# Patient Record
Sex: FEMALE | Race: WHITE | HISPANIC OR LATINO | Employment: OTHER | URBAN - METROPOLITAN AREA
[De-identification: names, ages, dates, MRNs, and addresses within clinical notes are randomized per-mention and may not be internally consistent; named-entity substitution may affect disease eponyms.]

---

## 2017-02-01 ENCOUNTER — TELEPHONE (OUTPATIENT)
Dept: UROLOGY | Facility: CLINIC | Age: 72
End: 2017-02-01

## 2017-02-01 ENCOUNTER — OFFICE VISIT (OUTPATIENT)
Dept: UROLOGY | Facility: CLINIC | Age: 72
End: 2017-02-01
Payer: MEDICARE

## 2017-02-01 VITALS
DIASTOLIC BLOOD PRESSURE: 71 MMHG | SYSTOLIC BLOOD PRESSURE: 119 MMHG | BODY MASS INDEX: 26.63 KG/M2 | HEIGHT: 65 IN | HEART RATE: 73 BPM | WEIGHT: 159.81 LBS

## 2017-02-01 DIAGNOSIS — R31.29 HEMATURIA, MICROSCOPIC: Primary | ICD-10-CM

## 2017-02-01 DIAGNOSIS — N95.2 VAGINAL ATROPHY: Primary | ICD-10-CM

## 2017-02-01 DIAGNOSIS — R39.89 URETHRAL PAIN: ICD-10-CM

## 2017-02-01 DIAGNOSIS — R93.89 ABNORMAL CT SCAN: ICD-10-CM

## 2017-02-01 DIAGNOSIS — N13.30 HYDRONEPHROSIS, RIGHT: ICD-10-CM

## 2017-02-01 PROCEDURE — 99999 PR PBB SHADOW E&M-EST. PATIENT-LVL III: CPT | Mod: PBBFAC,,, | Performed by: UROLOGY

## 2017-02-01 PROCEDURE — 99215 OFFICE O/P EST HI 40 MIN: CPT | Mod: 25,S$GLB,, | Performed by: UROLOGY

## 2017-02-01 PROCEDURE — 87077 CULTURE AEROBIC IDENTIFY: CPT

## 2017-02-01 PROCEDURE — 3078F DIAST BP <80 MM HG: CPT | Mod: S$GLB,,, | Performed by: UROLOGY

## 2017-02-01 PROCEDURE — 81002 URINALYSIS NONAUTO W/O SCOPE: CPT | Mod: S$GLB,,, | Performed by: UROLOGY

## 2017-02-01 PROCEDURE — 1157F ADVNC CARE PLAN IN RCRD: CPT | Mod: S$GLB,,, | Performed by: UROLOGY

## 2017-02-01 PROCEDURE — 3074F SYST BP LT 130 MM HG: CPT | Mod: S$GLB,,, | Performed by: UROLOGY

## 2017-02-01 PROCEDURE — 87186 SC STD MICRODIL/AGAR DIL: CPT

## 2017-02-01 PROCEDURE — 1160F RVW MEDS BY RX/DR IN RCRD: CPT | Mod: S$GLB,,, | Performed by: UROLOGY

## 2017-02-01 PROCEDURE — 1159F MED LIST DOCD IN RCRD: CPT | Mod: S$GLB,,, | Performed by: UROLOGY

## 2017-02-01 PROCEDURE — 1126F AMNT PAIN NOTED NONE PRSNT: CPT | Mod: S$GLB,,, | Performed by: UROLOGY

## 2017-02-01 PROCEDURE — 87086 URINE CULTURE/COLONY COUNT: CPT

## 2017-02-01 PROCEDURE — 87088 URINE BACTERIA CULTURE: CPT

## 2017-02-01 NOTE — MR AVS SNAPSHOT
[facility] 4th Floor  [address]  [place] [address]  Phone: [phone]                  Aminah Alicea-Dermody   2017 9:40 AM   Office Visit    Description:  Female : 1945   Provider:  Mary Murray MD   Department:  [facility] - Urology 4th Floor           Reason for Visit     Dysuria           Diagnoses this Visit        Comments    Vaginal atrophy    -  Primary     Urethral pain         Hydronephrosis, right                To Do List           Goals (5 Years of Data)     None       These Medications        Disp Refills Start End    conjugated estrogens (PREMARIN) vaginal cream 30 g 3 2017 3/3/2017    Place 0.5 g vaginally 3 (three) times a week. - Vaginal    Pharmacy: [facility] #[id number] - [place], [place] - [address]  #: [phone]         [facility]     [facility] Care [facility] -  Assistance  Registered nurses in the [facility] provide clinical advisement, health education, appointment booking, and other advisory services.  Call for this free service at [phone].             Medications           Message regarding Medications     Verify the changes and/or additions to your medication regime listed below are the same as discussed with your clinician today.  If any of these changes or additions are incorrect, please notify your healthcare provider.        START taking these NEW medications        Refills    conjugated estrogens (PREMARIN) vaginal cream 3    Sig: Place 0.5 g vaginally 3 (three) times a week.    Class: Normal    Route: Vaginal           Verify that the below list of medications is an accurate representation of the medications you are currently taking.  If none reported, the list may be blank. If incorrect, please contact your healthcare provider. Carry this list with you in case of emergency.           Current Medications     alcohol swabs (ALCOHOL PADS) PadM Apply 1 each topically as  "needed.    aspirin (ECOTRIN) 81 MG EC tablet Take 81 mg by mouth once daily.    biotin 1 mg tablet Take 1,000 mcg by mouth 3 (three) times daily.    blood sugar diagnostic (ACCU-CHEK SMARTVIEW TEST STRIP) Strp To use as directed with AccuChek Shara meter and monitor blood sugar daily    blood-glucose meter Misc Accu Check Sahra Smartview meter    carvedilol (COREG) 12.5 MG tablet Take 1 tablet (12.5 mg total) by mouth 2 (two) times daily.    clotrimazole-betamethasone 1-0.05% (LOTRISONE) cream Apply topically 2 (two) times daily.    cyclobenzaprine (FLEXERIL) 10 MG tablet Take 1 tablet (10 mg total) by mouth 3 (three) times daily as needed for Muscle spasms.    econazole nitrate 1 % cream AAA bid after cool blow dry when flared    gabapentin (NEURONTIN) 300 MG capsule Take 1 capsule (300 mg total) by mouth every evening.    lancets (ACCU-CHEK MULTICLIX LANCET) Misc To use as directed with Accu Chek Sahra FastClix lancing device    levothyroxine (SYNTHROID) 112 MCG tablet Take 1 tablet (112 mcg total) by mouth before breakfast.    lisinopril (PRINIVIL,ZESTRIL) 40 MG tablet Take 1 tablet (40 mg total) by mouth once daily.    metformin (GLUCOPHAGE) 1000 MG tablet Take 1 tablet (1,000 mg total) by mouth 2 (two) times daily with meals.    pravastatin (PRAVACHOL) 80 MG tablet Take 1 tablet (80 mg total) by mouth once daily.    conjugated estrogens (PREMARIN) vaginal cream Place 0.5 g vaginally 3 (three) times a week.    fluocinonide (LIDEX) 0.05 % external solution Apply topically 2 (two) times daily.           Clinical Reference Information           Vital Signs - Last Recorded  Most recent update: 2/1/2017  9:44 AM by Anitha Quiroz LPN    BP Pulse Ht Wt BMI    119/71 73 5' 5" (1.651 m) 72.5 kg (159 lb 13.3 oz) 26.6 kg/m2      Blood Pressure          Most Recent Value    BP  119/71      Allergies as of 2/1/2017     Bufferin [Aspirin, Buffered]    Ibuprofen      Immunizations Administered on Date of Encounter - 2/1/2017     " None      [facility] Sign-Up     Activating your [facility] account is as easy as 1-2-3!     1) Visit [email or web address], select Sign Up Now, enter this activation code and your date of birth, then select Next.  [email or web address]  Expires: 3/18/2017 10:20 AM      2) Create a username and password to use when you visit [facility] in the future and select a security question in case you lose your password and select Next.    3) Enter your e-mail address and click Sign Up!    Additional Information  If you have questions, please e-mail [email or web address] or call [phone] to talk to our [facility] staff. Remember, [facility] is NOT to be used for urgent needs. For medical emergencies, dial 911.

## 2017-02-01 NOTE — PROGRESS NOTES
CHIEF COMPLAINT:    Mrs. Norton is a 71 y.o. female presenting for a consultation at the request of Dr. Dereje Thomas. Patient presents with dysuria.    PRESENTING ILLNESS:    Aminah Norton is a 71 y.o. female who states that she has a history of a burning sensation, deep within the pelvis.  Started in October.  She also notes that the urinary flow is not very good.  She has been checked for a UTI but was found to have no growth.  Review of her chart reveals that she saw Dr. Hannah last fall for microscopic hematuria and a CT urogram was obtained which showed she was supposed to have cystoscopy, RGPG, possible ureteroscopy, possible ureteral biopsy with fulguration and a stent, however, this was not done.  The patient denies any gross hematuria.  With regards to risk factors, she is a non smoker, she denies any chemotherapy or radiation therapy.      REVIEW OF SYSTEMS:    Review of Systems   Constitutional: Negative.    HENT: Negative.    Eyes:        Dry eyes   Respiratory: Negative.    Cardiovascular: Negative.    Gastrointestinal: Negative for diarrhea.   Genitourinary: Positive for dysuria.   Musculoskeletal: Positive for back pain.   Skin: Negative.    Neurological: Negative.    Endo/Heme/Allergies: Negative.    Psychiatric/Behavioral: Negative.      PATIENT HISTORY:    Past Medical History   Diagnosis Date    Arthritis     Cataract      left eye    Choroidal rupture of left eye     Diabetes mellitus type II     GERD (gastroesophageal reflux disease)     Hyperlipidemia     Hypertension     Hypothyroidism     Macular scar      right eye    Retinal degeneration      chorioretinal OD    Thyroid disease     Vitamin B 12 deficiency        Past Surgical History   Procedure Laterality Date    Total abdominal hysterectomy       DUB    Cholecystectomy      Bladder suspension      Cataract extraction       right eye        Family History   Problem Relation Age of Onset     Cancer Mother      uterine    COPD Father     Cancer Sister      liver    Hypertension Daughter     Thyroid disease Daughter     Diabetes Son     Hypertension Sister     Hypertension Sister     Cancer Daughter      uterine    Thyroid disease Daughter     Hypertension Daughter        Social History     Social History    Marital status:      Social History Main Topics    Smoking status: Never Smoker    Smokeless tobacco: Never Used    Alcohol use No    Drug use: No    Sexual activity: Yes     Partners: Male     Birth control/ protection: Post-menopausal      Comment:         Social History Narrative     with 7 kids       Allergies:  Bufferin [aspirin, buffered] and Ibuprofen    Medications:  Outpatient Encounter Prescriptions as of 2/1/2017   Medication Sig Dispense Refill    alcohol swabs (ALCOHOL PADS) PadM Apply 1 each topically as needed. 100 each 3    aspirin (ECOTRIN) 81 MG EC tablet Take 81 mg by mouth once daily.      biotin 1 mg tablet Take 1,000 mcg by mouth 3 (three) times daily.      blood sugar diagnostic (ACCU-CHEK SMARTVIEW TEST STRIP) Strp To use as directed with AccuChek Sahra meter and monitor blood sugar daily 100 strip 3    blood-glucose meter Misc Accu Check Sahra Smartview meter 1 each 0    carvedilol (COREG) 12.5 MG tablet Take 1 tablet (12.5 mg total) by mouth 2 (two) times daily. 180 tablet 3    clotrimazole-betamethasone 1-0.05% (LOTRISONE) cream Apply topically 2 (two) times daily. 45 g 1    cyclobenzaprine (FLEXERIL) 10 MG tablet Take 1 tablet (10 mg total) by mouth 3 (three) times daily as needed for Muscle spasms. 30 tablet 1    econazole nitrate 1 % cream AAA bid after cool blow dry when flared 85 g 2    gabapentin (NEURONTIN) 300 MG capsule Take 1 capsule (300 mg total) by mouth every evening. 90 capsule 3    lancets (ACCU-CHEK MULTICLIX LANCET) Misc To use as directed with Accu Chek Sahra FastClix lancing device 100 each 2    levothyroxine  (SYNTHROID) 112 MCG tablet Take 1 tablet (112 mcg total) by mouth before breakfast. 90 tablet 3    lisinopril (PRINIVIL,ZESTRIL) 40 MG tablet Take 1 tablet (40 mg total) by mouth once daily. 90 tablet 3    metformin (GLUCOPHAGE) 1000 MG tablet Take 1 tablet (1,000 mg total) by mouth 2 (two) times daily with meals. 180 tablet 3    pravastatin (PRAVACHOL) 80 MG tablet Take 1 tablet (80 mg total) by mouth once daily. 90 tablet 3    conjugated estrogens (PREMARIN) vaginal cream Place 0.5 g vaginally 3 (three) times a week. 30 g 3    fluocinonide (LIDEX) 0.05 % external solution Apply topically 2 (two) times daily. 60 mL 1     No facility-administered encounter medications on file as of 2/1/2017.          PHYSICAL EXAMINATION:    The patient generally appears in good health, is appropriately interactive, and is in no apparent distress.    Skin: No lesions.    Mental: Cooperative with normal affect.    Neuro: Grossly intact.    HEENT: Normal. No evidence of lymphadenopathy.    Chest: Clear to ascultation bilaterally, normal inspiratory effort.    Heart: Regular rhythm.  No murmurs    Abdomen: BS active. Soft, non-tender. No masses or organomegaly. Bladder is not palpable. No evidence of flank discomfort. No evidence of inguinal hernia.    Extremities: No clubbing, cyanosis, or edema    Normal external female genitalia  Grade II urogenital atrophy  Urethral meatus is normal  Urethra and bladder are nontender to bimanual exam  Well supported anteriorly and posteriorly   Uterus and cervix are normal  No adnexal masses    LABS:    Reviewed the CT scan 10/11/2016 with the patient (images and report)  Explained the abnormality and the rationale for performing cysto, B RGPG,, ureteroscopy, possible ureteral biopsy and fulguration, ureteral stent.      IMPRESSION:    Encounter Diagnoses   Name Primary?    Vaginal atrophy Yes    Urethral pain     Hydronephrosis, right        PLAN:    1.  Catheterized specimen sent for  culture  2.  Consent signed for cystoscopy, bilateral retrograde pyelogram, possible ureteroscopy, ureteral biopsy and fulguration and stent  3.  Premarin cream.  MOA was discussed    Copy to:  Dr. Thomas

## 2017-02-01 NOTE — LETTER
February 1, 2017      Dereje Thomas MD  [address]  [facility] [address]           [facility] - Urology 4th Floor  [address]  [facility] [address]  Phone: [phone]          Patient: Aminah Norton   MR Number: [id number]   YOB: 1945   Date of Visit: 2/1/2017       Dear Dr. Dereje Thomas:    Thank you for referring Aminah Norton to me for evaluation. Attached you will find relevant portions of my assessment and plan of care.    If you have questions, please do not hesitate to call me. I look forward to following Aminah Norton along with you.    Sincerely,    Mary Murray MD    Enclosure  CC:  No Recipients    If you would like to receive this communication electronically, please contact [email or web address] or [phone] to request more information on [employer] access.    For providers and/or their staff who would like to refer a patient to [facility], please contact us through our one-stop-shop provider referral line, [facility], at [phone].    If you feel you have received this communication in error or would no longer like to receive these types of communications, please e-mail [email or web address]

## 2017-02-03 LAB — BACTERIA UR CULT: NORMAL

## 2017-02-06 ENCOUNTER — TELEPHONE (OUTPATIENT)
Dept: UROLOGY | Facility: CLINIC | Age: 72
End: 2017-02-06

## 2017-02-06 DIAGNOSIS — N30.90 BLADDER INFECTION: Primary | ICD-10-CM

## 2017-02-06 RX ORDER — SULFAMETHOXAZOLE AND TRIMETHOPRIM 800; 160 MG/1; MG/1
1 TABLET ORAL 2 TIMES DAILY
Qty: 14 TABLET | Refills: 0 | Status: SHIPPED | OUTPATIENT
Start: 2017-02-06 | End: 2017-02-13

## 2017-02-06 NOTE — TELEPHONE ENCOUNTER
Left message on the patient's number [phone] that a prescription for Bactrim DS was sent to her [facility] pharmacy.

## 2017-02-24 ENCOUNTER — ANESTHESIA EVENT (OUTPATIENT)
Dept: SURGERY | Facility: HOSPITAL | Age: 72
End: 2017-02-24
Payer: MEDICARE

## 2017-02-24 NOTE — ANESTHESIA PREPROCEDURE EVALUATION
Pre Admission Screening  Hayley Sampson RN      []Hide copied text  Anesthesia Assessment: Preoperative EQUATION     Planned Procedure: Procedure(s) (LRB):  CYSTOSCOPY WITH RETROGRADE PYELOGRAM (Bilateral)  PLACEMENT-STENT (Right)  URETEROSCOPY (Right)  BIOPSY-URETER (Right)  Requested Anesthesia Type:General  Surgeon: Mary Murray MD  Service: Urology  Known or anticipated Date of Surgery:3/7/2017     Surgeon notes: reviewed     Electronic QUestionnaire Assessment completed via nurse interview with patient.         Aminah Norton [[id number]] - 71 y.o. Female         Providers Outside of [facility]       No data to display        Surgical Risk Level       Surgical Risk Level:   2              caRDScore (Clinical Anesthesia Rapid Decision Score)         Low  Total Score: 14       14 Sum of Clinical Scores        caRDScores (Grouped)       caRDScore - Ane:   1                       caRDScore - CVD:   3                       caRDScore - Pul:   0                       caRDScore - Met:   5                       caRDScore - Phy:   5              caRDScore Items             Pre-admit from 3/7/2017 in [facility]      Anesthesia        Has GERD, hiatal hernia, or chronic heartburn/dyspepsia requiring Rx some or all times   Yes      CVD        Activity similar to best ability for maximal activity or exercise   METS 4      Diagnosed with high blood pressure   Yes      Typical BP runs <150/90   Yes      PVD in abdomen or legs   Yes      Pulmonary        Metabolic        Type 2 Diabetes   Yes      Is on oral Rx and/or non-insulin injectable for diabetes   Yes      Thyroid disease (Specify)   Yes [hypothyroidism]      Problem well controlled by med or other Rx   Yes      Has hyperlipoproteinemia   Yes      Physiologic        Obesity Status   Not Obese (BMI <30)      Having abnormal bleeding (intestinal, urinary, vaginal, coughing up blood, etc.)   Yes [microscopic hematuria]      Aspirin  (taken because I have stents)   Yes        Flags       Red Flag Score:   1                       Yellow Flag Score:   3              Red Flags             Pre-admit from 3/7/2017 in [facility]      Obesity Status   Not Obese (BMI <30)      Aspirin (taken because I have stents)   Yes        Yellow Flags             Pre-admit from 3/7/2017 in [facility]      Obesity Status   Not Obese (BMI <30)      Having abnormal bleeding (intestinal, urinary, vaginal, coughing up blood, etc.)   Yes [microscopic hematuria]      Aspirin   Yes      Has pain   Yes        PONV Risk Score (assumes periop narcotic use = +1, Max=4)       PONV Risk Score:   3              PONV Risk Factors  Total Score: 2       1 Female      1 Non-Smoker at present        Sleep Apnea  Total Score: 0         MATTHEW STOP-Bang Risk Factors (Max=8)  Total Score: 2       1 Takes medication for high blood pressure      1 Age >50        MATTHEW Risk Level - 1 (Low), 2 (Moderate), 3 (High)       MATTHEW Risk Level:   1              RCRI (Revised Cardiac Risk Indices of ACC/AHA guidelines, Max=6)  Total Score: 0         CAD Risk Factors  Total Score: 5       1 Female. Age >55      1 Diagnosed with high blood pressure      1 Has/has had non-cardiac arterial blockages or aneurysm (PVD) now or in the past      1 Has Diabetes      1 Has hyperlipoproteinemia        CHADS Score if applicable (history of atrial fib/flutter, Max=6)  Total Score: 2       1 Diagnosed with high blood pressure      1 Has Diabetes        Maximal Exercise Capacity             Pre-admit from 3/7/2017 in [facility]      Maximal Exercise Capacity   METS 4        Summary of Dependence  Total Score: 1       1 Is totally independent of others for activities of daily living        Phone Fraility Score (Max = 17)  Total Score: 1       1 Uses 5 or more meds on reg basis        Pain Factors             [facility]      Has pain   Yes      Location and description of pain   abdominal pain      Typical Pain Scores   0 to 4        Risk Triggers (Evidence-Based Risk Triggers)         Pulmonary Risk Triggers  Total Score: 1       1 Age > or = 60        Renal Risk Triggers  Total Score: 3       1 Diagnosed with high blood pressure      1 PVD in abdomen or legs      1 Type 2 Diabetes        Delirium Risk Triggers  Total Score: 0         Urologic Risk Triggers  Total Score: 0         Logistics         Pre-op Clinic Logistics  Total Score: 5       1 Has had anesthesia, either as adult or as a child      3 Aspirin (taken because I have stents)      1 Takes medication for high blood pressure        DOSC Logistics  Total Score: 1       1 Aspirin (taken because I have stents)        Discharge Logistics  Total Score: 0         Discharge Planning             Pre-admit from 3/7/2017 in [facility]      Discharge Planning        Will assist patient 24/7, if needed   daughter      Who will transport you to therapy, if need   daughter        Anes <For office use only>       Total Score: 12          Surgical Risk Level Assessment                       Triage considerations:      The patient has no apparent active cardiac condition (No unstable coronary Syndrome such as severe unstable angina or recent [<1 month] myocardial infarction, decompensated CHF, severe valvular disease or significant arrhythmia)     Previous anesthesia records:GETA, MAC, No problems and Not available     Last PCP note: within 3 months , within [facility]  12/28/16 Dr Thomas  Subspecialty notes: Cardiology: General, Endocrinology     Other important co-morbidities: HTN/HLP, Hypothyroidism, DM2, GERD, mild anemia      Tests already available:  Available tests, 3-6 months ago . 11/2016 TSH, A1c, CBC, CMP. 2014 EKG      Instructions given. (See in Nurse's note)     Optimization:  Anesthesia Preop Clinic Assessment Indicated-not required for  this surgery      Plan:   Testing: BMP  Patient has previously scheduled Medical Appointment:none     Navigation: Tests Scheduled. Am of surgery  Straight Line to surgery.       Electronically signed by Hayley Sampson RN at 2/24/2017 11:23 AM        Pre-admit on 3/7/2017              Detailed Report                                                                                                                        02/24/2017  Aminah Norton is a 71 y.o., female.    [facility] Anesthesia Evaluation    I have reviewed the Patient Summary Reports.    I have reviewed the Nursing Notes.   I have reviewed the Medications.     Review of Systems  Anesthesia Hx:  No problems with previous Anesthesia    Social:  Non-Smoker, No Alcohol Use    Hematology/Oncology:  Hematology Normal   Oncology Normal     EENT/Dental:EENT/Dental Normal   Cardiovascular:   Exercise tolerance: good Hypertension NYHA Classification I    Pulmonary:  Pulmonary Normal    Renal/:  Renal/ Normal     Hepatic/GI:   GERD, well controlled    Musculoskeletal:  Musculoskeletal Normal    Neurological:  Neurology Normal    Endocrine:   Diabetes, poorly controlled, type 2 Hypothyroidism    Dermatological:  Skin Normal    Psych:  Psychiatric Normal           Physical Exam  General:  Well nourished, Obesity    Airway/Jaw/Neck:  Airway Findings: Mouth Opening: Normal Tongue: Normal  General Airway Assessment: Adult  Mallampati: II  Improves to II with phonation.  TM Distance: Normal, at least 6 cm        Eyes/Ears/Nose:  EYES/EARS/NOSE FINDINGS: Normal   Dental:  DENTAL FINDINGS: Normal   Chest/Lungs:  Chest/Lungs Findings: Clear to auscultation, Normal Respiratory Rate     Heart/Vascular:  Heart Findings: Rate: Normal  Rhythm: Regular Rhythm  Sounds: Normal     Abdomen:  Abdomen Findings: Normal    Musculoskeletal:  Musculoskeletal Findings: Normal   Skin:  Skin Findings: Normal    Mental Status:  Mental Status Findings:  Cooperative, Alert and  Oriented         Anesthesia Plan  Type of Anesthesia, risks & benefits discussed:  Anesthesia Type:  general  Patient's Preference:   Intra-op Monitoring Plan: standard ASA monitors  Intra-op Monitoring Plan Comments:   Post Op Pain Control Plan:   Post Op Pain Control Plan Comments:   Induction:   IV  Beta Blocker:  Patient is on a Beta-Blocker and has received one dose within the past 24 hours (No further documentation required).       Informed Consent: Patient understands risks and agrees with Anesthesia plan.  Questions answered. Anesthesia consent signed with patient.  ASA Score: 3     Day of Surgery Review of History & Physical:    H&P update referred to the surgeon.     Anesthesia Plan Notes: Patient with elevated BP this am. No signs of end organ dysfunction. Appears very anxious. Did not take her medications this am or yesterday. Will giver her home beta blocker and watch closely.        Ready For Surgery From Anesthesia Perspective.      Lab Results   Component Value Date    HGBA1C 7.6 (H) 11/29/2016

## 2017-02-24 NOTE — PRE ADMISSION SCREENING
Anesthesia Assessment: Preoperative EQUATION    Planned Procedure: Procedure(s) (LRB):  CYSTOSCOPY WITH RETROGRADE PYELOGRAM (Bilateral)  PLACEMENT-STENT (Right)  URETEROSCOPY (Right)  BIOPSY-URETER (Right)  Requested Anesthesia Type:General  Surgeon: Mary Murray MD  Service: Urology  Known or anticipated Date of Surgery:3/7/2017    Surgeon notes: reviewed    Electronic QUestionnaire Assessment completed via nurse interview with patient.        NixonEfrainAminah johnson [[id number]] - 71 y.o. Female        Providers Outside of [facility]      No data to display       Surgical Risk Level      Surgical Risk Level:  2           caRDScore (Clinical Anesthesia Rapid Decision Score)        Low  Total Score: 14      14 Sum of Clinical Scores       caRDScores (Grouped)      caRDScore - Ane:  1                caRDScore - CVD:  3                caRDScore - Pul:  0                caRDScore - Met:  5                caRDScore - Phy:  5           caRDScore Items           Pre-admit from 3/7/2017 in [facility]     Anesthesia      Has GERD, hiatal hernia, or chronic heartburn/dyspepsia requiring Rx some or all times  Yes     CVD      Activity similar to best ability for maximal activity or exercise  METS 4     Diagnosed with high blood pressure  Yes     Typical BP runs <150/90  Yes     PVD in abdomen or legs  Yes     Pulmonary      Metabolic      Type 2 Diabetes  Yes     Is on oral Rx and/or non-insulin injectable for diabetes  Yes     Thyroid disease (Specify)  Yes [hypothyroidism]     Problem well controlled by med or other Rx  Yes     Has hyperlipoproteinemia  Yes     Physiologic      Obesity Status  Not Obese (BMI <30)     Having abnormal bleeding (intestinal, urinary, vaginal, coughing up blood, etc.)  Yes [microscopic hematuria]     Aspirin (taken because I have stents)  Yes       Flags      Red Flag Score:  1                Yellow Flag Score:  3           Red Flags           Pre-admit from 3/7/2017  in [facility]     Obesity Status  Not Obese (BMI <30)     Aspirin (taken because I have stents)  Yes       Yellow Flags           Pre-admit from 3/7/2017 in [facility]     Obesity Status  Not Obese (BMI <30)     Having abnormal bleeding (intestinal, urinary, vaginal, coughing up blood, etc.)  Yes [microscopic hematuria]     Aspirin  Yes     Has pain  Yes       PONV Risk Score (assumes periop narcotic use = +1, Max=4)      PONV Risk Score:  3           PONV Risk Factors  Total Score: 2      1 Female     1 Non-Smoker at present       Sleep Apnea  Total Score: 0        MATTHEW STOP-Bang Risk Factors (Max=8)  Total Score: 2      1 Takes medication for high blood pressure     1 Age >50       MATTHEW Risk Level - 1 (Low), 2 (Moderate), 3 (High)      MATTHEW Risk Level:  1           RCRI (Revised Cardiac Risk Indices of ACC/AHA guidelines, Max=6)  Total Score: 0        CAD Risk Factors  Total Score: 5      1 Female. Age >55     1 Diagnosed with high blood pressure     1 Has/has had non-cardiac arterial blockages or aneurysm (PVD) now or in the past     1 Has Diabetes     1 Has hyperlipoproteinemia       CHADS Score if applicable (history of atrial fib/flutter, Max=6)  Total Score: 2      1 Diagnosed with high blood pressure     1 Has Diabetes       Maximal Exercise Capacity           Pre-admit from 3/7/2017 in [facility]     Maximal Exercise Capacity  METS 4       Summary of Dependence  Total Score: 1      1 Is totally independent of others for activities of daily living       Phone Fraility Score (Max = 17)  Total Score: 1      1 Uses 5 or more meds on reg basis       Pain Factors           Pre-admit from 3/7/2017 in [facility]     Has pain  Yes     Location and description of pain  abdominal pain     Typical Pain Scores  0 to 4       Risk Triggers (Evidence-Based Risk Triggers)        Pulmonary Risk Triggers  Total Score: 1      1 Age > or = 60        Renal Risk Triggers  Total Score: 3      1 Diagnosed with high blood pressure     1 PVD in abdomen or legs     1 Type 2 Diabetes       Delirium Risk Triggers  Total Score: 0        Urologic Risk Triggers  Total Score: 0        Logistics        Pre-op Clinic Logistics  Total Score: 5      1 Has had anesthesia, either as adult or as a child     3 Aspirin (taken because I have stents)     1 Takes medication for high blood pressure       DOSC Logistics  Total Score: 1      1 Aspirin (taken because I have stents)       Discharge Logistics  Total Score: 0        Discharge Planning           Pre-admit from 3/7/2017 in [facility]     Discharge Planning      Will assist patient 24/7, if needed  daughter     Who will transport you to therapy, if need  daughter       Anes <For office use only>      Total Score: 12        Surgical Risk Level Assessment                 Triage considerations:     The patient has no apparent active cardiac condition (No unstable coronary Syndrome such as severe unstable angina or recent [<1 month] myocardial infarction, decompensated CHF, severe valvular   disease or significant arrhythmia)    Previous anesthesia records:GETA, MAC, No problems and Not available    Last PCP note: within 3 months , within [facility]   Subspecialty notes: Cardiology: General, Endocrinology    Other important co-morbidities: HTN/HLP, Hypothyroidism, DM2, GERD     Tests already available:  Available tests,  3-6 months ago . 11/2016 TSH, A1c, CBC, CMP. 2014 EKG            Instructions given. (See in Nurse's note)    Optimization:  Anesthesia Preop Clinic Assessment  Indicated-not required for this surgery      Plan:    Testing:  BMP      Patient  has previously scheduled Medical Appointment:none    Navigation: Tests Scheduled. Am of surgery                       Straight Line to surgery.

## 2017-03-06 ENCOUNTER — TELEPHONE (OUTPATIENT)
Dept: UROLOGY | Facility: CLINIC | Age: 72
End: 2017-03-06

## 2017-03-06 NOTE — TELEPHONE ENCOUNTER
Called pt to confirm arrival time of 5:30am for procedure. Gave pt NPO instructions and gave pt opportunity to ask questions. Pt verbalized understanding.

## 2017-03-07 ENCOUNTER — ANESTHESIA (OUTPATIENT)
Dept: SURGERY | Facility: HOSPITAL | Age: 72
End: 2017-03-07
Payer: MEDICARE

## 2017-03-07 ENCOUNTER — HOSPITAL ENCOUNTER (OUTPATIENT)
Facility: HOSPITAL | Age: 72
Discharge: HOME | End: 2017-03-07
Attending: UROLOGY | Admitting: UROLOGY
Payer: MEDICARE

## 2017-03-07 DIAGNOSIS — N13.5 URETERAL OBSTRUCTION: ICD-10-CM

## 2017-03-07 DIAGNOSIS — R31.9 HEMATURIA: ICD-10-CM

## 2017-03-07 LAB
POCT GLUCOSE: 181 MG/DL (ref 70–110)
POCT GLUCOSE: 182 MG/DL (ref 70–110)

## 2017-03-07 PROCEDURE — D9220A PRA ANESTHESIA: Mod: CRNA,,, | Performed by: NURSE ANESTHETIST, CERTIFIED REGISTERED

## 2017-03-07 PROCEDURE — 71000015 HC POSTOP RECOV 1ST HR: Performed by: UROLOGY

## 2017-03-07 PROCEDURE — 25000003 PHARM REV CODE 250: Performed by: ANESTHESIOLOGY

## 2017-03-07 PROCEDURE — 88305 TISSUE EXAM BY PATHOLOGIST: CPT | Mod: 26,,, | Performed by: PATHOLOGY

## 2017-03-07 PROCEDURE — 52354 CYSTOURETERO W/BIOPSY: CPT | Mod: RT,,, | Performed by: UROLOGY

## 2017-03-07 PROCEDURE — 88305 TISSUE EXAM BY PATHOLOGIST: CPT | Mod: 59 | Performed by: PATHOLOGY

## 2017-03-07 PROCEDURE — 52332 CYSTOSCOPY AND TREATMENT: CPT | Mod: 51,RT,, | Performed by: UROLOGY

## 2017-03-07 PROCEDURE — 36000707: Performed by: UROLOGY

## 2017-03-07 PROCEDURE — 36000706: Performed by: UROLOGY

## 2017-03-07 PROCEDURE — C1758 CATHETER, URETERAL: HCPCS | Performed by: UROLOGY

## 2017-03-07 PROCEDURE — C2617 STENT, NON-COR, TEM W/O DEL: HCPCS | Performed by: UROLOGY

## 2017-03-07 PROCEDURE — 71000039 HC RECOVERY, EACH ADD'L HOUR: Performed by: UROLOGY

## 2017-03-07 PROCEDURE — 25000003 PHARM REV CODE 250: Performed by: NURSE ANESTHETIST, CERTIFIED REGISTERED

## 2017-03-07 PROCEDURE — D9220A PRA ANESTHESIA: Mod: ANES,,, | Performed by: ANESTHESIOLOGY

## 2017-03-07 PROCEDURE — 25000003 PHARM REV CODE 250: Performed by: UROLOGY

## 2017-03-07 PROCEDURE — 71000033 HC RECOVERY, INTIAL HOUR: Performed by: UROLOGY

## 2017-03-07 PROCEDURE — 76000 FLUOROSCOPY <1 HR PHYS/QHP: CPT | Mod: 26,59,, | Performed by: UROLOGY

## 2017-03-07 PROCEDURE — 63600175 PHARM REV CODE 636 W HCPCS: Performed by: NURSE ANESTHETIST, CERTIFIED REGISTERED

## 2017-03-07 PROCEDURE — 74420 UROGRAPHY RTRGR +-KUB: CPT | Mod: 26,,, | Performed by: UROLOGY

## 2017-03-07 PROCEDURE — 52204 CYSTOSCOPY W/BIOPSY(S): CPT | Mod: 59,,, | Performed by: UROLOGY

## 2017-03-07 PROCEDURE — 37000009 HC ANESTHESIA EA ADD 15 MINS: Performed by: UROLOGY

## 2017-03-07 PROCEDURE — 27201423 OPTIME MED/SURG SUP & DEVICES STERILE SUPPLY: Performed by: UROLOGY

## 2017-03-07 PROCEDURE — 37000008 HC ANESTHESIA 1ST 15 MINUTES: Performed by: UROLOGY

## 2017-03-07 PROCEDURE — 25500020 PHARM REV CODE 255: Performed by: UROLOGY

## 2017-03-07 PROCEDURE — C1769 GUIDE WIRE: HCPCS | Performed by: UROLOGY

## 2017-03-07 DEVICE — STENT URETERAL UNIV 6FR 28CM: Type: IMPLANTABLE DEVICE | Site: URETER | Status: FUNCTIONAL

## 2017-03-07 RX ORDER — PHENYLEPHRINE HYDROCHLORIDE 10 MG/ML
INJECTION INTRAVENOUS
Status: DISCONTINUED | OUTPATIENT
Start: 2017-03-07 | End: 2017-03-07

## 2017-03-07 RX ORDER — CARVEDILOL 12.5 MG/1
12.5 TABLET ORAL ONCE
Status: COMPLETED | OUTPATIENT
Start: 2017-03-07 | End: 2017-03-07

## 2017-03-07 RX ORDER — SUCCINYLCHOLINE CHLORIDE 20 MG/ML
INJECTION INTRAMUSCULAR; INTRAVENOUS
Status: DISCONTINUED | OUTPATIENT
Start: 2017-03-07 | End: 2017-03-07

## 2017-03-07 RX ORDER — LIDOCAINE HYDROCHLORIDE 10 MG/ML
1 INJECTION INFILTRATION; PERINEURAL ONCE
Status: COMPLETED | OUTPATIENT
Start: 2017-03-07 | End: 2017-03-07

## 2017-03-07 RX ORDER — TAMSULOSIN HYDROCHLORIDE 0.4 MG/1
0.4 CAPSULE ORAL DAILY
Qty: 30 CAPSULE | Refills: 0 | Status: SHIPPED | OUTPATIENT
Start: 2017-03-07 | End: 2017-05-01 | Stop reason: SDUPTHER

## 2017-03-07 RX ORDER — POLYETHYLENE GLYCOL 3350 17 G/17G
17 POWDER, FOR SOLUTION ORAL DAILY
Qty: 30 PACKET | Refills: 0 | Status: ON HOLD | OUTPATIENT
Start: 2017-03-07 | End: 2021-05-19 | Stop reason: HOSPADM

## 2017-03-07 RX ORDER — ROCURONIUM BROMIDE 10 MG/ML
INJECTION, SOLUTION INTRAVENOUS
Status: DISCONTINUED | OUTPATIENT
Start: 2017-03-07 | End: 2017-03-07

## 2017-03-07 RX ORDER — SODIUM CHLORIDE 9 MG/ML
INJECTION, SOLUTION INTRAVENOUS CONTINUOUS
Status: DISCONTINUED | OUTPATIENT
Start: 2017-03-07 | End: 2017-03-07 | Stop reason: HOSPADM

## 2017-03-07 RX ORDER — OXYCODONE AND ACETAMINOPHEN 5; 325 MG/1; MG/1
1 TABLET ORAL EVERY 4 HOURS PRN
Qty: 21 TABLET | Refills: 0 | Status: SHIPPED | OUTPATIENT
Start: 2017-03-07 | End: 2017-09-27

## 2017-03-07 RX ORDER — PROPOFOL 10 MG/ML
VIAL (ML) INTRAVENOUS
Status: DISCONTINUED | OUTPATIENT
Start: 2017-03-07 | End: 2017-03-07

## 2017-03-07 RX ORDER — HYDROCODONE BITARTRATE AND ACETAMINOPHEN 5; 325 MG/1; MG/1
1 TABLET ORAL EVERY 4 HOURS PRN
Status: DISCONTINUED | OUTPATIENT
Start: 2017-03-07 | End: 2017-03-07 | Stop reason: HOSPADM

## 2017-03-07 RX ORDER — LIDOCAINE HCL/PF 100 MG/5ML
SYRINGE (ML) INTRAVENOUS
Status: DISCONTINUED | OUTPATIENT
Start: 2017-03-07 | End: 2017-03-07

## 2017-03-07 RX ORDER — ONDANSETRON 8 MG/1
8 TABLET, ORALLY DISINTEGRATING ORAL EVERY 8 HOURS PRN
Status: DISCONTINUED | OUTPATIENT
Start: 2017-03-07 | End: 2017-03-07 | Stop reason: HOSPADM

## 2017-03-07 RX ORDER — LIDOCAINE HYDROCHLORIDE 10 MG/ML
INJECTION, SOLUTION EPIDURAL; INFILTRATION; INTRACAUDAL; PERINEURAL
Status: DISCONTINUED
Start: 2017-03-07 | End: 2017-03-07 | Stop reason: HOSPADM

## 2017-03-07 RX ORDER — NEOSTIGMINE METHYLSULFATE 0.5 MG/ML
INJECTION, SOLUTION INTRAVENOUS
Status: DISCONTINUED | OUTPATIENT
Start: 2017-03-07 | End: 2017-03-07

## 2017-03-07 RX ORDER — ONDANSETRON 2 MG/ML
INJECTION INTRAMUSCULAR; INTRAVENOUS
Status: DISCONTINUED | OUTPATIENT
Start: 2017-03-07 | End: 2017-03-07

## 2017-03-07 RX ORDER — LIDOCAINE HYDROCHLORIDE 20 MG/ML
JELLY TOPICAL
Status: DISCONTINUED | OUTPATIENT
Start: 2017-03-07 | End: 2017-03-07 | Stop reason: HOSPADM

## 2017-03-07 RX ORDER — FENTANYL CITRATE 50 UG/ML
INJECTION, SOLUTION INTRAMUSCULAR; INTRAVENOUS
Status: DISCONTINUED | OUTPATIENT
Start: 2017-03-07 | End: 2017-03-07

## 2017-03-07 RX ORDER — GLYCOPYRROLATE 0.2 MG/ML
INJECTION INTRAMUSCULAR; INTRAVENOUS
Status: DISCONTINUED | OUTPATIENT
Start: 2017-03-07 | End: 2017-03-07

## 2017-03-07 RX ADMIN — ROCURONIUM BROMIDE 10 MG: 10 INJECTION, SOLUTION INTRAVENOUS at 07:03

## 2017-03-07 RX ADMIN — GLYCOPYRROLATE 0.4 MG: 0.2 INJECTION INTRAMUSCULAR; INTRAVENOUS at 08:03

## 2017-03-07 RX ADMIN — EPHEDRINE SULFATE 5 MG: 50 INJECTION, SOLUTION INTRAMUSCULAR; INTRAVENOUS; SUBCUTANEOUS at 08:03

## 2017-03-07 RX ADMIN — LIDOCAINE HYDROCHLORIDE 1 ML: 10 INJECTION, SOLUTION EPIDURAL; INFILTRATION; INTRACAUDAL; PERINEURAL at 06:03

## 2017-03-07 RX ADMIN — LIDOCAINE HYDROCHLORIDE 40 MG: 20 INJECTION, SOLUTION INTRAVENOUS at 08:03

## 2017-03-07 RX ADMIN — LIDOCAINE HYDROCHLORIDE 80 MG: 20 INJECTION, SOLUTION INTRAVENOUS at 07:03

## 2017-03-07 RX ADMIN — DEXTROSE 2 G: 50 INJECTION, SOLUTION INTRAVENOUS at 07:03

## 2017-03-07 RX ADMIN — LIDOCAINE HYDROCHLORIDE 20 MG: 20 INJECTION, SOLUTION INTRAVENOUS at 07:03

## 2017-03-07 RX ADMIN — PROPOFOL 200 MG: 10 INJECTION, EMULSION INTRAVENOUS at 07:03

## 2017-03-07 RX ADMIN — CARVEDILOL 12.5 MG: 12.5 TABLET, FILM COATED ORAL at 06:03

## 2017-03-07 RX ADMIN — SODIUM CHLORIDE: 0.9 INJECTION, SOLUTION INTRAVENOUS at 06:03

## 2017-03-07 RX ADMIN — FENTANYL CITRATE 100 MCG: 50 INJECTION, SOLUTION INTRAMUSCULAR; INTRAVENOUS at 07:03

## 2017-03-07 RX ADMIN — CARBOXYMETHYLCELLULOSE SODIUM 2 DROP: 2.5 SOLUTION/ DROPS OPHTHALMIC at 07:03

## 2017-03-07 RX ADMIN — NEOSTIGMINE METHYLSULFATE 3 MG: 0.5 INJECTION INTRAVENOUS at 08:03

## 2017-03-07 RX ADMIN — ROCURONIUM BROMIDE 5 MG: 10 INJECTION, SOLUTION INTRAVENOUS at 07:03

## 2017-03-07 RX ADMIN — EPHEDRINE SULFATE 5 MG: 50 INJECTION, SOLUTION INTRAMUSCULAR; INTRAVENOUS; SUBCUTANEOUS at 07:03

## 2017-03-07 RX ADMIN — PHENYLEPHRINE HYDROCHLORIDE 100 MCG: 10 INJECTION INTRAVENOUS at 07:03

## 2017-03-07 RX ADMIN — ONDANSETRON 4 MG: 2 INJECTION INTRAMUSCULAR; INTRAVENOUS at 07:03

## 2017-03-07 RX ADMIN — SUCCINYLCHOLINE CHLORIDE 140 MG: 20 INJECTION, SOLUTION INTRAMUSCULAR; INTRAVENOUS at 07:03

## 2017-03-07 NOTE — H&P
CHIEF COMPLAINT:     Mrs. Norton is a 71 y.o. female presenting for cystoscopy, bilateral retrograde pyelograms, possible ureteroscopy, possible ureteral biopsy and stent placement for a distal ureteral lesion seen on CT Scan.       PRESENTING ILLNESS:     Aminah Norton is a 71 y.o. female who states that she has a history of a burning sensation, deep within the pelvis. Started in October. She also notes that the urinary flow is not very good. She has been checked for a UTI but was found to have no growth. Review of her chart reveals that she saw Dr. Hannah last fall for microscopic hematuria and a CT urogram was obtained which showed she was supposed to have cystoscopy, RGPG, possible ureteroscopy, possible ureteral biopsy with fulguration and a stent, however, this was not done. The patient denies any gross hematuria. With regards to risk factors, she is a non smoker, she denies any chemotherapy or radiation therapy.      REVIEW OF SYSTEMS:     Review of Systems   Constitutional: Negative.   HENT: Negative.   Eyes:   Dry eyes   Respiratory: Negative.   Cardiovascular: Negative.   Gastrointestinal: Negative for diarrhea.   Genitourinary: Positive for dysuria.   Musculoskeletal: Positive for back pain.   Skin: Negative.   Neurological: Negative.   Endo/Heme/Allergies: Negative.   Psychiatric/Behavioral: Negative.      PATIENT HISTORY:           Past Medical History   Diagnosis Date    Arthritis      Cataract         left eye    Choroidal rupture of left eye      Diabetes mellitus type II      GERD (gastroesophageal reflux disease)      Hyperlipidemia      Hypertension      Hypothyroidism      Macular scar         right eye    Retinal degeneration         chorioretinal OD    Thyroid disease      Vitamin B 12 deficiency                  Past Surgical History   Procedure Laterality Date    Total abdominal hysterectomy           DUB    Cholecystectomy        Bladder suspension         Cataract extraction           right eye                 Family History   Problem Relation Age of Onset    Cancer Mother         uterine    COPD Father      Cancer Sister         liver    Hypertension Daughter      Thyroid disease Daughter      Diabetes Son      Hypertension Sister      Hypertension Sister      Cancer Daughter         uterine    Thyroid disease Daughter      Hypertension Daughter           Social History           Social History    Marital status:              Social History Main Topics    Smoking status: Never Smoker    Smokeless tobacco: Never Used    Alcohol use No    Drug use: No    Sexual activity: Yes       Partners: Male       Birth control/ protection: Post-menopausal         Comment:               Social History Narrative      with 7 kids         Allergies:  Bufferin [aspirin, buffered] and Ibuprofen     Medications:   Encounter Medications    Outpatient Encounter Prescriptions as of 2/1/2017   Medication Sig Dispense Refill    alcohol swabs (ALCOHOL PADS) PadM Apply 1 each topically as needed. 100 each 3    aspirin (ECOTRIN) 81 MG EC tablet Take 81 mg by mouth once daily.        biotin 1 mg tablet Take 1,000 mcg by mouth 3 (three) times daily.        blood sugar diagnostic (ACCU-CHEK SMARTVIEW TEST STRIP) Strp To use as directed with AccuChek Sahra meter and monitor blood sugar daily 100 strip 3    blood-glucose meter Misc Accu Check Sahra Smartview meter 1 each 0    carvedilol (COREG) 12.5 MG tablet Take 1 tablet (12.5 mg total) by mouth 2 (two) times daily. 180 tablet 3    clotrimazole-betamethasone 1-0.05% (LOTRISONE) cream Apply topically 2 (two) times daily. 45 g 1    cyclobenzaprine (FLEXERIL) 10 MG tablet Take 1 tablet (10 mg total) by mouth 3 (three) times daily as needed for Muscle spasms. 30 tablet 1    econazole nitrate 1 % cream AAA bid after cool blow dry when flared 85 g 2    gabapentin (NEURONTIN) 300 MG capsule Take 1 capsule  (300 mg total) by mouth every evening. 90 capsule 3    lancets (ACCU-CHEK MULTICLIX LANCET) Misc To use as directed with Accu Chek Sahra FastClix lancing device 100 each 2    levothyroxine (SYNTHROID) 112 MCG tablet Take 1 tablet (112 mcg total) by mouth before breakfast. 90 tablet 3    lisinopril (PRINIVIL,ZESTRIL) 40 MG tablet Take 1 tablet (40 mg total) by mouth once daily. 90 tablet 3    metformin (GLUCOPHAGE) 1000 MG tablet Take 1 tablet (1,000 mg total) by mouth 2 (two) times daily with meals. 180 tablet 3    pravastatin (PRAVACHOL) 80 MG tablet Take 1 tablet (80 mg total) by mouth once daily. 90 tablet 3    conjugated estrogens (PREMARIN) vaginal cream Place 0.5 g vaginally 3 (three) times a week. 30 g 3    fluocinonide (LIDEX) 0.05 % external solution Apply topically 2 (two) times daily. 60 mL 1      No facility-administered encounter medications on file as of 2/1/2017.                PHYSICAL EXAMINATION:     The patient generally appears in good health, is appropriately interactive, and is in no apparent distress.     Skin: No lesions.     Mental: Cooperative with normal affect.     Neuro: Grossly intact.     HEENT: Normal. No evidence of lymphadenopathy.     Chest: Clear to ascultation bilaterally, normal inspiratory effort.     Heart: Regular rhythm. No murmurs     Abdomen: BS active. Soft, non-tender. No masses or organomegaly. Bladder is not palpable. No evidence of flank discomfort. No evidence of inguinal hernia.     Extremities: No clubbing, cyanosis, or edema     Normal external female genitalia  Grade II urogenital atrophy  Urethral meatus is normal  Urethra and bladder are nontender to bimanual exam  Well supported anteriorly and posteriorly   Uterus and cervix are normal  No adnexal masses     LABS:     Reviewed the CT scan 10/11/2016 with the patient (images and report) Explained the abnormality and the rationale for performing cysto, B RGPG,, ureteroscopy, possible ureteral biopsy and  fulguration, ureteral stent.  There is right sided hydroureteronephrosis leading down to a distal ureteral mass.       IMPRESSION:          Encounter Diagnoses   Name Primary?    Vaginal atrophy Yes    Urethral pain      Hydronephrosis, right           PLAN:     1. To OR today  2. All questions answered  3. Consent obtained    UA negative for infection    Kait Bartlett MD  Urology, PGY-2  Pager# [phone]    Patient seen in holding.  No changes in clinical condition.  Proceed with planned procedure.

## 2017-03-07 NOTE — IP AVS SNAPSHOT
[facility]  [address]  [facility] [address]  Phone: [phone]           Patient Discharge Instructions     Our goal is to set you up for success. This packet includes information on your condition, medications, and your home care. It will help you to care for yourself so you don't get sicker and need to go back to the hospital.     Please ask your nurse if you have any questions.        There are many details to remember when preparing to leave the hospital. Here is what you will need to do:    1. Take your medicine. If you are prescribed medications, review your Medication List in the following pages. You may have new medications to  at the pharmacy and others that you'll need to stop taking. Review the instructions for how and when to take your medications. Talk with your doctor or nurses if you are unsure of what to do.     2. Go to your follow-up appointments. Specific follow-up information is listed in the following pages. Your may be contacted by a transition nurse or clinical provider about future appointments. Be sure we have all of the phone numbers to reach you, if needed. Please contact your provider's office if you are unable to make an appointment.     3. Watch for warning signs. Your doctor or nurse will give you detailed warning signs to watch for and when to call for assistance. These instructions may also include educational information about your condition. If you experience any of warning signs to your health, call your doctor.               [facility]  Unless otherwise directed by your provider, please contact [facility], our nurse care line that is available for 24/7 assistance.     [phone] (toll-free)    Registered nurses in the [facility] provide clinical advisement, health education, appointment booking, and other advisory services.                    ** Verify the list of medication(s) below is accurate and up  to date. Carry this with you in case of emergency. If your medications have changed, please notify your healthcare provider.             Medication List      START taking these medications        Additional Info                      oxycodone-acetaminophen 5-325 mg per tablet   Commonly known as:  PERCOCET   Quantity:  21 tablet   Refills:  0   Dose:  1 tablet    Instructions:  Take 1 tablet by mouth every 4 (four) hours as needed for Pain.     Begin Date    AM    Noon    PM    Bedtime       polyethylene glycol 17 gram Pwpk   Commonly known as:  GLYCOLAX   Quantity:  30 packet   Refills:  0   Dose:  17 g    Instructions:  Take 17 g by mouth once daily.     Begin Date    AM    Noon    PM    Bedtime       tamsulosin 0.4 mg Cp24   Commonly known as:  FLOMAX   Quantity:  30 capsule   Refills:  0   Dose:  0.4 mg    Instructions:  Take 1 capsule (0.4 mg total) by mouth once daily.     Begin Date    AM    Noon    PM    Bedtime         CONTINUE taking these medications        Additional Info                      alcohol swabs Padm   Commonly known as:  ALCOHOL PADS   Quantity:  100 each   Refills:  3   Dose:  1 each    Instructions:  Apply 1 each topically as needed.     Begin Date    AM    Noon    PM    Bedtime       aspirin 81 MG EC tablet   Commonly known as:  ECOTRIN   Refills:  0   Dose:  81 mg    Instructions:  Take 81 mg by mouth once daily.     Begin Date    AM    Noon    PM    Bedtime       biotin 1 mg tablet   Refills:  0   Dose:  1000 mcg    Instructions:  Take 1,000 mcg by mouth 3 (three) times daily.     Begin Date    AM    Noon    PM    Bedtime       blood sugar diagnostic Strp   Commonly known as:  ACCU-CHEK SMARTVIEW TEST STRIP   Quantity:  100 strip   Refills:  3    Instructions:  To use as directed with AccuChek Sahra meter and monitor blood sugar daily     Begin Date    AM    Noon    PM    Bedtime       blood-glucose meter Misc   Quantity:  1 each   Refills:  0    Instructions:  Accu Check Sahra Smartview  meter     Begin Date    AM    Noon    PM    Bedtime       carvedilol 12.5 MG tablet   Commonly known as:  COREG   Quantity:  180 tablet   Refills:  3   Dose:  12.5 mg    Last time this was given:  12.5 mg on 3/7/2017  6:40 AM   Instructions:  Take 1 tablet (12.5 mg total) by mouth 2 (two) times daily.     Begin Date    AM    Noon    PM    Bedtime       clotrimazole-betamethasone 1-0.05% cream   Commonly known as:  LOTRISONE   Quantity:  45 g   Refills:  1    Instructions:  Apply topically 2 (two) times daily.     Begin Date    AM    Noon    PM    Bedtime       conjugated estrogens vaginal cream   Commonly known as:  PREMARIN   Quantity:  30 g   Refills:  3   Dose:  0.5 g    Instructions:  Place 0.5 g vaginally 3 (three) times a week.     Begin Date    AM    Noon    PM    Bedtime       cyclobenzaprine 10 MG tablet   Commonly known as:  FLEXERIL   Quantity:  30 tablet   Refills:  1   Dose:  10 mg    Instructions:  Take 1 tablet (10 mg total) by mouth 3 (three) times daily as needed for Muscle spasms.     Begin Date    AM    Noon    PM    Bedtime       econazole nitrate 1 % cream   Quantity:  85 g   Refills:  2    Instructions:  AAA bid after cool blow dry when flared     Begin Date    AM    Noon    PM    Bedtime       fluocinonide 0.05 % external solution   Commonly known as:  LIDEX   Quantity:  60 mL   Refills:  1    Instructions:  Apply topically 2 (two) times daily.     Begin Date    AM    Noon    PM    Bedtime       gabapentin 300 MG capsule   Commonly known as:  NEURONTIN   Quantity:  90 capsule   Refills:  3   Dose:  300 mg    Instructions:  Take 1 capsule (300 mg total) by mouth every evening.     Begin Date    AM    Noon    PM    Bedtime       lancets Misc   Commonly known as:  ACCU-CHEK MULTICLIX LANCET   Quantity:  100 each   Refills:  2    Instructions:  To use as directed with Accu Chek Sahra FastClix lancing device     Begin Date    AM    Noon    PM    Bedtime       levothyroxine 112 MCG tablet   Commonly  known as:  SYNTHROID   Quantity:  90 tablet   Refills:  3   Dose:  112 mcg    Instructions:  Take 1 tablet (112 mcg total) by mouth before breakfast.     Begin Date    AM    Noon    PM    Bedtime       lisinopril 40 MG tablet   Commonly known as:  PRINIVIL,ZESTRIL   Quantity:  90 tablet   Refills:  3   Dose:  40 mg    Instructions:  Take 1 tablet (40 mg total) by mouth once daily.     Begin Date    AM    Noon    PM    Bedtime       metformin 1000 MG tablet   Commonly known as:  GLUCOPHAGE   Quantity:  180 tablet   Refills:  3   Dose:  1000 mg    Instructions:  Take 1 tablet (1,000 mg total) by mouth 2 (two) times daily with meals.     Begin Date    AM    Noon    PM    Bedtime       pravastatin 80 MG tablet   Commonly known as:  PRAVACHOL   Quantity:  90 tablet   Refills:  3   Dose:  80 mg    Instructions:  Take 1 tablet (80 mg total) by mouth once daily.     Begin Date    AM    Noon    PM    Bedtime            Where to Get Your Medications      You can get these medications from any pharmacy     Bring a paper prescription for each of these medications     oxycodone-acetaminophen 5-325 mg per tablet    polyethylene glycol 17 gram Pwpk    tamsulosin 0.4 mg Cp24                  Please bring to all follow up appointments:    1. A copy of your discharge instructions.  2. All medicines you are currently taking in their original bottles.  3. Identification and insurance card.    Please arrive 15 minutes ahead of scheduled appointment time.    Please call 24 hours in advance if you must reschedule your appointment and/or time.        Your Scheduled Appointments     Mar 20, 2017  3:00 PM CDT   Post OP with MD Juan Ivey - Urology 4th Floor ([address] )    [address]  [facility] [id number] [phone]              Follow-up Information     Follow up with Mary Murray MD In 2 weeks.    Specialty:  Urology    Why:  pathology results    Contact information:    [address]  [place] [address]  [phone]          Discharge Instructions     Future Orders    Activity as tolerated     Call MD for:  persistent nausea and vomiting     Call MD for:  severe uncontrolled pain     Call MD for:  temperature >100.4     Diet general     Questions:    Total calories:      Fat restriction, if any:      Protein restriction, if any:      Na restriction, if any:      Fluid restriction:      Additional restrictions:      No dressing needed         Discharge Instructions         La cistoscopia  La cistoscopia es un procedimiento que permite al médico observar directamente la uretra y la vejiga. Puede usarse para:  · Ayudar a diagnosticar un problema de la uretra, la vejiga o los riñones.  · Garrick kell muestra (biopsia) de tejido de la vejiga o de la uretra.  · Tratar ciertos problemas (tigre la extracción de cálculos renales).  · Colocar un stent (endoprótesis) para evitar kell obstrucción.  · Garrick radiografías especiales de los riñones.  Según los resultados, es posible que cantrell médico le recomiende otras pruebas o tratamientos.  ¿Qué es un cistoscopio?  Un cistoscopio es un instrumento semejante a un telescopio que contiene lentes y fibra óptica (filamentos de kadeem para la transmisión anthony). El cistoscopio puede ser recto y rígido o tiago flexible para que pueda doblarse en las curvas de la uretra. El médico puede mirar directamente dentro del cistoscopio o proyectar la imagen en un monitor.  Preparativos  Para prepararse, deje de garrick medicamentos según le indiquen. Pregunte si debe evitar comer o beber después de la medianoche anterior al procedimiento. Siga todas las demás instrucciones que le dé el médico.  Antes del examen, informe a cantrell médico si usted:  · Está tomando medicamentos, tigre aspirina o anticoagulantes  · Tiene alergia a algún medicamento  · Está embarazada   El procedimiento  La cistoscopia se lleva a cabo en el consultorio del médico o en el hospital. Además del médico, a veces  también está presente kell enfermera. El procedimiento dura solo unos minutos; un poco más si es necesario hacer kell biopsia, radiografías o tratamiento. Janes el procedimiento:  · Usted está en posición horizontal sobre kell marielos, boca arriba, con las rodillas dobladas, las piernas separadas y cubierto con kell sábana.  · Le lavarán la uretra y la tanika alrededor de esta. Es posible que le apliquen gelatina anestésica para insensibilizar la uretra. Normalmente no se necesita ningún otro tipo de medicamentos contra el dolor. En algunos casos, es posible que le ofrezcan un sedante suave para ayudarle a relajarse. Si fuera necesario ampliar el procedimiento, tigre por ejemplo hacer también kell biopsia o extraer un cálculo renal, es posible que se necesite anestesia general.  · Se inserta el cistoscopio y se introduce un líquido estéril en la vejiga para dilatarla. Es posible que sienta presión a causa de basilio líquido.  · Cuando se termina el procedimiento, se retira el cistoscopio.  Después de la operación  Si ha recibido un sedante, anestesia general o anestesia naik, pida a alguien que le lleve a casa. Kell vez que esté en casa:  · Alicia abundante líquido.  · Es posible que sienta ardor en la uretra al orinar, o que tenga un poco de roscoe en la orina. Martell es normal.  · Urban vez le receten medicamentos para aliviar las molestias o prevenir infecciones. [place] los medicamentos según le indiquen.  · Llame a cantrell médico si tiene sangrado abundante o coágulos, ardor que persiste janes más de un día, fiebre superior a 101.4 °F (38 °C) o dificultad para orinar.  Date Last Reviewed: 9/8/2014  © 0149-5348 [employer]. [address] [place] [address], [place] [address]. Todos los derechos reservados. Esta información no pretende sustituir la atención médica profesional. Sólo cantrell médico puede diagnosticar y tratar un problema de shanon.        Stents ureterales  Un stent ureteral es un tubo plástico blando con agujeros. Se  inserta temporalmente dentro de un uréter para ayudar a que la orina circule para llegar a la vejiga. Un extremo se ubica en el riñón y el otro entra en la vejiga. En cada extremo, hay un espiral que sostiene al stent (o endoprótesis) en cantrell lugar. El stent no se ve desde el exterior del cuerpo y no debería interferir con cantrell rutina normal. La colocación del stent estará a cargo de un urólogo (médico experto en las vías urinarias) u otro especialista. Bettie procedimiento se lleva a cabo en un hospital o centro quúrgico. ¿Cuándo se usa un stent ureteral?  ¿Cuándo se coloca un stent ureteral?  Se puede usar:  · Para derivar un bloqueo en un riñón o uréter.  · Janes la extracción de cálculos renales.  · Para permitir que el uréter sane después de karine operación.    Antes del procedimiento  Cantrell médico le dará instrucciones sobre cómo prepararse para el procedimiento. Podrían hacerle radiografías u otras pruebas con imágenes de los riñones y los uréteres antes de colocarle el stent.  Janes el procedimiento  · Le administrarán medicamentos para impedir que sienta dolor y ayudarle a relajarse o a dormir janes el procedimiento. Karine vez que surtan efecto los medicamentos, se inicia el procedimiento.  · El médico insertará un cistoscopio (instrumento con stacy) a través de la uretra hasta ingresar en la vejiga. Así podrá eusebio la abertura hacia el uréter.  · Se guía un alambre grayson cuidadosamente por el cistoscopio, subiendo por el uréter, hasta entrar en el riñón. El stent se introduce enhebrado en el alambre.  · Se utiliza un fluoroscopio (máquina especial de josh X) para ayudar a ubicar el stent. Cuando el stent llega a cantrell lugar, se quitan el alambre y el cistoscopio.  Mientras tenga un stent  · Es normal sentir algo de molestia. Ciertos movimientos pueden desencadenar dolor o sensación de necesidad de orinar. También es posible que sienta un dolor leve o presión antes o janes la micción. Estos síntomas desaparecerán  pocos días después de que le quiten el stent.  · Es posible que le receten medicamentos para controlar el dolor o los espasmos de la vejiga o para prevenir infecciones. Tómelos según las indicaciones.  · Alicia mucho líquido para ayudar a limpiar sandip vías urinarias.  · Cantrell orina puede estar levemente yanira o mena a causa del sangrado por la irritación chen que provoca el stent. [place] puede presentarse de manera intermitente mientras usted tenga el stent.  · [place] sucede con cualquier dispositivo sintético que se coloca en el cuerpo, existe un riesgo de infección. Se deberá extraer el stent si se presenta esta situación.   ¿Por cuánto tiempo necesitará el stent?  Generalmente se jeovany después de tratar el bloqueo del uréter o kell vez que heather el uréter. [place]paugh puede demorar entre kell y dos semanas o más. Si el stent se necesita por más tiempo, es posible que haya que reemplazarlo cada varios meses.  Llame a cantrell médico  Llame a cantrell médico de inmediato si:  · Cantrell orina tiene coágulos de roscoe o ve kell cantidad jorge alberto de orina teñida de roscoe.   · Tiene síntomas parecidos a los que tuvo antes de que le colocaran el stent.   · Tiene escapes de orina constantes.  · Tiene fiebre de más de 100.4°F (38°C), escalofríos, náuseas o vómitos.  · Cantrell dolor no se calma con los medicamentos.  · El extremo del stent sale por la uretra.   Date Last Reviewed: 11/26/2014  © 0549-4125 [employer]. [address] [place] [address], [place] [address]. Todos los derechos reservados. Esta información no pretende sustituir la atención médica profesional. Sólo cantrell médico puede diagnosticar y tratar un problema de shanon.        Cystoscopy    Cystoscopy is a procedure that lets your doctor look directly inside your urethra and bladder. It can be used to:  · Help diagnose a problem with your urethra, bladder, or kidneys.  · Take a sample (biopsy) of bladder or urethral tissue.  · Treat certain problems (such as removing kidney stones).  · Place a stent  to bypass an obstruction.  · Take special X-rays of the kidneys.  Based on the findings, your doctor may recommend other tests or treatments.  What is a cystoscope?  A cystoscope is a telescope-like instrument that contains lenses and fiberoptics (small glass wires that make bright light). The cystoscope may be straight and rigid, or flexible to bend around curves in the urethra. The doctor may look directly into the cystoscope, or project the image onto a monitor.  Getting ready  · Ask your doctor if you should stop taking any medications prior to the procedure.  · Ask whether you should avoid eating or drinking anything after midnight before the procedure.  · Follow any other instructions your doctor gives you.  Tell your doctor before the exam if you:  · Take any medications, such as aspirin or blood thinners  · Have allergies to any medications  · Are pregnant   The procedure  Cystoscopy is done in the doctors office or hospital. The doctor and a nurse are present during the procedure. It takes only a few minutes, longer if a biopsy, X-ray, or treatment needs to be done.  During the procedure:  · You lie on an exam table on your back, knees bent and legs apart. You are covered with a drape.  · Your urethra and the area around it are washed. Anesthetic jelly may be applied to numb the urethra. Other pain medication is usually not needed. In some cases, you may be offered a mild sedative to help you relax. If a more extensive procedure is to be done, such as a biopsy or kidney stone removal, general anesthesia may be needed.  · The cystoscope is inserted. A sterile fluid is put into the bladder to expand it. You may feel pressure from this fluid.  · When the procedure is done, the cystoscope is removed.  After the procedure  If you had a sedative, general anesthesia, or spinal anesthesia, you must have someone drive you home. Once youre home:  · Drink plenty of fluids.  · You may have burning or light bleeding  when you urinate--this is normal.  · Medications may be prescribed to ease any discomfort or prevent infection. Take these as directed.  · Call your doctor if you have heavy bleeding or blood clots, burning that lasts more than a day, a fever over 100°F  (38° C), or trouble urinating.  Date Last Reviewed: 9/8/2014  © 3209-8408 [employer]. [address] [place] [address], [place] [address]. All rights reserved. This information is not intended as a substitute for professional medical care. Always follow your healthcare professional's instructions.      Ureteral Stents  A ureteral stent is a soft plastic tube with holes in it. Its temporarily inserted into a ureter to help drain urine into the bladder. One end goes in the kidney. The other end goes in the bladder. A coil on each end holds the stent in place. The stent cant be seen from outside the body. It shouldnt interfere with your normal routine. Your stent will be put in by a urologist (doctor trained in treating the urinary tract) or another specialist. The procedure is done in a hospital or surgery center. Youll likely go home the same day.  When Is a Ureteral Stent Used?  A ureteral stent may be used:  · To bypass a blockage in a kidney or ureter.  · During kidney stone removal.  · To let a ureter heal after surgery.    Before the Procedure  Your doctor will give you instructions to prepare for the procedure. X-rays or other imaging tests of your kidneys and ureters may be done beforehand.  During the Procedure  · You receive medication to prevent pain and help you relax or sleep during the procedure. Once this takes effect, the procedure starts.  · The doctor inserts a cystoscope (lighted instrument) through the urethra and into the bladder. This shows the opening to the ureter.  · A thin wire is carefully threaded through the cystoscope, up the ureter, and into the kidney. The stent is inserted over the wire.  · A fluoroscope (special X-ray  machine) is used to help position the stent. When the stent is in place, the wire and cystoscope are removed.  While You Have a Stent  · Some discomfort is normal. Certain movements may trigger pain or a feeling that you need to urinate. You may also feel mild soreness or pressure before or during urination. These symptoms will go away a few days after the stent is removed.  · Medication to control pain or bladder spasms or to prevent infection may be prescribed. Take this as directed.  · Drink plenty of fluids to help flush out your urinary tract.  · Your urine may be slightly pink or red. This is due to bleeding caused by minor irritation from the stent. This may happen on and off while you have the stent.  · As with any synthetic device placed in the body, there is a risk of infection. The stent may have to be removed if this happens.   How Long Will You Need a Stent?  The stent is often taken out after the blockage in the ureter is treated or the ureter has healed. This may take 1 week to 2 weeks, or longer. If a stent is needed for a long time, it may need to be changed every few months.  Call Your Doctor  Contact your doctor right away if:  · Your urine contains blood clots or you see a large amount of blood-tinged urine.   · You have symptoms similar to those you had before the stent was placed.   · You constantly leak urine.  · You have a fever over 100.4°F (38°C), chills, nausea, or vomiting.  · Your pain is not relieved with medication.  · The end of the stent comes out of the urethra.   Date Last Reviewed: 11/26/2014  © 4023-2382 [employer]. [address] [place] [address], [place] [address]. All rights reserved. This information is not intended as a substitute for professional medical care. Always follow your healthcare professional's instructions.              Primary Diagnosis     Your primary diagnosis was:  Blood In Urine      Admission Information     Date & Time Provider Department CSN     "3/7/2017  5:49 AM Mary Murray MD [facility] [id number]      Care Providers     Provider Role Specialty Primary office phone    Mary Murray MD Attending Provider Urology [phone]    Mary Murray MD Surgeon  Urology [phone]      Your Vitals Were     BP Pulse Temp Resp Height Weight    131/53 73 97.7 °F (36.5 °C) (Temporal) 14 5' 5" (1.651 m) 74.4 kg (164 lb)    SpO2 BMI             99% 27.29 kg/m2         Recent Lab Values        11/11/2013 5/16/2014 9/16/2014 1/16/2015 6/15/2015 5/9/2016 11/21/2016 11/29/2016      8:30 AM  2:08 PM  8:30 AM  8:15 AM  9:50 AM  8:40 AM  8:42 AM 10:23 AM    A1C 7.7 (H) 7.8 (H) 7.8 (H) 7.9 (H) 8.0 (H) 8.1 (H) 7.8 (H) 7.6 (H)    Comment for A1C at  8:42 AM on 11/21/2016:  According to ADA guidelines, hemoglobin A1C <7.0% represents  optimal control in non-pregnant diabetic patients.  Different  metrics may apply to specific populations.   Standards of Medical Care in Diabetes - 2016.  For the purpose of screening for the presence of diabetes:  <5.7%     Consistent with the absence of diabetes  5.7-6.4%  Consistent with increasing risk for diabetes   (prediabetes)  >or=6.5%  Consistent with diabetes  Currently no consensus exists for use of hemoglobin A1C  for diagnosis of diabetes for children.      Comment for A1C at 10:23 AM on 11/29/2016:  According to ADA guidelines, hemoglobin A1C <7.0% represents  optimal control in non-pregnant diabetic patients.  Different  metrics may apply to specific populations.   Standards of Medical Care in Diabetes - 2016.  For the purpose of screening for the presence of diabetes:  <5.7%     Consistent with the absence of diabetes  5.7-6.4%  Consistent with increasing risk for diabetes   (prediabetes)  >or=6.5%  Consistent with diabetes  Currently no consensus exists for use of hemoglobin A1C  for diagnosis of diabetes for children.        Pending Labs     Order Current Status    Specimen to Pathology - Surgery " Collected (03/07/17 0813)      Allergies as of 3/7/2017        Reactions    Bufferin [Aspirin, Buffered] Itching    Ibuprofen Itching    Itching of eyes, head      Advance Directives     An advance directive is a document which, in the event you are no longer able to make decisions for yourself, tells your healthcare team what kind of treatment you do or do not want to receive, or who you would like to make those decisions for you.  If you do not currently have an advance directive, [facility] encourages you to create one.  For more information call:  [phone]), [phone] ([phone]),  or log on to [email or web address].        Language Assistance Services     ATTENTION: Language assistance services are available, free of charge. Please call [phone].      ATENCIÓN: Si habla español, tiene a cantrell disposición servicios gratuitos de asistencia lingüística. Llame al [phone].     CHÚ Ý: N?u b?n nói Ti?ng Vi?t, có các d?ch v? h? tr? ngôn ng? mi?n phí dành cho b?n. G?i s? [phone].        Pneumonmia Discharge Instructions                Diabetes Discharge Instructions                                   [facility] Sign-Up     Activating your [facility] account is as easy as 1-2-3!     1) Visit [email or web address], select Sign Up Now, enter this activation code and your date of birth, then select Next.  [email or web address]  Expires: 3/18/2017 10:20 AM      2) Create a username and password to use when you visit [facility] in the future and select a security question in case you lose your password and select Next.    3) Enter your e-mail address and click Sign Up!    Additional Information  If you have questions, please e-mail [email or web address] or call [phone] to talk to our [facility] staff. Remember, [facility] is NOT to be used for urgent needs. For medical emergencies, dial 911.          [facility] complies with applicable Federal civil rights laws and does not  discriminate on the basis of race, color, national origin, age, disability, or sex.

## 2017-03-07 NOTE — ANESTHESIA RELEASE NOTE
"Anesthesia Release from PACU Note    Patient: Aminah Blockda-Dermelizabeth    Procedure(s) Performed: Procedure(s) (LRB):  CYSTOSCOPY WITH RETROGRADE PYELOGRAM (Bilateral)  PLACEMENT-STENT (Right)  URETEROSCOPY (Right)  BIOPSY-URETER (Right)  BIOPSY-BLADDER (N/A)  FULGURATION-BLADDER (N/A)    Anesthesia type: general    Post pain: Adequate analgesia    Post assessment: no apparent anesthetic complications    Last Vitals:   Visit Vitals    /68    Pulse 73    Temp 36.5 °C (97.7 °F) (Temporal)    Resp 16    Ht 5' 5" (1.651 m)    Wt 74.4 kg (164 lb)    SpO2 95%    Breastfeeding No    BMI 27.29 kg/m2       Post vital signs: stable    Level of consciousness: awake, alert  and oriented    Nausea/Vomiting: no nausea/no vomiting    Complications: none    Airway Patency: patent    Respiratory: unassisted, spontaneous ventilation    Cardiovascular: stable and blood pressure at baseline    Hydration: euvolemic  "

## 2017-03-07 NOTE — PROGRESS NOTES
Discharge instructions given by SUKI Gorman, Turkmen speaking nurse. Family and patient verbalized understanding.

## 2017-03-07 NOTE — PROGRESS NOTES
Patient awake and alert, family at bedside. Patient due to void, tolerating po liquids. No complaints of pain or nausea. VSS. Son waiting at beside to speak with Dr Murray.

## 2017-03-07 NOTE — TRANSFER OF CARE
"Anesthesia Transfer of Care Note    Patient: Aminah Alicea-Shelly    Procedure(s) Performed: Procedure(s) (LRB):  CYSTOSCOPY WITH RETROGRADE PYELOGRAM (Bilateral)  PLACEMENT-STENT (Right)  URETEROSCOPY (Right)  BIOPSY-URETER (Right)  BIOPSY-BLADDER (N/A)  FULGURATION-BLADDER (N/A)    Patient location: PACU    Anesthesia Type: general    Transport from OR: Transported from OR on 6-10 L/min O2 by face mask with adequate spontaneous ventilation    Post pain: adequate analgesia    Post assessment: no apparent anesthetic complications    Post vital signs: stable    Level of consciousness: sedated    Nausea/Vomiting: no nausea/vomiting    Complications: none          Last vitals:   Visit Vitals    /62    Pulse 77    Temp 36.5 °C (97.7 °F) (Temporal)    Resp 20    Ht 5' 5" (1.651 m)    Wt 74.4 kg (164 lb)    SpO2 97%    Breastfeeding No    BMI 27.29 kg/m2     "

## 2017-03-07 NOTE — ANESTHESIA POSTPROCEDURE EVALUATION
"Anesthesia Post Evaluation    Patient: Aminah Blockda-Dermelizabeth    Procedure(s) Performed: Procedure(s) (LRB):  CYSTOSCOPY WITH RETROGRADE PYELOGRAM (Bilateral)  PLACEMENT-STENT (Right)  URETEROSCOPY (Right)  BIOPSY-URETER (Right)  BIOPSY-BLADDER (N/A)  FULGURATION-BLADDER (N/A)    Final Anesthesia Type: general  Patient location during evaluation: PACU  Patient participation: Yes- Able to Participate  Level of consciousness: awake and alert and awake  Post-procedure vital signs: reviewed and stable  Pain management: adequate  Airway patency: patent  PONV status at discharge: No PONV  Anesthetic complications: no      Cardiovascular status: blood pressure returned to baseline  Respiratory status: unassisted and spontaneous ventilation  Hydration status: euvolemic  Follow-up not needed.        Visit Vitals    /68    Pulse 73    Temp 36.5 °C (97.7 °F) (Temporal)    Resp 16    Ht 5' 5" (1.651 m)    Wt 74.4 kg (164 lb)    SpO2 95%    Breastfeeding No    BMI 27.29 kg/m2       Pain/Lorenza Score: Pain Assessment Performed: Yes (3/7/2017  8:55 AM)  Presence of Pain: denies (3/7/2017  8:55 AM)  Lorenza Score: 10 (3/7/2017  8:55 AM)      "

## 2017-03-07 NOTE — OP NOTE
[facility]  Operative Note    Date: 03/07/2017    Pre-Op Diagnosis:   1. Right hydroureteralnephrosis  2. Right ureteral filling defect  3. Pelvic pain    Post-Op Diagnosis: same    Procedure(s) Performed:   1.  Right ureteroscopy  2.  Cystoscopy  3.  Right JJ ureteral stent placement  4.  Bilateral retrograde pyelogram  5.  Right ureteral biopsy  6.  Bladder biopsy and fulguration  7.  Fluoro < 1 h    Specimen(s):   1. Right distal ureteral biopsy  2. Bladder biopsy x 2    Staff Surgeon: Mary Murray MD    Assistant Surgeon: Kait Bartlett MD; River Yusuf MD    Anesthesia: General endotracheal anesthesia    Indications: Aminah Norton is a 71 y.o. female with a hx of hematuria and a abnormality seen in her right ureter on CT Urogram.     Findings:   1. No urethral abnormalities  2. Bladder highly vascular with scattered areas of suspicious erythema  3. No filling defects or hydronephrosis on left retrograde pyelogram  4. Filling defect noted in distal right ureter with hydronephrosis proximal  5. Area of tumor vs scar tissue seen in distal right ureter on ureteroscopy    1 basket was used throughout the case.      Estimated Blood Loss: min    Drains: 6 Fr x 28 cm JJ ureteral stent without strings    Procedure in detail:  After informed consent was obtained, the patient was brought the the cystoscopy suite and placed in the supine position.  SCDs were applied and working.  Anesthesia was administered.  The patient was then placed in the dorsal lithotomy position and prepped and draped in the usual sterile fashion.      A rigid cystoscope in a 22 Fr sheath was introduced into the patient's urethra.  This passed easily.  The entire urethra was visualized which showed no strictures or masses.  Formal cystoscopy was performed with findings above. The ureteral orifices were visualized in the normal anatomic position bilaterally and clear efflux was visualized.      A 5 fr open  ended catheter was used to perform left retrograde pyelogram. No filling defects or hydronephrosis noted. We then moved to the right, there was a filling defect in the distal ureter as well as hydroureter proximally. We were unable to push contrast past the obstruction however we were able to advance the 5 Fr open ended catheter past this area and into the renal pelvis. This was then filled with contrast. No other filling defects were noted.     A Motion wire was passed up the right ureteral orifice and up into the kidney.  This passed easily and placement was confirmed using fluoro.  The cystoscope was removed keeping the guidewire in place.    An 8 Fr rigid ureteroscope was passed into the patient's bladder alongside the wire under direct vision.  It was then passed through the right ureteral orifice alongside the wire, a glide wire was needed to advance into the ureteral orifice.  There was an abnormal area noted in the distal ureter. It appeared to be possible scar tissue vs possible tumor. The area was biopsied multiple times with cup biopsy forceps. We were then able to advance past the area with the ureteroscope. The more proximal ureter appeared normal. We then removed the ureteroscope keeping the motion wire in place.      A cystoscope was reinserted and the bladder over the motion wire. A 5 Fr open ended catheter was used to measure the distance from the renal pelvis to the ureteral orifice.    A 6 Fr x 28 cm JJ ureteral stent without strings was passed over the wire and up into the renal pelvis using fluoro.  When the coil appeared to be in good position in the kidney, the wire was removed under continuous fluoro.  Good coils were seen in the kidney and the bladder using fluoro as well as direct vision.    We then used narrow band imaging to locate abnormal areas in the bladder mucosa. 2 biopsies were taken using cup forceps. These areas were fulgurated using the Bugbee.       The patient tolerated the  procedure well and was transferred to the recovery room in stable condition.      Disposition:  The patient will follow up with Dr. Murray in 2 weeks for pathology results.  She was given prescriptions for percocet, miralax, flomax.     Kait Bartlett MD    I was present for the entire case and agree with the above note.

## 2017-03-07 NOTE — DISCHARGE SUMMARY
[facility]  Discharge Note  Short Stay    Admit Date: 3/7/2017    Discharge Date and Time: 3/7/2017    Attending Physician: Mary Murray MD     Discharge Provider: Kait Bartlett    Diagnoses:  Active Hospital Problems    Diagnosis  POA    *Hematuria [R31.9]  Yes   Right ureteral lesion with right hydronephrosis   Resolved Hospital Problems    Diagnosis Date Resolved POA   No resolved problems to display.       Discharged Condition: good    Hospital Course: Patient was admitted for right ureteroscopy and tolerated the procedure well with no complications.    Final Diagnoses: Same as principal problem.    Disposition: Home or Self Care    Follow up/Patient Instructions:    Medications:  Reconciled Home Medications:   Current Discharge Medication List      START taking these medications    Details   oxycodone-acetaminophen (PERCOCET) 5-325 mg per tablet Take 1 tablet by mouth every 4 (four) hours as needed for Pain.  Qty: 21 tablet, Refills: 0      polyethylene glycol (GLYCOLAX) 17 gram PwPk Take 17 g by mouth once daily.  Qty: 30 packet, Refills: 0      tamsulosin (FLOMAX) 0.4 mg Cp24 Take 1 capsule (0.4 mg total) by mouth once daily.  Qty: 30 capsule, Refills: 0         CONTINUE these medications which have NOT CHANGED    Details   aspirin (ECOTRIN) 81 MG EC tablet Take 81 mg by mouth once daily.      carvedilol (COREG) 12.5 MG tablet Take 1 tablet (12.5 mg total) by mouth 2 (two) times daily.  Qty: 180 tablet, Refills: 3    Associated Diagnoses: Palpitations; HTN (hypertension), benign      levothyroxine (SYNTHROID) 112 MCG tablet Take 1 tablet (112 mcg total) by mouth before breakfast.  Qty: 90 tablet, Refills: 3    Associated Diagnoses: Hypothyroidism due to acquired atrophy of thyroid      lisinopril (PRINIVIL,ZESTRIL) 40 MG tablet Take 1 tablet (40 mg total) by mouth once daily.  Qty: 90 tablet, Refills: 3      metformin (GLUCOPHAGE) 1000 MG tablet Take 1 tablet (1,000 mg total) by mouth 2  (two) times daily with meals.  Qty: 180 tablet, Refills: 3    Associated Diagnoses: Type 2 diabetes mellitus without complication      pravastatin (PRAVACHOL) 80 MG tablet Take 1 tablet (80 mg total) by mouth once daily.  Qty: 90 tablet, Refills: 3    Associated Diagnoses: Hyperlipidemia      alcohol swabs (ALCOHOL PADS) PadM Apply 1 each topically as needed.  Qty: 100 each, Refills: 3      biotin 1 mg tablet Take 1,000 mcg by mouth 3 (three) times daily.      blood sugar diagnostic (ACCU-CHEK SMARTVIEW TEST STRIP) Strp To use as directed with AccuChek Sahra meter and monitor blood sugar daily  Qty: 100 strip, Refills: 3    Associated Diagnoses: Diabetes type 2, controlled      blood-glucose meter Misc Accu Check Sahra Smartview meter  Qty: 1 each, Refills: 0    Associated Diagnoses: Diabetes type 2, controlled      clotrimazole-betamethasone 1-0.05% (LOTRISONE) cream Apply topically 2 (two) times daily.  Qty: 45 g, Refills: 1    Associated Diagnoses: Tinea corporis      conjugated estrogens (PREMARIN) vaginal cream Place 0.5 g vaginally 3 (three) times a week.  Qty: 30 g, Refills: 3    Associated Diagnoses: Vaginal atrophy; Urethral pain      cyclobenzaprine (FLEXERIL) 10 MG tablet Take 1 tablet (10 mg total) by mouth 3 (three) times daily as needed for Muscle spasms.  Qty: 30 tablet, Refills: 1    Associated Diagnoses: Back pain      econazole nitrate 1 % cream AAA bid after cool blow dry when flared  Qty: 85 g, Refills: 2    Associated Diagnoses: Intertrigo      fluocinonide (LIDEX) 0.05 % external solution Apply topically 2 (two) times daily.  Qty: 60 mL, Refills: 1    Associated Diagnoses: Dermatitis      gabapentin (NEURONTIN) 300 MG capsule Take 1 capsule (300 mg total) by mouth every evening.  Qty: 90 capsule, Refills: 3    Associated Diagnoses: Diabetic polyneuropathy associated with type 2 diabetes mellitus      lancets (ACCU-CHEK MULTICLIX LANCET) Misc To use as directed with Accu Chek Sahra FastClix lancing  device  Qty: 100 each, Refills: 2    Associated Diagnoses: Type II or unspecified type diabetes mellitus with neurological manifestations, uncontrolled             Discharge Procedure Orders  Diet general     Activity as tolerated     Call MD for:  temperature >100.4     Call MD for:  persistent nausea and vomiting     Call MD for:  severe uncontrolled pain     No dressing needed       Follow-up Information     Follow up with Mary Murray MD In 2 weeks.    Specialty:  Urology    Why:  pathology results    Contact information:    [address] [place] [address] [id number] [phone]              Kait Bartlett MD  Urology, PGY-2  Pager# [phone]      As above.

## 2017-03-07 NOTE — DISCHARGE INSTRUCTIONS
La cistoscopia  La cistoscopia es un procedimiento que permite al médico observar directamente la uretra y la vejiga. Puede usarse para:  · Ayudar a diagnosticar un problema de la uretra, la vejiga o los riñones.  · Garrick kell muestra (biopsia) de tejido de la vejiga o de la uretra.  · Tratar ciertos problemas (tigre la extracción de cálculos renales).  · Colocar un stent (endoprótesis) para evitar kell obstrucción.  · Garrick radiografías especiales de los riñones.  Según los resultados, es posible que cantrell médico le recomiende otras pruebas o tratamientos.  ¿Qué es un cistoscopio?  Un cistoscopio es un instrumento semejante a un telescopio que contiene lentes y fibra óptica (filamentos de kadeem para la transmisión anthony). El cistoscopio puede ser recto y rígido o tiago flexible para que pueda doblarse en las curvas de la uretra. El médico puede mirar directamente dentro del cistoscopio o proyectar la imagen en un monitor.  Preparativos  Para prepararse, deje de garrick medicamentos según le indiquen. Pregunte si debe evitar comer o beber después de la medianoche anterior al procedimiento. Siga todas las demás instrucciones que le dé el médico.  Antes del examen, informe a cantrell médico si usted:  · Está tomando medicamentos, tigre aspirina o anticoagulantes  · Tiene alergia a algún medicamento  · Está embarazada   El procedimiento  La cistoscopia se lleva a cabo en el consultorio del médico o en el hospital. Además del médico, a veces también está presente kell enfermera. El procedimiento dura solo unos minutos; un poco más si es necesario hacer kell biopsia, radiografías o tratamiento. Tc el procedimiento:  · Usted está en posición horizontal sobre kell marielos, boca arriba, con las rodillas dobladas, las piernas separadas y cubierto con kell sábana.  · Le lavarán la uretra y la tanika alrededor de esta. Es posible que le apliquen gelatina anestésica para insensibilizar la uretra. Normalmente no se necesita ningún otro  tipo de medicamentos contra el dolor. En algunos casos, es posible que le ofrezcan un sedante suave para ayudarle a relajarse. Si fuera necesario ampliar el procedimiento, tigre por ejemplo hacer también kell biopsia o extraer un cálculo renal, es posible que se necesite anestesia general.  · Se inserta el cistoscopio y se introduce un líquido estéril en la vejiga para dilatarla. Es posible que sienta presión a causa de basilio líquido.  · Cuando se termina el procedimiento, se retira el cistoscopio.  Después de la operación  Si ha recibido un sedante, anestesia general o anestesia naik, pida a alguien que le lleve a casa. Kell vez que esté en casa:  · Alicia abundante líquido.  · Es posible que sienta ardor en la uretra al orinar, o que tenga un poco de roscoe en la orina. Peter es normal.  · Urban vez le receten medicamentos para aliviar las molestias o prevenir infecciones. [place] los medicamentos según le indiquen.  · Llame a cantrell médico si tiene sangrado abundante o coágulos, ardor que persiste janes más de un día, fiebre superior a 101.4 °F (38 °C) o dificultad para orinar.  Date Last Reviewed: 9/8/2014  © 0624-9645 [employer]. [address] [place] [id number]. Todos los derechos reservados. Esta información no pretende sustituir la atención médica profesional. Sólo cantrell médico puede diagnosticar y tratar un problema de shanon.        Stents ureterales  Un stent ureteral es un tubo plástico blando con agujeros. Se inserta temporalmente dentro de un uréter para ayudar a que la orina circule para llegar a la vejiga. Un extremo se ubica en el riñón y el otro entra en la vejiga. En cada extremo, hay un espiral que sostiene al stent (o endoprótesis) en cantrell lugar. El stent no se ve desde el exterior del cuerpo y no debería interferir con cantrell rutina normal. La colocación del stent estará a cargo de un urólogo (médico experto en las vías urinarias) u otro especialista. Basilio procedimiento se lleva a cabo en un  hospital o [facility]. ¿Cuándo se usa un stent ureteral?  ¿Cuándo se coloca un stent ureteral?  Se puede usar:  · Para derivar un bloqueo en un riñón o uréter.  · Janes la extracción de cálculos renales.  · Para permitir que el uréter sane después de kell operación.    Antes del procedimiento  Cantrell médico le dará instrucciones sobre cómo prepararse para el procedimiento. Podrían hacerle radiografías u otras pruebas con imágenes de los riñones y los uréteres antes de colocarle el stent.  Janes el procedimiento  · Le administrarán medicamentos para impedir que sienta dolor y ayudarle a relajarse o a dormir janes el procedimiento. Kell vez que surtan efecto los medicamentos, se inicia el procedimiento.  · El médico insertará un cistoscopio (instrumento con stacy) a través de la uretra hasta ingresar en la vejiga. Así podrá eusebio la abertura hacia el uréter.  · Se guía un alambre grayson cuidadosamente por el cistoscopio, subiendo por el uréter, hasta entrar en el riñón. El stent se introduce enhebrado en el alambre.  · Se utiliza un fluoroscopio (máquina especial de josh X) para ayudar a ubicar el stent. Cuando el stent llega a cantrell lugar, se quitan el alambre y el cistoscopio.  Mientras tenga un stent  · Es normal sentir algo de molestia. Ciertos movimientos pueden desencadenar dolor o sensación de necesidad de orinar. También es posible que sienta un dolor leve o presión antes o janes la micción. Estos síntomas desaparecerán pocos días después de que le quiten el stent.  · Es posible que le receten medicamentos para controlar el dolor o los espasmos de la vejiga o para prevenir infecciones. Tómelos según las indicaciones.  · Alicia mucho líquido para ayudar a limpiar sandip vías urinarias.  · Cantrell orina puede estar levemente yanira o mena a causa del sangrado por la irritación chen que provoca el stent. [place] puede presentarse de manera intermitente mientras usted tenga el stent.  · [place] sucede con cualquier dispositivo  sintético que se coloca en el cuerpo, existe un riesgo de infección. Se deberá extraer el stent si se presenta esta situación.   ¿Por cuánto tiempo necesitará el stent?  Generalmente se jeovany después de tratar el bloqueo del uréter o kell vez que heather el uréter. [place] puede demorar entre kell y dos semanas o más. Si el stent se necesita por más tiempo, es posible que haya que reemplazarlo cada varios meses.  Llame a cantrell médico  Llame a cantrell médico de inmediato si:  · Cantrell orina tiene coágulos de roscoe o ve kell cantidad jorge alberto de orina teñida de roscoe.   · Tiene síntomas parecidos a los que tuvo antes de que le colocaran el stent.   · Tiene escapes de orina constantes.  · Tiene fiebre de más de 100.4°F (38°C), escalofríos, náuseas o vómitos.  · Cantrell dolor no se calma con los medicamentos.  · El extremo del stent sale por la uretra.   Date Last Reviewed: 11/26/2014  © 7213-2503 [employer]. [address] [place] [address]. Todos los derechos reservados. Esta información no pretende sustituir la atención médica profesional. Sólo cantrell médico puede diagnosticar y tratar un problema de shanon.        Cystoscopy    Cystoscopy is a procedure that lets your doctor look directly inside your urethra and bladder. It can be used to:  · Help diagnose a problem with your urethra, bladder, or kidneys.  · Take a sample (biopsy) of bladder or urethral tissue.  · Treat certain problems (such as removing kidney stones).  · Place a stent to bypass an obstruction.  · Take special X-rays of the kidneys.  Based on the findings, your doctor may recommend other tests or treatments.  What is a cystoscope?  A cystoscope is a telescope-like instrument that contains lenses and fiberoptics (small glass wires that make bright light). The cystoscope may be straight and rigid, or flexible to bend around curves in the urethra. The doctor may look directly into the cystoscope, or project the image onto a monitor.  Getting ready  · Ask  your doctor if you should stop taking any medications prior to the procedure.  · Ask whether you should avoid eating or drinking anything after midnight before the procedure.  · Follow any other instructions your doctor gives you.  Tell your doctor before the exam if you:  · Take any medications, such as aspirin or blood thinners  · Have allergies to any medications  · Are pregnant   The procedure  Cystoscopy is done in the doctors office or hospital. The doctor and a nurse are present during the procedure. It takes only a few minutes, longer if a biopsy, X-ray, or treatment needs to be done.  During the procedure:  · You lie on an exam table on your back, knees bent and legs apart. You are covered with a drape.  · Your urethra and the area around it are washed. Anesthetic jelly may be applied to numb the urethra. Other pain medication is usually not needed. In some cases, you may be offered a mild sedative to help you relax. If a more extensive procedure is to be done, such as a biopsy or kidney stone removal, general anesthesia may be needed.  · The cystoscope is inserted. A sterile fluid is put into the bladder to expand it. You may feel pressure from this fluid.  · When the procedure is done, the cystoscope is removed.  After the procedure  If you had a sedative, general anesthesia, or spinal anesthesia, you must have someone drive you home. Once youre home:  · Drink plenty of fluids.  · You may have burning or light bleeding when you urinate--this is normal.  · Medications may be prescribed to ease any discomfort or prevent infection. Take these as directed.  · Call your doctor if you have heavy bleeding or blood clots, burning that lasts more than a day, a fever over 100°F  (38° C), or trouble urinating.  Date Last Reviewed: 9/8/2014  © 4496-3478 [employer]. [address] [place] [address], [place] [address]. All rights reserved. This information is not intended as a substitute for professional medical  care. Always follow your healthcare professional's instructions.      Ureteral Stents  A ureteral stent is a soft plastic tube with holes in it. Its temporarily inserted into a ureter to help drain urine into the bladder. One end goes in the kidney. The other end goes in the bladder. A coil on each end holds the stent in place. The stent cant be seen from outside the body. It shouldnt interfere with your normal routine. Your stent will be put in by a urologist (doctor trained in treating the urinary tract) or another specialist. The procedure is done in a hospital or surgery center. Youll likely go home the same day.  When Is a Ureteral Stent Used?  A ureteral stent may be used:  · To bypass a blockage in a kidney or ureter.  · During kidney stone removal.  · To let a ureter heal after surgery.    Before the Procedure  Your doctor will give you instructions to prepare for the procedure. X-rays or other imaging tests of your kidneys and ureters may be done beforehand.  During the Procedure  · You receive medication to prevent pain and help you relax or sleep during the procedure. Once this takes effect, the procedure starts.  · The doctor inserts a cystoscope (lighted instrument) through the urethra and into the bladder. This shows the opening to the ureter.  · A thin wire is carefully threaded through the cystoscope, up the ureter, and into the kidney. The stent is inserted over the wire.  · A fluoroscope (special X-ray machine) is used to help position the stent. When the stent is in place, the wire and cystoscope are removed.  While You Have a Stent  · Some discomfort is normal. Certain movements may trigger pain or a feeling that you need to urinate. You may also feel mild soreness or pressure before or during urination. These symptoms will go away a few days after the stent is removed.  · Medication to control pain or bladder spasms or to prevent infection may be prescribed. Take this as directed.  · Drink  plenty of fluids to help flush out your urinary tract.  · Your urine may be slightly pink or red. This is due to bleeding caused by minor irritation from the stent. This may happen on and off while you have the stent.  · As with any synthetic device placed in the body, there is a risk of infection. The stent may have to be removed if this happens.   How Long Will You Need a Stent?  The stent is often taken out after the blockage in the ureter is treated or the ureter has healed. This may take 1 week to 2 weeks, or longer. If a stent is needed for a long time, it may need to be changed every few months.  Call Your Doctor  Contact your doctor right away if:  · Your urine contains blood clots or you see a large amount of blood-tinged urine.   · You have symptoms similar to those you had before the stent was placed.   · You constantly leak urine.  · You have a fever over 100.4°F (38°C), chills, nausea, or vomiting.  · Your pain is not relieved with medication.  · The end of the stent comes out of the urethra.   Date Last Reviewed: 11/26/2014  © 4449-7270 [employer]. [address] [place] [address], [place] [address]. All rights reserved. This information is not intended as a substitute for professional medical care. Always follow your healthcare professional's instructions.

## 2017-03-08 VITALS
HEART RATE: 71 BPM | HEIGHT: 65 IN | WEIGHT: 164 LBS | DIASTOLIC BLOOD PRESSURE: 68 MMHG | OXYGEN SATURATION: 98 % | BODY MASS INDEX: 27.32 KG/M2 | TEMPERATURE: 98 F | SYSTOLIC BLOOD PRESSURE: 135 MMHG | RESPIRATION RATE: 16 BRPM

## 2017-03-20 ENCOUNTER — OFFICE VISIT (OUTPATIENT)
Dept: UROLOGY | Facility: CLINIC | Age: 72
End: 2017-03-20
Payer: MEDICARE

## 2017-03-20 VITALS
BODY MASS INDEX: 27.18 KG/M2 | WEIGHT: 163.13 LBS | HEIGHT: 65 IN | SYSTOLIC BLOOD PRESSURE: 137 MMHG | DIASTOLIC BLOOD PRESSURE: 58 MMHG | HEART RATE: 93 BPM

## 2017-03-20 DIAGNOSIS — N13.30 HYDRONEPHROSIS, RIGHT: Primary | ICD-10-CM

## 2017-03-20 PROCEDURE — 99024 POSTOP FOLLOW-UP VISIT: CPT | Mod: S$GLB,,, | Performed by: UROLOGY

## 2017-03-20 PROCEDURE — 99999 PR PBB SHADOW E&M-EST. PATIENT-LVL IV: CPT | Mod: PBBFAC,,, | Performed by: UROLOGY

## 2017-03-20 RX ORDER — CIPROFLOXACIN 250 MG/1
500 TABLET, FILM COATED ORAL ONCE
Status: CANCELLED | OUTPATIENT
Start: 2017-03-20 | End: 2017-03-20

## 2017-03-20 RX ORDER — LIDOCAINE HYDROCHLORIDE 20 MG/ML
JELLY TOPICAL ONCE
Status: CANCELLED | OUTPATIENT
Start: 2017-03-20 | End: 2017-03-20

## 2017-03-20 NOTE — PROGRESS NOTES
CHIEF COMPLAINT:    Mrs. Norton is a 71 y.o. female presenting for a follow up after right ureteral biopsy and stent placement.    PRESENTING ILLNESS:    Aminah Norton is a 71 y.o. female who returns for follow up.  She is seen with the .  She states she had no problems after the biopsy.  The pathology was negative for neoplasm (see below)      Allergies:  Bufferin [aspirin, buffered] and Ibuprofen    Medications:  Outpatient Encounter Prescriptions as of 3/20/2017   Medication Sig Dispense Refill    alcohol swabs (ALCOHOL PADS) PadM Apply 1 each topically as needed. 100 each 3    aspirin (ECOTRIN) 81 MG EC tablet Take 81 mg by mouth once daily.      biotin 1 mg tablet Take 1,000 mcg by mouth 3 (three) times daily.      blood sugar diagnostic (ACCU-CHEK SMARTVIEW TEST STRIP) Strp To use as directed with AccuChek Sahra meter and monitor blood sugar daily 100 strip 3    blood-glucose meter Misc Accu Check Sarha Smartview meter 1 each 0    carvedilol (COREG) 12.5 MG tablet Take 1 tablet (12.5 mg total) by mouth 2 (two) times daily. 180 tablet 3    clotrimazole-betamethasone 1-0.05% (LOTRISONE) cream Apply topically 2 (two) times daily. 45 g 1    econazole nitrate 1 % cream AAA bid after cool blow dry when flared 85 g 2    gabapentin (NEURONTIN) 300 MG capsule Take 1 capsule (300 mg total) by mouth every evening. 90 capsule 3    lancets (ACCU-CHEK MULTICLIX LANCET) Misc To use as directed with Accu Chek Sahra FastClix lancing device 100 each 2    levothyroxine (SYNTHROID) 112 MCG tablet Take 1 tablet (112 mcg total) by mouth before breakfast. 90 tablet 3    lisinopril (PRINIVIL,ZESTRIL) 40 MG tablet Take 1 tablet (40 mg total) by mouth once daily. 90 tablet 3    metformin (GLUCOPHAGE) 1000 MG tablet Take 1 tablet (1,000 mg total) by mouth 2 (two) times daily with meals. 180 tablet 3    oxycodone-acetaminophen (PERCOCET) 5-325 mg per tablet Take 1 tablet by mouth  every 4 (four) hours as needed for Pain. 21 tablet 0    polyethylene glycol (GLYCOLAX) 17 gram PwPk Take 17 g by mouth once daily. 30 packet 0    pravastatin (PRAVACHOL) 80 MG tablet Take 1 tablet (80 mg total) by mouth once daily. 90 tablet 3    tamsulosin (FLOMAX) 0.4 mg Cp24 Take 1 capsule (0.4 mg total) by mouth once daily. 30 capsule 0    conjugated estrogens (PREMARIN) vaginal cream Place 0.5 g vaginally 3 (three) times a week. 30 g 3    cyclobenzaprine (FLEXERIL) 10 MG tablet Take 1 tablet (10 mg total) by mouth 3 (three) times daily as needed for Muscle spasms. 30 tablet 1    fluocinonide (LIDEX) 0.05 % external solution Apply topically 2 (two) times daily. 60 mL 1     No facility-administered encounter medications on file as of 3/20/2017.          PHYSICAL EXAMINATION:    The patient generally appears in good health, is appropriately interactive, and is in no apparent distress.    Skin: No lesions.    Mental: Cooperative with normal affect.    Neuro: Grossly intact.    HEENT: Normal. No evidence of lymphadenopathy.    Chest:   normal inspiratory effort    Extremities: No clubbing, cyanosis, or edema      LABS:    FINAL PATHOLOGIC DIAGNOSIS  1. Ureter, right distal, biopsy:  - Urothelium with acute and chronic inflammation.  - Negative for malignancy.  2. Bladder, biopsy:  - Urothelium with chronic inflammation.  - Negative for malignancy.    IMPRESSION:    Encounter Diagnoses   Name Primary?    Hydronephrosis, right Yes       PLAN:    1.  Reassured the patient about the path  2.  Scheduled for cystoscopy, stent removal  3.  Plan to repeat a renal ultrasound in 1 month, if continues to have hydronephrosis, will check for obstruction.  She may need a ureteral reimplantation if obstructed.

## 2017-03-20 NOTE — PATIENT INSTRUCTIONS

## 2017-03-20 NOTE — MR AVS SNAPSHOT
[facility] - Urology 4th Floor  [address]  [place] [address]  Phone: [phone]                  Aminah Alicea-Shelly   3/20/2017 3:00 PM   Office Visit    Description:  Female : 1945   Provider:  Mary Murray MD   Department:  [facility] - Urology 4th Floor           Reason for Visit     Post-op Evaluation           Diagnoses this Visit        Comments    Hydronephrosis, right    -  Primary            To Do List           Goals (5 Years of Data)     None      [facility]     [facility] Care [facility] -  Assistance  Registered nurses in the [facility] Center provide clinical advisement, health education, appointment booking, and other advisory services.  Call for this free service at [phone].             Medications           Message regarding Medications     Verify the changes and/or additions to your medication regime listed below are the same as discussed with your clinician today.  If any of these changes or additions are incorrect, please notify your healthcare provider.             Verify that the below list of medications is an accurate representation of the medications you are currently taking.  If none reported, the list may be blank. If incorrect, please contact your healthcare provider. Carry this list with you in case of emergency.           Current Medications     alcohol swabs (ALCOHOL PADS) PadM Apply 1 each topically as needed.    aspirin (ECOTRIN) 81 MG EC tablet Take 81 mg by mouth once daily.    biotin 1 mg tablet Take 1,000 mcg by mouth 3 (three) times daily.    blood sugar diagnostic (ACCU-CHEK SMARTVIEW TEST STRIP) Strp To use as directed with AccuChek Sahra meter and monitor blood sugar daily    blood-glucose meter Misc Accu Check Sahra Smartview meter    carvedilol (COREG) 12.5 MG tablet Take 1 tablet (12.5 mg total) by mouth 2 (two) times daily.    clotrimazole-betamethasone 1-0.05% (LOTRISONE) cream Apply topically 2 (two)  "times daily.    econazole nitrate 1 % cream AAA bid after cool blow dry when flared    gabapentin (NEURONTIN) 300 MG capsule Take 1 capsule (300 mg total) by mouth every evening.    lancets (ACCU-CHEK MULTICLIX LANCET) Misc To use as directed with Accu Chek Sahra FastClix lancing device    levothyroxine (SYNTHROID) 112 MCG tablet Take 1 tablet (112 mcg total) by mouth before breakfast.    lisinopril (PRINIVIL,ZESTRIL) 40 MG tablet Take 1 tablet (40 mg total) by mouth once daily.    metformin (GLUCOPHAGE) 1000 MG tablet Take 1 tablet (1,000 mg total) by mouth 2 (two) times daily with meals.    oxycodone-acetaminophen (PERCOCET) 5-325 mg per tablet Take 1 tablet by mouth every 4 (four) hours as needed for Pain.    polyethylene glycol (GLYCOLAX) 17 gram PwPk Take 17 g by mouth once daily.    pravastatin (PRAVACHOL) 80 MG tablet Take 1 tablet (80 mg total) by mouth once daily.    tamsulosin (FLOMAX) 0.4 mg Cp24 Take 1 capsule (0.4 mg total) by mouth once daily.    conjugated estrogens (PREMARIN) vaginal cream Place 0.5 g vaginally 3 (three) times a week.    cyclobenzaprine (FLEXERIL) 10 MG tablet Take 1 tablet (10 mg total) by mouth 3 (three) times daily as needed for Muscle spasms.    fluocinonide (LIDEX) 0.05 % external solution Apply topically 2 (two) times daily.           Clinical Reference Information           Your Vitals Were     BP Pulse Height Weight BMI    137/58 93 5' 5" (1.651 m) 74 kg (163 lb 2.3 oz) 27.15 kg/m2      Blood Pressure          Most Recent Value    BP  (!)  137/58      Allergies as of 3/20/2017     Bufferin [Aspirin, Buffered]    Ibuprofen      Immunizations Administered on Date of Encounter - 3/20/2017     None      Orders Placed During Today's Visit     Future Labs/Procedures Expected by Expires    US Kidney Only  4/24/2017 3/20/2018    Cystoscopy with Stent Removal  As directed 5/20/2017      [facility] Sign-Up     Activating your [facility] account is as easy as 1-2-3!     1) Visit " [email or web address], select Sign Up Now, enter this activation code and your date of birth, then select Next.  [email or web address]  Expires: 5/4/2017  3:34 PM      2) Create a username and password to use when you visit [facility] in the future and select a security question in case you lose your password and select Next.    3) Enter your e-mail address and click Sign Up!    Additional Information  If you have questions, please e-mail [email or web address] or call [phone] to talk to our [employer] staff. Remember, [facility] is NOT to be used for urgent needs. For medical emergencies, dial 911.         Instructions        Cystoscopy    Cystoscopy is a procedure that lets your doctor look directly inside your urethra and bladder. It can be used to:  · Help diagnose a problem with your urethra, bladder, or kidneys.  · Take a sample (biopsy) of bladder or urethral tissue.  · Treat certain problems (such as removing kidney stones).  · Place a stent to bypass an obstruction.  · Take special X-rays of the kidneys.  Based on the findings, your doctor may recommend other tests or treatments.  What is a cystoscope?  A cystoscope is a telescope-like instrument that contains lenses and fiberoptics (small glass wires that make bright light). The cystoscope may be straight and rigid, or flexible to bend around curves in the urethra. The doctor may look directly into the cystoscope, or project the image onto a monitor.  Getting ready  · Ask your doctor if you should stop taking any medications prior to the procedure.  · Ask whether you should avoid eating or drinking anything after midnight before the procedure.  · Follow any other instructions your doctor gives you.  Tell your doctor before the exam if you:  · Take any medications, such as aspirin or blood thinners  · Have allergies to any medications  · Are pregnant   The procedure  Cystoscopy is done in the doctors office or hospital. The doctor and a nurse are present during the  procedure. It takes only a few minutes, longer if a biopsy, X-ray, or treatment needs to be done.  During the procedure:  · You lie on an exam table on your back, knees bent and legs apart. You are covered with a drape.  · Your urethra and the area around it are washed. Anesthetic jelly may be applied to numb the urethra. Other pain medication is usually not needed. In some cases, you may be offered a mild sedative to help you relax. If a more extensive procedure is to be done, such as a biopsy or kidney stone removal, general anesthesia may be needed.  · The cystoscope is inserted. A sterile fluid is put into the bladder to expand it. You may feel pressure from this fluid.  · When the procedure is done, the cystoscope is removed.  After the procedure  If you had a sedative, general anesthesia, or spinal anesthesia, you must have someone drive you home. Once youre home:  · Drink plenty of fluids.  · You may have burning or light bleeding when you urinate--this is normal.  · Medications may be prescribed to ease any discomfort or prevent infection. Take these as directed.  · Call your doctor if you have heavy bleeding or blood clots, burning that lasts more than a day, a fever over 100°F  (38° C), or trouble urinating.  Date Last Reviewed: 9/8/2014  © 9449-6383 [employer]. [address] [place] [address]. All rights reserved. This information is not intended as a substitute for professional medical care. Always follow your healthcare professional's instructions.             Language Assistance Services     ATTENTION: Language assistance services are available, free of charge. Please call [phone].      ATENCIÓN: Si habla español, tiene a cantrell disposición servicios gratuitos de asistencia lingüística. Llame al [phone].     ANASTASIA Ý: N?u b?n nói Ti?ng Vi?t, có các d?ch v? h? tr? ngôn ng? mi?n phí dành cho b?n. G?i s? [phone].         [facility] 4th [facility] complies with  applicable Federal civil rights laws and does not discriminate on the basis of race, color, national origin, age, disability, or sex.

## 2017-03-31 ENCOUNTER — HOSPITAL ENCOUNTER (OUTPATIENT)
Dept: UROLOGY | Facility: HOSPITAL | Age: 72
Discharge: HOME | End: 2017-03-31
Attending: UROLOGY
Payer: MEDICARE

## 2017-03-31 VITALS
BODY MASS INDEX: 28.88 KG/M2 | DIASTOLIC BLOOD PRESSURE: 76 MMHG | SYSTOLIC BLOOD PRESSURE: 166 MMHG | RESPIRATION RATE: 16 BRPM | HEIGHT: 63 IN | HEART RATE: 69 BPM | WEIGHT: 163 LBS | TEMPERATURE: 98 F

## 2017-03-31 DIAGNOSIS — N13.30 HYDRONEPHROSIS, RIGHT: ICD-10-CM

## 2017-03-31 PROCEDURE — 52310 CYSTOSCOPY AND TREATMENT: CPT | Mod: ,,, | Performed by: UROLOGY

## 2017-03-31 PROCEDURE — 52310 CYSTOSCOPY AND TREATMENT: CPT

## 2017-03-31 PROCEDURE — 52000 CYSTOURETHROSCOPY: CPT

## 2017-03-31 RX ORDER — CIPROFLOXACIN 500 MG/1
500 TABLET ORAL
Status: COMPLETED | OUTPATIENT
Start: 2017-03-31 | End: 2017-03-31

## 2017-03-31 RX ORDER — LIDOCAINE HYDROCHLORIDE 20 MG/ML
JELLY TOPICAL ONCE
Status: COMPLETED | OUTPATIENT
Start: 2017-03-31 | End: 2017-03-31

## 2017-03-31 RX ADMIN — CIPROFLOXACIN 500 MG: 500 TABLET ORAL at 08:03

## 2017-03-31 RX ADMIN — LIDOCAINE HYDROCHLORIDE: 20 JELLY TOPICAL at 07:03

## 2017-03-31 NOTE — PATIENT INSTRUCTIONS

## 2017-03-31 NOTE — H&P (VIEW-ONLY)
CHIEF COMPLAINT:    Mrs. Norton is a 71 y.o. female presenting for a follow up after right ureteral biopsy and stent placement.    PRESENTING ILLNESS:    Aminah Norton is a 71 y.o. female who returns for follow up.  She is seen with the .  She states she had no problems after the biopsy.  The pathology was negative for neoplasm (see below)      Allergies:  Bufferin [aspirin, buffered] and Ibuprofen    Medications:  Outpatient Encounter Prescriptions as of 3/20/2017   Medication Sig Dispense Refill    alcohol swabs (ALCOHOL PADS) PadM Apply 1 each topically as needed. 100 each 3    aspirin (ECOTRIN) 81 MG EC tablet Take 81 mg by mouth once daily.      biotin 1 mg tablet Take 1,000 mcg by mouth 3 (three) times daily.      blood sugar diagnostic (ACCU-CHEK SMARTVIEW TEST STRIP) Strp To use as directed with AccuChek Sahra meter and monitor blood sugar daily 100 strip 3    blood-glucose meter Misc Accu Check Sahra Smartview meter 1 each 0    carvedilol (COREG) 12.5 MG tablet Take 1 tablet (12.5 mg total) by mouth 2 (two) times daily. 180 tablet 3    clotrimazole-betamethasone 1-0.05% (LOTRISONE) cream Apply topically 2 (two) times daily. 45 g 1    econazole nitrate 1 % cream AAA bid after cool blow dry when flared 85 g 2    gabapentin (NEURONTIN) 300 MG capsule Take 1 capsule (300 mg total) by mouth every evening. 90 capsule 3    lancets (ACCU-CHEK MULTICLIX LANCET) Misc To use as directed with Accu Chek Sahra FastClix lancing device 100 each 2    levothyroxine (SYNTHROID) 112 MCG tablet Take 1 tablet (112 mcg total) by mouth before breakfast. 90 tablet 3    lisinopril (PRINIVIL,ZESTRIL) 40 MG tablet Take 1 tablet (40 mg total) by mouth once daily. 90 tablet 3    metformin (GLUCOPHAGE) 1000 MG tablet Take 1 tablet (1,000 mg total) by mouth 2 (two) times daily with meals. 180 tablet 3    oxycodone-acetaminophen (PERCOCET) 5-325 mg per tablet Take 1 tablet by mouth  every 4 (four) hours as needed for Pain. 21 tablet 0    polyethylene glycol (GLYCOLAX) 17 gram PwPk Take 17 g by mouth once daily. 30 packet 0    pravastatin (PRAVACHOL) 80 MG tablet Take 1 tablet (80 mg total) by mouth once daily. 90 tablet 3    tamsulosin (FLOMAX) 0.4 mg Cp24 Take 1 capsule (0.4 mg total) by mouth once daily. 30 capsule 0    conjugated estrogens (PREMARIN) vaginal cream Place 0.5 g vaginally 3 (three) times a week. 30 g 3    cyclobenzaprine (FLEXERIL) 10 MG tablet Take 1 tablet (10 mg total) by mouth 3 (three) times daily as needed for Muscle spasms. 30 tablet 1    fluocinonide (LIDEX) 0.05 % external solution Apply topically 2 (two) times daily. 60 mL 1     No facility-administered encounter medications on file as of 3/20/2017.          PHYSICAL EXAMINATION:    The patient generally appears in good health, is appropriately interactive, and is in no apparent distress.    Skin: No lesions.    Mental: Cooperative with normal affect.    Neuro: Grossly intact.    HEENT: Normal. No evidence of lymphadenopathy.    Chest:   normal inspiratory effort    Extremities: No clubbing, cyanosis, or edema      LABS:    FINAL PATHOLOGIC DIAGNOSIS  1. Ureter, right distal, biopsy:  - Urothelium with acute and chronic inflammation.  - Negative for malignancy.  2. Bladder, biopsy:  - Urothelium with chronic inflammation.  - Negative for malignancy.    IMPRESSION:    Encounter Diagnoses   Name Primary?    Hydronephrosis, right Yes       PLAN:    1.  Reassured the patient about the path  2.  Scheduled for cystoscopy, stent removal  3.  Plan to repeat a renal ultrasound in 1 month, if continues to have hydronephrosis, will check for obstruction.  She may need a ureteral reimplantation if obstructed.

## 2017-03-31 NOTE — IP AVS SNAPSHOT
[facility]  [address] [address]  Phone: [phone]  Fax: [phone]                  Aminah Norton   3/31/2017  9:00 AM   Cysto and Stent Removal    Description:  Female : 1945   Provider:  IBETH HUIZAR   Department:  [facility]           Visit Information     Date & Time Provider Department    3/31/2017  9:00 AM IBETH HUIZAR [facility]      Recent Lab Values        2014 12/15/2015 2016 2017                 12:29 PM 11:27 AM 11:41 AM 11:07 AM        Urine Culture - No significant growth No growth ESCHERICHIA COLI  10,000 - 49,999 cfu/ml          Color Yellow - - -        Specific Gravity 1.010 - - -        pH 6.0 - - -        Nitrite Negative - - -        Ketones Negative - - -        Urobilinogen Negative - - -                 Procedures Performed This Visit     Procedure Authorizing Provider    Cystoscopy with Stent Removal Mary Murray MD      Reason for Visit     Other           Diagnoses this Visit        Comments    Hydronephrosis, right                To Do List           Your Scheduled Appointments     Mar 31, 2017  9:00 AM CDT   Cysto and Stent Removal with IBETH HUIZAR   [facility] ([facility])    [address] [address] [phone]            2017  8:45 AM CDT   [facility] with [place] [address] ALL   [facility] ([facility] )    [address] [address] [phone]              Follow-Up and Disposition     Return in about 1 month (around 2017).      Goals (5 Years of Data)     None           Medications                ** Verify the list of medication(s) below is accurate and up to date. Carry this with you in case of emergency. If your medications have changed, please notify your healthcare provider.             Medication List       TAKE these medications        Additional Info                      alcohol swabs Padm   Commonly known as:  ALCOHOL PADS   Quantity:  100 each   Refills:  3   Dose:  1 each    Instructions:  Apply 1 each topically as needed.     Begin Date    AM    Noon    PM    Bedtime       aspirin 81 MG EC tablet   Commonly known as:  ECOTRIN   Refills:  0   Dose:  81 mg    Instructions:  Take 81 mg by mouth once daily.     Begin Date    AM    Noon    PM    Bedtime       biotin 1 mg tablet   Refills:  0   Dose:  1000 mcg    Instructions:  Take 1,000 mcg by mouth 3 (three) times daily.     Begin Date    AM    Noon    PM    Bedtime       blood sugar diagnostic Strp   Commonly known as:  ACCU-CHEK SMARTVIEW TEST STRIP   Quantity:  100 strip   Refills:  3    Instructions:  To use as directed with AccuChek Sahra meter and monitor blood sugar daily     Begin Date    AM    Noon    PM    Bedtime       blood-glucose meter Misc   Quantity:  1 each   Refills:  0    Instructions:  Accu Check Sahra Smartview meter     Begin Date    AM    Noon    PM    Bedtime       carvedilol 12.5 MG tablet   Commonly known as:  COREG   Quantity:  180 tablet   Refills:  3   Dose:  12.5 mg    Instructions:  Take 1 tablet (12.5 mg total) by mouth 2 (two) times daily.     Begin Date    AM    Noon    PM    Bedtime       clotrimazole-betamethasone 1-0.05% cream   Commonly known as:  LOTRISONE   Quantity:  45 g   Refills:  1    Instructions:  Apply topically 2 (two) times daily.     Begin Date    AM    Noon    PM    Bedtime       conjugated estrogens vaginal cream   Commonly known as:  PREMARIN   Quantity:  30 g   Refills:  3   Dose:  0.5 g    Instructions:  Place 0.5 g vaginally 3 (three) times a week.     Begin Date    AM    Noon    PM    Bedtime       cyclobenzaprine 10 MG tablet   Commonly known as:  FLEXERIL   Quantity:  30 tablet   Refills:  1   Dose:  10 mg    Instructions:  Take 1 tablet (10 mg total) by mouth 3 (three) times daily as needed for Muscle  spasms.     Begin Date    AM    Noon    PM    Bedtime       econazole nitrate 1 % cream   Quantity:  85 g   Refills:  2    Instructions:  AAA bid after cool blow dry when flared     Begin Date    AM    Noon    PM    Bedtime       fluocinonide 0.05 % external solution   Commonly known as:  LIDEX   Quantity:  60 mL   Refills:  1    Instructions:  Apply topically 2 (two) times daily.     Begin Date    AM    Noon    PM    Bedtime       gabapentin 300 MG capsule   Commonly known as:  NEURONTIN   Quantity:  90 capsule   Refills:  3   Dose:  300 mg    Instructions:  Take 1 capsule (300 mg total) by mouth every evening.     Begin Date    AM    Noon    PM    Bedtime       lancets Misc   Commonly known as:  ACCU-CHEK MULTICLIX LANCET   Quantity:  100 each   Refills:  2    Instructions:  To use as directed with Accu Chek Sahra FastClix lancing device     Begin Date    AM    Noon    PM    Bedtime       levothyroxine 112 MCG tablet   Commonly known as:  SYNTHROID   Quantity:  90 tablet   Refills:  3   Dose:  112 mcg    Instructions:  Take 1 tablet (112 mcg total) by mouth before breakfast.     Begin Date    AM    Noon    PM    Bedtime       lisinopril 40 MG tablet   Commonly known as:  PRINIVIL,ZESTRIL   Quantity:  90 tablet   Refills:  3   Dose:  40 mg    Instructions:  Take 1 tablet (40 mg total) by mouth once daily.     Begin Date    AM    Noon    PM    Bedtime       metformin 1000 MG tablet   Commonly known as:  GLUCOPHAGE   Quantity:  180 tablet   Refills:  3   Dose:  1000 mg    Instructions:  Take 1 tablet (1,000 mg total) by mouth 2 (two) times daily with meals.     Begin Date    AM    Noon    PM    Bedtime       oxycodone-acetaminophen 5-325 mg per tablet   Commonly known as:  PERCOCET   Quantity:  21 tablet   Refills:  0   Dose:  1 tablet    Instructions:  Take 1 tablet by mouth every 4 (four) hours as needed for Pain.     Begin Date    AM    Noon    PM    Bedtime       polyethylene glycol 17 gram Pwpk   Commonly known  "as:  GLYCOLAX   Quantity:  30 packet   Refills:  0   Dose:  17 g    Instructions:  Take 17 g by mouth once daily.     Begin Date    AM    Noon    PM    Bedtime       pravastatin 80 MG tablet   Commonly known as:  PRAVACHOL   Quantity:  90 tablet   Refills:  3   Dose:  80 mg    Instructions:  Take 1 tablet (80 mg total) by mouth once daily.     Begin Date    AM    Noon    PM    Bedtime       tamsulosin 0.4 mg Cp24   Commonly known as:  FLOMAX   Quantity:  30 capsule   Refills:  0   Dose:  0.4 mg    Instructions:  Take 1 capsule (0.4 mg total) by mouth once daily.     Begin Date    AM    Noon    PM    Bedtime               Your Vitals Were     BP Pulse Temp Resp Height Weight    166/76 69 97.7 °F (36.5 °C) 16 5' 3" (1.6 m) 73.9 kg (163 lb)    BMI                28.87 kg/m2          Allergies as of 3/31/2017     Bufferin [Aspirin, Buffered]    Ibuprofen      Immunizations Administered on Date of Encounter - 3/31/2017     None      Orders Placed During Today's Visit      Normal Orders This Visit    Cystoscopy with Stent Removal       Administrations This Visit     lidocaine HCl 2% urojet     Admin Date Action Dose Route Administered By             03/31/2017 Given   Mucous Membrane Alexa Jacobs, RN                      Instructions    What to Expect After a Cystoscopy with Stent Removal  For the next 24-48 hours, you may feel a mild burning when you urinate. This burning is normal and expected. Drink plenty of water to dilute the urine to help relieve the burning sensation. You may also see a small amount of blood in your urine after the procedure.    Unless you are already taking antibiotics, you may be given an antibiotic after the test to prevent infection.    Signs and Symptoms to Report  Call the [facility] at [phone] if you develop any of the following:  · Fever of 101 degrees or higher  · Chills or persistent bleeding  · Inability to urinate    After hours or on weekends, you may reach a " urology resident on call at this number: [phone].       Advance Directives     An advance directive is a document which, in the event you are no longer able to make decisions for yourself, tells your healthcare team what kind of treatment you do or do not want to receive, or who you would like to make those decisions for you.  If you do not currently have an advance directive, [facility] encourages you to create one.  For more information call:  [phone]), [phone] ([phone]),  or log on to [email or web address].        [facility] On Call     [facility] Nurse Care Line - 24/7 Assistance  Unless otherwise directed by your provider, please contact [facility], our nurse care line that is available for 24/7 assistance.     Registered nurses in the [facility] Call Center provide: appointment scheduling, clinical advisement, health education, and other advisory services.  Call: [phone] (toll free)          [facility] Sign-Up     Activating your [facility] account is as easy as 1-2-3!     1) Visit [email or web address], select Sign Up Now, enter this activation code and your date of birth, then select Next.  [email or web address]  Expires: 5/4/2017  3:34 PM      2) Create a username and password to use when you visit [facility] in the future and select a security question in case you lose your password and select Next.    3) Enter your e-mail address and click Sign Up!    Additional Information  If you have questions, please e-mail [email or web address] or call [phone] to talk to our [facility] staff. Remember, [facility] is NOT to be used for urgent needs. For medical emergencies, dial 911.         Language Assistance Services     ATTENTION: Language assistance services are available, free of charge. Please call [phone].      ATENCIÓN: Si habla español, tiene a cantrell disposición servicios gratuitos de asistencia lingüística. Llame al [phone].     CHÚ Ý: N?u b?n nói Ti?ng Vi?t,  có các d?ch v? h? tr? ngôn ng? mi?n phí dành cho b?n. G?i s? [phone].         [facility] complies with applicable Federal civil rights laws and does not discriminate on the basis of race, color, national origin, age, disability, or sex.

## 2017-04-27 ENCOUNTER — OFFICE VISIT (OUTPATIENT)
Dept: CARDIOLOGY | Facility: CLINIC | Age: 72
End: 2017-04-27
Payer: MEDICARE

## 2017-04-27 VITALS
DIASTOLIC BLOOD PRESSURE: 63 MMHG | HEIGHT: 65 IN | HEART RATE: 78 BPM | SYSTOLIC BLOOD PRESSURE: 129 MMHG | BODY MASS INDEX: 27.25 KG/M2 | WEIGHT: 163.56 LBS

## 2017-04-27 DIAGNOSIS — E78.2 ELEVATED CHOLESTEROL WITH HIGH TRIGLYCERIDES: ICD-10-CM

## 2017-04-27 DIAGNOSIS — E11.40 TYPE 2 DIABETES MELLITUS WITH DIABETIC NEUROPATHY, WITHOUT LONG-TERM CURRENT USE OF INSULIN: ICD-10-CM

## 2017-04-27 DIAGNOSIS — R00.2 PALPITATIONS: ICD-10-CM

## 2017-04-27 DIAGNOSIS — I10 HTN (HYPERTENSION), BENIGN: Primary | ICD-10-CM

## 2017-04-27 DIAGNOSIS — M79.605 BILATERAL LEG PAIN: ICD-10-CM

## 2017-04-27 DIAGNOSIS — M79.604 BILATERAL LEG PAIN: ICD-10-CM

## 2017-04-27 PROCEDURE — 99999 PR PBB SHADOW E&M-EST. PATIENT-LVL III: CPT | Mod: PBBFAC,,, | Performed by: NURSE PRACTITIONER

## 2017-04-27 PROCEDURE — 1157F ADVNC CARE PLAN IN RCRD: CPT | Mod: S$GLB,,, | Performed by: NURSE PRACTITIONER

## 2017-04-27 PROCEDURE — 1159F MED LIST DOCD IN RCRD: CPT | Mod: S$GLB,,, | Performed by: NURSE PRACTITIONER

## 2017-04-27 PROCEDURE — 1126F AMNT PAIN NOTED NONE PRSNT: CPT | Mod: S$GLB,,, | Performed by: NURSE PRACTITIONER

## 2017-04-27 PROCEDURE — 3074F SYST BP LT 130 MM HG: CPT | Mod: S$GLB,,, | Performed by: NURSE PRACTITIONER

## 2017-04-27 PROCEDURE — 99214 OFFICE O/P EST MOD 30 MIN: CPT | Mod: S$GLB,,, | Performed by: NURSE PRACTITIONER

## 2017-04-27 PROCEDURE — 1160F RVW MEDS BY RX/DR IN RCRD: CPT | Mod: S$GLB,,, | Performed by: NURSE PRACTITIONER

## 2017-04-27 PROCEDURE — 3045F PR MOST RECENT HEMOGLOBIN A1C LEVEL 7.0-9.0%: CPT | Mod: S$GLB,,, | Performed by: NURSE PRACTITIONER

## 2017-04-27 PROCEDURE — 99499 UNLISTED E&M SERVICE: CPT | Mod: S$GLB,,, | Performed by: NURSE PRACTITIONER

## 2017-04-27 PROCEDURE — 4010F ACE/ARB THERAPY RXD/TAKEN: CPT | Mod: S$GLB,,, | Performed by: NURSE PRACTITIONER

## 2017-04-27 PROCEDURE — 3078F DIAST BP <80 MM HG: CPT | Mod: S$GLB,,, | Performed by: NURSE PRACTITIONER

## 2017-04-27 RX ORDER — ATORVASTATIN CALCIUM 40 MG/1
40 TABLET, FILM COATED ORAL DAILY
Qty: 90 TABLET | Refills: 3 | Status: SHIPPED | OUTPATIENT
Start: 2017-04-27 | End: 2018-07-25 | Stop reason: SINTOL

## 2017-04-27 RX ORDER — LISINOPRIL 40 MG/1
40 TABLET ORAL DAILY
Qty: 90 TABLET | Refills: 3 | Status: SHIPPED | OUTPATIENT
Start: 2017-04-27 | End: 2018-05-30 | Stop reason: SDUPTHER

## 2017-04-27 RX ORDER — CARVEDILOL 12.5 MG/1
12.5 TABLET ORAL 2 TIMES DAILY
Qty: 180 TABLET | Refills: 3 | OUTPATIENT
Start: 2017-04-27 | End: 2018-02-27 | Stop reason: SDUPTHER

## 2017-04-27 NOTE — PROGRESS NOTES
Ms. Norton was last seen in [facility] 2/5/2016 by Dr. Novak.      Subjective:   Patient ID:  Aminah Norton is a 71 y.o. female who presents for follow-up of Hypertension (Annual exam) and Leg Problem (cramping and both legs)  .   HPI:    Ms. Newton is a 72yo female with a PMHx of HTN, HLD, and DMII here for follow-up of HTN. Today she has complaints of pain in her legs. She says that this is associated with arthritis and has been present for about 2 years. Her pain runs up and down the front and back of both legs and knees, is not associated with activity, and is more prevalent at night. She last had a lower extremity ultrasound 3/1/2012 that was negative for DVT. She reports that her palpitations are controlled with her carvedilol 12.5mg BID. She had an event monitor (8/21/2014) that showed NSR. She is also taking ASA 81mg, lisinopril 40mg, and pravastatin 80mg. Her LDL is 145.2 and she reports that she is compliant with her medications daily. She takes her BP at home and reports that it is normally in the 130s but can spike to the 160s if she eats a high salt meal. She says that she has some dizziness when changing positions. Ms. Newton denies chest pain with exertion or at rest, SOB, DAY, syncope, leg edema, claudication, PND, or orthopnea. She walks daily for 15-20 minutes daily    Blood pressure:    BP Readings from Last 5 Encounters:   04/27/17 129/63   03/31/17 (!) 166/76   03/20/17 (!) 137/58   03/07/17 135/68   02/01/17 119/71     Recent Cardiac Tests:    Exercise stress echo (8/12/2013):    1 - Normal left ventricular function (EF 65%).     2 - Normal diastolic function.     3 - Normal right ventricular function .   No evidence of stress induced myocardial ischemia.     Current Outpatient Prescriptions   Medication Sig    alcohol swabs (ALCOHOL PADS) PadM Apply 1 each topically as needed.    aspirin (ECOTRIN) 81 MG EC tablet Take 81 mg by mouth once  daily.    biotin 1 mg tablet Take 1,000 mcg by mouth 3 (three) times daily.    blood sugar diagnostic (ACCU-CHEK SMARTVIEW TEST STRIP) Strp To use as directed with AccuChek Sahra meter and monitor blood sugar daily    blood-glucose meter Misc Accu Check Sahra Smartview meter    carvedilol (COREG) 12.5 MG tablet Take 1 tablet (12.5 mg total) by mouth 2 (two) times daily.    clotrimazole-betamethasone 1-0.05% (LOTRISONE) cream Apply topically 2 (two) times daily.    cyclobenzaprine (FLEXERIL) 10 MG tablet Take 1 tablet (10 mg total) by mouth 3 (three) times daily as needed for Muscle spasms.    econazole nitrate 1 % cream AAA bid after cool blow dry when flared    gabapentin (NEURONTIN) 300 MG capsule Take 1 capsule (300 mg total) by mouth every evening.    lancets (ACCU-CHEK MULTICLIX LANCET) Misc To use as directed with Accu Chek Sahra FastClix lancing device    levothyroxine (SYNTHROID) 112 MCG tablet Take 1 tablet (112 mcg total) by mouth before breakfast.    lisinopril (PRINIVIL,ZESTRIL) 40 MG tablet Take 1 tablet (40 mg total) by mouth once daily.    metformin (GLUCOPHAGE) 1000 MG tablet Take 1 tablet (1,000 mg total) by mouth 2 (two) times daily with meals.    oxycodone-acetaminophen (PERCOCET) 5-325 mg per tablet Take 1 tablet by mouth every 4 (four) hours as needed for Pain.    polyethylene glycol (GLYCOLAX) 17 gram PwPk Take 17 g by mouth once daily.    tamsulosin (FLOMAX) 0.4 mg Cp24 Take 1 capsule (0.4 mg total) by mouth once daily.    atorvastatin (LIPITOR) 40 MG tablet Take 1 tablet (40 mg total) by mouth once daily.    conjugated estrogens (PREMARIN) vaginal cream Place 0.5 g vaginally 3 (three) times a week.    fluocinonide (LIDEX) 0.05 % external solution Apply topically 2 (two) times daily.     No current facility-administered medications for this visit.        Review of Systems   Constitution: Negative for weakness and malaise/fatigue.   HENT: Positive for headaches (sometimes).  "Negative for sore throat.    Eyes: Negative for blurred vision.   Cardiovascular: Negative for chest pain, claudication, dyspnea on exertion, irregular heartbeat, leg swelling, orthopnea, palpitations, paroxysmal nocturnal dyspnea and syncope.   Respiratory: Negative for shortness of breath.    Hematologic/Lymphatic: Negative for adenopathy and bleeding problem.   Skin: Negative for rash.   Musculoskeletal: Positive for arthritis, joint pain (bilateral knees, left hip) and neck pain. Negative for back pain, muscle weakness and myalgias.   Gastrointestinal: Negative for abdominal pain, constipation, diarrhea, dysphagia and nausea.   Genitourinary: Negative for dysuria and hematuria.   Neurological: Negative for loss of balance, numbness, paresthesias and seizures.   Psychiatric/Behavioral: Negative for altered mental status.   Allergic/Immunologic: Negative for persistent infections.     Objective:     Right Arm BP - Sittin/63 (17 1327)  Left Arm BP - Sittin/63 (17 1327)    /63 (BP Location: Left arm, Patient Position: Sitting, BP Method: Automatic)  Pulse 78  Ht 5' 5" (1.651 m)  Wt 74.2 kg (163 lb 9.3 oz)  BMI 27.22 kg/m2    Physical Exam   Constitutional: She is oriented to person, place, and time. She appears well-developed and well-nourished.   HENT:   Head: Normocephalic.   Nose: Nose normal.   Eyes: Pupils are equal, round, and reactive to light.   Neck: No JVD present. Carotid bruit is not present.   Cardiovascular: Normal rate, regular rhythm, S1 normal, S2 normal and intact distal pulses.   No extrasystoles are present. PMI is not displaced.  Exam reveals no gallop.    No murmur heard.  Pulses:       Carotid pulses are 2+ on the right side, and 2+ on the left side.       Radial pulses are 2+ on the right side, and 2+ on the left side.        Dorsalis pedis pulses are 2+ on the right side, and 2+ on the left side.   Pulmonary/Chest: Breath sounds normal. No respiratory " distress.   Abdominal: Soft. Bowel sounds are normal. She exhibits no distension.   Musculoskeletal: Normal range of motion. She exhibits no edema.   Neurological: She is alert and oriented to person, place, and time. She is not disoriented.   Skin: Skin is warm and dry. No rash noted.   Psychiatric: She has a normal mood and affect. Her speech is normal and behavior is normal.   Nursing note and vitals reviewed.    Lab Results   Component Value Date     11/21/2016    K 4.4 11/21/2016     11/21/2016    CO2 25 11/21/2016    BUN 13 11/21/2016    CREATININE 0.8 11/21/2016     (H) 11/21/2016    HGBA1C 7.6 (H) 11/29/2016    MG 2.1 09/07/2012    AST 16 11/21/2016    ALT 19 11/21/2016    ALBUMIN 3.8 11/21/2016    PROT 7.2 11/21/2016    BILITOT 0.5 11/21/2016    WBC 3.69 (L) 11/21/2016    HGB 11.1 (L) 11/21/2016    HCT 34.2 (L) 11/21/2016    MCV 78 (L) 11/21/2016     11/21/2016    TSH 1.395 11/29/2016    CHOL 229 (H) 11/21/2016    HDL 51 11/21/2016    LDLCALC 145.2 11/21/2016    TRIG 164 (H) 11/21/2016            Test(s) Reviewed  I have reviewed the following in detail:  [] Stress test   [] Angiography   [] Echocardiogram   [x] Labs   [] Other:       Assessment:         1. HTN (hypertension), benign. BP controlled on current medications. Discussed with patient the importance of eating a low salt diet to prevent blood pressure elevation. Also discussed need to increase exercise to 30 min daily.      2. Elevated cholesterol with high triglycerides .2 on 11/21/2016. Patient says she is compliant with her statin. Will change to atorvastatin 40mg.     3. Type 2 diabetes mellitus with diabetic neuropathy, without long-term current use of insulin. DM not well controlled. HA1C 7.6. Takes gabapentin 300mg daily. Discussed consulting her PCP regarding neuropathy.     4. Bilateral leg pain. Whole leg. Not associated with activity and predominantly at night. Unlikely PAD. Likely related to arthritis  and/or neuropathy.      Plan:     Aminah was seen today for hypertension and leg problem.    Diagnoses and all orders for this visit:    HTN (hypertension), benign  -     Basic metabolic panel; Future; Expected date: 4/27/18  -     carvedilol (COREG) 12.5 MG tablet; Take 1 tablet (12.5 mg total) by mouth 2 (two) times daily.  -     lisinopril (PRINIVIL,ZESTRIL) 40 MG tablet; Take 1 tablet (40 mg total) by mouth once daily.    Elevated cholesterol with high triglycerides  -     atorvastatin (LIPITOR) 40 MG tablet; Take 1 tablet (40 mg total) by mouth once daily.  -     Lipid panel; Future; Expected date: 4/27/18    Type 2 diabetes mellitus with diabetic neuropathy, without long-term current use of insulin    Bilateral leg pain    Palpitations  -     carvedilol (COREG) 12.5 MG tablet; Take 1 tablet (12.5 mg total) by mouth 2 (two) times daily.      Patient has been encouraged to exercise a minimum of 30 minutes daily, 3-5 times per week.   Patient advised to consume a low salt, heart healthy diet. Information provided.    Return in about 1 year (around 4/27/2018).

## 2017-04-27 NOTE — LETTER
April 27, 2017      Dereje Thomas MD  [address]  [facility] [address]           [facility]  [address]  [facility] [address]  Phone: [phone]          Patient: Aminah Norton   MR Number: [id number]   YOB: 1945   Date of Visit: 4/27/2017       Dear Dr. Dereje Thomas:    Thank you for referring Aminah Norton to me for evaluation. Attached you will find relevant portions of my assessment and plan of care.    If you have questions, please do not hesitate to call me. I look forward to following Aminah Norton along with you.    Sincerely,    Shira Sung, AUDELIA    Enclosure  CC:  No Recipients    If you would like to receive this communication electronically, please contact [email or web address] or [phone] to request more information on [employer]. Link access.    For providers and/or their staff who would like to refer a patient to [facility], please contact us through our one-stop-shop provider referral line, [facility], at [phone].    If you feel you have received this communication in error or would no longer like to receive these types of communications, please e-mail [email or web address]

## 2017-04-27 NOTE — MR AVS SNAPSHOT
[facility]  [address]  [place] [address]  Phone: [phone]                  Aminah Alicea-Dermody   2017 1:20 PM   Office Visit    Description:  Female : 1945   Provider:  Shira Sung NP   Department:  [facility]           Reason for Visit     Hypertension     Leg Problem           Diagnoses this Visit        Comments    HTN (hypertension), benign    -  Primary     Elevated cholesterol with high triglycerides         Type 2 diabetes mellitus with diabetic neuropathy, without long-term current use of insulin         Bilateral leg pain                To Do List           Future Appointments        Provider Department Dept Phone    2017  8:45 AM [facility] 11 ALL [facility] [phone]    2017 1:00 PM Mary Murray MD [facility] - Urology 4th Floor [phone]      Goals (5 Years of Data)     None      Follow-Up and Disposition     Return in about 1 year (around 2018).       These Medications        Disp Refills Start End    atorvastatin (LIPITOR) 40 MG tablet 90 tablet 3 2017    Take 1 tablet (40 mg total) by mouth once daily. - Oral    Pharmacy: [facility] - [address] Ph #: [phone]         [facility]     [facility] Nurse Care Line -  Assistance  Unless otherwise directed by your provider, please contact [facility], our nurse care line that is available for  assistance.     Registered nurses in the [facility] provide: appointment scheduling, clinical advisement, health education, and other advisory services.  Call: [phone] (toll free)               Medications           Message regarding Medications     Verify the changes and/or additions to your medication regime listed below are the same as discussed with your clinician today.  If any of these changes or additions are incorrect, please notify  your healthcare provider.        START taking these NEW medications        Refills    atorvastatin (LIPITOR) 40 MG tablet 3    Sig: Take 1 tablet (40 mg total) by mouth once daily.    Class: Normal    Route: Oral      STOP taking these medications     pravastatin (PRAVACHOL) 80 MG tablet Take 1 tablet (80 mg total) by mouth once daily.           Verify that the below list of medications is an accurate representation of the medications you are currently taking.  If none reported, the list may be blank. If incorrect, please contact your healthcare provider. Carry this list with you in case of emergency.           Current Medications     alcohol swabs (ALCOHOL PADS) PadM Apply 1 each topically as needed.    aspirin (ECOTRIN) 81 MG EC tablet Take 81 mg by mouth once daily.    biotin 1 mg tablet Take 1,000 mcg by mouth 3 (three) times daily.    blood sugar diagnostic (ACCU-CHEK SMARTVIEW TEST STRIP) Strp To use as directed with AccuChek Sahra meter and monitor blood sugar daily    blood-glucose meter Misc Accu Check Sahra Smartview meter    carvedilol (COREG) 12.5 MG tablet Take 1 tablet (12.5 mg total) by mouth 2 (two) times daily.    clotrimazole-betamethasone 1-0.05% (LOTRISONE) cream Apply topically 2 (two) times daily.    cyclobenzaprine (FLEXERIL) 10 MG tablet Take 1 tablet (10 mg total) by mouth 3 (three) times daily as needed for Muscle spasms.    econazole nitrate 1 % cream AAA bid after cool blow dry when flared    gabapentin (NEURONTIN) 300 MG capsule Take 1 capsule (300 mg total) by mouth every evening.    lancets (ACCU-CHEK MULTICLIX LANCET) Misc To use as directed with Accu Chek Sahra FastClix lancing device    levothyroxine (SYNTHROID) 112 MCG tablet Take 1 tablet (112 mcg total) by mouth before breakfast.    lisinopril (PRINIVIL,ZESTRIL) 40 MG tablet Take 1 tablet (40 mg total) by mouth once daily.    metformin (GLUCOPHAGE) 1000 MG tablet Take 1 tablet (1,000 mg total) by mouth 2 (two) times daily with  "meals.    oxycodone-acetaminophen (PERCOCET) 5-325 mg per tablet Take 1 tablet by mouth every 4 (four) hours as needed for Pain.    polyethylene glycol (GLYCOLAX) 17 gram PwPk Take 17 g by mouth once daily.    tamsulosin (FLOMAX) 0.4 mg Cp24 Take 1 capsule (0.4 mg total) by mouth once daily.    atorvastatin (LIPITOR) 40 MG tablet Take 1 tablet (40 mg total) by mouth once daily.    conjugated estrogens (PREMARIN) vaginal cream Place 0.5 g vaginally 3 (three) times a week.    fluocinonide (LIDEX) 0.05 % external solution Apply topically 2 (two) times daily.           Clinical Reference Information           Your Vitals Were     BP Pulse Height Weight BMI    129/63 (BP Location: Left arm, Patient Position: Sitting, BP Method: Automatic) 78 5' 5" (1.651 m) 74.2 kg (163 lb 9.3 oz) 27.22 kg/m2      Blood Pressure          Most Recent Value    Right Arm BP - Sitting  131/63    Left Arm BP - Sitting  129/63    BP  129/63      Allergies as of 4/27/2017     Bufferin [Aspirin, Buffered]    Ibuprofen      Immunizations Administered on Date of Encounter - 4/27/2017     None      Orders Placed During Today's Visit     Future Labs/Procedures Expected by Expires    Basic metabolic panel  4/27/2018 (Approximate) 4/27/2018    Lipid panel  4/27/2018 (Approximate) 4/27/2019      [facility] Sign-Up     Activating your [facility] account is as easy as 1-2-3!     1) Visit [email or web address], select Sign Up Now, enter this activation code and your date of birth, then select Next.  [email or web address]  Expires: 5/4/2017  3:34 PM      2) Create a username and password to use when you visit [facility] in the future and select a security question in case you lose your password and select Next.    3) Enter your e-mail address and click Sign Up!    Additional Information  If you have questions, please e-mail [email or web address] or call [phone] to talk to our [facility] staff. Remember, [facility] is NOT to be used for urgent needs. For medical " emergencies, dial 911.         Instructions    Stop taking pravastatin 80mg; start taking atorvastatin 40mg.  Patient has been encouraged to exercise a minimum of 30 minutes daily, 3-5 times per week.   Patient advised to consume a low salt, heart healthy diet. DASH diet recommended and information provided.  Return in one year after blood tests.    Walking for Fitness  Fitness walking has something for everyone, even people who are already fit. Walking is one of the safest ways to condition your body aerobically. It can boost energy, help you lose weight, and reduce stress.    Physical benefits  · Walking strengthens your heart and lungs, and tones your muscles.  · When walking, your feet land with less impact than in other sports. This reduces chances of muscle, bone, and joint injury.  · Regular walking improves your cholesterol levels and lowers your risk of heart disease. And it helps you control your blood sugar if you have diabetes.  · Walking is a weight-bearing activity, which helps maintain bone density. This can help prevent osteoporosis.  Personal rewards  · Taking walks can help you relax and manage stress. And fitness walking may make you feel better about yourself.  · Walking can help you sleep better at night and make you less likely to be depressed.  · Regular walking may help maintain your memory as you get older.  · Walking is a great way to spend extra time with friends and family members. Be sure to invite your dog along!  Q&A about fitness walking  Q: Will walking keep me fit?  A: Yes. Regular walking at the right pace gives you all the benefits of other aerobic activities, such as jogging and swimming.  Q: Will walking help me lose weight and keep it off?  A: Yes. Per mile, walking can burn as many calories as jogging. Your health care provider can help work walking into your weight-loss plan.  Q: Is walking safe for my health?  A: Yes. Walking is safe if you have high blood pressure,  diabetes, heart disease, or other conditions. Talk to your health care provider before you start.  Date Last Reviewed: 5/9/2015 © 2000-2016 [employer]. [address] [place] [address], [place] [address]. All rights reserved. This information is not intended as a substitute for professional medical care. Always follow your healthcare professional's instructions.        Low-Salt Diet  This diet removes foods that are high in salt. It also limits the amount of salt you use when cooking. It is most often used for people with high blood pressure, edema (fluid retention), and kidney, liver, or heart disease.  Table salt contains the mineral sodium. Your body needs sodium to work normally. But too much sodium can make your health problems worse. Your healthcare provider is recommending a low-salt (also called low-sodium) diet for you. Your total daily allowance of salt is 1,500 to 2,300 milligrams (mg). It is less than 1 teaspoon of table salt. This means you can have only about 500 to 700 mg of sodium at each meal. People with certain health problems should limit salt intake to the lower end of the recommended range.    When you cook, dont add much salt. If you can cook without using salt, even better. Dont add salt to your food at the table.  When shopping, read food labels. Salt is often called sodium on the label. Choose foods that are salt-free, low salt, or very low salt. Note that foods with reduced salt may not lower your salt intake enough.    Beans, potatoes, and pasta  Ok: Dry beans, split peas, lentils, potatoes, rice, macaroni, pasta, spaghetti without added salt  Avoid: Potato chips, tortilla chips, and similar products  Breads and cereals  Ok: Low-sodium breads, rolls, cereals, and cakes; low-salt crackers, matzo crackers  Avoid: Salted crackers, pretzels, popcorn, Indian toast, pancakes, muffins  Dairy  Ok: Milk, chocolate milk, hot chocolate mix, low-salt cheeses, and yogurt  Avoid: Processed  cheese and cheese spreads; Roquefort, Camembert, and cottage cheese; buttermilk, instant breakfast drink  Desserts  Ok: Ice cream, frozen yogurt, juice bars, gelatin, cookies and pies, sugar, honey, jelly, hard candy  Avoid: Most pies, cakes and cookies prepared or processed with salt; instant pudding  Drinks  Ok: Tea, coffee, fizzy (carbonated) drinks, juices  Avoid: Flavored coffees, electrolyte replacement drinks, sports drinks  Meats  Ok: All fresh meat, fish, poultry, low-salt tuna, eggs, egg substitute  Avoid: Smoked, pickled, brine-cured, or salted meats and fish. This includes kirby, chipped beef, corned beef, hot dogs, deli meats, ham, kosher meats, salt pork, sausage, canned tuna, salted codfish, smoked salmon, herring, sardines, or anchovies.  Seasonings and spices  Ok: Most seasonings are okay. Good substitutes for salt include: fresh herb blends, hot sauce, lemon, garlic, manzano, vinegar, dry mustard, parsley, cilantro, horseradish, tomato paste, regular margarine, mayonnaise, unsalted butter, cream cheese, vegetable oil, cream, low-salt salad dressing and gravy.  Avoid: Regular ketchup, relishes, pickles, soy sauce, teriyaki sauce, Worcestershire sauce, BBQ sauce, tartar sauce, meat tenderizer, chili sauce, regular gravy, regular salad dressing, salted butter  Soups  Ok: Low-salt soups and broths made with allowed foods  Avoid: Bouillon cubes, soups with smoked or salted meats, regular soup and broth  Vegetables  Ok: Most vegetables are okay; also low-salt tomato and vegetable juices  Avoid: Sauerkraut and other brine-soaked vegetables; pickles and other pickled vegetables; tomato juice, olives  Date Last Reviewed: 8/1/2016  © 9477-2898 [employer]. [address] [place] [address], [place] [address]. All rights reserved. This information is not intended as a substitute for professional medical care. Always follow your healthcare professional's instructions.             Language Assistance Services      ATTENTION: Language assistance services are available, free of charge. Please call [phone].      ATENCIÓN: Si habla español, tiene a cantrell disposición servicios gratuitos de asistencia lingüística. Llame al [phone].     CHÚ Ý: N?u b?n nói Ti?ng Vi?t, có các d?ch v? h? tr? ngôn ng? mi?n phí dành cho b?n. G?i s? [phone].         [facility] complies with applicable Federal civil rights laws and does not discriminate on the basis of race, color, national origin, age, disability, or sex.

## 2017-04-27 NOTE — PATIENT INSTRUCTIONS
Stop taking pravastatin 80mg; start taking atorvastatin 40mg.  Patient has been encouraged to exercise a minimum of 30 minutes daily, 3-5 times per week.   Patient advised to consume a low salt, heart healthy diet. DASH diet recommended and information provided.  Return in one year after blood tests.    Walking for Fitness  Fitness walking has something for everyone, even people who are already fit. Walking is one of the safest ways to condition your body aerobically. It can boost energy, help you lose weight, and reduce stress.    Physical benefits  · Walking strengthens your heart and lungs, and tones your muscles.  · When walking, your feet land with less impact than in other sports. This reduces chances of muscle, bone, and joint injury.  · Regular walking improves your cholesterol levels and lowers your risk of heart disease. And it helps you control your blood sugar if you have diabetes.  · Walking is a weight-bearing activity, which helps maintain bone density. This can help prevent osteoporosis.  Personal rewards  · Taking walks can help you relax and manage stress. And fitness walking may make you feel better about yourself.  · Walking can help you sleep better at night and make you less likely to be depressed.  · Regular walking may help maintain your memory as you get older.  · Walking is a great way to spend extra time with friends and family members. Be sure to invite your dog along!  Q&A about fitness walking  Q: Will walking keep me fit?  A: Yes. Regular walking at the right pace gives you all the benefits of other aerobic activities, such as jogging and swimming.  Q: Will walking help me lose weight and keep it off?  A: Yes. Per mile, walking can burn as many calories as jogging. Your health care provider can help work walking into your weight-loss plan.  Q: Is walking safe for my health?  A: Yes. Walking is safe if you have high blood pressure, diabetes, heart disease, or other conditions. Talk to  your health care provider before you start.  Date Last Reviewed: 5/9/2015  © 0618-2915 [employer]. [address] [place] [address], [place] [address]. All rights reserved. This information is not intended as a substitute for professional medical care. Always follow your healthcare professional's instructions.        Low-Salt Diet  This diet removes foods that are high in salt. It also limits the amount of salt you use when cooking. It is most often used for people with high blood pressure, edema (fluid retention), and kidney, liver, or heart disease.  Table salt contains the mineral sodium. Your body needs sodium to work normally. But too much sodium can make your health problems worse. Your healthcare provider is recommending a low-salt (also called low-sodium) diet for you. Your total daily allowance of salt is 1,500 to 2,300 milligrams (mg). It is less than 1 teaspoon of table salt. This means you can have only about 500 to 700 mg of sodium at each meal. People with certain health problems should limit salt intake to the lower end of the recommended range.    When you cook, dont add much salt. If you can cook without using salt, even better. Dont add salt to your food at the table.  When shopping, read food labels. Salt is often called sodium on the label. Choose foods that are salt-free, low salt, or very low salt. Note that foods with reduced salt may not lower your salt intake enough.    Beans, potatoes, and pasta  Ok: Dry beans, split peas, lentils, potatoes, rice, macaroni, pasta, spaghetti without added salt  Avoid: Potato chips, tortilla chips, and similar products  Breads and cereals  Ok: Low-sodium breads, rolls, cereals, and cakes; low-salt crackers, matzo crackers  Avoid: Salted crackers, pretzels, popcorn, Malay toast, pancakes, muffins  Dairy  Ok: Milk, chocolate milk, hot chocolate mix, low-salt cheeses, and yogurt  Avoid: Processed cheese and cheese spreads; Roquefort, Camembert, and  cottage cheese; buttermilk, instant breakfast drink  Desserts  Ok: Ice cream, frozen yogurt, juice bars, gelatin, cookies and pies, sugar, honey, jelly, hard candy  Avoid: Most pies, cakes and cookies prepared or processed with salt; instant pudding  Drinks  Ok: Tea, coffee, fizzy (carbonated) drinks, juices  Avoid: Flavored coffees, electrolyte replacement drinks, sports drinks  Meats  Ok: All fresh meat, fish, poultry, low-salt tuna, eggs, egg substitute  Avoid: Smoked, pickled, brine-cured, or salted meats and fish. This includes kirby, chipped beef, corned beef, hot dogs, deli meats, ham, kosher meats, salt pork, sausage, canned tuna, salted codfish, smoked salmon, herring, sardines, or anchovies.  Seasonings and spices  Ok: Most seasonings are okay. Good substitutes for salt include: fresh herb blends, hot sauce, lemon, garlic, manzano, vinegar, dry mustard, parsley, cilantro, horseradish, tomato paste, regular margarine, mayonnaise, unsalted butter, cream cheese, vegetable oil, cream, low-salt salad dressing and gravy.  Avoid: Regular ketchup, relishes, pickles, soy sauce, teriyaki sauce, Worcestershire sauce, BBQ sauce, tartar sauce, meat tenderizer, chili sauce, regular gravy, regular salad dressing, salted butter  Soups  Ok: Low-salt soups and broths made with allowed foods  Avoid: Bouillon cubes, soups with smoked or salted meats, regular soup and broth  Vegetables  Ok: Most vegetables are okay; also low-salt tomato and vegetable juices  Avoid: Sauerkraut and other brine-soaked vegetables; pickles and other pickled vegetables; tomato juice, olives  Date Last Reviewed: 8/1/2016  © 6803-9610 [employer]. [address] [place] [address]. All rights reserved. This information is not intended as a substitute for professional medical care. Always follow your healthcare professional's instructions.

## 2017-04-28 ENCOUNTER — HOSPITAL ENCOUNTER (OUTPATIENT)
Dept: RADIOLOGY | Facility: HOSPITAL | Age: 72
Discharge: HOME | End: 2017-04-28
Attending: UROLOGY
Payer: MEDICARE

## 2017-04-28 DIAGNOSIS — N13.30 HYDRONEPHROSIS, RIGHT: ICD-10-CM

## 2017-04-28 PROCEDURE — 76770 US EXAM ABDO BACK WALL COMP: CPT | Mod: TC

## 2017-04-28 PROCEDURE — 76770 US EXAM ABDO BACK WALL COMP: CPT | Mod: 26,,, | Performed by: RADIOLOGY

## 2017-05-01 ENCOUNTER — OFFICE VISIT (OUTPATIENT)
Dept: UROLOGY | Facility: CLINIC | Age: 72
End: 2017-05-01
Payer: MEDICARE

## 2017-05-01 VITALS
BODY MASS INDEX: 27.18 KG/M2 | WEIGHT: 163.13 LBS | HEART RATE: 78 BPM | HEIGHT: 65 IN | DIASTOLIC BLOOD PRESSURE: 63 MMHG | SYSTOLIC BLOOD PRESSURE: 128 MMHG

## 2017-05-01 DIAGNOSIS — N13.30 HYDRONEPHROSIS, RIGHT: Primary | ICD-10-CM

## 2017-05-01 DIAGNOSIS — R39.11 URINARY HESITANCY: ICD-10-CM

## 2017-05-01 PROCEDURE — 99999 PR PBB SHADOW E&M-EST. PATIENT-LVL IV: CPT | Mod: PBBFAC,,, | Performed by: UROLOGY

## 2017-05-01 PROCEDURE — 1160F RVW MEDS BY RX/DR IN RCRD: CPT | Mod: S$GLB,,, | Performed by: UROLOGY

## 2017-05-01 PROCEDURE — 99213 OFFICE O/P EST LOW 20 MIN: CPT | Mod: S$GLB,,, | Performed by: UROLOGY

## 2017-05-01 PROCEDURE — 1126F AMNT PAIN NOTED NONE PRSNT: CPT | Mod: S$GLB,,, | Performed by: UROLOGY

## 2017-05-01 PROCEDURE — 3074F SYST BP LT 130 MM HG: CPT | Mod: S$GLB,,, | Performed by: UROLOGY

## 2017-05-01 PROCEDURE — 1159F MED LIST DOCD IN RCRD: CPT | Mod: S$GLB,,, | Performed by: UROLOGY

## 2017-05-01 PROCEDURE — 1157F ADVNC CARE PLAN IN RCRD: CPT | Mod: S$GLB,,, | Performed by: UROLOGY

## 2017-05-01 PROCEDURE — 3078F DIAST BP <80 MM HG: CPT | Mod: S$GLB,,, | Performed by: UROLOGY

## 2017-05-01 RX ORDER — TAMSULOSIN HYDROCHLORIDE 0.4 MG/1
0.4 CAPSULE ORAL DAILY
Qty: 90 CAPSULE | Refills: 3 | Status: SHIPPED | OUTPATIENT
Start: 2017-05-01 | End: 2018-05-30 | Stop reason: SDUPTHER

## 2017-05-01 RX ORDER — TAMSULOSIN HYDROCHLORIDE 0.4 MG/1
0.4 CAPSULE ORAL DAILY
Qty: 90 CAPSULE | Refills: 3 | Status: SHIPPED | OUTPATIENT
Start: 2017-05-01 | End: 2017-05-01 | Stop reason: SDUPTHER

## 2017-05-01 NOTE — PROGRESS NOTES
CHIEF COMPLAINT:    Mrs. Norton is a 72 y.o. female presenting for a follow up on ureteral scarring, status post cystoscopy, stent placement, is status post removal.    PRESENTING ILLNESS:    Aminah Norton is a 72 y.o. female who returns for follow up.  She states that she urinates more easily with the flomax.  She would like to stay on the medication and wants it to be sent to [facility].  She feels well, no right flank tenderness.  She had her renal ultrasound today which is 1 month from the time of stent removal.  There is hydronephrosis but it is to a lesser degree than it was when she had the biopsy and stent placement.      REVIEW OF SYSTEMS:    Review of Systems   Constitutional: Negative.    HENT: Negative.    Eyes: Negative.    Respiratory: Negative.    Cardiovascular: Negative.    Gastrointestinal: Negative.    Genitourinary: Negative for flank pain.   Musculoskeletal: Negative.    Skin: Negative.    Neurological: Negative.    Endo/Heme/Allergies: Negative.    Psychiatric/Behavioral: Negative.          PATIENT HISTORY:    Past Medical History:   Diagnosis Date    Arthritis     Cataract     left eye    Choroidal rupture of left eye     Diabetes mellitus type II     GERD (gastroesophageal reflux disease)     Hyperlipidemia     Hypertension     Hypothyroidism     Macular scar     right eye    Retinal degeneration     chorioretinal OD    Thyroid disease     Vitamin B 12 deficiency        Past Surgical History:   Procedure Laterality Date    BLADDER SUSPENSION      CATARACT EXTRACTION      right eye     CHOLECYSTECTOMY      TOTAL ABDOMINAL HYSTERECTOMY      DUB       Family History   Problem Relation Age of Onset    Cancer Mother      uterine    COPD Father     Cancer Sister      liver    Hypertension Daughter     Thyroid disease Daughter     Diabetes Son     Hypertension Sister     Hypertension Sister     Cancer Daughter      uterine     Thyroid disease Daughter     Hypertension Daughter      Social History    Marital status:      Social History Main Topics    Smoking status: Never Smoker    Smokeless tobacco: Never Used    Alcohol use No    Drug use: No    Sexual activity: Yes     Partners: Male     Birth control/ protection: Post-menopausal      Comment:       Social History Narrative     with 7 kids       Allergies:  Bufferin [aspirin, buffered] and Ibuprofen    Medications:  Outpatient Encounter Prescriptions as of 5/1/2017   Medication Sig Dispense Refill    aspirin (ECOTRIN) 81 MG EC tablet Take 81 mg by mouth once daily.      atorvastatin (LIPITOR) 40 MG tablet Take 1 tablet (40 mg total) by mouth once daily. 90 tablet 3    biotin 1 mg tablet Take 1,000 mcg by mouth 3 (three) times daily.      carvedilol (COREG) 12.5 MG tablet Take 1 tablet (12.5 mg total) by mouth 2 (two) times daily. 180 tablet 3    cyclobenzaprine (FLEXERIL) 10 MG tablet Take 1 tablet (10 mg total) by mouth 3 (three) times daily as needed for Muscle spasms. 30 tablet 1    gabapentin (NEURONTIN) 300 MG capsule Take 1 capsule (300 mg total) by mouth every evening. 90 capsule 3    levothyroxine (SYNTHROID) 112 MCG tablet Take 1 tablet (112 mcg total) by mouth before breakfast. 90 tablet 3    lisinopril (PRINIVIL,ZESTRIL) 40 MG tablet Take 1 tablet (40 mg total) by mouth once daily. 90 tablet 3    metformin (GLUCOPHAGE) 1000 MG tablet Take 1 tablet (1,000 mg total) by mouth 2 (two) times daily with meals. 180 tablet 3    tamsulosin (FLOMAX) 0.4 mg Cp24 Take 1 capsule (0.4 mg total) by mouth once daily. 90 capsule 3    [DISCONTINUED] tamsulosin (FLOMAX) 0.4 mg Cp24 Take 1 capsule (0.4 mg total) by mouth once daily. 30 capsule 0    alcohol swabs (ALCOHOL PADS) PadM Apply 1 each topically as needed. 100 each 3    blood sugar diagnostic (ACCU-CHEK SMARTVIEW TEST STRIP) Strp To use as directed with AccuChek Sahra meter and monitor blood sugar  daily 100 strip 3    blood-glucose meter Misc Accu Check Sahra Smartview meter 1 each 0    clotrimazole-betamethasone 1-0.05% (LOTRISONE) cream Apply topically 2 (two) times daily. 45 g 1    conjugated estrogens (PREMARIN) vaginal cream Place 0.5 g vaginally 3 (three) times a week. 30 g 3    econazole nitrate 1 % cream AAA bid after cool blow dry when flared 85 g 2    fluocinonide (LIDEX) 0.05 % external solution Apply topically 2 (two) times daily. 60 mL 1    lancets (ACCU-CHEK MULTICLIX LANCET) Misc To use as directed with Accu Chek Sahra FastClix lancing device 100 each 2    oxycodone-acetaminophen (PERCOCET) 5-325 mg per tablet Take 1 tablet by mouth every 4 (four) hours as needed for Pain. 21 tablet 0    polyethylene glycol (GLYCOLAX) 17 gram PwPk Take 17 g by mouth once daily. 30 packet 0     No facility-administered encounter medications on file as of 5/1/2017.          PHYSICAL EXAMINATION:    The patient generally appears in good health, is appropriately interactive, and is in no apparent distress.    Skin: No lesions.    Mental: Cooperative with normal affect.    Neuro: Grossly intact.    HEENT: Normal. No evidence of lymphadenopathy.    Chest: Clear to ascultation bilaterally, normal inspiratory effort.    Extremities: No clubbing, cyanosis, or edema      LABS:    Renal ultrasound report and images reviewed    IMPRESSION:    Encounter Diagnoses   Name Primary?    Urinary hesitancy Yes       PLAN:    1. Return in 3 months with pre clinic renal ultrasound  2.  flomax refilled as requested.

## 2017-05-01 NOTE — MR AVS SNAPSHOT
[facility] - Urology 4th Floor  [address]  [place] [address]  Phone: [phone]                  Aminah DeShelly   2017 1:00 PM   Office Visit    Description:  Female : 1945   Provider:  Mary Murray MD   Department:  [facility] - Urology 4th Floor           Reason for Visit     Follow-up           Diagnoses this Visit        Comments    Hydronephrosis, right    -  Primary     Urinary hesitancy                To Do List           Goals (5 Years of Data)     None       These Medications        Disp Refills Start End    tamsulosin (FLOMAX) 0.4 mg Cp24 90 capsule 3 2017     Take 1 capsule (0.4 mg total) by mouth once daily. - Oral    Pharmacy: [facility] - [place] [id number] [facility] Ph #: [phone]         [facility]     [facility] Nurse Care Line -  Assistance  Unless otherwise directed by your provider, please contact [facility], our nurse care line that is available for  assistance.     Registered nurses in the [facility] provide: appointment scheduling, clinical advisement, health education, and other advisory services.  Call: [phone] (toll free)               Medications           Message regarding Medications     Verify the changes and/or additions to your medication regime listed below are the same as discussed with your clinician today.  If any of these changes or additions are incorrect, please notify your healthcare provider.             Verify that the below list of medications is an accurate representation of the medications you are currently taking.  If none reported, the list may be blank. If incorrect, please contact your healthcare provider. Carry this list with you in case of emergency.           Current Medications     aspirin (ECOTRIN) 81 MG EC tablet Take 81 mg by mouth once daily.    atorvastatin (LIPITOR) 40 MG tablet Take 1 tablet (40 mg total) by mouth once  "daily.    biotin 1 mg tablet Take 1,000 mcg by mouth 3 (three) times daily.    carvedilol (COREG) 12.5 MG tablet Take 1 tablet (12.5 mg total) by mouth 2 (two) times daily.    cyclobenzaprine (FLEXERIL) 10 MG tablet Take 1 tablet (10 mg total) by mouth 3 (three) times daily as needed for Muscle spasms.    gabapentin (NEURONTIN) 300 MG capsule Take 1 capsule (300 mg total) by mouth every evening.    levothyroxine (SYNTHROID) 112 MCG tablet Take 1 tablet (112 mcg total) by mouth before breakfast.    lisinopril (PRINIVIL,ZESTRIL) 40 MG tablet Take 1 tablet (40 mg total) by mouth once daily.    metformin (GLUCOPHAGE) 1000 MG tablet Take 1 tablet (1,000 mg total) by mouth 2 (two) times daily with meals.    tamsulosin (FLOMAX) 0.4 mg Cp24 Take 1 capsule (0.4 mg total) by mouth once daily.    alcohol swabs (ALCOHOL PADS) PadM Apply 1 each topically as needed.    blood sugar diagnostic (ACCU-CHEK SMARTVIEW TEST STRIP) Strp To use as directed with AccuChek Sahra meter and monitor blood sugar daily    blood-glucose meter Misc Accu Check Sahra Smartview meter    clotrimazole-betamethasone 1-0.05% (LOTRISONE) cream Apply topically 2 (two) times daily.    conjugated estrogens (PREMARIN) vaginal cream Place 0.5 g vaginally 3 (three) times a week.    econazole nitrate 1 % cream AAA bid after cool blow dry when flared    fluocinonide (LIDEX) 0.05 % external solution Apply topically 2 (two) times daily.    lancets (ACCU-CHEK MULTICLIX LANCET) Misc To use as directed with Accu Chek Sahra FastClix lancing device    oxycodone-acetaminophen (PERCOCET) 5-325 mg per tablet Take 1 tablet by mouth every 4 (four) hours as needed for Pain.    polyethylene glycol (GLYCOLAX) 17 gram PwPk Take 17 g by mouth once daily.           Clinical Reference Information           Your Vitals Were     BP Pulse Height Weight BMI    128/63 78 5' 5" (1.651 m) 74 kg (163 lb 2.3 oz) 27.15 kg/m2      Blood Pressure          Most Recent Value    BP  128/63    "   Allergies as of 5/1/2017     Bufferin [Aspirin, Buffered]    Ibuprofen      Immunizations Administered on Date of Encounter - 5/1/2017     None      Orders Placed During Today's Visit     Future Labs/Procedures Expected by Expires    US Kidney Only  8/1/2017 5/1/2018      Language Assistance Services     ATTENTION: Language assistance services are available, free of charge. Please call [phone].      ATENCIÓN: Si habla español, tiene a cantrell disposición servicios gratuitos de asistencia lingüística. Llame al [phone].     CHÚ Ý: N?u b?n nói Ti?ng Vi?t, có các d?ch v? h? tr? ngôn ng? mi?n phí dành cho b?n. G?i s? [phone].         [facility] complies with applicable Federal civil rights laws and does not discriminate on the basis of race, color, national origin, age, disability, or sex.

## 2017-05-16 ENCOUNTER — OFFICE VISIT (OUTPATIENT)
Dept: INTERNAL MEDICINE | Facility: CLINIC | Age: 72
End: 2017-05-16
Payer: MEDICARE

## 2017-05-16 ENCOUNTER — HOSPITAL ENCOUNTER (OUTPATIENT)
Dept: RADIOLOGY | Facility: HOSPITAL | Age: 72
Discharge: HOME | End: 2017-05-16
Attending: INTERNAL MEDICINE
Payer: MEDICARE

## 2017-05-16 VITALS — DIASTOLIC BLOOD PRESSURE: 86 MMHG | SYSTOLIC BLOOD PRESSURE: 126 MMHG | HEART RATE: 70 BPM

## 2017-05-16 DIAGNOSIS — M54.16 LEFT LUMBAR RADICULITIS: ICD-10-CM

## 2017-05-16 DIAGNOSIS — Z71.89 ENCOUNTER FOR MEDICATION REVIEW AND COUNSELING: ICD-10-CM

## 2017-05-16 DIAGNOSIS — M54.16 LEFT LUMBAR RADICULITIS: Primary | ICD-10-CM

## 2017-05-16 PROCEDURE — 72100 X-RAY EXAM L-S SPINE 2/3 VWS: CPT | Mod: TC

## 2017-05-16 PROCEDURE — 72100 X-RAY EXAM L-S SPINE 2/3 VWS: CPT | Mod: 26,,, | Performed by: RADIOLOGY

## 2017-05-16 PROCEDURE — 99213 OFFICE O/P EST LOW 20 MIN: CPT | Mod: S$GLB,,, | Performed by: INTERNAL MEDICINE

## 2017-05-16 PROCEDURE — 99999 PR PBB SHADOW E&M-EST. PATIENT-LVL III: CPT | Mod: PBBFAC,,, | Performed by: INTERNAL MEDICINE

## 2017-05-16 PROCEDURE — 3074F SYST BP LT 130 MM HG: CPT | Mod: S$GLB,,, | Performed by: INTERNAL MEDICINE

## 2017-05-16 PROCEDURE — 1159F MED LIST DOCD IN RCRD: CPT | Mod: S$GLB,,, | Performed by: INTERNAL MEDICINE

## 2017-05-16 PROCEDURE — 1157F ADVNC CARE PLAN IN RCRD: CPT | Mod: S$GLB,,, | Performed by: INTERNAL MEDICINE

## 2017-05-16 PROCEDURE — 3079F DIAST BP 80-89 MM HG: CPT | Mod: S$GLB,,, | Performed by: INTERNAL MEDICINE

## 2017-05-16 PROCEDURE — 1160F RVW MEDS BY RX/DR IN RCRD: CPT | Mod: S$GLB,,, | Performed by: INTERNAL MEDICINE

## 2017-05-16 NOTE — MR AVS SNAPSHOT
[facility]  [address]  [facility] [address]  Phone: [phone]  Fax: [phone]                  Aminah Alicea-Dermody   2017 11:30 AM   Office Visit    Description:  Female : 1945   Provider:  Dereje Thomas MD   Department:  [facility]           Reason for Visit     Follow-up           Diagnoses this Visit        Comments    Left lumbar radiculitis    -  Primary            To Do List           Future Appointments        Provider Department Dept Phone    2017 12:30 PM [facility] [id number] [address] LIMIT [facility] [phone]    2017 9:30 AM [facility] US 11 ALL [facility] [phone]    2017 10:40 AM Mary Murray MD [facility]rology 4th Floor [phone]      Goals (5 Years of Data)     None      [facility]     [facility] Nurse Care Line -  Assistance  Unless otherwise directed by your provider, please contact [facility], our nurse care line that is available for  assistance.     Registered nurses in the [facility] provide: appointment scheduling, clinical advisement, health education, and other advisory services.  Call: [phone] (toll free)               Medications           Message regarding Medications     Verify the changes and/or additions to your medication regime listed below are the same as discussed with your clinician today.  If any of these changes or additions are incorrect, please notify your healthcare provider.             Verify that the below list of medications is an accurate representation of the medications you are currently taking.  If none reported, the list may be blank. If incorrect, please contact your healthcare provider. Carry this list with you in case of emergency.           Current Medications     alcohol swabs (ALCOHOL PADS) PadM Apply 1 each topically as needed.    aspirin (ECOTRIN) 81 MG EC tablet Take 81 mg by  mouth once daily.    atorvastatin (LIPITOR) 40 MG tablet Take 1 tablet (40 mg total) by mouth once daily.    biotin 1 mg tablet Take 1,000 mcg by mouth 3 (three) times daily.    blood sugar diagnostic (ACCU-CHEK SMARTVIEW TEST STRIP) Strp To use as directed with AccuChek Sahra meter and monitor blood sugar daily    blood-glucose meter Misc Accu Check Sahra Smartview meter    carvedilol (COREG) 12.5 MG tablet Take 1 tablet (12.5 mg total) by mouth 2 (two) times daily.    cyclobenzaprine (FLEXERIL) 10 MG tablet Take 1 tablet (10 mg total) by mouth 3 (three) times daily as needed for Muscle spasms.    econazole nitrate 1 % cream AAA bid after cool blow dry when flared    gabapentin (NEURONTIN) 300 MG capsule Take 1 capsule (300 mg total) by mouth every evening.    lancets (ACCU-CHEK MULTICLIX LANCET) Misc To use as directed with Accu Chek Sahra FastClix lancing device    levothyroxine (SYNTHROID) 112 MCG tablet Take 1 tablet (112 mcg total) by mouth before breakfast.    lisinopril (PRINIVIL,ZESTRIL) 40 MG tablet Take 1 tablet (40 mg total) by mouth once daily.    metformin (GLUCOPHAGE) 1000 MG tablet Take 1 tablet (1,000 mg total) by mouth 2 (two) times daily with meals.    clotrimazole-betamethasone 1-0.05% (LOTRISONE) cream Apply topically 2 (two) times daily.    conjugated estrogens (PREMARIN) vaginal cream Place 0.5 g vaginally 3 (three) times a week.    fluocinonide (LIDEX) 0.05 % external solution Apply topically 2 (two) times daily.    oxycodone-acetaminophen (PERCOCET) 5-325 mg per tablet Take 1 tablet by mouth every 4 (four) hours as needed for Pain.    polyethylene glycol (GLYCOLAX) 17 gram PwPk Take 17 g by mouth once daily.    tamsulosin (FLOMAX) 0.4 mg Cp24 Take 1 capsule (0.4 mg total) by mouth once daily.           Clinical Reference Information           Allergies as of 5/16/2017     Bufferin [Aspirin, Buffered]    Ibuprofen      Immunizations Administered on Date of Encounter - 5/16/2017     None       Orders Placed During Today's Visit     Future Labs/Procedures Expected by Expires    X-Ray Lumbar Spine AP And Lateral  5/16/2017 5/16/2018      Language Assistance Services     ATTENTION: Language assistance services are available, free of charge. Please call [phone].      ATENCIÓN: Si habdelia elliott, tiene a cantrell disposición servicios gratuitos de asistencia lingüística. Llame al [phone].     CHÚ Ý: N?u b?n nói Ti?ng Vi?t, có các d?ch v? h? tr? ngôn ng? mi?n phí dành cho b?n. G?i s? [phone].         [facility] complies with applicable Federal civil rights laws and does not discriminate on the basis of race, color, national origin, age, disability, or sex.

## 2017-05-16 NOTE — PROGRESS NOTES
"Subjective:       Patient ID: Aminah Norton is a 72 y.o. female.    Chief Complaint: Follow-up    HPIMiss Aminah here today for follow up. She is s/p cysto with biopsies taken R ureter and bladder 2o LUTS and evidence of ureteral dilatation and R hydronephrosis. She tolerated procedure well and has noted improvement in her urinary sx's. She is on Flomax since the procedure which helps. Copies of the pathology report were provided that showed no evidence of malignancy, but rather acute and chronic inflammation. She reports recurrent pain in her lower back with L leg radiation at times. She underwent an MRI in 2015 for similar complaints showing degenerative changes with "severe" neural foraminal stenosis at L5-S1 segment on the L>R.. I will obtain L spine xr and proceed from there. She might need repeat MRI for comparison.  Review of Systems   All other systems reviewed and are negative.      Objective:      Physical Exam   Constitutional: She is oriented to person, place, and time. She appears well-developed and well-nourished. No distress.   Cardiovascular: Normal rate, regular rhythm, normal heart sounds and intact distal pulses.    Pulmonary/Chest: Effort normal and breath sounds normal. No respiratory distress. She has no wheezes. She exhibits no tenderness.   Musculoskeletal: Normal range of motion. She exhibits no edema or tenderness.   Neurological: She is alert and oriented to person, place, and time. No cranial nerve deficit.   Skin: Skin is warm and dry. She is not diaphoretic.   Psychiatric: She has a normal mood and affect. Her behavior is normal.   Nursing note and vitals reviewed.      Assessment:       1. Left lumbar radiculitis    2. Encounter for medication review and counseling     3.    S/P cystoscopy with evidence of ureteral/bladder inflammation.   4.     Improved LUTS.   Plan:    1. Obtain L spine XR.         2. Continue with current medications.         3. RTC 3 months.  "

## 2017-08-18 ENCOUNTER — OFFICE VISIT (OUTPATIENT)
Dept: ORTHOPEDICS | Facility: CLINIC | Age: 72
End: 2017-08-18
Payer: MEDICARE

## 2017-08-18 ENCOUNTER — HOSPITAL ENCOUNTER (OUTPATIENT)
Dept: RADIOLOGY | Facility: HOSPITAL | Age: 72
Discharge: HOME | End: 2017-08-18
Attending: PHYSICIAN ASSISTANT
Payer: MEDICARE

## 2017-08-18 VITALS — HEIGHT: 65 IN | BODY MASS INDEX: 27.24 KG/M2 | WEIGHT: 163.5 LBS

## 2017-08-18 DIAGNOSIS — M25.511 BILATERAL SHOULDER PAIN, UNSPECIFIED CHRONICITY: ICD-10-CM

## 2017-08-18 DIAGNOSIS — R20.2 NUMBNESS AND TINGLING OF RIGHT UPPER EXTREMITY: ICD-10-CM

## 2017-08-18 DIAGNOSIS — M25.512 BILATERAL SHOULDER PAIN, UNSPECIFIED CHRONICITY: ICD-10-CM

## 2017-08-18 DIAGNOSIS — M25.511 BILATERAL SHOULDER PAIN, UNSPECIFIED CHRONICITY: Primary | ICD-10-CM

## 2017-08-18 DIAGNOSIS — R20.2 NUMBNESS AND TINGLING IN LEFT UPPER EXTREMITY: ICD-10-CM

## 2017-08-18 DIAGNOSIS — M25.512 BILATERAL SHOULDER PAIN, UNSPECIFIED CHRONICITY: Primary | ICD-10-CM

## 2017-08-18 DIAGNOSIS — R20.0 NUMBNESS AND TINGLING OF RIGHT UPPER EXTREMITY: ICD-10-CM

## 2017-08-18 DIAGNOSIS — R20.0 NUMBNESS AND TINGLING IN LEFT UPPER EXTREMITY: ICD-10-CM

## 2017-08-18 PROCEDURE — 73030 X-RAY EXAM OF SHOULDER: CPT | Mod: TC,50

## 2017-08-18 PROCEDURE — 99999 PR PBB SHADOW E&M-EST. PATIENT-LVL IV: CPT | Mod: PBBFAC,,, | Performed by: PHYSICIAN ASSISTANT

## 2017-08-18 PROCEDURE — 99213 OFFICE O/P EST LOW 20 MIN: CPT | Mod: 25,S$GLB,, | Performed by: PHYSICIAN ASSISTANT

## 2017-08-18 PROCEDURE — 1159F MED LIST DOCD IN RCRD: CPT | Mod: S$GLB,,, | Performed by: PHYSICIAN ASSISTANT

## 2017-08-18 PROCEDURE — 1125F AMNT PAIN NOTED PAIN PRSNT: CPT | Mod: S$GLB,,, | Performed by: PHYSICIAN ASSISTANT

## 2017-08-18 PROCEDURE — 3008F BODY MASS INDEX DOCD: CPT | Mod: S$GLB,,, | Performed by: PHYSICIAN ASSISTANT

## 2017-08-18 PROCEDURE — 73030 X-RAY EXAM OF SHOULDER: CPT | Mod: 26,50,, | Performed by: RADIOLOGY

## 2017-08-18 PROCEDURE — 1157F ADVNC CARE PLAN IN RCRD: CPT | Mod: S$GLB,,, | Performed by: PHYSICIAN ASSISTANT

## 2017-08-18 PROCEDURE — 20610 DRAIN/INJ JOINT/BURSA W/O US: CPT | Mod: 50,S$GLB,, | Performed by: PHYSICIAN ASSISTANT

## 2017-08-18 RX ORDER — TRIAMCINOLONE ACETONIDE 40 MG/ML
80 INJECTION, SUSPENSION INTRA-ARTICULAR; INTRAMUSCULAR
Status: COMPLETED | OUTPATIENT
Start: 2017-08-18 | End: 2017-08-18

## 2017-08-18 RX ADMIN — TRIAMCINOLONE ACETONIDE 80 MG: 40 INJECTION, SUSPENSION INTRA-ARTICULAR; INTRAMUSCULAR at 04:08

## 2017-08-18 NOTE — LETTER
August 18, 2017      Dereje Thomas MD  [address]  [facility] [address]           [facility] - Orthopedics  [address]  [facility] [address]  Phone: [phone]          Patient: Aminah Norton   MR Number: [id number]   YOB: 1945   Date of Visit: 8/18/2017       Dear Dr. Dereje Thomas:    Thank you for referring Aminah Norton to me for evaluation. Attached you will find relevant portions of my assessment and plan of care.    If you have questions, please do not hesitate to call me. I look forward to following Aminah Norton along with you.    Sincerely,    Betsy Altman PA-C    Enclosure  CC:  No Recipients    If you would like to receive this communication electronically, please contact [email or web address] or [phone] to request more information on [employer] Link access.    For providers and/or their staff who would like to refer a patient to [facility], please contact us through our one-stop-shop provider referral line, [facility], at [phone].    If you feel you have received this communication in error or would no longer like to receive these types of communications, please e-mail [email or web address]

## 2017-08-18 NOTE — PROGRESS NOTES
Subjective:      Patient ID: Aminah Norton is a 72 y.o. female.    Chief Complaint: No chief complaint on file.    HPI  72 year old female presents with chief complaint of bilateral UE pain x 1 month. She is RHD and denies trauma. Pain is at the shoulder and it radiates down her arms. It is worse at night. She reports weakness in her hands and was told that she has neuropathy. She is a diabetic. She reports intermittent N/T at her left hand. She uses Bengay with little relief. She does not take nsaids. She reports neck pain.   Review of Systems   Constitution: Negative for chills, fever and night sweats.   Cardiovascular: Negative for chest pain.   Respiratory: Negative for cough and shortness of breath.    Hematologic/Lymphatic: Does not bruise/bleed easily.   Skin: Negative for color change.   Gastrointestinal: Negative for heartburn.   Genitourinary: Negative for dysuria.   Psychiatric/Behavioral: Negative for altered mental status.   Allergic/Immunologic: Negative for persistent infections.         Objective:            General    Vitals reviewed.  Constitutional: She is oriented to person, place, and time. She appears well-developed and well-nourished.   Cardiovascular: Normal rate.    Neurological: She is alert and oriented to person, place, and time.             Right Hand/Wrist Exam     Tests   Tinels Sign (Medial Nerve): positive        Left Hand/Wrist Exam     Tests   Tinels Sign (Medial Nerve): positive        Right Shoulder Exam     Range of Motion   Active Abduction: normal   Forward Flexion: normal   External Rotation 0 degrees: normal   Internal Rotation 0 degrees: normal     Tests & Signs   Hawkin's test: positive  Impingement: positive    Other   Sensation: normal    Left Shoulder Exam     Range of Motion   Active Abduction: normal   Forward Flexion: normal   External Rotation 0 degrees: normal   Internal Rotation 0 degrees: normal     Tests & Signs   Hawkin's test:  positive  Impingement: positive    Other   Sensation: normal       Muscle Strength   Right Upper Extremity   Shoulder Abduction: 5/5   Supraspinatus: 5/5/5   Biceps: 5/5/5   : 4/5/5   Left Upper Extremity  Shoulder Abduction: 5/5   Supraspinatus: 5/5/5   Biceps: 5/5/5   :  4/5/5     Vascular Exam     Right Pulses      Radial:                    2+      Left Pulses      Radial:                    2+          X-ray: ordered and reviewed by myself. Visualized osseous structures appear unremarkable, with no evidence of recent or healing fracture, lytic destructive process, or other significant abnormality identified.  No glenohumeral dislocation or abnormal soft tissue calcifications are seen on either side.        Assessment:       Encounter Diagnoses   Name Primary?    Bilateral shoulder pain, unspecified chronicity Yes    Numbness and tingling in left upper extremity     Numbness and tingling of right upper extremity           Plan:       Discussed treatment options with patient. EMG was ordered. Will call with results. She will take aleve BID x 1-2 weeks then prn. She would like bilateral shoulder injections. Order placed for PT. RTC prn.     PROCEDURE:  I have explained the risks, benefits, and alternatives of the procedure in detail.  The patient voices understanding and all questions have been answered.  The patient agrees to proceed as planned. So after I performed a sterile prep of the skin in the normal fashion the bilateral shoulder is injected from the anterior approach using a 22 gauge needle with a combination of 4cc 1% plain lidocaine and 80 mg of depo medrol. The patient is cautioned and immediate relief of pain is secondary to the local anesthetic and will be temporary.  After the anesthetic wears off there may be a increase in pain that may last for a few hours or a few days and they should use ice to help alleviate this flair up of pain.

## 2017-09-27 ENCOUNTER — OFFICE VISIT (OUTPATIENT)
Dept: INTERNAL MEDICINE | Facility: CLINIC | Age: 72
End: 2017-09-27
Payer: MEDICARE

## 2017-09-27 VITALS
TEMPERATURE: 98 F | HEIGHT: 65 IN | WEIGHT: 160.94 LBS | HEART RATE: 76 BPM | DIASTOLIC BLOOD PRESSURE: 70 MMHG | BODY MASS INDEX: 26.81 KG/M2 | OXYGEN SATURATION: 97 % | SYSTOLIC BLOOD PRESSURE: 136 MMHG

## 2017-09-27 DIAGNOSIS — E11.40 TYPE 2 DIABETES MELLITUS WITH DIABETIC NEUROPATHY, WITHOUT LONG-TERM CURRENT USE OF INSULIN: ICD-10-CM

## 2017-09-27 DIAGNOSIS — R53.83 MALAISE AND FATIGUE: ICD-10-CM

## 2017-09-27 DIAGNOSIS — M54.50 BACK PAIN, LUMBOSACRAL: ICD-10-CM

## 2017-09-27 DIAGNOSIS — Z87.448 HISTORY OF HYDRONEPHROSIS: ICD-10-CM

## 2017-09-27 DIAGNOSIS — I70.0 AORTIC ATHEROSCLEROSIS: ICD-10-CM

## 2017-09-27 DIAGNOSIS — R10.9 FLANK PAIN: Primary | ICD-10-CM

## 2017-09-27 DIAGNOSIS — R53.81 MALAISE AND FATIGUE: ICD-10-CM

## 2017-09-27 DIAGNOSIS — D64.9 ANEMIA, UNSPECIFIED TYPE: ICD-10-CM

## 2017-09-27 LAB
BILIRUB SERPL-MCNC: NEGATIVE MG/DL
BLOOD URINE, POC: ABNORMAL
COLOR, POC UA: ABNORMAL
GLUCOSE SERPL-MCNC: 290 MG/DL (ref 70–110)
GLUCOSE UR QL STRIP: 250
KETONES UR QL STRIP: NEGATIVE
LEUKOCYTE ESTERASE URINE, POC: ABNORMAL
NITRITE, POC UA: NEGATIVE
PH, POC UA: 5
PROTEIN, POC: ABNORMAL
SPECIFIC GRAVITY, POC UA: 1.01
UROBILINOGEN, POC UA: NORMAL

## 2017-09-27 PROCEDURE — 99214 OFFICE O/P EST MOD 30 MIN: CPT | Mod: 25,S$GLB,, | Performed by: NURSE PRACTITIONER

## 2017-09-27 PROCEDURE — 99499 UNLISTED E&M SERVICE: CPT | Mod: S$GLB,,, | Performed by: NURSE PRACTITIONER

## 2017-09-27 PROCEDURE — 82948 REAGENT STRIP/BLOOD GLUCOSE: CPT | Mod: S$GLB,,, | Performed by: NURSE PRACTITIONER

## 2017-09-27 PROCEDURE — 87086 URINE CULTURE/COLONY COUNT: CPT

## 2017-09-27 PROCEDURE — 3008F BODY MASS INDEX DOCD: CPT | Mod: S$GLB,,, | Performed by: NURSE PRACTITIONER

## 2017-09-27 PROCEDURE — 1125F AMNT PAIN NOTED PAIN PRSNT: CPT | Mod: S$GLB,,, | Performed by: NURSE PRACTITIONER

## 2017-09-27 PROCEDURE — 96372 THER/PROPH/DIAG INJ SC/IM: CPT | Mod: S$GLB,,, | Performed by: NURSE PRACTITIONER

## 2017-09-27 PROCEDURE — 81002 URINALYSIS NONAUTO W/O SCOPE: CPT | Mod: S$GLB,,, | Performed by: NURSE PRACTITIONER

## 2017-09-27 PROCEDURE — 3075F SYST BP GE 130 - 139MM HG: CPT | Mod: S$GLB,,, | Performed by: NURSE PRACTITIONER

## 2017-09-27 PROCEDURE — 1157F ADVNC CARE PLAN IN RCRD: CPT | Mod: S$GLB,,, | Performed by: NURSE PRACTITIONER

## 2017-09-27 PROCEDURE — 99999 PR PBB SHADOW E&M-EST. PATIENT-LVL V: CPT | Mod: PBBFAC,,, | Performed by: NURSE PRACTITIONER

## 2017-09-27 PROCEDURE — 1159F MED LIST DOCD IN RCRD: CPT | Mod: S$GLB,,, | Performed by: NURSE PRACTITIONER

## 2017-09-27 PROCEDURE — 3078F DIAST BP <80 MM HG: CPT | Mod: S$GLB,,, | Performed by: NURSE PRACTITIONER

## 2017-09-27 RX ORDER — TIZANIDINE 4 MG/1
4 TABLET ORAL
Qty: 30 TABLET | Refills: 0 | Status: SHIPPED | OUTPATIENT
Start: 2017-09-27 | End: 2017-10-07

## 2017-09-27 RX ORDER — CYANOCOBALAMIN 1000 UG/ML
100 INJECTION, SOLUTION INTRAMUSCULAR; SUBCUTANEOUS
Status: COMPLETED | OUTPATIENT
Start: 2017-09-27 | End: 2017-09-27

## 2017-09-27 RX ADMIN — CYANOCOBALAMIN 100 MCG: 1000 INJECTION, SOLUTION INTRAMUSCULAR; SUBCUTANEOUS at 06:09

## 2017-09-28 ENCOUNTER — HOSPITAL ENCOUNTER (OUTPATIENT)
Dept: RADIOLOGY | Facility: HOSPITAL | Age: 72
Discharge: HOME | End: 2017-09-28
Attending: NURSE PRACTITIONER
Payer: MEDICARE

## 2017-09-28 DIAGNOSIS — Z87.448 HISTORY OF HYDRONEPHROSIS: ICD-10-CM

## 2017-09-28 DIAGNOSIS — R10.9 FLANK PAIN: ICD-10-CM

## 2017-09-28 PROCEDURE — 76770 US EXAM ABDO BACK WALL COMP: CPT | Mod: 26,,, | Performed by: RADIOLOGY

## 2017-09-28 PROCEDURE — 76770 US EXAM ABDO BACK WALL COMP: CPT | Mod: TC

## 2017-09-28 NOTE — PROGRESS NOTES
Subjective:       Patient ID: Aminah Norton is a 72 y.o. female.    Chief Complaint: Back Pain (x 1 wk)    Ms. Norton presents today with left sided low back, flank and leg pain. She also requests a B12 injection, which she says makes her feel much better when she is feeling weak and tired.       Back Pain   This is a recurrent problem. The current episode started more than 1 year ago. The problem occurs constantly. The problem has been gradually worsening since onset. The pain is present in the costovertebral angle, lumbar spine and gluteal. The quality of the pain is described as aching. The pain radiates to the left thigh. The pain is at a severity of 8/10. The pain is moderate. The pain is worse during the night. The symptoms are aggravated by position. Associated symptoms include leg pain. Pertinent negatives include no abdominal pain, bladder incontinence, bowel incontinence, chest pain, dysuria, fever, headaches, numbness, paresis, paresthesias, pelvic pain, perianal numbness, tingling, weakness or weight loss. She has tried NSAIDs for the symptoms. The treatment provided mild relief.     Review of Systems   Constitutional: Negative for fever and weight loss.   Eyes: Negative for visual disturbance.   Respiratory: Negative for shortness of breath.    Cardiovascular: Negative for chest pain.   Gastrointestinal: Negative for abdominal pain and bowel incontinence.   Genitourinary: Negative for bladder incontinence, dysuria and pelvic pain.   Musculoskeletal: Positive for back pain.   Skin: Negative for rash.   Neurological: Negative for tingling, weakness, numbness, headaches and paresthesias.   Psychiatric/Behavioral: Negative for confusion.       Objective:      Physical Exam   Constitutional: She is oriented to person, place, and time. She appears well-developed and well-nourished. No distress.   HENT:   Head: Atraumatic.   Eyes: No scleral icterus.   Neck: Neck supple.    Cardiovascular: Normal rate, regular rhythm and normal heart sounds.    Pulmonary/Chest: Effort normal and breath sounds normal. No respiratory distress. She has no wheezes. She has no rales.   Abdominal: There is CVA tenderness.   Musculoskeletal:        Left hip: She exhibits decreased range of motion and tenderness. She exhibits normal strength, no bony tenderness, no swelling, no crepitus, no deformity and no laceration.        Lumbar back: She exhibits decreased range of motion, tenderness and pain. She exhibits no bony tenderness, no swelling, no edema, no deformity and no laceration.   Neurological: She is alert and oriented to person, place, and time.   Skin: Skin is warm and dry. She is not diaphoretic.   Psychiatric: She has a normal mood and affect. Her behavior is normal.       Assessment:       1. Flank pain    2. Type 2 diabetes mellitus with diabetic neuropathy, without long-term current use of insulin    3. Back pain, lumbosacral    4. History of hydronephrosis    5. Aortic atherosclerosis    6. Malaise and fatigue    7. Anemia, unspecified type        Plan:   1. Flank pain  - Urine culture  - POCT urine dipstick without microscope  - US Retroperitoneal Complete (Kidney and; Future    2. Type 2 diabetes mellitus with diabetic neuropathy, without long-term current use of insulin  - POCT glucose    3. Back pain, lumbosacral  - tizanidine (ZANAFLEX) 4 MG tablet; Take 1 tablet (4 mg total) by mouth every 6 to 8 hours as needed.  Dispense: 30 tablet; Refill: 0    4. History of hydronephrosis  - US Retroperitoneal Complete (Kidney and; Future    5. Aortic atherosclerosis  - Stable, chronic finding.     6. Malaise and fatigue  - cyanocobalamin injection 100 mcg; Inject 0.1 mLs (100 mcg total) into the muscle one time.    7. Anemia, unspecified type  - cyanocobalamin injection 100 mcg; Inject 0.1 mLs (100 mcg total) into the muscle one time.      Pt has been given instructions populated from [facility]  database and has verbalized understanding of the after visit summary and information contained wherein.    Follow up with a primary care provider. May go to ER for acute shortness of breath, lightheadedness, fever, or any other emergent complaints or changes in condition.

## 2017-09-29 LAB — BACTERIA UR CULT: NORMAL

## 2017-10-02 ENCOUNTER — TELEPHONE (OUTPATIENT)
Dept: INTERNAL MEDICINE | Facility: CLINIC | Age: 72
End: 2017-10-02

## 2017-10-02 DIAGNOSIS — N20.0 NEPHROLITHIASIS: Primary | ICD-10-CM

## 2017-10-02 RX ORDER — TRAMADOL HYDROCHLORIDE 50 MG/1
50 TABLET ORAL EVERY 6 HOURS PRN
Qty: 30 TABLET | Refills: 0 | Status: SHIPPED | OUTPATIENT
Start: 2017-10-02 | End: 2018-11-16

## 2017-10-02 NOTE — TELEPHONE ENCOUNTER
Informed pt of recommendations. Pt states that she is in a lot of pain. Mary Worthington will send in tramadol, allergies reviewed. Informed pt she needs to see urology and urged her to make appt. Pt verbalized understanding.   JUAN Barriga

## 2017-10-09 ENCOUNTER — TELEPHONE (OUTPATIENT)
Dept: NEUROLOGY | Facility: CLINIC | Age: 72
End: 2017-10-09

## 2017-10-09 DIAGNOSIS — E08.65 DIABETES MELLITUS DUE TO UNDERLYING CONDITION WITH HYPERGLYCEMIA, UNSPECIFIED LONG TERM INSULIN USE STATUS: Primary | ICD-10-CM

## 2017-10-10 ENCOUNTER — PROCEDURE VISIT (OUTPATIENT)
Dept: NEUROLOGY | Facility: CLINIC | Age: 72
End: 2017-10-10
Payer: MEDICARE

## 2017-10-10 DIAGNOSIS — R20.0 NUMBNESS AND TINGLING OF RIGHT UPPER EXTREMITY: ICD-10-CM

## 2017-10-10 DIAGNOSIS — R20.0 NUMBNESS AND TINGLING IN LEFT UPPER EXTREMITY: ICD-10-CM

## 2017-10-10 DIAGNOSIS — R20.2 NUMBNESS AND TINGLING OF RIGHT UPPER EXTREMITY: ICD-10-CM

## 2017-10-10 DIAGNOSIS — R20.2 NUMBNESS AND TINGLING IN LEFT UPPER EXTREMITY: ICD-10-CM

## 2017-10-10 PROCEDURE — 95912 NRV CNDJ TEST 11-12 STUDIES: CPT | Mod: S$GLB,,, | Performed by: PSYCHIATRY & NEUROLOGY

## 2017-10-10 PROCEDURE — 95886 MUSC TEST DONE W/N TEST COMP: CPT | Mod: 50,S$GLB,, | Performed by: PSYCHIATRY & NEUROLOGY

## 2017-10-10 RX ORDER — ISOPROPYL ALCOHOL 70 ML/100ML
1 SWAB TOPICAL
Qty: 100 EACH | Refills: 3 | Status: SHIPPED | OUTPATIENT
Start: 2017-10-10 | End: 2019-03-27 | Stop reason: SDUPTHER

## 2017-10-11 ENCOUNTER — OFFICE VISIT (OUTPATIENT)
Dept: UROLOGY | Facility: CLINIC | Age: 72
End: 2017-10-11
Payer: MEDICARE

## 2017-10-11 VITALS
HEART RATE: 72 BPM | SYSTOLIC BLOOD PRESSURE: 131 MMHG | DIASTOLIC BLOOD PRESSURE: 59 MMHG | BODY MASS INDEX: 25.96 KG/M2 | TEMPERATURE: 98 F | WEIGHT: 156 LBS

## 2017-10-11 DIAGNOSIS — R39.11 URINARY HESITANCY: ICD-10-CM

## 2017-10-11 DIAGNOSIS — N13.30 HYDRONEPHROSIS OF RIGHT KIDNEY: ICD-10-CM

## 2017-10-11 DIAGNOSIS — M54.50 CHRONIC LEFT-SIDED LOW BACK PAIN WITHOUT SCIATICA: Primary | ICD-10-CM

## 2017-10-11 DIAGNOSIS — G89.29 CHRONIC LEFT-SIDED LOW BACK PAIN WITHOUT SCIATICA: Primary | ICD-10-CM

## 2017-10-11 PROCEDURE — 99999 PR PBB SHADOW E&M-EST. PATIENT-LVL IV: CPT | Mod: PBBFAC,,, | Performed by: PHYSICIAN ASSISTANT

## 2017-10-11 PROCEDURE — 99214 OFFICE O/P EST MOD 30 MIN: CPT | Mod: S$GLB,,, | Performed by: PHYSICIAN ASSISTANT

## 2017-10-11 NOTE — LETTER
October 11, 2017      Mary Worthington, NP  [address]  [facility] [address]           [facility] 4th Floor  [address]  [facility] [address]  Phone: [phone]          Patient: Aminah Norton   MR Number: [id number]   YOB: 1945   Date of Visit: 10/11/2017       Dear Mary Worthington:    Thank you for referring Aminah Norton to me for evaluation. Attached you will find relevant portions of my assessment and plan of care.    If you have questions, please do not hesitate to call me. I look forward to following Aminah Norton along with you.    Sincerely,    CAMILA Nunesosure  CC:  No Recipients    If you would like to receive this communication electronically, please contact [email or web address] or [phone] to request more information on [employer] Link access.    For providers and/or their staff who would like to refer a patient to [facility], please contact us through our one-stop-shop provider referral line, [facility], at [phone].    If you feel you have received this communication in error or would no longer like to receive these types of communications, please e-mail [email or web address]

## 2017-10-11 NOTE — PROCEDURES
Procedures   Please see scanned NCS/EMG procedure note    Cleveland Roman MD  [facility]

## 2017-10-12 NOTE — PROGRESS NOTES
CHIEF COMPLAINT:    Mrs. Norton is a 72 y.o. female presenting for left sided back pain.  PRESENTING ILLNESS:    Aminah Norton is a 72 y.o. female  who presents for left sided back pain.  The pain is constant and has been present about 1 year.  It is a 7/10.  Movement, laying flat, or sitting straight up makes it worse.  Reclining a little makes it better.  She takes a pain medication that helps but she does not know the name of it.   Sometimes the pain radiates to the right side of her back.   She reports pain in her hands and legs but it is a different pain from her back pain.    Renal ultrasound 9/2017 showed persistent mild to moderate right-sided hydronephrosis grossly stable, a 3mm left non obstructing stone and subcentimeter left renal cyst.  She urinates more easily with flomax.  She denies incontinence, frequency, dysuria.      She has a hx of hematuria and a abnormality seen in her right ureter on CT Urogram.  Date: 03/07/2017  Pre-Op Diagnosis:   1. Right hydroureteralnephrosis  2. Right ureteral filling defect  3. Pelvic pain   Procedure(s) Performed:   1.  Right ureteroscopy  2.  Cystoscopy  3.  Right JJ ureteral stent placement  4.  Bilateral retrograde pyelogram  5.  Right ureteral biopsy  6.  Bladder biopsy and fulguration  7.  Fluoro < 1 h   Specimen(s):   1. Right distal ureteral biopsy  2. Bladder biopsy x 2  FINAL PATHOLOGIC DIAGNOSIS  1. Ureter, right distal, biopsy:  - Urothelium with acute and chronic inflammation.  - Negative for malignancy.  2. Bladder, biopsy:  - Urothelium with chronic inflammation.  - Negative for malignancy.  Indications: Aminah Norton is a 71 y.o. female with a      Findings:   1. No urethral abnormalities  2. Bladder highly vascular with scattered areas of suspicious erythema  3. No filling defects or hydronephrosis on left retrograde pyelogram  4. Filling defect noted in distal right ureter with hydronephrosis proximal  5.  Area of tumor vs scar tissue seen in distal right ureter on ureteroscopy    REVIEW OF SYSTEMS:    Constitutional: Negative for fever and chills.   HENT: Negative for hearing loss.   Eyes: Negative for visual disturbance.   Respiratory: Negative for shortness of breath.   Cardiovascular: Negative for chest pain.   Gastrointestinal: Negative for vomiting, and constipation.   Genitourinary:  Negative for urgency, frequency, nocturia, incontinence, dysuria, hematuria, intermittency, incomplete bladder emptying, and decreased urine volume.   Neurological: Negative for dizziness.   Hematological: Does not bruise/bleed easily.   Psychiatric/Behavioral: Negative for confusion.     PATIENT HISTORY:    Past Medical History:   Diagnosis Date    Arthritis     Cataract     left eye    Choroidal rupture of left eye     Diabetes mellitus type II     GERD (gastroesophageal reflux disease)     Hyperlipidemia     Hypertension     Hypothyroidism     Macular scar     right eye    Retinal degeneration     chorioretinal OD    Thyroid disease     Vitamin B 12 deficiency        Past Surgical History:   Procedure Laterality Date    BLADDER SUSPENSION      CATARACT EXTRACTION      right eye     CHOLECYSTECTOMY      TOTAL ABDOMINAL HYSTERECTOMY      DUB       Family History   Problem Relation Age of Onset    Cancer Mother      uterine    COPD Father     Cancer Sister      liver    Hypertension Daughter     Thyroid disease Daughter     Diabetes Son     Hypertension Sister     Hypertension Sister     Cancer Daughter      uterine    Thyroid disease Daughter     Hypertension Daughter     Breast cancer Neg Hx     Colon cancer Neg Hx     Amblyopia Neg Hx     Blindness Neg Hx     Cataracts Neg Hx     Glaucoma Neg Hx     Macular degeneration Neg Hx     Retinal detachment Neg Hx     Strabismus Neg Hx     Stroke Neg Hx        Social History     Social History    Marital status:      Spouse name: N/A     Number of children: N/A    Years of education: N/A     Occupational History    Not on file.     Social History Main Topics    Smoking status: Never Smoker    Smokeless tobacco: Never Used    Alcohol use No    Drug use: No    Sexual activity: Yes     Partners: Male     Birth control/ protection: Post-menopausal      Comment:       Other Topics Concern    Are You Pregnant Or Think You May Be? No    Breast-Feeding No     Social History Narrative     with 7 kids       Allergies:  Bufferin [aspirin, buffered] and Ibuprofen    Medications:    Current Outpatient Prescriptions:     alcohol swabs (ALCOHOL PADS) PadM, Apply 1 each topically as needed., Disp: 100 each, Rfl: 3    aspirin (ECOTRIN) 81 MG EC tablet, Take 81 mg by mouth once daily., Disp: , Rfl:     atorvastatin (LIPITOR) 40 MG tablet, Take 1 tablet (40 mg total) by mouth once daily., Disp: 90 tablet, Rfl: 3    biotin 1 mg tablet, Take 1,000 mcg by mouth 3 (three) times daily., Disp: , Rfl:     blood sugar diagnostic Strp, 1 strip by Misc.(Non-Drug; Combo Route) route 3 (three) times daily. accu-chek MATTIE plus test strip, Disp: 100 strip, Rfl: 2    blood-glucose meter Misc, Accu Check Sahra Smartview meter, Disp: 1 each, Rfl: 0    carvedilol (COREG) 12.5 MG tablet, Take 1 tablet (12.5 mg total) by mouth 2 (two) times daily., Disp: 180 tablet, Rfl: 3    clotrimazole-betamethasone 1-0.05% (LOTRISONE) cream, Apply topically 2 (two) times daily., Disp: 45 g, Rfl: 1    conjugated estrogens (PREMARIN) vaginal cream, Place 0.5 g vaginally 3 (three) times a week., Disp: 30 g, Rfl: 3    econazole nitrate 1 % cream, AAA bid after cool blow dry when flared, Disp: 85 g, Rfl: 2    fluocinonide (LIDEX) 0.05 % external solution, Apply topically 2 (two) times daily., Disp: 60 mL, Rfl: 1    gabapentin (NEURONTIN) 300 MG capsule, Take 1 capsule (300 mg total) by mouth every evening., Disp: 90 capsule, Rfl: 3    lancets (ACCU-CHEK MULTICLIX LANCET)  Misc, To use as directed with Accu Chek Sahra FastClix lancing device, Disp: 100 each, Rfl: 2    levothyroxine (SYNTHROID) 112 MCG tablet, Take 1 tablet (112 mcg total) by mouth before breakfast., Disp: 90 tablet, Rfl: 3    lisinopril (PRINIVIL,ZESTRIL) 40 MG tablet, Take 1 tablet (40 mg total) by mouth once daily., Disp: 90 tablet, Rfl: 3    metformin (GLUCOPHAGE) 1000 MG tablet, Take 1 tablet (1,000 mg total) by mouth 2 (two) times daily with meals., Disp: 180 tablet, Rfl: 3    polyethylene glycol (GLYCOLAX) 17 gram PwPk, Take 17 g by mouth once daily., Disp: 30 packet, Rfl: 0    tamsulosin (FLOMAX) 0.4 mg Cp24, Take 1 capsule (0.4 mg total) by mouth once daily., Disp: 90 capsule, Rfl: 3    tramadol (ULTRAM) 50 mg tablet, Take 1 tablet (50 mg total) by mouth every 6 (six) hours as needed., Disp: 30 tablet, Rfl: 0    PHYSICAL EXAMINATION:    Constitutional: She appears well-developed and well-nourished.  She is in no apparent distress.    Eyes: No scleral icterus noted bilaterally.  No discharge noted bilaterally.    Cardiovascular: Normal rate.  No pitting edema noted in lower extremities bilaterally    Pulmonary/Chest: Effort normal. No respiratory distress.     Abdominal:  She exhibits no distension.  There is no CVA tenderness.     Lymphadenopathy:          Right: No supraclavicular adenopathy present.        Left: No supraclavicular adenopathy present.     Neurological: She is alert and oriented to person, place, and time.     Skin: Skin is warm and dry.     Psych: Cooperative with normal affect.    Physical Exam      LABS:    U/a: 1.015, pH 5, tr blood, otherwise negative.      IMPRESSION:    Encounter Diagnoses   Name Primary?    Chronic left-sided low back pain without sciatica Yes    Hydronephrosis of right kidney     Urinary hesitancy        PLAN:  I spent 25 minutes with the patient of which more than half was spent in direct consultation with the patient in regards to our treatment and plan.     Continue flomax.   Discussed ultrasound with patient.  No urological findings to explain left sided back pain.  Patient instructed to follow up with pcp for further evaluation.    Discussed hydronephrosis and reviewed ultrasound  with Dr. Murray.  Given its stability, she will follow up in 6 months to repeat renal ultrasound.           Brooke Simpson PA-C

## 2017-10-24 ENCOUNTER — OFFICE VISIT (OUTPATIENT)
Dept: INTERNAL MEDICINE | Facility: CLINIC | Age: 72
End: 2017-10-24
Payer: MEDICARE

## 2017-10-24 VITALS
TEMPERATURE: 98 F | HEART RATE: 80 BPM | WEIGHT: 157.69 LBS | BODY MASS INDEX: 26.24 KG/M2 | SYSTOLIC BLOOD PRESSURE: 138 MMHG | DIASTOLIC BLOOD PRESSURE: 74 MMHG

## 2017-10-24 DIAGNOSIS — R52 PAIN: ICD-10-CM

## 2017-10-24 DIAGNOSIS — M79.10 MYALGIA: Primary | ICD-10-CM

## 2017-10-24 PROCEDURE — 99213 OFFICE O/P EST LOW 20 MIN: CPT | Mod: S$GLB,,, | Performed by: INTERNAL MEDICINE

## 2017-10-24 PROCEDURE — 99999 PR PBB SHADOW E&M-EST. PATIENT-LVL III: CPT | Mod: PBBFAC,,, | Performed by: INTERNAL MEDICINE

## 2017-10-30 ENCOUNTER — HOSPITAL ENCOUNTER (OUTPATIENT)
Dept: RADIOLOGY | Facility: OTHER | Age: 72
Discharge: HOME | End: 2017-10-30
Attending: ANESTHESIOLOGY
Payer: MEDICARE

## 2017-10-30 ENCOUNTER — OFFICE VISIT (OUTPATIENT)
Dept: PAIN MEDICINE | Facility: CLINIC | Age: 72
End: 2017-10-30
Attending: ANESTHESIOLOGY
Payer: MEDICARE

## 2017-10-30 VITALS
HEIGHT: 65 IN | DIASTOLIC BLOOD PRESSURE: 63 MMHG | SYSTOLIC BLOOD PRESSURE: 168 MMHG | BODY MASS INDEX: 25.71 KG/M2 | RESPIRATION RATE: 18 BRPM | HEART RATE: 86 BPM | TEMPERATURE: 98 F | WEIGHT: 154.31 LBS

## 2017-10-30 DIAGNOSIS — M54.50 CHRONIC BILATERAL LOW BACK PAIN WITHOUT SCIATICA: ICD-10-CM

## 2017-10-30 DIAGNOSIS — M25.511 CHRONIC PAIN OF BOTH SHOULDERS: ICD-10-CM

## 2017-10-30 DIAGNOSIS — M43.10 SPONDYLOLISTHESIS, UNSPECIFIED SPINAL REGION: ICD-10-CM

## 2017-10-30 DIAGNOSIS — M25.512 CHRONIC PAIN OF BOTH SHOULDERS: ICD-10-CM

## 2017-10-30 DIAGNOSIS — M43.10 SPONDYLOLISTHESIS, UNSPECIFIED SPINAL REGION: Primary | ICD-10-CM

## 2017-10-30 DIAGNOSIS — G89.29 CHRONIC BILATERAL LOW BACK PAIN WITHOUT SCIATICA: ICD-10-CM

## 2017-10-30 DIAGNOSIS — G89.29 CHRONIC PAIN OF BOTH SHOULDERS: ICD-10-CM

## 2017-10-30 PROCEDURE — 72100 X-RAY EXAM L-S SPINE 2/3 VWS: CPT | Mod: TC

## 2017-10-30 PROCEDURE — 72100 X-RAY EXAM L-S SPINE 2/3 VWS: CPT | Mod: 26,,, | Performed by: RADIOLOGY

## 2017-10-30 PROCEDURE — 99999 PR PBB SHADOW E&M-EST. PATIENT-LVL III: CPT | Mod: PBBFAC,,, | Performed by: ANESTHESIOLOGY

## 2017-10-30 PROCEDURE — 99204 OFFICE O/P NEW MOD 45 MIN: CPT | Mod: GC,S$GLB,, | Performed by: ANESTHESIOLOGY

## 2017-10-30 RX ORDER — CELECOXIB 100 MG/1
CAPSULE ORAL
Qty: 60 CAPSULE | Refills: 0 | Status: SHIPPED | OUTPATIENT
Start: 2017-10-30 | End: 2017-12-12

## 2017-10-30 NOTE — PROGRESS NOTES
Chronic Pain - New Consult    Referring Physician: Dereje Thomas MD    Chief Complaint: No chief complaint on file.       SUBJECTIVE: Disclaimer: This note has been generated using voice-recognition software. There may be typographical errors that have been missed during proof-reading    Initial encounter:    Aminah Norton is a 72 y.o. female with a pmhx of DM2, HLD(on statin therapy) HTN and GERD who presents to the clinic for the evaluation of diffuse back, neck, arm , leg  pain. The pain started 1 month ago and symptoms have been worsening. She associates her symptoms with gardening. Her daughter is accompanying her and serves as her . Patient describes pain as burning tingling sensation that is made worse with bending over. At onset of pain she presented to hospital where she received bilateral steroid injections to the shoulders. This resulted in pain relief for 1 month. She is currently taking gabapentin 300mg daily and tramadol. She reports no pain relief. EMG to upper extremities did not reveal any concerning findings. Labs: CK, ESR, and c- reactive protein are within normal limits.      Brief history:    Pain Description:    The pain is located in the back area and radiates to the neck, arms and legs.      At BEST  9/10      At WORST  9/10 on the WORST day.      On average pain is rated as 9/10.     Today the pain is rated as 9/10    The pain is described as aching, burning and tingling      Symptoms interfere with daily activity and sleeping.     Exacerbating factors: Bending and Night Time.      Mitigating factors nothing.     Patient denies .  Patient denies any suicidal or homicidal ideations    Pain Medications:  Current:  Gabapentin  Ultram    Tried in Past:  NSAIDs -Never  TCA -Never  SNRI -Never  Anti-convulsants -Never  Muscle Relaxants -Never  Opioids-Never    Physical Therapy/Home Exercise: yes       report:  Reviewed and consistent with medication use as  prescribed.    Pain Procedures: steroid injection to bilateral shoulders    Chiropractor -never  Acupuncture - never  TENS unit -never  Spinal decompression -never  Joint replacement -never    Imaging:   XRAY L SPINE 05/16/2017  Narrative     Technique: AP and lateral views of the lumbar spine    Comparison: 07/10/2015    Results:Continued grade 2 anterolisthesis of L5 on S1. The lumbar vertebral heights and contours are relatively maintained without evidence for acute fracture. Further evaluation as warrented clinically.   Impression      See Above .      Electronically signed by: SELWYN LOVE DO  Date: 05/16/17  Time: 13:17          View Encounter             Past Medical History:   Diagnosis Date    Arthritis     Cataract     left eye    Choroidal rupture of left eye     Diabetes mellitus type II     GERD (gastroesophageal reflux disease)     Hyperlipidemia     Hypertension     Hypothyroidism     Macular scar     right eye    Retinal degeneration     chorioretinal OD    Thyroid disease     Vitamin B 12 deficiency      Past Surgical History:   Procedure Laterality Date    BLADDER SUSPENSION      CATARACT EXTRACTION      right eye     CHOLECYSTECTOMY      TOTAL ABDOMINAL HYSTERECTOMY      DUB     Social History     Social History    Marital status:      Spouse name: N/A    Number of children: N/A    Years of education: N/A     Occupational History    Not on file.     Social History Main Topics    Smoking status: Never Smoker    Smokeless tobacco: Never Used    Alcohol use No    Drug use: No    Sexual activity: Yes     Partners: Male     Birth control/ protection: Post-menopausal      Comment:       Other Topics Concern    Are You Pregnant Or Think You May Be? No    Breast-Feeding No     Social History Narrative     with 7 kids     Family History   Problem Relation Age of Onset    Cancer Mother      uterine    COPD Father     Cancer Sister      liver    Hypertension  Daughter     Thyroid disease Daughter     Diabetes Son     Hypertension Sister     Hypertension Sister     Cancer Daughter      uterine    Thyroid disease Daughter     Hypertension Daughter     Breast cancer Neg Hx     Colon cancer Neg Hx     Amblyopia Neg Hx     Blindness Neg Hx     Cataracts Neg Hx     Glaucoma Neg Hx     Macular degeneration Neg Hx     Retinal detachment Neg Hx     Strabismus Neg Hx     Stroke Neg Hx        Review of patient's allergies indicates:   Allergen Reactions    Bufferin [aspirin, buffered] Itching    Ibuprofen Itching     Itching of eyes, head       Current Outpatient Prescriptions   Medication Sig    alcohol swabs (ALCOHOL PADS) PadM Apply 1 each topically as needed.    aspirin (ECOTRIN) 81 MG EC tablet Take 81 mg by mouth once daily.    atorvastatin (LIPITOR) 40 MG tablet Take 1 tablet (40 mg total) by mouth once daily.    biotin 1 mg tablet Take 1,000 mcg by mouth 3 (three) times daily.    blood sugar diagnostic Strp 1 strip by Misc.(Non-Drug; Combo Route) route 3 (three) times daily. accu-chek MATTIE plus test strip    blood-glucose meter Misc Accu Check Sahra Smartview meter    carvedilol (COREG) 12.5 MG tablet Take 1 tablet (12.5 mg total) by mouth 2 (two) times daily.    clotrimazole-betamethasone 1-0.05% (LOTRISONE) cream Apply topically 2 (two) times daily.    conjugated estrogens (PREMARIN) vaginal cream Place 0.5 g vaginally 3 (three) times a week.    econazole nitrate 1 % cream AAA bid after cool blow dry when flared    fluocinonide (LIDEX) 0.05 % external solution Apply topically 2 (two) times daily.    gabapentin (NEURONTIN) 300 MG capsule Take 1 capsule (300 mg total) by mouth every evening.    lancets (ACCU-CHEK MULTICLIX LANCET) Misc To use as directed with Accu Chek Sahra FastClix lancing device    levothyroxine (SYNTHROID) 112 MCG tablet Take 1 tablet (112 mcg total) by mouth before breakfast.    lisinopril (PRINIVIL,ZESTRIL) 40 MG  "tablet Take 1 tablet (40 mg total) by mouth once daily.    metformin (GLUCOPHAGE) 1000 MG tablet Take 1 tablet (1,000 mg total) by mouth 2 (two) times daily with meals.    polyethylene glycol (GLYCOLAX) 17 gram PwPk Take 17 g by mouth once daily.    tamsulosin (FLOMAX) 0.4 mg Cp24 Take 1 capsule (0.4 mg total) by mouth once daily.    tramadol (ULTRAM) 50 mg tablet Take 1 tablet (50 mg total) by mouth every 6 (six) hours as needed.     No current facility-administered medications for this visit.      REVIEW OF SYSTEMS:    GENERAL:  No weight loss, malaise or fevers.  HEENT:   No recent changes in vision or hearing  NECK:  Negative for lumps, no difficulty with swallowing.  RESPIRATORY:  Negative for cough, wheezing or shortness of breath, patient denies any recent URI.  CARDIOVASCULAR:  Negative for chest pain, leg swelling or palpitations.  GI:  Negative for abdominal discomfort, blood in stools or black stools or change in bowel habits.  MUSCULOSKELETAL:  See HPI.  SKIN:  Negative for lesions, rash, and itching.  PSYCH:  No mood disorder or recent psychosocial stressors.  Patients sleep is disturbed secondary to pain.  HEMATOLOGY/LYMPHOLOGY:  Negative for prolonged bleeding, bruising easily or swollen nodes.  Patient is not currently taking any anti-coagulants  ENDO: No history of diabetes or thyroid dysfunction  NEURO:   No history of headaches, syncope, paralysis, seizures or tremors.  All other reviewed and negative other than HPI.    OBJECTIVE:    BP (!) 168/63   Pulse 86   Temp 98.3 °F (36.8 °C) (Oral)   Resp 18   Ht 5' 5" (1.651 m)   Wt 70 kg (154 lb 5.2 oz)   BMI 25.68 kg/m²     PHYSICAL EXAMINATION:    GENERAL: Well appearing, in no acute distress, alert and oriented x3.  PSYCH:  Mood and affect appropriate.  SKIN: Skin color, texture, turgor normal, no rashes or lesions.  HEAD/FACE:  Normocephalic, atraumatic. Cranial nerves grossly intact.  NECK:TTP over the cervical paraspinous muscles. " Spurling Negative. Myofacial pain with neck flexion, extension, or lateral flexion.   PULM: No evidence of respiratory difficulty, symmetric chest rise.  BACK: Positive straight leg raising in the supine position bilaterally. TTP over lumbar spine and spinous processes. Reproduction of pain with normal range of motion.   EXTREMITIES: Peripheral joint ROM is full and pain free without obvious instability or laxity in all four extremities. No deformities, edema, or skin discoloration. Good capillary refill.  MUSCULOSKELETAL: Shoulder, hip, and knee provocative maneuvers are negative.  TTP over SI joint bilaterally.  FABERs test is negative.  FADIRs test is negative.   Bilateral upper and lower extremity strength is normal and symmetric.  No atrophy or tone abnormalities are noted.  NEURO: Bilateral upper and lower extremity coordination and muscle stretch reflexes are physiologic and symmetric.  Plantar response are downgoing. No clonus.  No loss of sensation is noted.  GAIT: normal.    Lab Results   Component Value Date    HGBA1C 7.6 (H) 11/29/2016     Lab Results   Component Value Date    WBC 3.69 (L) 11/21/2016    HGB 11.1 (L) 11/21/2016    HCT 34.2 (L) 11/21/2016    MCV 78 (L) 11/21/2016     11/21/2016     Lab Results   Component Value Date    CREATININE 0.8 11/21/2016     ASSESSMENT: 72 y.o. year old female with pain, consistent with diffuse myalgia.    Encounter Diagnoses   Name Primary?    Spondylolisthesis, unspecified spinal region Yes    Chronic bilateral low back pain without sciatica     Chronic pain of both shoulders       PLAN:     - will contact PCP to consider holding statin therapy as patient is with diffuse myofacial pain.     - will order voltaren gel and place order for celebrex 100mg daily x7 days(if no help with pain increase to twice daily)     - will refer to PT.     - Patient will need to get x-ray of lumbar spine with flexion and extension.     - follow up with APC in 4 weeks- 6  weeks.    The above plan and management options were discussed at length with patient. Patient is in agreement with the above and verbalized understanding. It will be communicated with the referring physician via electronic record, fax, or mail.    Addison Salazar PGY 3  10/30/2017    I reviewed and edited the  resident's note, I conducted my own interview and physical examination and agree with the findings.      Desiree Springer 10/30/2017

## 2017-10-30 NOTE — LETTER
October 30, 2017      Dereje Thomas MD  [address]  [facility] [address]           [facility]  [address]  [place] [address]  Phone: [phone]  Fax: [phone]          Patient: Aminah Norton   MR Number: [id number]   YOB: 1945   Date of Visit: 10/30/2017       Dear Dr. Dereje Thomas:    Thank you for referring Aminah Norton to me for evaluation. Attached you will find relevant portions of my assessment and plan of care.    If you have questions, please do not hesitate to call me. I look forward to following Aminah Norton along with you.    Sincerely,    Desiree Springer MD    Enclosure  CC:  No Recipients    If you would like to receive this communication electronically, please contact [email or web address] or [phone] to request more information on [facility] access.    For providers and/or their staff who would like to refer a patient to [facility], please contact us through our one-stop-shop provider referral line, [facility], at [phone].    If you feel you have received this communication in error or would no longer like to receive these types of communications, please e-mail [email or web address]

## 2017-11-01 ENCOUNTER — OFFICE VISIT (OUTPATIENT)
Dept: INTERNAL MEDICINE | Facility: CLINIC | Age: 72
End: 2017-11-01
Payer: MEDICARE

## 2017-11-01 DIAGNOSIS — Z71.89 ENCOUNTER FOR MEDICATION REVIEW AND COUNSELING: Primary | ICD-10-CM

## 2017-11-01 DIAGNOSIS — M79.10 MYALGIA: ICD-10-CM

## 2017-11-01 PROCEDURE — 99999 PR PBB SHADOW E&M-EST. PATIENT-LVL II: CPT | Mod: PBBFAC,,, | Performed by: INTERNAL MEDICINE

## 2017-11-01 PROCEDURE — 99213 OFFICE O/P EST LOW 20 MIN: CPT | Mod: S$GLB,,, | Performed by: INTERNAL MEDICINE

## 2017-11-01 NOTE — PROGRESS NOTES
Miss Burr is her today for her review. She was seen by me about 2 weks ago with c/o diffuse pains in her arms, shoulders, legs with difficulty performing simple, daily tasks such as combing her hair and the like. I obtained inflammatory markers at the time and referred her to Pain Management for evaluation. She has since seen them and was rx'd Celebrex which she hs not taken yet. Her blood wok did not show any evidence of inflammation with normal ESR/CRP/CPK values. Interestingly, she was placed on high intensity stating - Lipitor 40 mgs - in April of this year. She had been on Pravachol 80 mgs nelson before that and tolerating it well although lipids not at goal for her conditions. She saw Cardiology who mad the statin changes, b ut she now reports that she cut the dose in half as she noted the development of muscle pains at that time. She did not inform me or Cardiology about this. Nevertheless, it is probable that her myalgias are due to statin use and will hold Lipitor for now and monitor. I encouraged her to watch her diet, continue with her other meds including ASA daily and will see how she does. If she responds favorably to this, then will considering alternative statins of lesser intensity than Lipitor. She will call my office in about 1 week to report if improved or not.

## 2017-11-02 ENCOUNTER — CLINICAL SUPPORT (OUTPATIENT)
Dept: REHABILITATION | Facility: HOSPITAL | Age: 72
End: 2017-11-02
Attending: ANESTHESIOLOGY
Payer: MEDICARE

## 2017-11-02 DIAGNOSIS — R53.1 WEAKNESS: ICD-10-CM

## 2017-11-02 DIAGNOSIS — M54.41 CHRONIC BILATERAL LOW BACK PAIN WITH BILATERAL SCIATICA: ICD-10-CM

## 2017-11-02 DIAGNOSIS — M25.69 BACK STIFFNESS: ICD-10-CM

## 2017-11-02 DIAGNOSIS — G89.29 CHRONIC BILATERAL LOW BACK PAIN WITH BILATERAL SCIATICA: ICD-10-CM

## 2017-11-02 DIAGNOSIS — M54.42 CHRONIC BILATERAL LOW BACK PAIN WITH BILATERAL SCIATICA: ICD-10-CM

## 2017-11-02 PROBLEM — M54.50 LOW BACK PAIN: Status: ACTIVE | Noted: 2017-11-02

## 2017-11-02 PROCEDURE — G8978 MOBILITY CURRENT STATUS: HCPCS | Mod: CL

## 2017-11-02 PROCEDURE — G8979 MOBILITY GOAL STATUS: HCPCS | Mod: CK

## 2017-11-02 PROCEDURE — 97162 PT EVAL MOD COMPLEX 30 MIN: CPT

## 2017-11-02 NOTE — PLAN OF CARE
Physical Therapy Initial Evaluation     Name: Aminah Higgins [facility]  Clinic Number: [id number]    Diagnosis:   Encounter Diagnoses   Name Primary?    Chronic bilateral low back pain with bilateral sciatica     Weakness     Back stiffness      Physician: Desiree Springer MD  Treatment Orders: PT Eval and Treat  Past Medical History:   Diagnosis Date    Arthritis     Cataract     left eye    Choroidal rupture of left eye     Diabetes mellitus type II     GERD (gastroesophageal reflux disease)     Hyperlipidemia     Hypertension     Hypothyroidism     Macular scar     right eye    Retinal degeneration     chorioretinal OD    Thyroid disease     Vitamin B 12 deficiency      Current Outpatient Prescriptions   Medication Sig    alcohol swabs (ALCOHOL PADS) PadM Apply 1 each topically as needed.    aspirin (ECOTRIN) 81 MG EC tablet Take 81 mg by mouth once daily.    atorvastatin (LIPITOR) 40 MG tablet Take 1 tablet (40 mg total) by mouth once daily.    biotin 1 mg tablet Take 1,000 mcg by mouth 3 (three) times daily.    blood sugar diagnostic Strp 1 strip by Misc.(Non-Drug; Combo Route) route 3 (three) times daily. accu-chek MATTIE plus test strip    blood-glucose meter Misc Accu Check Sahra Smartview meter    carvedilol (COREG) 12.5 MG tablet Take 1 tablet (12.5 mg total) by mouth 2 (two) times daily.    celecoxib (CELEBREX) 100 MG capsule Take 1 a day for 7 days, then increase to BID if not helping    clotrimazole-betamethasone 1-0.05% (LOTRISONE) cream Apply topically 2 (two) times daily.    conjugated estrogens (PREMARIN) vaginal cream Place 0.5 g vaginally 3 (three) times a week.    econazole nitrate 1 % cream AAA bid after cool blow dry when flared    fluocinonide (LIDEX) 0.05 % external solution Apply topically 2 (two) times daily.    gabapentin (NEURONTIN) 300 MG capsule Take 1 capsule (300 mg total) by mouth every evening.     lancets (ACCU-CHEK MULTICLIX LANCET) Misc To use as directed with Accu Chek Sahra FastClix lancing device    levothyroxine (SYNTHROID) 112 MCG tablet Take 1 tablet (112 mcg total) by mouth before breakfast.    lisinopril (PRINIVIL,ZESTRIL) 40 MG tablet Take 1 tablet (40 mg total) by mouth once daily.    metformin (GLUCOPHAGE) 1000 MG tablet Take 1 tablet (1,000 mg total) by mouth 2 (two) times daily with meals.    polyethylene glycol (GLYCOLAX) 17 gram PwPk Take 17 g by mouth once daily.    tamsulosin (FLOMAX) 0.4 mg Cp24 Take 1 capsule (0.4 mg total) by mouth once daily.    tramadol (ULTRAM) 50 mg tablet Take 1 tablet (50 mg total) by mouth every 6 (six) hours as needed.     No current facility-administered medications for this visit.      Review of patient's allergies indicates:   Allergen Reactions    Bufferin [aspirin, buffered] Itching    Ibuprofen Itching     Itching of eyes, head       Evaluation Date: 11/2/2017  Visit # authorized: 20  Authorization period: 12/31/2017  Plan of care Expiration: 12/14/2017      Subjective     Pt reports that she has been having back pain for about 2 months (hands 3 weeks).  Her last fall was 8 months ago. Pt reports pain in the back bilaterally with pain doing down to the ankles bilaterally. Pt has had Cortizone shots in the shoulders. 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 More numbness in the L than the R    Imaging: 10/30/2017  Narrative:     Comparison: 5/16/17.    Findings: Neutral lateral, flexion and extension views of the lumbar spine again demonstrate the grade 1-2 anterolisthesis of L5 on S1 with near-complete loss of disc height.  There is no instability on flexion and extension views.  Calcified aortoiliac atherosclerosis is present.  Loss of disc height with anterior spondylosis is identified at T12-L1.  This is stable.   Impression      No instability of the grade 1-2 anterolisthesis at L5-S1.       Pain Scale: Aminah  rates pain on a scale of 0-10 to be 10 at worst; 8 currently; 8 at best .  Onset: insidious  Radicular symptoms:  Yes; bilateral legs into the feet  Aggravating factors:   Sweeping, vacuuming, sitting slouched, standing washing dishes  Easing factors:  Sitting with proper posture, walking, heat  Prior Therapy: No previous therapy for current condition. Previous therapy for ankle; helped  Home Environment (Steps/Adaptations): No steps in the house  Functional Deficits Leading to Referral: Decreased standing tolerance, bending tolerance, sweeping.   Prior functional status: Independent  DME owned/used: None  Occupation:  Not working                       Pts goals:  Return to PLOF.     Objective     Posture Alignment: Sitting posture poor.  Standing posture poor, forward head.  Upward rotated scapula.     LUMBAR SPINE AROM:   Flexion: min grayson (knee flexion compensation   Extension: mod grayson   Left Sidebend: min grayson   Right Sidebend: min grayson   Left Rotation: min grayson   Right Rotation: mod grayson     REPEATED MOTIONS:  Flexion in standing  Increase pain   Extension in standing Shoulder pain worse   Flexion in lying Worse   Extension in lying Worse   LTR No effect       LOWER EXTREMITY STRENGTH:   Left Right   Quadriceps 5/5 5/5   Hamstrings 4+/5 4+/5     Iliopsoas 4+/5 ! hips 4/5 ! hips   PGM 5/5 5/5   Hip IR 4/5 4/5   Hip ER 4/5 4/5   Hip ABD 4+/5 4+/5   Hip ADD 5/5 5/5   Hip Ext 5/5 5/5       DTR's:   Left Right   Patella Tendon 2+ 2+   Achilles Tendon 2+ 2+     Dermatomes: Sensation: Light Touch: Intact t/o.   Myotomes: WNL t/o  Flexibility: Decreased hamstring flexibility bilaterally    Special Tests:   Left Right   Slump Negative Negative   SLR Negative Negative   Hip scour Negative Negative   CHANDLER Positive Positive   Tripod Test Positive hamstring tightness Positive hamstring tightness   Leg length Equal Equal     GAIT: Aminah ambulates independently     GAIT DEVIATIONS: Aminah displays decreased gait speed, WBOS. .      Pt/family was provided educational information, including: role of PT, goals for PT, scheduling - pt verbalized understanding.     TREATMENT     Time In: 5:40  Time Out: 5:45    PT Evaluation Completed? Yes  Discussed Plan of Care with patient: Yes    Aminah received 5 minutes of therapeutic exercise & instruction including:  HEP instruction and performance by patient. HEP listed below.     Written Home Exercises Provided: yes    LTR 3x10, 3x/day  Scapular retractions 3x10. 3x/day    Aminah demo good understanding of the education provided. Patient demo good return demo of skill of exercises.    Assessment     Patient tolerated evaluation well.  Patient reported no increased symptoms following evaluation.  Patient performed HEP well and stated that they could perform exercises at home independently.      Pt prognosis is Good.  Pt will benefit from skilled outpatient physical therapy to address the above stated deficits, provide pt/family education and to maximize pt's level of independence.     History  Co-morbidities and personal factors that may impact the plan of care Examination  Body Structures and Functions, activity limitations and participation restrictions that may impact the plan of care    Clinical Presentation   Co-morbidities:   see pMH        Personal Factors:   age  social background Body Regions:   head  neck  back  lower extremities    Body Systems:    gross symmetry  ROM  strength  gait  transfers  motor control            Participation Restrictions:   Sweeping, cleaning     Activity limitations:   Learning and applying knowledge  listening    General Tasks and Commands  no deficits    Communication  communicating with/receiving spoken language    Mobility  lifting and carrying objects  walking    Self care  dressing    Domestic Life  shopping  doing house work (cleaning house, washing dishes, laundry)    Interactions/Relationships  no deficits    Life Areas  employment  no  deficits    Community and Social Life  no deficits         evolving clinical presentation with changing clinical characteristics                      moderate   moderate  moderate Decision Making/ Complexity Score:  moderate     Medical necessity is demonstrated by the following IMPAIRMENTS/PROBLEMS:  1. Increased Pain  2. Decreased Segmental Mobility & Decreased ROM  3. Decreased Core & BLE strength  4. Decreased Flexibility BLE  5. Decreased Tolerance to Functional Activities    Pt's spiritual, cultural and educational needs considered and pt agreeable to plan of care and goals as stated below:     Anticipated Barriers for physical therapy: language barrier    Short Term GOALS: 3 weeks. Pt agrees with goals set.  1. Patient demonstrates independence with HEP.   2. Patient demonstrates independence with Postural Awareness.   3. Patient demonstrates independence with body mechanics.     Long Term GOALS: 6 weeks. Pt agrees with goals set.  1. Patient demonstrates increased Lumbar ROM  to minimal limitation t/o to improve tolerance to functional activities pain free.   2. Patient demonstrates increased strength BLE's to 5/5 or greater to improve tolerance to functional activities pain free.   3. Patient demonstrates improved overall function per FOTO Lumbar Survey to 60% Limitation or less.     Functional Limitations Reports - G Codes  Category: Mobility  Tool: FOTO Lumbar Survey  Score: 72% Limitation   Modifier  Impairment Limitation Restriction    CH  0 % impaired, limited or restricted    CI  @ least 1% but less than 20% impaired, limited or restricted    CJ  @ least 20%<40% impaired, limited or restricted    CK  @ least 40%<60% impaired, limited or restricted    CL  @ least 60% <80% impaired, limited or restricted    CM  @ least 80%<100% impaired limited or restricted    CN  100% impaired, limited or restricted     Current/: CL = 72% Limitation   Goal/ : CK  = 60% Limitation    PLAN     Outpatient  physical therapy 2 times weekly to include: pt ed, hep, therapeutic exercises, neuromuscular re-education/ balance exercises, joint mobilizations, aquatic therapy and modalities prn. Cont PT for  6 weeks. Pt may be seen by PTA as part of the rehabilitation team.     Therapist: Gumaro Aponte, PT  11/2/2017

## 2017-11-02 NOTE — PROGRESS NOTES
Miss Burr here today for evaluation of diffuse pain, myalgias. She has noted progressive pain in her upper extremities, legs. She was recently seen in  for this and was given rx for Flexeril which has helped minimally. She states that she received a flu vaccine in early September and noted an increase in these pains since then. This has limited her activities including simple tasks such as combing her hair, etc. The pain in her arms makes it difficult to perform these tasks. She has hx of lumbar radicular sx's 2o DDD of the spine, but this pain is different. She noted pains in her muscles after her statin was changed from Pravastatin to Lipitor. I will obtain some blood work to look into this and refer her to Pain Management for evaluation as well. I will see her back for review and adjust her medications if indicated.

## 2017-11-07 ENCOUNTER — CLINICAL SUPPORT (OUTPATIENT)
Dept: REHABILITATION | Facility: HOSPITAL | Age: 72
End: 2017-11-07
Attending: ANESTHESIOLOGY
Payer: MEDICARE

## 2017-11-07 DIAGNOSIS — G89.29 CHRONIC BILATERAL LOW BACK PAIN WITH BILATERAL SCIATICA: ICD-10-CM

## 2017-11-07 DIAGNOSIS — M54.41 CHRONIC BILATERAL LOW BACK PAIN WITH BILATERAL SCIATICA: ICD-10-CM

## 2017-11-07 DIAGNOSIS — M54.42 CHRONIC BILATERAL LOW BACK PAIN WITH BILATERAL SCIATICA: ICD-10-CM

## 2017-11-07 DIAGNOSIS — R53.1 WEAKNESS: ICD-10-CM

## 2017-11-07 DIAGNOSIS — M25.69 BACK STIFFNESS: ICD-10-CM

## 2017-11-07 PROCEDURE — 97110 THERAPEUTIC EXERCISES: CPT

## 2017-11-07 NOTE — PROGRESS NOTES
Physical Therapy Daily Note     Name: Aminah Higgins [facility] ([place], [place])  Clinic Number: [id number]  Diagnosis:   Encounter Diagnoses   Name Primary?    Chronic bilateral low back pain with bilateral sciatica     Weakness     Back stiffness      Physician: Desiree Springer MD  Precautions: HTN, DMII, cataract L eye  Visit #: 2 of 12  PTA Visit #: 0  Time In: 4:30  Time Out: 5:35  Total Treatment Time 1:1: 45    Evaluation Date: 11/2/2017  Visit # authorized: 20  Authorization period: 12/31/2017  Plan of care Expiration: 12/14/2017  MD referral: Desiree Springer MD    Subjective     Pt reports: She is hurting today.  Patient's shoulders and neck are bothering her a lot and she is having trouble sleeping.  Her back feels better.  Patient has been doing her exercises.  She has an  with her.     Pain Scale: Aminah rates pain on a scale of 0-10 to be 7 currently.    Objective     Aminah received individual therapeutic exercises to develop strength, endurance, ROM, flexibility, posture and core stabilization for 55 minutes including:  Bike 10 minutes (unbilled)  RENNY c ball 3x10  LTR c ball 3x10 B  Open books 2x10 B  Upper trap stretch 5x10sh B  Levator scap stretch 5x10s/h B  Corner stretch 10x10s/h   Scap retraction 3x10  Standing rows OTB 3x10    The patient received the following supervised modalities after being cleared for contradictions: Ice in z-lie 10 minutes  Written Home Exercises Provided: yes  Pt demo fair understanding of the education provided. Aminah demonstrated fair return demonstration of activities.     Education provided re: use of ice regularly for pain relief.   Aminah verbalized fair understanding of education provided.   No spiritual or educational barriers to learning provided    Assessment     Patient tolerated treatment fair today.  She came to therapy with an .  She reported decreased symptoms in her back with exercises but  UE exercises caused some discomfort in her shoulders which did not last.  Patient reported that cervical stretches helped with symptoms.  Patient required constant verbal cueing to perform exercises correctly.  Patient reported decreased symptoms upon leaving.     This is a 72 y.o. female referred to outpatient physical therapy and presents with a medical diagnosis of spondylolithesis and demonstrates limitations as described in the problem list. Pt prognosis is Fair. Pt will continue to benefit from skilled outpatient physical therapy to address the deficits listed in the problem list, provide pt/family education and to maximize pt's level of independence in the home and community environment.     Goals as follows:  Short Term GOALS: 3 weeks. Pt agrees with goals set.  1. Patient demonstrates independence with HEP. (progressing, not met)  2. Patient demonstrates independence with Postural Awareness. (progressing, not met)  3. Patient demonstrates independence with body mechanics. (progressing, not met)     Long Term GOALS: 6 weeks. Pt agrees with goals set.  1. Patient demonstrates increased Lumbar ROM  to minimal limitation t/o to improve tolerance to functional activities pain free.(progressing, not met)  2. Patient demonstrates increased strength BLE's to 5/5 or greater to improve tolerance to functional activities pain free. (progressing, not met)  3. Patient demonstrates improved overall function per FOTO Lumbar Survey to 60% Limitation or less. (progressing, not met)     Functional Limitations Reports - G Codes  Category: Mobility  Tool: FOTO Lumbar Survey  Score: 72% Limitation   Modifier  Impairment Limitation Restriction    CH  0 % impaired, limited or restricted    CI  @ least 1% but less than 20% impaired, limited or restricted    CJ  @ least 20%<40% impaired, limited or restricted    CK  @ least 40%<60% impaired, limited or restricted    CL  @ least 60% <80% impaired, limited or restricted    CM  @  least 80%<100% impaired limited or restricted    CN  100% impaired, limited or restricted      Current/: CL = 72% Limitation   Goal/ : CK  = 60% Limitation    Plan     Continue with established Plan of Care towards PT goals.    Therapist: Gumaro Aponte, PT  11/7/2017

## 2017-11-14 ENCOUNTER — CLINICAL SUPPORT (OUTPATIENT)
Dept: REHABILITATION | Facility: HOSPITAL | Age: 72
End: 2017-11-14
Attending: ANESTHESIOLOGY
Payer: MEDICARE

## 2017-11-14 DIAGNOSIS — M25.69 BACK STIFFNESS: ICD-10-CM

## 2017-11-14 DIAGNOSIS — M54.41 CHRONIC BILATERAL LOW BACK PAIN WITH BILATERAL SCIATICA: ICD-10-CM

## 2017-11-14 DIAGNOSIS — R53.1 WEAKNESS: ICD-10-CM

## 2017-11-14 DIAGNOSIS — G89.29 CHRONIC BILATERAL LOW BACK PAIN WITH BILATERAL SCIATICA: ICD-10-CM

## 2017-11-14 DIAGNOSIS — M54.42 CHRONIC BILATERAL LOW BACK PAIN WITH BILATERAL SCIATICA: ICD-10-CM

## 2017-11-14 PROCEDURE — 97110 THERAPEUTIC EXERCISES: CPT

## 2017-11-14 NOTE — PROGRESS NOTES
Physical Therapy Daily Note     Name: Aminah Higgins [facility]  Clinic Number: [id number]  Diagnosis:   Encounter Diagnoses   Name Primary?    Chronic bilateral low back pain with bilateral sciatica     Weakness     Back stiffness      Physician: Desiree Springer MD  Precautions: HTN, DMII, cataract L eye  Visit #: 3 of 12  PTA Visit #: 0  Time In: 4:03 pm  Time Out: 4:57 pm  Total Treatment Time 1:1: 45    Evaluation Date: 11/2/2017  Visit # authorized: 20  Authorization period: 12/31/2017  Plan of care Expiration: 12/14/2017  MD referral: Desiree Springer MD    Subjective     Pt reports: She is hurting today primarily in the shoulder and hands where she has swelling and it is affecting her sleep. At this time she is having about 8/10 pain in the shoulders, and about 5/10 in the low back. She feels like her back/hips hip getting better but not her shoulders. She was given medication but it did not help    Pain Scale: Aminah rates pain on a scale of 0-10 to be 7 currently.    Objective     Aminah received individual therapeutic exercises to develop strength, endurance, ROM, flexibility, posture and core stabilization for 55 minutes including:  Recumbent Bike 10 minutes (unbilled)  RENNY c ball 3x10  LTR c ball 3x10 B  Open books 2x10 B  Upper trap stretch 5x10sh B  Levator scap stretch 5x10s/h B  Corner stretch 10x10s/h - NT  Scap retraction 3x10  Standing rows OTB 3x10 - NT  Standing ball up the wall 20x  Standing trunk rotation with ball 20x  Supine pec stretch on 1/2 foam 3'    The patient received the following supervised modalities after being cleared for contradictions: Ice in z-lie 10 minutes  Written Home Exercises Provided: yes  Pt demo fair understanding of the education provided. Aminah demonstrated fair return demonstration of activities.     Education provided re: use of ice regularly for pain relief.   Aminah verbalized fair understanding of  education provided.   No spiritual or educational barriers to learning provided    Assessment     Patient tolerated treatment well today, she had the most difficulty with the corner pec stretch, modified with supine pec stretch and gave pt HEP for pec stretch.  She came to therapy with an .  She reports that she is feeling better post treatment in the shoulders and the low back. Will continue with exercises as tolerated.    This is a 72 y.o. female referred to outpatient physical therapy and presents with a medical diagnosis of spondylolithesis and demonstrates limitations as described in the problem list. Pt prognosis is Fair. Pt will continue to benefit from skilled outpatient physical therapy to address the deficits listed in the problem list, provide pt/family education and to maximize pt's level of independence in the home and community environment.     Goals as follows:  Short Term GOALS: 3 weeks. Pt agrees with goals set.  1. Patient demonstrates independence with HEP. (progressing, not met)  2. Patient demonstrates independence with Postural Awareness. (progressing, not met)  3. Patient demonstrates independence with body mechanics. (progressing, not met)     Long Term GOALS: 6 weeks. Pt agrees with goals set.  1. Patient demonstrates increased Lumbar ROM  to minimal limitation t/o to improve tolerance to functional activities pain free.(progressing, not met)  2. Patient demonstrates increased strength BLE's to 5/5 or greater to improve tolerance to functional activities pain free. (progressing, not met)  3. Patient demonstrates improved overall function per FOTO Lumbar Survey to 60% Limitation or less. (progressing, not met)     Functional Limitations Reports - G Codes  Category: Mobility  Tool: FOTO Lumbar Survey  Score: 72% Limitation   Modifier  Impairment Limitation Restriction    CH  0 % impaired, limited or restricted    CI  @ least 1% but less than 20% impaired, limited or restricted    CJ   @ least 20%<40% impaired, limited or restricted    CK  @ least 40%<60% impaired, limited or restricted    CL  @ least 60% <80% impaired, limited or restricted    CM  @ least 80%<100% impaired limited or restricted    CN  100% impaired, limited or restricted      Current/: CL = 72% Limitation   Goal/ : CK  = 60% Limitation    Plan     Continue with established Plan of Care towards PT goals.    Therapist: Isauro Garzon, PT  11/14/2017

## 2017-11-16 ENCOUNTER — CLINICAL SUPPORT (OUTPATIENT)
Dept: REHABILITATION | Facility: HOSPITAL | Age: 72
End: 2017-11-16
Attending: ANESTHESIOLOGY
Payer: MEDICARE

## 2017-11-16 DIAGNOSIS — M54.41 CHRONIC BILATERAL LOW BACK PAIN WITH BILATERAL SCIATICA: ICD-10-CM

## 2017-11-16 DIAGNOSIS — M54.42 CHRONIC BILATERAL LOW BACK PAIN WITH BILATERAL SCIATICA: ICD-10-CM

## 2017-11-16 DIAGNOSIS — M25.69 BACK STIFFNESS: ICD-10-CM

## 2017-11-16 DIAGNOSIS — G89.29 CHRONIC BILATERAL LOW BACK PAIN WITH BILATERAL SCIATICA: ICD-10-CM

## 2017-11-16 DIAGNOSIS — R53.1 WEAKNESS: ICD-10-CM

## 2017-11-16 PROCEDURE — 97110 THERAPEUTIC EXERCISES: CPT

## 2017-11-16 NOTE — PROGRESS NOTES
"                                                    Physical Therapy Daily Note     [facility]  Cl[facility] Number: [id number]  Diagnosis:   Encounter Diagnoses   Name Primary?    Chronic bilateral low back pain with bilateral sciatica     Weakness     Back stiffness      Physician: Desiree Springer MD  Precautions: HTN, DMII, cataract L eye  Visit #: 4 of 12  PTA Visit #: 0  Time In: 4:12 pm  Time Out: 5:00 pm  Total Treatment Time 1:1: 48    Evaluation Date: 11/2/2017  Visit # authorized: 20  Authorization period: 12/31/2017  Plan of care Expiration: 12/14/2017  MD referral: Desiree Springer MD    Subjective     Pt reports: (here with ) Pt reports that she is having pain today in the R shoulder about 7/10, the low back is not that bad and she has some swelling in the R hand yesterday. Pt reports that since she has been coming to therapy she is, "more better."    Pain Scale: Aminah rates pain on a scale of 0-10 to be 7 currently.    Objective     Aminah received individual therapeutic exercises to develop strength, endurance, ROM, flexibility, posture and core stabilization for 38 minutes including:    Recumbent Bike 5 minutes (unbilled)  RENNY c ball 3x10  LTR c ball 3x10 B  Open books 2x10 B  Upper trap stretch 5x10sh B  Levator scap stretch 5x10s/h B  Corner stretch 5x10s/h  Scap retraction 3x10  Standing rows OTB 3x10 - NT  Standing ball up the wall 30x  Standing trunk rotation with ball 20x  Supine pec stretch on 1/2 foam 3' - NT  4 way walk outs with maroon TB 10xea  Bridges - 20x    The patient received the following supervised modalities after being cleared for contradictions: Ice to low back and bilateral shoulders in z-lie 10 minutes  Written Home Exercises Provided: yes  Pt demo fair understanding of the education provided. Aminah demonstrated fair return demonstration of activities.     Education provided re: use of ice regularly for pain relief.   Aminah verbalized fair " understanding of education provided.   No spiritual or educational barriers to learning provided    Assessment     Patient tolerated treatment well today, she was able to complete corner stretch today with decreased repetitions and minimal pain in the shoulders. Pt reports feeling better post therapy in the shoulders and back. Pt did not have any difficulty with exercises today but continues to have limited ROM in the cervical spine. Pt would benefit from continued PT to increase ability to complete household activities without increase in pain.    This is a 72 y.o. female referred to outpatient physical therapy and presents with a medical diagnosis of spondylolithesis and demonstrates limitations as described in the problem list. Pt prognosis is Fair. Pt will continue to benefit from skilled outpatient physical therapy to address the deficits listed in the problem list, provide pt/family education and to maximize pt's level of independence in the home and community environment.     Goals as follows:  Short Term GOALS: 3 weeks. Pt agrees with goals set.  1. Patient demonstrates independence with HEP. (progressing, not met)  2. Patient demonstrates independence with Postural Awareness. (progressing, not met)  3. Patient demonstrates independence with body mechanics. (progressing, not met)     Long Term GOALS: 6 weeks. Pt agrees with goals set.  1. Patient demonstrates increased Lumbar ROM  to minimal limitation t/o to improve tolerance to functional activities pain free.(progressing, not met)  2. Patient demonstrates increased strength BLE's to 5/5 or greater to improve tolerance to functional activities pain free. (progressing, not met)  3. Patient demonstrates improved overall function per FOTO Lumbar Survey to 60% Limitation or less. (progressing, not met)     Functional Limitations Reports - G Codes  Category: Mobility  Tool: FOTO Lumbar Survey  Score: 72% Limitation   Modifier  Impairment Limitation Restriction     CH  0 % impaired, limited or restricted    CI  @ least 1% but less than 20% impaired, limited or restricted    CJ  @ least 20%<40% impaired, limited or restricted    CK  @ least 40%<60% impaired, limited or restricted    CL  @ least 60% <80% impaired, limited or restricted    CM  @ least 80%<100% impaired limited or restricted    CN  100% impaired, limited or restricted      Current/: CL = 72% Limitation   Goal/ : CK  = 60% Limitation    Plan     Continue with established Plan of Care towards PT goals.    Therapist: Isauro Garzon, PT  11/16/2017

## 2017-11-21 ENCOUNTER — CLINICAL SUPPORT (OUTPATIENT)
Dept: REHABILITATION | Facility: HOSPITAL | Age: 72
End: 2017-11-21
Attending: ANESTHESIOLOGY
Payer: MEDICARE

## 2017-11-21 DIAGNOSIS — M25.69 BACK STIFFNESS: ICD-10-CM

## 2017-11-21 DIAGNOSIS — G89.29 CHRONIC BILATERAL LOW BACK PAIN WITH BILATERAL SCIATICA: ICD-10-CM

## 2017-11-21 DIAGNOSIS — R53.1 WEAKNESS: ICD-10-CM

## 2017-11-21 DIAGNOSIS — M54.41 CHRONIC BILATERAL LOW BACK PAIN WITH BILATERAL SCIATICA: ICD-10-CM

## 2017-11-21 DIAGNOSIS — M54.42 CHRONIC BILATERAL LOW BACK PAIN WITH BILATERAL SCIATICA: ICD-10-CM

## 2017-11-21 PROCEDURE — 97110 THERAPEUTIC EXERCISES: CPT

## 2017-11-21 NOTE — PROGRESS NOTES
Physical Therapy Daily Note     Name: Aminah Higgins [facility]  Clinic Number: [id number]  Diagnosis:   Encounter Diagnoses   Name Primary?    Chronic bilateral low back pain with bilateral sciatica     Weakness     Back stiffness      Physician: Desiree Springer MD  Precautions: HTN, DMII, cataract L eye  Visit #: 5 of 12  PTA Visit #: 0  Time In: 4:00 pm  Time Out: 5:00 pm  Total Treatment Time 1:1: 60    Evaluation Date: 11/2/2017  Visit # authorized: 20  Authorization period: 12/31/2017  Plan of care Expiration: 12/14/2017  MD referral: Desiree Springer MD    Subjective     Pt reports: Pt reports that she is feeling better, the most pain she is having at this time is in her hands, she has minimal pain in the low back and about 5/10 in the shoulders but her shoulders are worse at night  Pain Scale: Aminah rates pain on a scale of 0-10 to be 7 currently.    Objective     Aminah received individual therapeutic exercises to develop strength, endurance, ROM, flexibility, posture and core stabilization for 40 minutes including:    Recumbent Bike 10 minutes (unbilled)  RENNY c ball 3x10  LTR c ball 3x10 B  Open books 2x10 B  Upper trap stretch 5x10sh B  Levator scap stretch 5x10s/h B  Corner stretch 5x10s/h -NT  Scap retraction 3x10  Standing rows OTB 3x10   Standing ball up the wall 30x  Standing trunk rotation with ball 20x  Supine pec stretch on 1/2 foam 3'   Bridges - 20x  Chops with Orange TB bilateral 20x    The patient received the following supervised modalities after being cleared for contradictions: Ice to low back and bilateral shoulders in z-lie 10 minutes  Written Home Exercises Provided: yes  Pt demo fair understanding of the education provided. Aminah demonstrated fair return demonstration of activities.     Education provided re: use of ice regularly for pain relief.   Aminah verbalized fair understanding of education provided.   No spiritual or  educational barriers to learning provided    Assessment     Patient tolerated treatment well today, she was able to add strengthening and functional chops to her exercises and will continue to progress as tolerated.     This is a 72 y.o. female referred to outpatient physical therapy and presents with a medical diagnosis of spondylolithesis and demonstrates limitations as described in the problem list. Pt prognosis is Fair. Pt will continue to benefit from skilled outpatient physical therapy to address the deficits listed in the problem list, provide pt/family education and to maximize pt's level of independence in the home and community environment.     Goals as follows:  Short Term GOALS: 3 weeks. Pt agrees with goals set.  1. Patient demonstrates independence with HEP. (progressing, not met)  2. Patient demonstrates independence with Postural Awareness. (progressing, not met)  3. Patient demonstrates independence with body mechanics. (progressing, not met)     Long Term GOALS: 6 weeks. Pt agrees with goals set.  1. Patient demonstrates increased Lumbar ROM  to minimal limitation t/o to improve tolerance to functional activities pain free.(progressing, not met)  2. Patient demonstrates increased strength BLE's to 5/5 or greater to improve tolerance to functional activities pain free. (progressing, not met)  3. Patient demonstrates improved overall function per FOTO Lumbar Survey to 60% Limitation or less. (progressing, not met)     Functional Limitations Reports - G Codes  Category: Mobility  Tool: FOTO Lumbar Survey  Score: 72% Limitation   Modifier  Impairment Limitation Restriction    CH  0 % impaired, limited or restricted    CI  @ least 1% but less than 20% impaired, limited or restricted    CJ  @ least 20%<40% impaired, limited or restricted    CK  @ least 40%<60% impaired, limited or restricted    CL  @ least 60% <80% impaired, limited or restricted    CM  @ least 80%<100% impaired limited or restricted     CN  100% impaired, limited or restricted      Current/: CL = 72% Limitation   Goal/ : CK  = 60% Limitation    Plan     Continue with established Plan of Care towards PT goals.    Therapist: Isauro Garzon, PT  11/21/2017

## 2017-12-12 ENCOUNTER — OFFICE VISIT (OUTPATIENT)
Dept: PAIN MEDICINE | Facility: CLINIC | Age: 72
End: 2017-12-12
Payer: MEDICARE

## 2017-12-12 VITALS
BODY MASS INDEX: 26.8 KG/M2 | TEMPERATURE: 97 F | HEART RATE: 76 BPM | RESPIRATION RATE: 20 BRPM | WEIGHT: 160.88 LBS | SYSTOLIC BLOOD PRESSURE: 108 MMHG | DIASTOLIC BLOOD PRESSURE: 63 MMHG | HEIGHT: 65 IN

## 2017-12-12 DIAGNOSIS — G89.29 CHRONIC BILATERAL LOW BACK PAIN WITHOUT SCIATICA: ICD-10-CM

## 2017-12-12 DIAGNOSIS — M43.10 SPONDYLOLISTHESIS, UNSPECIFIED SPINAL REGION: Primary | ICD-10-CM

## 2017-12-12 DIAGNOSIS — M25.512 CHRONIC PAIN OF BOTH SHOULDERS: ICD-10-CM

## 2017-12-12 DIAGNOSIS — M54.50 CHRONIC BILATERAL LOW BACK PAIN WITHOUT SCIATICA: ICD-10-CM

## 2017-12-12 DIAGNOSIS — M25.511 CHRONIC PAIN OF BOTH SHOULDERS: ICD-10-CM

## 2017-12-12 DIAGNOSIS — M79.10 MYALGIA: ICD-10-CM

## 2017-12-12 DIAGNOSIS — G89.29 CHRONIC PAIN OF BOTH SHOULDERS: ICD-10-CM

## 2017-12-12 PROCEDURE — 99213 OFFICE O/P EST LOW 20 MIN: CPT | Mod: S$GLB,,, | Performed by: NURSE PRACTITIONER

## 2017-12-12 PROCEDURE — 99999 PR PBB SHADOW E&M-EST. PATIENT-LVL III: CPT | Mod: PBBFAC,,, | Performed by: NURSE PRACTITIONER

## 2017-12-12 RX ORDER — TIZANIDINE 4 MG/1
4 TABLET ORAL 2 TIMES DAILY PRN
Qty: 60 TABLET | Refills: 1 | Status: SHIPPED | OUTPATIENT
Start: 2017-12-12 | End: 2018-02-10

## 2017-12-12 RX ORDER — CELECOXIB 200 MG/1
200 CAPSULE ORAL 2 TIMES DAILY
Qty: 60 CAPSULE | Refills: 1 | Status: SHIPPED | OUTPATIENT
Start: 2017-12-12 | End: 2018-02-24 | Stop reason: SDUPTHER

## 2018-01-18 DIAGNOSIS — Z12.39 ENCOUNTER FOR BREAST CANCER SCREENING OTHER THAN MAMMOGRAM: ICD-10-CM

## 2018-01-18 DIAGNOSIS — E08.9 DIABETES MELLITUS DUE TO UNDERLYING CONDITION WITHOUT COMPLICATION, UNSPECIFIED LONG TERM INSULIN USE STATUS: ICD-10-CM

## 2018-01-18 DIAGNOSIS — Z12.39 BREAST CANCER SCREENING: Primary | ICD-10-CM

## 2018-01-18 RX ORDER — METFORMIN HYDROCHLORIDE 1000 MG/1
1000 TABLET ORAL 2 TIMES DAILY WITH MEALS
Qty: 180 TABLET | Refills: 3 | Status: SHIPPED | OUTPATIENT
Start: 2018-01-18 | End: 2018-05-30 | Stop reason: SDUPTHER

## 2018-01-26 ENCOUNTER — TELEPHONE (OUTPATIENT)
Dept: INTERNAL MEDICINE | Facility: CLINIC | Age: 73
End: 2018-01-26

## 2018-01-26 DIAGNOSIS — R92.2 INCONCLUSIVE MAMMOGRAM: ICD-10-CM

## 2018-01-26 DIAGNOSIS — Z12.39 BREAST CANCER SCREENING: Primary | ICD-10-CM

## 2018-02-01 DIAGNOSIS — E03.4 HYPOTHYROIDISM DUE TO ACQUIRED ATROPHY OF THYROID: ICD-10-CM

## 2018-02-01 RX ORDER — LEVOTHYROXINE SODIUM 112 UG/1
112 TABLET ORAL
Qty: 90 TABLET | Refills: 3 | Status: SHIPPED | OUTPATIENT
Start: 2018-02-01 | End: 2018-02-19 | Stop reason: SDUPTHER

## 2018-02-19 ENCOUNTER — TELEPHONE (OUTPATIENT)
Dept: INTERNAL MEDICINE | Facility: CLINIC | Age: 73
End: 2018-02-19

## 2018-02-19 ENCOUNTER — LAB VISIT (OUTPATIENT)
Dept: LAB | Facility: HOSPITAL | Age: 73
End: 2018-02-19
Payer: MEDICARE

## 2018-02-19 ENCOUNTER — OFFICE VISIT (OUTPATIENT)
Dept: ENDOCRINOLOGY | Facility: CLINIC | Age: 73
End: 2018-02-19
Payer: MEDICARE

## 2018-02-19 VITALS
SYSTOLIC BLOOD PRESSURE: 138 MMHG | WEIGHT: 158.63 LBS | BODY MASS INDEX: 31.14 KG/M2 | HEART RATE: 68 BPM | DIASTOLIC BLOOD PRESSURE: 64 MMHG | HEIGHT: 60 IN

## 2018-02-19 DIAGNOSIS — R39.9 LOWER URINARY TRACT SYMPTOMS (LUTS): ICD-10-CM

## 2018-02-19 DIAGNOSIS — E55.9 VITAMIN D INSUFFICIENCY: ICD-10-CM

## 2018-02-19 DIAGNOSIS — E11.40 TYPE 2 DIABETES MELLITUS WITH DIABETIC NEUROPATHY, WITHOUT LONG-TERM CURRENT USE OF INSULIN: Primary | ICD-10-CM

## 2018-02-19 DIAGNOSIS — E03.4 HYPOTHYROIDISM DUE TO ACQUIRED ATROPHY OF THYROID: ICD-10-CM

## 2018-02-19 DIAGNOSIS — E78.2 ELEVATED CHOLESTEROL WITH HIGH TRIGLYCERIDES: ICD-10-CM

## 2018-02-19 DIAGNOSIS — M54.50 CHRONIC BILATERAL LOW BACK PAIN WITHOUT SCIATICA: ICD-10-CM

## 2018-02-19 DIAGNOSIS — G62.9 NEUROPATHY: ICD-10-CM

## 2018-02-19 DIAGNOSIS — E11.40 TYPE 2 DIABETES MELLITUS WITH DIABETIC NEUROPATHY, WITHOUT LONG-TERM CURRENT USE OF INSULIN: ICD-10-CM

## 2018-02-19 DIAGNOSIS — N39.0 URINARY TRACT INFECTION WITHOUT HEMATURIA, SITE UNSPECIFIED: Primary | ICD-10-CM

## 2018-02-19 DIAGNOSIS — G89.29 CHRONIC BILATERAL LOW BACK PAIN WITHOUT SCIATICA: ICD-10-CM

## 2018-02-19 LAB
25(OH)D3+25(OH)D2 SERPL-MCNC: 38 NG/ML
ALBUMIN SERPL BCP-MCNC: 3.8 G/DL
ALP SERPL-CCNC: 114 U/L
ALT SERPL W/O P-5'-P-CCNC: 14 U/L
ANION GAP SERPL CALC-SCNC: 11 MMOL/L
AST SERPL-CCNC: 14 U/L
BILIRUB SERPL-MCNC: 0.3 MG/DL
BUN SERPL-MCNC: 20 MG/DL
CALCIUM SERPL-MCNC: 10.3 MG/DL
CHLORIDE SERPL-SCNC: 100 MMOL/L
CO2 SERPL-SCNC: 26 MMOL/L
CREAT SERPL-MCNC: 0.8 MG/DL
EST. GFR  (AFRICAN AMERICAN): >60 ML/MIN/1.73 M^2
EST. GFR  (NON AFRICAN AMERICAN): >60 ML/MIN/1.73 M^2
ESTIMATED AVG GLUCOSE: 171 MG/DL
GLUCOSE SERPL-MCNC: 97 MG/DL
HBA1C MFR BLD HPLC: 7.6 %
POTASSIUM SERPL-SCNC: 4.6 MMOL/L
PROT SERPL-MCNC: 7.8 G/DL
SODIUM SERPL-SCNC: 137 MMOL/L
TSH SERPL DL<=0.005 MIU/L-ACNC: 1.93 UIU/ML

## 2018-02-19 PROCEDURE — 3008F BODY MASS INDEX DOCD: CPT | Mod: S$GLB,,, | Performed by: NURSE PRACTITIONER

## 2018-02-19 PROCEDURE — 84443 ASSAY THYROID STIM HORMONE: CPT

## 2018-02-19 PROCEDURE — 36415 COLL VENOUS BLD VENIPUNCTURE: CPT

## 2018-02-19 PROCEDURE — 1126F AMNT PAIN NOTED NONE PRSNT: CPT | Mod: S$GLB,,, | Performed by: NURSE PRACTITIONER

## 2018-02-19 PROCEDURE — 99214 OFFICE O/P EST MOD 30 MIN: CPT | Mod: S$GLB,,, | Performed by: NURSE PRACTITIONER

## 2018-02-19 PROCEDURE — 99999 PR PBB SHADOW E&M-EST. PATIENT-LVL IV: CPT | Mod: PBBFAC,,, | Performed by: NURSE PRACTITIONER

## 2018-02-19 PROCEDURE — 99499 UNLISTED E&M SERVICE: CPT | Mod: S$GLB,,, | Performed by: NURSE PRACTITIONER

## 2018-02-19 PROCEDURE — 82306 VITAMIN D 25 HYDROXY: CPT

## 2018-02-19 PROCEDURE — 80053 COMPREHEN METABOLIC PANEL: CPT

## 2018-02-19 PROCEDURE — 83036 HEMOGLOBIN GLYCOSYLATED A1C: CPT

## 2018-02-19 PROCEDURE — 1159F MED LIST DOCD IN RCRD: CPT | Mod: S$GLB,,, | Performed by: NURSE PRACTITIONER

## 2018-02-19 RX ORDER — LEVOTHYROXINE SODIUM 112 UG/1
112 TABLET ORAL
Qty: 90 TABLET | Refills: 3 | Status: SHIPPED | OUTPATIENT
Start: 2018-02-19 | End: 2018-09-17 | Stop reason: SDUPTHER

## 2018-02-19 NOTE — PATIENT INSTRUCTIONS
Snacks can be an important part of a balanced, healthy meal plan. They allow you to eat more frequently, feeling full and satisfied throughout the day. Also, they allow you to spread carbohydrates evenly, which may stabilize blood sugars.  Plus, snacks are enjoyable!     The amount of carbohydrate needed at snacks varies. Generally, about 15-30 grams of carbohydrate per snack is recommended.  Below you will find some tasty treats.       0-5 gm carb   Crystal Light   Vitamin Water Zero   Herbal tea, unsweetened   2 tsp peanut butter on celery   1./2 cup sugar-free jell-o   1 sugar-free popsicle   ¼ cup blueberries   8oz Blue Yolis unsweetened almond milk   5 baby carrots & celery sticks, cucumbers, bell peppers dipped in ¼ cup salsa, 2Tbsp light ranch dressing or 2Tbsp plain Greek yogurt   10 Goldfish crackers   ½ oz low-fat cheese or string cheese   1 closed handful of nuts, unsalted   1 Tbsp of sunflower seeds, unsalted   1 cup Smart Pop popcorn   1 whole grain brown rice cake        15 gm carb   1 small piece of fruit or ½ banana or 1/2 cup lite canned fruit   3 thuan cracker squares   3 cups Smart Pop popcorn, top spray butter, Piper lite salt or cinnamon and Truvia   5 Vanilla Wafers   ½ cup low fat, no added sugar ice cream or frozen yogurt (Blue bell, Blue Bunny, Weight Watchers, Skinny Cow)   ½ turkey, ham, or chicken sandwich   ½ c fruit with ½ c Cottage cheese   4-6 unsalted wheat crackers with 1 oz low fat cheese or 1 tbsp peanut butter    30-45 goldfish crackers (depending on flavor)    7-8 Restorationism mini brown rice cakes (caramel, apple cinnamon, chocolate)    12 Restorationism mini brown rice cakes (cheddar, bbq, ranch)    1/3 cup hummus dip with raw veg   1/2 whole wheat reshma, 1Tbsp hummus   Mini Pizza (1/2 whole wheat English muffin, low-fat  cheese, tomato sauce)   100 calorie snack pack (Oreo, Chips Ahoy, Ritz Mix, Baked Cheetos)   4-6 oz. light or Greek Style yogurt  (Mia, Hoa, [facility], [facility])   ½ cup sugar-free pudding     6 in. wheat tortilla or reshma oven toasted chips (topped with spray butter flavoring, cinnamon, Truvia OR spray butter, garlic powder, chili powder)    18 BBQ Popchips (available at [employer], [employer], Fresh Market)

## 2018-02-19 NOTE — PROGRESS NOTES
CHIEF COMPLAINT: Type 2 Diabetes     HPI: Mrs. Aminah Norton is a 72 y.o. female who was diagnosed with Type 2 DM in 2008.   She was last seen by Dr. Gr in 2015 and by Dr. KRISH Olvera in 12/2016.  No blood work today.  Has  with her during visit.  Checks bg 2 times a day.   134 this am   60s- less than 200    PREVIOUS DIABETES MEDICATIONS TRIED  Metformin     CURRENT DIABETIC MEDS: metformin 1000 mg bid     Pt is monitoring blood glucose readings 2 times a day.  Needs >100 strips per month related to fluctuations with blood glucose reading, a1c trends, and activity level.    Last Podiatry Exam: none     50 mins 2x a day for at least 5x a week     REVIEW OF SYSTEMS  General: no weakness, fatigue, or +weight changes #2-5.   Eyes: no double or blurred vision, eye pain, or redness;+wear glasses +floaters Last Eye Exam=x 1 year ago. Dr. Joyner 3/6/18  Cardiovascular: no chest pain, palpitations, edema, or murmurs.   Respiratory: + cough (productive at times) or dyspnea.   GI/: no heartburn, nausea, or changes in bowel patterns; good appetite. +frequent UTIs, f/u with urology  Skin: no rashes, dryness, itching, or reactions at insulin injection sites.  Neuro: no numbness, tingling, tremors, or vertigo. +bone/msucular pain-(L) arm/shoulder  Endocrine: no polyuria, polydipsia, polyphagia, heat or cold intolerance.     Vital Signs  /64   Pulse 68   Ht 5' (1.524 m)   Wt 71.9 kg (158 lb 9.6 oz)   BMI 30.97 kg/m²     Hemoglobin A1C   Date Value Ref Range Status   11/29/2016 7.6 (H) 4.5 - 6.2 % Final     Comment:     According to ADA guidelines, hemoglobin A1C <7.0% represents  optimal control in non-pregnant diabetic patients.  Different  metrics may apply to specific populations.   Standards of Medical Care in Diabetes - 2016.  For the purpose of screening for the presence of diabetes:  <5.7%     Consistent with the absence of diabetes  5.7-6.4%  Consistent with increasing risk for  diabetes   (prediabetes)  >or=6.5%  Consistent with diabetes  Currently no consensus exists for use of hemoglobin A1C  for diagnosis of diabetes for children.     11/21/2016 7.8 (H) 4.5 - 6.2 % Final     Comment:     According to ADA guidelines, hemoglobin A1C <7.0% represents  optimal control in non-pregnant diabetic patients.  Different  metrics may apply to specific populations.   Standards of Medical Care in Diabetes - 2016.  For the purpose of screening for the presence of diabetes:  <5.7%     Consistent with the absence of diabetes  5.7-6.4%  Consistent with increasing risk for diabetes   (prediabetes)  >or=6.5%  Consistent with diabetes  Currently no consensus exists for use of hemoglobin A1C  for diagnosis of diabetes for children.     05/09/2016 8.1 (H) 4.5 - 6.2 % Final        Chemistry        Component Value Date/Time     11/21/2016 0842    K 4.4 11/21/2016 0842     11/21/2016 0842    CO2 25 11/21/2016 0842    BUN 13 11/21/2016 0842    CREATININE 0.8 11/21/2016 0842     (H) 11/21/2016 0842        Component Value Date/Time    CALCIUM 9.4 11/21/2016 0842    ALKPHOS 86 11/21/2016 0842    AST 16 11/21/2016 0842    ALT 19 11/21/2016 0842    BILITOT 0.5 11/21/2016 0842    ESTGFRAFRICA >60.0 11/21/2016 0842    EGFRNONAA >60.0 11/21/2016 0842           Lab Results   Component Value Date    TSH 1.395 11/29/2016      Lab Results   Component Value Date    CHOL 229 (H) 11/21/2016    CHOL 193 05/09/2016    CHOL 190 02/16/2016     Lab Results   Component Value Date    HDL 51 11/21/2016    HDL 53 05/09/2016    HDL 48 02/16/2016     Lab Results   Component Value Date    LDLCALC 145.2 11/21/2016    LDLCALC 89.2 05/09/2016    LDLCALC 102.8 02/16/2016     Lab Results   Component Value Date    TRIG 164 (H) 11/21/2016    TRIG 254 (H) 05/09/2016    TRIG 196 (H) 02/16/2016     Lab Results   Component Value Date    CHOLHDL 22.3 11/21/2016    CHOLHDL 27.5 05/09/2016    CHOLHDL 25.3 02/16/2016         PHYSICAL  EXAMINATION  Constitutional: Appears well, no distress  Neck: Supple, trachea midline.   Respiratory: CTA without wheezes, even and unlabored.  Cardiovascular: RRR; no carotid bruits or murmurs; (+) DP pulses; no edema.   Lymph: no lymphadenopathy palpated  Skin: warm and dry; no injection site reactions, no acanthosis nigracans observed.  Neuro:patient alert and cooperative, normal affect; steady gait.    Diabetes Foot Exam:   Protective Sensation (w/ 10 gram monofilament):  Right: Intact  Left: Intact    Visual Inspection:  Normal -  Bilateral    Pedal Pulses:   Right: Present  Left: Present    Posterior tibialis:   Right:Present  Left: Present        Assessment/Plan    1. Type 2 diabetes mellitus with diabetic neuropathy, without long-term current use of insulin  Hemoglobin A1c next time, today  a1c goal less than 7.5%  Continue metformin  Discuss glp1a, dpp4i, or sglt2i next time .  tsh today, vitamin d today, cmp today  Lipid next time.   2. Neuropathy  See above   3. Hypothyroidism due to acquired atrophy of thyroid  Lab Results   Component Value Date    TSH 1.934 02/19/2018     At goal.      levothyroxine (SYNTHROID) 112 MCG tablet   4. Elevated cholesterol with high triglycerides  Lipid panel next time - increase lipitor if necessary to 80 mg qhs.  Lab Results   Component Value Date    LDLCALC 145.2 11/21/2016     Not at goal.   5. Vitamin D insufficiency  Vitamin D, at goal .   6. Lower urinary tract symptoms (LUTS)  F/u with urology   7. Chronic bilateral low back pain without sciatica  F/u with primary or specialist.       FOLLOW UP  Follow-up in about 1 year (around 2/19/2019).

## 2018-02-26 RX ORDER — CELECOXIB 200 MG/1
200 CAPSULE ORAL 2 TIMES DAILY
Qty: 60 CAPSULE | Refills: 1 | Status: SHIPPED | OUTPATIENT
Start: 2018-02-26 | End: 2018-04-27

## 2018-02-27 ENCOUNTER — OFFICE VISIT (OUTPATIENT)
Dept: UROLOGY | Facility: CLINIC | Age: 73
End: 2018-02-27
Payer: MEDICARE

## 2018-02-27 ENCOUNTER — OFFICE VISIT (OUTPATIENT)
Dept: INTERNAL MEDICINE | Facility: CLINIC | Age: 73
End: 2018-02-27
Payer: MEDICARE

## 2018-02-27 VITALS — HEART RATE: 98 BPM | DIASTOLIC BLOOD PRESSURE: 90 MMHG | SYSTOLIC BLOOD PRESSURE: 160 MMHG

## 2018-02-27 VITALS — BODY MASS INDEX: 31.02 KG/M2 | HEIGHT: 60 IN | WEIGHT: 158 LBS

## 2018-02-27 DIAGNOSIS — R39.11 HESITANCY: Primary | ICD-10-CM

## 2018-02-27 DIAGNOSIS — R39.89 URETHRAL PAIN: ICD-10-CM

## 2018-02-27 DIAGNOSIS — R30.0 DYSURIA: ICD-10-CM

## 2018-02-27 DIAGNOSIS — R00.2 PALPITATIONS: ICD-10-CM

## 2018-02-27 DIAGNOSIS — R10.2 PELVIC PAIN: Primary | ICD-10-CM

## 2018-02-27 DIAGNOSIS — M79.10 MYALGIA: ICD-10-CM

## 2018-02-27 DIAGNOSIS — N95.2 VAGINAL ATROPHY: ICD-10-CM

## 2018-02-27 DIAGNOSIS — I10 HTN (HYPERTENSION), BENIGN: ICD-10-CM

## 2018-02-27 DIAGNOSIS — R10.2 PELVIC PAIN IN FEMALE: ICD-10-CM

## 2018-02-27 PROCEDURE — 1159F MED LIST DOCD IN RCRD: CPT | Mod: S$GLB,,, | Performed by: NURSE PRACTITIONER

## 2018-02-27 PROCEDURE — 87086 URINE CULTURE/COLONY COUNT: CPT

## 2018-02-27 PROCEDURE — 99213 OFFICE O/P EST LOW 20 MIN: CPT | Mod: S$GLB,,, | Performed by: INTERNAL MEDICINE

## 2018-02-27 PROCEDURE — 99213 OFFICE O/P EST LOW 20 MIN: CPT | Mod: 25,S$GLB,, | Performed by: NURSE PRACTITIONER

## 2018-02-27 PROCEDURE — 1159F MED LIST DOCD IN RCRD: CPT | Mod: S$GLB,,, | Performed by: INTERNAL MEDICINE

## 2018-02-27 PROCEDURE — 99999 PR PBB SHADOW E&M-EST. PATIENT-LVL II: CPT | Mod: PBBFAC,,, | Performed by: INTERNAL MEDICINE

## 2018-02-27 PROCEDURE — 3008F BODY MASS INDEX DOCD: CPT | Mod: S$GLB,,, | Performed by: NURSE PRACTITIONER

## 2018-02-27 PROCEDURE — 3008F BODY MASS INDEX DOCD: CPT | Mod: S$GLB,,, | Performed by: INTERNAL MEDICINE

## 2018-02-27 PROCEDURE — 1126F AMNT PAIN NOTED NONE PRSNT: CPT | Mod: S$GLB,,, | Performed by: NURSE PRACTITIONER

## 2018-02-27 PROCEDURE — 99999 PR PBB SHADOW E&M-EST. PATIENT-LVL V: CPT | Mod: PBBFAC,,, | Performed by: NURSE PRACTITIONER

## 2018-02-27 PROCEDURE — 51701 INSERT BLADDER CATHETER: CPT | Mod: S$GLB,,, | Performed by: NURSE PRACTITIONER

## 2018-02-27 RX ORDER — CARVEDILOL 12.5 MG/1
12.5 TABLET ORAL 2 TIMES DAILY
Qty: 180 TABLET | Refills: 3 | Status: SHIPPED | OUTPATIENT
Start: 2018-02-27 | End: 2018-05-30 | Stop reason: SDUPTHER

## 2018-02-27 NOTE — Clinical Note
Ella,     I saw Ms. Alicea for dysuria, pelvic pain, and hesitancy. She is taking flomax daily. Good FOS and complete emptying. On exam, she was tender at vaginal triggers points. I referred her to PT for pelvic floor dysfunction. I wanted to see if you would recommend anything else. Thanks!!!  Key

## 2018-02-27 NOTE — PROGRESS NOTES
CHIEF COMPLAINT:    Mrs. Norton is a 72 y.o. female presenting for a consultation at the request of Dr. Dereje Thomas. Patient presents with lower abdominal pain.     PRESENTING ILLNESS:    Aminah Norton is a 72 y.o. female who presents for lower abd pain.   Last seen in the clinic on 10/11/17 with VOLODYMYR Peck.    at the bedside.     She reports that for the last month she has had a burning sensation, deep within the pelvis and dysuria with urination. She has been checked for a UTI but was found to have no growth.  Nothing improves or worsens the pain. She recently started taking Celebrex and gabapentin for arthritis but did not notice improvements. She has increased her water and cranberry juice intake. She also reports urgency, frequency, and nocturia. She reports that she urinates every hour. She reports hesitancy and that it takes 30 minutes for her to start her urination. She states she has to lean forward to emptying her bladder. She reports a good FOS and complete bladder emptying. She states that she urinates more easily with the flomax. She stopped the premarin d/t cost.     She reports no right flank tenderness.  There is hydronephrosis that is persistent mild to moderate right-sided hydronephrosis grossly stable from 9/28/17.    She has a hx of hematuria and a abnormality seen in her right ureter on CT Urogram.    Date: 03/07/2017  Pre-Op Diagnosis:   1. Right hydroureteralnephrosis  2. Right ureteral filling defect  3. Pelvic pain   Procedure(s) Performed:   1.  Right ureteroscopy  2.  Cystoscopy  3.  Right JJ ureteral stent placement  4.  Bilateral retrograde pyelogram  5.  Right ureteral biopsy  6.  Bladder biopsy and fulguration  7.  Fluoro < 1 h   Specimen(s):   1. Right distal ureteral biopsy  2. Bladder biopsy x 2  FINAL PATHOLOGIC DIAGNOSIS  1. Ureter, right distal, biopsy:  - Urothelium with acute and chronic inflammation.  - Negative for  malignancy.  2. Bladder, biopsy:  - Urothelium with chronic inflammation.  - Negative for malignancy.     Findings:   1. No urethral abnormalities  2. Bladder highly vascular with scattered areas of suspicious erythema  3. No filling defects or hydronephrosis on left retrograde pyelogram  4. Filling defect noted in distal right ureter with hydronephrosis proximal  5. Area of tumor vs scar tissue seen in distal right ureter on ureteroscopy      REVIEW OF SYSTEMS:    Review of Systems   Constitutional: Negative.    HENT: Negative.    Eyes:        Dry eyes   Respiratory: Negative.    Cardiovascular: Negative.    Gastrointestinal: Negative for diarrhea.   Genitourinary: Positive for dysuria, frequency and urgency. Negative for flank pain and hematuria.   Musculoskeletal: Positive for myalgias.   Skin: Negative.    Neurological: Negative.    Endo/Heme/Allergies: Negative.    Psychiatric/Behavioral: Negative.      PATIENT HISTORY:    Past Medical History:   Diagnosis Date    Arthritis     Cataract     left eye    Choroidal rupture of left eye     Diabetes mellitus type II     GERD (gastroesophageal reflux disease)     Hyperlipidemia     Hypertension     Hypothyroidism     Macular scar     right eye    Retinal degeneration     chorioretinal OD    Thyroid disease     Vitamin B 12 deficiency        Past Surgical History:   Procedure Laterality Date    BLADDER SUSPENSION      CATARACT EXTRACTION      right eye     CHOLECYSTECTOMY      TOTAL ABDOMINAL HYSTERECTOMY      DUB       Family History   Problem Relation Age of Onset    Cancer Mother      uterine    COPD Father     Cancer Sister      liver    Hypertension Daughter     Thyroid disease Daughter     Diabetes Son     Hypertension Sister     Hypertension Sister     Cancer Daughter      uterine    Thyroid disease Daughter     Hypertension Daughter        Social History     Social History    Marital status:      Social History Main Topics     Smoking status: Never Smoker    Smokeless tobacco: Never Used    Alcohol use No    Drug use: No    Sexual activity: Yes     Partners: Male     Birth control/ protection: Post-menopausal      Comment:         Social History Narrative     with 7 kids       Allergies:  Bufferin [aspirin, buffered] and Ibuprofen    Medications:  Outpatient Encounter Prescriptions as of 2/27/2018   Medication Sig Dispense Refill    alcohol swabs (ALCOHOL PADS) PadM Apply 1 each topically as needed. 100 each 3    aspirin (ECOTRIN) 81 MG EC tablet Take 81 mg by mouth once daily.      atorvastatin (LIPITOR) 40 MG tablet Take 1 tablet (40 mg total) by mouth once daily. 90 tablet 3    biotin 1 mg tablet Take 1,000 mcg by mouth 3 (three) times daily.      blood sugar diagnostic Strp 1 strip by Misc.(Non-Drug; Combo Route) route 3 (three) times daily. accu-chek MATTIE plus test strip 100 strip 2    blood-glucose meter Misc Accu Check Sahra Smartview meter 1 each 0    carvedilol (COREG) 12.5 MG tablet Take 1 tablet (12.5 mg total) by mouth 2 (two) times daily. 180 tablet 3    celecoxib (CELEBREX) 200 MG capsule TAKE 1 CAPSULE (200 MG TOTAL) BY MOUTH 2 (TWO) TIMES DAILY. 60 capsule 1    clotrimazole-betamethasone 1-0.05% (LOTRISONE) cream Apply topically 2 (two) times daily. 45 g 1    econazole nitrate 1 % cream AAA bid after cool blow dry when flared 85 g 2    gabapentin (NEURONTIN) 300 MG capsule Take 1 capsule (300 mg total) by mouth every evening. 90 capsule 3    lancets (ACCU-CHEK MULTICLIX LANCET) Misc To use as directed with Accu Chek Sahra FastClix lancing device 100 each 2    levothyroxine (SYNTHROID) 112 MCG tablet Take 1 tablet (112 mcg total) by mouth before breakfast. 90 tablet 3    lisinopril (PRINIVIL,ZESTRIL) 40 MG tablet Take 1 tablet (40 mg total) by mouth once daily. 90 tablet 3    metFORMIN (GLUCOPHAGE) 1000 MG tablet Take 1 tablet (1,000 mg total) by mouth 2 (two) times daily with meals. 180  tablet 3    polyethylene glycol (GLYCOLAX) 17 gram PwPk Take 17 g by mouth once daily. 30 packet 0    tamsulosin (FLOMAX) 0.4 mg Cp24 Take 1 capsule (0.4 mg total) by mouth once daily. 90 capsule 3    tramadol (ULTRAM) 50 mg tablet Take 1 tablet (50 mg total) by mouth every 6 (six) hours as needed. 30 tablet 0    [START ON 2/28/2018] conjugated estrogens (PREMARIN) vaginal cream Place 0.5 g vaginally 3 (three) times a week. 30 g 3    fluocinonide (LIDEX) 0.05 % external solution Apply topically 2 (two) times daily. 60 mL 1    [DISCONTINUED] conjugated estrogens (PREMARIN) vaginal cream Place 0.5 g vaginally 3 (three) times a week. 30 g 3     No facility-administered encounter medications on file as of 2/27/2018.          PHYSICAL EXAMINATION:    The patient generally appears in good health, is appropriately interactive, and is in no apparent distress.    Skin: No lesions.    Mental: Cooperative with normal affect.    Neuro: Grossly intact.    HEENT: Normal. No evidence of lymphadenopathy.    Chest: Clear to ascultation bilaterally, normal inspiratory effort.    Heart: Regular rhythm.  No murmurs    Abdomen: BS active. Soft, non-tender. No masses or organomegaly. Bladder is not palpable. No evidence of flank discomfort. No evidence of inguinal hernia.    Extremities: No clubbing, cyanosis, or edema    : Normal external female genitalia  Grade II urogenital atrophy  Urethral meatus is normal  Urethra and bladder are nontender to bimanual exam  Vaginal trigger points tender to palpation.   Well supported anteriorly and posteriorly   Verbal consent received. In and out cath was 90 cc of clear yellow urine.     LABS:    UA today showed trace blood and no bacteria. Spec grav 1.000 and ph 6.     IMPRESSION:    Encounter Diagnoses   Name Primary?    Hesitancy Yes    Vaginal atrophy     Urethral pain     Dysuria     Pelvic pain in female        PLAN:  -Reassured pt,   -Discussed possible causes for dysuria.    -Discussed pelvic pain dysfunction and referral to PT for her pelvic floor.    -Catheterized specimen sent for culture  -Continue Premarin cream and flomax.  MOA was discussed  -Will discuss with Dr. Murray.   -RTC in 6 weeks.     Copy to:  Dr. Thomas

## 2018-02-27 NOTE — LETTER
February 27, 2018      Dereje Thomas MD  [address]  [facility] [address]           [facility] - Urology 4th Floor  [address]  [facility] [address]  Phone: [phone]          Patient: Aminah Norton   MR Number: [id number]   YOB: 1945   Date of Visit: 2/27/2018       Dear Dr. Dereje Thomas:    Thank you for referring Aminah Norton to me for evaluation. Attached you will find relevant portions of my assessment and plan of care.    If you have questions, please do not hesitate to call me. I look forward to following Aminah Norton along with you.    Sincerely,    Araceli Rangel, NP    Enclosure  CC:  No Recipients    If you would like to receive this communication electronically, please contact [email or web address] or [phone] to request more information on Monitor Link access.    For providers and/or their staff who would like to refer a patient to [facility], please contact us through our one-stop-shop provider referral line, [facility], at [phone].    If you feel you have received this communication in error or would no longer like to receive these types of communications, please e-mail [email or web address]

## 2018-02-27 NOTE — PROGRESS NOTES
Subjective:       Patient ID: Aminah Norton is a 72 y.o. female.    Chief Complaint: Follow-up    HPIMiss Aminah is here today with c/o persistent pelvic pain and dysuria. She notes the increased urgency with pain at most times of the day although urinates very little. She experiences pelvic pain before and after micturition that can, at times, be severe. I discussed the various potential causes including interstitial cystitis and will make appt with Urology for evaluation of this problem.  Regarding her myalgias, she continues to experience pain in her upper body mainly that makes it difficult for even the simple tasks at home. She was seen by Pain Clinic who prescribed Celebrex and Zanaflex. She did not tolerate the muscle relaxer, but is taking the Celebrex as directed with minimal relief. I advised her to follow up with them.  Review of Systems   All other systems reviewed and are negative.      Objective:      Physical Exam   Constitutional: She is oriented to person, place, and time.   Cardiovascular: Regular rhythm, normal heart sounds and intact distal pulses.    No murmur heard.  Pulmonary/Chest: Effort normal and breath sounds normal. No respiratory distress. She has no wheezes.   Abdominal: Soft. Bowel sounds are normal. She exhibits no distension and no mass. There is tenderness.   Pelvic pain with palpation.   Neurological: She is alert and oriented to person, place, and time.   Nursing note and vitals reviewed.      Assessment:       1. Pelvic pain    2. Myalgia    3. Dysuria        Plan:    1. Refer to Urology - r/o IC.         2. Follow up with Pain Clinic.         3. RTC after above.

## 2018-02-28 LAB — BACTERIA UR CULT: NO GROWTH

## 2018-03-02 ENCOUNTER — TELEPHONE (OUTPATIENT)
Dept: INTERNAL MEDICINE | Facility: CLINIC | Age: 73
End: 2018-03-02

## 2018-03-05 DIAGNOSIS — E08.65 DIABETES MELLITUS DUE TO UNDERLYING CONDITION WITH HYPERGLYCEMIA, UNSPECIFIED LONG TERM INSULIN USE STATUS: ICD-10-CM

## 2018-03-06 ENCOUNTER — OFFICE VISIT (OUTPATIENT)
Dept: OPTOMETRY | Facility: CLINIC | Age: 73
End: 2018-03-06
Payer: MEDICARE

## 2018-03-06 DIAGNOSIS — H52.4 MYOPIA WITH PRESBYOPIA OF BOTH EYES: ICD-10-CM

## 2018-03-06 DIAGNOSIS — H43.392 VITREOUS FLOATER, LEFT: ICD-10-CM

## 2018-03-06 DIAGNOSIS — H52.13 MYOPIA WITH PRESBYOPIA OF BOTH EYES: ICD-10-CM

## 2018-03-06 DIAGNOSIS — H25.12 NUCLEAR SCLEROSIS, LEFT: ICD-10-CM

## 2018-03-06 DIAGNOSIS — H04.129 DRY EYE SYNDROME: ICD-10-CM

## 2018-03-06 DIAGNOSIS — H31.011 MACULAR SCAR, RIGHT: ICD-10-CM

## 2018-03-06 DIAGNOSIS — E11.9 TYPE 2 DIABETES MELLITUS WITHOUT OPHTHALMIC MANIFESTATIONS: Primary | ICD-10-CM

## 2018-03-06 PROCEDURE — 92015 DETERMINE REFRACTIVE STATE: CPT | Mod: S$GLB,,, | Performed by: OPTOMETRIST

## 2018-03-06 PROCEDURE — 92014 COMPRE OPH EXAM EST PT 1/>: CPT | Mod: S$GLB,,, | Performed by: OPTOMETRIST

## 2018-03-06 PROCEDURE — 99499 UNLISTED E&M SERVICE: CPT | Mod: S$GLB,,, | Performed by: OPTOMETRIST

## 2018-03-06 PROCEDURE — 99999 PR PBB SHADOW E&M-EST. PATIENT-LVL II: CPT | Mod: PBBFAC,,, | Performed by: OPTOMETRIST

## 2018-03-06 NOTE — PROGRESS NOTES
HPI     Patient's last dilated exam was: 10/3/2016 W/ Dr. Joyner    Pt here for diabetic eye exam. C/O floaters OS apprx 3 months. Feels they   have gotten worse. Says they have either become more dense or she is   seeing a shadow. Total darkness that goes in and out of vision. No   flashes. C/O pressure pain behind her left eye, has been going on for a   while. Blood sugar was 132 this morning. Pt hx: PCIOL OD, NS OS, Macular   scar - OD, Choroidal rupture  OS        Hemoglobin A1C       Date                     Value               Ref Range             Status                02/19/2018               7.6 (H)             4.0 - 5.6 %           Final                 11/29/2016               7.6 (H)             4.5 - 6.2 %           Final                 11/21/2016               7.8 (H)             4.5 - 6.2 %           Final                  Last edited by Reena Sotomayor, PCT on 3/6/2018  9:41 AM.   (History)            Assessment /Plan     For exam results, see Encounter Report.    Type 2 diabetes mellitus without ophthalmic manifestations    Nuclear sclerosis, left    Macular scar, right    Dry eye syndrome    Vitreous floater, left    Myopia with presbyopia of both eyes            1.  No retinopathy--monitor yearly.  BS control.  2.  Educated on cataracts and affects on vision.  Patient happy with vision.  Monitor.   3.  Longstanding per patient--possibly due to trauma.  Monitor.  4.  Recommend using artificial tears at least 2x/day OU.  5.  Patient reports one new floater OS but no flashes.  Retina normal OU--no holes, tears, breaks, or RDs.    6.  Bifocal rx given

## 2018-03-09 ENCOUNTER — TELEPHONE (OUTPATIENT)
Dept: PEDIATRIC UROLOGY | Facility: CLINIC | Age: 73
End: 2018-03-09

## 2018-03-26 ENCOUNTER — INITIAL CONSULT (OUTPATIENT)
Dept: RHEUMATOLOGY | Facility: CLINIC | Age: 73
End: 2018-03-26
Payer: MEDICARE

## 2018-03-26 VITALS
HEIGHT: 65 IN | SYSTOLIC BLOOD PRESSURE: 130 MMHG | BODY MASS INDEX: 26.85 KG/M2 | WEIGHT: 161.13 LBS | RESPIRATION RATE: 18 BRPM | HEART RATE: 76 BPM | DIASTOLIC BLOOD PRESSURE: 70 MMHG

## 2018-03-26 DIAGNOSIS — R30.0 DYSURIA: Primary | ICD-10-CM

## 2018-03-26 DIAGNOSIS — M79.10 MYALGIA: ICD-10-CM

## 2018-03-26 DIAGNOSIS — M25.50 POLYARTHRALGIA: ICD-10-CM

## 2018-03-26 PROCEDURE — 3075F SYST BP GE 130 - 139MM HG: CPT | Mod: CPTII,S$GLB,, | Performed by: INTERNAL MEDICINE

## 2018-03-26 PROCEDURE — 99204 OFFICE O/P NEW MOD 45 MIN: CPT | Mod: S$GLB,,, | Performed by: INTERNAL MEDICINE

## 2018-03-26 PROCEDURE — 3078F DIAST BP <80 MM HG: CPT | Mod: CPTII,S$GLB,, | Performed by: INTERNAL MEDICINE

## 2018-03-26 PROCEDURE — 99999 PR PBB SHADOW E&M-EST. PATIENT-LVL IV: CPT | Mod: PBBFAC,,, | Performed by: INTERNAL MEDICINE

## 2018-03-26 RX ORDER — PREDNISONE 20 MG/1
20 TABLET ORAL DAILY
Qty: 10 TABLET | Refills: 0 | Status: SHIPPED | OUTPATIENT
Start: 2018-03-26 | End: 2018-04-05

## 2018-03-26 NOTE — PROGRESS NOTES
Subjective:       Patient ID: Aminah Norton is a 72 y.o. female.    Chief Complaint: No chief complaint on file.    HPI 73 yo F With PMH of type II DM,retinal, degeneration on right side, left eye cataract, nephrolithiasis,  hypothyroidism, HTN. HL here for evaluation.  Reports she has increase urinary frequency and dysuria for 3 months.  Reports she has pain in shoulders, wrists, hands, hips, ankles, and hips.  Reports she has been having pain since September. Reports that she had steroid injections into both shoulders and she felt worse.   Pain level is 7/10 and is worse at night. Pain is aching.  Reports she has swelling in hands since September.  She has trouble making a fist.  Does not smoke. Denies rashes, oral ulcers,fevers, raynauds.  Denies pruritus. She took celebrex but it made her dysuria worse.   Raising her arms makes her pain worse. Denies history of smoking.    Family hx: unsure of RA    Past Medical History:   Diagnosis Date    Arthritis     Cataract     left eye    Choroidal rupture of left eye     Diabetes mellitus type II     GERD (gastroesophageal reflux disease)     Hyperlipidemia     Hypertension     Hypothyroidism     Macular scar     right eye    Retinal degeneration     chorioretinal OD    Thyroid disease     Vitamin B 12 deficiency        Review of Systems   Constitutional: Positive for fatigue. Negative for activity change, appetite change, chills, diaphoresis, fever and unexpected weight change.   HENT: Negative for congestion, ear discharge, ear pain, facial swelling, mouth sores, sinus pressure, sneezing, sore throat, tinnitus and trouble swallowing.    Eyes: Negative for photophobia, pain, discharge, redness, itching and visual disturbance.   Respiratory: Negative for apnea, chest tightness, shortness of breath, wheezing and stridor.    Cardiovascular: Negative for leg swelling.   Gastrointestinal: Negative for abdominal distention, abdominal pain, anal  "bleeding, blood in stool, constipation, diarrhea and nausea.   Endocrine: Negative for cold intolerance and heat intolerance.   Genitourinary: Negative for difficulty urinating and dysuria.   Musculoskeletal: Positive for arthralgias. Negative for back pain, gait problem, joint swelling, myalgias, neck pain and neck stiffness.   Skin: Negative for color change, pallor, rash and wound.   Neurological: Negative for dizziness, seizures, light-headedness and numbness.   Hematological: Negative for adenopathy. Does not bruise/bleed easily.   Psychiatric/Behavioral: Negative for sleep disturbance. The patient is not nervous/anxious.              Objective:   /70   Pulse 76   Resp 18   Ht 5' 5" (1.651 m)   Wt 73.1 kg (161 lb 1.6 oz)   BMI 26.81 kg/m²      Physical Exam   Constitutional: She is oriented to person, place, and time.   HENT:   Head: Normocephalic and atraumatic.   Right Ear: External ear normal.   Left Ear: External ear normal.   Nose: Nose normal.   Mouth/Throat: Oropharynx is clear and moist. No oropharyngeal exudate.   Eyes: Conjunctivae and EOM are normal. Pupils are equal, round, and reactive to light. Right eye exhibits no discharge. Left eye exhibits no discharge. No scleral icterus.   Neck: Neck supple. No JVD present. No thyromegaly present.   Cardiovascular: Normal rate, regular rhythm, normal heart sounds and intact distal pulses.  Exam reveals no gallop and no friction rub.    No murmur heard.  Pulmonary/Chest: Effort normal and breath sounds normal. No respiratory distress. She has no wheezes. She has no rales. She exhibits no tenderness.   Abdominal: Soft. Bowel sounds are normal. She exhibits no distension and no mass. There is no tenderness. There is no rebound and no guarding.   Lymphadenopathy:     She has no cervical adenopathy.   Neurological: She is alert and oriented to person, place, and time. No cranial nerve deficit. Gait normal. Coordination normal.   Skin: Skin is dry. No " rash noted. No erythema. No pallor.     Psychiatric: Affect and judgment normal.   Musculoskeletal: She exhibits tenderness. She exhibits no edema or deformity.   Diffuse tenderness in muscles  Trouble making a fist in both hands  ? 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  Patient with diffuse jmyalgias concerning for fibromyalgia.  She also reports dysuria, so will check for UTI and have asked her to follow up with urology.  Lastly, she has trouble making a fist and has has decreased ROM of both shoulders. Will give her trial of prednisone to see if notices improvement. Have asked her to eat low carbohydrate given that prednisone will increase her glucose.      No diagnosis found.        Plan:       Labs  xrays  Start prednisone 20mg a day  Consider putting her on baclofen at next visit   UA  rtc next week  **

## 2018-03-26 NOTE — LETTER
March 26, 2018      Dereje Thomas MD  [address]  [facility] [address]           [facility] - Rheumatology  [address]  [facility] [address]  Phone: [phone]  Fax: [phone]          Patient: Aminah Norton   MR Number: [id number]   YOB: 1945   Date of Visit: 3/26/2018       Dear Dr. Dereje Thomas:    Thank you for referring Aminah Norton to me for evaluation. Attached you will find relevant portions of my assessment and plan of care.    If you have questions, please do not hesitate to call me. I look forward to following Aminah Norton along with you.    Sincerely,    Aimee Colorado MD    Enclosure  CC:  No Recipients    If you would like to receive this communication electronically, please contact [email or web address] or [phone] to request more information on [employer] access.    For providers and/or their staff who would like to refer a patient to [facility], please contact us through our one-stop-shop provider referral line, [facility], at [phone].    If you feel you have received this communication in error or would no longer like to receive these types of communications, please e-mail [email or web address]

## 2018-03-27 ENCOUNTER — TELEPHONE (OUTPATIENT)
Dept: UROLOGY | Facility: CLINIC | Age: 73
End: 2018-03-27

## 2018-04-02 ENCOUNTER — HOSPITAL ENCOUNTER (OUTPATIENT)
Dept: RADIOLOGY | Facility: HOSPITAL | Age: 73
Discharge: HOME | End: 2018-04-02
Attending: INTERNAL MEDICINE
Payer: MEDICARE

## 2018-04-02 DIAGNOSIS — M25.50 POLYARTHRALGIA: ICD-10-CM

## 2018-04-02 PROCEDURE — 77077 JOINT SURVEY SINGLE VIEW: CPT | Mod: TC

## 2018-04-02 PROCEDURE — 77077 JOINT SURVEY SINGLE VIEW: CPT | Mod: 26,,, | Performed by: RADIOLOGY

## 2018-04-04 ENCOUNTER — HOSPITAL ENCOUNTER (OUTPATIENT)
Dept: RADIOLOGY | Facility: HOSPITAL | Age: 73
Discharge: HOME | End: 2018-04-04
Attending: INTERNAL MEDICINE
Payer: MEDICARE

## 2018-04-04 DIAGNOSIS — R92.2 INCONCLUSIVE MAMMOGRAM: ICD-10-CM

## 2018-04-04 DIAGNOSIS — Z12.39 BREAST CANCER SCREENING: ICD-10-CM

## 2018-04-04 PROCEDURE — 77066 DX MAMMO INCL CAD BI: CPT | Mod: TC,PO

## 2018-04-04 PROCEDURE — 77066 DX MAMMO INCL CAD BI: CPT | Mod: 26,,, | Performed by: RADIOLOGY

## 2018-04-04 PROCEDURE — 77062 BREAST TOMOSYNTHESIS BI: CPT | Mod: 26,,, | Performed by: RADIOLOGY

## 2018-04-20 ENCOUNTER — TELEPHONE (OUTPATIENT)
Dept: PEDIATRIC UROLOGY | Facility: CLINIC | Age: 73
End: 2018-04-20

## 2018-04-20 NOTE — TELEPHONE ENCOUNTER
assisted me with the call. Called to discuss if dysuria and pelvic pain are improving.  She reports she is much better. She is pleased with the improvements. Explained to f/u prn. Verbalized understanding.

## 2018-05-30 DIAGNOSIS — E11.42 DIABETIC POLYNEUROPATHY ASSOCIATED WITH TYPE 2 DIABETES MELLITUS: ICD-10-CM

## 2018-05-30 DIAGNOSIS — R00.2 PALPITATIONS: ICD-10-CM

## 2018-05-30 DIAGNOSIS — I10 HTN (HYPERTENSION), BENIGN: ICD-10-CM

## 2018-05-31 RX ORDER — GABAPENTIN 300 MG/1
300 CAPSULE ORAL NIGHTLY
Qty: 90 CAPSULE | Refills: 3 | Status: SHIPPED | OUTPATIENT
Start: 2018-05-31 | End: 2018-09-17 | Stop reason: SDUPTHER

## 2018-05-31 RX ORDER — CARVEDILOL 12.5 MG/1
12.5 TABLET ORAL 2 TIMES DAILY
Qty: 180 TABLET | Refills: 3 | Status: SHIPPED | OUTPATIENT
Start: 2018-05-31 | End: 2018-09-17 | Stop reason: SDUPTHER

## 2018-05-31 RX ORDER — DEXTROSE 4 G
TABLET,CHEWABLE ORAL
Qty: 1 EACH | Refills: 0 | Status: SHIPPED | OUTPATIENT
Start: 2018-05-31 | End: 2018-12-14

## 2018-05-31 RX ORDER — INSULIN PUMP SYRINGE, 3 ML
1 EACH MISCELLANEOUS DAILY
Qty: 2 EACH | Refills: 0 | Status: SHIPPED | OUTPATIENT
Start: 2018-05-31 | End: 2018-12-14

## 2018-05-31 RX ORDER — TAMSULOSIN HYDROCHLORIDE 0.4 MG/1
0.4 CAPSULE ORAL DAILY
Qty: 90 CAPSULE | Refills: 3 | Status: SHIPPED | OUTPATIENT
Start: 2018-05-31 | End: 2018-09-17 | Stop reason: SDUPTHER

## 2018-05-31 RX ORDER — METFORMIN HYDROCHLORIDE 1000 MG/1
1000 TABLET ORAL 2 TIMES DAILY WITH MEALS
Qty: 180 TABLET | Refills: 3 | Status: SHIPPED | OUTPATIENT
Start: 2018-05-31 | End: 2018-09-17 | Stop reason: SDUPTHER

## 2018-05-31 RX ORDER — LISINOPRIL 40 MG/1
40 TABLET ORAL DAILY
Qty: 90 TABLET | Refills: 3 | Status: SHIPPED | OUTPATIENT
Start: 2018-05-31 | End: 2018-09-17 | Stop reason: SDUPTHER

## 2018-06-19 ENCOUNTER — OFFICE VISIT (OUTPATIENT)
Dept: INTERNAL MEDICINE | Facility: CLINIC | Age: 73
End: 2018-06-19
Payer: MEDICARE

## 2018-06-19 VITALS
DIASTOLIC BLOOD PRESSURE: 64 MMHG | BODY MASS INDEX: 26.85 KG/M2 | WEIGHT: 161.19 LBS | OXYGEN SATURATION: 97 % | HEART RATE: 81 BPM | HEIGHT: 65 IN | TEMPERATURE: 98 F | SYSTOLIC BLOOD PRESSURE: 128 MMHG

## 2018-06-19 DIAGNOSIS — J01.00 ACUTE MAXILLARY SINUSITIS, RECURRENCE NOT SPECIFIED: Primary | ICD-10-CM

## 2018-06-19 PROCEDURE — 99999 PR PBB SHADOW E&M-EST. PATIENT-LVL V: CPT | Mod: PBBFAC,,, | Performed by: NURSE PRACTITIONER

## 2018-06-19 PROCEDURE — 3078F DIAST BP <80 MM HG: CPT | Mod: CPTII,S$GLB,, | Performed by: NURSE PRACTITIONER

## 2018-06-19 PROCEDURE — 99213 OFFICE O/P EST LOW 20 MIN: CPT | Mod: S$GLB,,, | Performed by: NURSE PRACTITIONER

## 2018-06-19 PROCEDURE — 3074F SYST BP LT 130 MM HG: CPT | Mod: CPTII,S$GLB,, | Performed by: NURSE PRACTITIONER

## 2018-06-19 RX ORDER — AMOXICILLIN AND CLAVULANATE POTASSIUM 875; 125 MG/1; MG/1
1 TABLET, FILM COATED ORAL EVERY 12 HOURS
Qty: 20 TABLET | Refills: 0 | Status: SHIPPED | OUTPATIENT
Start: 2018-06-19 | End: 2018-06-29

## 2018-06-19 NOTE — PROGRESS NOTES
"Subjective:       Patient ID: Aminah Norton is a 73 y.o. female.    Chief Complaint: Headache    HPI:  74 yo female that presents to clinic today with complaint of headache and head congestion.    States that her symptoms have been going on for about a week.  States that the pain starts in her face and wraps around all the way to the back of her head.  States that she has also been having a lot of coughing with increasing nasal congestion.  States that she has been taking one aleve in the morning and one in the evening for pain but states minimal relief.  States pain is made worse when she bends "over or down to  things."    Denies any fever, SOB, chest pain, n/v blurred vision or dizziness.    Review of Systems   Constitutional: Negative for activity change, appetite change, fatigue and fever.   HENT: Positive for congestion, postnasal drip, rhinorrhea, sinus pain and sinus pressure. Negative for ear pain and sore throat.    Respiratory: Positive for cough. Negative for apnea, shortness of breath and wheezing.    Cardiovascular: Negative for chest pain, palpitations and leg swelling.   Gastrointestinal: Negative for abdominal distention, abdominal pain, constipation, diarrhea, nausea and vomiting.   Musculoskeletal: Negative for arthralgias, myalgias, neck pain and neck stiffness.   Skin: Negative for color change and rash.   Neurological: Positive for headaches. Negative for dizziness, facial asymmetry, speech difficulty, light-headedness and numbness.   Psychiatric/Behavioral: Negative for behavioral problems.       Objective:      Physical Exam   Constitutional: She is oriented to person, place, and time. She appears well-developed and well-nourished. She appears distressed.   HENT:   Right Ear: Hearing, external ear and ear canal normal. No drainage, swelling or tenderness. Tympanic membrane is not erythematous and not bulging. A middle ear effusion is present. No decreased hearing is " noted.   Left Ear: Hearing, external ear and ear canal normal. No drainage, swelling or tenderness. Tympanic membrane is not erythematous and not bulging. A middle ear effusion is present. No decreased hearing is noted.   Nose: Rhinorrhea present. Right sinus exhibits maxillary sinus tenderness. Right sinus exhibits no frontal sinus tenderness. Left sinus exhibits maxillary sinus tenderness. Left sinus exhibits no frontal sinus tenderness.   + post nasal drip and mild erythema to oropharynx.   Neck: Normal range of motion. Neck supple.   Cardiovascular: Normal rate, regular rhythm, normal heart sounds and intact distal pulses.    No murmur heard.  Pulmonary/Chest: Effort normal and breath sounds normal. No respiratory distress. She has no wheezes. She has no rales.   Neurological: She is alert and oriented to person, place, and time. No cranial nerve deficit or sensory deficit.   Skin: Skin is warm and dry. No erythema.   Psychiatric: Her behavior is normal.       Assessment:       1. Acute maxillary sinusitis, recurrence not specified        Plan:       1. Acute maxillary sinusitis, recurrence not specified    -Vitals are stable in clinic.  -Neurological exam is unremarkable.  -Will prescribe augmentin po bid x 10 days.  -Encouraged to try taking 2 aleve in the am and 2 aleve in the pm for the next few days.  -Can take tylenol for any added pain relief.  -Encouraged to increase water intake.  -If headache continues to persist will obtain CT scan of head.

## 2018-06-28 ENCOUNTER — OFFICE VISIT (OUTPATIENT)
Dept: RHEUMATOLOGY | Facility: CLINIC | Age: 73
End: 2018-06-28
Payer: MEDICARE

## 2018-06-28 VITALS
SYSTOLIC BLOOD PRESSURE: 131 MMHG | DIASTOLIC BLOOD PRESSURE: 67 MMHG | HEART RATE: 67 BPM | BODY MASS INDEX: 27.04 KG/M2 | HEIGHT: 65 IN | WEIGHT: 162.31 LBS

## 2018-06-28 DIAGNOSIS — M19.90 INFLAMMATORY ARTHRITIS: ICD-10-CM

## 2018-06-28 DIAGNOSIS — Z11.4 ENCOUNTER FOR SCREENING FOR HIV: ICD-10-CM

## 2018-06-28 DIAGNOSIS — L60.1 ONYCHOLYSIS: Primary | ICD-10-CM

## 2018-06-28 DIAGNOSIS — M79.7 FIBROMYALGIA, SECONDARY: ICD-10-CM

## 2018-06-28 DIAGNOSIS — E11.8 TYPE 2 DIABETES MELLITUS WITH COMPLICATIONS: ICD-10-CM

## 2018-06-28 DIAGNOSIS — M25.50 POLYARTHRALGIA: Primary | ICD-10-CM

## 2018-06-28 PROCEDURE — 99999 PR PBB SHADOW E&M-EST. PATIENT-LVL III: CPT | Mod: PBBFAC,,, | Performed by: INTERNAL MEDICINE

## 2018-06-28 PROCEDURE — 99214 OFFICE O/P EST MOD 30 MIN: CPT | Mod: S$GLB,,, | Performed by: INTERNAL MEDICINE

## 2018-06-28 PROCEDURE — 3078F DIAST BP <80 MM HG: CPT | Mod: CPTII,S$GLB,, | Performed by: INTERNAL MEDICINE

## 2018-06-28 PROCEDURE — 3075F SYST BP GE 130 - 139MM HG: CPT | Mod: CPTII,S$GLB,, | Performed by: INTERNAL MEDICINE

## 2018-06-28 RX ORDER — BACLOFEN 10 MG/1
10 TABLET ORAL NIGHTLY
Qty: 90 TABLET | Refills: 1 | Status: SHIPPED | OUTPATIENT
Start: 2018-06-28 | End: 2018-08-16 | Stop reason: SDUPTHER

## 2018-06-28 NOTE — PROGRESS NOTES
Subjective:       Patient ID: Aminah Norton is a 72 y.o. female.    Chief Complaint: No chief complaint on file.    HPI 73 yo F With PMH of type II DM,retinal degeneration on right side, left eye cataract, nephrolithiasis,  hypothyroidism, HTN. HL here for evaluation.  Reports she has increase urinary frequency and dysuria for 3 months.  Reports she has pain in shoulders, wrists, hands, hips, ankles, and hips.  Reports she has been having pain since September. Reports that she had steroid injections into both shoulders and she felt worse.   Pain level is 7/10 and is worse at night. Pain is aching.  Reports she has swelling in hands since September.  She has trouble making a fist.  Does not smoke. Denies rashes, oral ulcers,fevers, raynauds.  Denies pruritus. She took celebrex but it made her dysuria worse.   Raising her arms makes her pain worse. Denies history of smoking.    Family hx: unsure of RA      Interval history: She recently took course of abx for sinusitis.  Reports that she took prednisone for about a month  And reports that felt great on it.  When she stopped the prednisone, she had recurrence of pain and swelling. She has pain in both shoulders, hands, wrists, hands, knees, ankles, and feet.  Pain level in hands is 8/10, aching and non-radiating.    Reports that she has some swelling in the hands. Reports morning stiffness for few minutes.        Past Medical History:   Diagnosis Date    Arthritis     Cataract     left eye    Choroidal rupture of left eye     Diabetes mellitus type II     GERD (gastroesophageal reflux disease)     Hyperlipidemia     Hypertension     Hypothyroidism     Macular scar     right eye    Retinal degeneration     chorioretinal OD    Thyroid disease     Vitamin B 12 deficiency        Review of Systems   Constitutional: Positive for fatigue. Negative for activity change, appetite change, chills, diaphoresis, fever and unexpected weight change.    HENT: Negative for congestion, ear discharge, ear pain, facial swelling, mouth sores, sinus pressure, sneezing, sore throat, tinnitus and trouble swallowing.    Eyes: Negative for photophobia, pain, discharge, redness, itching and visual disturbance.   Respiratory: Negative for apnea, chest tightness, shortness of breath, wheezing and stridor.    Cardiovascular: Negative for leg swelling.   Gastrointestinal: Negative for abdominal distention, abdominal pain, anal bleeding, blood in stool, constipation, diarrhea and nausea.   Endocrine: Negative for cold intolerance and heat intolerance.   Genitourinary: Negative for difficulty urinating and dysuria.   Musculoskeletal: Positive for arthralgias. Negative for back pain, gait problem, joint swelling, myalgias, neck pain and neck stiffness.   Skin: Negative for color change, pallor, rash and wound.   Neurological: Negative for dizziness, seizures, light-headedness and numbness.   Hematological: Negative for adenopathy. Does not bruise/bleed easily.   Psychiatric/Behavioral: Negative for sleep disturbance. The patient is not nervous/anxious.              Objective:        Physical Exam   Constitutional: She is oriented to person, place, and time.   HENT:   Head: Normocephalic and atraumatic.   Right Ear: External ear normal.   Left Ear: External ear normal.   Nose: Nose normal.   Mouth/Throat: Oropharynx is clear and moist. No oropharyngeal exudate.   Eyes: Conjunctivae and EOM are normal. Pupils are equal, round, and reactive to light. Right eye exhibits no discharge. Left eye exhibits no discharge. No scleral icterus.   Neck: Neck supple. No JVD present. No thyromegaly present.   Cardiovascular: Normal rate, regular rhythm, normal heart sounds and intact distal pulses.  Exam reveals no gallop and no friction rub.    No murmur heard.  Pulmonary/Chest: Effort normal and breath sounds normal. No respiratory distress. She has no wheezes. She has no rales. She exhibits no  tenderness.   Abdominal: Soft. Bowel sounds are normal. She exhibits no distension and no mass. There is no tenderness. There is no rebound and no guarding.   Lymphadenopathy:     She has no cervical adenopathy.   Neurological: She is alert and oriented to person, place, and time. No cranial nerve deficit. Gait normal. Coordination normal.   Skin: Skin is dry. No rash noted. No erythema. No pallor.     Psychiatric: Affect and judgment normal.   Musculoskeletal: She exhibits tenderness. She exhibits no edema or deformity.   Diffuse tenderness in muscles  Shoulders: decreased abduction of shoulders   Trouble making a fist in both hands  Synovitis of pips   left ankle tenderness    labs: reviewed     Arthritis survey (2018): I personally reviewed; DJD       Assessment:      73 yo F With PMH of type II DM,retinal, degeneration on right side, left eye cataract, nephrolithiasis,  hypothyroidism, HTN, HL here for evaluation of joint pain.  1) rheumatoid arthritis: based on exam, she has an inflammatory arthritis consistent with RA.  She tried prednisone in the past with resolution of symptoms. She did not get labs at last visit so will get them today.   Hand out given on Humira in [place]    2) Fibromyalgia:she has diffuse tenderness out of proportion to exam.  Start baclofen 10mg po qhs    3)onycholysis: sending to podiatry    rtc next week  **

## 2018-07-02 ENCOUNTER — LAB VISIT (OUTPATIENT)
Dept: LAB | Facility: HOSPITAL | Age: 73
End: 2018-07-02
Attending: INTERNAL MEDICINE
Payer: MEDICARE

## 2018-07-02 DIAGNOSIS — M25.50 POLYARTHRALGIA: ICD-10-CM

## 2018-07-02 PROCEDURE — 86480 TB TEST CELL IMMUN MEASURE: CPT

## 2018-07-02 PROCEDURE — 36415 COLL VENOUS BLD VENIPUNCTURE: CPT

## 2018-07-05 LAB
MITOGEN IGNF BCKGRD COR BLD-ACNC: 0.04 IU/ML
MITOGEN NIL: 9.5 IU/ML
TB GOLD PLUS: POSITIVE
TB1 - NIL: 1.19 IU/ML
TB2 - NIL: 0.77 IU/ML

## 2018-07-06 ENCOUNTER — OFFICE VISIT (OUTPATIENT)
Dept: RHEUMATOLOGY | Facility: CLINIC | Age: 73
End: 2018-07-06
Payer: MEDICARE

## 2018-07-06 VITALS
HEIGHT: 65 IN | WEIGHT: 161 LBS | BODY MASS INDEX: 26.82 KG/M2 | HEART RATE: 77 BPM | DIASTOLIC BLOOD PRESSURE: 63 MMHG | SYSTOLIC BLOOD PRESSURE: 111 MMHG

## 2018-07-06 DIAGNOSIS — R76.12 POSITIVE QUANTIFERON-TB GOLD TEST: ICD-10-CM

## 2018-07-06 DIAGNOSIS — M06.9 RHEUMATOID ARTHRITIS INVOLVING MULTIPLE JOINTS: ICD-10-CM

## 2018-07-06 DIAGNOSIS — R05.9 COUGH: Primary | ICD-10-CM

## 2018-07-06 PROCEDURE — 3074F SYST BP LT 130 MM HG: CPT | Mod: CPTII,S$GLB,, | Performed by: INTERNAL MEDICINE

## 2018-07-06 PROCEDURE — 99999 PR PBB SHADOW E&M-EST. PATIENT-LVL III: CPT | Mod: PBBFAC,,, | Performed by: INTERNAL MEDICINE

## 2018-07-06 PROCEDURE — 3078F DIAST BP <80 MM HG: CPT | Mod: CPTII,S$GLB,, | Performed by: INTERNAL MEDICINE

## 2018-07-06 PROCEDURE — 99214 OFFICE O/P EST MOD 30 MIN: CPT | Mod: S$GLB,,, | Performed by: INTERNAL MEDICINE

## 2018-07-06 RX ORDER — BACLOFEN 10 MG/1
10 TABLET ORAL NIGHTLY
Qty: 90 TABLET | Refills: 4 | Status: SHIPPED | OUTPATIENT
Start: 2018-07-06 | End: 2019-02-26 | Stop reason: SDUPTHER

## 2018-07-06 NOTE — PROGRESS NOTES
Subjective:       Patient ID: Aminah Norton is a 72 y.o. female.    Chief Complaint: No chief complaint on file.    HPI 71 yo F With PMH of type II DM,retinal degeneration on right side, left eye cataract, nephrolithiasis,  hypothyroidism, HTN. HL here for evaluation.  Reports she has increase urinary frequency and dysuria for 3 months.  Reports she has pain in shoulders, wrists, hands, hips, ankles, and hips.  Reports she has been having pain since September. Reports that she had steroid injections into both shoulders and she felt worse.   Pain level is 7/10 and is worse at night. Pain is aching.  Reports she has swelling in hands since September.  She has trouble making a fist.  Does not smoke. Denies rashes, oral ulcers,fevers, raynauds.  Denies pruritus. She took celebrex but it made her dysuria worse.   Raising her arms makes her pain worse. Denies history of smoking.    Family hx: unsure of RA      Interval history: She recently took antibiotics, done 2 weeks ago.  Reports that she took prednisone for about a month  And reports that felt great on it.  When she stopped the prednisone, she had recurrence of pain and swelling. She has pain in both shoulders, hands, wrists, hands, knees, ankles, and feet.  Pain level in hands is 8/10, aching and non-radiating.    Reports that she has some swelling in the hands. Reports morning stiffness for few minutes.        Past Medical History:   Diagnosis Date    Arthritis     Cataract     left eye    Choroidal rupture of left eye     Diabetes mellitus type II     GERD (gastroesophageal reflux disease)     Hyperlipidemia     Hypertension     Hypothyroidism     Macular scar     right eye    Retinal degeneration     chorioretinal OD    Thyroid disease     Vitamin B 12 deficiency        Review of Systems   Constitutional: Positive for fatigue. Negative for activity change, appetite change, chills, diaphoresis, fever and unexpected weight change.    HENT: Negative for congestion, ear discharge, ear pain, facial swelling, mouth sores, sinus pressure, sneezing, sore throat, tinnitus and trouble swallowing.    Eyes: Negative for photophobia, pain, discharge, redness, itching and visual disturbance.   Respiratory: Negative for apnea, chest tightness, shortness of breath, wheezing and stridor.    Cardiovascular: Negative for leg swelling.   Gastrointestinal: Negative for abdominal distention, abdominal pain, anal bleeding, blood in stool, constipation, diarrhea and nausea.   Endocrine: Negative for cold intolerance and heat intolerance.   Genitourinary: Negative for difficulty urinating and dysuria.   Musculoskeletal: Positive for arthralgias. Negative for back pain, gait problem, joint swelling, myalgias, neck pain and neck stiffness.   Skin: Negative for color change, pallor, rash and wound.   Neurological: Negative for dizziness, seizures, light-headedness and numbness.   Hematological: Negative for adenopathy. Does not bruise/bleed easily.   Psychiatric/Behavioral: Negative for sleep disturbance. The patient is not nervous/anxious.              Objective:        Physical Exam   Constitutional: She is oriented to person, place, and time.   HENT:   Head: Normocephalic and atraumatic.   Right Ear: External ear normal.   Left Ear: External ear normal.   Nose: Nose normal.   Mouth/Throat: Oropharynx is clear and moist. No oropharyngeal exudate.   Eyes: Conjunctivae and EOM are normal. Pupils are equal, round, and reactive to light. Right eye exhibits no discharge. Left eye exhibits no discharge. No scleral icterus.   Neck: Neck supple. No JVD present. No thyromegaly present.   Cardiovascular: Normal rate, regular rhythm, normal heart sounds and intact distal pulses.  Exam reveals no gallop and no friction rub.    No murmur heard.  Pulmonary/Chest: Effort normal and breath sounds normal. No respiratory distress. She has no wheezes. She has no rales. She exhibits no  tenderness.   Abdominal: Soft. Bowel sounds are normal. She exhibits no distension and no mass. There is no tenderness. There is no rebound and no guarding.   Lymphadenopathy:     She has no cervical adenopathy.   Neurological: She is alert and oriented to person, place, and time. No cranial nerve deficit. Gait normal. Coordination normal.   Skin: Skin is dry. No rash noted. No erythema. No pallor.     Psychiatric: Affect and judgment normal.   Musculoskeletal: She exhibits tenderness. She exhibits no edema or deformity.   Diffuse tenderness in muscles  Shoulders: decreased abduction of shoulders   Trouble making a fist in both hands  Synovitis of pips   left ankle tenderness    labs: reviewed     Arthritis survey (2018): I personally reviewed; DJD       Assessment:      71 yo F With PMH of type II DM,retinal, degeneration on right side, left eye cataract, nephrolithiasis,  hypothyroidism, HTN, HL here for evaluation of joint pain.  1) rheumatoid arthritis: based on exam, she has an inflammatory arthritis consistent with RA.  She tried prednisone in the past with resolution of symptoms. Hand out given on Humira in [place] at last visit and patient reports she is afraid of side effects and does not want to take medication.  She understands risk of progressive joint destruction.  -no plaquenil given retinal issues  NO MTX given neutropenia  rtc in 3 months    2) Fibromyalgia:she has diffuse tenderness out of proportion to exam.  Start baclofen 10mg po qhs    3)+quant gold: chest xray and send to ID  **

## 2018-07-20 DIAGNOSIS — E08.8 DIABETES MELLITUS DUE TO UNDERLYING CONDITION WITH COMPLICATION, UNSPECIFIED WHETHER LONG TERM INSULIN USE: Primary | ICD-10-CM

## 2018-07-20 RX ORDER — LANCETS
1 EACH MISCELLANEOUS ONCE
Qty: 100 EACH | Refills: 3 | Status: SHIPPED | OUTPATIENT
Start: 2018-07-20 | End: 2020-03-31

## 2018-07-23 ENCOUNTER — PES CALL (OUTPATIENT)
Dept: ADMINISTRATIVE | Facility: CLINIC | Age: 73
End: 2018-07-23

## 2018-07-25 ENCOUNTER — TELEPHONE (OUTPATIENT)
Dept: CARDIOLOGY | Facility: CLINIC | Age: 73
End: 2018-07-25

## 2018-07-25 ENCOUNTER — OFFICE VISIT (OUTPATIENT)
Dept: CARDIOLOGY | Facility: CLINIC | Age: 73
End: 2018-07-25
Payer: MEDICARE

## 2018-07-25 VITALS
SYSTOLIC BLOOD PRESSURE: 121 MMHG | DIASTOLIC BLOOD PRESSURE: 58 MMHG | HEIGHT: 63 IN | WEIGHT: 160.5 LBS | HEART RATE: 86 BPM | BODY MASS INDEX: 28.44 KG/M2

## 2018-07-25 DIAGNOSIS — K59.03 DRUG-INDUCED CONSTIPATION: ICD-10-CM

## 2018-07-25 DIAGNOSIS — R29.818 SUSPECTED SLEEP APNEA: ICD-10-CM

## 2018-07-25 DIAGNOSIS — E78.2 MIXED HYPERLIPIDEMIA: ICD-10-CM

## 2018-07-25 DIAGNOSIS — I10 HTN (HYPERTENSION), BENIGN: Primary | ICD-10-CM

## 2018-07-25 DIAGNOSIS — D50.9 IRON DEFICIENCY ANEMIA, UNSPECIFIED IRON DEFICIENCY ANEMIA TYPE: Primary | ICD-10-CM

## 2018-07-25 DIAGNOSIS — I70.0 AORTIC ATHEROSCLEROSIS: ICD-10-CM

## 2018-07-25 DIAGNOSIS — M79.10 MYALGIA: ICD-10-CM

## 2018-07-25 DIAGNOSIS — D50.9 MICROCYTIC ANEMIA: ICD-10-CM

## 2018-07-25 DIAGNOSIS — R06.83 SNORING: ICD-10-CM

## 2018-07-25 PROCEDURE — 99214 OFFICE O/P EST MOD 30 MIN: CPT | Mod: S$GLB,,, | Performed by: NURSE PRACTITIONER

## 2018-07-25 PROCEDURE — 3074F SYST BP LT 130 MM HG: CPT | Mod: CPTII,S$GLB,, | Performed by: NURSE PRACTITIONER

## 2018-07-25 PROCEDURE — 3078F DIAST BP <80 MM HG: CPT | Mod: CPTII,S$GLB,, | Performed by: NURSE PRACTITIONER

## 2018-07-25 PROCEDURE — 99499 UNLISTED E&M SERVICE: CPT | Mod: HCNC,S$GLB,, | Performed by: NURSE PRACTITIONER

## 2018-07-25 PROCEDURE — 99999 PR PBB SHADOW E&M-EST. PATIENT-LVL IV: CPT | Mod: PBBFAC,,, | Performed by: NURSE PRACTITIONER

## 2018-07-25 RX ORDER — FERROUS SULFATE 325(65) MG
325 TABLET ORAL 2 TIMES DAILY
Qty: 180 TABLET | Refills: 1 | Status: SHIPPED | OUTPATIENT
Start: 2018-07-25 | End: 2021-02-12

## 2018-07-25 RX ORDER — ROSUVASTATIN CALCIUM 5 MG/1
5 TABLET, COATED ORAL DAILY
Qty: 90 TABLET | Refills: 3 | Status: SHIPPED | OUTPATIENT
Start: 2018-07-25 | End: 2018-12-14 | Stop reason: SDUPTHER

## 2018-07-25 RX ORDER — CALCIUM CARBONATE 600 MG
600 TABLET ORAL ONCE
Status: ON HOLD | COMMUNITY
End: 2021-05-19 | Stop reason: HOSPADM

## 2018-07-25 RX ORDER — DOCUSATE SODIUM 100 MG/1
100 CAPSULE, LIQUID FILLED ORAL 2 TIMES DAILY
Qty: 180 CAPSULE | Refills: 1 | Status: SHIPPED | OUTPATIENT
Start: 2018-07-25 | End: 2018-11-16

## 2018-07-25 NOTE — Clinical Note
Saira Thomas, I saw Ms. Norton this morning and she said that she has been having difficulty getting her test strip prescripition renewed.  Could you please send her a new prescription?  Also, she saw Nir on 6/19 for sinus headaches and was told to let you know if they persisted after she completed her abx course and she should get a CT scan then.  I told her that I would pass the message to you. Thank you, Jaleesa

## 2018-07-25 NOTE — TELEPHONE ENCOUNTER
Serum iron and ferritin are low.  Start ferrous sulfate 325mg by mouth two times a day.  She will need to take colace once a day to prevent constipation.  Instruct to take vitamin c OTC 500mg BID for best absorption.  The iron should be taken 1 hour before or 2 hours after any dairy products or antacids.

## 2018-07-25 NOTE — PROGRESS NOTES
Ms. Norton  was last seen in [facility] Visit 4/27/17.    Subjective:   Patient ID:  Aminah Norton is a 73 y.o. female who presents for follow-up of Hypertension (Annual Exam ) and Headache    Problems:  HTN   HLD  DMII   Rheumatoid arthritis    HPI  Ms. Norton is in clinic today for routine follow up.  Visit conducted via .  Patient's primary language is Colombian.  Patient denies chest pain with exertion or at rest, palpitations, SOB, DAY, dizziness, syncope, edema, orthopnea, PND, or claudication.  Reports routine exercise.  Reports followinwas taking atorvastatin 40 mg and they attempted to increase her to 80mg.  Her joint and muscle pains became unbearable after increasing to 80 mg.  She has since stopped taking atorvastatin and is no longer having those pains.  LDL is 164.      Review of Systems   Constitution: Negative for decreased appetite, diaphoresis, weakness, malaise/fatigue, weight gain and weight loss.   HENT: Positive for congestion.    Eyes: Negative for visual disturbance.   Cardiovascular: Negative for chest pain, claudication, dyspnea on exertion, irregular heartbeat, leg swelling, near-syncope, orthopnea, palpitations, paroxysmal nocturnal dyspnea and syncope.        Denies chest pressure   Respiratory: Positive for snoring. Negative for cough, hemoptysis, shortness of breath and sleep disturbances due to breathing.    Endocrine: Negative for cold intolerance and heat intolerance.   Hematologic/Lymphatic: Negative for bleeding problem. Does not bruise/bleed easily.   Musculoskeletal: Positive for arthritis, joint pain, joint swelling and myalgias.   Gastrointestinal: Negative for bloating, abdominal pain, anorexia, change in bowel habit, constipation, diarrhea, nausea and vomiting.   Genitourinary: Positive for nocturia (Hourly).   Neurological: Positive for headaches (sinus). Negative for difficulty with concentration, disturbances in coordination,  excessive daytime sleepiness, dizziness, light-headedness, loss of balance and numbness.   Psychiatric/Behavioral: The patient does not have insomnia.        Allergies and current medications updated and reviewed:  Review of patient's allergies indicates:   Allergen Reactions    Bufferin [aspirin, buffered] Itching    Ibuprofen Itching     Itching of eyes, head     Current Outpatient Prescriptions   Medication Sig    alcohol swabs (ALCOHOL PADS) PadM Apply 1 each topically as needed.    aspirin (ECOTRIN) 81 MG EC tablet Take 81 mg by mouth once daily.    baclofen (LIORESAL) 10 MG tablet Take 1 tablet (10 mg total) by mouth every evening.    baclofen (LIORESAL) 10 MG tablet Take 1 tablet (10 mg total) by mouth every evening.    biotin 1 mg tablet Take 1,000 mcg by mouth 3 (three) times daily.    blood glucose control, normal Soln 1 vial by Misc.(Non-Drug; Combo Route) route once daily.    blood sugar diagnostic Strp 1 strip by Misc.(Non-Drug; Combo Route) route 3 (three) times daily. SMARTVIEW TEST STRIPS    blood sugar diagnostic Strp 1 strip by Misc.(Non-Drug; Combo Route) route once daily.    blood-glucose meter Misc Accu check MATTIE Plus Meter    calcium carbonate (OS-JESSIKA) 600 mg calcium (1,500 mg) Tab Take 600 mg by mouth once.    carvedilol (COREG) 12.5 MG tablet Take 1 tablet (12.5 mg total) by mouth 2 (two) times daily.    clotrimazole-betamethasone 1-0.05% (LOTRISONE) cream Apply topically 2 (two) times daily.    econazole nitrate 1 % cream AAA bid after cool blow dry when flared    gabapentin (NEURONTIN) 300 MG capsule Take 1 capsule (300 mg total) by mouth every evening.    lancets (ACCU-CHEK MULTICLIX LANCET) Misc To use as directed with Accu Chek Sahra FastClix lancing device    levothyroxine (SYNTHROID) 112 MCG tablet Take 1 tablet (112 mcg total) by mouth before breakfast.    lisinopril (PRINIVIL,ZESTRIL) 40 MG tablet Take 1 tablet (40 mg total) by mouth once daily.    metFORMIN  "(GLUCOPHAGE) 1000 MG tablet Take 1 tablet (1,000 mg total) by mouth 2 (two) times daily with meals.    polyethylene glycol (GLYCOLAX) 17 gram PwPk Take 17 g by mouth once daily.    tamsulosin (FLOMAX) 0.4 mg Cp24 Take 1 capsule (0.4 mg total) by mouth once daily.    tramadol (ULTRAM) 50 mg tablet Take 1 tablet (50 mg total) by mouth every 6 (six) hours as needed.    atorvastatin (LIPITOR) 40 MG tablet Take 1 tablet (40 mg total) by mouth once daily. Not taking    fluocinonide (LIDEX) 0.05 % external solution Apply topically 2 (two) times daily.     No current facility-administered medications for this visit.      Objective:     Right Arm BP - Sittin/63 (18 1002)  Left Arm BP - Sittin/58 (18 1002)    BP (!) 121/58 (BP Location: Left arm, Patient Position: Sitting, BP Method: Large (Automatic))   Pulse 86   Ht 5' 2.5" (1.588 m)   Wt 72.8 kg (160 lb 7.9 oz)   BMI 28.89 kg/m²     Physical Exam   Constitutional: She is oriented to person, place, and time. Vital signs are normal. She appears well-developed and well-nourished. She is active. No distress.   HENT:   Head: Normocephalic and atraumatic.   Eyes: Conjunctivae and lids are normal. No scleral icterus.   Neck: Neck supple. Normal carotid pulses, no hepatojugular reflux and no JVD present. Carotid bruit is not present.   Cardiovascular: Normal rate, regular rhythm, S1 normal, S2 normal and intact distal pulses.  PMI is not displaced.  Exam reveals no gallop and no friction rub.    No murmur heard.  Pulses:       Carotid pulses are 2+ on the right side, and 2+ on the left side.       Radial pulses are 2+ on the right side, and 2+ on the left side.        Dorsalis pedis pulses are 2+ on the right side, and 2+ on the left side.        Posterior tibial pulses are 1+ on the right side, and 1+ on the left side.   Pulmonary/Chest: Effort normal and breath sounds normal. No respiratory distress. She has no decreased breath sounds. She has no " wheezes. She has no rhonchi. She has no rales. She exhibits no tenderness.   Abdominal: Soft. Normal appearance and bowel sounds are normal. She exhibits no distension, no fluid wave, no abdominal bruit, no ascites and no pulsatile midline mass. There is no hepatosplenomegaly. There is no tenderness.   Musculoskeletal: She exhibits no edema.   Neurological: She is alert and oriented to person, place, and time. Gait normal.   Skin: Skin is warm, dry and intact. No rash noted. She is not diaphoretic. Nails show no clubbing.   Psychiatric: She has a normal mood and affect. Her speech is normal and behavior is normal. Judgment and thought content normal. Cognition and memory are normal.   Nursing note and vitals reviewed.      Chemistry        Component Value Date/Time     07/02/2018 0840    K 4.3 07/02/2018 0840     07/02/2018 0840    CO2 26 07/02/2018 0840    BUN 19 07/02/2018 0840    CREATININE 0.9 07/02/2018 0840     (H) 07/02/2018 0840        Component Value Date/Time    CALCIUM 10.3 07/02/2018 0840    ALKPHOS 98 07/02/2018 0840    AST 15 07/02/2018 0840    ALT 17 07/02/2018 0840    BILITOT 0.5 07/02/2018 0840    ESTGFRAFRICA >60.0 07/02/2018 0840    EGFRNONAA >60.0 07/02/2018 0840        Lab Results   Component Value Date    HGBA1C 7.6 (H) 02/19/2018       Recent Labs  Lab 02/19/18  1250 07/02/18  0840   WHITE BLOOD CELL COUNT  --  3.53 L   HEMOGLOBIN  --  10.6 L   HEMATOCRIT  --  33.2 L   MCV  --  76 L   PLATELETS  --  280   TSH 1.934  --    CHOLESTEROL  --  272 H   HDL  --  59   LDL CHOLESTEROL  --  164.0 H   TRIGLYCERIDES  --  245 H   HDL/CHOLESTEROL RATIO  --  21.7     No results found for: IRON, TIBC, FERRITIN, SATURATEDIRO         Test(s) Reviewed  I have reviewed the following in detail:  [x] Stress test   [] Angiography   [x] Echocardiogram   [x] Labs   [] Other:         Assessment/Plan:     HTN (hypertension), benign  Comments:  BP at goal <130/80. Requested pt call with BP log in 2  weeks. Continue current regimen.   Orders:  -     Comprehensive metabolic panel; Future; Expected date: 09/05/2018    Mixed hyperlipidemia  Comments:  LDL high 164. Start rosuvastatin 5mg and slowly up titrate given h/o statin induced myalgia. Repeat lipids and cmp in 6-8 weeks.   Orders:  -     rosuvastatin (CRESTOR) 5 MG tablet; Take 1 tablet (5 mg total) by mouth once daily.  Dispense: 90 tablet; Refill: 3  -     Lipid panel; Future; Expected date: 09/05/2018    Aortic atherosclerosis    Myalgia  Comments:  Vitamin D wnl earlier this year. Potassium wnl 7/13. Will check magnesium.   Orders:  -     Magnesium; Future; Expected date: 09/07/2018    Microcytic anemia  Comments:  MCV <81 with low H/H. No prior iron studies.  F/U with PCP.  Orders:  -     Iron and TIBC; Future; Expected date: 09/07/2018  -     Ferritin; Future; Expected date: 09/07/2018    Snoring  -     Ambulatory consult to Sleep Disorders    Suspected sleep apnea  Comments:  Snores and wakes hourly to urinate with little urinary production at night. BMI 28.9 Refer to sleep clinic.   Orders:  -     Ambulatory consult to Sleep Disorders         Follow-up in about 3 months (around 10/25/2018).

## 2018-07-25 NOTE — TELEPHONE ENCOUNTER
Called patient twice to give her lab results and instructions from Jaleesa Kraus NP re: new Rx sent to pharmacy but no answer. Left two messages asking her to please call us. Will try again.       Jaleesa Kraus NP   You 35 minutes ago (2:02 PM)      Hi Marline,   Please let Ms. Alicea know that she has very low iron stores and iron in the blood which is likely why she is anemic.  I want to correct her deficiency with ferrous sulfate 325mg by mouth 2 times a day taken with either vitamin C 500mg or orange juice to help with absorption.  She should take the iron an hour before any antacids or dairy products.  Iron can be constipating so she may want to take it with colace (docusate sodium) at least once a day to prevent constipation.  Her stool will likely become black so not to be alarmed.  She's [place] speaking so please call her for me.  :) (Routing comment)       Jaleesa Kraus NP 44 minutes ago (1:52 PM)         Serum iron and ferritin are low.  Start ferrous sulfate 325mg by mouth two times a day.  She will need to take colace once a day to prevent constipation.  Instruct to take vitamin c OTC 500mg BID for best absorption.  The iron should be taken 1 hour before or 2 hours after any dairy products or antacids.

## 2018-07-25 NOTE — PATIENT INSTRUCTIONS
Start the rosuvastatin 5mg (Crestor) by mouth daily.  You may want to try taking it at night.     Please have your blood drawn in 6-8 weeks fasting.  Please get labs drawn fasting for 12 hours after dinner.  You can have water and take your medications while fasting.      Increase cardiovascular exercise to 30 minutes of brisk walking a day for 4-5 days a week.  You can use a stationary bike or swim for 30 minutes a day instead of walking.  Whatever exercise you choose, make sure you are working hard enough to increase your heart rate.

## 2018-08-02 ENCOUNTER — HOSPITAL ENCOUNTER (OUTPATIENT)
Dept: RADIOLOGY | Facility: HOSPITAL | Age: 73
Discharge: HOME | End: 2018-08-02
Attending: INTERNAL MEDICINE
Payer: MEDICARE

## 2018-08-02 ENCOUNTER — OFFICE VISIT (OUTPATIENT)
Dept: INFECTIOUS DISEASES | Facility: CLINIC | Age: 73
End: 2018-08-02
Payer: MEDICARE

## 2018-08-02 VITALS
TEMPERATURE: 98 F | HEIGHT: 60 IN | WEIGHT: 166.44 LBS | HEART RATE: 88 BPM | BODY MASS INDEX: 32.68 KG/M2 | SYSTOLIC BLOOD PRESSURE: 120 MMHG | DIASTOLIC BLOOD PRESSURE: 67 MMHG

## 2018-08-02 DIAGNOSIS — Z22.7 LATENT TUBERCULOSIS: Primary | ICD-10-CM

## 2018-08-02 DIAGNOSIS — R05.9 COUGH: ICD-10-CM

## 2018-08-02 DIAGNOSIS — Z22.7 TB LUNG, LATENT: ICD-10-CM

## 2018-08-02 PROCEDURE — 99203 OFFICE O/P NEW LOW 30 MIN: CPT | Mod: S$GLB,,, | Performed by: INTERNAL MEDICINE

## 2018-08-02 PROCEDURE — 71046 X-RAY EXAM CHEST 2 VIEWS: CPT | Mod: 26,,, | Performed by: RADIOLOGY

## 2018-08-02 PROCEDURE — 99999 PR PBB SHADOW E&M-EST. PATIENT-LVL III: CPT | Mod: PBBFAC,,, | Performed by: INTERNAL MEDICINE

## 2018-08-02 PROCEDURE — 71046 X-RAY EXAM CHEST 2 VIEWS: CPT | Mod: TC

## 2018-08-02 RX ORDER — ISONIAZID 300 MG/1
300 TABLET ORAL DAILY
Qty: 30 TABLET | Refills: 8 | Status: SHIPPED | OUTPATIENT
Start: 2018-08-02 | End: 2019-02-07 | Stop reason: SDUPTHER

## 2018-08-02 NOTE — PATIENT INSTRUCTIONS
1. [place] isoniazid - 300 mg - kell pastilla por romelia x 9 meses.  2. B6 - 50 mg - kell diaria.  3. chequear enzimas del higado mensualmente.    4. nereyda en 3 meses.

## 2018-08-02 NOTE — PROGRESS NOTES
Subjective:      Patient ID: Aminah Norton is a 73 y.o. female.    Chief Complaint:Positive PPD      History of Present Illness    {ID HPI BLOCKS:85840}    ROS  Objective:   Physical Exam  Assessment:       No diagnosis found.      Plan:       ***

## 2018-08-02 NOTE — LETTER
August 5, 2018      Aimee Colorado MD  [address]  [address]  [place] [address]           [facility] - Infectious [facility]  [address]  [place] [address]  Phone: [phone]  Fax: [phone]          Patient: Aminah Norton   MR Number: [id number]   YOB: 1945   Date of Visit: 8/2/2018       Dear Dr. Aimee Colorado:    Thank you for referring Aminah Norton to me for evaluation. Attached you will find relevant portions of my assessment and plan of care.    If you have questions, please do not hesitate to call me. I look forward to following Aminah Norton along with you.    Sincerely,    Ayaka Barksdale MD    Enclosure  CC:  No Recipients    If you would like to receive this communication electronically, please contact [email or web address] or [phone] to request more information on [employer] Link access.    For providers and/or their staff who would like to refer a patient to [facility], please contact us through our one-stop-shop provider referral line, [facility], at [phone].    If you feel you have received this communication in error or would no longer like to receive these types of communications, please e-mail [email or web address]

## 2018-08-02 NOTE — PROGRESS NOTES
Subjective:      Patient ID: Aminah Norton is a 73 y.o. female.    Chief Complaint:Positive PPD      History of Present Illness    73 year old female presents for positive PPD - she [place] in [place].  No previous positive PPD prior. Currently no fever nor chills; no night sweats; no weight loss.     Component      Latest Ref Rng & Units 7/2/2018   NIL      See text IU/mL 0.040   TB1 - Nil      See text IU/mL 1.190   TB2 - Nil      See text IU/mL 0.770   Mitogen - Nil      See text IU/mL 9.500   TB Gold Plus       Positive (A)     Component      Latest Ref Rng & Units 7/2/2018   Varicella IgG      0.00 - 0.90 ISR 3.12 (H)   Varicella Interpretation      Negative Positive (A)   Hep B S Ab       Negative   Hepatitis B Surface Ag       Negative   Hepatitis C Ab       Negative   HIV 1/2 Ag/Ab      Negative Negative   RPR      Non-reactive Non-reactive       Review of Systems   Constitution: Negative for chills, decreased appetite, fever, weakness, malaise/fatigue, night sweats, weight gain and weight loss.   HENT: Negative for congestion, ear pain, hearing loss, hoarse voice, sore throat and tinnitus.    Eyes: Negative for blurred vision, redness and visual disturbance.   Cardiovascular: Negative for chest pain, leg swelling and palpitations.   Respiratory: Negative for cough, hemoptysis, shortness of breath and sputum production.    Hematologic/Lymphatic: Negative for adenopathy. Does not bruise/bleed easily.   Skin: Negative for dry skin, itching, rash and suspicious lesions.   Musculoskeletal: Negative for back pain, joint pain, myalgias and neck pain.   Gastrointestinal: Negative for abdominal pain, constipation, diarrhea, heartburn, nausea and vomiting.   Genitourinary: Positive for frequency. Negative for dysuria, flank pain, hematuria, hesitancy and urgency.   Neurological: Negative for dizziness, headaches, numbness and paresthesias.   Psychiatric/Behavioral: Negative for depression  and memory loss. The patient does not have insomnia and is not nervous/anxious.      Objective:   Physical Exam   Constitutional: She is oriented to person, place, and time.   Cardiovascular: Normal rate.    Pulmonary/Chest: Effort normal.   Abdominal: Soft.   Neurological: She is alert and oriented to person, place, and time.   Skin: Skin is warm and dry.   Vitals reviewed.    Assessment:       1. Latent tuberculosis    2. TB lung, latent         RECOMMENDATIONS:  1. The patient has been counseled regarding risks and benefits of prophylactic therapy.   2. The patient would like to take medication for latent TB.  3. A prescription for isoniazid (INH) 300 mg every evening was provided to the patient.  4. The patient was counseled about possible side effects related to the medication.  5. The patient was advised to take a multivitamin or vitamin B6 (pyridoxine) 50 mg daily.  6.  The patient was asked to let me know if there are any problems tolerating the medication.  7. LFT's monthly ordered.  8. Follow up in 3 months.     The visit was conducted in Croatian and instructions give:  1. [place] isoniazid - 300 mg - kell pastilla por romelia x 9 meses.  2. B6 - 50 mg - kell diaria.  3. chequear enzimas del higado mensualmente.    4. nereyda en 3 meses.

## 2018-08-06 ENCOUNTER — TELEPHONE (OUTPATIENT)
Dept: INFECTIOUS DISEASES | Facility: CLINIC | Age: 73
End: 2018-08-06

## 2018-08-06 PROBLEM — Z22.7 TB LUNG, LATENT: Status: ACTIVE | Noted: 2018-08-06

## 2018-08-06 NOTE — TELEPHONE ENCOUNTER
----- Message from Ayaka Barksdale MD sent at 8/6/2018 12:26 PM CDT -----  Needs 3 month follow up

## 2018-08-10 ENCOUNTER — TELEPHONE (OUTPATIENT)
Dept: RHEUMATOLOGY | Facility: CLINIC | Age: 73
End: 2018-08-10

## 2018-08-15 ENCOUNTER — TELEPHONE (OUTPATIENT)
Dept: SLEEP MEDICINE | Facility: CLINIC | Age: 73
End: 2018-08-15

## 2018-08-16 ENCOUNTER — OFFICE VISIT (OUTPATIENT)
Dept: SLEEP MEDICINE | Facility: CLINIC | Age: 73
End: 2018-08-16
Payer: MEDICARE

## 2018-08-16 VITALS
OXYGEN SATURATION: 98 % | WEIGHT: 163.56 LBS | BODY MASS INDEX: 32.11 KG/M2 | HEART RATE: 81 BPM | SYSTOLIC BLOOD PRESSURE: 132 MMHG | HEIGHT: 60 IN | DIASTOLIC BLOOD PRESSURE: 70 MMHG

## 2018-08-16 DIAGNOSIS — G47.30 SLEEP APNEA, UNSPECIFIED TYPE: Primary | ICD-10-CM

## 2018-08-16 PROCEDURE — 99999 PR PBB SHADOW E&M-EST. PATIENT-LVL V: CPT | Mod: PBBFAC,,, | Performed by: NURSE PRACTITIONER

## 2018-08-16 PROCEDURE — 3075F SYST BP GE 130 - 139MM HG: CPT | Mod: CPTII,S$GLB,, | Performed by: NURSE PRACTITIONER

## 2018-08-16 PROCEDURE — 99499 UNLISTED E&M SERVICE: CPT | Mod: HCNC,S$GLB,, | Performed by: NURSE PRACTITIONER

## 2018-08-16 PROCEDURE — 99203 OFFICE O/P NEW LOW 30 MIN: CPT | Mod: S$GLB,,, | Performed by: NURSE PRACTITIONER

## 2018-08-16 PROCEDURE — 3078F DIAST BP <80 MM HG: CPT | Mod: CPTII,S$GLB,, | Performed by: NURSE PRACTITIONER

## 2018-08-16 NOTE — LETTER
August 16, 2018      Jaleesa Kraus NP  [address]  [facility] [facility] [address]           [facility]  [address]  [facility] [address]  Phone: [phone]          Patient: Aminah Norton   MR Number: [id number]   YOB: 1945   Date of Visit: 8/16/2018       Dear Jaleesa Kraus:    Thank you for referring Aminah Norton to me for evaluation. Attached you will find relevant portions of my assessment and plan of care.    If you have questions, please do not hesitate to call me. I look forward to following Aminah Norton along with you.    Sincerely,    Renetta Morataya NP    Enclosure  CC:  No Recipients    If you would like to receive this communication electronically, please contact [email or web address] or [phone] to request more information on [employer] Link access.    For providers and/or their staff who would like to refer a patient to [facility], please contact us through our one-stop-shop provider referral line, [facility], at [phone].    If you feel you have received this communication in error or would no longer like to receive these types of communications, please e-mail [email or web address]

## 2018-08-16 NOTE — PROGRESS NOTES
"Aminah Norton  was seen as a new patient at the request of  Jaleesa Kraus NP for the evaluation of obstructive sleep apnea.    CHIEF COMPLAINT:    Chief Complaint   Patient presents with    Sleep Apnea       08/16/2018 WALKER Morataya NP: Initial HISTORY OF PRESENT ILLNESS: Aminah Norton is a 73 y.o. female is here for sleep evaluation.       Patient primary language: Maori.   Visit conducted via in-person , Marybel     Per cards: "Snores and wakes hourly to urinate with little urinary production at night. BMI 28.9 "    Patient complaints include: snoring, interrupted sleep, nocturia, and mild daytime sleepiness    Denies symptoms of restless legs or kicking during sleep.    Occupation: retired     [place] Sleepiness Scale score during initial sleep evaluation was 8 .    SLEEP ROUTINE:    Bed partner:  None , lives with daughter   Time to bed:  9:30 pm  Sleep onset latency:  Immediately        Disruptions or awakenings:    Almost hourly for bathroom, no trouble falling back asleep   Wakeup time:    6:30 am   Perceived sleep quality:  Good       PAST MEDICAL HISTORY:    Active Ambulatory Problems     Diagnosis Date Noted    Myalgia 09/18/2012    Plantar fasciitis of left foot 04/17/2013    Hypothyroidism 07/26/2013    Elevated cholesterol with high triglycerides 07/26/2013    HTN (hypertension), benign 07/26/2013    Vitamin B 12 deficiency 11/21/2013    GERD (gastroesophageal reflux disease) 11/21/2013    Neuropathy 03/28/2014    Type 2 diabetes mellitus with neurologic complication, without long-term current use of insulin 09/23/2014    Polyneuropathy in diabetes(357.2) 09/23/2014    Cataracts, bilateral 01/14/2015    Seborrheic dermatitis 07/10/2015    Back pain 07/10/2015    Facet syndrome 07/10/2015    Bilateral low back pain without sciatica 09/15/2015    Spondylisthesis 10/16/2015    Vitamin D insufficiency 12/06/2016    Lower urinary tract symptoms (LUTS) " 12/28/2016    Hematuria 03/07/2017    Bilateral leg pain 04/27/2017    Aortic atherosclerosis 09/27/2017    Bilateral shoulder pain 10/30/2017    Low back pain 11/02/2017    Weakness 11/02/2017    Back stiffness 11/02/2017    TB lung, latent 08/06/2018     Resolved Ambulatory Problems     Diagnosis Date Noted    Pneumonia 09/18/2012    Elevated CPK 09/18/2012    Chest pain, localized 03/14/2013    Pelvic pain in female 03/14/2013    Type II or unspecified type diabetes mellitus with neurological manifestations, uncontrolled(250.62) 07/26/2013    Type II or unspecified type diabetes mellitus with neurological manifestations, not stated as uncontrolled(250.60) 05/16/2014    Back pain 09/11/2014    Palpitations 09/11/2014     Past Medical History:   Diagnosis Date    Arthritis     Cataract     Choroidal rupture of left eye     Diabetes mellitus type II     GERD (gastroesophageal reflux disease)     Hyperlipidemia     Hypertension     Hypothyroidism     Macular scar     Retinal degeneration     Thyroid disease     Vitamin B 12 deficiency                 PAST SURGICAL HISTORY:    Past Surgical History:   Procedure Laterality Date    BLADDER SUSPENSION      CATARACT EXTRACTION      right eye     CHOLECYSTECTOMY      TOTAL ABDOMINAL HYSTERECTOMY      DUB         FAMILY HISTORY:                Family History   Problem Relation Age of Onset    Cancer Mother         uterine    Breast cancer Mother     Ovarian cancer Mother     COPD Father     Cancer Father     Cancer Sister         liver    Ovarian cancer Sister     Hypertension Daughter     Thyroid disease Daughter     Diabetes Son     Hypertension Sister     Hypertension Sister     Cancer Daughter         uterine    Thyroid disease Daughter     Hypertension Daughter     Colon cancer Neg Hx     Amblyopia Neg Hx     Blindness Neg Hx     Cataracts Neg Hx     Glaucoma Neg Hx     Macular degeneration Neg Hx     Retinal  detachment Neg Hx     Strabismus Neg Hx     Stroke Neg Hx        SOCIAL HISTORY:          Tobacco:   Social History     Tobacco Use   Smoking Status Never Smoker   Smokeless Tobacco Never Used       Alcohol use:    Social History     Substance and Sexual Activity   Alcohol Use No                 ALLERGIES:    Review of patient's allergies indicates:   Allergen Reactions    Bufferin [aspirin, buffered] Itching    Atorvastatin      Myalgias and arthralgias    Ibuprofen Itching     Itching of eyes, head       CURRENT MEDICATIONS:    Current Outpatient Medications   Medication Sig Dispense Refill    alcohol swabs (ALCOHOL PADS) PadM Apply 1 each topically as needed. 100 each 3    aspirin (ECOTRIN) 81 MG EC tablet Take 81 mg by mouth once daily.      baclofen (LIORESAL) 10 MG tablet Take 1 tablet (10 mg total) by mouth every evening. 90 tablet 1    baclofen (LIORESAL) 10 MG tablet Take 1 tablet (10 mg total) by mouth every evening. 90 tablet 4    biotin 1 mg tablet Take 1,000 mcg by mouth 3 (three) times daily.      blood glucose control, normal Soln 1 vial by Misc.(Non-Drug; Combo Route) route once daily. 2 each 0    blood sugar diagnostic Strp 1 strip by Misc.(Non-Drug; Combo Route) route 3 (three) times daily. SMARTVIEW TEST STRIPS 100 strip 2    blood sugar diagnostic Strp 1 strip by Misc.(Non-Drug; Combo Route) route once daily. 300 strip 1    blood-glucose meter Misc Accu check MATTIE Plus Meter 1 each 0    calcium carbonate (OS-JESSIKA) 600 mg calcium (1,500 mg) Tab Take 600 mg by mouth once.      carvedilol (COREG) 12.5 MG tablet Take 1 tablet (12.5 mg total) by mouth 2 (two) times daily. 180 tablet 3    clotrimazole-betamethasone 1-0.05% (LOTRISONE) cream Apply topically 2 (two) times daily. 45 g 1    docusate sodium (COLACE) 100 MG capsule Take 1 capsule (100 mg total) by mouth 2 (two) times daily. 180 capsule 1    econazole nitrate 1 % cream AAA bid after cool blow dry when flared 85 g 2     ferrous sulfate 325 mg (65 mg iron) Tab tablet Take 1 tablet (325 mg total) by mouth 2 (two) times daily. 180 tablet 1    fluocinonide (LIDEX) 0.05 % external solution Apply topically 2 (two) times daily. 60 mL 1    gabapentin (NEURONTIN) 300 MG capsule Take 1 capsule (300 mg total) by mouth every evening. 90 capsule 3    isoniazid (NYDRAZID) 300 MG Tab Take 1 tablet (300 mg total) by mouth once daily. 30 tablet 8    lancets (ACCU-CHEK MULTICLIX LANCET) Misc To use as directed with Accu Chek Sahra FastClix lancing device 100 each 2    levothyroxine (SYNTHROID) 112 MCG tablet Take 1 tablet (112 mcg total) by mouth before breakfast. 90 tablet 3    lisinopril (PRINIVIL,ZESTRIL) 40 MG tablet Take 1 tablet (40 mg total) by mouth once daily. 90 tablet 3    metFORMIN (GLUCOPHAGE) 1000 MG tablet Take 1 tablet (1,000 mg total) by mouth 2 (two) times daily with meals. 180 tablet 3    polyethylene glycol (GLYCOLAX) 17 gram PwPk Take 17 g by mouth once daily. 30 packet 0    rosuvastatin (CRESTOR) 5 MG tablet Take 1 tablet (5 mg total) by mouth once daily. 90 tablet 3    tamsulosin (FLOMAX) 0.4 mg Cp24 Take 1 capsule (0.4 mg total) by mouth once daily. 90 capsule 3    tramadol (ULTRAM) 50 mg tablet Take 1 tablet (50 mg total) by mouth every 6 (six) hours as needed. 30 tablet 0     No current facility-administered medications for this visit.                   REVIEW OF SYSTEMS:     Sleep related symptoms as per HPI.  CONST:Denies weight gain    HEENT: Denies sinus congestion; dry mouth in AM  PULM: Denies dyspnea  CARD:  Denies palpitations   GI:  Denies acid reflux  : Reports hourly nocturia; + dysuria   NEURO: Denies headaches  PSYCH: Denies mood disturbance  HEME: Denies anemia    Otherwise, a balance of systems reviewed is negative.          PHYSICAL EXAM:  /70 (BP Location: Right arm, Patient Position: Sitting, BP Method: Large (Manual))   Pulse 81   Ht 5' (1.524 m)   Wt 74.2 kg (163 lb 9.3 oz)   SpO2  98%   BMI 31.95 kg/m²   GENERAL: Overweight development, well groomed  HEENT:  Conjunctivae are non-erythematous; Pupils equal, round, and reactive to light; Nose is symmetrical; Nasal mucosa is pink and moist; Septum is midline; Inferior turbinates are normal; Nasal airflow is normal; Posterior pharynx is pink; Modified Mallampati: IV; Posterior palate is normal; Tonsils not visualized; Uvula is normal and pink;Tongue is normal; Dentition is edentulous- dentures uppers/lowers; No TMJ tenderness; Jaw opening and protrusion without click and without discomfort.  NECK: Supple. Neck circumference is 14 inches. No thyromegaly. No palpable nodes.    SKIN: On face and neck: No abrasions, no rashes, no lesions.  No subcutaneous nodules are palpable.  RESPIRATORY: Chest is clear to auscultation.  Normal chest expansion and non-labored breathing at rest.  CARDIOVASCULAR: Normal S1, S2.  No murmurs, gallops or rubs. No carotid bruits bilaterally.  EXTREMITIES: No edema. No clubbing. No cyanosis. Station normal. Gait normal.        NEURO/PSYCH: Oriented to time, place and person. Normal attention span and concentration. Affect is full. Mood is normal.                                              ASSESSMENT:    Sleep apnea, unspecified. The patient symptomatically has snoring, interrupted sleep, nocturia, and mild daytime sleepiness with findings of crowded oral airway and elevated body mass index. Medical co-morbidities: HTN, DM2, and overweight BMI.  This warrants further investigation for possible obstructive sleep apnea.      PLAN:    Diagnostic: HST. The nature of this procedure and its indication was discussed with the patient. Discussed with patient that if HST is negative or depending on severity of positive HST, in-lab sleep study may be necessary.  Patient will be contacted after sleep study is done.  RTC to discuss results (needs ).     Education: During our discussion today, we talked about the etiology  of obstructive sleep apnea as well as the potential ramifications of untreated sleep apnea, which could include daytime sleepiness, hypertension, heart disease and/or stroke. We discussed potential treatment options, which could include weight loss, body positioning, continuous positive airway pressure (CPAP), OA, EPAP, or referral for surgical consideration.     Precautions: The patient was advised to abstain from driving should they feel sleepy  or drowsy.     Thank you for allowing me the opportunity to participate in the care of your patient.

## 2018-08-16 NOTE — Clinical Note
Pt seen in [facility] Clinic today for MATTHEW eval. Complaints of dysuria and reports unable to reach Dr. Thomas. Needs appointment with Dr. Thomas. Please assist. Of note, pt speaks Icelandic only and require  for visits.

## 2018-09-06 ENCOUNTER — LAB VISIT (OUTPATIENT)
Dept: LAB | Facility: HOSPITAL | Age: 73
End: 2018-09-06
Attending: INTERNAL MEDICINE
Payer: MEDICARE

## 2018-09-06 DIAGNOSIS — Z22.7 LATENT TUBERCULOSIS: ICD-10-CM

## 2018-09-06 LAB — ALT SERPL W/O P-5'-P-CCNC: 16 U/L

## 2018-09-06 PROCEDURE — 84460 ALANINE AMINO (ALT) (SGPT): CPT

## 2018-09-06 PROCEDURE — 36415 COLL VENOUS BLD VENIPUNCTURE: CPT

## 2018-09-17 ENCOUNTER — OFFICE VISIT (OUTPATIENT)
Dept: INTERNAL MEDICINE | Facility: CLINIC | Age: 73
End: 2018-09-17
Payer: MEDICARE

## 2018-09-17 VITALS
WEIGHT: 160.88 LBS | HEART RATE: 70 BPM | DIASTOLIC BLOOD PRESSURE: 70 MMHG | BODY MASS INDEX: 31.42 KG/M2 | SYSTOLIC BLOOD PRESSURE: 126 MMHG

## 2018-09-17 DIAGNOSIS — Z22.7 TB LUNG, LATENT: ICD-10-CM

## 2018-09-17 DIAGNOSIS — E03.4 HYPOTHYROIDISM DUE TO ACQUIRED ATROPHY OF THYROID: ICD-10-CM

## 2018-09-17 DIAGNOSIS — E11.42 DIABETIC POLYNEUROPATHY ASSOCIATED WITH TYPE 2 DIABETES MELLITUS: Primary | ICD-10-CM

## 2018-09-17 DIAGNOSIS — I10 HTN (HYPERTENSION), BENIGN: ICD-10-CM

## 2018-09-17 PROCEDURE — 99999 PR PBB SHADOW E&M-EST. PATIENT-LVL III: CPT | Mod: PBBFAC,,, | Performed by: INTERNAL MEDICINE

## 2018-09-17 PROCEDURE — 1101F PT FALLS ASSESS-DOCD LE1/YR: CPT | Mod: CPTII,,, | Performed by: INTERNAL MEDICINE

## 2018-09-17 PROCEDURE — 3078F DIAST BP <80 MM HG: CPT | Mod: CPTII,,, | Performed by: INTERNAL MEDICINE

## 2018-09-17 PROCEDURE — 3045F PR MOST RECENT HEMOGLOBIN A1C LEVEL 7.0-9.0%: CPT | Mod: CPTII,,, | Performed by: INTERNAL MEDICINE

## 2018-09-17 PROCEDURE — 99213 OFFICE O/P EST LOW 20 MIN: CPT | Mod: PBBFAC | Performed by: INTERNAL MEDICINE

## 2018-09-17 PROCEDURE — 99214 OFFICE O/P EST MOD 30 MIN: CPT | Mod: S$PBB,,, | Performed by: INTERNAL MEDICINE

## 2018-09-17 PROCEDURE — 3074F SYST BP LT 130 MM HG: CPT | Mod: CPTII,,, | Performed by: INTERNAL MEDICINE

## 2018-09-17 RX ORDER — CARVEDILOL 12.5 MG/1
12.5 TABLET ORAL 2 TIMES DAILY
Qty: 180 TABLET | Refills: 3 | Status: SHIPPED | OUTPATIENT
Start: 2018-09-17 | End: 2018-12-14 | Stop reason: SDUPTHER

## 2018-09-17 RX ORDER — GABAPENTIN 300 MG/1
300 CAPSULE ORAL NIGHTLY
Qty: 90 CAPSULE | Refills: 3 | Status: SHIPPED | OUTPATIENT
Start: 2018-09-17 | End: 2018-12-14 | Stop reason: SDUPTHER

## 2018-09-17 RX ORDER — LISINOPRIL 40 MG/1
40 TABLET ORAL DAILY
Qty: 90 TABLET | Refills: 3 | Status: SHIPPED | OUTPATIENT
Start: 2018-09-17 | End: 2018-12-14 | Stop reason: SDUPTHER

## 2018-09-17 RX ORDER — METFORMIN HYDROCHLORIDE 1000 MG/1
1000 TABLET ORAL 2 TIMES DAILY WITH MEALS
Qty: 180 TABLET | Refills: 3 | Status: SHIPPED | OUTPATIENT
Start: 2018-09-17 | End: 2018-12-14 | Stop reason: SDUPTHER

## 2018-09-17 RX ORDER — TAMSULOSIN HYDROCHLORIDE 0.4 MG/1
0.4 CAPSULE ORAL DAILY
Qty: 90 CAPSULE | Refills: 3 | Status: SHIPPED | OUTPATIENT
Start: 2018-09-17 | End: 2020-01-28 | Stop reason: SDUPTHER

## 2018-09-17 RX ORDER — LEVOTHYROXINE SODIUM 112 UG/1
112 TABLET ORAL
Qty: 90 TABLET | Refills: 3 | Status: SHIPPED | OUTPATIENT
Start: 2018-09-17 | End: 2018-12-14 | Stop reason: SDUPTHER

## 2018-09-17 NOTE — PROGRESS NOTES
Subjective:       Patient ID: Aminah Norton is a 73 y.o. female.    Chief Complaint: Follow-up; Diabetes; and Hypertension    HPI Miss Burr is here today for follow up DM and other issues. Overall doing better than the last time we met. Since hen, she has been placed on INH 2o latent TB. She was being worked up for RA and possible DMARD use. This work up included PPD/Quaantiferon gold that was positive. She was started on INH and has tolerated it well. She reports feeling better since starting this med except for continued joint pains which have improved some. I discussed the rationale for INH use in this setting a nd encouraged her to complete the 9 month course of the medication as indicated. She is monitoring her LFT's monthly and so far, these numbers have been wnl. Regarding her DM< she is compliant with her medications, but recently was seen at [facility] for some dental work she needs and her glucose levels were high at the time and treatment was postponed. I filled out their required form for instructions on this issue  Review of Systems   Constitutional: Negative for fatigue and unexpected weight change.   HENT: Negative.    Eyes: Negative for visual disturbance.   Respiratory: Negative for cough, shortness of breath and wheezing.    Cardiovascular: Negative for chest pain, palpitations and leg swelling.   Genitourinary: Negative for difficulty urinating.   Musculoskeletal: Positive for arthralgias and myalgias.   Neurological: Negative for dizziness, weakness, numbness and headaches.   Hematological: Negative.        Objective:      Physical Exam   Constitutional: She is oriented to person, place, and time. She appears well-developed and well-nourished.   HENT:   Head: Normocephalic and atraumatic.   Right Ear: External ear normal.   Left Ear: External ear normal.   Mouth/Throat: No oropharyngeal exudate.   Eyes: No scleral icterus.   Neck: Normal range of motion. Neck supple. No  JVD present. No thyromegaly present.   Cardiovascular: Normal rate, regular rhythm, normal heart sounds and intact distal pulses.   No murmur heard.  Pulmonary/Chest: Effort normal and breath sounds normal. No respiratory distress. She has no wheezes. She exhibits no tenderness.   Abdominal: Soft. Bowel sounds are normal. She exhibits no distension and no mass. There is no tenderness.   Lymphadenopathy:     She has no cervical adenopathy.   Neurological: She is alert and oriented to person, place, and time. No cranial nerve deficit.   Skin: Skin is dry. No rash noted. She is not diaphoretic. No erythema.   Psychiatric: She has a normal mood and affect. Her behavior is normal.   Nursing note and vitals reviewed.      Assessment:       1. TB lung, latent    2. Diabetic polyneuropathy associated with type 2 diabetes mellitus    3. HTN (hypertension), benign    4. Hypothyroidism due to acquired atrophy of thyroid        Plan:    1. Continue with current medications; refills provided.         2. Monitor LFT's monthly as indicated.         3. Follow up with ID as scheduled.         4. RTC 3 months or sooner if needed.

## 2018-10-04 ENCOUNTER — LAB VISIT (OUTPATIENT)
Dept: LAB | Facility: HOSPITAL | Age: 73
End: 2018-10-04
Attending: INTERNAL MEDICINE
Payer: MEDICARE

## 2018-10-04 DIAGNOSIS — Z22.7 LATENT TUBERCULOSIS: ICD-10-CM

## 2018-10-04 LAB — ALT SERPL W/O P-5'-P-CCNC: 16 U/L

## 2018-10-04 PROCEDURE — 36415 COLL VENOUS BLD VENIPUNCTURE: CPT

## 2018-10-04 PROCEDURE — 84460 ALANINE AMINO (ALT) (SGPT): CPT

## 2018-10-10 ENCOUNTER — TELEPHONE (OUTPATIENT)
Dept: SLEEP MEDICINE | Facility: OTHER | Age: 73
End: 2018-10-10

## 2018-10-10 NOTE — TELEPHONE ENCOUNTER
[facility] called patient to schedule the home sleep study.  Patient,not interested in having the home sleep study.

## 2018-11-08 ENCOUNTER — LAB VISIT (OUTPATIENT)
Dept: LAB | Facility: HOSPITAL | Age: 73
End: 2018-11-08
Attending: INTERNAL MEDICINE
Payer: MEDICARE

## 2018-11-08 DIAGNOSIS — Z22.7 LATENT TUBERCULOSIS: ICD-10-CM

## 2018-11-08 LAB — ALT SERPL W/O P-5'-P-CCNC: 47 U/L

## 2018-11-08 PROCEDURE — 84460 ALANINE AMINO (ALT) (SGPT): CPT

## 2018-11-08 PROCEDURE — 36415 COLL VENOUS BLD VENIPUNCTURE: CPT

## 2018-11-16 ENCOUNTER — OFFICE VISIT (OUTPATIENT)
Dept: RHEUMATOLOGY | Facility: CLINIC | Age: 73
End: 2018-11-16
Payer: MEDICARE

## 2018-11-16 VITALS — SYSTOLIC BLOOD PRESSURE: 125 MMHG | DIASTOLIC BLOOD PRESSURE: 67 MMHG | HEART RATE: 78 BPM

## 2018-11-16 DIAGNOSIS — M06.9 RHEUMATOID ARTHRITIS FLARE: Primary | ICD-10-CM

## 2018-11-16 PROCEDURE — 99999 PR PBB SHADOW E&M-EST. PATIENT-LVL IV: CPT | Mod: PBBFAC,,, | Performed by: INTERNAL MEDICINE

## 2018-11-16 PROCEDURE — 3074F SYST BP LT 130 MM HG: CPT | Mod: CPTII,S$GLB,, | Performed by: INTERNAL MEDICINE

## 2018-11-16 PROCEDURE — 1101F PT FALLS ASSESS-DOCD LE1/YR: CPT | Mod: CPTII,S$GLB,, | Performed by: INTERNAL MEDICINE

## 2018-11-16 PROCEDURE — 3078F DIAST BP <80 MM HG: CPT | Mod: CPTII,S$GLB,, | Performed by: INTERNAL MEDICINE

## 2018-11-16 PROCEDURE — 99214 OFFICE O/P EST MOD 30 MIN: CPT | Mod: S$GLB,,, | Performed by: INTERNAL MEDICINE

## 2018-11-16 ASSESSMENT — ROUTINE ASSESSMENT OF PATIENT INDEX DATA (RAPID3)
PAIN SCORE: 5
MDHAQ FUNCTION SCORE: 3
TOTAL RAPID3 SCORE: 5
FATIGUE SCORE: 0
PATIENT GLOBAL ASSESSMENT SCORE: 0
AM STIFFNESS SCORE: 0, NO
PSYCHOLOGICAL DISTRESS SCORE: 1.1

## 2018-11-16 NOTE — PROGRESS NOTES
Subjective:       Patient ID: Aminah Norton is a 72 y.o. female.    Chief Complaint: No chief complaint on file.    HPI 73 yo F With PMH of type II DM,retinal degeneration on right side, left eye cataract, nephrolithiasis,  hypothyroidism, HTN. HL here for evaluation.  Reports she has increase urinary frequency and dysuria for 3 months.  Reports she has pain in shoulders, wrists, hands, hips, ankles, and hips.  Reports she has been having pain since September. Reports that she had steroid injections into both shoulders and she felt worse.   Pain level is 7/10 and is worse at night. Pain is aching.  Reports she has swelling in hands since September.  She has trouble making a fist.  Does not smoke. Denies rashes, oral ulcers,fevers, raynauds.  Denies pruritus. She took celebrex but it made her dysuria worse.   Raising her arms makes her pain worse. Denies history of smoking.    Family hx: unsure of RA      Interval history: Reports that she took prednisone for about a month  And reports that felt great on it.  When she stopped the prednisone, she had recurrence of pain and swelling. She has pain in both shoulders, hands, wrists, hands, knees, ankles, and feet.  Pain level in hands is 5/10, aching and non-radiating.    Reports that she has some swelling in the hands. Reports morning stiffness for few minutes.          Past Medical History:   Diagnosis Date    Arthritis     Cataract     left eye    Choroidal rupture of left eye     Diabetes mellitus type II     GERD (gastroesophageal reflux disease)     Hyperlipidemia     Hypertension     Hypothyroidism     Macular scar     right eye    Retinal degeneration     chorioretinal OD    Thyroid disease     Vitamin B 12 deficiency        Review of Systems   Constitutional: Positive for fatigue. Negative for activity change, appetite change, chills, diaphoresis, fever and unexpected weight change.   HENT: Negative for congestion, ear discharge, ear  pain, facial swelling, mouth sores, sinus pressure, sneezing, sore throat, tinnitus and trouble swallowing.    Eyes: Negative for photophobia, pain, discharge, redness, itching and visual disturbance.   Respiratory: Negative for apnea, chest tightness, shortness of breath, wheezing and stridor.    Cardiovascular: Negative for leg swelling.   Gastrointestinal: Negative for abdominal distention, abdominal pain, anal bleeding, blood in stool, constipation, diarrhea and nausea.   Endocrine: Negative for cold intolerance and heat intolerance.   Genitourinary: Negative for difficulty urinating and dysuria.   Musculoskeletal: Positive for arthralgias. Negative for back pain, gait problem, joint swelling, myalgias, neck pain and neck stiffness.   Skin: Negative for color change, pallor, rash and wound.   Neurological: Negative for dizziness, seizures, light-headedness and numbness.   Hematological: Negative for adenopathy. Does not bruise/bleed easily.   Psychiatric/Behavioral: Negative for sleep disturbance. The patient is not nervous/anxious.              Objective:        Physical Exam   Constitutional: She is oriented to person, place, and time.   HENT:   Head: Normocephalic and atraumatic.   Right Ear: External ear normal.   Left Ear: External ear normal.   Nose: Nose normal.   Mouth/Throat: Oropharynx is clear and moist. No oropharyngeal exudate.   Eyes: Conjunctivae and EOM are normal. Pupils are equal, round, and reactive to light. Right eye exhibits no discharge. Left eye exhibits no discharge. No scleral icterus.   Neck: Neck supple. No JVD present. No thyromegaly present.   Cardiovascular: Normal rate, regular rhythm, normal heart sounds and intact distal pulses.  Exam reveals no gallop and no friction rub.    No murmur heard.  Pulmonary/Chest: Effort normal and breath sounds normal. No respiratory distress. She has no wheezes. She has no rales. She exhibits no tenderness.   Abdominal: Soft. Bowel sounds are  normal. She exhibits no distension and no mass. There is no tenderness. There is no rebound and no guarding.   Lymphadenopathy:     She has no cervical adenopathy.   Neurological: She is alert and oriented to person, place, and time. No cranial nerve deficit. Gait normal. Coordination normal.   Skin: Skin is dry. No rash noted. No erythema. No pallor.     Psychiatric: Affect and judgment normal.   Musculoskeletal: She exhibits tenderness. She exhibits no edema or deformity.   Diffuse tenderness in muscles  Shoulders: decreased abduction of shoulders   Trouble making a fist in both hands  Synovitis of pips   left ankle tenderness    labs: reviewed     Arthritis survey (2018): I personally reviewed; DJD       Assessment:      71 yo F With PMH of type II DM,retinal, degeneration on right side, left eye cataract, nephrolithiasis,  hypothyroidism, HTN, HL here for evaluation of joint pain.  1) rheumatoid arthritis: based on exam, she has an inflammatory arthritis consistent with RA.  She tried prednisone in the past with resolution of symptoms. Hand out given on Humira in Uzbek at last visit and patient reports she is afraid of side effects and does not want to take medication.  She understands risk of progressive joint destruction.  -no plaquenil given retinal issues  NO MTX given neutropenia  rtc in 3 months    2) Fibromyalgia:she has diffuse tenderness out of proportion to exam.  Continue  baclofen 10mg po qhs    3)latent TB: on INH. Followed by ID.    rtc in 3 months  **

## 2018-11-26 ENCOUNTER — OFFICE VISIT (OUTPATIENT)
Dept: INFECTIOUS DISEASES | Facility: CLINIC | Age: 73
End: 2018-11-26
Payer: MEDICARE

## 2018-11-26 ENCOUNTER — LAB VISIT (OUTPATIENT)
Dept: LAB | Facility: HOSPITAL | Age: 73
End: 2018-11-26
Attending: INTERNAL MEDICINE
Payer: MEDICARE

## 2018-11-26 ENCOUNTER — CLINICAL SUPPORT (OUTPATIENT)
Dept: INFECTIOUS DISEASES | Facility: CLINIC | Age: 73
End: 2018-11-26
Payer: MEDICARE

## 2018-11-26 VITALS
HEIGHT: 60 IN | DIASTOLIC BLOOD PRESSURE: 70 MMHG | WEIGHT: 165.56 LBS | TEMPERATURE: 98 F | HEART RATE: 86 BPM | BODY MASS INDEX: 32.51 KG/M2 | SYSTOLIC BLOOD PRESSURE: 136 MMHG

## 2018-11-26 DIAGNOSIS — Z22.7 TB LUNG, LATENT: ICD-10-CM

## 2018-11-26 DIAGNOSIS — Z22.7 LATENT TUBERCULOSIS: Primary | ICD-10-CM

## 2018-11-26 DIAGNOSIS — Z22.7 LATENT TUBERCULOSIS: ICD-10-CM

## 2018-11-26 DIAGNOSIS — R74.8 ELEVATED LIVER ENZYMES: ICD-10-CM

## 2018-11-26 LAB
ALBUMIN SERPL BCP-MCNC: 3.9 G/DL
ALP SERPL-CCNC: 97 U/L
ALT SERPL W/O P-5'-P-CCNC: 58 U/L
ALT SERPL W/O P-5'-P-CCNC: 58 U/L
AST SERPL-CCNC: 38 U/L
BILIRUB DIRECT SERPL-MCNC: 0.2 MG/DL
BILIRUB SERPL-MCNC: 0.4 MG/DL
PROT SERPL-MCNC: 7.4 G/DL

## 2018-11-26 PROCEDURE — 36415 COLL VENOUS BLD VENIPUNCTURE: CPT

## 2018-11-26 PROCEDURE — G0008 ADMIN INFLUENZA VIRUS VAC: HCPCS | Mod: S$GLB,,, | Performed by: INTERNAL MEDICINE

## 2018-11-26 PROCEDURE — 99999 PR PBB SHADOW E&M-EST. PATIENT-LVL III: CPT | Mod: PBBFAC,,, | Performed by: INTERNAL MEDICINE

## 2018-11-26 PROCEDURE — 80076 HEPATIC FUNCTION PANEL: CPT

## 2018-11-26 PROCEDURE — 90670 PCV13 VACCINE IM: CPT | Mod: S$GLB,,, | Performed by: INTERNAL MEDICINE

## 2018-11-26 PROCEDURE — 90662 IIV NO PRSV INCREASED AG IM: CPT | Mod: S$GLB,,, | Performed by: INTERNAL MEDICINE

## 2018-11-26 PROCEDURE — 1101F PT FALLS ASSESS-DOCD LE1/YR: CPT | Mod: CPTII,S$GLB,, | Performed by: INTERNAL MEDICINE

## 2018-11-26 PROCEDURE — 3078F DIAST BP <80 MM HG: CPT | Mod: CPTII,S$GLB,, | Performed by: INTERNAL MEDICINE

## 2018-11-26 PROCEDURE — 99213 OFFICE O/P EST LOW 20 MIN: CPT | Mod: S$GLB,,, | Performed by: INTERNAL MEDICINE

## 2018-11-26 PROCEDURE — 3075F SYST BP GE 130 - 139MM HG: CPT | Mod: CPTII,S$GLB,, | Performed by: INTERNAL MEDICINE

## 2018-11-26 PROCEDURE — G0009 ADMIN PNEUMOCOCCAL VACCINE: HCPCS | Mod: S$GLB,,, | Performed by: INTERNAL MEDICINE

## 2018-11-26 NOTE — PROGRESS NOTES
Subjective:      Patient ID: Aminah Norton is a 73 y.o. female.    Chief Complaint:Positive PPD      History of Present Illness    73 year old female presented for positive PPD - she immigrated to the [place] in [place].  No previous positive PPD prior. Currently no fever nor chills; no night sweats; no weight loss.  She was started on isoniazid which she is tolerating well.     Component      Latest Ref Rng & Units 11/26/2018 11/26/2018 11/8/2018 10/4/2018          11:30 AM 11:30 AM     ALT      10 - 44 U/L 58 (H) 58 (H) 47 (H) 16         Review of Systems   Constitution: Negative for chills, decreased appetite, fever, weakness, malaise/fatigue, night sweats, weight gain and weight loss.   HENT: Negative for congestion, ear pain, hearing loss, hoarse voice, sore throat and tinnitus.    Eyes: Negative for blurred vision, redness and visual disturbance.   Cardiovascular: Negative for chest pain, leg swelling and palpitations.   Respiratory: Negative for cough, hemoptysis, shortness of breath and sputum production.    Hematologic/Lymphatic: Negative for adenopathy. Does not bruise/bleed easily.   Skin: Negative for dry skin, itching, rash and suspicious lesions.   Musculoskeletal: Negative for back pain, joint pain, myalgias and neck pain.   Gastrointestinal: Negative for abdominal pain, constipation, diarrhea, heartburn, nausea and vomiting.   Genitourinary: Negative for dysuria, flank pain, frequency, hematuria, hesitancy and urgency.   Neurological: Negative for dizziness, headaches, numbness and paresthesias.   Psychiatric/Behavioral: Negative for depression and memory loss. The patient does not have insomnia and is not nervous/anxious.      Objective:   Physical Exam   Constitutional: She appears well-developed and well-nourished.   Cardiovascular: Normal rate.   Pulmonary/Chest: Effort normal and breath sounds normal.   Abdominal: Soft.   Vitals reviewed.    Assessment:       1. Latent tuberculosis     2. TB lung, latent    3. Elevated liver enzymes          Plan:       1. Repeat hepatic panel today.  2. If liver function tests continue to trend upward, will need to change meds.  3. Follow up after above.

## 2018-12-10 ENCOUNTER — LAB VISIT (OUTPATIENT)
Dept: LAB | Facility: HOSPITAL | Age: 73
End: 2018-12-10
Attending: INTERNAL MEDICINE
Payer: MEDICARE

## 2018-12-10 DIAGNOSIS — M06.9 RHEUMATOID ARTHRITIS FLARE: ICD-10-CM

## 2018-12-10 LAB
ALBUMIN SERPL BCP-MCNC: 3.9 G/DL
ALP SERPL-CCNC: 93 U/L
ALT SERPL W/O P-5'-P-CCNC: 41 U/L
ANION GAP SERPL CALC-SCNC: 9 MMOL/L
AST SERPL-CCNC: 30 U/L
BASOPHILS # BLD AUTO: 0.02 K/UL
BASOPHILS NFR BLD: 0.5 %
BILIRUB SERPL-MCNC: 0.5 MG/DL
BUN SERPL-MCNC: 14 MG/DL
CALCIUM SERPL-MCNC: 10 MG/DL
CCP AB SER IA-ACNC: <0.5 U/ML
CHLORIDE SERPL-SCNC: 99 MMOL/L
CO2 SERPL-SCNC: 28 MMOL/L
CREAT SERPL-MCNC: 0.8 MG/DL
CRP SERPL-MCNC: 0.5 MG/L
DIFFERENTIAL METHOD: ABNORMAL
EOSINOPHIL # BLD AUTO: 0.1 K/UL
EOSINOPHIL NFR BLD: 2.4 %
ERYTHROCYTE [DISTWIDTH] IN BLOOD BY AUTOMATED COUNT: 15.9 %
ERYTHROCYTE [SEDIMENTATION RATE] IN BLOOD BY WESTERGREN METHOD: 6 MM/HR
EST. GFR  (AFRICAN AMERICAN): >60 ML/MIN/1.73 M^2
EST. GFR  (NON AFRICAN AMERICAN): >60 ML/MIN/1.73 M^2
GLUCOSE SERPL-MCNC: 158 MG/DL
HBV CORE IGM SERPL QL IA: NEGATIVE
HBV SURFACE AB SER-ACNC: NEGATIVE M[IU]/ML
HBV SURFACE AG SERPL QL IA: NEGATIVE
HCT VFR BLD AUTO: 33.4 %
HCV AB SERPL QL IA: NEGATIVE
HGB BLD-MCNC: 10.1 G/DL
IMM GRANULOCYTES # BLD AUTO: 0.01 K/UL
IMM GRANULOCYTES NFR BLD AUTO: 0.3 %
LYMPHOCYTES # BLD AUTO: 1.5 K/UL
LYMPHOCYTES NFR BLD: 40.7 %
MCH RBC QN AUTO: 22.7 PG
MCHC RBC AUTO-ENTMCNC: 30.2 G/DL
MCV RBC AUTO: 75 FL
MONOCYTES # BLD AUTO: 0.4 K/UL
MONOCYTES NFR BLD: 11.7 %
NEUTROPHILS # BLD AUTO: 1.7 K/UL
NEUTROPHILS NFR BLD: 44.4 %
NRBC BLD-RTO: 0 /100 WBC
PLATELET # BLD AUTO: 222 K/UL
PMV BLD AUTO: 10.2 FL
POTASSIUM SERPL-SCNC: 4.3 MMOL/L
PROT SERPL-MCNC: 7.4 G/DL
RBC # BLD AUTO: 4.45 M/UL
SODIUM SERPL-SCNC: 136 MMOL/L
WBC # BLD AUTO: 3.76 K/UL

## 2018-12-10 PROCEDURE — 80053 COMPREHEN METABOLIC PANEL: CPT

## 2018-12-10 PROCEDURE — 87340 HEPATITIS B SURFACE AG IA: CPT

## 2018-12-10 PROCEDURE — 86705 HEP B CORE ANTIBODY IGM: CPT

## 2018-12-10 PROCEDURE — 86706 HEP B SURFACE ANTIBODY: CPT

## 2018-12-10 PROCEDURE — 86200 CCP ANTIBODY: CPT

## 2018-12-10 PROCEDURE — 86803 HEPATITIS C AB TEST: CPT

## 2018-12-10 PROCEDURE — 85025 COMPLETE CBC W/AUTO DIFF WBC: CPT

## 2018-12-10 PROCEDURE — 86140 C-REACTIVE PROTEIN: CPT

## 2018-12-10 PROCEDURE — 85652 RBC SED RATE AUTOMATED: CPT

## 2018-12-13 NOTE — PROGRESS NOTES
CHIEF COMPLAINT: Type 2 Diabetes     HPI: Mrs. Aminah Norton is a 73 y.o. female who was diagnosed with Type 2 DM in 2008.   She was last seen by Dr. Gr in 2015 and by Dr. KRISH Olvera in 12/2016.  No blood work today.  Pt has c/o muscle pains.  Last seen by me in Feb 2018.  No  during visit.  Reports bg this am 120.  After dinner 180 mg/dl (within 20 mins, not waiting to check 2 hours after).    No thyroid ca in family, no h/o pancreatitis.    PREVIOUS DIABETES MEDICATIONS TRIED  Metformin     CURRENT DIABETIC MEDS: metformin 1000 mg bid     Pt is monitoring blood glucose readings 2 times a day.  Needs >100 strips per month related to fluctuations with blood glucose reading, a1c trends, and activity level.    Last Podiatry Exam: none     20-25 mins  5x a week     REVIEW OF SYSTEMS  General: no weakness, fatigue, or +weight changes #3-4 lbs gain   Eyes: no double or blurred vision, eye pain, or redness;+wear glasses +floaters Last Eye Exam=x 1 year ago. Dr. Joyner 3/6/18  Cardiovascular: no chest pain, palpitations, edema, or murmurs.   Respiratory: no cough (productive at times) or dyspnea.   GI/: no heartburn, nausea, or changes in bowel patterns; good appetite. +frequent UTIs, f/u with urology  Skin: no rashes, dryness, itching, or reactions at insulin injection sites.  Neuro: no numbness, tingling, tremors, or vertigo. +bone/muscular pain-(L) arm/shoulder  Endocrine: + polyuria, polydipsia, polyphagia, heat or cold intolerance.     Vital Signs  /76 (BP Location: Left arm, Patient Position: Sitting, BP Method: Medium (Manual))   Pulse 68   Ht 5' (1.524 m)   Wt 73.9 kg (162 lb 14.7 oz)   BMI 31.82 kg/m²     Hemoglobin A1C   Date Value Ref Range Status   02/19/2018 7.6 (H) 4.0 - 5.6 % Final     Comment:     According to ADA guidelines, hemoglobin A1c <7.0% represents  optimal control in non-pregnant diabetic patients. Different  metrics may apply to specific patient  populations.   Standards of Medical Care in Diabetes-2016.  For the purpose of screening for the presence of diabetes:  <5.7%     Consistent with the absence of diabetes  5.7-6.4%  Consistent with increasing risk for diabetes   (prediabetes)  >or=6.5%  Consistent with diabetes  Currently, no consensus exists for use of hemoglobin A1c  for diagnosis of diabetes for children.  This Hemoglobin A1c assay has significant interference with fetal   hemoglobin   (HbF). The results are invalid for patients with abnormal amounts of   HbF,   including those with known Hereditary Persistence   of Fetal Hemoglobin. Heterozygous hemoglobin variants (HbAS, HbAC,   HbAD, HbAE, HbA2) do not significantly interfere with this assay;   however, presence of multiple variants in a sample may impact the %   interference.     11/29/2016 7.6 (H) 4.5 - 6.2 % Final     Comment:     According to ADA guidelines, hemoglobin A1C <7.0% represents  optimal control in non-pregnant diabetic patients.  Different  metrics may apply to specific populations.   Standards of Medical Care in Diabetes - 2016.  For the purpose of screening for the presence of diabetes:  <5.7%     Consistent with the absence of diabetes  5.7-6.4%  Consistent with increasing risk for diabetes   (prediabetes)  >or=6.5%  Consistent with diabetes  Currently no consensus exists for use of hemoglobin A1C  for diagnosis of diabetes for children.     11/21/2016 7.8 (H) 4.5 - 6.2 % Final     Comment:     According to ADA guidelines, hemoglobin A1C <7.0% represents  optimal control in non-pregnant diabetic patients.  Different  metrics may apply to specific populations.   Standards of Medical Care in Diabetes - 2016.  For the purpose of screening for the presence of diabetes:  <5.7%     Consistent with the absence of diabetes  5.7-6.4%  Consistent with increasing risk for diabetes   (prediabetes)  >or=6.5%  Consistent with diabetes  Currently no consensus exists for use of hemoglobin  A1C  for diagnosis of diabetes for children.          Chemistry        Component Value Date/Time     12/10/2018 0818    K 4.3 12/10/2018 0818    CL 99 12/10/2018 0818    CO2 28 12/10/2018 0818    BUN 14 12/10/2018 0818    CREATININE 0.8 12/10/2018 0818     (H) 12/10/2018 0818        Component Value Date/Time    CALCIUM 10.0 12/10/2018 0818    ALKPHOS 93 12/10/2018 0818    AST 30 12/10/2018 0818    ALT 41 12/10/2018 0818    BILITOT 0.5 12/10/2018 0818    ESTGFRAFRICA >60.0 12/10/2018 0818    EGFRNONAA >60.0 12/10/2018 0818           Lab Results   Component Value Date    TSH 1.934 02/19/2018      Lab Results   Component Value Date    CHOL 283 (H) 07/25/2018    CHOL 272 (H) 07/02/2018    CHOL 229 (H) 11/21/2016     Lab Results   Component Value Date    HDL 60 07/25/2018    HDL 59 07/02/2018    HDL 51 11/21/2016     Lab Results   Component Value Date    LDLCALC 157.6 07/25/2018    LDLCALC 164.0 (H) 07/02/2018    LDLCALC 145.2 11/21/2016     Lab Results   Component Value Date    TRIG 327 (H) 07/25/2018    TRIG 245 (H) 07/02/2018    TRIG 164 (H) 11/21/2016     Lab Results   Component Value Date    CHOLHDL 21.2 07/25/2018    CHOLHDL 21.7 07/02/2018    CHOLHDL 22.3 11/21/2016         PHYSICAL EXAMINATION  Constitutional: Appears well, no distress  Neck: Supple, trachea midline.   Respiratory: CTA without wheezes, even and unlabored.  Cardiovascular: RRR; no carotid bruits or murmurs; (+) DP pulses; no edema.   Lymph: no lymphadenopathy palpated  Skin: warm and dry; no injection site reactions, no acanthosis nigracans observed.  Neuro:patient alert and cooperative, normal affect; steady gait.    Diabetes Foot Exam:   Deferred.      Assessment/Plan  1. Type 2 diabetes mellitus with diabetic neuropathy, without long-term current use of insulin  Hemoglobin A1c next time   F/u in 4 mos  a1c goal less than 7%      Hemoglobin A1c today   Continue metformin 1000 mg bid   Add glipizide 2.5 mg t24 with first main  meal  Take if sugar is >120     glp1a and dpp4i not started-worried about cost  rx went to [facility]-glipizide, metformin, new meter/testing supplies   2. HTN (hypertension), benign  Controlled, continue med(s)  Urine mac next time    3. Hypothyroidism due to acquired atrophy of thyroid  Lab Results   Component Value Date    TSH 1.934 02/19/2018     At goal  Repeat tsh    4. Chronic bilateral low back pain without sciatica  May increase insulin resistance.    5. Hyperlipidemia  Lab Results   Component Value Date    LDLCALC 157.6 07/25/2018     Above goal  On statin      FOLLOW UP  Follow-up in about 4 months (around 4/14/2019).

## 2018-12-14 ENCOUNTER — OFFICE VISIT (OUTPATIENT)
Dept: ENDOCRINOLOGY | Facility: CLINIC | Age: 73
End: 2018-12-14
Payer: MEDICARE

## 2018-12-14 VITALS
HEART RATE: 68 BPM | SYSTOLIC BLOOD PRESSURE: 120 MMHG | HEIGHT: 60 IN | DIASTOLIC BLOOD PRESSURE: 76 MMHG | BODY MASS INDEX: 31.99 KG/M2 | WEIGHT: 162.94 LBS

## 2018-12-14 DIAGNOSIS — E11.42 DIABETIC POLYNEUROPATHY ASSOCIATED WITH TYPE 2 DIABETES MELLITUS: ICD-10-CM

## 2018-12-14 DIAGNOSIS — E78.5 HYPERLIPIDEMIA, UNSPECIFIED HYPERLIPIDEMIA TYPE: ICD-10-CM

## 2018-12-14 DIAGNOSIS — E11.40 TYPE 2 DIABETES MELLITUS WITH DIABETIC NEUROPATHY, WITHOUT LONG-TERM CURRENT USE OF INSULIN: Primary | ICD-10-CM

## 2018-12-14 DIAGNOSIS — G89.29 CHRONIC BILATERAL LOW BACK PAIN WITHOUT SCIATICA: ICD-10-CM

## 2018-12-14 DIAGNOSIS — M54.50 CHRONIC BILATERAL LOW BACK PAIN WITHOUT SCIATICA: ICD-10-CM

## 2018-12-14 DIAGNOSIS — E78.2 MIXED HYPERLIPIDEMIA: ICD-10-CM

## 2018-12-14 DIAGNOSIS — I10 HTN (HYPERTENSION), BENIGN: ICD-10-CM

## 2018-12-14 DIAGNOSIS — E03.4 HYPOTHYROIDISM DUE TO ACQUIRED ATROPHY OF THYROID: ICD-10-CM

## 2018-12-14 PROCEDURE — 1101F PT FALLS ASSESS-DOCD LE1/YR: CPT | Mod: CPTII,S$GLB,, | Performed by: NURSE PRACTITIONER

## 2018-12-14 PROCEDURE — 99214 OFFICE O/P EST MOD 30 MIN: CPT | Mod: S$GLB,,, | Performed by: NURSE PRACTITIONER

## 2018-12-14 PROCEDURE — 3045F PR MOST RECENT HEMOGLOBIN A1C LEVEL 7.0-9.0%: CPT | Mod: CPTII,S$GLB,, | Performed by: NURSE PRACTITIONER

## 2018-12-14 PROCEDURE — 3074F SYST BP LT 130 MM HG: CPT | Mod: CPTII,S$GLB,, | Performed by: NURSE PRACTITIONER

## 2018-12-14 PROCEDURE — 99999 PR PBB SHADOW E&M-EST. PATIENT-LVL IV: CPT | Mod: PBBFAC,,, | Performed by: NURSE PRACTITIONER

## 2018-12-14 PROCEDURE — 3078F DIAST BP <80 MM HG: CPT | Mod: CPTII,S$GLB,, | Performed by: NURSE PRACTITIONER

## 2018-12-14 RX ORDER — GABAPENTIN 300 MG/1
300 CAPSULE ORAL NIGHTLY
Qty: 90 CAPSULE | Refills: 3 | Status: SHIPPED | OUTPATIENT
Start: 2018-12-14 | End: 2019-06-11 | Stop reason: SDUPTHER

## 2018-12-14 RX ORDER — LANCETS
EACH MISCELLANEOUS
Qty: 200 EACH | Refills: 3 | Status: ON HOLD | OUTPATIENT
Start: 2018-12-14 | End: 2024-03-27 | Stop reason: HOSPADM

## 2018-12-14 RX ORDER — ROSUVASTATIN CALCIUM 5 MG/1
5 TABLET, COATED ORAL DAILY
Qty: 90 TABLET | Refills: 3 | Status: SHIPPED | OUTPATIENT
Start: 2018-12-14 | End: 2019-04-26 | Stop reason: SDUPTHER

## 2018-12-14 RX ORDER — LISINOPRIL 40 MG/1
40 TABLET ORAL DAILY
Qty: 90 TABLET | Refills: 3 | Status: SHIPPED | OUTPATIENT
Start: 2018-12-14 | End: 2019-06-11 | Stop reason: SDUPTHER

## 2018-12-14 RX ORDER — LEVOTHYROXINE SODIUM 112 UG/1
112 TABLET ORAL
Qty: 90 TABLET | Refills: 3 | Status: SHIPPED | OUTPATIENT
Start: 2018-12-14 | End: 2019-04-26 | Stop reason: SDUPTHER

## 2018-12-14 RX ORDER — INSULIN PUMP SYRINGE, 3 ML
EACH MISCELLANEOUS
Qty: 1 EACH | Refills: 0 | Status: SHIPPED | OUTPATIENT
Start: 2018-12-14 | End: 2020-03-24 | Stop reason: SDUPTHER

## 2018-12-14 RX ORDER — METFORMIN HYDROCHLORIDE 1000 MG/1
1000 TABLET ORAL 2 TIMES DAILY WITH MEALS
Qty: 180 TABLET | Refills: 3 | Status: SHIPPED | OUTPATIENT
Start: 2018-12-14 | End: 2020-01-28 | Stop reason: SDUPTHER

## 2018-12-14 RX ORDER — CARVEDILOL 12.5 MG/1
12.5 TABLET ORAL 2 TIMES DAILY
Qty: 180 TABLET | Refills: 3 | Status: SHIPPED | OUTPATIENT
Start: 2018-12-14 | End: 2019-05-29 | Stop reason: SDUPTHER

## 2018-12-14 RX ORDER — GLIPIZIDE 2.5 MG/1
5 TABLET, EXTENDED RELEASE ORAL
Qty: 180 TABLET | Refills: 3 | Status: SHIPPED | OUTPATIENT
Start: 2018-12-14 | End: 2020-01-07 | Stop reason: SDUPTHER

## 2018-12-14 NOTE — PATIENT INSTRUCTIONS
Snacks can be an important part of a balanced, healthy meal plan. They allow you to eat more frequently, feeling full and satisfied throughout the day. Also, they allow you to spread carbohydrates evenly, which may stabilize blood sugars.  Plus, snacks are enjoyable!     The amount of carbohydrate needed at snacks varies. Generally, about 15-30 grams of carbohydrate per snack is recommended.  Below you will find some tasty treats.       0-5 gm carb   Crystal Light   Vitamin Water Zero   Herbal tea, unsweetened   2 tsp peanut butter on celery   1./2 cup sugar-free jell-o   1 sugar-free popsicle   ¼ cup blueberries   8oz Blue Yolis unsweetened almond milk   5 baby carrots & celery sticks, cucumbers, bell peppers dipped in ¼ cup salsa, 2Tbsp light ranch dressing or 2Tbsp plain Greek yogurt   10 Goldfish crackers   ½ oz low-fat cheese or string cheese   1 closed handful of nuts, unsalted   1 Tbsp of sunflower seeds, unsalted   1 cup Smart Pop popcorn   1 whole grain brown rice cake        15 gm carb   1 small piece of fruit or ½ banana or 1/2 cup lite canned fruit   3 thuan cracker squares   3 cups Smart Pop popcorn, top spray butter, Piper lite salt or cinnamon and Truvia   5 Vanilla Wafers   ½ cup low fat, no added sugar ice cream or frozen yogurt (Blue bell, Blue Bunny, Weight Watchers, Skinny Cow)   ½ turkey, ham, or chicken sandwich   ½ c fruit with ½ c Cottage cheese   4-6 unsalted wheat crackers with 1 oz low fat cheese or 1 tbsp peanut butter    30-45 goldfish crackers (depending on flavor)    7-8 Denominational mini brown rice cakes (caramel, apple cinnamon, chocolate)    12 Denominational mini brown rice cakes (cheddar, bbq, ranch)    1/3 cup hummus dip with raw veg   1/2 whole wheat reshma, 1Tbsp hummus   Mini Pizza (1/2 whole wheat English muffin, low-fat  cheese, tomato sauce)   100 calorie snack pack (Oreo, Chips Ahoy, Ritz Mix, Baked Cheetos)   4-6 oz. light or Greek Style yogurt  (Mia, [place], [place], [facility])   ½ cup sugar-free pudding     6 in. wheat tortilla or reshma oven toasted chips (topped with spray butter flavoring, cinnamon, Truvia OR spray butter, garlic powder, chili powder)    18 BBQ Popchips (available at Target, Whole Foods, Fresh Market)                   Diabetes Support Group Meetings         Date: Topic:   February 14 Eat Fit EDUARDO/Health Promotion   March 14 Taking Care of Your Smile   April 11 Spring into Healthy Eating/Cooking Demo   May 9 Ease Your Mind with Diabetes   Juani 13 Summer Treats/Cooking Demo   July 11 Eat Fit EDUARDO/Super Market Sweep   August 8 Taking Care of Your Eyes and Feet   Sept 12 Technology/ADA updates   October 10 Recipes & Treats/Cooking Demo   November 14 Heart Health/Pump it up!   December 12 Year-End Close Out        Meetings are held in the [facility] (A) of the [facility] located at [address] [place] [address]. Please call [phone] for additional information.    Free service, offered every 2nd Thursday of every month! Family members and/or friends are welcome as well!  Support group is for patients with type 1 or type 2 diabetes.    From 3:30p to 4:30p        Glipizide Extended-release tablets  What is this medicine?  GLIPIZIDE (GLIP i zide) helps to treat type 2 diabetes. It is combined with diet and exercise. The medicine helps your body to use insulin better.  How should I use this medicine?  Take this medicine by mouth. Follow the directions on the prescription label. Swallow the tablets with a drink of water and take with your breakfast. Take your medicine at the same time each day. Do not take more often than directed.  Talk to your pediatrician regarding the use of this medicine in children. Special care may be needed.  Elderly patients over 65 years old may have a stronger reaction and need a smaller dose.  What side effects may I notice from receiving this  medicine?  Side effects that you should report to your doctor or health care professional as soon as possible:  · allergic reactions like skin rash, itching or hives, swelling of the face, lips, or tongue  · breathing problems  · dark urine  · fever, chills, sore throat  · signs and symptoms of low blood sugar such as feeling anxious, confusion, dizziness, increased hunger, unusually weak or tired, sweating, shakiness, cold, irritable, headache, blurred vision, fast heartbeat, loss of consciousness  · unusual bleeding or bruising  · yellowing of the eyes or skin  Side effects that usually do not require medical attention (report to your doctor or health care professional if they continue or are bothersome):  · diarrhea  · dizziness  · headache  · heartburn  · nausea  · stomach gas  What may interact with this medicine?  · bosentan  · chloramphenicol  · cisapride  · medicines for fungal or yeast infections  · metoclopramide  · probenecid  · warfarin  Many medications may cause an increase or decrease in blood sugar, these include:  · alcohol containing beverages  · aspirin and aspirin-like drugs  · chloramphenicol  · chromium  · clarithromycin  · female hormones, like estrogens or progestins and birth control pills  · heart medicines  · isoniazid  · male hormones or anabolic steroids  · medicines for weight loss  · medicines for allergies, asthma, cold, or cough  · medicines for mental problems  · medicines called MAO Inhibitors like Nardil, Parnate, Marplan, Eldepryl  · niacin  · NSAIDs, medicines for pain and inflammation, like ibuprofen or naproxen  · pentamidine  · phenytoin  · probenecid  · quinolone antibiotics like ciprofloxacin, levofloxacin, ofloxacin  · some herbal dietary supplements  · steroid medicines like prednisone or cortisone  · thyroid medicine  · water pills or diuretics  What if I miss a dose?  If you miss a dose, take it as soon as you can. If it is almost time for your next dose, take only that  dose. Do not take double or extra doses.  Where should I keep my medicine?  Keep out of the reach of children.  Store at room temperature between 15 to 30 degrees C (59 to 86 degrees F). Protect from moisture and humidity. Throw away any unused medicine after the expiration date.  What should I tell my health care provider before I take this medicine?  They need to know if you have any of these conditions:  · diabetic ketoacidosis  · glucose-6-phosphate dehydrogenase deficiency  · heart disease  · kidney disease  · liver disease  · porphyria  · severe infection or injury  · thyroid disease  · an unusual or allergic reaction to glipizide, sulfa drugs, other medicines, foods, dyes, or preservatives  · pregnant or trying to get pregnant  · breast-feeding  What should I watch for while using this medicine?  Visit your doctor or health care professional for regular checks on your progress.  A test called the HbA1C (A1C) will be monitored. This is a simple blood test. It measures your blood sugar control over the last 2 to 3 months. You will receive this test every 3 to 6 months.  Learn how to check your blood sugar. Learn the symptoms of low and high blood sugar and how to manage them.  Always carry a quick-source of sugar with you in case you have symptoms of low blood sugar. Examples include hard sugar candy or glucose tablets. Make sure others know that you can choke if you eat or drink when you develop serious symptoms of low blood sugar, such as seizures or unconsciousness. They must get medical help at once.  Tell your doctor or health care professional if you have high blood sugar. You might need to change the dose of your medicine. If you are sick or exercising more than usual, you might need to change the dose of your medicine.  Do not skip meals. Ask your doctor or health care professional if you should avoid alcohol. Many nonprescription cough and cold products contain sugar or alcohol. These can affect blood  sugar.  This medicine can make you more sensitive to the sun. Keep out of the sun. If you cannot avoid being in the sun, wear protective clothing and use sunscreen. Do not use sun lamps or tanning beds/booths.  Wear a medical ID bracelet or chain, and carry a card that describes your disease and details of your medicine and dosage times.  NOTE:This sheet is a summary. It may not cover all possible information. If you have questions about this medicine, talk to your doctor, pharmacist, or health care provider. Copyright© 2017 [employer] Standard        Hypoglycemia (Low Blood Sugar)     Fast-acting sugar includes a cup of nonfat milk.     Too little sugar (glucose) in your blood is called hypoglycemia or low blood sugar. Low blood sugar usually means anything lower than 70 mg/dL. Talk with your healthcare provider about your target range and what level is too low for you. Diabetes itself doesnt cause low blood sugar. But some of the treatments for diabetes, such as pills or insulin, may raise your risk for it. Low blood sugar may cause you to pass out or have a seizure. So always treat low blood sugar right away, but don't overeat.  Special note: Always carry a source of fast-acting sugar and a snack in case of hypoglycemia.   What you may notice  If you have low blood sugar, you may have one or more of these symptoms:  · Shakiness or dizziness  · Cold, clammy skin or sweating  · Feelings of hunger  · Headache  · Nervousness  · A hard, fast heartbeat  · Weakness  · Confusion or irritability  · Blurred vision  · Having nightmares or waking up confused or sweating  · Numbness or tingling in the lips or tongue  What you should do  Here are tips to follow if you have hypoglycemia:   · First check your blood sugar. If it is too low (out of your target range), eat or drink 15 to 20 grams of fast-acting sugar. This may be 3 to 4 glucose tablets, 4 ounces (half a cup) of fruit juice or regular (nondiet) soda, 8 ounces (1 cup) of  fat-free milk, or 1 tablespoon of honey. Dont take more than this, or your blood sugar may go too high.  · Wait 15 minutes. Then recheck your blood sugar if you can.  · If your blood sugar is still too low, repeat the steps above and check your blood sugar again. If your blood sugar still has not returned to your target range, contact your healthcare provider or seek emergency care.  · Once your blood sugar returns to target range, eat a snack or meal.  Preventing low blood sugar  Things you can do include the following:   · If your condition needs a strict treatment plan, eat your meals and snacks at the same times each day. Dont skip meals!  · If your treatment plan lets you change when you eat and what you eat, learn how to change the time and dose of your rapid-acting insulin to match this.   · Ask your healthcare provider if it is safe for you to drink alcohol. Never drink on an empty stomach.  · Take your medicine at the prescribed times.  · Always carry a source of fast-acting sugar and a snack when youre away from home.  Other things to do  Additional tips include the following:  · Carry a medical ID card, a compact USB drive, or wear a medical alert bracelet or necklace. It should say that you have diabetes. It should also say what to do if you pass out or have a seizure.  · Make sure your family, friends, and coworkers know the signs of low blood sugar. Tell them what to do if your blood sugar falls very low and you cant treat yourself.  · Keep a glucagon emergency kit handy. Be sure your family, friends, and coworkers know how and when to use it. Check it regularly and replace the glucagon before it expires.  · Talk with your health care team about other things you can do to prevent low blood sugar.     If you have unexplained hypoglycemia or hypoglycemia several times, call your healthcare provider.   Date Last Reviewed: 5/1/2016  © 9156-2256 [employer]. [address] [facility],  [place] [address]. All rights reserved. This information is not intended as a substitute for professional medical care. Always follow your healthcare professional's instructions.

## 2018-12-19 ENCOUNTER — DOCUMENTATION ONLY (OUTPATIENT)
Dept: REHABILITATION | Facility: HOSPITAL | Age: 73
End: 2018-12-19

## 2018-12-19 NOTE — PROGRESS NOTES
PHYSICAL THERAPY DISCHARGE:    This patient was originally evaluated at our facility on: 11/2/17         Pt attended PT for a total of 5 Visits receiving: Manual Therapy, Therex, Postural Education, Body Mechanics Training, Home Exercise Instruction     This patient's last visit occurred on: 11/21/17     Short term goals: 3/3 in progress    Long term goals: 3/3 in progress    Pt was DC'd from our care secondary to: end of plan of care       Therapist: Norma Briones, PT, DPT  12/19/2018

## 2019-01-09 NOTE — TELEPHONE ENCOUNTER
----- Message from Aruna Reena sent at 1/8/2019  2:07 PM CST -----  Contact: [facility]/ [place] / tel:  [phone]  Dr. Mclean's pt./   Pt. Cannot get in touch with her mail order pharmacy.    Asking if you could send her medication to [facility]  Instead, Medication:   ISONIAZID  300mgs.  3 mos. Supply w/ refills.   Mail order is not responding to her.   Pls call w/ OK .

## 2019-01-10 RX ORDER — ISONIAZID 300 MG/1
300 TABLET ORAL DAILY
Qty: 30 TABLET | Refills: 8 | OUTPATIENT
Start: 2019-01-10 | End: 2020-01-10

## 2019-01-18 ENCOUNTER — LAB VISIT (OUTPATIENT)
Dept: LAB | Facility: HOSPITAL | Age: 74
End: 2019-01-18
Attending: INTERNAL MEDICINE
Payer: MEDICARE

## 2019-01-18 DIAGNOSIS — Z22.7 LATENT TUBERCULOSIS: ICD-10-CM

## 2019-01-18 LAB — ALT SERPL W/O P-5'-P-CCNC: 22 U/L

## 2019-01-18 PROCEDURE — 36415 COLL VENOUS BLD VENIPUNCTURE: CPT

## 2019-01-18 PROCEDURE — 84460 ALANINE AMINO (ALT) (SGPT): CPT

## 2019-02-07 ENCOUNTER — OFFICE VISIT (OUTPATIENT)
Dept: INFECTIOUS DISEASES | Facility: CLINIC | Age: 74
End: 2019-02-07
Payer: MEDICARE

## 2019-02-07 VITALS — TEMPERATURE: 98 F | HEART RATE: 68 BPM | DIASTOLIC BLOOD PRESSURE: 61 MMHG | SYSTOLIC BLOOD PRESSURE: 136 MMHG

## 2019-02-07 DIAGNOSIS — Z22.7 TB LUNG, LATENT: Primary | ICD-10-CM

## 2019-02-07 PROCEDURE — 1101F PR PT FALLS ASSESS DOC 0-1 FALLS W/OUT INJ PAST YR: ICD-10-PCS | Mod: HCNC,CPTII,S$GLB, | Performed by: INTERNAL MEDICINE

## 2019-02-07 PROCEDURE — 3075F PR MOST RECENT SYSTOLIC BLOOD PRESS GE 130-139MM HG: ICD-10-PCS | Mod: HCNC,CPTII,S$GLB, | Performed by: INTERNAL MEDICINE

## 2019-02-07 PROCEDURE — 99999 PR PBB SHADOW E&M-EST. PATIENT-LVL III: ICD-10-PCS | Mod: PBBFAC,HCNC,, | Performed by: INTERNAL MEDICINE

## 2019-02-07 PROCEDURE — 99999 PR PBB SHADOW E&M-EST. PATIENT-LVL III: CPT | Mod: PBBFAC,HCNC,, | Performed by: INTERNAL MEDICINE

## 2019-02-07 PROCEDURE — 3078F DIAST BP <80 MM HG: CPT | Mod: HCNC,CPTII,S$GLB, | Performed by: INTERNAL MEDICINE

## 2019-02-07 PROCEDURE — 3075F SYST BP GE 130 - 139MM HG: CPT | Mod: HCNC,CPTII,S$GLB, | Performed by: INTERNAL MEDICINE

## 2019-02-07 PROCEDURE — 99213 PR OFFICE/OUTPT VISIT, EST, LEVL III, 20-29 MIN: ICD-10-PCS | Mod: HCNC,S$GLB,, | Performed by: INTERNAL MEDICINE

## 2019-02-07 PROCEDURE — 1101F PT FALLS ASSESS-DOCD LE1/YR: CPT | Mod: HCNC,CPTII,S$GLB, | Performed by: INTERNAL MEDICINE

## 2019-02-07 PROCEDURE — 3078F PR MOST RECENT DIASTOLIC BLOOD PRESSURE < 80 MM HG: ICD-10-PCS | Mod: HCNC,CPTII,S$GLB, | Performed by: INTERNAL MEDICINE

## 2019-02-07 PROCEDURE — 99213 OFFICE O/P EST LOW 20 MIN: CPT | Mod: HCNC,S$GLB,, | Performed by: INTERNAL MEDICINE

## 2019-02-07 RX ORDER — ISONIAZID 300 MG/1
300 TABLET ORAL DAILY
Qty: 90 TABLET | Refills: 1 | Status: SHIPPED | OUTPATIENT
Start: 2019-02-07 | End: 2019-04-26 | Stop reason: SDUPTHER

## 2019-02-13 NOTE — PROGRESS NOTES
Subjective:      Patient ID: Aminah Norton is a 73 y.o. female.    Chief Complaint:Follow-up      History of Present Illness      73 year old female presented for positive PPD - she immig[place] the [place] in [place].  No previous positive PPD prior. Currently no fever nor chills; no night sweats; no weight loss.  On her last visit, patient admitted to be on isoniazid - however, per her account, she may have stopped prior.       Review of Systems   Constitution: Negative for chills, decreased appetite, fever, weakness, malaise/fatigue, night sweats, weight gain and weight loss.   HENT: Negative for congestion, ear pain, hearing loss, hoarse voice, sore throat and tinnitus.    Eyes: Negative for blurred vision, redness and visual disturbance.   Cardiovascular: Negative for chest pain, leg swelling and palpitations.   Respiratory: Negative for cough, hemoptysis, shortness of breath and sputum production.    Hematologic/Lymphatic: Negative for adenopathy. Does not bruise/bleed easily.   Skin: Negative for dry skin, itching, rash and suspicious lesions.   Musculoskeletal: Negative for back pain, joint pain, myalgias and neck pain.   Gastrointestinal: Negative for abdominal pain, constipation, diarrhea, heartburn, nausea and vomiting.   Genitourinary: Negative for dysuria, flank pain, frequency, hematuria, hesitancy and urgency.   Neurological: Negative for dizziness, headaches, numbness and paresthesias.   Psychiatric/Behavioral: Negative for depression and memory loss. The patient does not have insomnia and is not nervous/anxious.      Objective:   Physical Exam   Constitutional: She is oriented to person, place, and time.   Cardiovascular: Normal rate.   Pulmonary/Chest: Effort normal.   Neurological: She is alert and oriented to person, place, and time.   Vitals reviewed.    Assessment:       1. TB lung, latent          Plan:       1. Re-start isoniazid and will complete 6 months.   2. ALT once a month.

## 2019-02-15 ENCOUNTER — LAB VISIT (OUTPATIENT)
Dept: LAB | Facility: HOSPITAL | Age: 74
End: 2019-02-15
Attending: INTERNAL MEDICINE
Payer: MEDICARE

## 2019-02-15 DIAGNOSIS — Z22.7 TB LUNG, LATENT: ICD-10-CM

## 2019-02-15 LAB — ALT SERPL W/O P-5'-P-CCNC: 21 U/L

## 2019-02-15 PROCEDURE — 84460 ALANINE AMINO (ALT) (SGPT): CPT | Mod: HCNC

## 2019-02-15 PROCEDURE — 36415 COLL VENOUS BLD VENIPUNCTURE: CPT | Mod: HCNC

## 2019-02-26 ENCOUNTER — OFFICE VISIT (OUTPATIENT)
Dept: INTERNAL MEDICINE | Facility: CLINIC | Age: 74
End: 2019-02-26
Payer: MEDICARE

## 2019-02-26 VITALS
SYSTOLIC BLOOD PRESSURE: 140 MMHG | DIASTOLIC BLOOD PRESSURE: 68 MMHG | BODY MASS INDEX: 32.15 KG/M2 | HEART RATE: 76 BPM | WEIGHT: 164.63 LBS

## 2019-02-26 DIAGNOSIS — E53.8 VITAMIN B 12 DEFICIENCY: ICD-10-CM

## 2019-02-26 DIAGNOSIS — E03.9 HYPOTHYROIDISM, UNSPECIFIED TYPE: ICD-10-CM

## 2019-02-26 DIAGNOSIS — E11.42 DIABETIC POLYNEUROPATHY ASSOCIATED WITH TYPE 2 DIABETES MELLITUS: Primary | ICD-10-CM

## 2019-02-26 DIAGNOSIS — L30.9 DERMATITIS: ICD-10-CM

## 2019-02-26 PROCEDURE — 3045F PR MOST RECENT HEMOGLOBIN A1C LEVEL 7.0-9.0%: ICD-10-PCS | Mod: HCNC,CPTII,S$GLB, | Performed by: INTERNAL MEDICINE

## 2019-02-26 PROCEDURE — 3077F PR MOST RECENT SYSTOLIC BLOOD PRESSURE >= 140 MM HG: ICD-10-PCS | Mod: HCNC,CPTII,S$GLB, | Performed by: INTERNAL MEDICINE

## 2019-02-26 PROCEDURE — 99214 OFFICE O/P EST MOD 30 MIN: CPT | Mod: HCNC,S$GLB,, | Performed by: INTERNAL MEDICINE

## 2019-02-26 PROCEDURE — 3077F SYST BP >= 140 MM HG: CPT | Mod: HCNC,CPTII,S$GLB, | Performed by: INTERNAL MEDICINE

## 2019-02-26 PROCEDURE — 3078F DIAST BP <80 MM HG: CPT | Mod: HCNC,CPTII,S$GLB, | Performed by: INTERNAL MEDICINE

## 2019-02-26 PROCEDURE — 99499 RISK ADDL DX/OHS AUDIT: ICD-10-PCS | Mod: HCNC,S$GLB,, | Performed by: INTERNAL MEDICINE

## 2019-02-26 PROCEDURE — 99214 PR OFFICE/OUTPT VISIT, EST, LEVL IV, 30-39 MIN: ICD-10-PCS | Mod: HCNC,S$GLB,, | Performed by: INTERNAL MEDICINE

## 2019-02-26 PROCEDURE — 99999 PR PBB SHADOW E&M-EST. PATIENT-LVL III: CPT | Mod: PBBFAC,HCNC,, | Performed by: INTERNAL MEDICINE

## 2019-02-26 PROCEDURE — 3045F PR MOST RECENT HEMOGLOBIN A1C LEVEL 7.0-9.0%: CPT | Mod: HCNC,CPTII,S$GLB, | Performed by: INTERNAL MEDICINE

## 2019-02-26 PROCEDURE — 1101F PR PT FALLS ASSESS DOC 0-1 FALLS W/OUT INJ PAST YR: ICD-10-PCS | Mod: HCNC,CPTII,S$GLB, | Performed by: INTERNAL MEDICINE

## 2019-02-26 PROCEDURE — 99499 UNLISTED E&M SERVICE: CPT | Mod: HCNC,S$GLB,, | Performed by: INTERNAL MEDICINE

## 2019-02-26 PROCEDURE — 3078F PR MOST RECENT DIASTOLIC BLOOD PRESSURE < 80 MM HG: ICD-10-PCS | Mod: HCNC,CPTII,S$GLB, | Performed by: INTERNAL MEDICINE

## 2019-02-26 PROCEDURE — 1101F PT FALLS ASSESS-DOCD LE1/YR: CPT | Mod: HCNC,CPTII,S$GLB, | Performed by: INTERNAL MEDICINE

## 2019-02-26 PROCEDURE — 99999 PR PBB SHADOW E&M-EST. PATIENT-LVL III: ICD-10-PCS | Mod: PBBFAC,HCNC,, | Performed by: INTERNAL MEDICINE

## 2019-02-26 RX ORDER — FLUOCINONIDE TOPICAL SOLUTION USP, 0.05% 0.5 MG/ML
SOLUTION TOPICAL 2 TIMES DAILY
Qty: 60 ML | Refills: 1 | Status: SHIPPED | OUTPATIENT
Start: 2019-02-26 | End: 2023-05-09

## 2019-02-26 RX ORDER — CYANOCOBALAMIN 1000 UG/ML
1000 INJECTION, SOLUTION INTRAMUSCULAR; SUBCUTANEOUS
Qty: 10 ML | Refills: 1 | Status: ON HOLD | OUTPATIENT
Start: 2019-02-26 | End: 2021-05-19 | Stop reason: HOSPADM

## 2019-02-26 RX ORDER — KETOCONAZOLE 20 MG/G
CREAM TOPICAL DAILY
Qty: 60 G | Refills: 1 | Status: ON HOLD | OUTPATIENT
Start: 2019-02-26 | End: 2024-03-27 | Stop reason: HOSPADM

## 2019-02-26 RX ORDER — BACLOFEN 10 MG/1
10 TABLET ORAL NIGHTLY
Qty: 90 TABLET | Refills: 2 | Status: SHIPPED | OUTPATIENT
Start: 2019-02-26 | End: 2019-05-29 | Stop reason: SDUPTHER

## 2019-02-26 NOTE — PROGRESS NOTES
Subjective:       Patient ID: Aminah Bravo is a 73 y.o. female.    Chief Complaint: Annual Exam    HPIMiss Burr is here today for her annual exam and follow up DM. Overall doing well. She recently had URI sx's which resolved after a few days. She also has hx of positive Quantiferon gold test and was placed on INH for 6 months. She is taking her medications as indicated and has had no problems with it. She is requesting refills on some of her meds which were provided.  Review of Systems   All other systems reviewed and are negative.      Objective:      Physical Exam   Constitutional: She is oriented to person, place, and time. She appears well-developed and well-nourished. No distress.   HENT:   Head: Normocephalic and atraumatic.   Right Ear: External ear normal.   Left Ear: External ear normal.   Mouth/Throat: Oropharynx is clear and moist. No oropharyngeal exudate.   Eyes: Conjunctivae and EOM are normal. Right eye exhibits no discharge. Left eye exhibits no discharge. No scleral icterus.   Neck: Normal range of motion. Neck supple. No JVD present. No thyromegaly present.   Cardiovascular: Normal rate, regular rhythm, normal heart sounds and intact distal pulses.   No murmur heard.  Pulmonary/Chest: Effort normal and breath sounds normal. No respiratory distress. She has no wheezes.   Abdominal: Soft. Bowel sounds are normal. She exhibits no distension and no mass. There is no tenderness.   Musculoskeletal: Normal range of motion. She exhibits no edema or tenderness.   Lymphadenopathy:     She has no cervical adenopathy.   Neurological: She is alert and oriented to person, place, and time. No cranial nerve deficit. Coordination normal.   Skin: Skin is warm and dry. She is not diaphoretic.   Psychiatric: She has a normal mood and affect. Her behavior is normal. Judgment and thought content normal.   Nursing note and vitals reviewed.      Assessment:       1. Diabetic polyneuropathy associated  with type 2 diabetes mellitus    2. Dermatitis    3. Hypothyroidism, unspecified type    4. Vitamin B 12 deficiency        Plan:    1. Obtain blood work.         2. Refill meds as requested.         3. Call with results.         4. RTC 6 months or sooner if needed.

## 2019-03-15 ENCOUNTER — LAB VISIT (OUTPATIENT)
Dept: LAB | Facility: HOSPITAL | Age: 74
End: 2019-03-15
Attending: INTERNAL MEDICINE
Payer: MEDICARE

## 2019-03-15 DIAGNOSIS — Z22.7 TB LUNG, LATENT: ICD-10-CM

## 2019-03-15 LAB — ALT SERPL W/O P-5'-P-CCNC: 18 U/L

## 2019-03-15 PROCEDURE — 84460 ALANINE AMINO (ALT) (SGPT): CPT | Mod: HCNC

## 2019-03-15 PROCEDURE — 36415 COLL VENOUS BLD VENIPUNCTURE: CPT | Mod: HCNC

## 2019-03-27 DIAGNOSIS — E08.65 DIABETES MELLITUS DUE TO UNDERLYING CONDITION WITH HYPERGLYCEMIA: ICD-10-CM

## 2019-03-27 RX ORDER — ISOPROPYL ALCOHOL 70 ML/100ML
1 SWAB TOPICAL
Qty: 100 EACH | Refills: 3 | Status: SHIPPED | OUTPATIENT
Start: 2019-03-27 | End: 2020-06-24

## 2019-04-02 ENCOUNTER — TELEPHONE (OUTPATIENT)
Dept: INTERNAL MEDICINE | Facility: CLINIC | Age: 74
End: 2019-04-02

## 2019-04-26 ENCOUNTER — LAB VISIT (OUTPATIENT)
Dept: LAB | Facility: HOSPITAL | Age: 74
End: 2019-04-26
Attending: INTERNAL MEDICINE
Payer: MEDICARE

## 2019-04-26 DIAGNOSIS — Z22.7 TB LUNG, LATENT: ICD-10-CM

## 2019-04-26 DIAGNOSIS — E03.4 HYPOTHYROIDISM DUE TO ACQUIRED ATROPHY OF THYROID: ICD-10-CM

## 2019-04-26 DIAGNOSIS — Z22.7 LATENT TUBERCULOSIS: ICD-10-CM

## 2019-04-26 DIAGNOSIS — E78.2 MIXED HYPERLIPIDEMIA: ICD-10-CM

## 2019-04-26 LAB — ALT SERPL W/O P-5'-P-CCNC: 38 U/L (ref 10–44)

## 2019-04-26 PROCEDURE — 36415 COLL VENOUS BLD VENIPUNCTURE: CPT | Mod: HCNC

## 2019-04-26 PROCEDURE — 84460 ALANINE AMINO (ALT) (SGPT): CPT | Mod: HCNC

## 2019-04-26 RX ORDER — LEVOTHYROXINE SODIUM 112 UG/1
112 TABLET ORAL
Qty: 90 TABLET | Refills: 3 | Status: SHIPPED | OUTPATIENT
Start: 2019-04-26 | End: 2019-06-11 | Stop reason: SDUPTHER

## 2019-04-26 RX ORDER — ISONIAZID 300 MG/1
300 TABLET ORAL DAILY
Qty: 90 TABLET | Refills: 1 | Status: SHIPPED | OUTPATIENT
Start: 2019-04-26 | End: 2021-02-12 | Stop reason: ALTCHOICE

## 2019-04-26 RX ORDER — ROSUVASTATIN CALCIUM 5 MG/1
5 TABLET, COATED ORAL DAILY
Qty: 90 TABLET | Refills: 3 | Status: SHIPPED | OUTPATIENT
Start: 2019-04-26 | End: 2020-01-28 | Stop reason: SDUPTHER

## 2019-05-16 ENCOUNTER — OFFICE VISIT (OUTPATIENT)
Dept: INTERNAL MEDICINE | Facility: CLINIC | Age: 74
End: 2019-05-16
Payer: MEDICARE

## 2019-05-16 ENCOUNTER — TELEPHONE (OUTPATIENT)
Dept: INTERNAL MEDICINE | Facility: CLINIC | Age: 74
End: 2019-05-16

## 2019-05-16 ENCOUNTER — HOSPITAL ENCOUNTER (OUTPATIENT)
Dept: RADIOLOGY | Facility: HOSPITAL | Age: 74
Discharge: HOME | End: 2019-05-16
Attending: INTERNAL MEDICINE
Payer: MEDICARE

## 2019-05-16 DIAGNOSIS — Z12.31 ENCOUNTER FOR SCREENING MAMMOGRAM FOR BREAST CANCER: Primary | ICD-10-CM

## 2019-05-16 DIAGNOSIS — J40 BRONCHITIS: Primary | ICD-10-CM

## 2019-05-16 DIAGNOSIS — J40 BRONCHITIS: ICD-10-CM

## 2019-05-16 DIAGNOSIS — R30.0 DYSURIA: ICD-10-CM

## 2019-05-16 DIAGNOSIS — R93.9 DIAGNOSTIC IMAGING INCONCLUSIVE DUE TO EXCESS BODY FAT OF PATIENT: ICD-10-CM

## 2019-05-16 PROCEDURE — 1101F PR PT FALLS ASSESS DOC 0-1 FALLS W/OUT INJ PAST YR: ICD-10-PCS | Mod: HCNC,CPTII,S$GLB, | Performed by: INTERNAL MEDICINE

## 2019-05-16 PROCEDURE — 1101F PT FALLS ASSESS-DOCD LE1/YR: CPT | Mod: HCNC,CPTII,S$GLB, | Performed by: INTERNAL MEDICINE

## 2019-05-16 PROCEDURE — 71046 X-RAY EXAM CHEST 2 VIEWS: CPT | Mod: TC,HCNC,FY

## 2019-05-16 PROCEDURE — 71046 X-RAY EXAM CHEST 2 VIEWS: CPT | Mod: 26,HCNC,, | Performed by: RADIOLOGY

## 2019-05-16 PROCEDURE — 99213 PR OFFICE/OUTPT VISIT, EST, LEVL III, 20-29 MIN: ICD-10-PCS | Mod: HCNC,S$GLB,, | Performed by: INTERNAL MEDICINE

## 2019-05-16 PROCEDURE — 71046 XR CHEST PA AND LATERAL: ICD-10-PCS | Mod: 26,HCNC,, | Performed by: RADIOLOGY

## 2019-05-16 PROCEDURE — 99213 OFFICE O/P EST LOW 20 MIN: CPT | Mod: HCNC,S$GLB,, | Performed by: INTERNAL MEDICINE

## 2019-05-16 PROCEDURE — 99999 PR PBB SHADOW E&M-EST. PATIENT-LVL III: CPT | Mod: PBBFAC,HCNC,, | Performed by: INTERNAL MEDICINE

## 2019-05-16 PROCEDURE — 99999 PR PBB SHADOW E&M-EST. PATIENT-LVL III: ICD-10-PCS | Mod: PBBFAC,HCNC,, | Performed by: INTERNAL MEDICINE

## 2019-05-16 RX ORDER — AMOXICILLIN 500 MG/1
TABLET, FILM COATED ORAL
Qty: 20 TABLET | Refills: 0 | Status: SHIPPED | OUTPATIENT
Start: 2019-05-16 | End: 2021-02-12

## 2019-05-17 ENCOUNTER — HOSPITAL ENCOUNTER (OUTPATIENT)
Dept: RADIOLOGY | Facility: HOSPITAL | Age: 74
Discharge: HOME | End: 2019-05-17
Attending: INTERNAL MEDICINE
Payer: MEDICARE

## 2019-05-17 DIAGNOSIS — R93.9 DIAGNOSTIC IMAGING INCONCLUSIVE DUE TO EXCESS BODY FAT OF PATIENT: ICD-10-CM

## 2019-05-17 DIAGNOSIS — Z12.31 ENCOUNTER FOR SCREENING MAMMOGRAM FOR BREAST CANCER: ICD-10-CM

## 2019-05-17 PROCEDURE — 77063 MAMMO DIGITAL SCREENING BILAT WITH TOMOSYNTHESIS_CAD: ICD-10-PCS | Mod: 26,HCNC,, | Performed by: RADIOLOGY

## 2019-05-17 PROCEDURE — 77067 MAMMO DIGITAL SCREENING BILAT WITH TOMOSYNTHESIS_CAD: ICD-10-PCS | Mod: 26,HCNC,, | Performed by: RADIOLOGY

## 2019-05-17 PROCEDURE — 77067 SCR MAMMO BI INCL CAD: CPT | Mod: 26,HCNC,, | Performed by: RADIOLOGY

## 2019-05-17 PROCEDURE — 77063 BREAST TOMOSYNTHESIS BI: CPT | Mod: 26,HCNC,, | Performed by: RADIOLOGY

## 2019-05-17 PROCEDURE — 77067 SCR MAMMO BI INCL CAD: CPT | Mod: TC,HCNC,PO

## 2019-05-23 ENCOUNTER — OFFICE VISIT (OUTPATIENT)
Dept: PODIATRY | Facility: CLINIC | Age: 74
End: 2019-05-23
Payer: MEDICARE

## 2019-05-23 VITALS
SYSTOLIC BLOOD PRESSURE: 130 MMHG | RESPIRATION RATE: 18 BRPM | BODY MASS INDEX: 33.33 KG/M2 | DIASTOLIC BLOOD PRESSURE: 71 MMHG | HEART RATE: 87 BPM | HEIGHT: 60 IN | WEIGHT: 169.75 LBS

## 2019-05-23 DIAGNOSIS — L60.0 INGROWN NAIL: ICD-10-CM

## 2019-05-23 DIAGNOSIS — E11.42 DIABETIC POLYNEUROPATHY ASSOCIATED WITH TYPE 2 DIABETES MELLITUS: Primary | ICD-10-CM

## 2019-05-23 PROCEDURE — 99203 PR OFFICE/OUTPT VISIT, NEW, LEVL III, 30-44 MIN: ICD-10-PCS | Mod: HCNC,S$GLB,, | Performed by: PODIATRIST

## 2019-05-23 PROCEDURE — 3045F PR MOST RECENT HEMOGLOBIN A1C LEVEL 7.0-9.0%: CPT | Mod: HCNC,CPTII,S$GLB, | Performed by: PODIATRIST

## 2019-05-23 PROCEDURE — 99999 PR PBB SHADOW E&M-EST. PATIENT-LVL III: ICD-10-PCS | Mod: PBBFAC,HCNC,, | Performed by: PODIATRIST

## 2019-05-23 PROCEDURE — 3078F DIAST BP <80 MM HG: CPT | Mod: HCNC,CPTII,S$GLB, | Performed by: PODIATRIST

## 2019-05-23 PROCEDURE — 3045F PR MOST RECENT HEMOGLOBIN A1C LEVEL 7.0-9.0%: ICD-10-PCS | Mod: HCNC,CPTII,S$GLB, | Performed by: PODIATRIST

## 2019-05-23 PROCEDURE — 3078F PR MOST RECENT DIASTOLIC BLOOD PRESSURE < 80 MM HG: ICD-10-PCS | Mod: HCNC,CPTII,S$GLB, | Performed by: PODIATRIST

## 2019-05-23 PROCEDURE — 99203 OFFICE O/P NEW LOW 30 MIN: CPT | Mod: HCNC,S$GLB,, | Performed by: PODIATRIST

## 2019-05-23 PROCEDURE — 1101F PT FALLS ASSESS-DOCD LE1/YR: CPT | Mod: HCNC,CPTII,S$GLB, | Performed by: PODIATRIST

## 2019-05-23 PROCEDURE — 3075F PR MOST RECENT SYSTOLIC BLOOD PRESS GE 130-139MM HG: ICD-10-PCS | Mod: HCNC,CPTII,S$GLB, | Performed by: PODIATRIST

## 2019-05-23 PROCEDURE — 3075F SYST BP GE 130 - 139MM HG: CPT | Mod: HCNC,CPTII,S$GLB, | Performed by: PODIATRIST

## 2019-05-23 PROCEDURE — 1101F PR PT FALLS ASSESS DOC 0-1 FALLS W/OUT INJ PAST YR: ICD-10-PCS | Mod: HCNC,CPTII,S$GLB, | Performed by: PODIATRIST

## 2019-05-23 PROCEDURE — 99999 PR PBB SHADOW E&M-EST. PATIENT-LVL III: CPT | Mod: PBBFAC,HCNC,, | Performed by: PODIATRIST

## 2019-05-23 RX ORDER — NEOMYCIN SULFATE, POLYMYXIN B SULFATE AND DEXAMETHASONE 3.5; 10000; 1 MG/ML; [USP'U]/ML; MG/ML
SUSPENSION/ DROPS OPHTHALMIC
Refills: 0 | COMMUNITY
Start: 2019-02-20 | End: 2022-06-29

## 2019-05-23 RX ORDER — CELECOXIB 200 MG/1
200 CAPSULE ORAL
COMMUNITY
End: 2021-02-12

## 2019-05-23 RX ORDER — TRAMADOL HYDROCHLORIDE 50 MG/1
50 TABLET ORAL
COMMUNITY
End: 2021-02-12

## 2019-05-24 VITALS — DIASTOLIC BLOOD PRESSURE: 72 MMHG | HEART RATE: 64 BPM | SYSTOLIC BLOOD PRESSURE: 150 MMHG

## 2019-05-24 DIAGNOSIS — N39.0 URINARY TRACT INFECTION WITHOUT HEMATURIA, SITE UNSPECIFIED: Primary | ICD-10-CM

## 2019-05-24 RX ORDER — CIPROFLOXACIN 500 MG/1
TABLET ORAL
Qty: 6 TABLET | Refills: 0 | Status: SHIPPED | OUTPATIENT
Start: 2019-05-24 | End: 2019-09-17

## 2019-05-24 NOTE — PROGRESS NOTES
Subjective:       Patient ID: Aminah Bravo is a 74 y.o. female.    Chief Complaint: Follow-up    HPIMiss Aminah is here for follow up. She was recently seen in  for bronchitis/sinusitis.She was treated symptomatically, but still with cough and sinus congestion. I obtained a CXR that was unremarkable. I gave her a rx for Amoxicillin with instructions. She also c/o dysuria and obtained urine studies to look into this. I will treat accordingly.  Review of Systems   Constitutional: Negative for chills, fatigue and fever.   Cardiovascular: Negative for chest pain, palpitations and leg swelling.       Objective:      Physical Exam   Constitutional: She appears well-developed and well-nourished.   HENT:   Head: Normocephalic and atraumatic.   Right Ear: External ear normal.   Left Ear: External ear normal.   Mouth/Throat: Oropharynx is clear and moist. No oropharyngeal exudate.   Neck: Normal range of motion. Neck supple. No JVD present. No thyromegaly present.   Cardiovascular: Normal rate, regular rhythm, normal heart sounds and intact distal pulses.   No murmur heard.  Pulmonary/Chest: Effort normal and breath sounds normal. No respiratory distress. She has no wheezes. She exhibits no tenderness.   Musculoskeletal: She exhibits no edema.   Lymphadenopathy:     She has no cervical adenopathy.   Skin: Skin is warm and dry. She is not diaphoretic.   Psychiatric: She has a normal mood and affect. Her behavior is normal.   Nursing note and vitals reviewed.      Assessment:       1. Bronchitis    2. Dysuria        Plan:    1. As per HPI.         2. Call with results and treat accordingly.         3. RTC prn.

## 2019-05-28 NOTE — PROGRESS NOTES
Subjective:      Patient ID: Aminah Bravo is a 74 y.o. female.    Chief Complaint: PCP (Dereje Thomas MD 2/26/19); Diabetic Foot Exam (left foot pain ); Nail Care; Nail Problem (nail fungus ); and Callouses    Aminah is a 74 y.o. female who presents to the clinic upon referral from Dr. Dunn  for evaluation and treatment of diabetic feet. Aminah has a past medical history of Arthritis, Cataract, Choroidal rupture of left eye, Diabetes mellitus type II, GERD (gastroesophageal reflux disease), Hyperlipidemia, Hypertension, Hypothyroidism, Macular scar, Retinal degeneration, Thyroid disease, and Vitamin B 12 deficiency. Patient relates no major problem with feet. Only complaints today consist of routine diabetic foot evaluations.    PCP: Dereje Thomas MD    Date Last Seen by PCP:   Chief Complaint   Patient presents with    PCP     Dereje Thomas MD 2/26/19    Diabetic Foot Exam     left foot pain     Nail Care    Nail Problem     nail fungus     Callouses         Current shoe gear: Casual shoes    Hemoglobin A1C   Date Value Ref Range Status   12/14/2018 8.3 (H) 4.0 - 5.6 % Final     Comment:     ADA Screening Guidelines:  5.7-6.4%  Consistent with prediabetes  >or=6.5%  Consistent with diabetes  High levels of fetal hemoglobin interfere with the HbA1C  assay. Heterozygous hemoglobin variants (HbS, HgC, etc)do  not significantly interfere with this assay.   However, presence of multiple variants may affect accuracy.     02/19/2018 7.6 (H) 4.0 - 5.6 % Final     Comment:     According to ADA guidelines, hemoglobin A1c <7.0% represents  optimal control in non-pregnant diabetic patients. Different  metrics may apply to specific patient populations.   Standards of Medical Care in Diabetes-2016.  For the purpose of screening for the presence of diabetes:  <5.7%     Consistent with the absence of diabetes  5.7-6.4%  Consistent with increasing risk for diabetes    (prediabetes)  >or=6.5%  Consistent with diabetes  Currently, no consensus exists for use of hemoglobin A1c  for diagnosis of diabetes for children.  This Hemoglobin A1c assay has significant interference with fetal   hemoglobin   (HbF). The results are invalid for patients with abnormal amounts of   HbF,   including those with known Hereditary Persistence   of Fetal Hemoglobin. Heterozygous hemoglobin variants (HbAS, HbAC,   HbAD, HbAE, HbA2) do not significantly interfere with this assay;   however, presence of multiple variants in a sample may impact the %   interference.     11/29/2016 7.6 (H) 4.5 - 6.2 % Final     Comment:     According to ADA guidelines, hemoglobin A1C <7.0% represents  optimal control in non-pregnant diabetic patients.  Different  metrics may apply to specific populations.   Standards of Medical Care in Diabetes - 2016.  For the purpose of screening for the presence of diabetes:  <5.7%     Consistent with the absence of diabetes  5.7-6.4%  Consistent with increasing risk for diabetes   (prediabetes)  >or=6.5%  Consistent with diabetes  Currently no consensus exists for use of hemoglobin A1C  for diagnosis of diabetes for children.             Review of Systems   Constitution: Negative for chills and fever.   HENT: Negative for congestion and tinnitus.    Eyes: Negative for double vision and visual disturbance.   Cardiovascular: Negative for chest pain and claudication.   Respiratory: Negative for hemoptysis and shortness of breath.    Endocrine: Negative for cold intolerance and heat intolerance.   Hematologic/Lymphatic: Negative for adenopathy and bleeding problem.   Skin: Positive for color change, dry skin and nail changes.   Musculoskeletal: Positive for stiffness. Negative for myalgias.   Gastrointestinal: Negative for nausea and vomiting.   Genitourinary: Negative for dysuria and hematuria.   Neurological: Positive for numbness and paresthesias.   Psychiatric/Behavioral: Negative for  altered mental status and suicidal ideas.   Allergic/Immunologic: Negative for environmental allergies and persistent infections.           Objective:      Physical Exam   Constitutional: She is oriented to person, place, and time. She appears well-developed and well-nourished.   Cardiovascular:   Pulses:       Dorsalis pedis pulses are 2+ on the right side, and 2+ on the left side.        Posterior tibial pulses are 2+ on the right side, and 2+ on the left side.   Pulmonary/Chest: Effort normal.   Musculoskeletal: Normal range of motion.   Inspection and palpation of the muscles joints and bones of both lower extremities reveal that muscle strength for the anterior lateral and posterior muscle groups and intrinsic muscle groups of the foot are all 5 over 5 symmetrical.  Ankle subtalar midtarsal and digital joint range of motion are within normal limits, nonpainful, without crepitus or effusion.  Patient exhibits a normal angle and base of gait.  Palpation of the tendons reveal no defects.   Neurological: She is alert and oriented to person, place, and time.   Sharp dull light touch vibratory proprioceptive sensation are diminished bilaterally.  Deep tendon reflexes to patellar and Achilles tendon are symmetrical 2 over 4 bilaterally.  No ankle clonus or Babinski reflexes noted bilaterally.  Coordination is fair to both feet and lower extremities.   Skin: Skin is warm and dry. Capillary refill takes 2 to 3 seconds.   Skin turgor is normal bilaterally.  Skin texture is well hydrated to both lower extremities.  No lesions or rashes or wounds appreciated bilaterally. Mild incurvation noted to bilateral hallux nails with mild tenderness no erythema.   Psychiatric: She has a normal mood and affect.   Vitals reviewed.            Assessment:       Encounter Diagnoses   Name Primary?    Diabetic polyneuropathy associated with type 2 diabetes mellitus Yes    Ingrown nail          Plan:       Aminah was seen today for pcp,  diabetic foot exam, nail care, nail problem and callouses.    Diagnoses and all orders for this visit:    Diabetic polyneuropathy associated with type 2 diabetes mellitus  -     DIABETIC SHOES FOR HOME USE    Ingrown nail      I counseled the patient on her conditions, their implications and medical management.      Shoe inspection. Diabetic Foot Education. Patient reminded of the importance of good nutrition and blood sugar control to help prevent podiatric complications of diabetes. Patient instructed on proper foot hygeine. We discussed wearing proper shoe gear, daily foot inspections and Diabetic foot education in detail.  Follow-up for possible procedure.  Return to clinic in 3-6 months or sooner if problems arise   .

## 2019-05-29 DIAGNOSIS — E78.2 MIXED HYPERLIPIDEMIA: ICD-10-CM

## 2019-05-29 DIAGNOSIS — I10 HTN (HYPERTENSION), BENIGN: ICD-10-CM

## 2019-05-29 RX ORDER — BACLOFEN 10 MG/1
10 TABLET ORAL NIGHTLY
Qty: 90 TABLET | Refills: 1 | Status: SHIPPED | OUTPATIENT
Start: 2019-05-29 | End: 2020-01-28 | Stop reason: SDUPTHER

## 2019-05-29 RX ORDER — CARVEDILOL 12.5 MG/1
12.5 TABLET ORAL 2 TIMES DAILY
Qty: 180 TABLET | Refills: 3 | Status: SHIPPED | OUTPATIENT
Start: 2019-05-29 | End: 2020-01-28 | Stop reason: SDUPTHER

## 2019-06-06 DIAGNOSIS — E08.65 DIABETES MELLITUS DUE TO UNDERLYING CONDITION WITH HYPERGLYCEMIA: ICD-10-CM

## 2019-06-07 RX ORDER — ISOPROPYL ALCOHOL 0.75 G/1
SWAB TOPICAL
Refills: 3 | OUTPATIENT
Start: 2019-06-07

## 2019-06-11 DIAGNOSIS — I10 HTN (HYPERTENSION), BENIGN: ICD-10-CM

## 2019-06-11 DIAGNOSIS — E03.4 HYPOTHYROIDISM DUE TO ACQUIRED ATROPHY OF THYROID: ICD-10-CM

## 2019-06-11 DIAGNOSIS — E11.42 DIABETIC POLYNEUROPATHY ASSOCIATED WITH TYPE 2 DIABETES MELLITUS: ICD-10-CM

## 2019-06-11 RX ORDER — LEVOTHYROXINE SODIUM 112 UG/1
TABLET ORAL
Qty: 90 TABLET | Refills: 1 | Status: SHIPPED | OUTPATIENT
Start: 2019-06-11 | End: 2020-01-28 | Stop reason: SDUPTHER

## 2019-06-12 RX ORDER — LISINOPRIL 40 MG/1
40 TABLET ORAL DAILY
Qty: 90 TABLET | Refills: 3 | Status: SHIPPED | OUTPATIENT
Start: 2019-06-12 | End: 2020-01-28 | Stop reason: SDUPTHER

## 2019-06-12 RX ORDER — GABAPENTIN 300 MG/1
300 CAPSULE ORAL NIGHTLY
Qty: 90 CAPSULE | Refills: 3 | Status: SHIPPED | OUTPATIENT
Start: 2019-06-12 | End: 2020-01-28 | Stop reason: SDUPTHER

## 2019-08-16 ENCOUNTER — PES CALL (OUTPATIENT)
Dept: ADMINISTRATIVE | Facility: CLINIC | Age: 74
End: 2019-08-16

## 2019-09-17 ENCOUNTER — HOSPITAL ENCOUNTER (EMERGENCY)
Facility: HOSPITAL | Age: 74
Discharge: HOME | End: 2019-09-17
Attending: EMERGENCY MEDICINE
Payer: MEDICARE

## 2019-09-17 VITALS
WEIGHT: 160 LBS | HEART RATE: 94 BPM | OXYGEN SATURATION: 96 % | BODY MASS INDEX: 31.41 KG/M2 | HEIGHT: 60 IN | RESPIRATION RATE: 18 BRPM | TEMPERATURE: 99 F | SYSTOLIC BLOOD PRESSURE: 182 MMHG | DIASTOLIC BLOOD PRESSURE: 74 MMHG

## 2019-09-17 DIAGNOSIS — R50.9 FEVER, UNSPECIFIED FEVER CAUSE: Primary | ICD-10-CM

## 2019-09-17 DIAGNOSIS — R68.89 MULTIPLE COMPLAINTS: ICD-10-CM

## 2019-09-17 DIAGNOSIS — R53.1 WEAKNESS: ICD-10-CM

## 2019-09-17 DIAGNOSIS — R30.0 DYSURIA: ICD-10-CM

## 2019-09-17 LAB
ALBUMIN SERPL BCP-MCNC: 3.8 G/DL (ref 3.5–5.2)
ALP SERPL-CCNC: 96 U/L (ref 55–135)
ALT SERPL W/O P-5'-P-CCNC: 13 U/L (ref 10–44)
ANION GAP SERPL CALC-SCNC: 12 MMOL/L (ref 8–16)
AST SERPL-CCNC: 16 U/L (ref 10–40)
BACTERIA #/AREA URNS AUTO: NORMAL /HPF
BASOPHILS # BLD AUTO: 0.01 K/UL (ref 0–0.2)
BASOPHILS NFR BLD: 0.2 % (ref 0–1.9)
BILIRUB SERPL-MCNC: 0.5 MG/DL (ref 0.1–1)
BILIRUB UR QL STRIP: NEGATIVE
BUN SERPL-MCNC: 11 MG/DL (ref 8–23)
CALCIUM SERPL-MCNC: 9.7 MG/DL (ref 8.7–10.5)
CHLORIDE SERPL-SCNC: 100 MMOL/L (ref 95–110)
CLARITY UR REFRACT.AUTO: CLEAR
CO2 SERPL-SCNC: 25 MMOL/L (ref 23–29)
COLOR UR AUTO: ABNORMAL
CREAT SERPL-MCNC: 0.8 MG/DL (ref 0.5–1.4)
DIFFERENTIAL METHOD: ABNORMAL
EOSINOPHIL # BLD AUTO: 0 K/UL (ref 0–0.5)
EOSINOPHIL NFR BLD: 0.7 % (ref 0–8)
ERYTHROCYTE [DISTWIDTH] IN BLOOD BY AUTOMATED COUNT: 18 % (ref 11.5–14.5)
EST. GFR  (AFRICAN AMERICAN): >60 ML/MIN/1.73 M^2
EST. GFR  (NON AFRICAN AMERICAN): >60 ML/MIN/1.73 M^2
GLUCOSE SERPL-MCNC: 223 MG/DL (ref 70–110)
GLUCOSE UR QL STRIP: ABNORMAL
HCT VFR BLD AUTO: 31.3 % (ref 37–48.5)
HGB BLD-MCNC: 9.5 G/DL (ref 12–16)
HGB UR QL STRIP: ABNORMAL
IMM GRANULOCYTES # BLD AUTO: 0.02 K/UL (ref 0–0.04)
IMM GRANULOCYTES NFR BLD AUTO: 0.4 % (ref 0–0.5)
INFLUENZA A, MOLECULAR: NEGATIVE
INFLUENZA B, MOLECULAR: NEGATIVE
KETONES UR QL STRIP: NEGATIVE
LACTATE SERPL-SCNC: 0.9 MMOL/L (ref 0.5–2.2)
LACTATE SERPL-SCNC: 4 MMOL/L (ref 0.5–2.2)
LEUKOCYTE ESTERASE UR QL STRIP: NEGATIVE
LYMPHOCYTES # BLD AUTO: 1.2 K/UL (ref 1–4.8)
LYMPHOCYTES NFR BLD: 21.4 % (ref 18–48)
MCH RBC QN AUTO: 21.9 PG (ref 27–31)
MCHC RBC AUTO-ENTMCNC: 30.4 G/DL (ref 32–36)
MCV RBC AUTO: 72 FL (ref 82–98)
MICROSCOPIC COMMENT: NORMAL
MONOCYTES # BLD AUTO: 0.7 K/UL (ref 0.3–1)
MONOCYTES NFR BLD: 12.1 % (ref 4–15)
NEUTROPHILS # BLD AUTO: 3.5 K/UL (ref 1.8–7.7)
NEUTROPHILS NFR BLD: 65.2 % (ref 38–73)
NITRITE UR QL STRIP: NEGATIVE
NRBC BLD-RTO: 0 /100 WBC
PH UR STRIP: 7 [PH] (ref 5–8)
PLATELET # BLD AUTO: 235 K/UL (ref 150–350)
PMV BLD AUTO: 10 FL (ref 9.2–12.9)
POTASSIUM SERPL-SCNC: 4.1 MMOL/L (ref 3.5–5.1)
PROT SERPL-MCNC: 7.4 G/DL (ref 6–8.4)
PROT UR QL STRIP: NEGATIVE
RBC # BLD AUTO: 4.34 M/UL (ref 4–5.4)
RBC #/AREA URNS AUTO: 2 /HPF (ref 0–4)
SODIUM SERPL-SCNC: 137 MMOL/L (ref 136–145)
SP GR UR STRIP: 1 (ref 1–1.03)
SPECIMEN SOURCE: NORMAL
URN SPEC COLLECT METH UR: ABNORMAL
WBC # BLD AUTO: 5.37 K/UL (ref 3.9–12.7)

## 2019-09-17 PROCEDURE — 87040 BLOOD CULTURE FOR BACTERIA: CPT | Mod: 59,HCNC

## 2019-09-17 PROCEDURE — 93010 EKG 12-LEAD: ICD-10-PCS | Mod: HCNC,,, | Performed by: INTERNAL MEDICINE

## 2019-09-17 PROCEDURE — 99285 EMERGENCY DEPT VISIT HI MDM: CPT | Mod: ,,, | Performed by: EMERGENCY MEDICINE

## 2019-09-17 PROCEDURE — 99285 PR EMERGENCY DEPT VISIT,LEVEL V: ICD-10-PCS | Mod: ,,, | Performed by: EMERGENCY MEDICINE

## 2019-09-17 PROCEDURE — 80053 COMPREHEN METABOLIC PANEL: CPT | Mod: HCNC

## 2019-09-17 PROCEDURE — 83605 ASSAY OF LACTIC ACID: CPT | Mod: HCNC

## 2019-09-17 PROCEDURE — 96375 TX/PRO/DX INJ NEW DRUG ADDON: CPT | Mod: HCNC

## 2019-09-17 PROCEDURE — 99285 EMERGENCY DEPT VISIT HI MDM: CPT | Mod: 25,HCNC

## 2019-09-17 PROCEDURE — 96361 HYDRATE IV INFUSION ADD-ON: CPT | Mod: HCNC

## 2019-09-17 PROCEDURE — 87502 INFLUENZA DNA AMP PROBE: CPT | Mod: HCNC

## 2019-09-17 PROCEDURE — 85025 COMPLETE CBC W/AUTO DIFF WBC: CPT | Mod: HCNC

## 2019-09-17 PROCEDURE — 93010 ELECTROCARDIOGRAM REPORT: CPT | Mod: HCNC,,, | Performed by: INTERNAL MEDICINE

## 2019-09-17 PROCEDURE — 81001 URINALYSIS AUTO W/SCOPE: CPT | Mod: HCNC

## 2019-09-17 PROCEDURE — 96365 THER/PROPH/DIAG IV INF INIT: CPT | Mod: HCNC

## 2019-09-17 PROCEDURE — 63600175 PHARM REV CODE 636 W HCPCS: Mod: HCNC | Performed by: EMERGENCY MEDICINE

## 2019-09-17 PROCEDURE — 93005 ELECTROCARDIOGRAM TRACING: CPT | Mod: HCNC

## 2019-09-17 RX ORDER — SULFAMETHOXAZOLE AND TRIMETHOPRIM 800; 160 MG/1; MG/1
1 TABLET ORAL 2 TIMES DAILY
Qty: 14 TABLET | Refills: 0 | Status: SHIPPED | OUTPATIENT
Start: 2019-09-17 | End: 2019-09-24

## 2019-09-17 RX ORDER — ONDANSETRON 2 MG/ML
4 INJECTION INTRAMUSCULAR; INTRAVENOUS
Status: COMPLETED | OUTPATIENT
Start: 2019-09-17 | End: 2019-09-17

## 2019-09-17 RX ORDER — MORPHINE SULFATE 4 MG/ML
4 INJECTION, SOLUTION INTRAMUSCULAR; INTRAVENOUS
Status: COMPLETED | OUTPATIENT
Start: 2019-09-17 | End: 2019-09-17

## 2019-09-17 RX ADMIN — SODIUM CHLORIDE 1000 ML: 0.9 INJECTION, SOLUTION INTRAVENOUS at 01:09

## 2019-09-17 RX ADMIN — MORPHINE SULFATE 4 MG: 4 INJECTION INTRAVENOUS at 01:09

## 2019-09-17 RX ADMIN — SODIUM CHLORIDE 1000 ML: 0.9 INJECTION, SOLUTION INTRAVENOUS at 03:09

## 2019-09-17 RX ADMIN — ONDANSETRON 4 MG: 2 INJECTION INTRAMUSCULAR; INTRAVENOUS at 01:09

## 2019-09-17 RX ADMIN — CEFTRIAXONE 2 G: 2 INJECTION, SOLUTION INTRAVENOUS at 03:09

## 2019-09-17 NOTE — PROVIDER PROGRESS NOTES - EMERGENCY DEPT.
Encounter Date: 9/17/2019    ED Physician Progress Notes         EKG - STEMI Decision  Initial Reading: No STEMI present.    ____________________  Alec Heredia MD, [facility]  Emergency Medicine Staff  12:18 PM 9/17/2019

## 2019-09-17 NOTE — ED PROVIDER NOTES
Encounter Date: 9/17/2019  Pt seen by provider at 12:52 PM       History     Chief Complaint   Patient presents with    Multiple complaints     urinating every 5 min, bodyaches,     73 y/o F with co-morbidities of HTN, HLD, hypothyroidism, DM2, GERD, arthritis, cataract and Vitamin B 12 deficiency presents with generalized bodyaches for the past 2 days. As well as urinary frequency, urgency and dysuria.  Fever noted yesterday evening. Mild headhache and chest and neck pain.  Denies nausea, vomiting and diarrhea.  No abdominal pain  Returned from [place] 1 month ago but has been well up until 2 days ago.  States her blood sugar this morning was approx 120  No others sick at home  No sore throat        Review of patient's allergies indicates:   Allergen Reactions    Bufferin [aspirin, buffered] Itching    Atorvastatin      Myalgias and arthralgias    Warfarin     Ibuprofen Itching     Itching of eyes, head     Past Medical History:   Diagnosis Date    Arthritis     Cataract     left eye    Choroidal rupture of left eye     Diabetes mellitus type II     GERD (gastroesophageal reflux disease)     Hyperlipidemia     Hypertension     Hypothyroidism     Macular scar     right eye    Retinal degeneration     chorioretinal OD    Thyroid disease     Vitamin B 12 deficiency      Past Surgical History:   Procedure Laterality Date    BIOPSY-BLADDER N/A 3/7/2017    [facility]    BIOPSY-URETER Right 3/7/2017    [facility]    BLADDER SUSPENSION      CATARACT EXTRACTION      right eye     CHOLECYSTECTOMY      CYSTOSCOPY WITH RETROGRADE PYELOGRAM Bilateral 3/7/2017    [facility]    FULGURATION-BLADDER N/A 3/7/2017    [facility]    PLACEMENT-STENT Right 3/7/2017    [facility]    TOTAL ABDOMINAL  HYSTERECTOMY      DUB    URETEROSCOPY Right 3/7/2017    [facility] at [facility] OR [address] [place] [address]     Family History   Problem Relation Age of Onset    Cancer Mother         uterine    Breast cancer Mother     Ovarian cancer Mother     COPD Father     Cancer Father     Cancer Sister         liver    Ovarian cancer Sister     Hypertension Daughter     Thyroid disease Daughter     Diabetes Son     Hypertension Sister     Hypertension Sister     Cancer Daughter         uterine    Thyroid disease Daughter     Hypertension Daughter     Colon cancer Neg Hx     Amblyopia Neg Hx     Blindness Neg Hx     Cataracts Neg Hx     Glaucoma Neg Hx     Macular degeneration Neg Hx     Retinal detachment Neg Hx     Strabismus Neg Hx     Stroke Neg Hx      Social History     Tobacco Use    Smoking status: Never Smoker    Smokeless tobacco: Never Used   Substance Use Topics    Alcohol use: No    Drug use: No     Review of Systems   Constitutional: Positive for fatigue and fever.   HENT: Negative for sinus pain and sore throat.    Eyes: Negative for photophobia.   Respiratory: Negative for cough, choking and shortness of breath.    Cardiovascular: Positive for chest pain. Negative for leg swelling.   Gastrointestinal: Negative for abdominal distention, abdominal pain, diarrhea, nausea and vomiting.   Genitourinary: Positive for dysuria, frequency and urgency.   Musculoskeletal: Positive for back pain, myalgias and neck stiffness.   Skin: Positive for pallor. Negative for rash.   Neurological: Positive for dizziness, weakness and headaches. Negative for syncope and light-headedness.       Physical Exam     Initial Vitals [09/17/19 1207]   BP Pulse Resp Temp SpO2   (!) 149/68 93 18 100.2 °F (37.9 °C) 97 %      MAP       --         Physical Exam    Nursing note and vitals reviewed.  Constitutional: She appears well-developed and well-nourished. No distress.   HENT:   Head: Normocephalic and  atraumatic.   Mouth/Throat: Oropharynx is clear and moist. No oropharyngeal exudate.   Eyes: EOM are normal. Pupils are equal, round, and reactive to light.   Neck: Neck supple.   Cardiovascular: Normal rate, regular rhythm, normal heart sounds and intact distal pulses.   Pulmonary/Chest: Breath sounds normal. She is in respiratory distress. She has no wheezes. She has no rhonchi. She has no rales.   Abdominal: Soft. Bowel sounds are normal. She exhibits no distension. There is rebound. There is no guarding.   ttp LLQ and suprapubic   Musculoskeletal: She exhibits no edema.   Lymphadenopathy:     She has no cervical adenopathy.   Neurological: She is alert and oriented to person, place, and time. GCS score is 15. GCS eye subscore is 4. GCS verbal subscore is 5. GCS motor subscore is 6.   Skin: Skin is warm and dry. No rash noted.         ED Course   Procedures  Labs Reviewed   CULTURE, BLOOD   CULTURE, BLOOD   INFLUENZA A & B BY MOLECULAR   CBC W/ AUTO DIFFERENTIAL   COMPREHENSIVE METABOLIC PANEL   LACTIC ACID, PLASMA   URINALYSIS, REFLEX TO URINE CULTURE     EKG Readings: (Independently Interpreted)   Sinus rhythm, rate 92, no acute ischemic changes     ECG Results          EKG 12-lead (In process)  Result time 09/17/19 12:49:43    In process by Interface, Lab In [facility] (09/17/19 12:49:43)                 Narrative:    Test Reason : R68.89,    Vent. Rate : 092 BPM     Atrial Rate : 092 BPM     P-R Int : 160 ms          QRS Dur : 088 ms      QT Int : 370 ms       P-R-T Axes : 000 186 152 degrees     QTc Int : 457 ms    Normal sinus rhythm  Right superior axis deviation  Abnormal ECG  When compared with ECG of 14-AUG-2014 09:12,  Questionable change in The axis  QT has lengthened    Referred By: ESTEFANIA MALCOLM           Confirmed By:                             Imaging Results    None       X-Rays:   Independently Interpreted Readings:   Chest X-Ray: Normal heart size.  No infiltrates.  No acute abnormalities.      Medical Decision Making:   History:   Old Medical Records: I decided to obtain old medical records.  Old Records Summarized: records from clinic visits.  Initial Assessment:   73 y/o F with generalized bodyaches and fever as well as dysuria  Ddx: UTI, urosepsis, influenza, viral syndrome, less likely pneumonia  Sepsis w/u initialted. Although pt not tachycardic or hypotensive - 1L saline bolus ordered.  Pt requesting pain medication- allergic to NSAIDS  Independently Interpreted Test(s):   I have ordered and independently interpreted X-rays - see prior notes.  I have ordered and independently interpreted EKG Reading(s) - see prior notes  Clinical Tests:   Lab Tests: Ordered and Reviewed       <> Summary of Lab: 3:12 PM  CMP winthin normal limits  Radiological Study: Ordered and Reviewed  Medical Tests: Ordered and Reviewed  ED Management:  3:12 PM  Lactate 4.0  2nd bolus saline ordered.  Awaiting CBC and UA but givne sx of UTI  Will order abx for presumptive UTI- rocephin IV.    4:32 PM  Pt reports feeling better. Awaiting UA and repeat lactate    6:32 PM  UA negative for leukocytes and nitrate, 1+ blood. Repeat lactate 0.7. Pt reassessed stats she is feeling much better, wants to go home. Given sx of UTI will treat. Pt to f/u with PCP for repeat cbc, UA and blood pressure check.                      Clinical Impression:       ICD-10-CM ICD-9-CM   1. Fever, unspecified fever cause R50.9 780.60   2. Multiple complaints R68.89 780.99   3. Weakness R53.1 780.79   4. Dysuria R30.0 788.1         Disposition:   Disposition: Discharged  Condition: Stable                        Mariza Abraham MD  09/18/19 1486

## 2019-09-17 NOTE — ED NOTES
Pt placed in hospital gown and given blankets and pillow for comfort measures. Updated on current POC.

## 2019-09-17 NOTE — DISCHARGE INSTRUCTIONS
Take antibiotic as prescribed.  Drink plenty of fluids to stay hydrated.   Follow up with your doctor. Call tomorrow for an appointment.  Return to ED for fevers, worsening burning with urination or frequency, abdominal pain, chest pain or any other concerns.

## 2019-09-17 NOTE — ED NOTES
Pt assisted to restroom with RN. Pt has steady gait, - complaints of weakness, pain at this time. AAOx4. Breathing equal and non-labored, in no acute distress. No new needs voiced at this time. Will continue to monitor.

## 2019-09-17 NOTE — ED NOTES
Pt awake and alert; resting quietly on stretcher.  Pt remains on continuous cardiac and pulse ox monitoring with non-invasive blood pressure to cycle every 30 minutes.  VS stable. Pt denies pain at this time; no acute distress or discomfort reported or observed.  Pt denies restroom needs at this time; is able to reposition self on stretcher. Bed locked in lowest position; side rails up and locked x 2; call light, bedside table, and personal belongings within reach. Room assessed for safety measures and cleanliness; no action needed at this time. Plan of care discussed.  Pt instructed to alert nurse for assistance and before attempting to get out of bed; verbalizes understanding. Pt denies needs or complaints at this time; will continue to monitor.

## 2019-09-17 NOTE — ED TRIAGE NOTES
Pt c/o fever, and generalized body aches with pain radiating to the neck, arms, and legs. States that the urinary frequency has been ongoing x 1 month.

## 2019-09-17 NOTE — ED NOTES
Pt lying in bed watching tv. Repositioned for pain control, in no acute distress. VS stable. Updated on current poc. Will continue to monitor.

## 2019-09-22 LAB
BACTERIA BLD CULT: NORMAL
BACTERIA BLD CULT: NORMAL

## 2019-10-16 ENCOUNTER — OFFICE VISIT (OUTPATIENT)
Dept: INTERNAL MEDICINE | Facility: CLINIC | Age: 74
End: 2019-10-16
Payer: MEDICARE

## 2019-10-16 VITALS — SYSTOLIC BLOOD PRESSURE: 150 MMHG | DIASTOLIC BLOOD PRESSURE: 80 MMHG | HEART RATE: 64 BPM

## 2019-10-16 DIAGNOSIS — M54.2 NECK PAIN, MUSCULOSKELETAL: ICD-10-CM

## 2019-10-16 DIAGNOSIS — R39.9 LOWER URINARY TRACT SYMPTOMS (LUTS): ICD-10-CM

## 2019-10-16 DIAGNOSIS — I10 ESSENTIAL HYPERTENSION: Primary | ICD-10-CM

## 2019-10-16 PROCEDURE — 99999 PR PBB SHADOW E&M-EST. PATIENT-LVL III: ICD-10-PCS | Mod: PBBFAC,HCNC,, | Performed by: INTERNAL MEDICINE

## 2019-10-16 PROCEDURE — 3077F PR MOST RECENT SYSTOLIC BLOOD PRESSURE >= 140 MM HG: ICD-10-PCS | Mod: HCNC,CPTII,S$GLB, | Performed by: INTERNAL MEDICINE

## 2019-10-16 PROCEDURE — 1101F PR PT FALLS ASSESS DOC 0-1 FALLS W/OUT INJ PAST YR: ICD-10-PCS | Mod: HCNC,CPTII,S$GLB, | Performed by: INTERNAL MEDICINE

## 2019-10-16 PROCEDURE — 99213 OFFICE O/P EST LOW 20 MIN: CPT | Mod: HCNC,S$GLB,, | Performed by: INTERNAL MEDICINE

## 2019-10-16 PROCEDURE — 1101F PT FALLS ASSESS-DOCD LE1/YR: CPT | Mod: HCNC,CPTII,S$GLB, | Performed by: INTERNAL MEDICINE

## 2019-10-16 PROCEDURE — 3077F SYST BP >= 140 MM HG: CPT | Mod: HCNC,CPTII,S$GLB, | Performed by: INTERNAL MEDICINE

## 2019-10-16 PROCEDURE — 3079F PR MOST RECENT DIASTOLIC BLOOD PRESSURE 80-89 MM HG: ICD-10-PCS | Mod: HCNC,CPTII,S$GLB, | Performed by: INTERNAL MEDICINE

## 2019-10-16 PROCEDURE — 99213 PR OFFICE/OUTPT VISIT, EST, LEVL III, 20-29 MIN: ICD-10-PCS | Mod: HCNC,S$GLB,, | Performed by: INTERNAL MEDICINE

## 2019-10-16 PROCEDURE — 99999 PR PBB SHADOW E&M-EST. PATIENT-LVL III: CPT | Mod: PBBFAC,HCNC,, | Performed by: INTERNAL MEDICINE

## 2019-10-16 PROCEDURE — 3079F DIAST BP 80-89 MM HG: CPT | Mod: HCNC,CPTII,S$GLB, | Performed by: INTERNAL MEDICINE

## 2019-10-16 RX ORDER — CYCLOBENZAPRINE HCL 10 MG
TABLET ORAL
Qty: 30 TABLET | Refills: 1 | Status: SHIPPED | OUTPATIENT
Start: 2019-10-16 | End: 2021-02-12

## 2019-10-16 NOTE — PROGRESS NOTES
Subjective:       Patient ID: Aminah Bravo is a 74 y.o. female.    Chief Complaint: Follow-up    Ekaterina Alicea is here today for evaluation of bilateral neck and shoulder pain x 1 month. She was seen in ER in September for UTI issues and had similar complaints as well. She is stiff in her upper back and has difficulty with some of her daily activities as a result. On exam, she has tenderness along both trapezius muscles, stiff with decreased ROM of her neck from this. I demonstrated some neck ROM exercises that should help. Her BP was also on the high side and questioning revealed that she takes her BP med in the evening. I recommended she take this med in the morning and will monitor her BP.  She also has hx of + PPD for which she is on Isoniazid daily x 6 months. She has almost completed her treatment and tolerating it well. LFT's have been normal. She denies cough, fevers, night sweats.  Review of Systems   Genitourinary: Positive for dysuria, frequency and urgency.   All other systems reviewed and are negative.      Objective:      Physical Exam   Constitutional: She is oriented to person, place, and time. She appears well-developed and well-nourished. No distress.   Neck: No JVD present.   Cardiovascular: Normal rate, regular rhythm, normal heart sounds and intact distal pulses.   No murmur heard.  Pulmonary/Chest: Effort normal and breath sounds normal. No respiratory distress. She has no wheezes. She exhibits no tenderness.   Abdominal: Soft. Bowel sounds are normal. She exhibits no distension. There is no tenderness.   Musculoskeletal: She exhibits tenderness. She exhibits no edema.   As per HPI - muscular pain and stiffness.   Lymphadenopathy:     She has no cervical adenopathy.   Neurological: She is alert and oriented to person, place, and time. No cranial nerve deficit. Coordination normal.   Skin: She is not diaphoretic.   Psychiatric: She has a normal mood and affect. Her behavior is  normal.   Nursing note and vitals reviewed.      Assessment:       1. Essential hypertension    2. Neck pain, musculoskeletal    3. Lower urinary tract symptoms (LUTS)        Plan:    1. Trial of Flexeril at bedtime - risks discussed.         2. Consider PT if not improved.         3. Take BP meds in AM as discussed. Monitor BP.         4. RTC 1 month

## 2019-10-24 DIAGNOSIS — M54.2 NECK PAIN: Primary | ICD-10-CM

## 2019-11-20 ENCOUNTER — CLINICAL SUPPORT (OUTPATIENT)
Dept: REHABILITATION | Facility: HOSPITAL | Age: 74
End: 2019-11-20
Attending: INTERNAL MEDICINE
Payer: MEDICARE

## 2019-11-20 DIAGNOSIS — R29.898 UPPER EXTREMITY WEAKNESS: ICD-10-CM

## 2019-11-20 DIAGNOSIS — M54.2 NECK PAIN: ICD-10-CM

## 2019-11-20 DIAGNOSIS — R29.3 POSTURE ABNORMALITY: ICD-10-CM

## 2019-11-20 PROBLEM — M25.69 BACK STIFFNESS: Status: RESOLVED | Noted: 2017-11-02 | Resolved: 2019-11-20

## 2019-11-20 PROBLEM — M54.50 LOW BACK PAIN: Status: RESOLVED | Noted: 2017-11-02 | Resolved: 2019-11-20

## 2019-11-20 PROBLEM — R53.1 WEAKNESS: Status: RESOLVED | Noted: 2017-11-02 | Resolved: 2019-11-20

## 2019-11-20 PROCEDURE — 97162 PT EVAL MOD COMPLEX 30 MIN: CPT | Mod: HCNC

## 2019-11-20 PROCEDURE — 97110 THERAPEUTIC EXERCISES: CPT | Mod: HCNC

## 2019-11-20 NOTE — PLAN OF CARE
[facility]  Physical Therapy Initial Evaluation    Name: Aminah Bravo  Clinic Number: [id number]    Therapy Diagnosis:   Encounter Diagnoses   Name Primary?    Neck pain     Upper extremity weakness     Posture abnormality      Physician: Dereje Thomas MD    Physician Orders: PT Eval and Treat   Medical Diagnosis from Referral: M54.2 (ICD-10-CM) - Neck pain  Evaluation Date: 11/20/2019  Authorization Period Expiration: 10/23/20  Plan of Care Expiration: 2/20/20  Visit # / Visits authorized: 1/ 1  FOTO: 1/5    Gcode: 1/10  Visit: 82.88  Total: 82.88    Time In: 2:00pm  Time Out: 3:00pm  Total Billable Time: 60 minutes     Precautions: Standard, Diabetes and HTN    Subjective   Date of onset: 2 months ago   History of current condition - Aminah reports: increased neck & B shoulder pain over the past 2 months. Pain radiates down B arms, worse on L.  The pain is worse with cold weather. She reports her pain has been improving somewhat over time. Pt is primarily Lithuanian speaking & English speaking daughter was not able to be present during eval.      Medical History:   Past Medical History:   Diagnosis Date    Arthritis     Cataract     left eye    Choroidal rupture of left eye     Diabetes mellitus type II     GERD (gastroesophageal reflux disease)     Hyperlipidemia     Hypertension     Hypothyroidism     Macular scar     right eye    Retinal degeneration     chorioretinal OD    Thyroid disease     Vitamin B 12 deficiency        Surgical History:   Aminah Bravo  has a past surgical history that includes Total abdominal hysterectomy; Cholecystectomy; Bladder suspension; and Cataract extraction.    Medications:   Aminah has a current medication list which includes the following prescription(s): alcohol swabs, amoxicillin, aspirin, baclofen, biotin, blood sugar diagnostic, blood-glucose meter, calcium carbonate, carvedilol, celecoxib,  cyanocobalamin, cyclobenzaprine, econazole nitrate, ferrous sulfate, fluocinonide, gabapentin, glipizide, isoniazid, ketoconazole, lancets, lancets, levothyroxine, lisinopril, metformin, neomycin-polymyxin-dexamethasone, polyethylene glycol, rosuvastatin, tamsulosin, tramadol, and vitamin b complex.    Allergies:   Review of patient's allergies indicates:   Allergen Reactions    Bufferin [aspirin, buffered] Itching    Atorvastatin      Myalgias and arthralgias    Warfarin     Ibuprofen Itching     Itching of eyes, head        Imaging, none recently     Prior Therapy: PT for knees   Social History:  lives with their family  Occupation: retired   Prior Level of Function: (I)  Current Level of Function: (I)    Pain:  Current 5/10, worst 8/10, best 3/10   Location: bilateral neck  and shoulder   Description:  unable to determine due to language barrier   Aggravating Factors: Extension, Getting out of bed/chair and housecleaning  Easing Factors: ice and heating pad    Pts goals: reduced neck pain with movement     Objective     Posture: slouched, forward head posture. Holds neck in L lateral flexion at rest     Range of Motion/Strength:         CERVICAL AROM Pain/Dysfunction with Movement   Flexion 45 deg     Extension 20 deg  ! Cervical spine  & forehead   Right side bending 25 deg   ! Cervical spine   Left side bending 25 deg   ! Cervical spine   Right rotation 25 deg   ! Cervical spine   Left rotation 25 deg   ! Cervical spine       Shoulder Right MMT Left MMT Pain/Dysfunction with Movement (pain=!)   AROM        flexion  WFL 3/5  WFL 3+/5 ! B biceps    abduction  WFL 3+/5  WFL 3+/5    Internal rotation  WFL 3+/5  WFL 3+/5    ER at 0° abd  WFL 3+/5  WFL 3+/5      Elbow Right MMT Left MMT Pain/Dysfunction with Movement (pain=!)   AROM        flexion  WFL 4-/5  WFL 4-/5    extension  WFL 4-/5  WFL 4-/5        Special Tests: B Spurlings negative         CMS Impairment/Limitation/Restriction for FOTO Neck  "Survey    Therapist reviewed FOTO scores for Aminah Bravo on 11/20/2019.   FOTO documents entered into [employer] - see Media section.    Limitation Score: TBA%  Category: Body Position    Current :TBA   Goal: TBA         TREATMENT   Treatment Time In: 2:52pm  Treatment Time Out: 3:00pm  Total Treatment time separate from Evaluation: 8 minutes    Aminah received therapeutic exercises to develop strength, endurance, ROM, flexibility and posture for 8 minutes including:  scap sets x10  Levator scap stretch x30" B   Cervical retractions x10       Home Exercises Provided and Patient Education Provided     Education provided:   - HEP  -postural education    Written Home Exercises Provided: yes.  Exercises were reviewed and Aminah was able to demonstrate them prior to the end of the session.  Aminah demonstrated fair  understanding of the education provided.     See EMR under Patient Instructions for exercises provided 11/20/2019.      Assessment   Aminah is a 74 y.o. female referred to outpatient Physical Therapy with a medical diagnosis of neck pain . Pt presents with neck pain that radiates to B UE, worse on L. She demonstrates impaired posture and limitations with cervical mobility. She c/o pain with household chores and any movement of her neck for ADLs or sleep.   Pt required maximum tactile & visual cues for correct technique with therex due to language barrier. Subjective & objective measures were limited due to language barrier.     Pt prognosis is Good.   Pt will benefit from skilled outpatient Physical Therapy to address the deficits stated above and in the chart below, provide pt/family education, and to maximize pt's level of independence.     Plan of care discussed with patient: Yes  Pt's spiritual, cultural and educational needs considered and patient is agreeable to the plan of care and goals as stated below:     Anticipated Barriers for therapy: co-morbidities    Medical Necessity is " demonstrated by the following  History  Co-morbidities and personal factors that may impact the plan of care Co-morbidities:   diabetes, difficulty sleeping and HTN    Personal Factors:   no deficits     high   Examination  Body Structures and Functions, activity limitations and participation restrictions that may impact the plan of care Body Regions:   head  neck  upper extremities  trunk    Body Systems:    gross symmetry  ROM  strength  transfers  blood pressure    Participation Restrictions:   none    Activity limitations:   Learning and applying knowledge  no deficits    General Tasks and Commands  no deficits    Communication  no deficits    Mobility  lifting and carrying objects    Self care  washing oneself (bathing, drying, washing hands)  caring for body parts (brushing teeth, shaving, grooming)  looking after one's health    Domestic Life  shopping  cooking  doing house work (cleaning house, washing dishes, laundry)    Interactions/Relationships  no deficits    Life Areas  no deficits    Community and Social Life  recreation and leisure         moderate   Clinical Presentation evolving clinical presentation with changing clinical characteristics moderate   Decision Making/ Complexity Score: moderate     Goals:  Short Term Goals: 6 visits  1. Pt will be compliant /c HEP to supplement PT in order to improve functional tasks  2. Pt will improve B shoulders MMTs to 4-/5 grossly to improve strength for functional activities  3. Pt will improve cervical Ext AROM to >/= 40 deg in order to perform household chores    Long Term Goals: 12 visits   1. Pt will improve B shoulders MMTs to 4/5 grossly to improve strength for functional activities  2. Pt will improve FOTO score to </= TBA% limitation to reduce perceived pain with body position  3. Pt will improve cervical B rotation AROM to >/= 40 deg in order to perform household chores    Plan   Plan of care Certification: 11/20/2019 to 2/20/20    Outpatient Physical  Therapy 2 times weekly for 12  visits to include the following interventions: Manual Therapy, Moist Heat/ Ice, Neuromuscular Re-ed, Patient Education, Therapeutic Activites and Therapeutic Exercise, ASTYM, Kinesiotaping PRN, Functional Dry Needling & modalities PRN    Sydney Martinez, PT, DPT

## 2020-01-07 RX ORDER — GLIPIZIDE 2.5 MG/1
5 TABLET, EXTENDED RELEASE ORAL
Qty: 180 TABLET | Refills: 3 | Status: SHIPPED | OUTPATIENT
Start: 2020-01-07 | End: 2020-01-28 | Stop reason: SDUPTHER

## 2020-01-28 ENCOUNTER — OFFICE VISIT (OUTPATIENT)
Dept: INTERNAL MEDICINE | Facility: CLINIC | Age: 75
End: 2020-01-28
Payer: MEDICARE

## 2020-01-28 DIAGNOSIS — R10.30 LOWER ABDOMINAL PAIN: ICD-10-CM

## 2020-01-28 DIAGNOSIS — E11.42 DIABETIC POLYNEUROPATHY ASSOCIATED WITH TYPE 2 DIABETES MELLITUS: ICD-10-CM

## 2020-01-28 DIAGNOSIS — E78.5 HYPERLIPIDEMIA, UNSPECIFIED HYPERLIPIDEMIA TYPE: ICD-10-CM

## 2020-01-28 DIAGNOSIS — R10.2 PELVIC PAIN: ICD-10-CM

## 2020-01-28 DIAGNOSIS — E55.9 VITAMIN D INSUFFICIENCY: ICD-10-CM

## 2020-01-28 DIAGNOSIS — E03.9 HYPOTHYROIDISM, UNSPECIFIED TYPE: ICD-10-CM

## 2020-01-28 DIAGNOSIS — E11.40 TYPE 2 DIABETES MELLITUS WITH DIABETIC NEUROPATHY, WITHOUT LONG-TERM CURRENT USE OF INSULIN: Primary | ICD-10-CM

## 2020-01-28 DIAGNOSIS — E78.2 MIXED HYPERLIPIDEMIA: ICD-10-CM

## 2020-01-28 DIAGNOSIS — I10 HTN (HYPERTENSION), BENIGN: ICD-10-CM

## 2020-01-28 DIAGNOSIS — E03.4 HYPOTHYROIDISM DUE TO ACQUIRED ATROPHY OF THYROID: ICD-10-CM

## 2020-01-28 PROCEDURE — 1101F PR PT FALLS ASSESS DOC 0-1 FALLS W/OUT INJ PAST YR: ICD-10-PCS | Mod: HCNC,CPTII,S$GLB, | Performed by: INTERNAL MEDICINE

## 2020-01-28 PROCEDURE — 3051F PR MOST RECENT HEMOGLOBIN A1C LEVEL 7.0 - < 8.0%: ICD-10-PCS | Mod: HCNC,CPTII,S$GLB, | Performed by: INTERNAL MEDICINE

## 2020-01-28 PROCEDURE — 1101F PT FALLS ASSESS-DOCD LE1/YR: CPT | Mod: HCNC,CPTII,S$GLB, | Performed by: INTERNAL MEDICINE

## 2020-01-28 PROCEDURE — 1159F MED LIST DOCD IN RCRD: CPT | Mod: HCNC,S$GLB,, | Performed by: INTERNAL MEDICINE

## 2020-01-28 PROCEDURE — 99999 PR PBB SHADOW E&M-EST. PATIENT-LVL III: ICD-10-PCS | Mod: PBBFAC,HCNC,, | Performed by: INTERNAL MEDICINE

## 2020-01-28 PROCEDURE — 1159F PR MEDICATION LIST DOCUMENTED IN MEDICAL RECORD: ICD-10-PCS | Mod: HCNC,S$GLB,, | Performed by: INTERNAL MEDICINE

## 2020-01-28 PROCEDURE — 99213 OFFICE O/P EST LOW 20 MIN: CPT | Mod: HCNC,S$GLB,, | Performed by: INTERNAL MEDICINE

## 2020-01-28 PROCEDURE — 3051F HG A1C>EQUAL 7.0%<8.0%: CPT | Mod: HCNC,CPTII,S$GLB, | Performed by: INTERNAL MEDICINE

## 2020-01-28 PROCEDURE — 99213 PR OFFICE/OUTPT VISIT, EST, LEVL III, 20-29 MIN: ICD-10-PCS | Mod: HCNC,S$GLB,, | Performed by: INTERNAL MEDICINE

## 2020-01-28 PROCEDURE — 99999 PR PBB SHADOW E&M-EST. PATIENT-LVL III: CPT | Mod: PBBFAC,HCNC,, | Performed by: INTERNAL MEDICINE

## 2020-01-28 RX ORDER — METFORMIN HYDROCHLORIDE 1000 MG/1
1000 TABLET ORAL 2 TIMES DAILY WITH MEALS
Qty: 180 TABLET | Refills: 3 | Status: SHIPPED | OUTPATIENT
Start: 2020-01-28 | End: 2021-02-12 | Stop reason: SDUPTHER

## 2020-01-28 RX ORDER — BACLOFEN 10 MG/1
10 TABLET ORAL NIGHTLY
Qty: 90 TABLET | Refills: 1 | Status: SHIPPED | OUTPATIENT
Start: 2020-01-28 | End: 2021-02-12 | Stop reason: SDUPTHER

## 2020-01-28 RX ORDER — CARVEDILOL 12.5 MG/1
12.5 TABLET ORAL 2 TIMES DAILY
Qty: 180 TABLET | Refills: 3 | Status: SHIPPED | OUTPATIENT
Start: 2020-01-28 | End: 2021-05-13 | Stop reason: SDUPTHER

## 2020-01-28 RX ORDER — ROSUVASTATIN CALCIUM 5 MG/1
5 TABLET, COATED ORAL DAILY
Qty: 90 TABLET | Refills: 3 | Status: SHIPPED | OUTPATIENT
Start: 2020-01-28 | End: 2021-02-12 | Stop reason: SDUPTHER

## 2020-01-28 RX ORDER — GLIPIZIDE 2.5 MG/1
5 TABLET, EXTENDED RELEASE ORAL
Qty: 180 TABLET | Refills: 3 | Status: SHIPPED | OUTPATIENT
Start: 2020-01-28 | End: 2021-02-12 | Stop reason: SDUPTHER

## 2020-01-28 RX ORDER — LEVOTHYROXINE SODIUM 112 UG/1
TABLET ORAL
Qty: 90 TABLET | Refills: 1 | Status: SHIPPED | OUTPATIENT
Start: 2020-01-28 | End: 2020-06-24

## 2020-01-28 RX ORDER — LISINOPRIL 40 MG/1
40 TABLET ORAL DAILY
Qty: 90 TABLET | Refills: 3 | Status: SHIPPED | OUTPATIENT
Start: 2020-01-28 | End: 2021-02-12 | Stop reason: SDUPTHER

## 2020-01-28 RX ORDER — GABAPENTIN 300 MG/1
300 CAPSULE ORAL NIGHTLY
Qty: 90 CAPSULE | Refills: 3 | Status: SHIPPED | OUTPATIENT
Start: 2020-01-28 | End: 2021-02-12 | Stop reason: SDUPTHER

## 2020-01-28 RX ORDER — TAMSULOSIN HYDROCHLORIDE 0.4 MG/1
0.4 CAPSULE ORAL DAILY
Qty: 90 CAPSULE | Refills: 3 | Status: SHIPPED | OUTPATIENT
Start: 2020-01-28 | End: 2021-02-12

## 2020-02-03 ENCOUNTER — HOSPITAL ENCOUNTER (OUTPATIENT)
Dept: RADIOLOGY | Facility: HOSPITAL | Age: 75
Discharge: HOME | End: 2020-02-03
Attending: INTERNAL MEDICINE
Payer: MEDICARE

## 2020-02-03 DIAGNOSIS — R10.2 PELVIC PAIN: ICD-10-CM

## 2020-02-03 DIAGNOSIS — R10.30 LOWER ABDOMINAL PAIN: ICD-10-CM

## 2020-02-03 PROCEDURE — 76830 US PELVIS COMP WITH TRANSVAG NON-OB (XPD): ICD-10-PCS | Mod: 26,HCNC,, | Performed by: INTERNAL MEDICINE

## 2020-02-03 PROCEDURE — 76700 US EXAM ABDOM COMPLETE: CPT | Mod: TC,HCNC

## 2020-02-03 PROCEDURE — 76830 TRANSVAGINAL US NON-OB: CPT | Mod: 26,HCNC,, | Performed by: INTERNAL MEDICINE

## 2020-02-03 PROCEDURE — 76856 US PELVIS COMP WITH TRANSVAG NON-OB (XPD): ICD-10-PCS | Mod: 26,HCNC,, | Performed by: INTERNAL MEDICINE

## 2020-02-03 PROCEDURE — 76856 US EXAM PELVIC COMPLETE: CPT | Mod: 26,HCNC,, | Performed by: INTERNAL MEDICINE

## 2020-02-03 PROCEDURE — 76700 US ABDOMEN COMPLETE: ICD-10-PCS | Mod: 26,HCNC,, | Performed by: INTERNAL MEDICINE

## 2020-02-03 PROCEDURE — 76830 TRANSVAGINAL US NON-OB: CPT | Mod: TC,HCNC

## 2020-02-03 PROCEDURE — 76700 US EXAM ABDOM COMPLETE: CPT | Mod: 26,HCNC,, | Performed by: INTERNAL MEDICINE

## 2020-02-18 ENCOUNTER — OFFICE VISIT (OUTPATIENT)
Dept: INTERNAL MEDICINE | Facility: CLINIC | Age: 75
End: 2020-02-18
Payer: MEDICARE

## 2020-02-18 VITALS — DIASTOLIC BLOOD PRESSURE: 70 MMHG | HEART RATE: 64 BPM | SYSTOLIC BLOOD PRESSURE: 156 MMHG

## 2020-02-18 DIAGNOSIS — Z12.11 COLON CANCER SCREENING: ICD-10-CM

## 2020-02-18 DIAGNOSIS — D64.9 ANEMIA, UNSPECIFIED TYPE: ICD-10-CM

## 2020-02-18 DIAGNOSIS — Z71.89 ENCOUNTER FOR MEDICATION REVIEW AND COUNSELING: Primary | ICD-10-CM

## 2020-02-18 PROCEDURE — 99999 PR PBB SHADOW E&M-EST. PATIENT-LVL II: CPT | Mod: PBBFAC,HCNC,, | Performed by: INTERNAL MEDICINE

## 2020-02-18 PROCEDURE — 3078F PR MOST RECENT DIASTOLIC BLOOD PRESSURE < 80 MM HG: ICD-10-PCS | Mod: HCNC,CPTII,S$GLB, | Performed by: INTERNAL MEDICINE

## 2020-02-18 PROCEDURE — 99213 OFFICE O/P EST LOW 20 MIN: CPT | Mod: HCNC,S$GLB,, | Performed by: INTERNAL MEDICINE

## 2020-02-18 PROCEDURE — 99499 RISK ADDL DX/OHS AUDIT: ICD-10-PCS | Mod: S$GLB,,, | Performed by: INTERNAL MEDICINE

## 2020-02-18 PROCEDURE — 99499 UNLISTED E&M SERVICE: CPT | Mod: S$GLB,,, | Performed by: INTERNAL MEDICINE

## 2020-02-18 PROCEDURE — 99999 PR PBB SHADOW E&M-EST. PATIENT-LVL II: ICD-10-PCS | Mod: PBBFAC,HCNC,, | Performed by: INTERNAL MEDICINE

## 2020-02-18 PROCEDURE — 1159F PR MEDICATION LIST DOCUMENTED IN MEDICAL RECORD: ICD-10-PCS | Mod: HCNC,S$GLB,, | Performed by: INTERNAL MEDICINE

## 2020-02-18 PROCEDURE — 99213 PR OFFICE/OUTPT VISIT, EST, LEVL III, 20-29 MIN: ICD-10-PCS | Mod: HCNC,S$GLB,, | Performed by: INTERNAL MEDICINE

## 2020-02-18 PROCEDURE — 1159F MED LIST DOCD IN RCRD: CPT | Mod: HCNC,S$GLB,, | Performed by: INTERNAL MEDICINE

## 2020-02-18 PROCEDURE — 1101F PR PT FALLS ASSESS DOC 0-1 FALLS W/OUT INJ PAST YR: ICD-10-PCS | Mod: HCNC,CPTII,S$GLB, | Performed by: INTERNAL MEDICINE

## 2020-02-18 PROCEDURE — 1101F PT FALLS ASSESS-DOCD LE1/YR: CPT | Mod: HCNC,CPTII,S$GLB, | Performed by: INTERNAL MEDICINE

## 2020-02-18 PROCEDURE — 3078F DIAST BP <80 MM HG: CPT | Mod: HCNC,CPTII,S$GLB, | Performed by: INTERNAL MEDICINE

## 2020-02-18 PROCEDURE — 3074F PR MOST RECENT SYSTOLIC BLOOD PRESSURE < 130 MM HG: ICD-10-PCS | Mod: HCNC,CPTII,S$GLB, | Performed by: INTERNAL MEDICINE

## 2020-02-18 PROCEDURE — 3074F SYST BP LT 130 MM HG: CPT | Mod: HCNC,CPTII,S$GLB, | Performed by: INTERNAL MEDICINE

## 2020-02-18 NOTE — PROGRESS NOTES
"Subjective:       Patient ID: Aminah Bravo is a 74 y.o. female.    Chief Complaint: Follow-up    HPIMielle Burr is her for follow up. She reports "bladder issues" feeling a "bulging sensation" in her vaginal area while walking mainly. She has some pain with micturition, not necessarily dysuria. I will make arrangements for her to be seen in UroGyn for evaluation of possible prolapse. I will also obtain an abdominal US as hse has some discomfort in her lower abdomen on examination.  Regarding her DM, she is compliant with her medications and is requesting refills on most of them. These were provided.  Review of Systems   All other systems reviewed and are negative.      Objective:      Physical Exam   Constitutional: She appears well-developed and well-nourished. No distress.   Neck: Normal range of motion. Neck supple. No JVD present. No thyromegaly present.   Cardiovascular: Normal rate, regular rhythm, normal heart sounds and intact distal pulses.   No murmur heard.  Pulmonary/Chest: Effort normal and breath sounds normal. No respiratory distress. She has no wheezes.   Abdominal: Soft. Bowel sounds are normal. She exhibits no distension and no mass. There is tenderness.   Mild lower abdominal pain.   Musculoskeletal: Normal range of motion. She exhibits no edema.   Lymphadenopathy:     She has no cervical adenopathy.   Neurological: She is alert. No cranial nerve deficit.   Skin: Skin is warm and dry. She is not diaphoretic.   Psychiatric: She has a normal mood and affect. Her behavior is normal.   Nursing note and vitals reviewed.      Assessment:       1. Type 2 diabetes mellitus with diabetic neuropathy, without long-term current use of insulin    2. HTN (hypertension), benign    3. Hyperlipidemia, unspecified hyperlipidemia type    4. Hypothyroidism, unspecified type    5. Vitamin D insufficiency    6. Lower abdominal pain    7. Pelvic pain    8. Hypothyroidism due to acquired atrophy of " thyroid    9. Diabetic polyneuropathy associated with type 2 diabetes mellitus    10. Mixed hyperlipidemia        Plan:    1. Obtain blood work.         2. Urinalysis.         3. Abdominal US.         4. Refill medications.         5. Refer to UroGyn.         6. RTC for review.

## 2020-02-18 NOTE — PROGRESS NOTES
Miss Alicea is here today for her review. I gave her copies of her studies and discussed them in detail including blood work that showed much improved DM with FBS at 117 with A1-C at 7.3. Lipids had also improved with excellent HDL fraction. CBC again showed microcytic anemia that is chronic. Nevertheless, it has been almost 10 years since her last colonoscopy and I will order one for her. She has c/o abdominal pain and needs follow up. Additional studies included pelvic US and abdominal US that were both unremarkable. Nevertheless, I will refer her to UroGYn as she has a probable prolapse. She will continue with her current medications and RTC 3 months for follow up.

## 2020-03-06 ENCOUNTER — TELEPHONE (OUTPATIENT)
Dept: ENDOSCOPY | Facility: HOSPITAL | Age: 75
End: 2020-03-06

## 2020-03-06 DIAGNOSIS — Z12.11 SPECIAL SCREENING FOR MALIGNANT NEOPLASMS, COLON: Primary | ICD-10-CM

## 2020-03-06 RX ORDER — POLYETHYLENE GLYCOL 3350, SODIUM SULFATE ANHYDROUS, SODIUM BICARBONATE, SODIUM CHLORIDE, POTASSIUM CHLORIDE 236; 22.74; 6.74; 5.86; 2.97 G/4L; G/4L; G/4L; G/4L; G/4L
4 POWDER, FOR SOLUTION ORAL ONCE
Qty: 4000 ML | Refills: 0 | Status: SHIPPED | OUTPATIENT
Start: 2020-03-06 | End: 2020-03-06

## 2020-03-06 NOTE — TELEPHONE ENCOUNTER
Patient called to schedule colonoscopy. Reviewed medical history with patient, instructed on procedure and prep instructions with Jr. Patient verbalized understanding of instructions. Patient requesting to have  for procedure scheduled on 4/20/20.

## 2020-03-24 RX ORDER — INSULIN PUMP SYRINGE, 3 ML
EACH MISCELLANEOUS
Qty: 1 EACH | Refills: 0 | Status: SHIPPED | OUTPATIENT
Start: 2020-03-24 | End: 2023-04-26 | Stop reason: SDUPTHER

## 2020-03-30 DIAGNOSIS — E08.8 DIABETES MELLITUS DUE TO UNDERLYING CONDITION WITH COMPLICATION: ICD-10-CM

## 2020-03-31 RX ORDER — BLOOD SUGAR DIAGNOSTIC
STRIP MISCELLANEOUS
Qty: 100 STRIP | Refills: 2 | Status: SHIPPED | OUTPATIENT
Start: 2020-03-31 | End: 2021-08-23

## 2020-03-31 RX ORDER — LANCETS
EACH MISCELLANEOUS
Qty: 100 EACH | Refills: 2 | Status: ON HOLD | OUTPATIENT
Start: 2020-03-31 | End: 2024-03-27 | Stop reason: HOSPADM

## 2020-04-06 DIAGNOSIS — Z12.11 SPECIAL SCREENING FOR MALIGNANT NEOPLASMS, COLON: Primary | ICD-10-CM

## 2020-04-06 RX ORDER — POLYETHYLENE GLYCOL 3350, SODIUM SULFATE ANHYDROUS, SODIUM BICARBONATE, SODIUM CHLORIDE, POTASSIUM CHLORIDE 236; 22.74; 6.74; 5.86; 2.97 G/4L; G/4L; G/4L; G/4L; G/4L
4 POWDER, FOR SOLUTION ORAL ONCE
Qty: 4000 ML | Refills: 0 | Status: SHIPPED | OUTPATIENT
Start: 2020-04-06 | End: 2020-04-06

## 2020-06-22 DIAGNOSIS — Z01.818 PRE-OP TESTING: ICD-10-CM

## 2021-02-08 LAB
LEFT EYE DM RETINOPATHY: NEGATIVE
RIGHT EYE DM RETINOPATHY: NEGATIVE

## 2021-02-12 ENCOUNTER — OFFICE VISIT (OUTPATIENT)
Dept: INTERNAL MEDICINE | Facility: CLINIC | Age: 76
End: 2021-02-12
Payer: MEDICARE

## 2021-02-12 ENCOUNTER — LAB VISIT (OUTPATIENT)
Dept: LAB | Facility: HOSPITAL | Age: 76
End: 2021-02-12
Attending: INTERNAL MEDICINE
Payer: MEDICARE

## 2021-02-12 VITALS
HEIGHT: 60 IN | SYSTOLIC BLOOD PRESSURE: 140 MMHG | OXYGEN SATURATION: 96 % | HEART RATE: 78 BPM | WEIGHT: 167.56 LBS | BODY MASS INDEX: 32.89 KG/M2 | DIASTOLIC BLOOD PRESSURE: 70 MMHG

## 2021-02-12 DIAGNOSIS — I10 ESSENTIAL HYPERTENSION, MALIGNANT: ICD-10-CM

## 2021-02-12 DIAGNOSIS — E78.2 MIXED HYPERLIPIDEMIA: ICD-10-CM

## 2021-02-12 DIAGNOSIS — E11.42 DIABETIC POLYNEUROPATHY ASSOCIATED WITH TYPE 2 DIABETES MELLITUS: ICD-10-CM

## 2021-02-12 DIAGNOSIS — E11.40 TYPE 2 DIABETES MELLITUS WITH DIABETIC NEUROPATHY, WITHOUT LONG-TERM CURRENT USE OF INSULIN: ICD-10-CM

## 2021-02-12 DIAGNOSIS — G89.29 CHRONIC LOW BACK PAIN, UNSPECIFIED BACK PAIN LATERALITY, UNSPECIFIED WHETHER SCIATICA PRESENT: ICD-10-CM

## 2021-02-12 DIAGNOSIS — R35.0 FREQUENCY OF URINATION: ICD-10-CM

## 2021-02-12 DIAGNOSIS — E03.9 HYPOTHYROIDISM, UNSPECIFIED TYPE: ICD-10-CM

## 2021-02-12 DIAGNOSIS — M54.50 CHRONIC LOW BACK PAIN, UNSPECIFIED BACK PAIN LATERALITY, UNSPECIFIED WHETHER SCIATICA PRESENT: ICD-10-CM

## 2021-02-12 DIAGNOSIS — E11.40 TYPE 2 DIABETES MELLITUS WITH DIABETIC NEUROPATHY, WITHOUT LONG-TERM CURRENT USE OF INSULIN: Primary | ICD-10-CM

## 2021-02-12 DIAGNOSIS — N95.1 VAGINAL DRYNESS, MENOPAUSAL: ICD-10-CM

## 2021-02-12 DIAGNOSIS — B37.31 VAGINAL YEAST INFECTION: ICD-10-CM

## 2021-02-12 DIAGNOSIS — E03.4 HYPOTHYROIDISM DUE TO ACQUIRED ATROPHY OF THYROID: ICD-10-CM

## 2021-02-12 DIAGNOSIS — I10 HTN (HYPERTENSION), BENIGN: ICD-10-CM

## 2021-02-12 LAB
ALBUMIN SERPL BCP-MCNC: 4.2 G/DL (ref 3.5–5.2)
ALP SERPL-CCNC: 117 U/L (ref 55–135)
ALT SERPL W/O P-5'-P-CCNC: 19 U/L (ref 10–44)
ANION GAP SERPL CALC-SCNC: 13 MMOL/L (ref 8–16)
AST SERPL-CCNC: 17 U/L (ref 10–40)
BASOPHILS # BLD AUTO: 0.03 K/UL (ref 0–0.2)
BASOPHILS NFR BLD: 0.9 % (ref 0–1.9)
BILIRUB SERPL-MCNC: 0.4 MG/DL (ref 0.1–1)
BUN SERPL-MCNC: 10 MG/DL (ref 8–23)
CALCIUM SERPL-MCNC: 9.8 MG/DL (ref 8.7–10.5)
CHLORIDE SERPL-SCNC: 99 MMOL/L (ref 95–110)
CHOLEST SERPL-MCNC: 183 MG/DL (ref 120–199)
CHOLEST/HDLC SERPL: 3.1 {RATIO} (ref 2–5)
CO2 SERPL-SCNC: 24 MMOL/L (ref 23–29)
CREAT SERPL-MCNC: 0.8 MG/DL (ref 0.5–1.4)
DIFFERENTIAL METHOD: ABNORMAL
EOSINOPHIL # BLD AUTO: 0.1 K/UL (ref 0–0.5)
EOSINOPHIL NFR BLD: 2.4 % (ref 0–8)
ERYTHROCYTE [DISTWIDTH] IN BLOOD BY AUTOMATED COUNT: 17.8 % (ref 11.5–14.5)
EST. GFR  (AFRICAN AMERICAN): >60 ML/MIN/1.73 M^2
EST. GFR  (NON AFRICAN AMERICAN): >60 ML/MIN/1.73 M^2
ESTIMATED AVG GLUCOSE: 174 MG/DL (ref 68–131)
GLUCOSE SERPL-MCNC: 201 MG/DL (ref 70–110)
HBA1C MFR BLD: 7.7 % (ref 4–5.6)
HCT VFR BLD AUTO: 32.6 % (ref 37–48.5)
HDLC SERPL-MCNC: 60 MG/DL (ref 40–75)
HDLC SERPL: 32.8 % (ref 20–50)
HGB BLD-MCNC: 9.7 G/DL (ref 12–16)
IMM GRANULOCYTES # BLD AUTO: 0.02 K/UL (ref 0–0.04)
IMM GRANULOCYTES NFR BLD AUTO: 0.6 % (ref 0–0.5)
LDLC SERPL CALC-MCNC: 75 MG/DL (ref 63–159)
LYMPHOCYTES # BLD AUTO: 1.1 K/UL (ref 1–4.8)
LYMPHOCYTES NFR BLD: 32.1 % (ref 18–48)
MCH RBC QN AUTO: 21.3 PG (ref 27–31)
MCHC RBC AUTO-ENTMCNC: 29.8 G/DL (ref 32–36)
MCV RBC AUTO: 72 FL (ref 82–98)
MONOCYTES # BLD AUTO: 0.4 K/UL (ref 0.3–1)
MONOCYTES NFR BLD: 10.7 % (ref 4–15)
NEUTROPHILS # BLD AUTO: 1.8 K/UL (ref 1.8–7.7)
NEUTROPHILS NFR BLD: 53.3 % (ref 38–73)
NONHDLC SERPL-MCNC: 123 MG/DL
NRBC BLD-RTO: 0 /100 WBC
PLATELET # BLD AUTO: 298 K/UL (ref 150–350)
PMV BLD AUTO: 10.4 FL (ref 9.2–12.9)
POTASSIUM SERPL-SCNC: 4.2 MMOL/L (ref 3.5–5.1)
PROT SERPL-MCNC: 7.7 G/DL (ref 6–8.4)
RBC # BLD AUTO: 4.56 M/UL (ref 4–5.4)
SODIUM SERPL-SCNC: 136 MMOL/L (ref 136–145)
TRIGL SERPL-MCNC: 240 MG/DL (ref 30–150)
TSH SERPL DL<=0.005 MIU/L-ACNC: 1.98 UIU/ML (ref 0.4–4)
WBC # BLD AUTO: 3.36 K/UL (ref 3.9–12.7)

## 2021-02-12 PROCEDURE — 80061 LIPID PANEL: CPT

## 2021-02-12 PROCEDURE — 1126F PR PAIN SEVERITY QUANTIFIED, NO PAIN PRESENT: ICD-10-PCS | Mod: S$GLB,,, | Performed by: INTERNAL MEDICINE

## 2021-02-12 PROCEDURE — 3288F PR FALLS RISK ASSESSMENT DOCUMENTED: ICD-10-PCS | Mod: CPTII,S$GLB,, | Performed by: INTERNAL MEDICINE

## 2021-02-12 PROCEDURE — 99215 PR OFFICE/OUTPT VISIT, EST, LEVL V, 40-54 MIN: ICD-10-PCS | Mod: S$GLB,,, | Performed by: INTERNAL MEDICINE

## 2021-02-12 PROCEDURE — 83036 HEMOGLOBIN GLYCOSYLATED A1C: CPT

## 2021-02-12 PROCEDURE — 80053 COMPREHEN METABOLIC PANEL: CPT

## 2021-02-12 PROCEDURE — 1159F PR MEDICATION LIST DOCUMENTED IN MEDICAL RECORD: ICD-10-PCS | Mod: S$GLB,,, | Performed by: INTERNAL MEDICINE

## 2021-02-12 PROCEDURE — 3288F FALL RISK ASSESSMENT DOCD: CPT | Mod: CPTII,S$GLB,, | Performed by: INTERNAL MEDICINE

## 2021-02-12 PROCEDURE — 1157F PR ADVANCE CARE PLAN OR EQUIV PRESENT IN MEDICAL RECORD: ICD-10-PCS | Mod: S$GLB,,, | Performed by: INTERNAL MEDICINE

## 2021-02-12 PROCEDURE — 84443 ASSAY THYROID STIM HORMONE: CPT

## 2021-02-12 PROCEDURE — 3077F SYST BP >= 140 MM HG: CPT | Mod: CPTII,S$GLB,, | Performed by: INTERNAL MEDICINE

## 2021-02-12 PROCEDURE — 36415 COLL VENOUS BLD VENIPUNCTURE: CPT

## 2021-02-12 PROCEDURE — 1101F PT FALLS ASSESS-DOCD LE1/YR: CPT | Mod: CPTII,S$GLB,, | Performed by: INTERNAL MEDICINE

## 2021-02-12 PROCEDURE — 1101F PR PT FALLS ASSESS DOC 0-1 FALLS W/OUT INJ PAST YR: ICD-10-PCS | Mod: CPTII,S$GLB,, | Performed by: INTERNAL MEDICINE

## 2021-02-12 PROCEDURE — 1157F ADVNC CARE PLAN IN RCRD: CPT | Mod: S$GLB,,, | Performed by: INTERNAL MEDICINE

## 2021-02-12 PROCEDURE — 1126F AMNT PAIN NOTED NONE PRSNT: CPT | Mod: S$GLB,,, | Performed by: INTERNAL MEDICINE

## 2021-02-12 PROCEDURE — 3078F PR MOST RECENT DIASTOLIC BLOOD PRESSURE < 80 MM HG: ICD-10-PCS | Mod: CPTII,S$GLB,, | Performed by: INTERNAL MEDICINE

## 2021-02-12 PROCEDURE — 3078F DIAST BP <80 MM HG: CPT | Mod: CPTII,S$GLB,, | Performed by: INTERNAL MEDICINE

## 2021-02-12 PROCEDURE — 85025 COMPLETE CBC W/AUTO DIFF WBC: CPT

## 2021-02-12 PROCEDURE — 99999 PR PBB SHADOW E&M-EST. PATIENT-LVL V: CPT | Mod: PBBFAC,,, | Performed by: INTERNAL MEDICINE

## 2021-02-12 PROCEDURE — 3077F PR MOST RECENT SYSTOLIC BLOOD PRESSURE >= 140 MM HG: ICD-10-PCS | Mod: CPTII,S$GLB,, | Performed by: INTERNAL MEDICINE

## 2021-02-12 PROCEDURE — 99999 PR PBB SHADOW E&M-EST. PATIENT-LVL V: ICD-10-PCS | Mod: PBBFAC,,, | Performed by: INTERNAL MEDICINE

## 2021-02-12 PROCEDURE — 1159F MED LIST DOCD IN RCRD: CPT | Mod: S$GLB,,, | Performed by: INTERNAL MEDICINE

## 2021-02-12 PROCEDURE — 99215 OFFICE O/P EST HI 40 MIN: CPT | Mod: S$GLB,,, | Performed by: INTERNAL MEDICINE

## 2021-02-12 RX ORDER — LEVOTHYROXINE SODIUM 112 UG/1
112 TABLET ORAL
Qty: 90 TABLET | Refills: 1 | Status: SHIPPED | OUTPATIENT
Start: 2021-02-12 | End: 2021-02-12

## 2021-02-12 RX ORDER — CHOLECALCIFEROL (VITAMIN D3) 25 MCG
1000 TABLET ORAL DAILY
Status: ON HOLD | COMMUNITY
End: 2024-03-27 | Stop reason: HOSPADM

## 2021-02-12 RX ORDER — METFORMIN HYDROCHLORIDE 1000 MG/1
1000 TABLET ORAL 2 TIMES DAILY WITH MEALS
Qty: 180 TABLET | Refills: 3 | Status: SHIPPED | OUTPATIENT
Start: 2021-02-12 | End: 2022-03-21

## 2021-02-12 RX ORDER — LANOLIN ALCOHOL/MO/W.PET/CERES
400 CREAM (GRAM) TOPICAL DAILY
Status: ON HOLD | COMMUNITY
End: 2021-05-19 | Stop reason: HOSPADM

## 2021-02-12 RX ORDER — LEVOTHYROXINE SODIUM 112 UG/1
112 TABLET ORAL
Qty: 90 TABLET | Refills: 1 | Status: SHIPPED | OUTPATIENT
Start: 2021-02-12 | End: 2022-01-24 | Stop reason: SDUPTHER

## 2021-02-12 RX ORDER — GUAIFENESIN AND PHENYLEPHRINE HCL 400; 10 MG/1; MG/1
TABLET ORAL
Status: ON HOLD | COMMUNITY
End: 2024-03-27 | Stop reason: HOSPADM

## 2021-02-12 RX ORDER — FLUCONAZOLE 150 MG/1
150 TABLET ORAL DAILY
Qty: 1 TABLET | Refills: 0 | Status: SHIPPED | OUTPATIENT
Start: 2021-02-12 | End: 2021-02-13

## 2021-02-12 RX ORDER — GLIPIZIDE 2.5 MG/1
5 TABLET, EXTENDED RELEASE ORAL
Qty: 180 TABLET | Refills: 3 | Status: SHIPPED | OUTPATIENT
Start: 2021-02-12 | End: 2022-03-21

## 2021-02-12 RX ORDER — LANOLIN ALCOHOL/MO/W.PET/CERES
100 CREAM (GRAM) TOPICAL DAILY
Status: ON HOLD | COMMUNITY
End: 2024-03-27 | Stop reason: HOSPADM

## 2021-02-12 RX ORDER — ROSUVASTATIN CALCIUM 5 MG/1
5 TABLET, COATED ORAL DAILY
Qty: 90 TABLET | Refills: 3 | Status: SHIPPED | OUTPATIENT
Start: 2021-02-12 | End: 2021-02-12

## 2021-02-12 RX ORDER — ROSUVASTATIN CALCIUM 5 MG/1
5 TABLET, COATED ORAL DAILY
Qty: 90 TABLET | Refills: 3 | Status: SHIPPED | OUTPATIENT
Start: 2021-02-12 | End: 2022-06-29 | Stop reason: SDUPTHER

## 2021-02-12 RX ORDER — BACLOFEN 10 MG/1
10 TABLET ORAL NIGHTLY
Qty: 90 TABLET | Refills: 1 | Status: SHIPPED | OUTPATIENT
Start: 2021-02-12 | End: 2021-02-12

## 2021-02-12 RX ORDER — GABAPENTIN 300 MG/1
300 CAPSULE ORAL NIGHTLY
Qty: 90 CAPSULE | Refills: 3 | Status: SHIPPED | OUTPATIENT
Start: 2021-02-12 | End: 2021-02-12

## 2021-02-12 RX ORDER — GABAPENTIN 300 MG/1
300 CAPSULE ORAL NIGHTLY
Qty: 90 CAPSULE | Refills: 3 | Status: ON HOLD | OUTPATIENT
Start: 2021-02-12 | End: 2021-05-19 | Stop reason: HOSPADM

## 2021-02-12 RX ORDER — LISINOPRIL 40 MG/1
40 TABLET ORAL DAILY
Qty: 90 TABLET | Refills: 3 | Status: SHIPPED | OUTPATIENT
Start: 2021-02-12 | End: 2021-02-12

## 2021-02-12 RX ORDER — BACLOFEN 10 MG/1
10 TABLET ORAL NIGHTLY
Qty: 90 TABLET | Refills: 1 | Status: ON HOLD | OUTPATIENT
Start: 2021-02-12 | End: 2021-05-19 | Stop reason: HOSPADM

## 2021-02-12 RX ORDER — METFORMIN HYDROCHLORIDE 1000 MG/1
1000 TABLET ORAL 2 TIMES DAILY WITH MEALS
Qty: 180 TABLET | Refills: 3 | Status: SHIPPED | OUTPATIENT
Start: 2021-02-12 | End: 2021-02-12

## 2021-02-12 RX ORDER — GLIPIZIDE 2.5 MG/1
5 TABLET, EXTENDED RELEASE ORAL
Qty: 180 TABLET | Refills: 3 | Status: SHIPPED | OUTPATIENT
Start: 2021-02-12 | End: 2021-02-12

## 2021-02-12 RX ORDER — LISINOPRIL 40 MG/1
40 TABLET ORAL DAILY
Qty: 90 TABLET | Refills: 3 | Status: SHIPPED | OUTPATIENT
Start: 2021-02-12 | End: 2022-03-08 | Stop reason: SDUPTHER

## 2021-02-17 ENCOUNTER — PATIENT OUTREACH (OUTPATIENT)
Dept: ADMINISTRATIVE | Facility: HOSPITAL | Age: 76
End: 2021-02-17

## 2021-03-05 ENCOUNTER — IMMUNIZATION (OUTPATIENT)
Dept: PRIMARY CARE CLINIC | Facility: CLINIC | Age: 76
End: 2021-03-05
Payer: MEDICARE

## 2021-03-05 DIAGNOSIS — Z23 NEED FOR VACCINATION: Primary | ICD-10-CM

## 2021-03-05 PROCEDURE — 0031A PR IMMUNIZ ADMIN, SARS-COV-2 COVID-19 VACC, 5X10VP/0.5ML: CPT | Mod: CV19,S$GLB,, | Performed by: INTERNAL MEDICINE

## 2021-03-05 PROCEDURE — 91303 PR SARSCOV2 VAC AD26 .5ML IM: CPT | Mod: S$GLB,,, | Performed by: INTERNAL MEDICINE

## 2021-03-05 PROCEDURE — 0031A PR IMMUNIZ ADMIN, SARS-COV-2 COVID-19 VACC, 5X10VP/0.5ML: ICD-10-PCS | Mod: CV19,S$GLB,, | Performed by: INTERNAL MEDICINE

## 2021-03-05 PROCEDURE — 91303 PR SARSCOV2 VAC AD26 .5ML IM: ICD-10-PCS | Mod: S$GLB,,, | Performed by: INTERNAL MEDICINE

## 2021-04-01 ENCOUNTER — PATIENT OUTREACH (OUTPATIENT)
Dept: ADMINISTRATIVE | Facility: OTHER | Age: 76
End: 2021-04-01

## 2021-04-06 ENCOUNTER — OFFICE VISIT (OUTPATIENT)
Dept: UROLOGY | Facility: CLINIC | Age: 76
End: 2021-04-06
Payer: MEDICARE

## 2021-04-06 VITALS
WEIGHT: 169.75 LBS | SYSTOLIC BLOOD PRESSURE: 166 MMHG | DIASTOLIC BLOOD PRESSURE: 73 MMHG | HEART RATE: 77 BPM | HEIGHT: 60 IN | BODY MASS INDEX: 33.33 KG/M2

## 2021-04-06 DIAGNOSIS — N81.9 OVERACTIVE BLADDER DUE TO PROLAPSE OF FEMALE GENITAL ORGAN: Primary | ICD-10-CM

## 2021-04-06 DIAGNOSIS — R30.0 DYSURIA: ICD-10-CM

## 2021-04-06 DIAGNOSIS — N81.4 CYSTOCELE WITH PROLAPSE: ICD-10-CM

## 2021-04-06 DIAGNOSIS — R35.0 FREQUENCY OF URINATION: ICD-10-CM

## 2021-04-06 DIAGNOSIS — N76.4 FURUNCLE OF LABIA MAJORA: ICD-10-CM

## 2021-04-06 DIAGNOSIS — N32.81 OVERACTIVE BLADDER DUE TO PROLAPSE OF FEMALE GENITAL ORGAN: Primary | ICD-10-CM

## 2021-04-06 LAB
BACTERIA #/AREA URNS AUTO: NORMAL /HPF
BILIRUB UR QL STRIP: NEGATIVE
CLARITY UR REFRACT.AUTO: CLEAR
COLOR UR AUTO: COLORLESS
GLUCOSE UR QL STRIP: ABNORMAL
HGB UR QL STRIP: NEGATIVE
KETONES UR QL STRIP: NEGATIVE
LEUKOCYTE ESTERASE UR QL STRIP: NEGATIVE
MICROSCOPIC COMMENT: NORMAL
NITRITE UR QL STRIP: NEGATIVE
PH UR STRIP: 6 [PH] (ref 5–8)
PROT UR QL STRIP: NEGATIVE
RBC #/AREA URNS AUTO: 0 /HPF (ref 0–4)
SP GR UR STRIP: 1 (ref 1–1.03)
SQUAMOUS #/AREA URNS AUTO: 0 /HPF
URN SPEC COLLECT METH UR: ABNORMAL
WBC #/AREA URNS AUTO: 0 /HPF (ref 0–5)
YEAST UR QL AUTO: NORMAL

## 2021-04-06 PROCEDURE — 87086 URINE CULTURE/COLONY COUNT: CPT | Performed by: NURSE PRACTITIONER

## 2021-04-06 PROCEDURE — 1159F PR MEDICATION LIST DOCUMENTED IN MEDICAL RECORD: ICD-10-PCS | Mod: S$GLB,,, | Performed by: NURSE PRACTITIONER

## 2021-04-06 PROCEDURE — 1101F PR PT FALLS ASSESS DOC 0-1 FALLS W/OUT INJ PAST YR: ICD-10-PCS | Mod: CPTII,S$GLB,, | Performed by: NURSE PRACTITIONER

## 2021-04-06 PROCEDURE — 1126F PR PAIN SEVERITY QUANTIFIED, NO PAIN PRESENT: ICD-10-PCS | Mod: S$GLB,,, | Performed by: NURSE PRACTITIONER

## 2021-04-06 PROCEDURE — 3288F FALL RISK ASSESSMENT DOCD: CPT | Mod: CPTII,S$GLB,, | Performed by: NURSE PRACTITIONER

## 2021-04-06 PROCEDURE — 3288F PR FALLS RISK ASSESSMENT DOCUMENTED: ICD-10-PCS | Mod: CPTII,S$GLB,, | Performed by: NURSE PRACTITIONER

## 2021-04-06 PROCEDURE — 81001 URINALYSIS AUTO W/SCOPE: CPT | Performed by: NURSE PRACTITIONER

## 2021-04-06 PROCEDURE — 99203 PR OFFICE/OUTPT VISIT, NEW, LEVL III, 30-44 MIN: ICD-10-PCS | Mod: S$GLB,,, | Performed by: NURSE PRACTITIONER

## 2021-04-06 PROCEDURE — 3077F SYST BP >= 140 MM HG: CPT | Mod: CPTII,S$GLB,, | Performed by: NURSE PRACTITIONER

## 2021-04-06 PROCEDURE — 3077F PR MOST RECENT SYSTOLIC BLOOD PRESSURE >= 140 MM HG: ICD-10-PCS | Mod: CPTII,S$GLB,, | Performed by: NURSE PRACTITIONER

## 2021-04-06 PROCEDURE — 1126F AMNT PAIN NOTED NONE PRSNT: CPT | Mod: S$GLB,,, | Performed by: NURSE PRACTITIONER

## 2021-04-06 PROCEDURE — 1101F PT FALLS ASSESS-DOCD LE1/YR: CPT | Mod: CPTII,S$GLB,, | Performed by: NURSE PRACTITIONER

## 2021-04-06 PROCEDURE — 1157F ADVNC CARE PLAN IN RCRD: CPT | Mod: S$GLB,,, | Performed by: NURSE PRACTITIONER

## 2021-04-06 PROCEDURE — 1157F PR ADVANCE CARE PLAN OR EQUIV PRESENT IN MEDICAL RECORD: ICD-10-PCS | Mod: S$GLB,,, | Performed by: NURSE PRACTITIONER

## 2021-04-06 PROCEDURE — 3078F DIAST BP <80 MM HG: CPT | Mod: CPTII,S$GLB,, | Performed by: NURSE PRACTITIONER

## 2021-04-06 PROCEDURE — 99999 PR PBB SHADOW E&M-EST. PATIENT-LVL V: ICD-10-PCS | Mod: PBBFAC,,, | Performed by: NURSE PRACTITIONER

## 2021-04-06 PROCEDURE — 1159F MED LIST DOCD IN RCRD: CPT | Mod: S$GLB,,, | Performed by: NURSE PRACTITIONER

## 2021-04-06 PROCEDURE — 3078F PR MOST RECENT DIASTOLIC BLOOD PRESSURE < 80 MM HG: ICD-10-PCS | Mod: CPTII,S$GLB,, | Performed by: NURSE PRACTITIONER

## 2021-04-06 PROCEDURE — 99203 OFFICE O/P NEW LOW 30 MIN: CPT | Mod: S$GLB,,, | Performed by: NURSE PRACTITIONER

## 2021-04-06 PROCEDURE — 99999 PR PBB SHADOW E&M-EST. PATIENT-LVL V: CPT | Mod: PBBFAC,,, | Performed by: NURSE PRACTITIONER

## 2021-04-06 RX ORDER — MIRABEGRON 25 MG/1
25 TABLET, FILM COATED, EXTENDED RELEASE ORAL DAILY
Qty: 30 TABLET | Refills: 11 | Status: SHIPPED | OUTPATIENT
Start: 2021-04-06 | End: 2021-05-13 | Stop reason: SDUPTHER

## 2021-04-06 RX ORDER — ACETAZOLAMIDE 250 MG/1
TABLET ORAL
Status: ON HOLD | COMMUNITY
Start: 2021-03-09 | End: 2021-05-19 | Stop reason: HOSPADM

## 2021-04-06 RX ORDER — CIPROFLOXACIN HYDROCHLORIDE 3 MG/ML
SOLUTION/ DROPS OPHTHALMIC
COMMUNITY
Start: 2021-03-09 | End: 2021-05-13 | Stop reason: CLARIF

## 2021-04-08 ENCOUNTER — OFFICE VISIT (OUTPATIENT)
Dept: INTERNAL MEDICINE | Facility: CLINIC | Age: 76
End: 2021-04-08
Payer: MEDICARE

## 2021-04-08 VITALS
DIASTOLIC BLOOD PRESSURE: 74 MMHG | HEART RATE: 74 BPM | BODY MASS INDEX: 33.19 KG/M2 | OXYGEN SATURATION: 96 % | WEIGHT: 169.06 LBS | SYSTOLIC BLOOD PRESSURE: 122 MMHG | HEIGHT: 60 IN

## 2021-04-08 DIAGNOSIS — Z00.00 HEALTHCARE MAINTENANCE: ICD-10-CM

## 2021-04-08 DIAGNOSIS — Z01.818 PREOP EXAMINATION: Primary | ICD-10-CM

## 2021-04-08 LAB — BACTERIA UR CULT: NO GROWTH

## 2021-04-08 PROCEDURE — 1159F MED LIST DOCD IN RCRD: CPT | Mod: GC,S$GLB,, | Performed by: STUDENT IN AN ORGANIZED HEALTH CARE EDUCATION/TRAINING PROGRAM

## 2021-04-08 PROCEDURE — 99999 PR PBB SHADOW E&M-EST. PATIENT-LVL V: CPT | Mod: PBBFAC,GC,, | Performed by: STUDENT IN AN ORGANIZED HEALTH CARE EDUCATION/TRAINING PROGRAM

## 2021-04-08 PROCEDURE — 99213 OFFICE O/P EST LOW 20 MIN: CPT | Mod: GC,S$GLB,, | Performed by: STUDENT IN AN ORGANIZED HEALTH CARE EDUCATION/TRAINING PROGRAM

## 2021-04-08 PROCEDURE — 99999 PR PBB SHADOW E&M-EST. PATIENT-LVL V: ICD-10-PCS | Mod: PBBFAC,GC,, | Performed by: STUDENT IN AN ORGANIZED HEALTH CARE EDUCATION/TRAINING PROGRAM

## 2021-04-08 PROCEDURE — 1159F PR MEDICATION LIST DOCUMENTED IN MEDICAL RECORD: ICD-10-PCS | Mod: GC,S$GLB,, | Performed by: STUDENT IN AN ORGANIZED HEALTH CARE EDUCATION/TRAINING PROGRAM

## 2021-04-08 PROCEDURE — 99213 PR OFFICE/OUTPT VISIT, EST, LEVL III, 20-29 MIN: ICD-10-PCS | Mod: GC,S$GLB,, | Performed by: STUDENT IN AN ORGANIZED HEALTH CARE EDUCATION/TRAINING PROGRAM

## 2021-05-06 ENCOUNTER — PATIENT OUTREACH (OUTPATIENT)
Dept: ADMINISTRATIVE | Facility: OTHER | Age: 76
End: 2021-05-06

## 2021-05-06 DIAGNOSIS — Z12.12 ENCOUNTER FOR COLORECTAL CANCER SCREENING: Primary | ICD-10-CM

## 2021-05-06 DIAGNOSIS — Z12.11 ENCOUNTER FOR COLORECTAL CANCER SCREENING: Primary | ICD-10-CM

## 2021-05-07 ENCOUNTER — OFFICE VISIT (OUTPATIENT)
Dept: UROLOGY | Facility: CLINIC | Age: 76
End: 2021-05-07
Payer: MEDICARE

## 2021-05-07 VITALS
BODY MASS INDEX: 33.19 KG/M2 | DIASTOLIC BLOOD PRESSURE: 84 MMHG | HEART RATE: 84 BPM | WEIGHT: 169.06 LBS | HEIGHT: 60 IN | SYSTOLIC BLOOD PRESSURE: 193 MMHG

## 2021-05-07 DIAGNOSIS — N81.9 OVERACTIVE BLADDER DUE TO PROLAPSE OF FEMALE GENITAL ORGAN: ICD-10-CM

## 2021-05-07 DIAGNOSIS — N32.81 OVERACTIVE BLADDER DUE TO PROLAPSE OF FEMALE GENITAL ORGAN: ICD-10-CM

## 2021-05-07 PROCEDURE — 1157F PR ADVANCE CARE PLAN OR EQUIV PRESENT IN MEDICAL RECORD: ICD-10-PCS | Mod: S$GLB,,, | Performed by: UROLOGY

## 2021-05-07 PROCEDURE — 99214 PR OFFICE/OUTPT VISIT, EST, LEVL IV, 30-39 MIN: ICD-10-PCS | Mod: S$GLB,,, | Performed by: UROLOGY

## 2021-05-07 PROCEDURE — 1101F PR PT FALLS ASSESS DOC 0-1 FALLS W/OUT INJ PAST YR: ICD-10-PCS | Mod: CPTII,S$GLB,, | Performed by: UROLOGY

## 2021-05-07 PROCEDURE — 99214 OFFICE O/P EST MOD 30 MIN: CPT | Mod: S$GLB,,, | Performed by: UROLOGY

## 2021-05-07 PROCEDURE — 1159F PR MEDICATION LIST DOCUMENTED IN MEDICAL RECORD: ICD-10-PCS | Mod: S$GLB,,, | Performed by: UROLOGY

## 2021-05-07 PROCEDURE — 1101F PT FALLS ASSESS-DOCD LE1/YR: CPT | Mod: CPTII,S$GLB,, | Performed by: UROLOGY

## 2021-05-07 PROCEDURE — 1159F MED LIST DOCD IN RCRD: CPT | Mod: S$GLB,,, | Performed by: UROLOGY

## 2021-05-07 PROCEDURE — 3288F PR FALLS RISK ASSESSMENT DOCUMENTED: ICD-10-PCS | Mod: CPTII,S$GLB,, | Performed by: UROLOGY

## 2021-05-07 PROCEDURE — 1125F PR PAIN SEVERITY QUANTIFIED, PAIN PRESENT: ICD-10-PCS | Mod: S$GLB,,, | Performed by: UROLOGY

## 2021-05-07 PROCEDURE — 1125F AMNT PAIN NOTED PAIN PRSNT: CPT | Mod: S$GLB,,, | Performed by: UROLOGY

## 2021-05-07 PROCEDURE — 1157F ADVNC CARE PLAN IN RCRD: CPT | Mod: S$GLB,,, | Performed by: UROLOGY

## 2021-05-07 PROCEDURE — 3288F FALL RISK ASSESSMENT DOCD: CPT | Mod: CPTII,S$GLB,, | Performed by: UROLOGY

## 2021-05-11 ENCOUNTER — TELEPHONE (OUTPATIENT)
Dept: UROLOGY | Facility: CLINIC | Age: 76
End: 2021-05-11

## 2021-05-11 DIAGNOSIS — N30.10 INTERSTITIAL CYSTITIS: Primary | ICD-10-CM

## 2021-05-13 DIAGNOSIS — R35.0 FREQUENCY OF URINATION: ICD-10-CM

## 2021-05-13 DIAGNOSIS — I10 HTN (HYPERTENSION), BENIGN: ICD-10-CM

## 2021-05-13 RX ORDER — MIRABEGRON 25 MG/1
25 TABLET, FILM COATED, EXTENDED RELEASE ORAL DAILY
Qty: 30 TABLET | Refills: 11 | Status: SHIPPED | OUTPATIENT
Start: 2021-05-13 | End: 2022-06-29 | Stop reason: SDUPTHER

## 2021-05-13 RX ORDER — CARVEDILOL 12.5 MG/1
12.5 TABLET ORAL 2 TIMES DAILY
Qty: 180 TABLET | Refills: 3 | Status: SHIPPED | OUTPATIENT
Start: 2021-05-13 | End: 2021-05-14 | Stop reason: SDUPTHER

## 2021-05-14 DIAGNOSIS — I10 HTN (HYPERTENSION), BENIGN: ICD-10-CM

## 2021-05-14 RX ORDER — CARVEDILOL 12.5 MG/1
12.5 TABLET ORAL 2 TIMES DAILY
Qty: 60 TABLET | Refills: 0 | Status: SHIPPED | OUTPATIENT
Start: 2021-05-14 | End: 2021-08-24

## 2021-05-16 ENCOUNTER — LAB VISIT (OUTPATIENT)
Dept: URGENT CARE | Facility: CLINIC | Age: 76
End: 2021-05-16
Payer: MEDICARE

## 2021-05-16 DIAGNOSIS — N30.10 INTERSTITIAL CYSTITIS: ICD-10-CM

## 2021-05-16 PROCEDURE — 99000 PR SPECIMEN HANDLING,DR OFF->LAB: ICD-10-PCS | Mod: S$GLB,,, | Performed by: FAMILY MEDICINE

## 2021-05-16 PROCEDURE — U0005 INFEC AGEN DETEC AMPLI PROBE: HCPCS | Performed by: NURSE PRACTITIONER

## 2021-05-16 PROCEDURE — U0003 INFECTIOUS AGENT DETECTION BY NUCLEIC ACID (DNA OR RNA); SEVERE ACUTE RESPIRATORY SYNDROME CORONAVIRUS 2 (SARS-COV-2) (CORONAVIRUS DISEASE [COVID-19]), AMPLIFIED PROBE TECHNIQUE, MAKING USE OF HIGH THROUGHPUT TECHNOLOGIES AS DESCRIBED BY CMS-2020-01-R: HCPCS | Performed by: NURSE PRACTITIONER

## 2021-05-16 PROCEDURE — 99000 SPECIMEN HANDLING OFFICE-LAB: CPT | Mod: S$GLB,,, | Performed by: FAMILY MEDICINE

## 2021-05-17 LAB — SARS-COV-2 RNA RESP QL NAA+PROBE: NOT DETECTED

## 2021-05-18 ENCOUNTER — TELEPHONE (OUTPATIENT)
Dept: UROLOGY | Facility: CLINIC | Age: 76
End: 2021-05-18

## 2021-05-19 ENCOUNTER — HOSPITAL ENCOUNTER (OUTPATIENT)
Facility: HOSPITAL | Age: 76
Discharge: HOME | End: 2021-05-19
Attending: UROLOGY | Admitting: UROLOGY
Payer: MEDICARE

## 2021-05-19 ENCOUNTER — ANESTHESIA (OUTPATIENT)
Dept: SURGERY | Facility: HOSPITAL | Age: 76
End: 2021-05-19
Payer: MEDICARE

## 2021-05-19 ENCOUNTER — ANESTHESIA EVENT (OUTPATIENT)
Dept: SURGERY | Facility: HOSPITAL | Age: 76
End: 2021-05-19
Payer: MEDICARE

## 2021-05-19 VITALS
RESPIRATION RATE: 10 BRPM | SYSTOLIC BLOOD PRESSURE: 157 MMHG | TEMPERATURE: 97 F | DIASTOLIC BLOOD PRESSURE: 69 MMHG | HEART RATE: 68 BPM | WEIGHT: 169.06 LBS | HEIGHT: 60 IN | BODY MASS INDEX: 33.19 KG/M2 | OXYGEN SATURATION: 100 %

## 2021-05-19 DIAGNOSIS — R39.89 BLADDER PAIN: Primary | ICD-10-CM

## 2021-05-19 LAB
POCT GLUCOSE: 100 MG/DL (ref 70–110)
POCT GLUCOSE: 111 MG/DL (ref 70–110)

## 2021-05-19 PROCEDURE — 63600175 PHARM REV CODE 636 W HCPCS: Performed by: STUDENT IN AN ORGANIZED HEALTH CARE EDUCATION/TRAINING PROGRAM

## 2021-05-19 PROCEDURE — 37000008 HC ANESTHESIA 1ST 15 MINUTES: Performed by: UROLOGY

## 2021-05-19 PROCEDURE — D9220A PRA ANESTHESIA: ICD-10-PCS | Mod: ,,, | Performed by: ANESTHESIOLOGY

## 2021-05-19 PROCEDURE — 36000706: Performed by: UROLOGY

## 2021-05-19 PROCEDURE — 25000003 PHARM REV CODE 250: Performed by: NURSE ANESTHETIST, CERTIFIED REGISTERED

## 2021-05-19 PROCEDURE — 71000016 HC POSTOP RECOV ADDL HR: Performed by: UROLOGY

## 2021-05-19 PROCEDURE — 71000015 HC POSTOP RECOV 1ST HR: Performed by: UROLOGY

## 2021-05-19 PROCEDURE — 52000 CYSTOURETHROSCOPY: CPT | Mod: ,,, | Performed by: UROLOGY

## 2021-05-19 PROCEDURE — 25000003 PHARM REV CODE 250: Performed by: STUDENT IN AN ORGANIZED HEALTH CARE EDUCATION/TRAINING PROGRAM

## 2021-05-19 PROCEDURE — 71000044 HC DOSC ROUTINE RECOVERY FIRST HOUR: Performed by: UROLOGY

## 2021-05-19 PROCEDURE — 63600175 PHARM REV CODE 636 W HCPCS: Performed by: NURSE ANESTHETIST, CERTIFIED REGISTERED

## 2021-05-19 PROCEDURE — 52000 PR CYSTOURETHROSCOPY: ICD-10-PCS | Mod: ,,, | Performed by: UROLOGY

## 2021-05-19 PROCEDURE — 36000707: Performed by: UROLOGY

## 2021-05-19 PROCEDURE — 82962 GLUCOSE BLOOD TEST: CPT | Performed by: UROLOGY

## 2021-05-19 PROCEDURE — D9220A PRA ANESTHESIA: Mod: ,,, | Performed by: ANESTHESIOLOGY

## 2021-05-19 PROCEDURE — 37000009 HC ANESTHESIA EA ADD 15 MINS: Performed by: UROLOGY

## 2021-05-19 RX ORDER — PROPOFOL 10 MG/ML
VIAL (ML) INTRAVENOUS CONTINUOUS PRN
Status: DISCONTINUED | OUTPATIENT
Start: 2021-05-19 | End: 2021-05-19

## 2021-05-19 RX ORDER — SODIUM CHLORIDE 9 MG/ML
INJECTION, SOLUTION INTRAVENOUS CONTINUOUS
Status: DISCONTINUED | OUTPATIENT
Start: 2021-05-19 | End: 2021-05-19 | Stop reason: HOSPADM

## 2021-05-19 RX ORDER — CEFAZOLIN SODIUM 1 G/3ML
2 INJECTION, POWDER, FOR SOLUTION INTRAMUSCULAR; INTRAVENOUS
Status: COMPLETED | OUTPATIENT
Start: 2021-05-19 | End: 2021-05-19

## 2021-05-19 RX ORDER — PROPOFOL 10 MG/ML
VIAL (ML) INTRAVENOUS
Status: DISCONTINUED | OUTPATIENT
Start: 2021-05-19 | End: 2021-05-19

## 2021-05-19 RX ORDER — FENTANYL CITRATE 50 UG/ML
INJECTION, SOLUTION INTRAMUSCULAR; INTRAVENOUS
Status: DISCONTINUED | OUTPATIENT
Start: 2021-05-19 | End: 2021-05-19

## 2021-05-19 RX ORDER — FENTANYL CITRATE 50 UG/ML
25 INJECTION, SOLUTION INTRAMUSCULAR; INTRAVENOUS EVERY 5 MIN PRN
Status: DISCONTINUED | OUTPATIENT
Start: 2021-05-19 | End: 2021-05-19 | Stop reason: HOSPADM

## 2021-05-19 RX ORDER — MIDAZOLAM HYDROCHLORIDE 1 MG/ML
INJECTION, SOLUTION INTRAMUSCULAR; INTRAVENOUS
Status: DISCONTINUED | OUTPATIENT
Start: 2021-05-19 | End: 2021-05-19

## 2021-05-19 RX ORDER — LIDOCAINE HYDROCHLORIDE 20 MG/ML
INJECTION INTRAVENOUS
Status: DISCONTINUED | OUTPATIENT
Start: 2021-05-19 | End: 2021-05-19

## 2021-05-19 RX ORDER — ONDANSETRON 2 MG/ML
4 INJECTION INTRAMUSCULAR; INTRAVENOUS ONCE AS NEEDED
Status: DISCONTINUED | OUTPATIENT
Start: 2021-05-19 | End: 2021-05-19 | Stop reason: HOSPADM

## 2021-05-19 RX ADMIN — PROPOFOL 50 MG: 10 INJECTION, EMULSION INTRAVENOUS at 03:05

## 2021-05-19 RX ADMIN — FENTANYL CITRATE 25 MCG: 50 INJECTION, SOLUTION INTRAMUSCULAR; INTRAVENOUS at 03:05

## 2021-05-19 RX ADMIN — LIDOCAINE HYDROCHLORIDE 80 MG: 20 INJECTION, SOLUTION INTRAVENOUS at 03:05

## 2021-05-19 RX ADMIN — SODIUM CHLORIDE: 0.9 INJECTION, SOLUTION INTRAVENOUS at 03:05

## 2021-05-19 RX ADMIN — MIDAZOLAM HYDROCHLORIDE 2 MG: 1 INJECTION, SOLUTION INTRAMUSCULAR; INTRAVENOUS at 03:05

## 2021-05-19 RX ADMIN — CEFAZOLIN 2 G: 330 INJECTION, POWDER, FOR SOLUTION INTRAMUSCULAR; INTRAVENOUS at 03:05

## 2021-05-19 RX ADMIN — Medication 150 MCG/KG/MIN: at 03:05

## 2021-07-02 ENCOUNTER — OFFICE VISIT (OUTPATIENT)
Dept: UROLOGY | Facility: CLINIC | Age: 76
End: 2021-07-02
Payer: MEDICARE

## 2021-07-02 VITALS
HEIGHT: 60 IN | SYSTOLIC BLOOD PRESSURE: 149 MMHG | DIASTOLIC BLOOD PRESSURE: 65 MMHG | HEART RATE: 82 BPM | WEIGHT: 169.75 LBS | BODY MASS INDEX: 33.33 KG/M2

## 2021-07-02 DIAGNOSIS — N30.10 INTERSTITIAL CYSTITIS: Primary | ICD-10-CM

## 2021-07-02 PROCEDURE — 3288F FALL RISK ASSESSMENT DOCD: CPT | Mod: CPTII,S$GLB,, | Performed by: UROLOGY

## 2021-07-02 PROCEDURE — 1157F PR ADVANCE CARE PLAN OR EQUIV PRESENT IN MEDICAL RECORD: ICD-10-PCS | Mod: S$GLB,,, | Performed by: UROLOGY

## 2021-07-02 PROCEDURE — 99213 OFFICE O/P EST LOW 20 MIN: CPT | Mod: S$GLB,,, | Performed by: UROLOGY

## 2021-07-02 PROCEDURE — 1101F PT FALLS ASSESS-DOCD LE1/YR: CPT | Mod: CPTII,S$GLB,, | Performed by: UROLOGY

## 2021-07-02 PROCEDURE — 1157F ADVNC CARE PLAN IN RCRD: CPT | Mod: S$GLB,,, | Performed by: UROLOGY

## 2021-07-02 PROCEDURE — 1125F PR PAIN SEVERITY QUANTIFIED, PAIN PRESENT: ICD-10-PCS | Mod: S$GLB,,, | Performed by: UROLOGY

## 2021-07-02 PROCEDURE — 1159F PR MEDICATION LIST DOCUMENTED IN MEDICAL RECORD: ICD-10-PCS | Mod: S$GLB,,, | Performed by: UROLOGY

## 2021-07-02 PROCEDURE — 1101F PR PT FALLS ASSESS DOC 0-1 FALLS W/OUT INJ PAST YR: ICD-10-PCS | Mod: CPTII,S$GLB,, | Performed by: UROLOGY

## 2021-07-02 PROCEDURE — 1159F MED LIST DOCD IN RCRD: CPT | Mod: S$GLB,,, | Performed by: UROLOGY

## 2021-07-02 PROCEDURE — 3288F PR FALLS RISK ASSESSMENT DOCUMENTED: ICD-10-PCS | Mod: CPTII,S$GLB,, | Performed by: UROLOGY

## 2021-07-02 PROCEDURE — 1125F AMNT PAIN NOTED PAIN PRSNT: CPT | Mod: S$GLB,,, | Performed by: UROLOGY

## 2021-07-02 PROCEDURE — 99213 PR OFFICE/OUTPT VISIT, EST, LEVL III, 20-29 MIN: ICD-10-PCS | Mod: S$GLB,,, | Performed by: UROLOGY

## 2021-08-06 ENCOUNTER — PES CALL (OUTPATIENT)
Dept: ADMINISTRATIVE | Facility: CLINIC | Age: 76
End: 2021-08-06

## 2021-09-20 ENCOUNTER — TELEPHONE (OUTPATIENT)
Dept: INTERNAL MEDICINE | Facility: CLINIC | Age: 76
End: 2021-09-20

## 2021-09-20 DIAGNOSIS — H25.9 AGE-RELATED CATARACT OF BOTH EYES, UNSPECIFIED AGE-RELATED CATARACT TYPE: Primary | ICD-10-CM

## 2021-10-15 ENCOUNTER — PES CALL (OUTPATIENT)
Dept: ADMINISTRATIVE | Facility: CLINIC | Age: 76
End: 2021-10-15

## 2021-11-01 ENCOUNTER — PES CALL (OUTPATIENT)
Dept: ADMINISTRATIVE | Facility: CLINIC | Age: 76
End: 2021-11-01
Payer: MEDICARE

## 2021-12-10 ENCOUNTER — OFFICE VISIT (OUTPATIENT)
Dept: INTERNAL MEDICINE | Facility: CLINIC | Age: 76
End: 2021-12-10
Payer: MEDICARE

## 2021-12-10 VITALS
DIASTOLIC BLOOD PRESSURE: 75 MMHG | WEIGHT: 165.81 LBS | HEIGHT: 63 IN | HEART RATE: 71 BPM | OXYGEN SATURATION: 97 % | SYSTOLIC BLOOD PRESSURE: 130 MMHG | BODY MASS INDEX: 29.38 KG/M2

## 2021-12-10 DIAGNOSIS — R31.9 HEMATURIA, UNSPECIFIED TYPE: ICD-10-CM

## 2021-12-10 DIAGNOSIS — M79.604 BILATERAL LEG PAIN: ICD-10-CM

## 2021-12-10 DIAGNOSIS — E53.8 VITAMIN B 12 DEFICIENCY: ICD-10-CM

## 2021-12-10 DIAGNOSIS — I70.0 AORTIC ATHEROSCLEROSIS: ICD-10-CM

## 2021-12-10 DIAGNOSIS — E55.9 VITAMIN D INSUFFICIENCY: ICD-10-CM

## 2021-12-10 DIAGNOSIS — G89.29 CHRONIC BILATERAL LOW BACK PAIN WITHOUT SCIATICA: ICD-10-CM

## 2021-12-10 DIAGNOSIS — M79.10 MYALGIA: ICD-10-CM

## 2021-12-10 DIAGNOSIS — M43.10 SPONDYLOLISTHESIS, UNSPECIFIED SPINAL REGION: ICD-10-CM

## 2021-12-10 DIAGNOSIS — R39.89 BLADDER PAIN: ICD-10-CM

## 2021-12-10 DIAGNOSIS — M72.2 PLANTAR FASCIITIS OF LEFT FOOT: ICD-10-CM

## 2021-12-10 DIAGNOSIS — E11.40 TYPE 2 DIABETES MELLITUS WITH DIABETIC NEUROPATHY, WITHOUT LONG-TERM CURRENT USE OF INSULIN: ICD-10-CM

## 2021-12-10 DIAGNOSIS — G89.29 CHRONIC PAIN OF BOTH SHOULDERS: ICD-10-CM

## 2021-12-10 DIAGNOSIS — M25.512 CHRONIC PAIN OF BOTH SHOULDERS: ICD-10-CM

## 2021-12-10 DIAGNOSIS — Z78.0 POSTMENOPAUSAL STATE: ICD-10-CM

## 2021-12-10 DIAGNOSIS — M54.50 CHRONIC BILATERAL LOW BACK PAIN WITHOUT SCIATICA: ICD-10-CM

## 2021-12-10 DIAGNOSIS — I10 HTN (HYPERTENSION), BENIGN: ICD-10-CM

## 2021-12-10 DIAGNOSIS — M54.9 BACK PAIN, UNSPECIFIED BACK LOCATION, UNSPECIFIED BACK PAIN LATERALITY, UNSPECIFIED CHRONICITY: ICD-10-CM

## 2021-12-10 DIAGNOSIS — M25.511 CHRONIC PAIN OF BOTH SHOULDERS: ICD-10-CM

## 2021-12-10 DIAGNOSIS — M47.899 FACET SYNDROME: ICD-10-CM

## 2021-12-10 DIAGNOSIS — M79.605 BILATERAL LEG PAIN: ICD-10-CM

## 2021-12-10 DIAGNOSIS — G62.9 NEUROPATHY: ICD-10-CM

## 2021-12-10 DIAGNOSIS — M54.2 NECK PAIN: ICD-10-CM

## 2021-12-10 DIAGNOSIS — Z22.7 TB LUNG, LATENT: ICD-10-CM

## 2021-12-10 DIAGNOSIS — Z12.11 SPECIAL SCREENING FOR MALIGNANT NEOPLASMS, COLON: ICD-10-CM

## 2021-12-10 DIAGNOSIS — H26.9 CATARACT OF BOTH EYES, UNSPECIFIED CATARACT TYPE: ICD-10-CM

## 2021-12-10 DIAGNOSIS — E78.5 HYPERLIPIDEMIA, UNSPECIFIED HYPERLIPIDEMIA TYPE: ICD-10-CM

## 2021-12-10 DIAGNOSIS — R29.3 POSTURE ABNORMALITY: ICD-10-CM

## 2021-12-10 DIAGNOSIS — E11.42 DIABETIC POLYNEUROPATHY ASSOCIATED WITH TYPE 2 DIABETES MELLITUS: ICD-10-CM

## 2021-12-10 DIAGNOSIS — R29.898 UPPER EXTREMITY WEAKNESS: ICD-10-CM

## 2021-12-10 DIAGNOSIS — L21.9 SEBORRHEIC DERMATITIS: ICD-10-CM

## 2021-12-10 DIAGNOSIS — K21.9 GASTROESOPHAGEAL REFLUX DISEASE, UNSPECIFIED WHETHER ESOPHAGITIS PRESENT: ICD-10-CM

## 2021-12-10 DIAGNOSIS — E78.2 ELEVATED CHOLESTEROL WITH HIGH TRIGLYCERIDES: ICD-10-CM

## 2021-12-10 DIAGNOSIS — Z00.00 ENCOUNTER FOR PREVENTIVE HEALTH EXAMINATION: Primary | ICD-10-CM

## 2021-12-10 DIAGNOSIS — R39.9 LOWER URINARY TRACT SYMPTOMS (LUTS): ICD-10-CM

## 2021-12-10 DIAGNOSIS — H91.90 HEARING LOSS, UNSPECIFIED HEARING LOSS TYPE, UNSPECIFIED LATERALITY: ICD-10-CM

## 2021-12-10 DIAGNOSIS — E03.9 HYPOTHYROIDISM, UNSPECIFIED TYPE: ICD-10-CM

## 2021-12-10 PROCEDURE — G0439 PR MEDICARE ANNUAL WELLNESS SUBSEQUENT VISIT: ICD-10-PCS | Mod: S$GLB,,, | Performed by: NURSE PRACTITIONER

## 2021-12-10 PROCEDURE — 99999 PR PBB SHADOW E&M-EST. PATIENT-LVL V: ICD-10-PCS | Mod: PBBFAC,,, | Performed by: NURSE PRACTITIONER

## 2021-12-10 PROCEDURE — 99999 PR PBB SHADOW E&M-EST. PATIENT-LVL V: CPT | Mod: PBBFAC,,, | Performed by: NURSE PRACTITIONER

## 2021-12-10 PROCEDURE — 1157F ADVNC CARE PLAN IN RCRD: CPT | Mod: CPTII,S$GLB,, | Performed by: NURSE PRACTITIONER

## 2021-12-10 PROCEDURE — G0439 PPPS, SUBSEQ VISIT: HCPCS | Mod: S$GLB,,, | Performed by: NURSE PRACTITIONER

## 2021-12-10 PROCEDURE — 1157F PR ADVANCE CARE PLAN OR EQUIV PRESENT IN MEDICAL RECORD: ICD-10-PCS | Mod: CPTII,S$GLB,, | Performed by: NURSE PRACTITIONER

## 2021-12-11 ENCOUNTER — PATIENT OUTREACH (OUTPATIENT)
Dept: ADMINISTRATIVE | Facility: OTHER | Age: 76
End: 2021-12-11
Payer: MEDICARE

## 2021-12-13 ENCOUNTER — OFFICE VISIT (OUTPATIENT)
Dept: PODIATRY | Facility: CLINIC | Age: 76
End: 2021-12-13
Payer: MEDICARE

## 2021-12-13 VITALS
HEIGHT: 63 IN | HEART RATE: 77 BPM | WEIGHT: 165 LBS | SYSTOLIC BLOOD PRESSURE: 132 MMHG | DIASTOLIC BLOOD PRESSURE: 62 MMHG | BODY MASS INDEX: 29.23 KG/M2

## 2021-12-13 DIAGNOSIS — M54.9 BACK PAIN, UNSPECIFIED BACK LOCATION, UNSPECIFIED BACK PAIN LATERALITY, UNSPECIFIED CHRONICITY: Primary | ICD-10-CM

## 2021-12-13 DIAGNOSIS — M43.10 SPONDYLOLISTHESIS, UNSPECIFIED SPINAL REGION: ICD-10-CM

## 2021-12-13 DIAGNOSIS — E11.40 TYPE 2 DIABETES MELLITUS WITH DIABETIC NEUROPATHY, WITHOUT LONG-TERM CURRENT USE OF INSULIN: ICD-10-CM

## 2021-12-13 DIAGNOSIS — B35.1 DERMATOPHYTOSIS OF NAIL: ICD-10-CM

## 2021-12-13 PROCEDURE — 99214 PR OFFICE/OUTPT VISIT, EST, LEVL IV, 30-39 MIN: ICD-10-PCS | Mod: S$GLB,,, | Performed by: PODIATRIST

## 2021-12-13 PROCEDURE — 1157F PR ADVANCE CARE PLAN OR EQUIV PRESENT IN MEDICAL RECORD: ICD-10-PCS | Mod: CPTII,S$GLB,, | Performed by: PODIATRIST

## 2021-12-13 PROCEDURE — 99214 OFFICE O/P EST MOD 30 MIN: CPT | Mod: S$GLB,,, | Performed by: PODIATRIST

## 2021-12-13 PROCEDURE — 1157F ADVNC CARE PLAN IN RCRD: CPT | Mod: CPTII,S$GLB,, | Performed by: PODIATRIST

## 2021-12-13 PROCEDURE — 99999 PR PBB SHADOW E&M-EST. PATIENT-LVL V: CPT | Mod: PBBFAC,,, | Performed by: PODIATRIST

## 2021-12-13 PROCEDURE — 99999 PR PBB SHADOW E&M-EST. PATIENT-LVL V: ICD-10-PCS | Mod: PBBFAC,,, | Performed by: PODIATRIST

## 2021-12-13 RX ORDER — PRENATAL VIT 91/IRON/FOLIC/DHA 28-975-200
COMBINATION PACKAGE (EA) ORAL 2 TIMES DAILY
Qty: 24 G | Refills: 3 | Status: SHIPPED | OUTPATIENT
Start: 2021-12-13 | End: 2022-06-29

## 2021-12-13 RX ORDER — GABAPENTIN 300 MG/1
300 CAPSULE ORAL NIGHTLY
Qty: 30 CAPSULE | Refills: 0 | Status: SHIPPED | OUTPATIENT
Start: 2021-12-13 | End: 2022-03-21 | Stop reason: SDUPTHER

## 2021-12-13 RX ORDER — BACLOFEN 10 MG/1
10 TABLET ORAL DAILY
COMMUNITY
Start: 2021-08-24 | End: 2022-06-29 | Stop reason: SDUPTHER

## 2021-12-18 DIAGNOSIS — M54.9 BACK PAIN, UNSPECIFIED BACK LOCATION, UNSPECIFIED BACK PAIN LATERALITY, UNSPECIFIED CHRONICITY: Primary | ICD-10-CM

## 2021-12-18 DIAGNOSIS — M43.10 SPONDYLOLISTHESIS, UNSPECIFIED SPINAL REGION: ICD-10-CM

## 2022-01-24 DIAGNOSIS — E03.9 HYPOTHYROIDISM, UNSPECIFIED TYPE: ICD-10-CM

## 2022-01-24 DIAGNOSIS — E03.4 HYPOTHYROIDISM DUE TO ACQUIRED ATROPHY OF THYROID: ICD-10-CM

## 2022-01-24 NOTE — TELEPHONE ENCOUNTER
Care Due:                  Date            Visit Type   Department     Provider  --------------------------------------------------------------------------------                                             [facility]  Last Visit: 02-      None         [facility]       DEVON MARTINEZ  Next Visit: None Scheduled  None         None Found                                                            Last  Test          Frequency    Reason                     Performed    Due Date  --------------------------------------------------------------------------------    Office Visit  12 months..  glipiZIDE, levothyroxine,   02- 02-                             lisinopriL, metFORMIN,                             rosuvastatin.............    CMP.........  12 months..  glipiZIDE, lisinopriL,     Not Found    Overdue                             metFORMIN, rosuvastatin..    HBA1C.......  6 months...  glipiZIDE, metFORMIN.....  02- 08-    Lipid Panel.  12 months..  rosuvastatin.............  Not Found    Overdue    TSH.........  12 months..  levothyroxine............  02- 02-    Powered by [employer]. Reference number: [id number].   1/24/2022 2:49:59 PM CST

## 2022-01-24 NOTE — TELEPHONE ENCOUNTER
----- Message from Herman Butler sent at 1/24/2022  2:20 PM CST -----  Contact: Patient  RX REFILL REQUEST:      levothyroxine (SYNTHROID) 112 MCG tablet      PHARMACY @[facility] - [place], [place] - [address]    [phone] (home)

## 2022-01-25 ENCOUNTER — CLINICAL SUPPORT (OUTPATIENT)
Dept: REHABILITATION | Facility: HOSPITAL | Age: 77
End: 2022-01-25
Payer: MEDICARE

## 2022-01-25 DIAGNOSIS — R29.898 WEAKNESS OF BOTH LOWER EXTREMITIES: ICD-10-CM

## 2022-01-25 DIAGNOSIS — M54.9 BACK PAIN, UNSPECIFIED BACK LOCATION, UNSPECIFIED BACK PAIN LATERALITY, UNSPECIFIED CHRONICITY: ICD-10-CM

## 2022-01-25 PROCEDURE — 97161 PT EVAL LOW COMPLEX 20 MIN: CPT

## 2022-01-25 RX ORDER — LEVOTHYROXINE SODIUM 112 UG/1
112 TABLET ORAL
Qty: 90 TABLET | Refills: 1 | Status: SHIPPED | OUTPATIENT
Start: 2022-01-25 | End: 2022-06-29 | Stop reason: SDUPTHER

## 2022-01-25 NOTE — PLAN OF CARE
[facility]   Physical Therapy Initial Evaluation       Date: 1/25/2022   Name: Aminah [facility]  Clinic Number: [id number]    Therapy Diagnosis:   Encounter Diagnoses   Name Primary?    Back pain, unspecified back location, unspecified back pain laterality, unspecified chronicity     Weakness of both lower extremities      Physician: Rachna Damon DPM    Physician Orders: PT Eval and Treat   Medical Diagnosis from Referral:   M54.9 (ICD-10-CM) - Back pain, unspecified back location, unspecified back pain laterality, unspecified chronicity   M43.10 (ICD-10-CM) - Spondylolisthesis, unspecified spinal region   Evaluation Date: 1/25/2022  Authorization Period Expiration: TBD  Plan of Care Expiration: 4/18/2022  Progress Note Due: 2/19/2022  Visit # / Visits authorized: 1/ TBD   FOTO: 1/5    Precautions: Standard -  needs daughter for      Time In: 440  Time Out: 520  Total Appointment Time (timed & untimed codes): 40 minutes      SUBJECTIVE   Date of onset: 2 years ago    History of current condition - Aminah reports: Daughter is interpreting for mother. Daughter reports that her mother has fallen two years ago and has been having pain ever since. Daughter reports her back pain bothers when she is cleaning the house. Daughter reports if she is standing doing dishes for more than ten minutes, she will have pain. Patient reports sitting feels worse if she doesn't have a back support. Daughter reports the patient has diabetes and arthritis. Daughter reports the patient has a burning sensation in her L leg, but may be due to the diabetes. Daughter reports her mother does not use an assistive device, but feels that she needs one. Daughter reports her mother will take a shower and moves her legs which may help her pain sometimes.       Prior Therapy: No  Social History: lives with their family  Occupation: Retired   Prior Level of Function: Sedentary, occasional house  work  Current Level of Function: Sedentary, occasional house work     Pain:  Current 7/10, worst 9/10, best 3/10   Location: left back , buttocks  and lower leg .   Description: Aching  Aggravating Factors: Sitting and standing   Easing Factors: Rest, showering, exercising     Patients goals: To be able to reduce back pain      Medical History:   Past Medical History:   Diagnosis Date    Anticoagulant long-term use     Arthritis     Cataract     left eye    Choroidal rupture of left eye     Diabetes mellitus type II     GERD (gastroesophageal reflux disease)     Hyperlipidemia     Hypertension     Hypothyroidism     Macular scar     right eye    Retinal degeneration     chorioretinal OD    Thyroid disease     Vitamin B 12 deficiency        Surgical History:   Aminah Norton  has a past surgical history that includes Total abdominal hysterectomy; Cholecystectomy; Bladder suspension; Cataract extraction; Colonoscopy (N/A, 7/20/2020); Cystoscopy (N/A, 5/19/2021); Appendectomy; and Eye surgery (Bilateral).    Medications:   Aminah has a current medication list which includes the following prescription(s): accu-chek kelli plus test strp, accu-chek softclix lancets, aspirin, baclofen, bd alcohol swabs, biotin, blood-glucose meter, carvedilol, cyanocobalamin, fluocinonide, gabapentin, glipizide, ketoconazole, lancets, lancets, levothyroxine, lisinopril, metformin, myrbetriq, neomycin-polymyxin-dexamethasone, rosuvastatin, terbinafine hcl, turmeric root extract, vitamin b complex, and vitamin d.    Allergies:   Review of patient's allergies indicates:   Allergen Reactions    Bufferin [aspirin, buffered] Itching    Atorvastatin      Myalgias and arthralgias    Shellfish containing products Itching     Shellfish causes her to itch per her daughter, Caro.     Warfarin     Ibuprofen Itching     Itching of eyes, head          OBJECTIVE     Lumbar AROM: Pain/Dysfunction with Movement:    Flexion: 80 degrees Pain in low back   Extension: 15 degrees    Right side bendin degrees    Left side bendin degrees    Right rotation: 25% limited    Left rotation: 25% limited       Myotome Right Left Pain/Dysfunction with Movement   L1,2- Hip Flexion (Femoral Nerve) 4/5 4-/5    L3,4- Knee Extension (Femoral Nerve) 5/5 5/5    L4,5- Ankle Dorsiflexion (Deep Fibular Nerve) 4+/5 4+/5    L5- Big Toe Extension (Deep Fibular Nerve) 5/5 4+/5    L5, S1- Ankle plantarflexion (Tibial Nerve) 4+/5 4+/5    Hip Abduction 4-/5 4-/5       30 sec sit to stand: 10 reps w/o UE support       TUG: 10.27 seconds       Limitation/Restriction for FOTO Lumbar Spine Survey    Therapist reviewed FOTO scores for Aminha Norton on 2022.   FOTO documents entered into [employer] - see Media section.    Limitation Score: **%         TREATMENT     Next session: General strengthening, balance training    PATIENT EDUCATION AND HOME EXERCISES     Education provided:   - Purpose of PT  - Purchase a rolling walker or rollator      ASSESSMENT     Aminah is a 76 y.o. female referred to outpatient Physical Therapy with a medical diagnosis of back pain. Patient presents with a chronic history of low back pain due to a fall two years ago. Patient presents with fair lower extremity strength, increased back pain, and fear of falling. Patient scored well on the TUG and 30 sec sit to stand test according to her age range. Although patient scored well on the TUG test, she seems to be an appropriate candidate for a walker for longer distances to reduce the risk of falls and promote increased frequency of walking.     Patient prognosis is Fair.   Patientt will benefit from skilled outpatient Physical Therapy to address the deficits stated above and in the chart below, provide patient /family education, and to maximize patientt's level of independence.     Plan of care discussed with patient: Yes  Patient's spiritual, cultural and  educational needs considered and patient is agreeable to the plan of care and goals as stated below:     Anticipated Barriers for therapy: Chronic Back Pain    Medical Necessity is demonstrated by the following  History  Co-morbidities and personal factors that may impact the plan of care Co-morbidities:   diabetes and HTN    Personal Factors:   no deficits     moderate   Examination  Body Structures and Functions, activity limitations and participation restrictions that may impact the plan of care Body Regions:   back  lower extremities    Body Systems:    ROM  strength  gait    Participation Restrictions:   None    Activity limitations:   Learning and applying knowledge  no deficits    General Tasks and Commands  no deficits    Communication  no deficits    Mobility  no deficits    Self care  no deficits    Domestic Life  doing house work (cleaning house, washing dishes, laundry)    Interactions/Relationships  no deficits    Life Areas  no deficits    Community and Social Life  recreation and leisure         high   Clinical Presentation stable and uncomplicated low   Decision Making/ Complexity Score: low     Goals:  Short Term Goals (3 Weeks):   1. Pt will be compliant with HEP to supplement PT in restoring pain free function.  2. Pt will improve 30 sec sit to stand test to >/=11 reps to improve LE endurance for functional community ambulation  3.   Long Term Goals (6 Weeks):  1. Pt will improve FOTO score to </= **% limited to decrease perceived limitation with mobility  2. Pt will improve 30 sec sit to stand test to >/=13 reps to improve LE endurance for functional community ambulation  3. Pt will improve impaired LE MMTs by 1/2 grade to improve strength for functional tasks.  4. Pt will report </= 1/10 with her low back pain to improve tolerance to leisure activities.       PLAN   Plan of care Certification: 1/25/2022 to 4/18/2022.    Outpatient Physical Therapy 2 times weekly for 6 weeks to include the  following interventions: Cervical/Lumbar Traction, Gait Training, Manual Therapy, Moist Heat/ Ice, Neuromuscular Re-ed, Patient Education, Self Care, Therapeutic Activities and Therapeutic Exercise.     Aidan Amaya, PT      I CERTIFY THE NEED FOR THESE SERVICES FURNISHED UNDER THIS PLAN OF TREATMENT AND WHILE UNDER MY CARE   Physician's comments:     Physician's Signature: ___________________________________________________

## 2022-02-08 ENCOUNTER — CLINICAL SUPPORT (OUTPATIENT)
Dept: REHABILITATION | Facility: HOSPITAL | Age: 77
End: 2022-02-08
Payer: MEDICARE

## 2022-02-08 DIAGNOSIS — R29.898 WEAKNESS OF BOTH LOWER EXTREMITIES: ICD-10-CM

## 2022-02-08 PROCEDURE — 97110 THERAPEUTIC EXERCISES: CPT

## 2022-02-08 NOTE — PROGRESS NOTES
[facility]   Physical Therapy Treatment Note     Name: Aminah Higgins [facility]  Clinic Number: [id number]    Therapy Diagnosis:   Encounter Diagnosis   Name Primary?    Weakness of both lower extremities      Physician: Rachna Damon DPM    Visit Date: 2/8/2022    Physician Orders: PT Eval and Treat   Medical Diagnosis from Referral:   M54.9 (ICD-10-CM) - Back pain, unspecified back location, unspecified back pain laterality, unspecified chronicity   M43.10 (ICD-10-CM) - Spondylolisthesis, unspecified spinal region   Evaluation Date: 1/25/2022  Authorization Period Expiration: 4/8/2022  Plan of Care Expiration: 4/18/2022  Progress Note Due: 2/19/2022  Visit # / Visits authorized: 2/ 20  FOTO: 1/5     Precautions: Standard -  needs daughter for        PTA Visit #: 0/5     Time In: 431  Time Out: 515  Total Billable Time: 44 minutes    SUBJECTIVE      is present today and interpreting all of patients subjective   Pt reports: she is feeling good today with no pain. Patient reports she is doing some stretching at home and feeling good today      Pain: 0/10  Location: bilateral back      OBJECTIVE     Objective Measures updated at progress report unless specified.     Treatment     Aminah received the treatments listed below:      therapeutic exercises to develop strength, endurance, ROM, flexibility, posture and core stabilization for 44 minutes including:    NuStep 7 minutes   Ball Rolls 10x ea way  Hamstring 30 sec x 3  Piriformis 30 sec x 3  LTR 15x  SKTC 15              Patient Education and Home Exercises     Home Exercises Provided and Patient Education Provided     Education provided:   - HEP    Written Home Exercises Provided: yes. Exercises were reviewed and Aminah was able to demonstrate them prior to the end of the session.  Aminah demonstrated good  understanding of the education provided. See EMR under Patient Instructions for exercises provided during  therapy sessions    ASSESSMENT     Patient presents to physical therapy with improvement of symptoms compared to last session. Patient was progressed with therex to address lumbar and LE mobility/flexibility. Patient needed her daughter () to correct her form and technique. Patient left without adverse effects. Cont to work on flexibility and progress LE strengthening next session.     Aminah Is progressing well towards her goals.   Pt prognosis is Fair.     Pt will continue to benefit from skilled outpatient physical therapy to address the deficits listed in the problem list box on initial evaluation, provide pt/family education and to maximize pt's level of independence in the home and community environment.     Pt's spiritual, cultural and educational needs considered and pt agreeable to plan of care and goals.     Anticipated barriers to physical therapy: Chronic Back Pain       Goals:   Short Term Goals (3 Weeks):   1. Pt will be compliant with HEP to supplement PT in restoring pain free function.  2. Pt will improve 30 sec sit to stand test to >/=11 reps to improve LE endurance for functional community ambulation  3.   Long Term Goals (6 Weeks):  1. Pt will improve FOTO score to </= **% limited to decrease perceived limitation with mobility  2. Pt will improve 30 sec sit to stand test to >/=13 reps to improve LE endurance for functional community ambulation  3. Pt will improve impaired LE MMTs by 1/2 grade to improve strength for functional tasks.  4. Pt will report </= 1/10 with her low back pain to improve tolerance to leisure activities.         PLAN   Plan of care Certification: 1/25/2022 to 4/18/2022.     Outpatient Physical Therapy 2 times weekly for 6 weeks to include the following interventions: Cervical/Lumbar Traction, Gait Training, Manual Therapy, Moist Heat/ Ice, Neuromuscular Re-ed, Patient Education, Self Care, Therapeutic Activities and Therapeutic Exercise.        Aidan Amaya, PT

## 2022-02-09 ENCOUNTER — OFFICE VISIT (OUTPATIENT)
Dept: OTOLARYNGOLOGY | Facility: CLINIC | Age: 77
End: 2022-02-09
Payer: MEDICARE

## 2022-02-09 ENCOUNTER — CLINICAL SUPPORT (OUTPATIENT)
Dept: AUDIOLOGY | Facility: CLINIC | Age: 77
End: 2022-02-09
Payer: MEDICARE

## 2022-02-09 DIAGNOSIS — H91.90 HEARING LOSS, UNSPECIFIED HEARING LOSS TYPE, UNSPECIFIED LATERALITY: ICD-10-CM

## 2022-02-09 DIAGNOSIS — H91.13 PRESBYCUSIS OF BOTH EARS: Primary | ICD-10-CM

## 2022-02-09 DIAGNOSIS — H93.13 TINNITUS, BILATERAL: ICD-10-CM

## 2022-02-09 DIAGNOSIS — H90.3 SENSORINEURAL HEARING LOSS, BILATERAL: Primary | ICD-10-CM

## 2022-02-09 PROCEDURE — 92557 PR COMPREHENSIVE HEARING TEST: ICD-10-PCS | Mod: S$GLB,,, | Performed by: AUDIOLOGIST

## 2022-02-09 PROCEDURE — 92567 PR TYMPA2METRY: ICD-10-PCS | Mod: S$GLB,,, | Performed by: AUDIOLOGIST

## 2022-02-09 PROCEDURE — 92557 COMPREHENSIVE HEARING TEST: CPT | Mod: S$GLB,,, | Performed by: AUDIOLOGIST

## 2022-02-09 PROCEDURE — 1159F MED LIST DOCD IN RCRD: CPT | Mod: CPTII,S$GLB,, | Performed by: OTOLARYNGOLOGY

## 2022-02-09 PROCEDURE — 99999 PR PBB SHADOW E&M-EST. PATIENT-LVL III: CPT | Mod: PBBFAC,,, | Performed by: OTOLARYNGOLOGY

## 2022-02-09 PROCEDURE — 1101F PR PT FALLS ASSESS DOC 0-1 FALLS W/OUT INJ PAST YR: ICD-10-PCS | Mod: CPTII,S$GLB,, | Performed by: OTOLARYNGOLOGY

## 2022-02-09 PROCEDURE — 1101F PT FALLS ASSESS-DOCD LE1/YR: CPT | Mod: CPTII,S$GLB,, | Performed by: OTOLARYNGOLOGY

## 2022-02-09 PROCEDURE — 3288F FALL RISK ASSESSMENT DOCD: CPT | Mod: CPTII,S$GLB,, | Performed by: OTOLARYNGOLOGY

## 2022-02-09 PROCEDURE — 1126F PR PAIN SEVERITY QUANTIFIED, NO PAIN PRESENT: ICD-10-PCS | Mod: CPTII,S$GLB,, | Performed by: OTOLARYNGOLOGY

## 2022-02-09 PROCEDURE — 1126F AMNT PAIN NOTED NONE PRSNT: CPT | Mod: CPTII,S$GLB,, | Performed by: OTOLARYNGOLOGY

## 2022-02-09 PROCEDURE — 1157F PR ADVANCE CARE PLAN OR EQUIV PRESENT IN MEDICAL RECORD: ICD-10-PCS | Mod: CPTII,S$GLB,, | Performed by: OTOLARYNGOLOGY

## 2022-02-09 PROCEDURE — 99204 OFFICE O/P NEW MOD 45 MIN: CPT | Mod: S$GLB,,, | Performed by: OTOLARYNGOLOGY

## 2022-02-09 PROCEDURE — 99999 PR PBB SHADOW E&M-EST. PATIENT-LVL III: ICD-10-PCS | Mod: PBBFAC,,, | Performed by: OTOLARYNGOLOGY

## 2022-02-09 PROCEDURE — 92567 TYMPANOMETRY: CPT | Mod: S$GLB,,, | Performed by: AUDIOLOGIST

## 2022-02-09 PROCEDURE — 1157F ADVNC CARE PLAN IN RCRD: CPT | Mod: CPTII,S$GLB,, | Performed by: OTOLARYNGOLOGY

## 2022-02-09 PROCEDURE — 99204 PR OFFICE/OUTPT VISIT, NEW, LEVL IV, 45-59 MIN: ICD-10-PCS | Mod: S$GLB,,, | Performed by: OTOLARYNGOLOGY

## 2022-02-09 PROCEDURE — 1159F PR MEDICATION LIST DOCUMENTED IN MEDICAL RECORD: ICD-10-PCS | Mod: CPTII,S$GLB,, | Performed by: OTOLARYNGOLOGY

## 2022-02-09 PROCEDURE — 3288F PR FALLS RISK ASSESSMENT DOCUMENTED: ICD-10-PCS | Mod: CPTII,S$GLB,, | Performed by: OTOLARYNGOLOGY

## 2022-02-09 NOTE — PROGRESS NOTES
[facility].  Patient's main complaint was bilateral tinnitus and hearing loss.      Tympanometry revealed Type A in the right ear and Type A in the left ear. Audiogram results revealed mild sloping to severe sensorineural hearing loss (SNHL) in the right ear and mild sloping to severe SNHL in the left ear.  Speech reception thresholds were noted at 30 dB in the right ear and 30 dB in the left ear.  Speech discrimination scores were not obtained due to language barrier.    Recommendations:  1. Otologic evaluation  2. Annual audiogram  3. Hearing protection when in noise  4. Hearing aid consultation

## 2022-02-09 NOTE — PROGRESS NOTES
Subjective:       Patient ID: Aminah Norton is a 76 y.o. female.    Chief Complaint: Hearing Loss and Tinnitus    HPI: Has B prog HL.    Fairly mild.    Some arin Tinn.    No prior audio.    ?? Fam Hx.    Past Medical History: Patient has a past medical history of Anticoagulant long-term use, Arthritis, Cataract, Choroidal rupture of left eye, Diabetes mellitus type II, GERD (gastroesophageal reflux disease), Hyperlipidemia, Hypertension, Hypothyroidism, Macular scar, Retinal degeneration, Thyroid disease, and Vitamin B 12 deficiency.    Past Surgical History: Patient has a past surgical history that includes Total abdominal hysterectomy; Cholecystectomy; Bladder suspension; Cataract extraction; Colonoscopy (N/A, 7/20/2020); Cystoscopy (N/A, 5/19/2021); Appendectomy; and Eye surgery (Bilateral).    Social History: Patient reports that she has never smoked. She has never used smokeless tobacco. She reports that she does not drink alcohol and does not use drugs.    Family History: family history includes Breast cancer in her mother; COPD in her father; Cancer in her daughter, father, mother, and sister; Diabetes in her son; Hypertension in her daughter, daughter, sister, and sister; Ovarian cancer in her mother and sister; Thyroid disease in her daughter and daughter.    Medications:   Current Outpatient Medications   Medication Sig    ACCU-CHEK MATTIE PLUS TEST STRP Strp TEST ONE TIME DAILY    ACCU-CHEK SOFTCLIX LANCETS Misc CHECK BLOOD SUGAR ONE TIME DAILY    aspirin (ECOTRIN) 81 MG EC tablet Take 81 mg by mouth once daily.    baclofen (LIORESAL) 10 MG tablet Take 10 mg by mouth once daily.    BD ALCOHOL SWABS PadM USE AS DIRECTED TOPICALLY AS NEEDED    biotin 1 mg tablet Take 1,000 mcg by mouth 3 (three) times daily.    blood-glucose meter kit To check BG 2  times daily, to use with insurance preferred meter, use as directed.    carvediloL (COREG) 12.5 MG tablet TAKE 1 TABLET TWICE DAILY     cyanocobalamin (VITAMIN B-12) 1000 MCG tablet Take 100 mcg by mouth once daily.    glipiZIDE (GLUCOTROL) 2.5 MG TR24 Take 2 tablets (5 mg total) by mouth daily with breakfast.    ketoconazole (NIZORAL) 2 % cream Apply topically once daily.    lancets (ACCU-CHEK MULTICLIX LANCET) Misc To use as directed with Accu Chek Sahra FastClix lancing device    lancets Misc To check BG 2 times daily, to use with insurance preferred meter.    levothyroxine (SYNTHROID) 112 MCG tablet Take 1 tablet (112 mcg total) by mouth before breakfast.    lisinopriL (PRINIVIL,ZESTRIL) 40 MG tablet Take 1 tablet (40 mg total) by mouth once daily.    metFORMIN (GLUCOPHAGE) 1000 MG tablet Take 1 tablet (1,000 mg total) by mouth 2 (two) times daily with meals.    mirabegron (MYRBETRIQ) 25 mg Tb24 ER tablet Take 1 tablet (25 mg total) by mouth once daily.    neomycin-polymyxin-dexamethasone (MAXITROL) 3.5mg/mL-10,000 unit/mL-0.1 % DrpS INSTILL 1 DROP IN THE LEFT EYE 4 TIMES A DAY    rosuvastatin (CRESTOR) 5 MG tablet Take 1 tablet (5 mg total) by mouth once daily.    terbinafine HCL (LAMISIL) 1 % cream Apply topically 2 (two) times daily. To affected toenails.    turmeric root extract 500 mg Cap Take by mouth.    VITAMIN B COMPLEX ORAL Take 1 tablet by mouth.    vitamin D (VITAMIN D3) 1000 units Tab Take 1,000 Units by mouth once daily.    fluocinonide (LIDEX) 0.05 % external solution Apply topically 2 (two) times daily. for 10 days    gabapentin (NEURONTIN) 300 MG capsule Take 1 capsule (300 mg total) by mouth every evening.     No current facility-administered medications for this visit.       Allergies: Patient is allergic to bufferin [aspirin, buffered]; atorvastatin; shellfish containing products; warfarin; and ibuprofen.    Review of Systems   Constitutional: Negative for appetite change, chills, fatigue and fever.   HENT: Positive for hearing loss and tinnitus. Negative for nasal congestion, ear discharge, facial swelling,  postnasal drip, rhinorrhea, sore throat and trouble swallowing.    Eyes: Negative for photophobia, pain, discharge, redness, itching and visual disturbance.   Respiratory: Negative for apnea, cough, choking, chest tightness, shortness of breath, wheezing and stridor.    Cardiovascular: Negative for chest pain and palpitations.   Gastrointestinal: Negative for abdominal pain, constipation, diarrhea, nausea and vomiting.   Musculoskeletal: Negative for arthralgias, gait problem, joint swelling, myalgias, neck pain and neck stiffness.   Integumentary:  Negative for color change, pallor, rash and wound.   Neurological: Negative for dizziness, tremors, seizures, syncope, facial asymmetry, speech difficulty, weakness, light-headedness, numbness and headaches.   Hematological: Negative for adenopathy. Does not bruise/bleed easily.   Psychiatric/Behavioral: Negative for agitation, behavioral problems, confusion, decreased concentration, dysphoric mood, hallucinations, sleep disturbance and suicidal ideas. The patient is not nervous/anxious and is not hyperactive.          Objective:      Physical Exam  Vitals and nursing note reviewed.   Constitutional:       General: She is not in acute distress.     Appearance: Normal appearance. She is well-developed and well-nourished. She is not ill-appearing, toxic-appearing, sickly-appearing or diaphoretic.   HENT:      Head: Normocephalic and atraumatic. Not macrocephalic and not microcephalic. No raccoon eyes, Floyd's sign, abrasion, contusion, right periorbital erythema, left periorbital erythema or laceration. Hair is normal.      Right Ear: Ear canal normal. Decreased hearing noted. No laceration, drainage, swelling or tenderness. No middle ear effusion. No foreign body. No mastoid tenderness. No hemotympanum. Tympanic membrane is not injected, scarred, perforated, erythematous, retracted or bulging. Tympanic membrane has normal mobility.      Left Ear: Ear canal normal.  Decreased hearing noted. No laceration, drainage, swelling or tenderness.  No middle ear effusion. No foreign body. No mastoid tenderness. No hemotympanum. Tympanic membrane is not injected, scarred, perforated, erythematous, retracted or bulging. Tympanic membrane has normal mobility.      Nose: No nasal deformity, septal deviation, laceration, sinus tenderness, mucosal edema, rhinorrhea, nasal septal hematoma, epistaxis or foreign body.      Right Sinus: No maxillary sinus tenderness.      Left Sinus: No maxillary sinus tenderness.   Eyes:      General: Lids are normal.      Extraocular Movements: EOM normal.      Conjunctiva/sclera: Conjunctivae normal.      Pupils: Pupils are equal, round, and reactive to light.   Neck:      Thyroid: No thyroid mass or thyromegaly.      Vascular: No JVD.      Trachea: No tracheal tenderness or tracheal deviation.   Cardiovascular:      Rate and Rhythm: Normal rate and regular rhythm.   Pulmonary:      Effort: Pulmonary effort is normal. No tachypnea, bradypnea, accessory muscle usage, respiratory distress or apnea.      Breath sounds: No stridor.   Abdominal:      Palpations: Abdomen is soft.   Musculoskeletal:         General: Normal range of motion.      Cervical back: Normal range of motion and neck supple. No edema, erythema or rigidity. No muscular tenderness. Normal range of motion.   Lymphadenopathy:      Head:      Right side of head: No submental, submandibular, tonsillar, preauricular or posterior auricular adenopathy.      Left side of head: No submental, submandibular, tonsillar, preauricular or posterior auricular adenopathy.      Cervical: No cervical adenopathy.      Right cervical: No superficial, deep or posterior cervical adenopathy.     Left cervical: No superficial, deep or posterior cervical adenopathy.   Skin:     General: Skin is warm, dry and intact.      Coloration: Skin is not pale.      Findings: No abrasion, bruising, burn, ecchymosis, erythema,  laceration, lesion or rash.   Neurological:      Mental Status: She is alert and oriented to person, place, and time.      Cranial Nerves: No cranial nerve deficit.      Sensory: No sensory deficit.      Motor: No tremor, atrophy, abnormal muscle tone or seizure activity.   Psychiatric:         Mood and Affect: Mood and affect normal.         Speech: She is communicative. Speech is not rapid and pressured or slurred.         Behavior: Behavior normal. Behavior is cooperative.         Thought Content: Thought content normal.         Cognition and Memory: Cognition and memory normal.         Judgment: Judgment normal.                 Assessment:       Problem List Items Addressed This Visit    None     Visit Diagnoses     Presbycusis of both ears    -  Primary    Hearing loss, unspecified hearing loss type, unspecified laterality              Plan:         Patient to see Audiology for hearing aid evaluation.    F/U prn.

## 2022-02-15 ENCOUNTER — CLINICAL SUPPORT (OUTPATIENT)
Dept: REHABILITATION | Facility: HOSPITAL | Age: 77
End: 2022-02-15
Payer: MEDICARE

## 2022-02-15 DIAGNOSIS — R29.898 WEAKNESS OF BOTH LOWER EXTREMITIES: ICD-10-CM

## 2022-02-15 PROCEDURE — 97110 THERAPEUTIC EXERCISES: CPT

## 2022-02-15 NOTE — PROGRESS NOTES
[facility]   Physical Therapy Treatment Note     Name: Aminah Higgins [facility]  Clinic Number: [id number]    Therapy Diagnosis:   Encounter Diagnosis   Name Primary?    Weakness of both lower extremities      Physician: Rachna Damon DPM    Visit Date: 2/15/2022    Physician Orders: PT Eval and Treat   Medical Diagnosis from Referral:   M54.9 (ICD-10-CM) - Back pain, unspecified back location, unspecified back pain laterality, unspecified chronicity   M43.10 (ICD-10-CM) - Spondylolisthesis, unspecified spinal region   Evaluation Date: 1/25/2022  Authorization Period Expiration: 4/8/2022  Plan of Care Expiration: 4/18/2022  Progress Note Due: 2/19/2022  Visit # / Visits authorized: 3/ 20  FOTO: 3/5     Precautions: Standard -  needs daughter for        PTA Visit #: 0/5     Time In: 430  Time Out: 515  Total Billable Time: 45 minutes    SUBJECTIVE      is present today and interpreting all of patients subjective   Pt reports: she is feeling good today with no pain. Patient reports she is doing some stretching at home and feeling good today      Pain: 0/10  Location: bilateral back      OBJECTIVE     Objective Measures updated at progress report unless specified.     Treatment     Aminah received the treatments listed below:      therapeutic exercises to develop strength, endurance, ROM, flexibility, posture and core stabilization for 45 minutes including:    NuStep 7 minutes   Ball Rolls 10x ea way  Hamstring 30 sec x 3  Piriformis 30 sec x 3  LTR 15x  SKTC 15  Supine Clams GTB 2x10  Ball Squeezes 20x    All Supine Exercises were on lumbar heat        Patient Education and Home Exercises     Home Exercises Provided and Patient Education Provided     Education provided:   - HEP    Written Home Exercises Provided: yes. Exercises were reviewed and Aminah was able to demonstrate them prior to the end of the session.  Aminah demonstrated good  understanding of the  education provided. See EMR under Patient Instructions for exercises provided during therapy sessions    ASSESSMENT     Patient presents to physical therapy with improvement of symptoms compared to last session. Patient was progressed with therex to address lumbar and LE strengthening. Patient needed her daughter () to correct her form and technique. Patient left without adverse effects. Cont to work on flexibility and progress LE strengthening next session.     Aminah Is progressing well towards her goals.   Pt prognosis is Fair.     Pt will continue to benefit from skilled outpatient physical therapy to address the deficits listed in the problem list box on initial evaluation, provide pt/family education and to maximize pt's level of independence in the home and community environment.     Pt's spiritual, cultural and educational needs considered and pt agreeable to plan of care and goals.     Anticipated barriers to physical therapy: Chronic Back Pain       Goals:   Short Term Goals (3 Weeks):   1. Pt will be compliant with HEP to supplement PT in restoring pain free function.  2. Pt will improve 30 sec sit to stand test to >/=11 reps to improve LE endurance for functional community ambulation  3.   Long Term Goals (6 Weeks):  1. Pt will improve FOTO score to </= **% limited to decrease perceived limitation with mobility  2. Pt will improve 30 sec sit to stand test to >/=13 reps to improve LE endurance for functional community ambulation  3. Pt will improve impaired LE MMTs by 1/2 grade to improve strength for functional tasks.  4. Pt will report </= 1/10 with her low back pain to improve tolerance to leisure activities.         PLAN   Plan of care Certification: 1/25/2022 to 4/18/2022.     Outpatient Physical Therapy 2 times weekly for 6 weeks to include the following interventions: Cervical/Lumbar Traction, Gait Training, Manual Therapy, Moist Heat/ Ice, Neuromuscular Re-ed, Patient Education, Self  Care, Therapeutic Activities and Therapeutic Exercise.        Aidan Amaya, PT

## 2022-02-22 ENCOUNTER — CLINICAL SUPPORT (OUTPATIENT)
Dept: REHABILITATION | Facility: HOSPITAL | Age: 77
End: 2022-02-22
Payer: MEDICARE

## 2022-02-22 DIAGNOSIS — R29.898 WEAKNESS OF BOTH LOWER EXTREMITIES: Primary | ICD-10-CM

## 2022-02-22 PROCEDURE — 97110 THERAPEUTIC EXERCISES: CPT

## 2022-02-22 NOTE — PROGRESS NOTES
[facility]   Physical Therapy Treatment Note     Name: Aminah Higgins [facility]  Clinic Number: [id number]    Therapy Diagnosis:   Encounter Diagnosis   Name Primary?    Weakness of both lower extremities Yes     Physician: Rachna Damon DPM    Visit Date: 2022    Physician Orders: PT Eval and Treat   Medical Diagnosis from Referral:   M54.9 (ICD-10-CM) - Back pain, unspecified back location, unspecified back pain laterality, unspecified chronicity   M43.10 (ICD-10-CM) - Spondylolisthesis, unspecified spinal region   Evaluation Date: 2022  Authorization Period Expiration: 2022  Plan of Care Expiration: 2022  Progress Note Due: 2022  Visit # / Visits authorized:   FOTO:      Precautions: Standard -  needs daughter for        PTA Visit #: 0/5     Time In: 427  Time Out: 512  Total Billable Time: 45 minutes    SUBJECTIVE      is present today and interpreting all of patients subjective   Pt reports: continues to feel really good and is able to move around much better. Patient reports her chief complaint is shoulder tightness.         Pain: 0/10  Location: bilateral back      OBJECTIVE     Lumbar AROM: Pain/Dysfunction with Movement:   Flexion: 80 degrees    Extension: 20 degrees     Right side bendin degrees     Left side bendin degrees     Right rotation: 25% limited     Left rotation: 25% limited         Myotome Right Left Pain/Dysfunction with Movement   L1,2- Hip Flexion (Femoral Nerve) 4/5 4/5     L3,4- Knee Extension (Femoral Nerve) 5/5 5/5     L4,5- Ankle Dorsiflexion (Deep Fibular Nerve) 4+/5 4+/5     L5- Big Toe Extension (Deep Fibular Nerve) 5/5 4+/5     L5, S1- Ankle plantarflexion (Tibial Nerve) 4+/5 4+/5     Hip Abduction 4/5 4/5        30 sec sit to stand: 13 reps w/o UE support         TUG: 10.27 seconds       Treatment     Aminah received the treatments listed below:      therapeutic exercises to develop  strength, endurance, ROM, flexibility, posture and core stabilization for 45 minutes including:    NuStep 7 minutes   Ball Rolls 10x ea way  Hamstring 30 sec x 3  Piriformis 30 sec x 3  LTR 15x  SKTC 15  Supine Clams GTB 2x10  Ball Squeezes 20x    All Supine Exercises were on lumbar heat        Patient Education and Home Exercises     Home Exercises Provided and Patient Education Provided     Education provided:   - HEP    Written Home Exercises Provided: yes. Exercises were reviewed and Aminah was able to demonstrate them prior to the end of the session.  Aminah demonstrated good  understanding of the education provided. See EMR under Patient Instructions for exercises provided during therapy sessions    ASSESSMENT     Patient presents to PT with continued improvement in symptoms. Patient is comfortable with home exercise program and would like to continue her exercising at her home. Patient is independent with all HEP with no adverse effects.     Aminah Is progressing well towards her goals.   Pt prognosis is Fair.       Pt's spiritual, cultural and educational needs considered and pt agreeable to plan of care and goals.     Anticipated barriers to physical therapy: Chronic Back Pain       Goals:   Short Term Goals (3 Weeks):   1. Pt will be compliant with HEP to supplement PT in restoring pain free function. MET   2. Pt will improve 30 sec sit to stand test to >/=11 reps to improve LE endurance for functional community ambulation MET     Long Term Goals (6 Weeks):  1. Pt will improve FOTO score to </= **% limited to decrease perceived limitation with mobility NOT INCLUDED   2. Pt will improve 30 sec sit to stand test to >/=13 reps to improve LE endurance for functional community ambulation MET  3. Pt will improve impaired LE MMTs by 1/2 grade to improve strength for functional tasks. MET   4. Pt will report </= 1/10 with her low back pain to improve tolerance to leisure activities.  MET         PLAN        Discharge from PT     Aidan Amaya, PT

## 2022-02-22 NOTE — PLAN OF CARE
[facility]   Discharge Note    Name: Aminah [facility]  Clinic Number: [id number]    Therapy Diagnosis:   Encounter Diagnosis   Name Primary?    Weakness of both lower extremities Yes     Physician: Rachna Damon DPM    Physician Orders: PT Eval and Treat   Medical Diagnosis from Referral:   M54.9 (ICD-10-CM) - Back pain, unspecified back location, unspecified back pain laterality, unspecified chronicity   M43.10 (ICD-10-CM) - Spondylolisthesis, unspecified spinal region   Evaluation Date: 1/25/2022      Date of Last visit: 2/22/2022  Total Visits Received: 4    ASSESSMENT      Patient presents to PT with continued improvement in symptoms. Patient is comfortable with home exercise program and would like to continue her exercising at her home. Patient is independent with all HEP with no adverse effects.       Discharge reason: Other:  Patient is able to maintain home exercises to manage pain      Goals: Short Term Goals (3 Weeks):   1. Pt will be compliant with HEP to supplement PT in restoring pain free function. MET   2. Pt will improve 30 sec sit to stand test to >/=11 reps to improve LE endurance for functional community ambulation MET     Long Term Goals (6 Weeks):  1. Pt will improve FOTO score to </= **% limited to decrease perceived limitation with mobility NOT INCLUDED   2. Pt will improve 30 sec sit to stand test to >/=13 reps to improve LE endurance for functional community ambulation MET  3. Pt will improve impaired LE MMTs by 1/2 grade to improve strength for functional tasks. MET   4. Pt will report </= 1/10 with her low back pain to improve tolerance to leisure activities.  MET       PLAN   This patient is discharged from Physical Therapy      Aidan Amaya, PT

## 2022-02-28 ENCOUNTER — TELEPHONE (OUTPATIENT)
Dept: INTERNAL MEDICINE | Facility: CLINIC | Age: 77
End: 2022-02-28
Payer: MEDICARE

## 2022-02-28 ENCOUNTER — OFFICE VISIT (OUTPATIENT)
Dept: UROLOGY | Facility: CLINIC | Age: 77
End: 2022-02-28
Payer: MEDICARE

## 2022-02-28 VITALS
BODY MASS INDEX: 28.95 KG/M2 | WEIGHT: 163.38 LBS | HEIGHT: 63 IN | SYSTOLIC BLOOD PRESSURE: 167 MMHG | HEART RATE: 67 BPM | DIASTOLIC BLOOD PRESSURE: 75 MMHG

## 2022-02-28 DIAGNOSIS — N30.10 INTERSTITIAL CYSTITIS: Primary | ICD-10-CM

## 2022-02-28 PROCEDURE — 1126F PR PAIN SEVERITY QUANTIFIED, NO PAIN PRESENT: ICD-10-PCS | Mod: CPTII,S$GLB,, | Performed by: UROLOGY

## 2022-02-28 PROCEDURE — 1157F ADVNC CARE PLAN IN RCRD: CPT | Mod: CPTII,S$GLB,, | Performed by: UROLOGY

## 2022-02-28 PROCEDURE — 1159F MED LIST DOCD IN RCRD: CPT | Mod: CPTII,S$GLB,, | Performed by: UROLOGY

## 2022-02-28 PROCEDURE — 1157F PR ADVANCE CARE PLAN OR EQUIV PRESENT IN MEDICAL RECORD: ICD-10-PCS | Mod: CPTII,S$GLB,, | Performed by: UROLOGY

## 2022-02-28 PROCEDURE — 1126F AMNT PAIN NOTED NONE PRSNT: CPT | Mod: CPTII,S$GLB,, | Performed by: UROLOGY

## 2022-02-28 PROCEDURE — 1160F RVW MEDS BY RX/DR IN RCRD: CPT | Mod: CPTII,S$GLB,, | Performed by: UROLOGY

## 2022-02-28 PROCEDURE — 1160F PR REVIEW ALL MEDS BY PRESCRIBER/CLIN PHARMACIST DOCUMENTED: ICD-10-PCS | Mod: CPTII,S$GLB,, | Performed by: UROLOGY

## 2022-02-28 PROCEDURE — 3077F SYST BP >= 140 MM HG: CPT | Mod: CPTII,S$GLB,, | Performed by: UROLOGY

## 2022-02-28 PROCEDURE — 99999 PR PBB SHADOW E&M-EST. PATIENT-LVL IV: CPT | Mod: PBBFAC,,, | Performed by: UROLOGY

## 2022-02-28 PROCEDURE — 3077F PR MOST RECENT SYSTOLIC BLOOD PRESSURE >= 140 MM HG: ICD-10-PCS | Mod: CPTII,S$GLB,, | Performed by: UROLOGY

## 2022-02-28 PROCEDURE — 1159F PR MEDICATION LIST DOCUMENTED IN MEDICAL RECORD: ICD-10-PCS | Mod: CPTII,S$GLB,, | Performed by: UROLOGY

## 2022-02-28 PROCEDURE — 3288F FALL RISK ASSESSMENT DOCD: CPT | Mod: CPTII,S$GLB,, | Performed by: UROLOGY

## 2022-02-28 PROCEDURE — 1101F PT FALLS ASSESS-DOCD LE1/YR: CPT | Mod: CPTII,S$GLB,, | Performed by: UROLOGY

## 2022-02-28 PROCEDURE — 99214 PR OFFICE/OUTPT VISIT, EST, LEVL IV, 30-39 MIN: ICD-10-PCS | Mod: S$GLB,,, | Performed by: UROLOGY

## 2022-02-28 PROCEDURE — 3078F DIAST BP <80 MM HG: CPT | Mod: CPTII,S$GLB,, | Performed by: UROLOGY

## 2022-02-28 PROCEDURE — 99999 PR PBB SHADOW E&M-EST. PATIENT-LVL IV: ICD-10-PCS | Mod: PBBFAC,,, | Performed by: UROLOGY

## 2022-02-28 PROCEDURE — 1101F PR PT FALLS ASSESS DOC 0-1 FALLS W/OUT INJ PAST YR: ICD-10-PCS | Mod: CPTII,S$GLB,, | Performed by: UROLOGY

## 2022-02-28 PROCEDURE — 99214 OFFICE O/P EST MOD 30 MIN: CPT | Mod: S$GLB,,, | Performed by: UROLOGY

## 2022-02-28 PROCEDURE — 3078F PR MOST RECENT DIASTOLIC BLOOD PRESSURE < 80 MM HG: ICD-10-PCS | Mod: CPTII,S$GLB,, | Performed by: UROLOGY

## 2022-02-28 PROCEDURE — 3288F PR FALLS RISK ASSESSMENT DOCUMENTED: ICD-10-PCS | Mod: CPTII,S$GLB,, | Performed by: UROLOGY

## 2022-02-28 RX ORDER — AMITRIPTYLINE HYDROCHLORIDE 10 MG/1
10 TABLET, FILM COATED ORAL NIGHTLY
Qty: 30 TABLET | Refills: 11 | Status: SHIPPED | OUTPATIENT
Start: 2022-02-28 | End: 2024-03-14

## 2022-02-28 NOTE — TELEPHONE ENCOUNTER
----- Message from Che Butt sent at 2/28/2022  9:28 AM CST -----  Regarding: Medicine and Labs  Pt went to Urology appointment and the provider stated that pt needed to get labs done that were put in by primary drRisa Please assist.     Pt also stated that she needed a refill on medication but not which one.

## 2022-02-28 NOTE — PROGRESS NOTES
[facility]        Bladder Pain Return Note     2/28/22     Referred by:  No ref. provider found     HPI: Aminah Bravo is a very pleasant 76 y.o. female who has is a return patient for BPS/IC. She underwent a cystoscopy 6 months ago which was normal.     She reports return of pain over the last month.     She reported pain is associated with urinary frequency (10x daily) with nocturia (4x per night). Based on the history provided today, this urinary frequency is driven by pain, pressure or discomfort and not fear of incontinence. She does not require daily pad use. She has not made changes to fluid intake.  She is on Myrbertriq.     She reports symptoms are triggered by consumption of no particular foods or beverages. She does report periodic flares lasting several weeks. She  reports no sexual activity, though not from avoidance due to pain.     She denies symptoms of obstructive voiding including decreased stream, hesitancy, intermittency, post void dribbling and sense of incomplete emptying. Bladder scan PVR was 30 mL.  Her history includes no notation of urolithiasis, hematuria, prior pelvic surgery, previous prolapse or incontinence procedures or neurological symptoms/diagnoses. She denies all other prior pelvic surgeries or neurologic diagnoses. She does not report symptoms suggestive of advanced POP.      Review of operative note from 2017 demonstrated ureteral biopsy, ultimately negative for malignancy. However areas of erythema noted. All biopsies negative for malignancy.      Previous evaluation has included:   · Cystoscopy: areas of erythema noted     A review of 10+ systems was conducted with pertinent positive and negative findings documented in HPI with all other systems reviewed and negative.     Past medical, family, surgical and social history reviewed as documented in chart with pertinent positive medical, family, surgical and social history detailed in  HPI.     Exam Findings:     Const: no acute distress, conversant and alert  Eyes: anicteric, extraocular muscles intact  ENMT: normocephalic, Nl oral membranes  Cardio: no cyanosis, nl cap refill  Pulm: no tachypnea; no resp distress  Abd: soft, no tenderness  Musc: no laceration, no tenderness  Neuro: alert; oriented x 3  Skin: warm, dry; no petichiae  Psych: no anxiety; normal speech      Assessment/Plan:     1. Bladder Pain (estab, worsening): She reports reports pelvic pain, pressure and discomfort that appears associated with the voiding cycle.  Urinary frequency is largely driven by pain and discomfort rather than by a desire to avoid incontinence.  The history suggests the likelihood of Bladder Pain Syndrome/InterstitialCystitis (BPS/IC) as the most likely diagnosis.  There are no clearly obvious competing etiologies after an initial evaluation, though other diagnoses will continue to be considered, particularly if symptoms do not respond to initial therapy.  Will try adding low-dose amitriptyline. May increase from 10mg if no symptom relief and minimal side effects.

## 2022-03-08 ENCOUNTER — LAB VISIT (OUTPATIENT)
Dept: LAB | Facility: HOSPITAL | Age: 77
End: 2022-03-08
Attending: INTERNAL MEDICINE
Payer: MEDICARE

## 2022-03-08 ENCOUNTER — TELEPHONE (OUTPATIENT)
Dept: INTERNAL MEDICINE | Facility: CLINIC | Age: 77
End: 2022-03-08
Payer: MEDICARE

## 2022-03-08 DIAGNOSIS — I10 HTN (HYPERTENSION), BENIGN: ICD-10-CM

## 2022-03-08 DIAGNOSIS — E11.40 TYPE 2 DIABETES MELLITUS WITH DIABETIC NEUROPATHY, WITHOUT LONG-TERM CURRENT USE OF INSULIN: ICD-10-CM

## 2022-03-08 LAB
ESTIMATED AVG GLUCOSE: 163 MG/DL (ref 68–131)
HBA1C MFR BLD: 7.3 % (ref 4–5.6)

## 2022-03-08 PROCEDURE — 83036 HEMOGLOBIN GLYCOSYLATED A1C: CPT | Performed by: INTERNAL MEDICINE

## 2022-03-08 PROCEDURE — 36415 COLL VENOUS BLD VENIPUNCTURE: CPT | Performed by: INTERNAL MEDICINE

## 2022-03-08 RX ORDER — LISINOPRIL 40 MG/1
40 TABLET ORAL DAILY
Qty: 90 TABLET | Refills: 3 | Status: SHIPPED | OUTPATIENT
Start: 2022-03-08 | End: 2022-06-29 | Stop reason: SDUPTHER

## 2022-03-08 NOTE — TELEPHONE ENCOUNTER
Care Due:                  Date            Visit Type   Department     Provider  --------------------------------------------------------------------------------                                NP -                              PRIMARY      [facility] INTER[facility]  Last Visit: 02-      [facility] ([facility]   MEDICINE       DEVON MARTINEZ  Next Visit: None Scheduled  None         None Found                                                            Last  Test          Frequency    Reason                     Performed    Due Date  --------------------------------------------------------------------------------    CMP.........  12 months..  lisinopriL, metFORMIN....  02- 02-    Powered by [employer]. Reference number: [id number].   3/08/2022 9:46:39 AM CST

## 2022-03-08 NOTE — TELEPHONE ENCOUNTER
----- Message from Ayaka Mateo sent at 3/8/2022  9:33 AM CST -----  Type:  RX Refill Request    Who Called: Aminah   Refill or New Rx:Refill   RX Name and Strength:lisinopriL (PRINIVIL,ZESTRIL) 40 MG tablet  metFORMIN (GLUCOPHAGE) 1000 MG tablet  glipiZIDE (GLUCOTROL) 2.5 MG TR24  How is the patient currently taking it? (ex. 1XDay):1 x day, 2 x day , 2 x day   Is this a 30 day or 90 day RX:90, 90, 90  Preferred Pharmacy with phone number:[facility] - [place], [place] - [address]  Local or Mail Order:mail   Ordering Provider:Jeison Sanchez  Would the patient rather a call back or a response via [facility]? Call back   Best Call Back Number:[phone]  Additional Information: Please call her once its done so she knows pt speaks Comoran     She also said she need blood test strips for sugar she said

## 2022-03-08 NOTE — TELEPHONE ENCOUNTER
----- Message from Jeison Sanchez MD sent at 3/8/2022  9:53 AM CST -----  A1c improved. I am okay with looser diabetes a1c target 2/2 age    Jeison Sanchez

## 2022-03-09 ENCOUNTER — IMMUNIZATION (OUTPATIENT)
Dept: INTERNAL MEDICINE | Facility: CLINIC | Age: 77
End: 2022-03-09
Payer: MEDICARE

## 2022-03-09 DIAGNOSIS — Z23 NEED FOR VACCINATION: Primary | ICD-10-CM

## 2022-03-09 PROCEDURE — 0052A COVID-19, MRNA, LNP-S, PF, 30 MCG/0.3 ML DOSE VACCINE (PFIZER): CPT | Mod: CV19,S$GLB,, | Performed by: INTERNAL MEDICINE

## 2022-03-09 PROCEDURE — 91305 COVID-19, MRNA, LNP-S, PF, 30 MCG/0.3 ML DOSE VACCINE (PFIZER): ICD-10-PCS | Mod: S$GLB,,, | Performed by: INTERNAL MEDICINE

## 2022-03-09 PROCEDURE — 0052A COVID-19, MRNA, LNP-S, PF, 30 MCG/0.3 ML DOSE VACCINE (PFIZER): ICD-10-PCS | Mod: CV19,S$GLB,, | Performed by: INTERNAL MEDICINE

## 2022-03-09 PROCEDURE — 91305 COVID-19, MRNA, LNP-S, PF, 30 MCG/0.3 ML DOSE VACCINE (PFIZER): CPT | Mod: S$GLB,,, | Performed by: INTERNAL MEDICINE

## 2022-05-10 ENCOUNTER — PES CALL (OUTPATIENT)
Dept: ADMINISTRATIVE | Facility: CLINIC | Age: 77
End: 2022-05-10
Payer: MEDICARE

## 2022-05-19 ENCOUNTER — TELEPHONE (OUTPATIENT)
Dept: ADMINISTRATIVE | Facility: CLINIC | Age: 77
End: 2022-05-19
Payer: MEDICARE

## 2022-06-29 ENCOUNTER — OFFICE VISIT (OUTPATIENT)
Dept: INTERNAL MEDICINE | Facility: CLINIC | Age: 77
End: 2022-06-29
Payer: MEDICARE

## 2022-06-29 VITALS
DIASTOLIC BLOOD PRESSURE: 72 MMHG | WEIGHT: 166.25 LBS | SYSTOLIC BLOOD PRESSURE: 154 MMHG | BODY MASS INDEX: 29.46 KG/M2 | HEART RATE: 82 BPM | HEIGHT: 63 IN

## 2022-06-29 DIAGNOSIS — Z20.822 CLOSE EXPOSURE TO COVID-19 VIRUS: ICD-10-CM

## 2022-06-29 DIAGNOSIS — I10 HTN (HYPERTENSION), BENIGN: ICD-10-CM

## 2022-06-29 DIAGNOSIS — M54.6 ACUTE RIGHT-SIDED THORACIC BACK PAIN: Primary | ICD-10-CM

## 2022-06-29 DIAGNOSIS — R35.0 FREQUENCY OF URINATION: ICD-10-CM

## 2022-06-29 DIAGNOSIS — E11.42 DIABETIC POLYNEUROPATHY ASSOCIATED WITH TYPE 2 DIABETES MELLITUS: ICD-10-CM

## 2022-06-29 DIAGNOSIS — E11.40 TYPE 2 DIABETES MELLITUS WITH DIABETIC NEUROPATHY, WITHOUT LONG-TERM CURRENT USE OF INSULIN: ICD-10-CM

## 2022-06-29 DIAGNOSIS — E03.4 HYPOTHYROIDISM DUE TO ACQUIRED ATROPHY OF THYROID: ICD-10-CM

## 2022-06-29 DIAGNOSIS — E78.2 MIXED HYPERLIPIDEMIA: ICD-10-CM

## 2022-06-29 DIAGNOSIS — E03.9 HYPOTHYROIDISM, UNSPECIFIED TYPE: ICD-10-CM

## 2022-06-29 PROCEDURE — 3077F SYST BP >= 140 MM HG: CPT | Mod: CPTII,S$GLB,, | Performed by: STUDENT IN AN ORGANIZED HEALTH CARE EDUCATION/TRAINING PROGRAM

## 2022-06-29 PROCEDURE — 3288F FALL RISK ASSESSMENT DOCD: CPT | Mod: CPTII,S$GLB,, | Performed by: STUDENT IN AN ORGANIZED HEALTH CARE EDUCATION/TRAINING PROGRAM

## 2022-06-29 PROCEDURE — 1157F PR ADVANCE CARE PLAN OR EQUIV PRESENT IN MEDICAL RECORD: ICD-10-PCS | Mod: CPTII,S$GLB,, | Performed by: STUDENT IN AN ORGANIZED HEALTH CARE EDUCATION/TRAINING PROGRAM

## 2022-06-29 PROCEDURE — 1160F RVW MEDS BY RX/DR IN RCRD: CPT | Mod: CPTII,S$GLB,, | Performed by: STUDENT IN AN ORGANIZED HEALTH CARE EDUCATION/TRAINING PROGRAM

## 2022-06-29 PROCEDURE — 1159F PR MEDICATION LIST DOCUMENTED IN MEDICAL RECORD: ICD-10-PCS | Mod: CPTII,S$GLB,, | Performed by: STUDENT IN AN ORGANIZED HEALTH CARE EDUCATION/TRAINING PROGRAM

## 2022-06-29 PROCEDURE — 1160F PR REVIEW ALL MEDS BY PRESCRIBER/CLIN PHARMACIST DOCUMENTED: ICD-10-PCS | Mod: CPTII,S$GLB,, | Performed by: STUDENT IN AN ORGANIZED HEALTH CARE EDUCATION/TRAINING PROGRAM

## 2022-06-29 PROCEDURE — 3288F PR FALLS RISK ASSESSMENT DOCUMENTED: ICD-10-PCS | Mod: CPTII,S$GLB,, | Performed by: STUDENT IN AN ORGANIZED HEALTH CARE EDUCATION/TRAINING PROGRAM

## 2022-06-29 PROCEDURE — 99999 PR PBB SHADOW E&M-EST. PATIENT-LVL IV: ICD-10-PCS | Mod: PBBFAC,,, | Performed by: STUDENT IN AN ORGANIZED HEALTH CARE EDUCATION/TRAINING PROGRAM

## 2022-06-29 PROCEDURE — 3078F PR MOST RECENT DIASTOLIC BLOOD PRESSURE < 80 MM HG: ICD-10-PCS | Mod: CPTII,S$GLB,, | Performed by: STUDENT IN AN ORGANIZED HEALTH CARE EDUCATION/TRAINING PROGRAM

## 2022-06-29 PROCEDURE — U0005 INFEC AGEN DETEC AMPLI PROBE: HCPCS | Performed by: STUDENT IN AN ORGANIZED HEALTH CARE EDUCATION/TRAINING PROGRAM

## 2022-06-29 PROCEDURE — 3051F PR MOST RECENT HEMOGLOBIN A1C LEVEL 7.0 - < 8.0%: ICD-10-PCS | Mod: CPTII,S$GLB,, | Performed by: STUDENT IN AN ORGANIZED HEALTH CARE EDUCATION/TRAINING PROGRAM

## 2022-06-29 PROCEDURE — 3077F PR MOST RECENT SYSTOLIC BLOOD PRESSURE >= 140 MM HG: ICD-10-PCS | Mod: CPTII,S$GLB,, | Performed by: STUDENT IN AN ORGANIZED HEALTH CARE EDUCATION/TRAINING PROGRAM

## 2022-06-29 PROCEDURE — 3078F DIAST BP <80 MM HG: CPT | Mod: CPTII,S$GLB,, | Performed by: STUDENT IN AN ORGANIZED HEALTH CARE EDUCATION/TRAINING PROGRAM

## 2022-06-29 PROCEDURE — 99214 PR OFFICE/OUTPT VISIT, EST, LEVL IV, 30-39 MIN: ICD-10-PCS | Mod: S$GLB,CS,, | Performed by: STUDENT IN AN ORGANIZED HEALTH CARE EDUCATION/TRAINING PROGRAM

## 2022-06-29 PROCEDURE — 1125F PR PAIN SEVERITY QUANTIFIED, PAIN PRESENT: ICD-10-PCS | Mod: CPTII,S$GLB,, | Performed by: STUDENT IN AN ORGANIZED HEALTH CARE EDUCATION/TRAINING PROGRAM

## 2022-06-29 PROCEDURE — 1101F PT FALLS ASSESS-DOCD LE1/YR: CPT | Mod: CPTII,S$GLB,, | Performed by: STUDENT IN AN ORGANIZED HEALTH CARE EDUCATION/TRAINING PROGRAM

## 2022-06-29 PROCEDURE — 99999 PR PBB SHADOW E&M-EST. PATIENT-LVL IV: CPT | Mod: PBBFAC,,, | Performed by: STUDENT IN AN ORGANIZED HEALTH CARE EDUCATION/TRAINING PROGRAM

## 2022-06-29 PROCEDURE — 1125F AMNT PAIN NOTED PAIN PRSNT: CPT | Mod: CPTII,S$GLB,, | Performed by: STUDENT IN AN ORGANIZED HEALTH CARE EDUCATION/TRAINING PROGRAM

## 2022-06-29 PROCEDURE — 1157F ADVNC CARE PLAN IN RCRD: CPT | Mod: CPTII,S$GLB,, | Performed by: STUDENT IN AN ORGANIZED HEALTH CARE EDUCATION/TRAINING PROGRAM

## 2022-06-29 PROCEDURE — 1101F PR PT FALLS ASSESS DOC 0-1 FALLS W/OUT INJ PAST YR: ICD-10-PCS | Mod: CPTII,S$GLB,, | Performed by: STUDENT IN AN ORGANIZED HEALTH CARE EDUCATION/TRAINING PROGRAM

## 2022-06-29 PROCEDURE — 99214 OFFICE O/P EST MOD 30 MIN: CPT | Mod: S$GLB,CS,, | Performed by: STUDENT IN AN ORGANIZED HEALTH CARE EDUCATION/TRAINING PROGRAM

## 2022-06-29 PROCEDURE — 3051F HG A1C>EQUAL 7.0%<8.0%: CPT | Mod: CPTII,S$GLB,, | Performed by: STUDENT IN AN ORGANIZED HEALTH CARE EDUCATION/TRAINING PROGRAM

## 2022-06-29 PROCEDURE — 1159F MED LIST DOCD IN RCRD: CPT | Mod: CPTII,S$GLB,, | Performed by: STUDENT IN AN ORGANIZED HEALTH CARE EDUCATION/TRAINING PROGRAM

## 2022-06-29 PROCEDURE — U0003 INFECTIOUS AGENT DETECTION BY NUCLEIC ACID (DNA OR RNA); SEVERE ACUTE RESPIRATORY SYNDROME CORONAVIRUS 2 (SARS-COV-2) (CORONAVIRUS DISEASE [COVID-19]), AMPLIFIED PROBE TECHNIQUE, MAKING USE OF HIGH THROUGHPUT TECHNOLOGIES AS DESCRIBED BY CMS-2020-01-R: HCPCS | Performed by: STUDENT IN AN ORGANIZED HEALTH CARE EDUCATION/TRAINING PROGRAM

## 2022-06-29 RX ORDER — GABAPENTIN 300 MG/1
300 CAPSULE ORAL NIGHTLY
Qty: 90 CAPSULE | Refills: 0 | Status: SHIPPED | OUTPATIENT
Start: 2022-06-29 | End: 2023-05-09

## 2022-06-29 RX ORDER — GLIPIZIDE 2.5 MG/1
TABLET, EXTENDED RELEASE ORAL
Qty: 180 TABLET | Refills: 0 | Status: SHIPPED | OUTPATIENT
Start: 2022-06-29 | End: 2022-10-12 | Stop reason: SDUPTHER

## 2022-06-29 RX ORDER — CARVEDILOL 25 MG/1
25 TABLET ORAL 2 TIMES DAILY
Qty: 180 TABLET | Refills: 0 | Status: SHIPPED | OUTPATIENT
Start: 2022-06-29 | End: 2022-10-12 | Stop reason: SDUPTHER

## 2022-06-29 RX ORDER — NAPROXEN 500 MG/1
500 TABLET ORAL 2 TIMES DAILY WITH MEALS
Qty: 30 TABLET | Refills: 0 | Status: SHIPPED | OUTPATIENT
Start: 2022-06-29 | End: 2022-06-29

## 2022-06-29 RX ORDER — ACETAMINOPHEN 500 MG
500 TABLET ORAL EVERY 6 HOURS PRN
Qty: 30 TABLET | Refills: 0 | Status: SHIPPED | OUTPATIENT
Start: 2022-06-29 | End: 2024-02-29

## 2022-06-29 RX ORDER — CARVEDILOL 12.5 MG/1
12.5 TABLET ORAL 2 TIMES DAILY
Qty: 180 TABLET | Refills: 0 | Status: SHIPPED | OUTPATIENT
Start: 2022-06-29 | End: 2022-06-29

## 2022-06-29 RX ORDER — MIRABEGRON 25 MG/1
25 TABLET, FILM COATED, EXTENDED RELEASE ORAL DAILY
Qty: 90 TABLET | Refills: 0 | Status: ON HOLD | OUTPATIENT
Start: 2022-06-29 | End: 2024-03-27 | Stop reason: HOSPADM

## 2022-06-29 RX ORDER — LEVOTHYROXINE SODIUM 112 UG/1
112 TABLET ORAL
Qty: 90 TABLET | Refills: 0 | Status: SHIPPED | OUTPATIENT
Start: 2022-06-29 | End: 2022-10-12 | Stop reason: SDUPTHER

## 2022-06-29 RX ORDER — BACLOFEN 10 MG/1
10 TABLET ORAL DAILY
Qty: 90 TABLET | Refills: 0 | Status: SHIPPED | OUTPATIENT
Start: 2022-06-29 | End: 2023-05-09

## 2022-06-29 RX ORDER — METFORMIN HYDROCHLORIDE 1000 MG/1
1000 TABLET ORAL 2 TIMES DAILY WITH MEALS
Qty: 180 TABLET | Refills: 0 | Status: SHIPPED | OUTPATIENT
Start: 2022-06-29 | End: 2022-10-12 | Stop reason: SDUPTHER

## 2022-06-29 RX ORDER — ROSUVASTATIN CALCIUM 5 MG/1
5 TABLET, COATED ORAL DAILY
Qty: 90 TABLET | Refills: 0 | Status: SHIPPED | OUTPATIENT
Start: 2022-06-29 | End: 2022-10-12 | Stop reason: SDUPTHER

## 2022-06-29 RX ORDER — LISINOPRIL 40 MG/1
40 TABLET ORAL DAILY
Qty: 90 TABLET | Refills: 0 | Status: SHIPPED | OUTPATIENT
Start: 2022-06-29 | End: 2022-10-12 | Stop reason: SDUPTHER

## 2022-06-29 RX ORDER — CYCLOBENZAPRINE HCL 5 MG
5 TABLET ORAL 3 TIMES DAILY PRN
Qty: 30 TABLET | Refills: 0 | Status: SHIPPED | OUTPATIENT
Start: 2022-06-29 | End: 2022-07-09

## 2022-06-29 NOTE — PATIENT INSTRUCTIONS
--Before your visit with Dr. Sanchez in the fall, make sure to track your blood pressure for 2 weeks prior to the visit so you can show him the results

## 2022-06-29 NOTE — PROGRESS NOTES
INTERNAL MEDICINE ESTABLISHED PATIENT VISIT NOTE        Assessment/Plan     1. Acute right-sided thoracic back pain  - cyclobenzaprine (FLEXERIL) 5 MG tablet; Take 1 tablet (5 mg total) by mouth 3 (three) times daily as needed for Muscle spasms.  Dispense: 30 tablet; Refill: 0  - acetaminophen (TYLENOL) 500 MG tablet; Take 1 tablet (500 mg total) by mouth every 6 (six) hours as needed for Pain.  Dispense: 30 tablet; Refill: 0  - baclofen (LIORESAL) 10 MG tablet; Take 1 tablet (10 mg total) by mouth once daily.  Dispense: 90 tablet; Refill: 0    2. HTN (hypertension), benign  - lisinopriL (PRINIVIL,ZESTRIL) 40 MG tablet; Take 1 tablet (40 mg total) by mouth once daily.  Dispense: 90 tablet; Refill: 0  - CBC Auto Differential; Future  - Comprehensive Metabolic Panel; Future  - carvediloL (COREG) 25 MG tablet; Take 1 tablet (25 mg total) by mouth 2 (two) times daily.  Dispense: 180 tablet; Refill: 0    3. Type 2 diabetes mellitus with diabetic neuropathy, without long-term current use of insulin  - gabapentin (NEURONTIN) 300 MG capsule; Take 1 capsule (300 mg total) by mouth every evening.  Dispense: 90 capsule; Refill: 0  - glipiZIDE (GLUCOTROL) 2.5 MG TR24; TAKE 2 TABLETS EVERY DAY WITH BREAKFAST  Dispense: 180 tablet; Refill: 0  - metFORMIN (GLUCOPHAGE) 1000 MG tablet; Take 1 tablet (1,000 mg total) by mouth 2 (two) times daily with meals.  Dispense: 180 tablet; Refill: 0  - Hemoglobin A1C; Future    4. Hypothyroidism, unspecified type  - levothyroxine (SYNTHROID) 112 MCG tablet; Take 1 tablet (112 mcg total) by mouth before breakfast.  Dispense: 90 tablet; Refill: 0  - TSH; Future    5. Hypothyroidism due to acquired atrophy of thyroid  - levothyroxine (SYNTHROID) 112 MCG tablet; Take 1 tablet (112 mcg total) by mouth before breakfast.  Dispense: 90 tablet; Refill: 0    6. Diabetic polyneuropathy associated with type 2 diabetes mellitus  - metFORMIN (GLUCOPHAGE) 1000 MG tablet; Take 1 tablet (1,000 mg total) by  mouth 2 (two) times daily with meals.  Dispense: 180 tablet; Refill: 0    7. Frequency of urination  - mirabegron (MYRBETRIQ) 25 mg Tb24 ER tablet; Take 1 tablet (25 mg total) by mouth once daily.  Dispense: 90 tablet; Refill: 0    8. Mixed hyperlipidemia  - rosuvastatin (CRESTOR) 5 MG tablet; Take 1 tablet (5 mg total) by mouth once daily.  Dispense: 90 tablet; Refill: 0  - Lipid Panel; Future    9. Close exposure to COVID-19 virus  - COVID-19 Routine Screening      Patient here with acute, 3 days of left sided thoracic back pain. Will trial flexeril and tylenol (nsaid allergy). Also needing medication refills and covid swab due to exposure with family member. All questions were answered.     Health Maintenance reviewed and discussed with patient.   RTC in 3 mo with pcp f/u, sooner if needed.    Subjective:     Chief Complaint: Back Pain (Pain=6)       Patient ID: Aminah DeAnthonyelizabeth is a 77 y.o. female with HTN, GERD, TIIDM with polyneuropathy, HLD, who is here to discuss follow up of multiple issues.       Patient is here for back pain and medication refills. She was last seen by PCP 2/2021. We used  Vladimir #[id number] for the visit today with Israeli language interpretation    Back pain - has been having symptoms for 3 days, no particular trigger. R thoracic spine. Does not radiate. Has not taken anything yet. Better if leaning back and resting. Does not change with breathing. Has been working on breathing exercises. Denies SOB, no CP. No rash.    --back pain consistent with msk related strain, no rash, is position dependent.   --Will not give NSAID given her allergy to ibuprofen, but will trial flexeril and tylenol.     She is also requesting medication refills - 90 days provided and advised to f/u with PCP     Also requesting covid test. Daughter had covid 10 days ago and recently recovered. She is asymptomatic.     HTN -elevated today. Reports this has been getting higher recently. On  "carvedilol 12.5 mg BID and lisinopril 40 mg. Reports it is in the 140s systolic at home. Will increase to 25 mg BID of coreg and have patient f/u with PCP.       Past medical history, social history, family history, medications and allergies reviewed.      Review of Systems   Constitutional: Negative for fever and unexpected weight change.   HENT: Negative for sinus pressure and sore throat.    Eyes: Negative for visual disturbance.   Respiratory: Negative for cough and shortness of breath.    Cardiovascular: Negative for chest pain and palpitations.   Gastrointestinal: Negative for constipation, diarrhea, nausea and vomiting.   Endocrine: Negative for polyuria.   Genitourinary: Negative for dysuria and urgency.   Musculoskeletal: Positive for back pain. Negative for arthralgias and myalgias.   Skin: Negative for rash.   Allergic/Immunologic: Negative for environmental allergies.   Neurological: Negative for dizziness, light-headedness and headaches.   Hematological: Does not bruise/bleed easily.   Psychiatric/Behavioral: Negative for agitation and decreased concentration. The patient is not nervous/anxious.            Objective:   BP (!) 154/72   Pulse 82   Ht 5' 3" (1.6 m)   Wt 75.4 kg (166 lb 3.6 oz)   BMI 29.45 kg/m²        General: AAO x3, no apparent distress  HEENT: PERRL, OP clear  Neck: Supple   CV: RRR, no m/r/g  Pulm: Lungs CTAB, no crackles, no wheezes  Abd: s/NT/ND +BS   Back: left sided thoracic ache/tenderness  Extremities: appears warm and well-perfused  Skin: no rashes, lesions, or tears            Bridget Burns MD   [facility]  Department of Internal Medicine   06/29/2022    "

## 2022-06-30 ENCOUNTER — LAB VISIT (OUTPATIENT)
Dept: LAB | Facility: HOSPITAL | Age: 77
End: 2022-06-30
Attending: STUDENT IN AN ORGANIZED HEALTH CARE EDUCATION/TRAINING PROGRAM
Payer: MEDICARE

## 2022-06-30 DIAGNOSIS — I10 HTN (HYPERTENSION), BENIGN: ICD-10-CM

## 2022-06-30 DIAGNOSIS — E11.40 TYPE 2 DIABETES MELLITUS WITH DIABETIC NEUROPATHY, WITHOUT LONG-TERM CURRENT USE OF INSULIN: ICD-10-CM

## 2022-06-30 DIAGNOSIS — E78.2 MIXED HYPERLIPIDEMIA: ICD-10-CM

## 2022-06-30 DIAGNOSIS — E03.9 HYPOTHYROIDISM, UNSPECIFIED TYPE: ICD-10-CM

## 2022-06-30 LAB
ALBUMIN SERPL BCP-MCNC: 4.1 G/DL (ref 3.5–5.2)
ALP SERPL-CCNC: 85 U/L (ref 55–135)
ALT SERPL W/O P-5'-P-CCNC: 16 U/L (ref 10–44)
ANION GAP SERPL CALC-SCNC: 9 MMOL/L (ref 8–16)
AST SERPL-CCNC: 15 U/L (ref 10–40)
BASOPHILS # BLD AUTO: 0.03 K/UL (ref 0–0.2)
BASOPHILS NFR BLD: 0.7 % (ref 0–1.9)
BILIRUB SERPL-MCNC: 0.4 MG/DL (ref 0.1–1)
BUN SERPL-MCNC: 18 MG/DL (ref 8–23)
CALCIUM SERPL-MCNC: 10 MG/DL (ref 8.7–10.5)
CHLORIDE SERPL-SCNC: 100 MMOL/L (ref 95–110)
CHOLEST SERPL-MCNC: 177 MG/DL (ref 120–199)
CHOLEST/HDLC SERPL: 3.1 {RATIO} (ref 2–5)
CO2 SERPL-SCNC: 27 MMOL/L (ref 23–29)
CREAT SERPL-MCNC: 0.7 MG/DL (ref 0.5–1.4)
DIFFERENTIAL METHOD: ABNORMAL
EOSINOPHIL # BLD AUTO: 0.1 K/UL (ref 0–0.5)
EOSINOPHIL NFR BLD: 2.2 % (ref 0–8)
ERYTHROCYTE [DISTWIDTH] IN BLOOD BY AUTOMATED COUNT: 16.6 % (ref 11.5–14.5)
EST. GFR  (AFRICAN AMERICAN): >60 ML/MIN/1.73 M^2
EST. GFR  (NON AFRICAN AMERICAN): >60 ML/MIN/1.73 M^2
ESTIMATED AVG GLUCOSE: 174 MG/DL (ref 68–131)
GLUCOSE SERPL-MCNC: 128 MG/DL (ref 70–110)
HBA1C MFR BLD: 7.7 % (ref 4–5.6)
HCT VFR BLD AUTO: 33.9 % (ref 37–48.5)
HDLC SERPL-MCNC: 57 MG/DL (ref 40–75)
HDLC SERPL: 32.2 % (ref 20–50)
HGB BLD-MCNC: 10.2 G/DL (ref 12–16)
IMM GRANULOCYTES # BLD AUTO: 0.01 K/UL (ref 0–0.04)
IMM GRANULOCYTES NFR BLD AUTO: 0.2 % (ref 0–0.5)
LDLC SERPL CALC-MCNC: 80.4 MG/DL (ref 63–159)
LYMPHOCYTES # BLD AUTO: 1.4 K/UL (ref 1–4.8)
LYMPHOCYTES NFR BLD: 33.9 % (ref 18–48)
MCH RBC QN AUTO: 22.9 PG (ref 27–31)
MCHC RBC AUTO-ENTMCNC: 30.1 G/DL (ref 32–36)
MCV RBC AUTO: 76 FL (ref 82–98)
MONOCYTES # BLD AUTO: 0.4 K/UL (ref 0.3–1)
MONOCYTES NFR BLD: 10.4 % (ref 4–15)
NEUTROPHILS # BLD AUTO: 2.1 K/UL (ref 1.8–7.7)
NEUTROPHILS NFR BLD: 52.6 % (ref 38–73)
NONHDLC SERPL-MCNC: 120 MG/DL
NRBC BLD-RTO: 0 /100 WBC
PLATELET # BLD AUTO: 267 K/UL (ref 150–450)
PMV BLD AUTO: 10.4 FL (ref 9.2–12.9)
POTASSIUM SERPL-SCNC: 4.6 MMOL/L (ref 3.5–5.1)
PROT SERPL-MCNC: 7.4 G/DL (ref 6–8.4)
RBC # BLD AUTO: 4.46 M/UL (ref 4–5.4)
SARS-COV-2 RNA RESP QL NAA+PROBE: NOT DETECTED
SARS-COV-2- CYCLE NUMBER: NORMAL
SODIUM SERPL-SCNC: 136 MMOL/L (ref 136–145)
TRIGL SERPL-MCNC: 198 MG/DL (ref 30–150)
TSH SERPL DL<=0.005 MIU/L-ACNC: 2.03 UIU/ML (ref 0.4–4)
WBC # BLD AUTO: 4.04 K/UL (ref 3.9–12.7)

## 2022-06-30 PROCEDURE — 83036 HEMOGLOBIN GLYCOSYLATED A1C: CPT | Performed by: STUDENT IN AN ORGANIZED HEALTH CARE EDUCATION/TRAINING PROGRAM

## 2022-06-30 PROCEDURE — 80053 COMPREHEN METABOLIC PANEL: CPT | Performed by: STUDENT IN AN ORGANIZED HEALTH CARE EDUCATION/TRAINING PROGRAM

## 2022-06-30 PROCEDURE — 36415 COLL VENOUS BLD VENIPUNCTURE: CPT | Performed by: STUDENT IN AN ORGANIZED HEALTH CARE EDUCATION/TRAINING PROGRAM

## 2022-06-30 PROCEDURE — 80061 LIPID PANEL: CPT | Performed by: STUDENT IN AN ORGANIZED HEALTH CARE EDUCATION/TRAINING PROGRAM

## 2022-06-30 PROCEDURE — 85025 COMPLETE CBC W/AUTO DIFF WBC: CPT | Performed by: STUDENT IN AN ORGANIZED HEALTH CARE EDUCATION/TRAINING PROGRAM

## 2022-06-30 PROCEDURE — 84443 ASSAY THYROID STIM HORMONE: CPT | Performed by: STUDENT IN AN ORGANIZED HEALTH CARE EDUCATION/TRAINING PROGRAM

## 2022-07-14 ENCOUNTER — PES CALL (OUTPATIENT)
Dept: ADMINISTRATIVE | Facility: CLINIC | Age: 77
End: 2022-07-14
Payer: MEDICARE

## 2022-08-31 DIAGNOSIS — Z78.0 MENOPAUSE: ICD-10-CM

## 2022-10-10 ENCOUNTER — IMMUNIZATION (OUTPATIENT)
Dept: INTERNAL MEDICINE | Facility: CLINIC | Age: 77
End: 2022-10-10
Payer: MEDICARE

## 2022-10-10 PROCEDURE — G0008 FLU VACCINE - QUADRIVALENT - ADJUVANTED: ICD-10-PCS | Mod: S$GLB,,, | Performed by: INTERNAL MEDICINE

## 2022-10-10 PROCEDURE — G0008 ADMIN INFLUENZA VIRUS VAC: HCPCS | Mod: S$GLB,,, | Performed by: INTERNAL MEDICINE

## 2022-10-10 PROCEDURE — 90694 VACC AIIV4 NO PRSRV 0.5ML IM: CPT | Mod: S$GLB,,, | Performed by: INTERNAL MEDICINE

## 2022-10-10 PROCEDURE — 90694 FLU VACCINE - QUADRIVALENT - ADJUVANTED: ICD-10-PCS | Mod: S$GLB,,, | Performed by: INTERNAL MEDICINE

## 2022-10-12 ENCOUNTER — OFFICE VISIT (OUTPATIENT)
Dept: INTERNAL MEDICINE | Facility: CLINIC | Age: 77
End: 2022-10-12
Payer: MEDICARE

## 2022-10-12 ENCOUNTER — IMMUNIZATION (OUTPATIENT)
Dept: INTERNAL MEDICINE | Facility: CLINIC | Age: 77
End: 2022-10-12
Payer: MEDICARE

## 2022-10-12 VITALS
HEIGHT: 65 IN | DIASTOLIC BLOOD PRESSURE: 68 MMHG | BODY MASS INDEX: 27.56 KG/M2 | SYSTOLIC BLOOD PRESSURE: 172 MMHG | WEIGHT: 165.44 LBS | OXYGEN SATURATION: 97 % | HEART RATE: 75 BPM

## 2022-10-12 DIAGNOSIS — Z23 NEED FOR VACCINATION: Primary | ICD-10-CM

## 2022-10-12 DIAGNOSIS — E78.2 MIXED HYPERLIPIDEMIA: ICD-10-CM

## 2022-10-12 DIAGNOSIS — E11.40 TYPE 2 DIABETES MELLITUS WITH DIABETIC NEUROPATHY, WITHOUT LONG-TERM CURRENT USE OF INSULIN: ICD-10-CM

## 2022-10-12 DIAGNOSIS — E08.8 DIABETES MELLITUS DUE TO UNDERLYING CONDITION WITH COMPLICATION: ICD-10-CM

## 2022-10-12 DIAGNOSIS — I10 HTN (HYPERTENSION), BENIGN: ICD-10-CM

## 2022-10-12 DIAGNOSIS — E11.42 DIABETIC POLYNEUROPATHY ASSOCIATED WITH TYPE 2 DIABETES MELLITUS: ICD-10-CM

## 2022-10-12 DIAGNOSIS — E03.9 HYPOTHYROIDISM, UNSPECIFIED TYPE: ICD-10-CM

## 2022-10-12 DIAGNOSIS — E03.4 HYPOTHYROIDISM DUE TO ACQUIRED ATROPHY OF THYROID: ICD-10-CM

## 2022-10-12 PROCEDURE — 3078F DIAST BP <80 MM HG: CPT | Mod: CPTII,S$GLB,, | Performed by: PHYSICIAN ASSISTANT

## 2022-10-12 PROCEDURE — 1157F ADVNC CARE PLAN IN RCRD: CPT | Mod: CPTII,S$GLB,, | Performed by: PHYSICIAN ASSISTANT

## 2022-10-12 PROCEDURE — 1126F PR PAIN SEVERITY QUANTIFIED, NO PAIN PRESENT: ICD-10-PCS | Mod: CPTII,S$GLB,, | Performed by: PHYSICIAN ASSISTANT

## 2022-10-12 PROCEDURE — 1160F RVW MEDS BY RX/DR IN RCRD: CPT | Mod: CPTII,S$GLB,, | Performed by: PHYSICIAN ASSISTANT

## 2022-10-12 PROCEDURE — 99214 PR OFFICE/OUTPT VISIT, EST, LEVL IV, 30-39 MIN: ICD-10-PCS | Mod: S$GLB,,, | Performed by: PHYSICIAN ASSISTANT

## 2022-10-12 PROCEDURE — 1101F PT FALLS ASSESS-DOCD LE1/YR: CPT | Mod: CPTII,S$GLB,, | Performed by: PHYSICIAN ASSISTANT

## 2022-10-12 PROCEDURE — 1157F PR ADVANCE CARE PLAN OR EQUIV PRESENT IN MEDICAL RECORD: ICD-10-PCS | Mod: CPTII,S$GLB,, | Performed by: PHYSICIAN ASSISTANT

## 2022-10-12 PROCEDURE — 91312 COVID-19, MRNA, LNP-S, BIVALENT BOOSTER, PF, 30 MCG/0.3 ML DOSE: ICD-10-PCS | Mod: S$GLB,,, | Performed by: INTERNAL MEDICINE

## 2022-10-12 PROCEDURE — 3077F SYST BP >= 140 MM HG: CPT | Mod: CPTII,S$GLB,, | Performed by: PHYSICIAN ASSISTANT

## 2022-10-12 PROCEDURE — 99999 PR PBB SHADOW E&M-EST. PATIENT-LVL V: ICD-10-PCS | Mod: PBBFAC,,, | Performed by: PHYSICIAN ASSISTANT

## 2022-10-12 PROCEDURE — 99999 PR PBB SHADOW E&M-EST. PATIENT-LVL V: CPT | Mod: PBBFAC,,, | Performed by: PHYSICIAN ASSISTANT

## 2022-10-12 PROCEDURE — 3288F PR FALLS RISK ASSESSMENT DOCUMENTED: ICD-10-PCS | Mod: CPTII,S$GLB,, | Performed by: PHYSICIAN ASSISTANT

## 2022-10-12 PROCEDURE — 1101F PR PT FALLS ASSESS DOC 0-1 FALLS W/OUT INJ PAST YR: ICD-10-PCS | Mod: CPTII,S$GLB,, | Performed by: PHYSICIAN ASSISTANT

## 2022-10-12 PROCEDURE — 99214 OFFICE O/P EST MOD 30 MIN: CPT | Mod: S$GLB,,, | Performed by: PHYSICIAN ASSISTANT

## 2022-10-12 PROCEDURE — 1160F PR REVIEW ALL MEDS BY PRESCRIBER/CLIN PHARMACIST DOCUMENTED: ICD-10-PCS | Mod: CPTII,S$GLB,, | Performed by: PHYSICIAN ASSISTANT

## 2022-10-12 PROCEDURE — 0124A COVID-19, MRNA, LNP-S, BIVALENT BOOSTER, PF, 30 MCG/0.3 ML DOSE: CPT | Mod: CV19,PBBFAC | Performed by: INTERNAL MEDICINE

## 2022-10-12 PROCEDURE — 1159F PR MEDICATION LIST DOCUMENTED IN MEDICAL RECORD: ICD-10-PCS | Mod: CPTII,S$GLB,, | Performed by: PHYSICIAN ASSISTANT

## 2022-10-12 PROCEDURE — 91312 COVID-19, MRNA, LNP-S, BIVALENT BOOSTER, PF, 30 MCG/0.3 ML DOSE: CPT | Mod: S$GLB,,, | Performed by: INTERNAL MEDICINE

## 2022-10-12 PROCEDURE — 99499 UNLISTED E&M SERVICE: CPT | Mod: S$PBB,,, | Performed by: PHYSICIAN ASSISTANT

## 2022-10-12 PROCEDURE — 1159F MED LIST DOCD IN RCRD: CPT | Mod: CPTII,S$GLB,, | Performed by: PHYSICIAN ASSISTANT

## 2022-10-12 PROCEDURE — 99499 RISK ADDL DX/OHS AUDIT: ICD-10-PCS | Mod: S$PBB,,, | Performed by: PHYSICIAN ASSISTANT

## 2022-10-12 PROCEDURE — 3077F PR MOST RECENT SYSTOLIC BLOOD PRESSURE >= 140 MM HG: ICD-10-PCS | Mod: CPTII,S$GLB,, | Performed by: PHYSICIAN ASSISTANT

## 2022-10-12 PROCEDURE — 1126F AMNT PAIN NOTED NONE PRSNT: CPT | Mod: CPTII,S$GLB,, | Performed by: PHYSICIAN ASSISTANT

## 2022-10-12 PROCEDURE — 3288F FALL RISK ASSESSMENT DOCD: CPT | Mod: CPTII,S$GLB,, | Performed by: PHYSICIAN ASSISTANT

## 2022-10-12 PROCEDURE — 3078F PR MOST RECENT DIASTOLIC BLOOD PRESSURE < 80 MM HG: ICD-10-PCS | Mod: CPTII,S$GLB,, | Performed by: PHYSICIAN ASSISTANT

## 2022-10-12 RX ORDER — ROSUVASTATIN CALCIUM 5 MG/1
5 TABLET, COATED ORAL DAILY
Qty: 90 TABLET | Refills: 1 | Status: SHIPPED | OUTPATIENT
Start: 2022-10-12 | End: 2023-05-09

## 2022-10-12 RX ORDER — GLIPIZIDE 2.5 MG/1
TABLET, EXTENDED RELEASE ORAL
Qty: 180 TABLET | Refills: 1 | Status: SHIPPED | OUTPATIENT
Start: 2022-10-12 | End: 2023-05-09

## 2022-10-12 RX ORDER — LEVOTHYROXINE SODIUM 112 UG/1
112 TABLET ORAL
Qty: 90 TABLET | Refills: 1 | Status: SHIPPED | OUTPATIENT
Start: 2022-10-12 | End: 2023-05-08

## 2022-10-12 RX ORDER — LISINOPRIL 40 MG/1
40 TABLET ORAL DAILY
Qty: 90 TABLET | Refills: 1 | Status: SHIPPED | OUTPATIENT
Start: 2022-10-12 | End: 2023-05-08 | Stop reason: SDUPTHER

## 2022-10-12 RX ORDER — METFORMIN HYDROCHLORIDE 1000 MG/1
1000 TABLET ORAL 2 TIMES DAILY WITH MEALS
Qty: 180 TABLET | Refills: 1 | Status: SHIPPED | OUTPATIENT
Start: 2022-10-12 | End: 2023-08-11 | Stop reason: SDUPTHER

## 2022-10-12 RX ORDER — CARVEDILOL 25 MG/1
25 TABLET ORAL 2 TIMES DAILY
Qty: 180 TABLET | Refills: 1 | Status: SHIPPED | OUTPATIENT
Start: 2022-10-12 | End: 2023-10-03

## 2022-10-12 NOTE — PROGRESS NOTES
Subjective:       Patient ID: Aminah Norton is a 77 y.o. female.        Chief Complaint: Medication Refill    Aminah Norton is an established patient of Jeison Sanchez MD here today for follow up visit.    She needs refills of her medications.  S/p recent flu shot.  Needs covid booster.  Due for labs in the next 1-2 months and overdue for PCP f/u.    DM - complicated by retinopathy and neuropathy  Metformin 1000 mg BID  Glipizide 2.5  mg 2 daily   Needs strips as well, out, not checking home blood sugar  Lab Results       Component                Value               Date                       HGBA1C                   7.7 (H)             06/30/2022                 HGBA1C                   7.3 (H)             03/08/2022                 HGBA1C                   7.7 (H)             02/12/2021              Hypothyroidism -   Levothyroxine 112 mcg daily  Last TSH 6/2022 and normal    Hypertension - did not take medication today and BP elevated, no chest pain/shortness of breath/headache/blurred vision  Coreg 25 mg BID  Lisinopril 40 mg daily    Lipids - occ takes crestor, lipitor in past caused myalgia         Review of Systems   Constitutional:  Negative for chills, diaphoresis, fatigue and fever.   HENT:  Negative for congestion and sore throat.    Eyes:  Negative for visual disturbance.   Respiratory:  Negative for cough, chest tightness and shortness of breath.    Cardiovascular:  Negative for chest pain, palpitations and leg swelling.   Gastrointestinal:  Negative for abdominal pain, blood in stool, constipation, diarrhea, nausea and vomiting.   Genitourinary:  Negative for dysuria, frequency, hematuria and urgency.   Musculoskeletal:  Negative for arthralgias and back pain.   Skin:  Negative for rash.   Neurological:  Negative for dizziness, syncope, weakness and headaches.   Psychiatric/Behavioral:  Negative for dysphoric mood and sleep disturbance. The patient is not  nervous/anxious.      Objective:      Physical Exam  Vitals and nursing note reviewed.   Constitutional:       Appearance: Normal appearance. She is well-developed.   HENT:      Head: Normocephalic.      Right Ear: External ear normal.      Left Ear: External ear normal.   Eyes:      Pupils: Pupils are equal, round, and reactive to light.   Cardiovascular:      Rate and Rhythm: Normal rate and regular rhythm.      Heart sounds: Normal heart sounds. No murmur heard.    No friction rub. No gallop.   Pulmonary:      Effort: Pulmonary effort is normal. No respiratory distress.      Breath sounds: Normal breath sounds.   Abdominal:      Palpations: Abdomen is soft.      Tenderness: There is no abdominal tenderness.   Skin:     General: Skin is warm and dry.   Neurological:      Mental Status: She is alert.       Assessment:       1. Mixed hyperlipidemia    2. Type 2 diabetes mellitus with diabetic neuropathy, without long-term current use of insulin    3. Diabetic polyneuropathy associated with type 2 diabetes mellitus    4. HTN (hypertension), benign    5. Hypothyroidism, unspecified type    6. Hypothyroidism due to acquired atrophy of thyroid    7. Diabetes mellitus due to underlying condition with complication          Plan:       Aminah was seen today for medication refill.    Diagnoses and all orders for this visit:    Mixed hyperlipidemia  -     rosuvastatin (CRESTOR) 5 MG tablet; Take 1 tablet (5 mg total) by mouth once daily.  -     Lipid Panel; Future    Type 2 diabetes mellitus with diabetic neuropathy, without long-term current use of insulin  -     metFORMIN (GLUCOPHAGE) 1000 MG tablet; Take 1 tablet (1,000 mg total) by mouth 2 (two) times daily with meals.  -     glipiZIDE (GLUCOTROL) 2.5 MG TR24; TAKE 2 TABLETS EVERY DAY WITH BREAKFAST  -     Hemoglobin A1C; Future  -     blood sugar diagnostic (ACCU-CHEK MATTIE PLUS TEST STRP) Strp; TEST ONE TIME DAILY  -     Ambulatory referral/consult to Optometry;  "Future    Diabetic polyneuropathy associated with type 2 diabetes mellitus  -     metFORMIN (GLUCOPHAGE) 1000 MG tablet; Take 1 tablet (1,000 mg total) by mouth 2 (two) times daily with meals.    HTN (hypertension), benign - uncontrolled but no medication today - schedule nurse visit for BP check on meds same day as lab appointment in a few weeks  -     lisinopriL (PRINIVIL,ZESTRIL) 40 MG tablet; Take 1 tablet (40 mg total) by mouth once daily.  -     carvediloL (COREG) 25 MG tablet; Take 1 tablet (25 mg total) by mouth 2 (two) times daily.  -     CBC Auto Differential; Future  -     Comprehensive Metabolic Panel; Future    Hypothyroidism, unspecified type  -     levothyroxine (SYNTHROID) 112 MCG tablet; Take 1 tablet (112 mcg total) by mouth before breakfast.    Refill all medications as above  Refer to optometry for diabetic eye exam  Schedule fasting labs  Schedule f/u with PCP  Covid booster today    Pt has been given instructions populated from [employer] database and has verbalized understanding of the after visit summary and information contained wherein.    Follow up with a primary care provider. May go to ER for acute shortness of breath, lightheadedness, fever, or any other emergent complaints or changes in condition.    "This note will be shared with the patient"    Future Appointments   Date Time Provider Department Center   12/12/2022 10:30 AM Jeison Sanchez MD [facility]                   "

## 2022-12-12 ENCOUNTER — OFFICE VISIT (OUTPATIENT)
Dept: INTERNAL MEDICINE | Facility: CLINIC | Age: 77
End: 2022-12-12
Payer: MEDICARE

## 2022-12-12 VITALS
OXYGEN SATURATION: 97 % | SYSTOLIC BLOOD PRESSURE: 138 MMHG | WEIGHT: 162.69 LBS | DIASTOLIC BLOOD PRESSURE: 72 MMHG | HEART RATE: 75 BPM | HEIGHT: 65 IN | BODY MASS INDEX: 27.1 KG/M2

## 2022-12-12 DIAGNOSIS — I10 HTN (HYPERTENSION), BENIGN: ICD-10-CM

## 2022-12-12 DIAGNOSIS — E11.40 TYPE 2 DIABETES MELLITUS WITH DIABETIC NEUROPATHY, WITHOUT LONG-TERM CURRENT USE OF INSULIN: Primary | ICD-10-CM

## 2022-12-12 DIAGNOSIS — Z12.11 COLON CANCER SCREENING: ICD-10-CM

## 2022-12-12 PROCEDURE — 99999 PR PBB SHADOW E&M-EST. PATIENT-LVL V: ICD-10-PCS | Mod: PBBFAC,,, | Performed by: INTERNAL MEDICINE

## 2022-12-12 PROCEDURE — 99999 PR PBB SHADOW E&M-EST. PATIENT-LVL V: CPT | Mod: PBBFAC,,, | Performed by: INTERNAL MEDICINE

## 2022-12-12 PROCEDURE — 1157F ADVNC CARE PLAN IN RCRD: CPT | Mod: ,,, | Performed by: INTERNAL MEDICINE

## 2022-12-12 PROCEDURE — 99214 OFFICE O/P EST MOD 30 MIN: CPT | Mod: S$PBB,,, | Performed by: INTERNAL MEDICINE

## 2022-12-12 PROCEDURE — 1157F PR ADVANCE CARE PLAN OR EQUIV PRESENT IN MEDICAL RECORD: ICD-10-PCS | Mod: ,,, | Performed by: INTERNAL MEDICINE

## 2022-12-12 PROCEDURE — 99214 PR OFFICE/OUTPT VISIT, EST, LEVL IV, 30-39 MIN: ICD-10-PCS | Mod: S$PBB,,, | Performed by: INTERNAL MEDICINE

## 2022-12-12 NOTE — PROGRESS NOTES
"    CHIEF COMPLAINT     Chief Complaint   Patient presents with    Follow-up    Leg Pain     Left side       HPI     Aminah Norton is a 77 y.o. female HTN, GERD, TIIDM with polyneuropathy, HLD, hypothyroidism here today for follow-up    Last seen by me over a year ago.  Did not get labs prior to today's visit.  Reports her back pain is improved with a new mattress    Reports having some episodic upper abdominal pain worse at night.  She notices it is worse when she is laying down.  Sometimes she thinks maybe something she eats brings it on other times unclear.    Reports compliance with medication      Personally Reviewed Patient's Medical, surgical, family and social hx. Changes updated in [employer].  Care Team updated in Epic    Review of Systems:  Review of Systems   Gastrointestinal:  Positive for abdominal pain.   Musculoskeletal:  Positive for back pain.     Health Maintenance:   Reviewed with patient  Due for the following:  Prevnar 20 due    PHYSICAL EXAM     /72 (BP Location: Right arm, Patient Position: Sitting, BP Method: Medium (Manual))   Pulse 75   Ht 5' 5" (1.651 m)   Wt 73.8 kg (162 lb 11.2 oz)   SpO2 97%   BMI 27.07 kg/m²     Gen: Well Appearing, NAD  HEENT: PERR, EOMI  Neck: FROM, no thyromegaly, no cervical adenopathy  CVD: RRR, no M/R/G  Pulm: Normal work of breathing, CTAB, no wheezing  Abd:  Soft, NT, ND non TTP, no mass  MSK: no LE edema  Neuro: A&Ox3, gait normal, speech normal  Mood; Mood normal, behavior normal, thought process linear       LABS     Labs reviewed; orderedNotable for  Lab Results   Component Value Date    HGBA1C 7.7 (H) 06/30/2022     Lab Results   Component Value Date    LDLCALC 80.4 06/30/2022     Lab Results   Component Value Date    TSH 2.031 06/30/2022       Chemistry        Component Value Date/Time     06/30/2022 0807    K 4.6 06/30/2022 0807     06/30/2022 0807    CO2 27 06/30/2022 0807    BUN 18 06/30/2022 0807    CREATININE 0.7 " 06/30/2022 0807     (H) 06/30/2022 0807        Component Value Date/Time    CALCIUM 10.0 06/30/2022 0807    ALKPHOS 85 06/30/2022 0807    AST 15 06/30/2022 0807    ALT 16 06/30/2022 0807    BILITOT 0.4 06/30/2022 0807    ESTGFRAFRICA >60.0 06/30/2022 0807    EGFRNONAA >60.0 06/30/2022 0807          ASSESSMENT     1. Type 2 diabetes mellitus with diabetic neuropathy, without long-term current use of insulin  MICROALBUMIN / CREATININE RATIO URINE    Ambulatory referral/consult to Ophthalmology      2. Colon cancer screening  Ambulatory referral/consult to Endo Procedure               Plan     Aminah Higgins Errol is a 77 y.o. female with HTN, GERD, TIIDM with polyneuropathy, HLD, hypothyroidism     1. Type 2 diabetes mellitus with diabetic neuropathy, without long-term current use of insulin  Continue current regimen of glipizide 2.5 and metformin 1000 b.i.d.  She is on ACE inhibitor she is on statin  - MICROALBUMIN / CREATININE RATIO URINE; Future  - Ambulatory referral/consult to Ophthalmology; Future    2. Colon cancer screening  - Ambulatory referral/consult to Endo Procedure ; Future      Will have her come back and see me in 6 weeks with labs    Jeison Sanchez MD

## 2023-01-17 ENCOUNTER — LAB VISIT (OUTPATIENT)
Dept: LAB | Facility: HOSPITAL | Age: 78
End: 2023-01-17
Payer: MEDICARE

## 2023-01-17 DIAGNOSIS — E78.2 MIXED HYPERLIPIDEMIA: ICD-10-CM

## 2023-01-17 DIAGNOSIS — I10 HTN (HYPERTENSION), BENIGN: ICD-10-CM

## 2023-01-17 DIAGNOSIS — E11.40 TYPE 2 DIABETES MELLITUS WITH DIABETIC NEUROPATHY, WITHOUT LONG-TERM CURRENT USE OF INSULIN: ICD-10-CM

## 2023-01-17 LAB
ALBUMIN SERPL BCP-MCNC: 4.2 G/DL (ref 3.5–5.2)
ALP SERPL-CCNC: 94 U/L (ref 55–135)
ALT SERPL W/O P-5'-P-CCNC: 15 U/L (ref 10–44)
ANION GAP SERPL CALC-SCNC: 9 MMOL/L (ref 8–16)
AST SERPL-CCNC: 14 U/L (ref 10–40)
BASOPHILS # BLD AUTO: 0.03 K/UL (ref 0–0.2)
BASOPHILS NFR BLD: 0.9 % (ref 0–1.9)
BILIRUB SERPL-MCNC: 0.5 MG/DL (ref 0.1–1)
BUN SERPL-MCNC: 13 MG/DL (ref 8–23)
CALCIUM SERPL-MCNC: 10 MG/DL (ref 8.7–10.5)
CHLORIDE SERPL-SCNC: 100 MMOL/L (ref 95–110)
CHOLEST SERPL-MCNC: 221 MG/DL (ref 120–199)
CHOLEST/HDLC SERPL: 3.7 {RATIO} (ref 2–5)
CO2 SERPL-SCNC: 27 MMOL/L (ref 23–29)
CREAT SERPL-MCNC: 0.8 MG/DL (ref 0.5–1.4)
DIFFERENTIAL METHOD: ABNORMAL
EOSINOPHIL # BLD AUTO: 0.1 K/UL (ref 0–0.5)
EOSINOPHIL NFR BLD: 2.1 % (ref 0–8)
ERYTHROCYTE [DISTWIDTH] IN BLOOD BY AUTOMATED COUNT: 16.2 % (ref 11.5–14.5)
EST. GFR  (NO RACE VARIABLE): >60 ML/MIN/1.73 M^2
ESTIMATED AVG GLUCOSE: 174 MG/DL (ref 68–131)
GLUCOSE SERPL-MCNC: 134 MG/DL (ref 70–110)
HBA1C MFR BLD: 7.7 % (ref 4–5.6)
HCT VFR BLD AUTO: 34.4 % (ref 37–48.5)
HDLC SERPL-MCNC: 59 MG/DL (ref 40–75)
HDLC SERPL: 26.7 % (ref 20–50)
HGB BLD-MCNC: 10.5 G/DL (ref 12–16)
IMM GRANULOCYTES # BLD AUTO: 0.01 K/UL (ref 0–0.04)
IMM GRANULOCYTES NFR BLD AUTO: 0.3 % (ref 0–0.5)
LDLC SERPL CALC-MCNC: 118.6 MG/DL (ref 63–159)
LYMPHOCYTES # BLD AUTO: 1.2 K/UL (ref 1–4.8)
LYMPHOCYTES NFR BLD: 37 % (ref 18–48)
MCH RBC QN AUTO: 22.8 PG (ref 27–31)
MCHC RBC AUTO-ENTMCNC: 30.5 G/DL (ref 32–36)
MCV RBC AUTO: 75 FL (ref 82–98)
MONOCYTES # BLD AUTO: 0.4 K/UL (ref 0.3–1)
MONOCYTES NFR BLD: 11.1 % (ref 4–15)
NEUTROPHILS # BLD AUTO: 1.6 K/UL (ref 1.8–7.7)
NEUTROPHILS NFR BLD: 48.6 % (ref 38–73)
NONHDLC SERPL-MCNC: 162 MG/DL
NRBC BLD-RTO: 0 /100 WBC
PLATELET # BLD AUTO: 255 K/UL (ref 150–450)
PMV BLD AUTO: 9.8 FL (ref 9.2–12.9)
POTASSIUM SERPL-SCNC: 4.5 MMOL/L (ref 3.5–5.1)
PROT SERPL-MCNC: 7.6 G/DL (ref 6–8.4)
RBC # BLD AUTO: 4.61 M/UL (ref 4–5.4)
SODIUM SERPL-SCNC: 136 MMOL/L (ref 136–145)
TRIGL SERPL-MCNC: 217 MG/DL (ref 30–150)
WBC # BLD AUTO: 3.32 K/UL (ref 3.9–12.7)

## 2023-01-17 PROCEDURE — 80053 COMPREHEN METABOLIC PANEL: CPT | Performed by: PHYSICIAN ASSISTANT

## 2023-01-17 PROCEDURE — 36415 COLL VENOUS BLD VENIPUNCTURE: CPT | Performed by: PHYSICIAN ASSISTANT

## 2023-01-17 PROCEDURE — 83036 HEMOGLOBIN GLYCOSYLATED A1C: CPT | Performed by: PHYSICIAN ASSISTANT

## 2023-01-17 PROCEDURE — 80061 LIPID PANEL: CPT | Performed by: PHYSICIAN ASSISTANT

## 2023-01-17 PROCEDURE — 85025 COMPLETE CBC W/AUTO DIFF WBC: CPT | Performed by: PHYSICIAN ASSISTANT

## 2023-01-30 ENCOUNTER — CLINICAL SUPPORT (OUTPATIENT)
Dept: ENDOSCOPY | Facility: HOSPITAL | Age: 78
End: 2023-01-30
Attending: INTERNAL MEDICINE
Payer: MEDICARE

## 2023-01-30 DIAGNOSIS — Z12.11 COLON CANCER SCREENING: ICD-10-CM

## 2023-01-30 NOTE — PLAN OF CARE
We attempted to reach you for your scheduled PAT appointment but you were not available. Please contact Endoscopy Scheduling at # [phone] or visit your [facility] portal to reschedule your PAT appointment.

## 2023-01-31 ENCOUNTER — TELEPHONE (OUTPATIENT)
Dept: ENDOSCOPY | Facility: HOSPITAL | Age: 78
End: 2023-01-31

## 2023-01-31 VITALS — HEIGHT: 65 IN | BODY MASS INDEX: 27.32 KG/M2 | WEIGHT: 164 LBS

## 2023-01-31 DIAGNOSIS — E08.8 DIABETES MELLITUS DUE TO UNDERLYING CONDITION WITH COMPLICATION: ICD-10-CM

## 2023-01-31 DIAGNOSIS — Z12.11 SPECIAL SCREENING FOR MALIGNANT NEOPLASMS, COLON: Primary | ICD-10-CM

## 2023-01-31 DIAGNOSIS — E08.65 DIABETES MELLITUS DUE TO UNDERLYING CONDITION WITH HYPERGLYCEMIA: ICD-10-CM

## 2023-01-31 DIAGNOSIS — Z12.11 COLON CANCER SCREENING: ICD-10-CM

## 2023-01-31 RX ORDER — ISOPROPYL ALCOHOL 70 ML/100ML
SWAB TOPICAL
Refills: 0 | OUTPATIENT
Start: 2023-01-31

## 2023-01-31 RX ORDER — DEXTROSE 4 G
TABLET,CHEWABLE ORAL
Refills: 0 | OUTPATIENT
Start: 2023-01-31

## 2023-01-31 RX ORDER — POLYETHYLENE GLYCOL 3350, SODIUM SULFATE ANHYDROUS, SODIUM BICARBONATE, SODIUM CHLORIDE, POTASSIUM CHLORIDE 236; 22.74; 6.74; 5.86; 2.97 G/4L; G/4L; G/4L; G/4L; G/4L
4 POWDER, FOR SOLUTION ORAL ONCE
Qty: 4000 ML | Refills: 0 | Status: SHIPPED | OUTPATIENT
Start: 2023-01-31 | End: 2023-01-31

## 2023-01-31 RX ORDER — LANCETS
EACH MISCELLANEOUS
Refills: 0 | OUTPATIENT
Start: 2023-01-31

## 2023-01-31 NOTE — TELEPHONE ENCOUNTER
No new care gaps identified.  [facility] Embedded Care Gaps. Reference number: [id number]. 1/31/2023   3:18:49 PM CST

## 2023-01-31 NOTE — TELEPHONE ENCOUNTER
Refill Decision Note   Aminah AliceaNoeShelly  is requesting a refill authorization.  Brief Assessment and Rationale for Refill:  Route     Medication Therapy Plan:      Pharmacy is requesting new scripts for the following medications without required information, (sig/ frequency/qty/etc)      Medication Reconciliation Completed: No     Comments: Pharmacies have been requesting medications for patients without required information, (sig, frequency, qty, etc.). In addition, requests are sent for medication(s) pt. are currently not taking, and medications patients have never taken.    We have spoken to the pharmacies about these request types and advised their teams previously that we are unable to assess these New Script requests and require all details for these requests. This is a known issue and has been reported.     Note composed:5:55 PM 01/31/2023

## 2023-02-02 DIAGNOSIS — E11.40 TYPE 2 DIABETES MELLITUS WITH DIABETIC NEUROPATHY, WITHOUT LONG-TERM CURRENT USE OF INSULIN: Primary | ICD-10-CM

## 2023-02-02 DIAGNOSIS — E78.2 MIXED HYPERLIPIDEMIA: ICD-10-CM

## 2023-02-02 RX ORDER — ROSUVASTATIN CALCIUM 10 MG/1
10 TABLET, COATED ORAL DAILY
Qty: 90 TABLET | Refills: 3 | Status: SHIPPED | OUTPATIENT
Start: 2023-02-02 | End: 2023-11-13 | Stop reason: SDUPTHER

## 2023-02-02 NOTE — TELEPHONE ENCOUNTER
Taylor can you please find a time slot. She was suppose to be seen in January 2023 but was never scheduled.

## 2023-02-02 NOTE — TELEPHONE ENCOUNTER
----- Message from Jeison Sanchez MD sent at 2/2/2023  8:06 AM CST -----  Would like to increase rosuvastatin from 5-10mg  Would also like to recheck a1c in .    Jeison Sanchez

## 2023-02-02 NOTE — TELEPHONE ENCOUNTER
No new care gaps identified.  [facility] Embedded Care Gaps. Reference number: [id number]. 2/02/2023   8:29:12 AM CST

## 2023-02-02 NOTE — TELEPHONE ENCOUNTER
Pt informed. She will double cholesterol med until she get the new Rx. Do you want a Lipid panel in 3 months because you message reads that you want a Hemoblobin A1c. Do you want both? I pended all orders.  She was seen 12/12/2022 and ws told to come back in 6 weeks but no appointment was made.

## 2023-02-02 NOTE — TELEPHONE ENCOUNTER
Called pt to get pt put on schedule. Didn't get an answer. A voicemail was left for pt to return call here at the office .

## 2023-02-06 ENCOUNTER — TELEPHONE (OUTPATIENT)
Dept: INTERNAL MEDICINE | Facility: CLINIC | Age: 78
End: 2023-02-06

## 2023-02-06 ENCOUNTER — TELEPHONE (OUTPATIENT)
Dept: ENDOSCOPY | Facility: HOSPITAL | Age: 78
End: 2023-02-06

## 2023-02-06 NOTE — TELEPHONE ENCOUNTER
When you arrive at [facility] on [facility], please park in the parking garage and take the parking garage elevators to the 2nd Floor.  When you step off the elevators, veer right and walk straight until you reach the [facility] elevators.  Turn right at the [facility] elevators and follow the hallway to the Surgery Waiting Room.  Please check in at the registration desk.  Staff will meet you in the waiting room and walk you to Endoscopy for your procedure.     Please see instructions attached and feel free to contact me with any questions or concerns.

## 2023-02-09 ENCOUNTER — HOSPITAL ENCOUNTER (OUTPATIENT)
Facility: HOSPITAL | Age: 78
Discharge: HOME | End: 2023-02-09
Attending: COLON & RECTAL SURGERY | Admitting: COLON & RECTAL SURGERY
Payer: MEDICARE

## 2023-02-09 ENCOUNTER — ANESTHESIA EVENT (OUTPATIENT)
Dept: ENDOSCOPY | Facility: HOSPITAL | Age: 78
End: 2023-02-09
Payer: MEDICARE

## 2023-02-09 ENCOUNTER — ANESTHESIA (OUTPATIENT)
Dept: ENDOSCOPY | Facility: HOSPITAL | Age: 78
End: 2023-02-09
Payer: MEDICARE

## 2023-02-09 VITALS
HEART RATE: 68 BPM | BODY MASS INDEX: 27.32 KG/M2 | HEIGHT: 65 IN | OXYGEN SATURATION: 100 % | SYSTOLIC BLOOD PRESSURE: 210 MMHG | RESPIRATION RATE: 14 BRPM | WEIGHT: 164 LBS | DIASTOLIC BLOOD PRESSURE: 78 MMHG | TEMPERATURE: 49 F

## 2023-02-09 DIAGNOSIS — Z12.11 ENCOUNTER FOR SCREENING COLONOSCOPY: ICD-10-CM

## 2023-02-09 LAB — GLUCOSE SERPL-MCNC: 161 MG/DL (ref 70–110)

## 2023-02-09 PROCEDURE — 82962 GLUCOSE BLOOD TEST: CPT | Performed by: COLON & RECTAL SURGERY

## 2023-02-09 PROCEDURE — 25000003 PHARM REV CODE 250: Performed by: COLON & RECTAL SURGERY

## 2023-02-09 PROCEDURE — 27201089 HC SNARE, DISP (ANY): Performed by: COLON & RECTAL SURGERY

## 2023-02-09 PROCEDURE — 45380 COLONOSCOPY AND BIOPSY: CPT | Mod: PT | Performed by: COLON & RECTAL SURGERY

## 2023-02-09 PROCEDURE — E9220 PRA ENDO ANESTHESIA: HCPCS | Mod: PT,,, | Performed by: NURSE ANESTHETIST, CERTIFIED REGISTERED

## 2023-02-09 PROCEDURE — 88305 TISSUE EXAM BY PATHOLOGIST: ICD-10-PCS | Mod: 26,,, | Performed by: STUDENT IN AN ORGANIZED HEALTH CARE EDUCATION/TRAINING PROGRAM

## 2023-02-09 PROCEDURE — E9220 PRA ENDO ANESTHESIA: ICD-10-PCS | Mod: PT,,, | Performed by: NURSE ANESTHETIST, CERTIFIED REGISTERED

## 2023-02-09 PROCEDURE — 88305 TISSUE EXAM BY PATHOLOGIST: CPT | Performed by: STUDENT IN AN ORGANIZED HEALTH CARE EDUCATION/TRAINING PROGRAM

## 2023-02-09 PROCEDURE — 63600175 PHARM REV CODE 636 W HCPCS: Performed by: NURSE ANESTHETIST, CERTIFIED REGISTERED

## 2023-02-09 PROCEDURE — 37000008 HC ANESTHESIA 1ST 15 MINUTES: Performed by: COLON & RECTAL SURGERY

## 2023-02-09 PROCEDURE — 37000009 HC ANESTHESIA EA ADD 15 MINS: Performed by: COLON & RECTAL SURGERY

## 2023-02-09 PROCEDURE — 45380 PR COLONOSCOPY,BIOPSY: ICD-10-PCS | Mod: PT,,, | Performed by: COLON & RECTAL SURGERY

## 2023-02-09 PROCEDURE — 88305 TISSUE EXAM BY PATHOLOGIST: CPT | Mod: 26,,, | Performed by: STUDENT IN AN ORGANIZED HEALTH CARE EDUCATION/TRAINING PROGRAM

## 2023-02-09 PROCEDURE — 45380 COLONOSCOPY AND BIOPSY: CPT | Mod: PT,,, | Performed by: COLON & RECTAL SURGERY

## 2023-02-09 RX ORDER — PROPOFOL 10 MG/ML
INJECTION, EMULSION INTRAVENOUS
Status: DISCONTINUED | OUTPATIENT
Start: 2023-02-09 | End: 2023-02-09

## 2023-02-09 RX ORDER — PHENYLEPHRINE HYDROCHLORIDE 10 MG/ML
INJECTION INTRAVENOUS
Status: DISCONTINUED | OUTPATIENT
Start: 2023-02-09 | End: 2023-02-09

## 2023-02-09 RX ORDER — PROPOFOL 10 MG/ML
INJECTION, EMULSION INTRAVENOUS CONTINUOUS PRN
Status: DISCONTINUED | OUTPATIENT
Start: 2023-02-09 | End: 2023-02-09

## 2023-02-09 RX ORDER — LIDOCAINE HCL/PF 100 MG/5ML
SYRINGE (ML) INTRAVENOUS
Status: DISCONTINUED | OUTPATIENT
Start: 2023-02-09 | End: 2023-02-09

## 2023-02-09 RX ORDER — SODIUM CHLORIDE 9 MG/ML
INJECTION, SOLUTION INTRAVENOUS CONTINUOUS
Status: DISCONTINUED | OUTPATIENT
Start: 2023-02-09 | End: 2023-02-09 | Stop reason: HOSPADM

## 2023-02-09 RX ADMIN — Medication 100 MG: at 08:02

## 2023-02-09 RX ADMIN — PHENYLEPHRINE HYDROCHLORIDE 100 MCG: 10 INJECTION INTRAVENOUS at 08:02

## 2023-02-09 RX ADMIN — SODIUM CHLORIDE: 0.9 INJECTION, SOLUTION INTRAVENOUS at 07:02

## 2023-02-09 RX ADMIN — PROPOFOL 80 MG: 10 INJECTION, EMULSION INTRAVENOUS at 08:02

## 2023-02-09 RX ADMIN — PROPOFOL 150 MCG/KG/MIN: 10 INJECTION, EMULSION INTRAVENOUS at 08:02

## 2023-02-09 NOTE — PROVATION PATIENT INSTRUCTIONS
Discharge Summary/Instructions after an Endoscopic Procedure  Patient Name: Aminah Carrera  Patient MRN: [id number]  Patient YOB: 1945 Thursday, February 9, 2023  Renan Sanchez MD  Dear patient,  As a result of recent federal legislation (The Federal Cures Act), you may   receive lab or pathology results from your procedure in your [facility]   account before your physician is able to contact you. Your physician or   their representative will relay the results to you with their   recommendations at their soonest availability.  Thank you,  RESTRICTIONS:  During your procedure today, you received medications for sedation.  These   medications may affect your judgment, balance and coordination.  Therefore,   for 24 hours, you have the following restrictions:   - DO NOT drive a car, operate machinery, make legal/financial decisions,   sign important papers or drink alcohol.    ACTIVITY:  Today: no heavy lifting, straining or running due to procedural   sedation/anesthesia.  The following day: return to full activity including work.  DIET:  Eat and drink normally unless instructed otherwise.     TREATMENT FOR COMMON SIDE EFFECTS:  - Mild abdominal pain, nausea, belching, bloating or excessive gas:  rest,   eat lightly and use a heating pad.  - Sore Throat: treat with throat lozenges and/or gargle with warm salt   water.  - Because air was used during the procedure, expelling large amounts of air   from your rectum or belching is normal.  - If a bowel prep was taken, you may not have a bowel movement for 1-3 days.    This is normal.  SYMPTOMS TO WATCH FOR AND REPORT TO YOUR PHYSICIAN:  1. Abdominal pain or bloating, other than gas cramps.  2. Chest pain.  3. Back pain.  4. Signs of infection such as: chills or fever occurring within 24 hours   after the procedure.  5. Rectal bleeding, which would show as bright red, maroon, or black stools.   (A tablespoon of blood from the rectum is not serious,  especially if   hemorrhoids are present.)  6. Vomiting.  7. Weakness or dizziness.  GO DIRECTLY TO THE NEAREST EMERGENCY ROOM IF YOU HAVE ANY OF THE FOLLOWING:      Difficulty breathing              Chills and/or fever over 101 F   Persistent vomiting and/or vomiting blood   Severe abdominal pain   Severe chest pain   Black, tarry stools   Bleeding- more than one tablespoon   Any other symptom or condition that you feel may need urgent attention  Your doctor recommends these additional instructions:  If any biopsies were taken, your doctors clinic will contact you in 1 to 2   weeks with any results.  - Discharge patient to home.   - Resume previous diet.   - Continue present medications.   - Await pathology results.   - Repeat colonoscopy date to be determined after pending pathology results   are reviewed for surveillance based on pathology results.   - Patient has a contact number available for emergencies.  The signs and   symptoms of potential delayed complications were discussed with the   patient.  Return to normal activities tomorrow.  Written discharge   instructions were provided to the patient.  For questions, problems or results please call your physician - Renan Sanchez MD at Work:  [phone].  [facility], EMERGENCY ROOM PHONE NUMBER: [phone]  IF A COMPLICATION OR EMERGENCY SITUATION ARISES AND YOU ARE UNABLE TO REACH   YOUR PHYSICIAN - GO DIRECTLY TO THE EMERGENCY ROOM.  Renan Sanchez MD  2/9/2023 8:37:30 AM  This report has been verified and signed electronically.  Dear patient,  As a result of recent federal legislation (The Federal Cures Act), you may   receive lab or pathology results from your procedure in your [facility]   account before your physician is able to contact you. Your physician or   their representative will relay the results to you with their   recommendations at their soonest availability.  Thank you,  PROVATION

## 2023-02-09 NOTE — ANESTHESIA POSTPROCEDURE EVALUATION
Anesthesia Post Evaluation    Patient: Aminah Norton    Procedure(s) Performed: Procedure(s) (LRB):  COLONOSCOPY (N/A)    Final Anesthesia Type: general      Patient location during evaluation: GI PACU  Patient participation: Yes- Able to Participate  Level of consciousness: awake and alert  Post-procedure vital signs: reviewed and stable  Pain management: adequate  Airway patency: patent    PONV status at discharge: No PONV  Anesthetic complications: no      Cardiovascular status: stable  Respiratory status: unassisted and spontaneous ventilation  Hydration status: euvolemic  Follow-up not needed.          Vitals Value Taken Time   /65 02/09/23 0902   Temp 9.2 °C (48.6 °F) 02/09/23 0842   Pulse 68 02/09/23 0902   Resp 14 02/09/23 0902   SpO2 100 % 02/09/23 0902         Event Time   Out of Recovery 09:27:05         Pain/Lorenza Score: Lorenza Score: 10 (2/9/2023  8:43 AM)

## 2023-02-09 NOTE — ANESTHESIA PREPROCEDURE EVALUATION
02/09/2023  Aminah Norton is a 77 y.o., female.  Pre-operative evaluation for Procedure(s) (LRB):  COLONOSCOPY (N/A)    Aminah Norton is a 77 y.o. female     Patient Active Problem List   Diagnosis    Myalgia    Plantar fasciitis of left foot    Hypothyroidism    Elevated cholesterol with high triglycerides    HTN (hypertension), benign    Vitamin B 12 deficiency    GERD (gastroesophageal reflux disease)    Neuropathy    Type 2 diabetes mellitus with neurologic complication, without long-term current use of insulin    Diabetic polyneuropathy    Cataracts, bilateral    Seborrheic dermatitis    Back pain    Facet syndrome    Bilateral low back pain without sciatica    Spondylisthesis    Vitamin D insufficiency    Lower urinary tract symptoms (LUTS)    Hematuria    Bilateral leg pain    Aortic atherosclerosis    Bilateral shoulder pain    TB lung, latent    Hyperlipidemia    Neck pain    Upper extremity weakness    Posture abnormality    Bladder pain    Lower extremity weakness       Review of patient's allergies indicates:   Allergen Reactions    Bufferin [aspirin, buffered] Itching    Atorvastatin      Myalgias and arthralgias    Shellfish containing products Itching     Shellfish causes her to itch per her daughter, Caro.     Warfarin     Ibuprofen Itching     Itching of eyes, head       No current facility-administered medications on file prior to encounter.     Current Outpatient Medications on File Prior to Encounter   Medication Sig Dispense Refill    biotin 1 mg tablet Take 1,000 mcg by mouth 3 (three) times daily.      carvediloL (COREG) 25 MG tablet Take 1 tablet (25 mg total) by mouth 2 (two) times daily. 180 tablet 1    glipiZIDE (GLUCOTROL) 2.5 MG TR24 TAKE 2 TABLETS EVERY DAY WITH BREAKFAST 180 tablet 1    metFORMIN  (GLUCOPHAGE) 1000 MG tablet Take 1 tablet (1,000 mg total) by mouth 2 (two) times daily with meals. 180 tablet 1    rosuvastatin (CRESTOR) 5 MG tablet Take 1 tablet (5 mg total) by mouth once daily. 90 tablet 1    turmeric root extract 500 mg Cap Take by mouth.      VITAMIN B COMPLEX ORAL Take 1 tablet by mouth.      vitamin D (VITAMIN D3) 1000 units Tab Take 1,000 Units by mouth once daily.      ACCU-CHEK SOFTCLIX LANCETS Misc CHECK BLOOD SUGAR ONE TIME DAILY 100 each 2    acetaminophen (TYLENOL) 500 MG tablet Take 1 tablet (500 mg total) by mouth every 6 (six) hours as needed for Pain. 30 tablet 0    amitriptyline (ELAVIL) 10 MG tablet Take 1 tablet (10 mg total) by mouth every evening. 30 tablet 11    aspirin (ECOTRIN) 81 MG EC tablet Take 81 mg by mouth once daily.      baclofen (LIORESAL) 10 MG tablet Take 1 tablet (10 mg total) by mouth once daily. 90 tablet 0    BD ALCOHOL SWABS PadM USE AS DIRECTED TOPICALLY AS NEEDED 300 each 1    blood sugar diagnostic (ACCU-CHEK MATTIE PLUS TEST STRP) Strp TEST ONE TIME DAILY 100 strip 3    blood-glucose meter kit To check BG 2  times daily, to use with insurance preferred meter, use as directed. 1 each 0    cyanocobalamin (VITAMIN B-12) 1000 MCG tablet Take 100 mcg by mouth once daily.      fluocinonide (LIDEX) 0.05 % external solution Apply topically 2 (two) times daily. for 10 days (Patient not taking: Reported on 12/12/2022) 60 mL 1    gabapentin (NEURONTIN) 300 MG capsule Take 1 capsule (300 mg total) by mouth every evening. 90 capsule 0    ketoconazole (NIZORAL) 2 % cream Apply topically once daily. 60 g 1    lancets (ACCU-CHEK MULTICLIX LANCET) Misc To use as directed with Accu Chek Sahra FastClix lancing device 100 each 2    lancets Misc To check BG 2 times daily, to use with insurance preferred meter. 200 each 3    levothyroxine (SYNTHROID) 112 MCG tablet Take 1 tablet (112 mcg total) by mouth before breakfast. 90 tablet 1    lisinopriL  (PRINIVIL,ZESTRIL) 40 MG tablet Take 1 tablet (40 mg total) by mouth once daily. 90 tablet 1    mirabegron (MYRBETRIQ) 25 mg Tb24 ER tablet Take 1 tablet (25 mg total) by mouth once daily. 90 tablet 0    pneumococcal 23-seamus ps (PNEUMOVAX-23) 25 mcg/0.5 mL vaccine Inject into the muscle. 0.5 mL 0       Past Surgical History:   Procedure Laterality Date    APPENDECTOMY      BLADDER SUSPENSION      CATARACT EXTRACTION      right eye     CHOLECYSTECTOMY      COLONOSCOPY N/A 7/20/2020    Procedure: COLONOSCOPY;  Surgeon: Juan Parker MD;  Location: [facility] (4TH FLR);  Service: Endoscopy;  Laterality: N/A;  Hx of chronic anemia.  patient needs a   4/6/20 - removed from 4/20/20, rescheduled 7/20/20 - pg  covid 7[place]17[place] [place] [facility]    CYSTOSCOPY N/A 5/19/2021    Procedure: CYSTOSCOPY;  Surgeon: Brody Smith MD;  Location: [address];  Service: Urology;  Laterality: N/A;    EYE SURGERY Bilateral     cataract removal    TOTAL ABDOMINAL HYSTERECTOMY      DUB       Social History     Socioeconomic History    Marital status:    Tobacco Use    Smoking status: Never    Smokeless tobacco: Never   Substance and Sexual Activity    Alcohol use: No    Drug use: No    Sexual activity: Yes     Partners: Male     Birth control/protection: Post-menopausal     Comment:     Other Topics Concern    Are you pregnant or think you may be? No    Breast-feeding No   Social History Narrative     with 7 kids           Pre-op Assessment    I have reviewed the Patient Summary Reports.    I have reviewed the NPO Status.   I have reviewed the Medications.     Review of Systems  Anesthesia Hx:  No problems with previous Anesthesia  History of prior surgery of interest to airway management or planning:  Denies Personal Hx of Anesthesia complications.   Cardiovascular:   Hypertension    Pulmonary:  Pulmonary Normal    Hepatic/GI:   GERD    Endocrine:   Diabetes        Physical  Exam  General: Well nourished, Cooperative, Alert and Oriented    Airway:  Mallampati: III   Mouth Opening: Normal  TM Distance: 4 - 6 cm  Neck ROM: Normal ROM        Anesthesia Plan  Type of Anesthesia, risks & benefits discussed:    Anesthesia Type: Gen Natural Airway  Intra-op Monitoring Plan: Standard ASA Monitors  Post Op Pain Control Plan: multimodal analgesia  Induction:  IV  Airway Plan: Direct, Post-Induction  Informed Consent: Informed consent signed with the Patient and all parties understand the risks and agree with anesthesia plan.  All questions answered.   ASA Score: 2    Ready For Surgery From Anesthesia Perspective.     .

## 2023-02-09 NOTE — H&P
COLONOSCOPY HISTORY AND PHYSICAL EXAM    Procedure : Colonoscopy      INDICATIONS: asymptomatic screening exam    Family Hx of CRC: denies    Last Colonoscopy:  2012  Findings: - The entire examined colon is normal.  Sedation Problems: NO  Fam Hx of Sedation Problems: NO     Past Medical History:   Diagnosis Date    Anticoagulant long-term use     Arthritis     Cataract     left eye    Choroidal rupture of left eye     Diabetes mellitus type II     GERD (gastroesophageal reflux disease)     Hyperlipidemia     Hypertension     Hypothyroidism     Macular scar     right eye    Retinal degeneration     chorioretinal OD    Thyroid disease     Vitamin B 12 deficiency      Family History   Problem Relation Age of Onset    Cancer Mother         uterine    Breast cancer Mother     Ovarian cancer Mother     COPD Father     Cancer Father     Cancer Sister         liver    Ovarian cancer Sister     Hypertension Daughter     Thyroid disease Daughter     Diabetes Son     Hypertension Sister     Hypertension Sister     Cancer Daughter         uterine    Thyroid disease Daughter     Hypertension Daughter     Colon cancer Neg Hx     Amblyopia Neg Hx     Blindness Neg Hx     Cataracts Neg Hx     Glaucoma Neg Hx     Macular degeneration Neg Hx     Retinal detachment Neg Hx     Strabismus Neg Hx     Stroke Neg Hx      Social History     Socioeconomic History    Marital status:    Tobacco Use    Smoking status: Never    Smokeless tobacco: Never   Substance and Sexual Activity    Alcohol use: No    Drug use: No    Sexual activity: Yes     Partners: Male     Birth control/protection: Post-menopausal     Comment:     Other Topics Concern    Are you pregnant or think you may be? No    Breast-feeding No   Social History Narrative     with 7 kids       Review of Systems - Negative except   Respiratory ROS: no dyspnea  Cardiovascular ROS: no exertional chest pain  Gastrointestinal ROS: NO abdominal discomfort,  NO rectal  "bleeding  Musculoskeletal ROS: no muscular pain  Neurological ROS: no recent stroke    Physical Exam:  BP (!) 191/79 (BP Location: Left arm, Patient Position: Lying)   Pulse 73   Temp 97.5 °F (36.4 °C) (Temporal)   Resp 18   Ht 5' 5" (1.651 m)   Wt 74.4 kg (164 lb)   SpO2 98%   Breastfeeding No   BMI 27.29 kg/m²   General: no distress  Head: normocephalic  Mallampati Score   Neck: supple, symmetrical, trachea midline  Lungs:  normal respiratory effort  Heart: regular rate and rhythm  Abdomen: soft, non-tender non-distented; bowel sounds normal; no masses,  no organomegaly  Extremities: no cyanosis or edema, or clubbing    ASA:  III    PLAN  COLONOSCOPY.    SedationPlan :MAC    The details of the procedure, the possible need for biopsy or polypectomy and the potential risks including bleeding, perforation, missed polyps were discussed in detail.    Maggie Mata MD MPA  Colorectal Surgery      "

## 2023-02-09 NOTE — TRANSFER OF CARE
"Anesthesia Transfer of Care Note    Patient: Aminah Norton    Procedure(s) Performed: Procedure(s) (LRB):  COLONOSCOPY (N/A)    Patient location: GI    Anesthesia Type: general    Transport from OR: Transported from OR on room air with adequate spontaneous ventilation    Post pain: adequate analgesia    Post assessment: no apparent anesthetic complications    Post vital signs: stable    Level of consciousness: awake    Nausea/Vomiting: no nausea/vomiting    Complications: none    Transfer of care protocol was followed      Last vitals:   Visit Vitals  /57   Pulse 73   Temp 36   Resp 12   Ht 5' 5" (1.651 m)   Wt 74.4 kg (164 lb)   SpO2 99%   Breastfeeding No   BMI 27.29 kg/m²     "

## 2023-02-14 LAB
FINAL PATHOLOGIC DIAGNOSIS: NORMAL
GROSS: NORMAL
Lab: NORMAL
MICROSCOPIC EXAM: NORMAL

## 2023-03-14 ENCOUNTER — TELEPHONE (OUTPATIENT)
Dept: INTERNAL MEDICINE | Facility: CLINIC | Age: 78
End: 2023-03-14
Payer: MEDICARE

## 2023-03-14 NOTE — TELEPHONE ENCOUNTER
----- Message from Tor Conte sent at 3/14/2023  8:43 AM CDT -----  Contact: Jennifer Berkowitz [phone] ref # [id number]  Sho is calling to talk to someone in the office to make sure the pt is a diabatic.

## 2023-04-26 ENCOUNTER — LAB VISIT (OUTPATIENT)
Dept: LAB | Facility: HOSPITAL | Age: 78
End: 2023-04-26
Attending: FAMILY MEDICINE
Payer: MEDICARE

## 2023-04-26 ENCOUNTER — OFFICE VISIT (OUTPATIENT)
Dept: INTERNAL MEDICINE | Facility: CLINIC | Age: 78
End: 2023-04-26
Payer: MEDICARE

## 2023-04-26 VITALS
SYSTOLIC BLOOD PRESSURE: 162 MMHG | OXYGEN SATURATION: 97 % | DIASTOLIC BLOOD PRESSURE: 78 MMHG | BODY MASS INDEX: 28.4 KG/M2 | WEIGHT: 170.44 LBS | HEIGHT: 65 IN | HEART RATE: 81 BPM

## 2023-04-26 DIAGNOSIS — I20.9 ANGINA PECTORIS, UNSPECIFIED: ICD-10-CM

## 2023-04-26 DIAGNOSIS — I10 HTN (HYPERTENSION), BENIGN: ICD-10-CM

## 2023-04-26 DIAGNOSIS — R42 LIGHTHEADEDNESS: ICD-10-CM

## 2023-04-26 DIAGNOSIS — E11.40 TYPE 2 DIABETES MELLITUS WITH DIABETIC NEUROPATHY, WITHOUT LONG-TERM CURRENT USE OF INSULIN: ICD-10-CM

## 2023-04-26 DIAGNOSIS — I20.9 ANGINA PECTORIS, UNSPECIFIED: Primary | ICD-10-CM

## 2023-04-26 DIAGNOSIS — Z79.899 ENCOUNTER FOR LONG-TERM (CURRENT) USE OF OTHER MEDICATIONS: ICD-10-CM

## 2023-04-26 DIAGNOSIS — R30.0 DYSURIA: ICD-10-CM

## 2023-04-26 LAB
ANION GAP SERPL CALC-SCNC: 9 MMOL/L (ref 8–16)
BILIRUB UR QL STRIP: NEGATIVE
BUN SERPL-MCNC: 18 MG/DL (ref 8–23)
CALCIUM SERPL-MCNC: 10.7 MG/DL (ref 8.7–10.5)
CHLORIDE SERPL-SCNC: 100 MMOL/L (ref 95–110)
CLARITY UR REFRACT.AUTO: CLEAR
CO2 SERPL-SCNC: 28 MMOL/L (ref 23–29)
COLOR UR AUTO: COLORLESS
CREAT SERPL-MCNC: 0.8 MG/DL (ref 0.5–1.4)
EST. GFR  (NO RACE VARIABLE): >60 ML/MIN/1.73 M^2
ESTIMATED AVG GLUCOSE: 194 MG/DL (ref 68–131)
GLUCOSE SERPL-MCNC: 108 MG/DL (ref 70–110)
GLUCOSE UR QL STRIP: NEGATIVE
HBA1C MFR BLD: 8.4 % (ref 4–5.6)
HGB UR QL STRIP: ABNORMAL
KETONES UR QL STRIP: NEGATIVE
LEUKOCYTE ESTERASE UR QL STRIP: NEGATIVE
NITRITE UR QL STRIP: NEGATIVE
PH UR STRIP: 6 [PH] (ref 5–8)
POTASSIUM SERPL-SCNC: 4.5 MMOL/L (ref 3.5–5.1)
PROT UR QL STRIP: NEGATIVE
SODIUM SERPL-SCNC: 137 MMOL/L (ref 136–145)
SP GR UR STRIP: 1 (ref 1–1.03)
URN SPEC COLLECT METH UR: ABNORMAL

## 2023-04-26 PROCEDURE — 3077F PR MOST RECENT SYSTOLIC BLOOD PRESSURE >= 140 MM HG: ICD-10-PCS | Mod: CPTII,S$GLB,, | Performed by: FAMILY MEDICINE

## 2023-04-26 PROCEDURE — 93005 EKG 12-LEAD: ICD-10-PCS | Mod: S$GLB,,, | Performed by: FAMILY MEDICINE

## 2023-04-26 PROCEDURE — 81003 URINALYSIS AUTO W/O SCOPE: CPT | Performed by: FAMILY MEDICINE

## 2023-04-26 PROCEDURE — 3078F PR MOST RECENT DIASTOLIC BLOOD PRESSURE < 80 MM HG: ICD-10-PCS | Mod: CPTII,S$GLB,, | Performed by: FAMILY MEDICINE

## 2023-04-26 PROCEDURE — 1159F PR MEDICATION LIST DOCUMENTED IN MEDICAL RECORD: ICD-10-PCS | Mod: CPTII,S$GLB,, | Performed by: FAMILY MEDICINE

## 2023-04-26 PROCEDURE — 99999 PR PBB SHADOW E&M-EST. PATIENT-LVL V: ICD-10-PCS | Mod: PBBFAC,,, | Performed by: FAMILY MEDICINE

## 2023-04-26 PROCEDURE — 1159F MED LIST DOCD IN RCRD: CPT | Mod: CPTII,S$GLB,, | Performed by: FAMILY MEDICINE

## 2023-04-26 PROCEDURE — 93010 ELECTROCARDIOGRAM REPORT: CPT | Mod: S$GLB,,, | Performed by: INTERNAL MEDICINE

## 2023-04-26 PROCEDURE — 99999 PR PBB SHADOW E&M-EST. PATIENT-LVL V: CPT | Mod: PBBFAC,,, | Performed by: FAMILY MEDICINE

## 2023-04-26 PROCEDURE — 1125F AMNT PAIN NOTED PAIN PRSNT: CPT | Mod: CPTII,S$GLB,, | Performed by: FAMILY MEDICINE

## 2023-04-26 PROCEDURE — 99214 OFFICE O/P EST MOD 30 MIN: CPT | Mod: S$GLB,,, | Performed by: FAMILY MEDICINE

## 2023-04-26 PROCEDURE — 83036 HEMOGLOBIN GLYCOSYLATED A1C: CPT | Performed by: FAMILY MEDICINE

## 2023-04-26 PROCEDURE — 93005 ELECTROCARDIOGRAM TRACING: CPT | Mod: S$GLB,,, | Performed by: FAMILY MEDICINE

## 2023-04-26 PROCEDURE — 1157F ADVNC CARE PLAN IN RCRD: CPT | Mod: CPTII,S$GLB,, | Performed by: FAMILY MEDICINE

## 2023-04-26 PROCEDURE — 3288F FALL RISK ASSESSMENT DOCD: CPT | Mod: CPTII,S$GLB,, | Performed by: FAMILY MEDICINE

## 2023-04-26 PROCEDURE — 1125F PR PAIN SEVERITY QUANTIFIED, PAIN PRESENT: ICD-10-PCS | Mod: CPTII,S$GLB,, | Performed by: FAMILY MEDICINE

## 2023-04-26 PROCEDURE — 3288F PR FALLS RISK ASSESSMENT DOCUMENTED: ICD-10-PCS | Mod: CPTII,S$GLB,, | Performed by: FAMILY MEDICINE

## 2023-04-26 PROCEDURE — 1101F PR PT FALLS ASSESS DOC 0-1 FALLS W/OUT INJ PAST YR: ICD-10-PCS | Mod: CPTII,S$GLB,, | Performed by: FAMILY MEDICINE

## 2023-04-26 PROCEDURE — 99214 PR OFFICE/OUTPT VISIT, EST, LEVL IV, 30-39 MIN: ICD-10-PCS | Mod: S$GLB,,, | Performed by: FAMILY MEDICINE

## 2023-04-26 PROCEDURE — 3077F SYST BP >= 140 MM HG: CPT | Mod: CPTII,S$GLB,, | Performed by: FAMILY MEDICINE

## 2023-04-26 PROCEDURE — 3078F DIAST BP <80 MM HG: CPT | Mod: CPTII,S$GLB,, | Performed by: FAMILY MEDICINE

## 2023-04-26 PROCEDURE — 80048 BASIC METABOLIC PNL TOTAL CA: CPT | Performed by: FAMILY MEDICINE

## 2023-04-26 PROCEDURE — 93010 EKG 12-LEAD: ICD-10-PCS | Mod: S$GLB,,, | Performed by: INTERNAL MEDICINE

## 2023-04-26 PROCEDURE — 36415 COLL VENOUS BLD VENIPUNCTURE: CPT | Performed by: FAMILY MEDICINE

## 2023-04-26 PROCEDURE — 1101F PT FALLS ASSESS-DOCD LE1/YR: CPT | Mod: CPTII,S$GLB,, | Performed by: FAMILY MEDICINE

## 2023-04-26 PROCEDURE — 1157F PR ADVANCE CARE PLAN OR EQUIV PRESENT IN MEDICAL RECORD: ICD-10-PCS | Mod: CPTII,S$GLB,, | Performed by: FAMILY MEDICINE

## 2023-04-26 RX ORDER — INSULIN PUMP SYRINGE, 3 ML
EACH MISCELLANEOUS
Qty: 1 EACH | Refills: 0 | Status: SHIPPED | OUTPATIENT
Start: 2023-04-26 | End: 2023-08-11 | Stop reason: SDUPTHER

## 2023-04-26 NOTE — Clinical Note
Seen today for same day visit. Help her set up visit next week with Dr. Sanchez in urgent slot if possible for reassessment. Lots of issues that are really chronic for her and would be good to see PCP again sooner rather than later. Thanks.

## 2023-04-26 NOTE — PROGRESS NOTES
Subjective:       Patient ID: Aminah Norton is a 77 y.o. female.    Chief Complaint: Chest Pain, Fatigue, Leg Pain, and Back Pain    Chest Pain   Associated symptoms include back pain, a cough and leg pain. Pertinent negatives include no abdominal pain, fever, palpitations or shortness of breath.   Fatigue  Associated symptoms include chest pain, coughing and fatigue. Pertinent negatives include no abdominal pain, chills or fever.   Leg Pain     Back Pain  Associated symptoms include chest pain, dysuria and leg pain. Pertinent negatives include no abdominal pain or fever.     Aminah Norton is a 77 y.o. female for same day visit. PCP Dr. Sanchez.    Multiple complaints today, unclear if related. Symptoms x weeks per patient, no contact made with PCP prior to today.     Of note, she adamantly declined use of  services after multiple times of explaining benefits and use. Able to discuss in English but I cannot say we had perfect communication. She had difficulty with some of my questions secondary to lack of understanding and took me rephrasing questions quite a few times so that she could understand what I was asking in English.    She complaints of chest pain x weeks with exertion and with rest, just consistent. Could not tell me what makes it worse. Able to do home exercises and home gardening without issue, just feels pain all the time. When stands up, feels lightheaded, no falls. Able to tolerate po with no issue. Endorses good intake of food and water. When urinating though, c/o pain with urination x 1-2 weeks.     Review of Systems   Constitutional:  Positive for fatigue. Negative for appetite change, chills and fever.   Respiratory:  Positive for cough. Negative for shortness of breath.    Cardiovascular:  Positive for chest pain. Negative for palpitations.   Gastrointestinal:  Negative for abdominal pain.   Genitourinary:  Positive for dysuria.   Musculoskeletal:   Positive for back pain.       Past Medical History:   Diagnosis Date    Anticoagulant long-term use     Arthritis     Cataract     left eye    Choroidal rupture of left eye     Diabetes mellitus type II     GERD (gastroesophageal reflux disease)     Hyperlipidemia     Hypertension     Hypothyroidism     Macular scar     right eye    Retinal degeneration     chorioretinal OD    Thyroid disease     Vitamin B 12 deficiency         Prior to Admission medications    Medication Sig Start Date End Date Taking? Authorizing Provider   ACCU-CHEK SOFTCLIX LANCETS Misc CHECK BLOOD SUGAR ONE TIME DAILY 3/31/20   Dereje Thomas MD   acetaminophen (TYLENOL) 500 MG tablet Take 1 tablet (500 mg total) by mouth every 6 (six) hours as needed for Pain. 6/29/22   Bridget Burns MD   amitriptyline (ELAVIL) 10 MG tablet Take 1 tablet (10 mg total) by mouth every evening. 2/28/22 2/28/23  Broyd Smith MD   aspirin (ECOTRIN) 81 MG EC tablet Take 81 mg by mouth once daily.    Historical Provider   baclofen (LIORESAL) 10 MG tablet Take 1 tablet (10 mg total) by mouth once daily. 6/29/22   Bridget Burns MD   BD ALCOHOL SWABS PadM USE AS DIRECTED TOPICALLY AS NEEDED 8/23/21   Dereje Thomas MD   biotin 1 mg tablet Take 1,000 mcg by mouth 3 (three) times daily.    Historical Provider   blood sugar diagnostic (ACCU-CHEK MATTIE PLUS TEST STRP) Strp TEST ONE TIME DAILY 10/12/22   Danita Paris PA-C   blood-glucose meter kit To check BG 2  times daily, to use with insurance preferred meter, use as directed. 3/24/20   Dereje Thomas MD   carvediloL (COREG) 25 MG tablet Take 1 tablet (25 mg total) by mouth 2 (two) times daily. 10/12/22   Danita Paris PA-C   cyanocobalamin (VITAMIN B-12) 1000 MCG tablet Take 100 mcg by mouth once daily.    Historical Provider   fluocinonide (LIDEX) 0.05 % external solution Apply topically 2 (two) times daily. for 10 days  Patient not taking: Reported on 12/12/2022 2/26/19 3/8/19   Dereje Thomas MD   gabapentin (NEURONTIN) 300 MG capsule Take 1 capsule (300 mg total) by mouth every evening. 6/29/22   Bridget Burns MD   glipiZIDE (GLUCOTROL) 2.5 MG TR24 TAKE 2 TABLETS EVERY DAY WITH BREAKFAST 10/12/22   Danita Paris PA-C   ketoconazole (NIZORAL) 2 % cream Apply topically once daily. 2/26/19   Dereje Thomas MD   lancets (ACCU-CHEK MULTICLIX LANCET) Misc To use as directed with Accu Chek Sahra FastClix lancing device 11/1/16   Dereje Thomas MD   lancets Misc To check BG 2 times daily, to use with insurance preferred meter. 12/14/18   MULUGETA Salazar, FNP   levothyroxine (SYNTHROID) 112 MCG tablet Take 1 tablet (112 mcg total) by mouth before breakfast. 10/12/22   Danita Paris PA-C   lisinopriL (PRINIVIL,ZESTRIL) 40 MG tablet Take 1 tablet (40 mg total) by mouth once daily. 10/12/22   Danita Paris PA-C   metFORMIN (GLUCOPHAGE) 1000 MG tablet Take 1 tablet (1,000 mg total) by mouth 2 (two) times daily with meals. 10/12/22   Danita Paris PA-C   mirabegron (MYRBETRIQ) 25 mg Tb24 ER tablet Take 1 tablet (25 mg total) by mouth once daily. 6/29/22 6/29/23  Bridget Burns MD   pneumococcal 23-seamus ps (PNEUMOVAX-23) 25 mcg/0.5 mL vaccine Inject into the muscle. 1/18/23   Masha Geronimo, PharmD   rosuvastatin (CRESTOR) 10 MG tablet Take 1 tablet (10 mg total) by mouth once daily. 2/2/23 2/2/24  Jeison Sanchez MD   rosuvastatin (CRESTOR) 5 MG tablet Take 1 tablet (5 mg total) by mouth once daily. 10/12/22 10/12/23  Danita Paris PA-C   turmeric root extract 500 mg Cap Take by mouth.    Historical Provider   VITAMIN B COMPLEX ORAL Take 1 tablet by mouth.    Historical Provider   vitamin D (VITAMIN D3) 1000 units Tab Take 1,000 Units by mouth once daily.    Historical Provider        Past medical history, surgical history, and family medical history reviewed and updated as appropriate.    Medications and allergies reviewed.     Objective:         "  Vitals:    04/26/23 1429   BP: (!) 162/78   BP Location: Left arm   Patient Position: Sitting   Pulse: 81   SpO2: 97%   Weight: 77.3 kg (170 lb 6.7 oz)   Height: 5' 5" (1.651 m)     Body mass index is 28.36 kg/m².  Physical Exam  Vitals and nursing note reviewed.   Constitutional:       General: She is not in acute distress.     Appearance: She is not ill-appearing, toxic-appearing or diaphoretic.   Cardiovascular:      Rate and Rhythm: Normal rate and regular rhythm.      Pulses: Normal pulses.      Heart sounds: Normal heart sounds. No murmur heard.  Pulmonary:      Effort: Pulmonary effort is normal. No respiratory distress.   Neurological:      Mental Status: She is alert and oriented to person, place, and time.       Lab Results   Component Value Date    WBC 3.32 (L) 01/17/2023    HGB 10.5 (L) 01/17/2023    HCT 34.4 (L) 01/17/2023     01/17/2023    CHOL 221 (H) 01/17/2023    TRIG 217 (H) 01/17/2023    HDL 59 01/17/2023    ALT 15 01/17/2023    AST 14 01/17/2023     01/17/2023    K 4.5 01/17/2023     01/17/2023    CREATININE 0.8 01/17/2023    BUN 13 01/17/2023    CO2 27 01/17/2023    TSH 2.031 06/30/2022    HGBA1C 7.7 (H) 01/17/2023       Assessment:       1. Angina pectoris, unspecified    2. Encounter for long-term (current) use of other medications    3. Dysuria    4. Lightheadedness    5. Type 2 diabetes mellitus with diabetic neuropathy, without long-term current use of insulin          Plan:   1. Angina pectoris, unspecified  -     Basic Metabolic Panel; Future; Expected date: 04/26/2023  -     Hemoglobin A1C; Future; Expected date: 04/26/2023  -     Urinalysis, Reflex to Urine Culture Urine, Clean Catch  -     EKG 12-lead    2. Encounter for long-term (current) use of other medications    3. Dysuria  -     Urinalysis, Reflex to Urine Culture Urine, Clean Catch    4. Lightheadedness    5. Type 2 diabetes mellitus with diabetic neuropathy, without long-term current use of insulin  -     " Basic Metabolic Panel; Future; Expected date: 04/26/2023  -     Hemoglobin A1C; Future; Expected date: 04/26/2023    Other orders  -     blood-glucose meter kit; To check BG 2  times daily, to use with insurance preferred meter, use as directed.  Dispense: 1 each; Refill: 0      Ekg today for further eval  Labs and urine ordered  Again as noted in history, did not have very good line of communication and not confident she was understanding what I was asking. On repeated attempts to get  services to help, she declined and didn't want. She needs close follow up with her PCP and also will put in for cardiology evaluation who she saw years ago. Sent in refill of glucometer which she states has asked for for months and has never got refill on.     No follow-ups on file.    Gumaro Domingo MD  Family Medicine / Primary Care  [facility]  4/26/2023

## 2023-04-27 ENCOUNTER — TELEPHONE (OUTPATIENT)
Dept: INTERNAL MEDICINE | Facility: CLINIC | Age: 78
End: 2023-04-27
Payer: MEDICARE

## 2023-04-27 DIAGNOSIS — E08.8 DIABETES MELLITUS DUE TO UNDERLYING CONDITION WITH COMPLICATION: ICD-10-CM

## 2023-04-27 DIAGNOSIS — E08.65 DIABETES MELLITUS DUE TO UNDERLYING CONDITION WITH HYPERGLYCEMIA: ICD-10-CM

## 2023-04-27 RX ORDER — ISOPROPYL ALCOHOL 70 ML/100ML
SWAB TOPICAL
Refills: 0 | OUTPATIENT
Start: 2023-04-27

## 2023-04-27 RX ORDER — LANCETS
EACH MISCELLANEOUS
Refills: 0 | OUTPATIENT
Start: 2023-04-27

## 2023-04-27 NOTE — TELEPHONE ENCOUNTER
Refill Decision Note   Aminah Norton  is requesting a refill authorization.  Brief Assessment and Rationale for Refill:  Quick Discontinue     Medication Therapy Plan: Pharmacy is requesting new scripts for the following medications without required information, (sig/ frequency/qty/etc)     Medication Reconciliation Completed: No   Comments:     No Care Gaps recommended.     Note composed:1:34 PM 04/27/2023

## 2023-04-27 NOTE — TELEPHONE ENCOUNTER
Called and spoke with pt, she does speak a little english and was able to understand that she need to follow up with PCP. Pt has an appt coming in July, she expressed understanding

## 2023-04-27 NOTE — TELEPHONE ENCOUNTER
----- Message from Gumaro Domingo MD sent at 4/27/2023  8:26 AM CDT -----  A1c going up but otherwise no concerns with urine or bloodwork given her symptoms. Very important that she follows up with PCP in near future.

## 2023-04-27 NOTE — TELEPHONE ENCOUNTER
No care due was identified.  [facility] Embedded Care Due Messages. Reference number: [id number].   4/27/2023 12:25:38 PM CDT

## 2023-05-05 DIAGNOSIS — E03.9 HYPOTHYROIDISM, UNSPECIFIED TYPE: ICD-10-CM

## 2023-05-05 DIAGNOSIS — E03.4 HYPOTHYROIDISM DUE TO ACQUIRED ATROPHY OF THYROID: ICD-10-CM

## 2023-05-08 DIAGNOSIS — I10 HTN (HYPERTENSION), BENIGN: ICD-10-CM

## 2023-05-08 DIAGNOSIS — E08.8 DIABETES MELLITUS DUE TO UNDERLYING CONDITION WITH COMPLICATION: ICD-10-CM

## 2023-05-08 RX ORDER — LEVOTHYROXINE SODIUM 112 UG/1
TABLET ORAL
Qty: 90 TABLET | Refills: 0 | Status: SHIPPED | OUTPATIENT
Start: 2023-05-08 | End: 2023-05-09 | Stop reason: SDUPTHER

## 2023-05-08 NOTE — TELEPHONE ENCOUNTER
Refill Decision Note   Aminah Norton  is requesting a refill authorization.  Brief Assessment and Rationale for Refill:  Approve     Medication Therapy Plan:         Comments:     Note composed:9:47 AM 05/08/2023

## 2023-05-08 NOTE — TELEPHONE ENCOUNTER
No care due was identified.  [facility] Embedded Care Due Messages. Reference number: [id number].   5/08/2023 8:44:04 AM CDT

## 2023-05-08 NOTE — TELEPHONE ENCOUNTER
No care due was identified.  [facility] Embedded Care Due Messages. Reference number: [id number].   5/08/2023 2:05:06 PM CDT

## 2023-05-08 NOTE — TELEPHONE ENCOUNTER
----- Message from Rigobertosathya David sent at 5/8/2023  9:25 AM CDT -----  Contact: self [phone]  Requesting an RX refill or new RX.  Is this a refill or new RX: refill  RX name and strength (copy/paste from chart):  lisinopriL (PRINIVIL,ZESTRIL) 40 MG tablet  Is this a 30 day or 90 day RX:   Pharmacy name and phone # (copy/paste from chart):    [facility] - [place] [id number] [facility]  [address] [address]  Phone: [phone] Fax: [phone]          The doctors have asked that we provide their patients with the following 2 reminders -- prescription refills can take up to 72 hours, and a friendly reminder that in the future you can use your [facility] account to request refills: yes    Requesting an RX refill or new RX.  Is this a refill or new RX: refill  RX name and strength (copy/paste from chart): levothyroxine (SYNTHROID) 112 MCG tablet   Is this a 30 day or 90 day RX:   Pharmacy name and phone # (copy/paste from chart):    [facility] [address] [place] [address]  [address] [address]  Phone: [phone] Fax: [phone]        The doctors have asked that we provide their patients with the following 2 reminders -- prescription refills can take up to 72 hours, and a friendly reminder that in the future you can use your [facility] account to request refills: yes    Requesting an RX refill or new RX.  Is this a refill or new RX: refill  RX name and strength (copy/paste from chart):  blood sugar diagnostic (ACCU-CHEK MATTIE PLUS TEST STRP) Strp  Is this a 30 day or 90 day RX:   Pharmacy name and phone # (copy/paste from chart):    [facility] [address] [facility]  [address] [id number]  Phone: [phone] Fax: [phone]      The doctors have asked that we provide their patients with the following 2 reminders -- prescription refills can take up to 72  hours, and a friendly reminder that in the future you can use your [facility] account to request refills: yes      Please call and advise

## 2023-05-09 ENCOUNTER — LAB VISIT (OUTPATIENT)
Dept: LAB | Facility: HOSPITAL | Age: 78
End: 2023-05-09
Attending: INTERNAL MEDICINE
Payer: MEDICARE

## 2023-05-09 ENCOUNTER — OFFICE VISIT (OUTPATIENT)
Dept: INTERNAL MEDICINE | Facility: CLINIC | Age: 78
End: 2023-05-09
Payer: MEDICARE

## 2023-05-09 VITALS
WEIGHT: 167 LBS | HEART RATE: 79 BPM | DIASTOLIC BLOOD PRESSURE: 80 MMHG | BODY MASS INDEX: 27.82 KG/M2 | OXYGEN SATURATION: 99 % | SYSTOLIC BLOOD PRESSURE: 148 MMHG | HEIGHT: 65 IN

## 2023-05-09 DIAGNOSIS — E03.4 HYPOTHYROIDISM DUE TO ACQUIRED ATROPHY OF THYROID: ICD-10-CM

## 2023-05-09 DIAGNOSIS — G89.29 CHRONIC GLUTEAL PAIN: ICD-10-CM

## 2023-05-09 DIAGNOSIS — I70.0 AORTIC ATHEROSCLEROSIS: ICD-10-CM

## 2023-05-09 DIAGNOSIS — M79.10 MYALGIA: ICD-10-CM

## 2023-05-09 DIAGNOSIS — E11.40 TYPE 2 DIABETES MELLITUS WITH DIABETIC NEUROPATHY, WITHOUT LONG-TERM CURRENT USE OF INSULIN: ICD-10-CM

## 2023-05-09 DIAGNOSIS — E08.8 DIABETES MELLITUS DUE TO UNDERLYING CONDITION WITH COMPLICATION: ICD-10-CM

## 2023-05-09 DIAGNOSIS — M79.18 CHRONIC GLUTEAL PAIN: ICD-10-CM

## 2023-05-09 DIAGNOSIS — I10 HTN (HYPERTENSION), BENIGN: ICD-10-CM

## 2023-05-09 DIAGNOSIS — Z00.00 ANNUAL PHYSICAL EXAM: Primary | ICD-10-CM

## 2023-05-09 LAB
CK SERPL-CCNC: 101 U/L (ref 20–180)
CRP SERPL-MCNC: 0.7 MG/L (ref 0–8.2)
ERYTHROCYTE [SEDIMENTATION RATE] IN BLOOD BY PHOTOMETRIC METHOD: 10 MM/HR (ref 0–36)

## 2023-05-09 PROCEDURE — 36415 COLL VENOUS BLD VENIPUNCTURE: CPT | Performed by: INTERNAL MEDICINE

## 2023-05-09 PROCEDURE — 85652 RBC SED RATE AUTOMATED: CPT | Performed by: INTERNAL MEDICINE

## 2023-05-09 PROCEDURE — 3288F PR FALLS RISK ASSESSMENT DOCUMENTED: ICD-10-PCS | Mod: CPTII,S$GLB,, | Performed by: INTERNAL MEDICINE

## 2023-05-09 PROCEDURE — 99397 PR PREVENTIVE VISIT,EST,65 & OVER: ICD-10-PCS | Mod: S$GLB,,, | Performed by: INTERNAL MEDICINE

## 2023-05-09 PROCEDURE — 3077F PR MOST RECENT SYSTOLIC BLOOD PRESSURE >= 140 MM HG: ICD-10-PCS | Mod: CPTII,S$GLB,, | Performed by: INTERNAL MEDICINE

## 2023-05-09 PROCEDURE — 3077F SYST BP >= 140 MM HG: CPT | Mod: CPTII,S$GLB,, | Performed by: INTERNAL MEDICINE

## 2023-05-09 PROCEDURE — 1159F PR MEDICATION LIST DOCUMENTED IN MEDICAL RECORD: ICD-10-PCS | Mod: CPTII,S$GLB,, | Performed by: INTERNAL MEDICINE

## 2023-05-09 PROCEDURE — 3079F PR MOST RECENT DIASTOLIC BLOOD PRESSURE 80-89 MM HG: ICD-10-PCS | Mod: CPTII,S$GLB,, | Performed by: INTERNAL MEDICINE

## 2023-05-09 PROCEDURE — 1101F PR PT FALLS ASSESS DOC 0-1 FALLS W/OUT INJ PAST YR: ICD-10-PCS | Mod: CPTII,S$GLB,, | Performed by: INTERNAL MEDICINE

## 2023-05-09 PROCEDURE — 99999 PR PBB SHADOW E&M-EST. PATIENT-LVL V: CPT | Mod: PBBFAC,,, | Performed by: INTERNAL MEDICINE

## 2023-05-09 PROCEDURE — 1101F PT FALLS ASSESS-DOCD LE1/YR: CPT | Mod: CPTII,S$GLB,, | Performed by: INTERNAL MEDICINE

## 2023-05-09 PROCEDURE — 1160F RVW MEDS BY RX/DR IN RCRD: CPT | Mod: CPTII,S$GLB,, | Performed by: INTERNAL MEDICINE

## 2023-05-09 PROCEDURE — 1157F ADVNC CARE PLAN IN RCRD: CPT | Mod: CPTII,S$GLB,, | Performed by: INTERNAL MEDICINE

## 2023-05-09 PROCEDURE — 86140 C-REACTIVE PROTEIN: CPT | Performed by: INTERNAL MEDICINE

## 2023-05-09 PROCEDURE — 1125F AMNT PAIN NOTED PAIN PRSNT: CPT | Mod: CPTII,S$GLB,, | Performed by: INTERNAL MEDICINE

## 2023-05-09 PROCEDURE — 3288F FALL RISK ASSESSMENT DOCD: CPT | Mod: CPTII,S$GLB,, | Performed by: INTERNAL MEDICINE

## 2023-05-09 PROCEDURE — 99999 PR PBB SHADOW E&M-EST. PATIENT-LVL V: ICD-10-PCS | Mod: PBBFAC,,, | Performed by: INTERNAL MEDICINE

## 2023-05-09 PROCEDURE — 1125F PR PAIN SEVERITY QUANTIFIED, PAIN PRESENT: ICD-10-PCS | Mod: CPTII,S$GLB,, | Performed by: INTERNAL MEDICINE

## 2023-05-09 PROCEDURE — 3079F DIAST BP 80-89 MM HG: CPT | Mod: CPTII,S$GLB,, | Performed by: INTERNAL MEDICINE

## 2023-05-09 PROCEDURE — 99397 PER PM REEVAL EST PAT 65+ YR: CPT | Mod: S$GLB,,, | Performed by: INTERNAL MEDICINE

## 2023-05-09 PROCEDURE — 1159F MED LIST DOCD IN RCRD: CPT | Mod: CPTII,S$GLB,, | Performed by: INTERNAL MEDICINE

## 2023-05-09 PROCEDURE — 82550 ASSAY OF CK (CPK): CPT | Performed by: INTERNAL MEDICINE

## 2023-05-09 PROCEDURE — 1160F PR REVIEW ALL MEDS BY PRESCRIBER/CLIN PHARMACIST DOCUMENTED: ICD-10-PCS | Mod: CPTII,S$GLB,, | Performed by: INTERNAL MEDICINE

## 2023-05-09 PROCEDURE — 1157F PR ADVANCE CARE PLAN OR EQUIV PRESENT IN MEDICAL RECORD: ICD-10-PCS | Mod: CPTII,S$GLB,, | Performed by: INTERNAL MEDICINE

## 2023-05-09 RX ORDER — LISINOPRIL 40 MG/1
40 TABLET ORAL DAILY
Qty: 90 TABLET | Refills: 1 | Status: SHIPPED | OUTPATIENT
Start: 2023-05-09 | End: 2024-01-17 | Stop reason: SDUPTHER

## 2023-05-09 RX ORDER — LISINOPRIL 40 MG/1
TABLET ORAL
Qty: 90 TABLET | Refills: 3 | OUTPATIENT
Start: 2023-05-09

## 2023-05-09 RX ORDER — LEVOTHYROXINE SODIUM 112 UG/1
112 TABLET ORAL
Qty: 90 TABLET | Refills: 0 | Status: SHIPPED | OUTPATIENT
Start: 2023-05-09 | End: 2023-08-11 | Stop reason: SDUPTHER

## 2023-05-09 RX ORDER — GLIPIZIDE 5 MG/1
5 TABLET, FILM COATED, EXTENDED RELEASE ORAL
Qty: 90 TABLET | Refills: 3 | Status: SHIPPED | OUTPATIENT
Start: 2023-05-09 | End: 2023-08-11 | Stop reason: SDUPTHER

## 2023-05-09 RX ORDER — GABAPENTIN 300 MG/1
300 CAPSULE ORAL NIGHTLY
Qty: 90 CAPSULE | Refills: 3 | Status: SHIPPED | OUTPATIENT
Start: 2023-05-09

## 2023-05-09 RX ORDER — MELOXICAM 15 MG/1
15 TABLET ORAL DAILY
Qty: 30 TABLET | Refills: 1 | Status: SHIPPED | OUTPATIENT
Start: 2023-05-09 | End: 2023-11-09 | Stop reason: ALTCHOICE

## 2023-05-09 NOTE — TELEPHONE ENCOUNTER
Refill Routing Note   Medication(s) are not appropriate for processing by [facility] for the following reason(s):      Required vitals abnormal  No active prescription written by PCP    [facility] action(s):  Defer None identified            Appointments  past 12m or future 3m with PCP    Date Provider   Last Visit   12/12/2022 Jeison Sanchez MD   Next Visit   5/9/2023 Jeison Sanchez MD   ED visits in past 90 days: 0        Note composed:8:46 AM 05/09/2023

## 2023-05-09 NOTE — TELEPHONE ENCOUNTER
Care Due:                  Date            Visit Type   Department     Provider  --------------------------------------------------------------------------------                                EP -                              PRIMARY      [facility] INTERNAL  Last Visit: 05-      CARE ([facility])   YESSY MARTINEZ                              EP -                              PRIMARY      [facility]  Next Visit: 07-      CARE ([facility])   YESSY MARTINEZ                                                            Last  Test          Frequency    Reason                     Performed    Due Date  --------------------------------------------------------------------------------    TSH.........  12 months..  levothyroxine............  06- 06-    [facility] Embedded Care Due Messages. Reference number: [id number].   5/09/2023 4:55:24 PM CDT

## 2023-05-09 NOTE — PROGRESS NOTES
"    CHIEF COMPLAINT     Chief Complaint   Patient presents with    Follow-up       HPI     Aminah Norton is a 78 y.o. female HTN, GERD, TIIDM with polyneuropathy, HLD, hypothyroidism here today for     Balance issues and feeling weak x1 month. Pain BL shoulders and L hip.     DM2 t  Taking metformin 1000mg BID, glipizide 2.5mg daily. She has been out of test strips for 3 months and got new glucometer that she doesn't know how to use.    Personally Reviewed Patient's Medical, surgical, family and social hx. Changes updated in [facility].  Care Team updated in Epic    Review of Systems:  Review of Systems   Constitutional:  Negative for fatigue and fever.   Respiratory:  Negative for cough and shortness of breath.    Musculoskeletal:  Positive for gait problem and myalgias.     Health Maintenance:   Reviewed with patient  Due for the following:      PHYSICAL EXAM     BP (!) 148/80   Pulse 79   Ht 5' 5" (1.651 m)   Wt 75.8 kg (167 lb)   SpO2 99%   BMI 27.79 kg/m²     Gen: Well Appearing, NAD  HEENT: PERR, EOMI  Neck: FROM, no thyromegaly, no cervical adenopathy  CVD: RRR, no M/R/G  Pulm: Normal work of breathing, CTAB, no wheezing  Abd:  Soft, NT, ND non TTP, no mass  MSK: no LE edema,  TTP external rotator muscles left glute and Left IT band  Neuro: A&Ox3, gait normal, speech normal  Mood; Mood normal, behavior normal, thought process linear       LABS     Labs reviewed; Notable for  Lab Results   Component Value Date    HGBA1C 8.4 (H) 04/26/2023   BMP  Lab Results   Component Value Date     04/26/2023    K 4.5 04/26/2023     04/26/2023    CO2 28 04/26/2023    BUN 18 04/26/2023    CREATININE 0.8 04/26/2023    CALCIUM 10.7 (H) 04/26/2023    ANIONGAP 9 04/26/2023    EGFRNORACEVR >60.0 04/26/2023     Lab Results   Component Value Date    LDLCALC 118.6 01/17/2023       ASSESSMENT     1. Annual physical exam        2. Hypothyroidism due to acquired atrophy of thyroid  levothyroxine (SYNTHROID) " 112 MCG tablet    TSH      3. Aortic atherosclerosis        4. Myalgia  Sedimentation rate    C-REACTIVE PROTEIN    CK      5. Type 2 diabetes mellitus with diabetic neuropathy, without long-term current use of insulin  LIPID PANEL    Hemoglobin A1C    glipiZIDE 5 MG TR24    Comprehensive Metabolic Panel    gabapentin (NEURONTIN) 300 MG capsule    Ambulatory referral/consult to Diabetes Education      6. Chronic gluteal and IT band pain  meloxicam (MOBIC) 15 MG tablet      7. Diabetes mellitus due to underlying condition with complication  blood sugar diagnostic (ACCU-CHEK MATTIE PLUS TEST STRP) Strp    Ambulatory referral/consult to Diabetes Education              Plan     Aminah Norton is a 78 y.o. female with HTN, GERD, TIIDM with polyneuropathy, HLD, hypothyroidism   1. Annual physical exam  Updated problem list, medical history, care team and discussed HM.       2. Hypothyroidism due to acquired atrophy of thyroid  Continue levothyroxine 112mcg   check TSH  - levothyroxine (SYNTHROID) 112 MCG tablet; Take 1 tablet (112 mcg total) by mouth before breakfast.  Dispense: 90 tablet; Refill: 0  - TSH; Future    3. Aortic atherosclerosis  Continue BP and lipids    4. Myalgia  Will check inflammatory markers to evaluate for PMR  - Sedimentation rate; Future  - C-REACTIVE PROTEIN; Future  - CK; Future    5. Type 2 diabetes mellitus with diabetic neuropathy, without long-term current use of insulin  Not at goal  Continue metformin 1000mg BID  Increase glipizide to 5mg daily    - LIPID PANEL; Future  - Hemoglobin A1C; Future  - glipiZIDE 5 MG TR24; Take 1 tablet (5 mg total) by mouth daily with breakfast. TAKE 2 TABLETS EVERY DAY WITH BREAKFAST  Dispense: 90 tablet; Refill: 3  - Comprehensive Metabolic Panel; Future  - gabapentin (NEURONTIN) 300 MG capsule; Take 1 capsule (300 mg total) by mouth every evening.  Dispense: 90 capsule; Refill: 3  - Ambulatory referral/consult to Diabetes Education;  Future    6. Chronic gluteal and IT band pain  Recommend HEP vs PT and meloxicam 15mg  - meloxicam (MOBIC) 15 MG tablet; Take 1 tablet (15 mg total) by mouth once daily.  Dispense: 30 tablet; Refill: 1    7. Diabetes mellitus due to underlying condition with complication  - blood sugar diagnostic (ACCU-CHEK MATTIE PLUS TEST STRP) Strp; TEST ONE TIME DAILY  Dispense: 100 strip; Refill: 3  - Ambulatory referral/consult to Diabetes Education; Future      Jeison Sanchez MD

## 2023-05-10 NOTE — TELEPHONE ENCOUNTER
Provider Staff:  Action required for this patient     Please see care gap opportunities below in Care Due Message.    Thanks!  [facility]     Appointments      Date Provider   Last Visit   5/9/2023 eJison Sanchez MD   Next Visit   7/7/2023 Jeison Sanchez MD     Refill Decision Note   Aminah AliceaJanki  is requesting a refill authorization.  Brief Assessment and Rationale for Refill:  Quick Discontinue     Medication Therapy Plan:         Comments:     Note composed:8:04 PM 05/09/2023

## 2023-05-12 ENCOUNTER — TELEPHONE (OUTPATIENT)
Dept: INTERNAL MEDICINE | Facility: CLINIC | Age: 78
End: 2023-05-12
Payer: MEDICARE

## 2023-05-12 DIAGNOSIS — E08.8 DIABETES MELLITUS DUE TO UNDERLYING CONDITION WITH COMPLICATION: ICD-10-CM

## 2023-05-12 DIAGNOSIS — E03.4 HYPOTHYROIDISM DUE TO ACQUIRED ATROPHY OF THYROID: ICD-10-CM

## 2023-05-12 DIAGNOSIS — I10 HTN (HYPERTENSION), BENIGN: ICD-10-CM

## 2023-05-12 NOTE — TELEPHONE ENCOUNTER
----- Message from Jeison Sanchez MD sent at 5/11/2023  4:51 PM CDT -----  Inflammatory markers are not elevated. Think she can try home exercises and consider PT if symptoms do not improve    Jeison Sanchez

## 2023-05-13 NOTE — TELEPHONE ENCOUNTER
Called and spoke to pt, relayed message from PCP. Pt verbalzied understanding. Pt states she will reach out if the home exercises do not work.

## 2023-05-16 ENCOUNTER — TELEPHONE (OUTPATIENT)
Dept: INTERNAL MEDICINE | Facility: CLINIC | Age: 78
End: 2023-05-16
Payer: MEDICARE

## 2023-05-16 NOTE — TELEPHONE ENCOUNTER
Called and spoke to Jennifer. States that they do have the prescriptions for the Lisinopril and the Accu strips. Jennifer did state they need to confirm the Mobic because they said they pt has a salicylate allergy. Please advise. Called and spoke to pt, explained the pharmacy is working on her prescriptions.

## 2023-05-16 NOTE — TELEPHONE ENCOUNTER
----- Message from Stella Hodges David sent at 5/16/2023 12:27 PM CDT -----  Contact: [phone]  Please verify prescriptions pt states that Jennifer called her today and they still havent received prescription.       Requesting an RX refill or new RX.  Is this a refill or new RX: refill  RX name and strength (copy/paste from chart):  lisinopriL (PRINIVIL,ZESTRIL) 40 MG tablet  Is this a 30 day or 90 day RX: 90  Pharmacy name and phone # (copy/paste from chart):      [facility] - [place], [place] - [address] [facility]  [address] [id number]  Phone: [phone] Fax: [phone]    Requesting an RX refill or new RX.  Is this a refill or new RX: refill  RX name and strength (copy/paste from chart):  blood sugar diagnostic (ACCU-CHEK MATTIE PLUS TEST STRP) Strp  Is this a 30 day or 90 day RX: 90  Pharmacy name and phone # (copy/paste from chart):        [facility] - [place], [place] - [address] [facility]  [address] [facility] [address]  Phone: [phone] Fax: [phone]        The doctors have asked that we provide their patients with the following 2 reminders -- prescription refills can take up to 72 hours, and a friendly reminder that in the future you can use your [facility] account to request refills:

## 2023-05-18 NOTE — TELEPHONE ENCOUNTER
Called and spoke with Jennifer. Relayed message from PCP that it is ok to release the Meloxicam as pt has not reacted to that medication.

## 2023-05-19 ENCOUNTER — PATIENT OUTREACH (OUTPATIENT)
Dept: ADMINISTRATIVE | Facility: HOSPITAL | Age: 78
End: 2023-05-19
Payer: MEDICARE

## 2023-05-19 NOTE — PROGRESS NOTES
Health Maintenance Due   Topic Date Due    TETANUS VACCINE  Never done    Shingles Vaccine (1 of 2) Never done    DEXA Scan  07/31/2016    Eye Exam  02/08/2022       HM updated. Triggered LINKS and Care Everywhere. Chart reviewed.     Laura Crystal LPN   Clinical Care Coordinator  Primary Care and Wellness

## 2023-05-26 ENCOUNTER — TELEPHONE (OUTPATIENT)
Dept: DIABETES | Facility: CLINIC | Age: 78
End: 2023-05-26
Payer: MEDICARE

## 2023-06-16 ENCOUNTER — NUTRITION (OUTPATIENT)
Dept: DIABETES | Facility: CLINIC | Age: 78
End: 2023-06-16
Payer: MEDICARE

## 2023-06-16 DIAGNOSIS — E08.8 DIABETES MELLITUS DUE TO UNDERLYING CONDITION WITH COMPLICATION: ICD-10-CM

## 2023-06-16 DIAGNOSIS — E11.40 TYPE 2 DIABETES MELLITUS WITH DIABETIC NEUROPATHY, WITHOUT LONG-TERM CURRENT USE OF INSULIN: ICD-10-CM

## 2023-06-16 NOTE — PROGRESS NOTES
Diabetes Care Specialist Progress Note  Author: Nancy العراقي RD  Date: 6/16/2023    Program Intake  Reason for Diabetes Program Visit:: Initial Diabetes Assessment  Current diabetes risk level:: moderate  In the last 12 months, have you:: used emergency room services  Was the ER or hospital admission related to diabetes?: No    Lab Results   Component Value Date    HGBA1C 8.4 (H) 04/26/2023     Clinical    Clinical Assessment  Current Diabetes Treatment: Oral Medication    Medication Information  Medication adherence impacting ability to self-manage diabetes?: No    Labs  Do you have regular lab work to monitor your medications?: Yes  Type of Regular Lab Work: A1c, Cholesterol, Microalbumin, CBC, BMP  Lab Compliance Barriers: No    Nutritional Status  Diet: Regular  Meal Plan 24 Hour Recall: Breakfast, Lunch, Dinner, Snack  Meal Plan 24 Hour Recall - Breakfast: 1 slice of bread  Meal Plan 24 Hour Recall - Lunch: Green apple smoothie and 1 spoonful of oatmeal  Meal Plan 24 Hour Recall - Dinner: Salad with beef of chicken  Meal Plan 24 Hour Recall - Snack: Orange, 1/2 banana. Drinks: water, Cranberry juice, milk  Current nutritional status an area of need that is impacting patient's ability to self-manage diabetes?: No    Additional Social History    Support  Does anyone support you with your diabetes care?: yes  Who supports you?: self  Who takes you to your medical appointments?: self  Does the current support meet the patient's needs?: Yes  Is Support an area impacting ability to self-manage diabetes?: No    Cognitive/Behavioral Health  Alert and Oriented: Yes  Difficulty Thinking: No  Requires Prompting: No  Requires assistance for routine expression?: No  Cognitive or behavioral barriers impacting ability to self-manage diabetes?: No    Communication  Language preference: Palauan  Hearing Problems: No  Vision Problems: Yes  Vision problem type:: Decreased Vision  Vision Assistance: Glasses  Communication needs  impacting ability to self-manage diabetes?: No    Health Literacy  Preferred Learning Method: Face to Face, Reading Materials  Health literacy needs impacting ability to self-manage diabetes?: No    Diabetes Self-Management Skills Assessment    Diabetes Disease Process/Treatment Options  Diabetes Disease Process/Treatment Options: Skills Assessment Completed: No  Deferred due to:: Time    Nutrition/Healthy Eating  Challenges to healthy eating:: portion control  Method of carbohydrate measurement:: no method  Nutrition/Healthy Eating Skills Assessment Completed:: Yes  Assessment indicates:: Instruction Needed  Area of need?: Yes    Physical Activity/Exercise  Patient's daily activity level:: lightly active  Patient formally exercises outside of work.: yes  Exercise Type: other (see comments) (Physical therapy exercises)  Patient can identify forms of physical activity.: yes  Stated forms of physical activity:: any movement performed by muscles that uses energy  Physical Activity/Exercise Skills Assessment Completed: : Yes  Assessment indicates:: Adequate understanding  Area of need?: No    Medications  Patient is able to describe current diabetes management routine.: yes  Diabetes management routine:: oral medications  Patient is able to identify current diabetes medications, dosages, and appropriate timing of medications.: yes (Glipizide, Metformin)  Patient reports problems or concerns with current medication regimen.: no  Medication Skills Assessment Completed:: Yes  Assessment indicates:: Adequate understanding  Area of need?: No    Home Blood Glucose Monitoring  Patient states that blood sugar is checked at home daily.: no  Reasons for not monitoring:: other (see comments) (Pt reports pharmacy sent wrong testing strips)  Home Blood Glucose Monitoring Skills Assessment Completed: : Yes  Assessment indicates:: Instruction Needed  Area of need?: Yes    Acute Complications  Acute Complications Skills Assessment  Completed: : No  Deffered due to:: Time    Chronic Complications  Patient is taking statin?: Yes  Chronic Complications Skills Assessment Completed: : No  Deferred due to:: Time    Psychosocial/Coping  Psychosocial/Coping Skills Assessment Completed: : No  Deffered due to:: Time    Diabetes Self Support Plan    Assessment Summary and Plan    Based on today's diabetes care assessment, the following areas of need were identified:      Social 6/16/2023   Support No   Cognitive/Behavioral Health No   Communication No   Health Literacy No        Clinical 6/16/2023   Medication Adherence No   Lab Compliance No   Nutritional Status No        Diabetes Self-Management Skills 6/16/2023   Nutrition/Healthy Eating Yes, see care plan.   Physical Activity/Exercise No   Medication No   Home Blood Glucose Monitoring Yes      Today's interventions were provided through individual discussion, instruction, and written materials were provided.      Patient verbalized understanding of instruction and written materials.  Pt was able to return back demonstration of instructions today. Patient understood key points, needs reinforcement and further instruction.     Diabetes Self-Management Care Plan:    Today's Diabetes Self-Management Care Plan was developed with Aminah's input. Aminah has agreed to work toward the following goal(s) to improve his/her overall diabetes control.      Care Plan: Diabetes Management   Updates made since 5/17/2023 12:00 AM        Problem: Healthy Eating         Goal: Eat 2-3 meals daily with 30-45g/2-3 servings of Carbohydrate per meal. Limit snacking in between meal to 1 serving (15 grams).    Start Date: 6/16/2023   Expected End Date: 9/16/2023   This Visit's Progress: Deferred   Priority: High   Barriers: No Barriers Identified   Note:    Reviewed meal planning and general nutritional counseling with regards to diabetes management. Typical food intake obtained from patient. Patient currently is not measuring  foods or carb counting.       Task: Reviewed the sources and role of Carbohydrate, Protein, and Fat and how each nutrient impacts blood sugar. Completed 6/16/2023        Task: Explained how to count carbohydrates using the food label and the use of dry measuring cups for accurate carb counting. Completed 6/16/2023        Task: Review the importance of balancing carbohydrates with each meal using portion control techniques to count servings of carbohydrate and label reading to identify serving size and amount of total carbs per serving. Completed 6/16/2023        Follow Up Plan     Follow up in about 3 months (around 9/16/2023) for 3-month F/U.    Today's care plan and follow up schedule was discussed with patient.  Aminah verbalized understanding of the care plan, goals, and agrees to follow up plan.        The patient was encouraged to communicate with his/her health care provider/physician and care team regarding his/her condition(s) and treatment.  I provided the patient with my contact information today and encouraged to contact me via phone or [facility] as needed.     Length of Visit   Total Time: 50 Minutes

## 2023-07-07 ENCOUNTER — PES CALL (OUTPATIENT)
Dept: ADMINISTRATIVE | Facility: CLINIC | Age: 78
End: 2023-07-07
Payer: MEDICARE

## 2023-07-10 NOTE — PROCEDURES
Bed: 19  Expected date:   Expected time:   Means of arrival:   Comments:  GLAKE nausea/OD MALE    Pre Procedure Diagnosis:  Right hydronephrosis, status post cystoscopy, right ureteral biopsy and stent placement    Post Procedure Diagnosis:  same    Procedure:  Cystoscopy, stent removal    Anesthesia: 10 cc 2% lidocaine jelly applied per urethra.    14 FR Flexible Olympus cystoscope used.    FINDINGS:  Stent removed in its entirety    The patient was taken to the cystoscopy suite and placed in supine position with legs in frog legged position.  The genitalia was prepped and draped  in the usual sterile fashion.  Two percent lidocaine jelly was inserted in the urethra.  After sufficent time had passed to allow good local anesthesia, the cystoscope was inserted in the urethra then bladder. The stent was visualized, grasped, and removed along with the cystoscope.  The patient was instructed to urinate proir to leaving the office.     Post procedure medication:  Cipro 500 mg x 1     ASSESSMENT/PLAN: 71 year old  woman status post flexible cystoscopy with transplant stent removal.  1. Push fluids for 24 hours.  2. May see blood in the urine, this should gradually improve over the next 2-3 days.  3. The patient was instructed to return to the office or go to the emergency should fever, chills, cloudy urine, or inability to urinate develop.  4. Follow up in with me in 1 month with pre clinic renal ultrasound     Hide Additional Notes?: No Detail Level: Detailed Detail Level: Zone

## 2023-07-28 DIAGNOSIS — E11.40 TYPE 2 DIABETES MELLITUS WITH DIABETIC NEUROPATHY, WITHOUT LONG-TERM CURRENT USE OF INSULIN: ICD-10-CM

## 2023-07-29 RX ORDER — GLIPIZIDE 2.5 MG/1
TABLET, EXTENDED RELEASE ORAL
Qty: 180 TABLET | Refills: 1 | OUTPATIENT
Start: 2023-07-29

## 2023-07-29 NOTE — TELEPHONE ENCOUNTER
Refill Decision Note   Aminah Norton  is requesting a refill authorization.  Brief Assessment and Rationale for Refill:  Quick Discontinue     Medication Therapy Plan:       Medication Reconciliation Completed: No   Comments:     No Care Gaps recommended.     Note composed:2:19 PM 07/29/2023

## 2023-08-07 ENCOUNTER — LAB VISIT (OUTPATIENT)
Dept: LAB | Facility: HOSPITAL | Age: 78
End: 2023-08-07
Attending: INTERNAL MEDICINE
Payer: MEDICARE

## 2023-08-07 DIAGNOSIS — E11.40 TYPE 2 DIABETES MELLITUS WITH DIABETIC NEUROPATHY, WITHOUT LONG-TERM CURRENT USE OF INSULIN: ICD-10-CM

## 2023-08-07 DIAGNOSIS — E03.4 HYPOTHYROIDISM DUE TO ACQUIRED ATROPHY OF THYROID: ICD-10-CM

## 2023-08-07 LAB
ALBUMIN SERPL BCP-MCNC: 4 G/DL (ref 3.5–5.2)
ALP SERPL-CCNC: 91 U/L (ref 55–135)
ALT SERPL W/O P-5'-P-CCNC: 16 U/L (ref 10–44)
ANION GAP SERPL CALC-SCNC: 11 MMOL/L (ref 8–16)
AST SERPL-CCNC: 16 U/L (ref 10–40)
BILIRUB SERPL-MCNC: 0.4 MG/DL (ref 0.1–1)
BUN SERPL-MCNC: 17 MG/DL (ref 8–23)
CALCIUM SERPL-MCNC: 9.8 MG/DL (ref 8.7–10.5)
CHLORIDE SERPL-SCNC: 101 MMOL/L (ref 95–110)
CHOLEST SERPL-MCNC: 201 MG/DL (ref 120–199)
CHOLEST/HDLC SERPL: 3.8 {RATIO} (ref 2–5)
CO2 SERPL-SCNC: 25 MMOL/L (ref 23–29)
CREAT SERPL-MCNC: 0.9 MG/DL (ref 0.5–1.4)
EST. GFR  (NO RACE VARIABLE): >60 ML/MIN/1.73 M^2
ESTIMATED AVG GLUCOSE: 189 MG/DL (ref 68–131)
GLUCOSE SERPL-MCNC: 153 MG/DL (ref 70–110)
HBA1C MFR BLD: 8.2 % (ref 4–5.6)
HDLC SERPL-MCNC: 53 MG/DL (ref 40–75)
HDLC SERPL: 26.4 % (ref 20–50)
LDLC SERPL CALC-MCNC: 108.6 MG/DL (ref 63–159)
NONHDLC SERPL-MCNC: 148 MG/DL
POTASSIUM SERPL-SCNC: 4.4 MMOL/L (ref 3.5–5.1)
PROT SERPL-MCNC: 7.1 G/DL (ref 6–8.4)
SODIUM SERPL-SCNC: 137 MMOL/L (ref 136–145)
T4 FREE SERPL-MCNC: 1.11 NG/DL (ref 0.71–1.51)
TRIGL SERPL-MCNC: 197 MG/DL (ref 30–150)
TSH SERPL DL<=0.005 MIU/L-ACNC: 4.57 UIU/ML (ref 0.4–4)

## 2023-08-07 PROCEDURE — 84439 ASSAY OF FREE THYROXINE: CPT | Performed by: INTERNAL MEDICINE

## 2023-08-07 PROCEDURE — 80053 COMPREHEN METABOLIC PANEL: CPT | Performed by: INTERNAL MEDICINE

## 2023-08-07 PROCEDURE — 83036 HEMOGLOBIN GLYCOSYLATED A1C: CPT | Performed by: INTERNAL MEDICINE

## 2023-08-07 PROCEDURE — 36415 COLL VENOUS BLD VENIPUNCTURE: CPT | Performed by: INTERNAL MEDICINE

## 2023-08-07 PROCEDURE — 80061 LIPID PANEL: CPT | Performed by: INTERNAL MEDICINE

## 2023-08-07 PROCEDURE — 84443 ASSAY THYROID STIM HORMONE: CPT | Performed by: INTERNAL MEDICINE

## 2023-08-11 ENCOUNTER — OFFICE VISIT (OUTPATIENT)
Dept: INTERNAL MEDICINE | Facility: CLINIC | Age: 78
End: 2023-08-11
Payer: MEDICARE

## 2023-08-11 VITALS
WEIGHT: 167.56 LBS | BODY MASS INDEX: 27.88 KG/M2 | DIASTOLIC BLOOD PRESSURE: 70 MMHG | SYSTOLIC BLOOD PRESSURE: 138 MMHG | HEART RATE: 71 BPM | OXYGEN SATURATION: 99 %

## 2023-08-11 DIAGNOSIS — E11.40 TYPE 2 DIABETES MELLITUS WITH DIABETIC NEUROPATHY, WITHOUT LONG-TERM CURRENT USE OF INSULIN: ICD-10-CM

## 2023-08-11 DIAGNOSIS — E03.9 HYPOTHYROIDISM, UNSPECIFIED TYPE: ICD-10-CM

## 2023-08-11 DIAGNOSIS — R42 VERTIGO: ICD-10-CM

## 2023-08-11 DIAGNOSIS — E11.40 TYPE 2 DIABETES MELLITUS WITH DIABETIC NEUROPATHY, WITHOUT LONG-TERM CURRENT USE OF INSULIN: Primary | ICD-10-CM

## 2023-08-11 DIAGNOSIS — D64.9 ANEMIA, UNSPECIFIED TYPE: ICD-10-CM

## 2023-08-11 DIAGNOSIS — R30.0 DYSURIA: ICD-10-CM

## 2023-08-11 DIAGNOSIS — E53.8 VITAMIN B 12 DEFICIENCY: ICD-10-CM

## 2023-08-11 PROCEDURE — 99999 PR PBB SHADOW E&M-EST. PATIENT-LVL V: ICD-10-PCS | Mod: PBBFAC,,, | Performed by: INTERNAL MEDICINE

## 2023-08-11 PROCEDURE — 99999 PR PBB SHADOW E&M-EST. PATIENT-LVL V: CPT | Mod: PBBFAC,,, | Performed by: INTERNAL MEDICINE

## 2023-08-11 PROCEDURE — 3078F DIAST BP <80 MM HG: CPT | Mod: CPTII,S$GLB,, | Performed by: INTERNAL MEDICINE

## 2023-08-11 PROCEDURE — 3075F PR MOST RECENT SYSTOLIC BLOOD PRESS GE 130-139MM HG: ICD-10-PCS | Mod: CPTII,S$GLB,, | Performed by: INTERNAL MEDICINE

## 2023-08-11 PROCEDURE — 1157F ADVNC CARE PLAN IN RCRD: CPT | Mod: CPTII,S$GLB,, | Performed by: INTERNAL MEDICINE

## 2023-08-11 PROCEDURE — 1159F PR MEDICATION LIST DOCUMENTED IN MEDICAL RECORD: ICD-10-PCS | Mod: CPTII,S$GLB,, | Performed by: INTERNAL MEDICINE

## 2023-08-11 PROCEDURE — 1157F PR ADVANCE CARE PLAN OR EQUIV PRESENT IN MEDICAL RECORD: ICD-10-PCS | Mod: CPTII,S$GLB,, | Performed by: INTERNAL MEDICINE

## 2023-08-11 PROCEDURE — 99214 PR OFFICE/OUTPT VISIT, EST, LEVL IV, 30-39 MIN: ICD-10-PCS | Mod: S$GLB,,, | Performed by: INTERNAL MEDICINE

## 2023-08-11 PROCEDURE — 3075F SYST BP GE 130 - 139MM HG: CPT | Mod: CPTII,S$GLB,, | Performed by: INTERNAL MEDICINE

## 2023-08-11 PROCEDURE — 1126F PR PAIN SEVERITY QUANTIFIED, NO PAIN PRESENT: ICD-10-PCS | Mod: CPTII,S$GLB,, | Performed by: INTERNAL MEDICINE

## 2023-08-11 PROCEDURE — 1126F AMNT PAIN NOTED NONE PRSNT: CPT | Mod: CPTII,S$GLB,, | Performed by: INTERNAL MEDICINE

## 2023-08-11 PROCEDURE — 1159F MED LIST DOCD IN RCRD: CPT | Mod: CPTII,S$GLB,, | Performed by: INTERNAL MEDICINE

## 2023-08-11 PROCEDURE — 3078F PR MOST RECENT DIASTOLIC BLOOD PRESSURE < 80 MM HG: ICD-10-PCS | Mod: CPTII,S$GLB,, | Performed by: INTERNAL MEDICINE

## 2023-08-11 PROCEDURE — 99214 OFFICE O/P EST MOD 30 MIN: CPT | Mod: S$GLB,,, | Performed by: INTERNAL MEDICINE

## 2023-08-11 RX ORDER — INSULIN PUMP SYRINGE, 3 ML
EACH MISCELLANEOUS
Qty: 1 EACH | Refills: 0 | Status: ON HOLD | OUTPATIENT
Start: 2023-08-11 | End: 2024-03-27 | Stop reason: HOSPADM

## 2023-08-11 RX ORDER — METFORMIN HYDROCHLORIDE 1000 MG/1
1000 TABLET ORAL 2 TIMES DAILY WITH MEALS
Qty: 180 TABLET | Refills: 1 | Status: ON HOLD | OUTPATIENT
Start: 2023-08-11 | End: 2024-03-27 | Stop reason: HOSPADM

## 2023-08-11 RX ORDER — GLIPIZIDE 5 MG/1
5 TABLET, FILM COATED, EXTENDED RELEASE ORAL
Qty: 90 TABLET | Refills: 3 | Status: SHIPPED | OUTPATIENT
Start: 2023-08-11 | End: 2023-08-14

## 2023-08-11 RX ORDER — LEVOTHYROXINE SODIUM 112 UG/1
112 TABLET ORAL
Qty: 90 TABLET | Refills: 3 | Status: SHIPPED | OUTPATIENT
Start: 2023-08-11 | End: 2023-11-09 | Stop reason: SDUPTHER

## 2023-08-11 NOTE — PROGRESS NOTES
CHIEF COMPLAINT     Chief Complaint   Patient presents with    Follow-up       HPI     Aminah Norton is a 78 y.o. female DM2, HTN, HLD, hypothyroid here today for       DM2    Taking metformin 1000mg BID, and glipizide 5mg daily.  A1c Out of range    Reports having episodes of dizziness.  Reports she gets the sensation that the room is spinning.  Has multiple episodes.  Personally Reviewed Patient's Medical, surgical, family and social hx. Changes updated in [facility].  Care Team updated in Epic    Review of Systems:  Review of Systems   Respiratory:  Negative for cough.    Neurological:  Positive for dizziness.       Health Maintenance:   Reviewed with patient  Due for the following:      PHYSICAL EXAM     /70   Pulse 71   Wt 76 kg (167 lb 8.8 oz)   SpO2 99%   BMI 27.88 kg/m²     Gen: Well Appearing, NAD  HEENT: PERR, EOMI  Neck: FROM, no thyromegaly, no cervical adenopathy  CVD: RRR, no M/R/G  Pulm: Normal work of breathing, CTAB, no wheezing  Abd:  Soft, NT, ND non TTP, no mass  MSK: no LE edema  Neuro: A&Ox3, gait normal, speech normal  Mood; Mood normal, behavior normal, thought process linear       LABS     Labs reviewed; Notable for  Lab Results   Component Value Date    WBC 3.32 (L) 01/17/2023    HGB 10.5 (L) 01/17/2023    HCT 34.4 (L) 01/17/2023    MCV 75 (L) 01/17/2023     01/17/2023       Lab Results   Component Value Date    HGBA1C 8.2 (H) 08/07/2023       Chemistry        Component Value Date/Time     08/07/2023 0729    K 4.4 08/07/2023 0729     08/07/2023 0729    CO2 25 08/07/2023 0729    BUN 17 08/07/2023 0729    CREATININE 0.9 08/07/2023 0729     (H) 08/07/2023 0729        Component Value Date/Time    CALCIUM 9.8 08/07/2023 0729    ALKPHOS 91 08/07/2023 0729    AST 16 08/07/2023 0729    ALT 16 08/07/2023 0729    BILITOT 0.4 08/07/2023 0729    ESTGFRAFRICA >60.0 06/30/2022 0807    EGFRNONAA >60.0 06/30/2022 0807        Lab Results   Component Value  Date    LDLCALC 108.6 08/07/2023     Lab Results   Component Value Date    TSH 4.567 (H) 08/07/2023         ASSESSMENT     1. Type 2 diabetes mellitus with diabetic neuropathy, without long-term current use of insulin  VITAMIN B12    Hemoglobin A1C    Comprehensive Metabolic Panel    metFORMIN (GLUCOPHAGE) 1000 MG tablet    glipiZIDE 5 MG TR24    blood sugar diagnostic (ACCU-CHEK MATTIE PLUS TEST STRP) Strp    blood-glucose meter kit    SITagliptin phosphate (JANUVIA) 100 MG Tab    Ambulatory referral/consult to Optometry      2. Hypothyroidism, unspecified type  levothyroxine (SYNTHROID) 112 MCG tablet      3. Anemia, unspecified type  IRON AND TIBC    Ferritin    VITAMIN B12    CBC Auto Differential      4. Vitamin B 12 deficiency  VITAMIN B12      5. Vertigo  Ambulatory referral/consult to Physical/Occupational Therapy      6. Dysuria  Urinalysis, Reflex to Urine Culture Urine, Clean Catch              Plan     Aminah Norton is a 78 y.o. female with DM2, HTN, HLD, hypothyroid  1. Type 2 diabetes mellitus with diabetic neuropathy, without long-term current use of insulin  Going to try and add januiva to regimen  Continue metformin continue glipizide  Recheck a1c 3m  - VITAMIN B12; Future  - Hemoglobin A1C; Future  - Comprehensive Metabolic Panel; Future  - metFORMIN (GLUCOPHAGE) 1000 MG tablet; Take 1 tablet (1,000 mg total) by mouth 2 (two) times daily with meals.  Dispense: 180 tablet; Refill: 1  - glipiZIDE 5 MG TR24; Take 1 tablet (5 mg total) by mouth daily with breakfast. TAKE 2 TABLETS EVERY DAY WITH BREAKFAST  Dispense: 90 tablet; Refill: 3  - blood sugar diagnostic (ACCU-CHEK MATTIE PLUS TEST STRP) Strp; TEST ONE TIME DAILY  Dispense: 100 strip; Refill: 3  - blood-glucose meter kit; To check BG 2  times daily, to use with insurance preferred meter, use as directed.  Dispense: 1 each; Refill: 0  - SITagliptin phosphate (JANUVIA) 100 MG Tab; Take 1 tablet (100 mg total) by mouth once daily.   Dispense: 90 tablet; Refill: 3    2. Hypothyroidism, unspecified type  Willing to tolerate TSH <6 based on MICHAEL guidelines for her age    3. Anemia, unspecified type  Willl repeat anemia labs at next visit  - IRON AND TIBC; Future  - Ferritin; Future  - VITAMIN B12; Future  - CBC Auto Differential; Future    4. Vitamin B 12 deficiency  - VITAMIN B12; Future    5. Vertigo  Will send to PT  - Ambulatory referral/consult to Physical/Occupational Therapy; Future    6. Dysuria  Will check for UTI  - Urinalysis, Reflex to Urine Culture Urine, Clean Catch      Jeison Sanchez MD

## 2023-08-11 NOTE — TELEPHONE ENCOUNTER
No care due was identified.  [facility] Embedded Care Due Messages. Reference number: [id number].   8/11/2023 2:46:46 PM CDT

## 2023-08-12 NOTE — TELEPHONE ENCOUNTER
Refill Routing Note   Medication(s) are not appropriate for processing by [facility] for the following reason(s):      Clarification of medication (Rx) details    [facility] action(s):  Defer Care Due:  None identified     Medication Therapy Plan: Unclear if patient is taking 1 or 2 tablets daily      Appointments  past 12m or future 3m with PCP    Date Provider   Last Visit   8/11/2023 Jeison Sanchez MD   Next Visit   11/9/2023 Jeison Sanchez MD   ED visits in past 90 days: 0        Note composed:7:37 PM 08/11/2023

## 2023-08-14 DIAGNOSIS — E11.40 TYPE 2 DIABETES MELLITUS WITH DIABETIC NEUROPATHY, WITHOUT LONG-TERM CURRENT USE OF INSULIN: ICD-10-CM

## 2023-08-14 RX ORDER — GLIPIZIDE 5 MG/1
5 TABLET, FILM COATED, EXTENDED RELEASE ORAL
Qty: 90 TABLET | Refills: 3 | Status: SHIPPED | OUTPATIENT
Start: 2023-08-14 | End: 2023-08-14

## 2023-08-14 RX ORDER — GLIPIZIDE 5 MG/1
5 TABLET, FILM COATED, EXTENDED RELEASE ORAL
Qty: 90 TABLET | Refills: 3 | Status: SHIPPED | OUTPATIENT
Start: 2023-08-14 | End: 2023-11-09 | Stop reason: SDUPTHER

## 2023-09-20 ENCOUNTER — CLINICAL SUPPORT (OUTPATIENT)
Dept: DIABETES | Facility: CLINIC | Age: 78
End: 2023-09-20
Payer: MEDICARE

## 2023-09-20 DIAGNOSIS — Z78.0 MENOPAUSE: ICD-10-CM

## 2023-09-20 DIAGNOSIS — E11.40 TYPE 2 DIABETES MELLITUS WITH DIABETIC NEUROPATHY, WITHOUT LONG-TERM CURRENT USE OF INSULIN: Primary | ICD-10-CM

## 2023-09-22 NOTE — PROGRESS NOTES
Diabetes Care Specialist Progress Note  Author: Nancy العراقي RD  Date: 9/22/2023    Program Intake  Reason for Diabetes Program Visit:: Intervention  Type of Intervention:: Individual  Individual: Education  Education: Self-Management Skill Review, Nutrition and Meal Planning  Current diabetes risk level:: moderate  In the last 12 months, have you:: used emergency room services  Was the ER or hospital admission related to diabetes?: No    Lab Results   Component Value Date    HGBA1C 8.2 (H) 08/07/2023     Clinical      There is no height or weight on file to calculate BMI.    Clinical Assessment  Current Diabetes Treatment: Oral Medication    Medication Information  Medication adherence impacting ability to self-manage diabetes?: No    Labs  Do you have regular lab work to monitor your medications?: Yes  Type of Regular Lab Work: A1c, Cholesterol, Microalbumin, CBC, BMP  Lab Compliance Barriers: No    Nutritional Status  Meal Plan 24 Hour Recall: Breakfast, Lunch, Dinner, Snack  Meal Plan 24 Hour Recall - Breakfast: ~1 cup of oatmeal with cinnamon and milk, or ~1 cup of Cheerios  Meal Plan 24 Hour Recall - Lunch: ~15 chips for Nachos with chicken, cheese, and alyx de hua  Meal Plan 24 Hour Recall - Dinner: Small piece of pork chop with 2 spoonfulls of rice and salad  Meal Plan 24 Hour Recall - Snack: 1/2 green mona. Drinks: water, Cranberry juice, coffee  Current nutritional status an area of need that is impacting patient's ability to self-manage diabetes?: No    Additional Social History    Support  Does anyone support you with your diabetes care?: yes  Who supports you?: self  Who takes you to your medical appointments?: self  Does the current support meet the patient's needs?: Yes  Is Support an area impacting ability to self-manage diabetes?: No    Cognitive/Behavioral Health  Alert and Oriented: Yes  Difficulty Thinking: No  Requires Prompting: No  Requires assistance for routine expression?:  No  Cognitive or behavioral barriers impacting ability to self-manage diabetes?: No    Communication  Language preference: Canadian  Hearing Problems: No  Vision Problems: Yes  Vision problem type:: Decreased Vision  Vision Assistance: Glasses  Communication needs impacting ability to self-manage diabetes?: No    Health Literacy  Preferred Learning Method: Face to Face, Reading Materials  Health literacy needs impacting ability to self-manage diabetes?: No    Diabetes Self-Management Skills Assessment    Diabetes Disease Process/Treatment Options  Diabetes Disease Process/Treatment Options: Skills Assessment Completed: No  Deferred due to:: Time    Nutrition/Healthy Eating  Challenges to healthy eating:: portion control  Method of carbohydrate measurement:: no method  Nutrition/Healthy Eating Skills Assessment Completed:: Yes  Assessment indicates:: Instruction Needed  Area of need?: Yes    Physical Activity/Exercise  Patient's daily activity level:: lightly active  Patient formally exercises outside of work.: yes  Exercise Type: other (see comments) (Physical therapy exercises)  Patient can identify forms of physical activity.: yes  Stated forms of physical activity:: any movement performed by muscles that uses energy  Assessment indicates:: Adequate understanding  Area of need?: No    Medications  Patient is able to describe current diabetes management routine.: yes  Diabetes management routine:: oral medications  Patient is able to identify current diabetes medications, dosages, and appropriate timing of medications.: yes (Glipizide, Metformin)  Patient reports problems or concerns with current medication regimen.: no  Assessment indicates:: Adequate understanding  Area of need?: No    Home Blood Glucose Monitoring  Patient states that blood sugar is checked at home daily.: yes  Monitoring Method:: home glucometer  Reasons for not monitoring:: other (see comments) (Pt reports pharmacy sent wrong testing  strips)  How often do you check your blood sugar?: Twice a day  When you check what is your typical blood sugar range? : 132 today, highest: 175, lowest: 96  Blood glucose logs:: no  Blood glucose logs reviewed today?: no  Home Blood Glucose Monitoring Skills Assessment Completed: : Yes  Assessment indicates:: Adequate understanding  Area of need?: No    Acute Complications  Patient is able to identify types of acute complications: Yes  Patient Identified:: Hypoglycemia  Patient is able to state the basic meaning of hypoglycemia?: Yes  Able to state proper treatment of hypoglycemia?: yes  Acute Complications Skills Assessment Completed: : Yes  Assessment indicates:: Instruction Needed  Deffered due to:: Time  Area of need?: Yes    Chronic Complications  Patient is taking statin?: Yes  Chronic Complications Skills Assessment Completed: : No  Deferred due to:: Time    Psychosocial/Coping  Psychosocial/Coping Skills Assessment Completed: : No  Deffered due to:: Time    Assessment Summary and Plan    Based on today's diabetes care assessment, the following areas of need were identified:          9/20/2023    12:01 AM   Social   Support No   Cognitive/Behavioral Health No   Communication No   Health Literacy No            9/20/2023    12:01 AM   Clinical   Medication Adherence No   Lab Compliance No   Nutritional Status No            9/20/2023    12:01 AM   Diabetes Self-Management Skills   Nutrition/Healthy Eating Yes, see care plan.   Physical Activity/Exercise No   Medication No   Home Blood Glucose Monitoring No   Acute Complications Yes      Today's interventions were provided through individual discussion, instruction, and written materials were provided.      Patient verbalized understanding of instruction and written materials.  Pt was able to return back demonstration of instructions today. Patient understood key points, needs reinforcement and further instruction.     Diabetes Self-Management Care  Plan:    Today's Diabetes Self-Management Care Plan was developed with Aminah's input. Aminah has agreed to work toward the following goal(s) to improve his/her overall diabetes control.      Care Plan: Diabetes Management   Updates made since 8/23/2023 12:00 AM        Problem: Healthy Eating         Goal: Eat 2-3 meals daily with 30-45g/2-3 servings of Carbohydrate per meal. Limit snacking in between meal to 1 serving (15 grams).    Start Date: 6/16/2023   Expected End Date: 12/22/2023   This Visit's Progress: On track   Recent Progress: Deferred   Priority: High   Barriers: No Barriers Identified   Note:    Reviewed meal planning and general nutritional counseling with regards to diabetes management. Typical food intake obtained from patient. Patient currently is not measuring foods or carb counting.    9/20/23: Reviewed meal planning with pt.       Follow Up Plan     Follow up in about 3 months (around 12/20/2023) for 3-month F/U.    Today's care plan and follow up schedule was discussed with patient.  Aminah verbalized understanding of the care plan, goals, and agrees to follow up plan.        The patient was encouraged to communicate with his/her health care provider/physician and care team regarding his/her condition(s) and treatment.  I provided the patient with my contact information today and encouraged to contact me via phone or [facility] as needed.     Length of Visit   Total Time: 40 Minutes

## 2023-11-09 ENCOUNTER — OFFICE VISIT (OUTPATIENT)
Dept: INTERNAL MEDICINE | Facility: CLINIC | Age: 78
End: 2023-11-09
Payer: MEDICARE

## 2023-11-09 ENCOUNTER — TELEPHONE (OUTPATIENT)
Dept: INTERNAL MEDICINE | Facility: CLINIC | Age: 78
End: 2023-11-09

## 2023-11-09 ENCOUNTER — LAB VISIT (OUTPATIENT)
Dept: LAB | Facility: HOSPITAL | Age: 78
End: 2023-11-09
Attending: INTERNAL MEDICINE
Payer: MEDICARE

## 2023-11-09 VITALS
SYSTOLIC BLOOD PRESSURE: 136 MMHG | DIASTOLIC BLOOD PRESSURE: 70 MMHG | HEIGHT: 65 IN | WEIGHT: 166.25 LBS | HEART RATE: 69 BPM | BODY MASS INDEX: 27.7 KG/M2 | OXYGEN SATURATION: 99 %

## 2023-11-09 DIAGNOSIS — E11.40 TYPE 2 DIABETES MELLITUS WITH DIABETIC NEUROPATHY, WITHOUT LONG-TERM CURRENT USE OF INSULIN: ICD-10-CM

## 2023-11-09 DIAGNOSIS — D64.9 ANEMIA, UNSPECIFIED TYPE: ICD-10-CM

## 2023-11-09 DIAGNOSIS — E78.2 MIXED HYPERLIPIDEMIA: ICD-10-CM

## 2023-11-09 DIAGNOSIS — E53.8 VITAMIN B 12 DEFICIENCY: ICD-10-CM

## 2023-11-09 DIAGNOSIS — M25.552 GREATER TROCHANTERIC PAIN SYNDROME OF LEFT LOWER EXTREMITY: Primary | ICD-10-CM

## 2023-11-09 DIAGNOSIS — E03.9 HYPOTHYROIDISM, UNSPECIFIED TYPE: ICD-10-CM

## 2023-11-09 LAB
ALBUMIN SERPL BCP-MCNC: 4.2 G/DL (ref 3.5–5.2)
ALP SERPL-CCNC: 87 U/L (ref 55–135)
ALT SERPL W/O P-5'-P-CCNC: 17 U/L (ref 10–44)
ANION GAP SERPL CALC-SCNC: 10 MMOL/L (ref 8–16)
AST SERPL-CCNC: 16 U/L (ref 10–40)
BASOPHILS # BLD AUTO: 0.02 K/UL (ref 0–0.2)
BASOPHILS NFR BLD: 0.6 % (ref 0–1.9)
BILIRUB SERPL-MCNC: 0.5 MG/DL (ref 0.1–1)
BUN SERPL-MCNC: 14 MG/DL (ref 8–23)
CALCIUM SERPL-MCNC: 10.3 MG/DL (ref 8.7–10.5)
CHLORIDE SERPL-SCNC: 101 MMOL/L (ref 95–110)
CHOLEST SERPL-MCNC: 225 MG/DL (ref 120–199)
CHOLEST/HDLC SERPL: 3.8 {RATIO} (ref 2–5)
CO2 SERPL-SCNC: 28 MMOL/L (ref 23–29)
CREAT SERPL-MCNC: 0.8 MG/DL (ref 0.5–1.4)
DIFFERENTIAL METHOD: ABNORMAL
EOSINOPHIL # BLD AUTO: 0.1 K/UL (ref 0–0.5)
EOSINOPHIL NFR BLD: 2.4 % (ref 0–8)
ERYTHROCYTE [DISTWIDTH] IN BLOOD BY AUTOMATED COUNT: 17.3 % (ref 11.5–14.5)
EST. GFR  (NO RACE VARIABLE): >60 ML/MIN/1.73 M^2
ESTIMATED AVG GLUCOSE: 189 MG/DL (ref 68–131)
ESTIMATED AVG GLUCOSE: 189 MG/DL (ref 68–131)
FERRITIN SERPL-MCNC: 11 NG/ML (ref 20–300)
GLUCOSE SERPL-MCNC: 168 MG/DL (ref 70–110)
HBA1C MFR BLD: 8.2 % (ref 4–5.6)
HBA1C MFR BLD: 8.2 % (ref 4–5.6)
HCT VFR BLD AUTO: 34.1 % (ref 37–48.5)
HDLC SERPL-MCNC: 59 MG/DL (ref 40–75)
HDLC SERPL: 26.2 % (ref 20–50)
HGB BLD-MCNC: 10.6 G/DL (ref 12–16)
IMM GRANULOCYTES # BLD AUTO: 0.01 K/UL (ref 0–0.04)
IMM GRANULOCYTES NFR BLD AUTO: 0.3 % (ref 0–0.5)
IRON SERPL-MCNC: 54 UG/DL (ref 30–160)
LDLC SERPL CALC-MCNC: 122.4 MG/DL (ref 63–159)
LYMPHOCYTES # BLD AUTO: 1.1 K/UL (ref 1–4.8)
LYMPHOCYTES NFR BLD: 33.3 % (ref 18–48)
MCH RBC QN AUTO: 22.8 PG (ref 27–31)
MCHC RBC AUTO-ENTMCNC: 31.1 G/DL (ref 32–36)
MCV RBC AUTO: 73 FL (ref 82–98)
MONOCYTES # BLD AUTO: 0.4 K/UL (ref 0.3–1)
MONOCYTES NFR BLD: 11.5 % (ref 4–15)
NEUTROPHILS # BLD AUTO: 1.7 K/UL (ref 1.8–7.7)
NEUTROPHILS NFR BLD: 51.9 % (ref 38–73)
NONHDLC SERPL-MCNC: 166 MG/DL
NRBC BLD-RTO: 0 /100 WBC
PLATELET # BLD AUTO: 288 K/UL (ref 150–450)
PMV BLD AUTO: 10.5 FL (ref 9.2–12.9)
POTASSIUM SERPL-SCNC: 4.7 MMOL/L (ref 3.5–5.1)
PROT SERPL-MCNC: 7.6 G/DL (ref 6–8.4)
RBC # BLD AUTO: 4.65 M/UL (ref 4–5.4)
SATURATED IRON: 10 % (ref 20–50)
SODIUM SERPL-SCNC: 139 MMOL/L (ref 136–145)
TOTAL IRON BINDING CAPACITY: 524 UG/DL (ref 250–450)
TRANSFERRIN SERPL-MCNC: 354 MG/DL (ref 200–375)
TRIGL SERPL-MCNC: 218 MG/DL (ref 30–150)
TSH SERPL DL<=0.005 MIU/L-ACNC: 3.4 UIU/ML (ref 0.4–4)
VIT B12 SERPL-MCNC: >2000 PG/ML (ref 210–950)
WBC # BLD AUTO: 3.3 K/UL (ref 3.9–12.7)

## 2023-11-09 PROCEDURE — 82728 ASSAY OF FERRITIN: CPT | Performed by: INTERNAL MEDICINE

## 2023-11-09 PROCEDURE — 3288F FALL RISK ASSESSMENT DOCD: CPT | Mod: CPTII,S$GLB,, | Performed by: INTERNAL MEDICINE

## 2023-11-09 PROCEDURE — 1126F PR PAIN SEVERITY QUANTIFIED, NO PAIN PRESENT: ICD-10-PCS | Mod: CPTII,S$GLB,, | Performed by: INTERNAL MEDICINE

## 2023-11-09 PROCEDURE — 3078F DIAST BP <80 MM HG: CPT | Mod: CPTII,S$GLB,, | Performed by: INTERNAL MEDICINE

## 2023-11-09 PROCEDURE — 1157F PR ADVANCE CARE PLAN OR EQUIV PRESENT IN MEDICAL RECORD: ICD-10-PCS | Mod: CPTII,S$GLB,, | Performed by: INTERNAL MEDICINE

## 2023-11-09 PROCEDURE — 85025 COMPLETE CBC W/AUTO DIFF WBC: CPT | Performed by: INTERNAL MEDICINE

## 2023-11-09 PROCEDURE — 82607 VITAMIN B-12: CPT | Performed by: INTERNAL MEDICINE

## 2023-11-09 PROCEDURE — 84443 ASSAY THYROID STIM HORMONE: CPT | Performed by: INTERNAL MEDICINE

## 2023-11-09 PROCEDURE — 83036 HEMOGLOBIN GLYCOSYLATED A1C: CPT | Performed by: INTERNAL MEDICINE

## 2023-11-09 PROCEDURE — 1159F MED LIST DOCD IN RCRD: CPT | Mod: CPTII,S$GLB,, | Performed by: INTERNAL MEDICINE

## 2023-11-09 PROCEDURE — 99214 PR OFFICE/OUTPT VISIT, EST, LEVL IV, 30-39 MIN: ICD-10-PCS | Mod: S$GLB,,, | Performed by: INTERNAL MEDICINE

## 2023-11-09 PROCEDURE — 84466 ASSAY OF TRANSFERRIN: CPT | Performed by: INTERNAL MEDICINE

## 2023-11-09 PROCEDURE — 99214 OFFICE O/P EST MOD 30 MIN: CPT | Mod: S$GLB,,, | Performed by: INTERNAL MEDICINE

## 2023-11-09 PROCEDURE — 3078F PR MOST RECENT DIASTOLIC BLOOD PRESSURE < 80 MM HG: ICD-10-PCS | Mod: CPTII,S$GLB,, | Performed by: INTERNAL MEDICINE

## 2023-11-09 PROCEDURE — 36415 COLL VENOUS BLD VENIPUNCTURE: CPT | Performed by: INTERNAL MEDICINE

## 2023-11-09 PROCEDURE — 1157F ADVNC CARE PLAN IN RCRD: CPT | Mod: CPTII,S$GLB,, | Performed by: INTERNAL MEDICINE

## 2023-11-09 PROCEDURE — 99999 PR PBB SHADOW E&M-EST. PATIENT-LVL V: CPT | Mod: PBBFAC,,, | Performed by: INTERNAL MEDICINE

## 2023-11-09 PROCEDURE — 99999 PR PBB SHADOW E&M-EST. PATIENT-LVL V: ICD-10-PCS | Mod: PBBFAC,,, | Performed by: INTERNAL MEDICINE

## 2023-11-09 PROCEDURE — 83540 ASSAY OF IRON: CPT | Performed by: INTERNAL MEDICINE

## 2023-11-09 PROCEDURE — 1101F PR PT FALLS ASSESS DOC 0-1 FALLS W/OUT INJ PAST YR: ICD-10-PCS | Mod: CPTII,S$GLB,, | Performed by: INTERNAL MEDICINE

## 2023-11-09 PROCEDURE — 1126F AMNT PAIN NOTED NONE PRSNT: CPT | Mod: CPTII,S$GLB,, | Performed by: INTERNAL MEDICINE

## 2023-11-09 PROCEDURE — 3075F PR MOST RECENT SYSTOLIC BLOOD PRESS GE 130-139MM HG: ICD-10-PCS | Mod: CPTII,S$GLB,, | Performed by: INTERNAL MEDICINE

## 2023-11-09 PROCEDURE — 1159F PR MEDICATION LIST DOCUMENTED IN MEDICAL RECORD: ICD-10-PCS | Mod: CPTII,S$GLB,, | Performed by: INTERNAL MEDICINE

## 2023-11-09 PROCEDURE — 80053 COMPREHEN METABOLIC PANEL: CPT | Performed by: INTERNAL MEDICINE

## 2023-11-09 PROCEDURE — 3288F PR FALLS RISK ASSESSMENT DOCUMENTED: ICD-10-PCS | Mod: CPTII,S$GLB,, | Performed by: INTERNAL MEDICINE

## 2023-11-09 PROCEDURE — 1160F PR REVIEW ALL MEDS BY PRESCRIBER/CLIN PHARMACIST DOCUMENTED: ICD-10-PCS | Mod: CPTII,S$GLB,, | Performed by: INTERNAL MEDICINE

## 2023-11-09 PROCEDURE — 80061 LIPID PANEL: CPT | Performed by: INTERNAL MEDICINE

## 2023-11-09 PROCEDURE — 1160F RVW MEDS BY RX/DR IN RCRD: CPT | Mod: CPTII,S$GLB,, | Performed by: INTERNAL MEDICINE

## 2023-11-09 PROCEDURE — 1101F PT FALLS ASSESS-DOCD LE1/YR: CPT | Mod: CPTII,S$GLB,, | Performed by: INTERNAL MEDICINE

## 2023-11-09 PROCEDURE — 3075F SYST BP GE 130 - 139MM HG: CPT | Mod: CPTII,S$GLB,, | Performed by: INTERNAL MEDICINE

## 2023-11-09 RX ORDER — NAPROXEN 500 MG/1
500 TABLET ORAL 2 TIMES DAILY WITH MEALS
COMMUNITY
End: 2023-11-09 | Stop reason: SDUPTHER

## 2023-11-09 RX ORDER — LEVOTHYROXINE SODIUM 112 UG/1
112 TABLET ORAL
Qty: 90 TABLET | Refills: 3 | Status: SHIPPED | OUTPATIENT
Start: 2023-11-09

## 2023-11-09 RX ORDER — GLIPIZIDE 5 MG/1
5 TABLET, FILM COATED, EXTENDED RELEASE ORAL
Qty: 90 TABLET | Refills: 3 | Status: ON HOLD | OUTPATIENT
Start: 2023-11-09 | End: 2024-03-27 | Stop reason: HOSPADM

## 2023-11-09 RX ORDER — NAPROXEN 500 MG/1
500 TABLET ORAL 2 TIMES DAILY WITH MEALS
Qty: 180 TABLET | Refills: 3 | Status: ON HOLD | OUTPATIENT
Start: 2023-11-09 | End: 2024-03-27 | Stop reason: HOSPADM

## 2023-11-09 NOTE — PROGRESS NOTES
"    CHIEF COMPLAINT     Chief Complaint   Patient presents with    Follow-up       HPI     Aminah Norton is a 78 y.o. female HTN, GERD, TIIDM with polyneuropathy, HLD, hypothyroidism  here today for     Mix up with medications. Think she has been taking extra levothyroxine. However, no changes in sx.  Did not start januvia unsure why.    Reports pain left lateral thigh. Worsens with external rotation.       Personally Reviewed Patient's Medical, surgical, family and social hx. Changes updated in [facility].  Care Team updated in Epic    Review of Systems:  Review of Systems   Respiratory:  Negative for cough and shortness of breath.    Musculoskeletal:  Positive for gait problem.       Health Maintenance:   Reviewed with patient  Due for the following:      PHYSICAL EXAM     /70   Pulse 69   Ht 5' 5" (1.651 m)   Wt 75.4 kg (166 lb 3.6 oz)   SpO2 99%   BMI 27.66 kg/m²     Gen: Well Appearing, NAD  HEENT: PERR, EOMI  Neck: FROM, no thyromegaly, no cervical adenopathy  CVD: RRR, no M/R/G  Pulm: Normal work of breathing, CTAB, no wheezing  Abd:  Soft, NT, ND non TTP, no mass  MSK: no LE edema  TTP. Left GT and IT Band. Exacerbated with figure 4 maneuver   Neuro: A&Ox3, gait normal, speech normal  Mood; Mood normal, behavior normal, thought process linear       LABS     Labs reviewed; in process    ASSESSMENT     1. Greater trochanteric pain syndrome of left lower extremity  naproxen (NAPROSYN) 500 MG tablet    Ambulatory referral/consult to Sports Medicine      2. Hypothyroidism, unspecified type  levothyroxine (SYNTHROID) 112 MCG tablet      3. Type 2 diabetes mellitus with diabetic neuropathy, without long-term current use of insulin  glipiZIDE 5 MG TR24    Ambulatory referral/consult to Optometry              Plan     Aminah Norton is a 78 y.o. female with HTN, GERD, TIIDM with polyneuropathy, HLD, hypothyroidism   1. Hypothyroidism, unspecified type  Will restart at correct " dose, will recheck tsh and check level.  - levothyroxine (SYNTHROID) 112 MCG tablet; Take 1 tablet (112 mcg total) by mouth before breakfast.  Dispense: 90 tablet; Refill: 3    2. Type 2 diabetes mellitus with diabetic neuropathy, without long-term current use of insulin  Restart januvia, continue glipizide and metformin as prescribed.  Recheck a1c at 3m if not at goal.  - glipiZIDE 5 MG TR24; Take 1 tablet (5 mg total) by mouth daily with breakfast.  Dispense: 90 tablet; Refill: 3  - Ambulatory referral/consult to Optometry; Future    3. Greater trochanteric pain syndrome of left lower extremity  Given HEP and sent to sports, if a1c <8 can consider ICS.  - naproxen (NAPROSYN) 500 MG tablet; Take 1 tablet (500 mg total) by mouth 2 (two) times daily with meals.  Dispense: 180 tablet; Refill: 3  - Ambulatory referral/consult to Sports Medicine; Future      Jeison Sanchez MD

## 2023-11-10 ENCOUNTER — TELEPHONE (OUTPATIENT)
Dept: INTERNAL MEDICINE | Facility: CLINIC | Age: 78
End: 2023-11-10
Payer: MEDICARE

## 2023-11-10 NOTE — TELEPHONE ENCOUNTER
----- Message from Jeison Sanchez MD sent at 11/10/2023 10:45 AM CST -----  1. Patient needs to start januiva  2. Would like to increase crestor to 20mg daily.    Let me know if she is okay with this plan

## 2023-11-13 DIAGNOSIS — E78.5 HYPERLIPIDEMIA, UNSPECIFIED HYPERLIPIDEMIA TYPE: Primary | ICD-10-CM

## 2023-11-13 RX ORDER — ROSUVASTATIN CALCIUM 20 MG/1
20 TABLET, COATED ORAL DAILY
Qty: 90 TABLET | Refills: 3 | Status: SHIPPED | OUTPATIENT
Start: 2023-11-13 | End: 2024-02-29 | Stop reason: SDUPTHER

## 2023-12-11 ENCOUNTER — CLINICAL SUPPORT (OUTPATIENT)
Dept: DIABETES | Facility: CLINIC | Age: 78
End: 2023-12-11
Payer: MEDICARE

## 2023-12-11 DIAGNOSIS — E11.40 TYPE 2 DIABETES MELLITUS WITH DIABETIC NEUROPATHY, WITHOUT LONG-TERM CURRENT USE OF INSULIN: Primary | ICD-10-CM

## 2023-12-14 NOTE — PROGRESS NOTES
Diabetes Care Specialist Progress Note  Author: Nancy العراقي RD  Date: 12/14/2023    Program Intake  Reason for Diabetes Program Visit:: Intervention  Type of Intervention:: Individual  Individual: Education  Education: Self-Management Skill Review, Nutrition and Meal Planning  Current diabetes risk level:: moderate  In the last 12 months, have you:: used emergency room services  Was the ER or hospital admission related to diabetes?: No    Lab Results   Component Value Date    HGBA1C 8.2 (H) 11/09/2023    HGBA1C 8.2 (H) 11/09/2023     Clinical      There is no height or weight on file to calculate BMI.    Clinical Assessment  Current Diabetes Treatment: Oral Medication    Medication Information  Medication adherence impacting ability to self-manage diabetes?: No    Labs  Do you have regular lab work to monitor your medications?: Yes  Type of Regular Lab Work: A1c, Microalbumin, CBC, BMP, Cholesterol  Lab Compliance Barriers: No    Nutritional Status  Diet: Regular  Meal Plan 24 Hour Recall: Breakfast, Lunch, Dinner, Snack  Meal Plan 24 Hour Recall - Breakfast: ~1 cup of oatmeal with cinnamon and milk, or ~1 cup of Cheerios  Meal Plan 24 Hour Recall - Lunch: 1 green apple or soups  Meal Plan 24 Hour Recall - Dinner: Lamb chop with 2 spoons of rice and salad  Meal Plan 24 Hour Recall - Snack: Nuts, orange, 1/2 banana. Drinks: water, Cranberry juice, coffee  Current nutritional status an area of need that is impacting patient's ability to self-manage diabetes?: No    Additional Social History    Support  Does anyone support you with your diabetes care?: yes  Who supports you?: self  Who takes you to your medical appointments?: self  Does the current support meet the patient's needs?: Yes  Is Support an area impacting ability to self-manage diabetes?: No    Cognitive/Behavioral Health  Alert and Oriented: Yes  Difficulty Thinking: No  Requires Prompting: No  Requires assistance for routine expression?: No  Cognitive  or behavioral barriers impacting ability to self-manage diabetes?: No    Communication  Language preference: Lebanese  Hearing Problems: No  Vision Problems: Yes  Vision problem type:: Decreased Vision  Vision Assistance: Glasses  Communication needs impacting ability to self-manage diabetes?: No    Health Literacy  Preferred Learning Method: Face to Face, Reading Materials  Health literacy needs impacting ability to self-manage diabetes?: No    Diabetes Self-Management Skills Assessment    Diabetes Disease Process/Treatment Options  Diabetes Disease Process/Treatment Options: Skills Assessment Completed: No  Deferred due to:: Time  Area of need?: Deferred    Nutrition/Healthy Eating  Challenges to healthy eating:: portion control  Method of carbohydrate measurement:: no method  Nutrition/Healthy Eating Skills Assessment Completed:: Yes  Assessment indicates:: Instruction Needed  Area of need?: Yes    Physical Activity/Exercise  Patient's daily activity level:: lightly active  Patient formally exercises outside of work.: yes  Exercise Type: other (see comments) (Physical therapy exercises)  Patient can identify forms of physical activity.: yes  Stated forms of physical activity:: any movement performed by muscles that uses energy  Assessment indicates:: Adequate understanding  Area of need?: No    Medications  Patient is able to describe current diabetes management routine.: yes  Diabetes management routine:: oral medications  Patient is able to identify current diabetes medications, dosages, and appropriate timing of medications.: yes (Glipizide, Metformin)  Patient reports problems or concerns with current medication regimen.: yes  Medication regimen problems/concerns:: concerned about side effects  Medication Skills Assessment Completed:: Yes  Assessment indicates:: Instruction Needed  Area of need?: Yes    Home Blood Glucose Monitoring  Patient states that blood sugar is checked at home daily.: yes  Monitoring  "Method:: home glucometer  Reasons for not monitoring:: other (see comments) (Pt reports pharmacy sent wrong testing strips)  When you check what is your typical blood sugar range? : 100,110 before eating, 200s in PM a few hours after eating  Blood glucose logs:: no  Blood glucose logs reviewed today?: no  Assessment indicates:: Adequate understanding  Area of need?: No    Acute Complications  Patient Identified:: Hypoglycemia  Patient is able to state the basic meaning of hypoglycemia?: Yes  Patient can identify general symptoms of hypoglycemia: yes  Patient identified:: other (see comments) (Headache, "feels horrible")  Able to state proper treatment of hypoglycemia?: yes  Patient identified:: 1/2 can soda/fruit juice  Acute Complications Skills Assessment Completed: : Yes  Assessment indicates:: Adequate understanding  Deffered due to:: Time  Area of need?: No    Chronic Complications  Patient is taking statin?: Yes  Chronic Complications Skills Assessment Completed: : No  Deferred due to:: Time  Area of need?: Deferred    Psychosocial/Coping  Psychosocial/Coping Skills Assessment Completed: : No  Deffered due to:: Time  Area of need?: Deferred    Assessment Summary and Plan    Based on today's diabetes care assessment, the following areas of need were identified:          12/11/2023    12:01 AM   Social   Support No   Cognitive/Behavioral Health No   Communication No   Health Literacy No            12/11/2023    12:01 AM   Clinical   Medication Adherence No   Lab Compliance No   Nutritional Status No            12/11/2023    12:01 AM   Diabetes Self-Management Skills   Diabetes Disease Process/Treatment Options Deferred   Nutrition/Healthy Eating Yes, see care plan.   Physical Activity/Exercise No   Medication Yes, see care plan.   Home Blood Glucose Monitoring No   Acute Complications No   Chronic Complications Deferred   Psychosocial/Coping Deferred      Today's interventions were provided through individual " discussion, instruction, and written materials were provided.      Patient verbalized understanding of instruction and written materials.  Pt was able to return back demonstration of instructions today. Patient understood key points, needs reinforcement and further instruction.     Diabetes Self-Management Care Plan:    Today's Diabetes Self-Management Care Plan was developed with Aminah's input. Aminah has agreed to work toward the following goal(s) to improve his/her overall diabetes control.      Care Plan: Diabetes Management   Updates made since 11/14/2023 12:00 AM        Problem: Healthy Eating         Goal: Eat 2-3 meals daily with 30-45g/2-3 servings of Carbohydrate per meal. Limit snacking in between meal to 1 serving (15 grams).    Start Date: 6/16/2023   Expected End Date: 12/22/2023   This Visit's Progress: On track   Recent Progress: On track   Priority: High   Barriers: No Barriers Identified   Note:    Reviewed meal planning and general nutritional counseling with regards to diabetes management. Typical food intake obtained from patient. Patient currently is not measuring foods or carb counting.    9/20/23: Reviewed meal planning with pt.    12/11/23: Educator encouraged pt to continue to portion carbohydrates.       Problem: Medications         Goal: Patient Agrees to take Diabetes Medications as prescribed.    Start Date: 12/11/2023   Expected End Date: 3/14/2024   This Visit's Progress: Deferred   Priority: Medium   Barriers: Other (comments)   Note:    Pt reports concern with frequent urination and abdominal pain. Educator encouraged pt to stay hydrated while taking Januvia. Pt reports drinking plenty of fluids. Educator messaged MD and encouraged pt to schedule appt with PCP.       Follow Up Plan     Follow up in about 3 months (around 3/11/2024) for 3-month F/U.    Today's care plan and follow up schedule was discussed with patient.  Aminah verbalized understanding of the care plan, goals, and  agrees to follow up plan.        The patient was encouraged to communicate with his/her health care provider/physician and care team regarding his/her condition(s) and treatment.  I provided the patient with my contact information today and encouraged to contact me via phone or [facility] Patient Portal as needed.     Length of Visit   Total Time: 50 Minutes

## 2024-01-17 DIAGNOSIS — I10 HTN (HYPERTENSION), BENIGN: ICD-10-CM

## 2024-01-17 DIAGNOSIS — E11.40 TYPE 2 DIABETES MELLITUS WITH DIABETIC NEUROPATHY, WITHOUT LONG-TERM CURRENT USE OF INSULIN: ICD-10-CM

## 2024-01-17 NOTE — TELEPHONE ENCOUNTER
No care due was identified.  [facility] Embedded Care Due Messages. Reference number: [id number].   1/17/2024 12:16:28 PM CST

## 2024-01-17 NOTE — TELEPHONE ENCOUNTER
----- Message from Mary Jane Ba sent at 1/17/2024 11:11 AM CST -----  Contact: Aminah [phone]  Pt needs a refill on     1) lisinopriL (PRINIVIL,ZESTRIL) 40 MG tablet     2) blood sugar diagnostic (ACCU-CHEK MATTIE PLUS TEST STRP) Strp    called into     [facility] (Now [facility]) - [place], [place] - [address]  [address] [facility] [address] [address]  Phone: [phone] Fax: [phone]      Pt mom/dad/guardian can be reached at [phone]    Aminah states the [facility] pharmacy stated she needs to pay for the RX and Aminah states she has insurance that covers the RX and would like to know why she is being charged for the RX. Aminah states she will need the provider to send over more than 90 Tablets of the lisinopriL (PRINIVIL,ZESTRIL) 40 MG tablet  and test strps so that way she can have enough for a while she waits. Please call Aminah back for advice.       Aminah states she will need 10 tablets of the RX lisinopriL (PRINIVIL,ZESTRIL) 40 MG tablet sent to the [facility] due to being completely out while she waits for [facility].    Please have a  on the line      [facility]/pharmacy #[id number] - [place], [place] - [address]  [address]  [place] [address]  Phone: [phone] Fax: [phone]

## 2024-01-17 NOTE — TELEPHONE ENCOUNTER
Pt requesting med refill, med pended     LOV with Jeison Sanchez MD , 11/9/2023    Pt also requesting PCP send a short term rx of lisinopril to [facility] because she is completely out and it will take some time to get from [facility]

## 2024-01-18 RX ORDER — LISINOPRIL 40 MG/1
40 TABLET ORAL DAILY
Qty: 90 TABLET | Refills: 2 | Status: SHIPPED | OUTPATIENT
Start: 2024-01-18 | End: 2024-01-19 | Stop reason: SDUPTHER

## 2024-01-19 DIAGNOSIS — I10 HTN (HYPERTENSION), BENIGN: ICD-10-CM

## 2024-01-19 RX ORDER — LISINOPRIL 40 MG/1
40 TABLET ORAL DAILY
Qty: 90 TABLET | Refills: 3 | Status: SHIPPED | OUTPATIENT
Start: 2024-01-19 | End: 2024-06-04

## 2024-01-19 NOTE — TELEPHONE ENCOUNTER
Refill Decision Note   Aminah AliceaJanki  is requesting a refill authorization.  Brief Assessment and Rationale for Refill:  Approve     Medication Therapy Plan:  Pt requesting medication be sent to [facility] #[id number]-Will resend      Comments:     Note composed:12:06 PM 01/19/2024             Appointments     Last Visit   11/9/2023 Jeison Sanchez MD   Next Visit   2/29/2024 Jeison Sanchez MD

## 2024-01-19 NOTE — TELEPHONE ENCOUNTER
----- Message from Marie Butt sent at 1/19/2024 11:44 AM CST -----  Contact: [phone]  Requesting an RX refill or new RX.  Is this a refill or new RX: refill  RX name and strength (copy/paste from chart):  lisinopriL (PRINIVIL,ZESTRIL) 40 MG tablet  Is this a 30 day or 90 day RX: 30  Pharmacy name and phone # (copy/paste from chart):    [facility]/pharmacy #[id number] - [place], [place] - [address]  [address]  [place] [address]  Phone: [phone] Fax: [phone]    The doctors have asked that we provide their patients with the following 2 reminders -- prescription refills can take up to 72 hours, and a friendly reminder that in the future you can use your [facility] account to request refills: Jennifer is calling she states the pt is needing to have an emergency refill for the medication she states the pt is out of this for 3 days now. They have spoke to someone in the office that stated the emergency refill would be sent to [facility] and the rest of the prescription would go to [place] well please give return call

## 2024-01-23 ENCOUNTER — HOSPITAL ENCOUNTER (EMERGENCY)
Facility: HOSPITAL | Age: 79
Discharge: HOME | End: 2024-01-23
Attending: EMERGENCY MEDICINE
Payer: MEDICARE

## 2024-01-23 ENCOUNTER — NURSE TRIAGE (OUTPATIENT)
Dept: ADMINISTRATIVE | Facility: CLINIC | Age: 79
End: 2024-01-23
Payer: MEDICARE

## 2024-01-23 VITALS
OXYGEN SATURATION: 97 % | HEART RATE: 70 BPM | RESPIRATION RATE: 18 BRPM | TEMPERATURE: 99 F | DIASTOLIC BLOOD PRESSURE: 72 MMHG | SYSTOLIC BLOOD PRESSURE: 183 MMHG

## 2024-01-23 DIAGNOSIS — R30.0 DYSURIA: Primary | ICD-10-CM

## 2024-01-23 DIAGNOSIS — R35.0 URINARY FREQUENCY: ICD-10-CM

## 2024-01-23 LAB
ALBUMIN SERPL BCP-MCNC: 4.5 G/DL (ref 3.5–5.2)
ALP SERPL-CCNC: 93 U/L (ref 55–135)
ALT SERPL W/O P-5'-P-CCNC: 18 U/L (ref 10–44)
ANION GAP SERPL CALC-SCNC: 9 MMOL/L (ref 8–16)
AST SERPL-CCNC: 17 U/L (ref 10–40)
BACTERIA #/AREA URNS AUTO: NORMAL /HPF
BILIRUB SERPL-MCNC: 0.4 MG/DL (ref 0.1–1)
BILIRUB UR QL STRIP: NEGATIVE
BUN SERPL-MCNC: 12 MG/DL (ref 8–23)
CALCIUM SERPL-MCNC: 10.4 MG/DL (ref 8.7–10.5)
CHLORIDE SERPL-SCNC: 102 MMOL/L (ref 95–110)
CLARITY UR REFRACT.AUTO: CLEAR
CO2 SERPL-SCNC: 25 MMOL/L (ref 23–29)
COLOR UR AUTO: COLORLESS
CREAT SERPL-MCNC: 1 MG/DL (ref 0.5–1.4)
ERYTHROCYTE [DISTWIDTH] IN BLOOD BY AUTOMATED COUNT: 16.7 % (ref 11.5–14.5)
EST. GFR  (NO RACE VARIABLE): 57.7 ML/MIN/1.73 M^2
GLUCOSE SERPL-MCNC: 141 MG/DL (ref 70–110)
GLUCOSE UR QL STRIP: NEGATIVE
HCT VFR BLD AUTO: 35.2 % (ref 37–48.5)
HGB BLD-MCNC: 11 G/DL (ref 12–16)
HGB UR QL STRIP: ABNORMAL
KETONES UR QL STRIP: NEGATIVE
LEUKOCYTE ESTERASE UR QL STRIP: ABNORMAL
MCH RBC QN AUTO: 22.3 PG (ref 27–31)
MCHC RBC AUTO-ENTMCNC: 31.3 G/DL (ref 32–36)
MCV RBC AUTO: 71 FL (ref 82–98)
MICROSCOPIC COMMENT: NORMAL
NITRITE UR QL STRIP: NEGATIVE
PH UR STRIP: 6 [PH] (ref 5–8)
PLATELET # BLD AUTO: 284 K/UL (ref 150–450)
PMV BLD AUTO: 9.6 FL (ref 9.2–12.9)
POTASSIUM SERPL-SCNC: 4.3 MMOL/L (ref 3.5–5.1)
PROT SERPL-MCNC: 8.1 G/DL (ref 6–8.4)
PROT UR QL STRIP: NEGATIVE
RBC # BLD AUTO: 4.94 M/UL (ref 4–5.4)
RBC #/AREA URNS AUTO: 1 /HPF (ref 0–4)
SODIUM SERPL-SCNC: 136 MMOL/L (ref 136–145)
SP GR UR STRIP: 1 (ref 1–1.03)
SQUAMOUS #/AREA URNS AUTO: 1 /HPF
URN SPEC COLLECT METH UR: ABNORMAL
WBC # BLD AUTO: 4.51 K/UL (ref 3.9–12.7)
WBC #/AREA URNS AUTO: 3 /HPF (ref 0–5)

## 2024-01-23 PROCEDURE — 81001 URINALYSIS AUTO W/SCOPE: CPT | Performed by: PHYSICIAN ASSISTANT

## 2024-01-23 PROCEDURE — 99283 EMERGENCY DEPT VISIT LOW MDM: CPT

## 2024-01-23 PROCEDURE — 85027 COMPLETE CBC AUTOMATED: CPT | Performed by: EMERGENCY MEDICINE

## 2024-01-23 PROCEDURE — 80053 COMPREHEN METABOLIC PANEL: CPT | Performed by: EMERGENCY MEDICINE

## 2024-01-23 RX ORDER — PHENAZOPYRIDINE HYDROCHLORIDE 200 MG/1
200 TABLET, FILM COATED ORAL 3 TIMES DAILY
Qty: 6 TABLET | Refills: 0 | Status: SHIPPED | OUTPATIENT
Start: 2024-01-23 | End: 2024-01-25

## 2024-01-23 NOTE — TELEPHONE ENCOUNTER
Per Ivorian interpretor, Aminah c/o pain and burning with urination and left kidney pain. Left flank pain began today. Blood sugar is 110. Pt reports new diabetes medication, Januvia. Advised pt per triage protocol to go to nearest ED now for physician ozzy. Instructed to call  now if no immediate . Pt refused and requesting appt with PCP tomorrow. Triager explained risk involved with pt decision to delay care could be fatal. Pt v/u. Triager reiterated care advice. Pt v/u.     Reason for Disposition   SEVERE pain (e.g., excruciating, scale 8-10) and present > 1 hour    Additional Information   Negative: Shock suspected (e.g., cold/pale/clammy skin, too weak to stand, low BP, rapid pulse)   Negative: Passed out (i.e., lost consciousness, collapsed and was not responding)   Negative: Sounds like a life-threatening emergency to the triager    Protocols used: Flank Pain-A-[place]

## 2024-01-24 NOTE — ED NOTES
Patient states he has been to the bathroom 7 times while in ED, reports pain in bladder,  also reports high blood pressure  with low pulse. 205 b/p and 66 today at 1500, states he took her b/p then took her meds at 1530

## 2024-01-24 NOTE — DISCHARGE INSTRUCTIONS
Thank you for choosing [facility]!     Our goal in the Emergency Department is to always provide outstanding medical care. You may receive a survey by mail or e-mail in the next week regarding your experience today. We would greatly appreciate you completing and returning the survey. Your feedback provides us with a way to recognize our staff who provide very good care, and it helps us learn how to improve when your experience was below our aspiration of excellence.      It is important to remember that some problems are difficult to diagnose and may not be found during your first visit. Be sure to follow up with your primary care doctor and review any labs/imaging that was performed during your visit with them. If you do not have a primary care doctor, you may contact the one listed on your discharge paperwork, or you may also call the [facility] Appointment Desk at [phone] to schedule an appointment.     All medications may potentially have side effects and it is impossible to predict which medications may give you side effects. If you feel that you are having a negative effect of any medication you should immediately stop taking them and seek medical attention.  Do not drive or make any important decisions for 24 hours if you have received any pain medications, sedatives or mood altering drugs during your ER visit.    We appreciate you trusting us with your medical care. We will be happy to take care of you for all of your future medical needs. You may return to the ER at any time for any new/concerning symptoms, worsening condition, or failure to improve. We hope you feel better soon.     Sincerely,    Kody Haddad Jr., MD  Board-Certified Emergency Medicine Physician  [facility]

## 2024-01-24 NOTE — ED NOTES
Patient identifiers verified and correct for  Ms Alicea  C/C: Elevated b/p SEE NN  APPEARANCE: awake and alert in NAD. PAIN  0/10  SKIN: warm, dry and intact. No breakdown or bruising.  MUSCULOSKELETAL: Patient moving all extremities spontaneously, no obvious swelling or deformities noted. Ambulates independently.  RESPIRATORY: Denies shortness of breath.Respirations unlabored.   CARDIAC: Denies CP, 2+ distal pulses; no peripheral edema  ABDOMEN: S/ND/NT, Denies nausea  : voids spontaneously, patient reports pain in bladder and burning, frequest urination   Neurologic: AAO x 4; follows commands equal strength in all extremities; denies numbness/tingling. Denies dizziness Denies new weakness

## 2024-01-24 NOTE — ED PROVIDER NOTES
"Emergency Department Encounter  Provider Note    Aminah Norton  [id number]  1/23/2024    Evaluation:    History Acquisition:     Chief Complaint   Patient presents with    Dysuria     X1 month       History of Present Illness:  Aminah Norton is a 78 y.o. female who has a past medical history of Anticoagulant long-term use, Arthritis, Cataract, Choroidal rupture of left eye, Diabetes mellitus type II, GERD (gastroesophageal reflux disease), Hyperlipidemia, Hypertension, Hypothyroidism, Macular scar, Retinal degeneration, Thyroid disease, and Vitamin B 12 deficiency.    The patient presents to the ED due to multiple concerns.   Patient is a poor historian, and daughter is at bedside to provide majority of history.    Patient has had intermittent burning with urination, lower abdominal discomfort, sensation of incomplete bladder emptying, and generalized weakness for the last 3 months. She feels it is due to Januvia, which she was prescribed in November of last year. She reports associated L-sided lower back pain that has been ongoing for the last month. She denies any fever but feels weak and fatigued intermittently. She also feels a "ball" in her lower stomach when she walks. She denies any vomiting, diarrhea, blood in stool. She has history of gallbladder surgery.   Daughter is concerned because patient seems to have trouble with managing her medications, and feels she is on too many.     Additional historians utilized:  Daughter at bedside, see HPI    Prior medical records were reviewed:   PCP visit 11/2023 for f/u HTN, GERD, DM, polyneuropathy, HLD, hypothyroidism  PCP visit 08/2023 for f/u DM, HTN, HLD, hypothyroidism  PCP visit 05/2023 for annual exam    The patient's list of active medical problems, social history, medications, and allergies as documented has been reviewed.     Past Medical History:   Diagnosis Date    Anticoagulant long-term use     Arthritis     Cataract     left " eye    Choroidal rupture of left eye     Diabetes mellitus type II     GERD (gastroesophageal reflux disease)     Hyperlipidemia     Hypertension     Hypothyroidism     Macular scar     right eye    Retinal degeneration     chorioretinal OD    Thyroid disease     Vitamin B 12 deficiency      Past Surgical History:   Procedure Laterality Date    APPENDECTOMY      BLADDER SUSPENSION      CATARACT EXTRACTION      right eye     CHOLECYSTECTOMY      COLONOSCOPY N/A 7/20/2020    Procedure: COLONOSCOPY;  Surgeon: Juan Parker MD;  Location: [facility] (4TH FLR);  Service: Endoscopy;  Laterality: N/A;  Hx of chronic anemia.  patient needs a   4/6/20 - removed from 4/20/20, rescheduled 7/20/20 - pg  covid 7/17-[facility] 2nd floor-tb    COLONOSCOPY N/A 2/9/2023    Procedure: COLONOSCOPY;  Surgeon: Renan Sanchez MD;  Location: [facility] (4TH FLR);  Service: Colon and Rectal;  Laterality: N/A;  instr handed to patient, -ml    CYSTOSCOPY N/A 5/19/2021    Procedure: CYSTOSCOPY;  Surgeon: Brody Smith MD;  Location: [address] FLR;  Service: Urology;  Laterality: N/A;    EYE SURGERY Bilateral     cataract removal    TOTAL ABDOMINAL HYSTERECTOMY      DUB     Family History   Problem Relation Age of Onset    Cancer Mother         uterine    Breast cancer Mother     Ovarian cancer Mother     COPD Father     Cancer Sister         liver    Ovarian cancer Sister     Hypertension Sister     Hypertension Sister     Hypertension Daughter     Thyroid disease Daughter     Cancer Daughter         uterine    Thyroid disease Daughter     Hypertension Daughter     Diabetes Son     Colon cancer Neg Hx     Amblyopia Neg Hx     Blindness Neg Hx     Cataracts Neg Hx     Glaucoma Neg Hx     Macular degeneration Neg Hx     Retinal detachment Neg Hx     Strabismus Neg Hx     Stroke Neg Hx      Social History     Socioeconomic History    Marital status:    Tobacco Use    Smoking status: Never    Smokeless  tobacco: Never   Substance and Sexual Activity    Alcohol use: No    Drug use: No    Sexual activity: Yes     Partners: Male     Birth control/protection: Post-menopausal     Comment:     Other Topics Concern    Are you pregnant or think you may be? No    Breast-feeding No   Social History Narrative     with 7 kids     Review of patient's allergies indicates:   Allergen Reactions    Bufferin [aspirin, buffered] Itching    Atorvastatin      Myalgias and arthralgias    Shellfish containing products Itching     Shellfish causes her to itch per her daughter, Caro.     Warfarin     Ibuprofen Itching     Itching of eyes, head       Review of Systems   Gastrointestinal:  Positive for abdominal pain.   Genitourinary:  Positive for dysuria, frequency and urgency.         Physical Exam:     Initial Vitals [01/23/24 1754]   BP Pulse Resp Temp SpO2   (!) 222/95 73 18 98.2 °F (36.8 °C) 96 %      MAP       --         Physical Exam    Nursing note and vitals reviewed.  Constitutional: She appears well-developed and well-nourished. She is not diaphoretic. No distress.   HENT:   Head: Normocephalic and atraumatic.   Mouth/Throat: Oropharynx is clear and moist.   Eyes: EOM are normal. Pupils are equal, round, and reactive to light.   Neck: No tracheal deviation present.   Cardiovascular:  Normal rate, regular rhythm, normal heart sounds and intact distal pulses.           Pulmonary/Chest: Breath sounds normal. No stridor. No respiratory distress. She has no wheezes.   Abdominal: Abdomen is soft and protuberant. Bowel sounds are normal. She exhibits no distension and no mass. A surgical scar is present. There is abdominal tenderness (mild) in the suprapubic area.   Obese abdomen.  Mild suprapubic tenderness. No rebound/guarding.    No right CVA tenderness.  No left CVA tenderness.     There is no rebound and no guarding.   Musculoskeletal:         General: No edema. Normal range of motion.      Cervical back: No tenderness  or bony tenderness.      Thoracic back: No tenderness or bony tenderness.      Lumbar back: No tenderness or bony tenderness.      Comments: Patient endorses L-sided lower back pain. No tenderness, bruising/swelling, skin changes/rash, or reproducible pain on exam.      Neurological: She is alert and oriented to person, place, and time. She has normal strength. No cranial nerve deficit or sensory deficit.   Skin: Skin is warm and dry. Capillary refill takes less than 2 seconds. No pallor.   Psychiatric: She has a normal mood and affect. Her behavior is normal. Thought content normal.         Differential Diagnoses:   Based on available information and initial assessment, Differential Diagnosis includes, but is not limited to:  AAA, aortic dissection, SBO/volvulus, intussusception, ileus, appendicitis, cholecystitis, hepatitis, nephrolithiasis, pancreatitis, IBD/IBS, biliary colic, GERD, PUD, constipation, UTI/pyelonephritis, musculoskeletal pain.       ED Management:   Procedures    Orders Placed This Encounter    Urinalysis, Reflex to Urine Culture Urine, Clean Catch    CBC Without Differential    Comprehensive metabolic panel    Urinalysis Microscopic    Ambulatory referral/consult to Urology    Insert peripheral IV    phenazopyridine (PYRIDIUM) 200 MG tablet          EKG:       Labs:     Labs Reviewed   URINALYSIS, REFLEX TO URINE CULTURE - Abnormal; Notable for the following components:       Result Value    Color, UA Colorless (*)     Occult Blood UA 1+ (*)     Leukocytes, UA 2+ (*)     All other components within normal limits    Narrative:     Specimen Source->Urine   CBC WITHOUT DIFFERENTIAL - Abnormal; Notable for the following components:    Hemoglobin 11.0 (*)     Hematocrit 35.2 (*)     MCV 71 (*)     MCH 22.3 (*)     MCHC 31.3 (*)     RDW 16.7 (*)     All other components within normal limits   COMPREHENSIVE METABOLIC PANEL - Abnormal; Notable for the following components:    Glucose 141 (*)     eGFR  57.7 (*)     All other components within normal limits   URINALYSIS MICROSCOPIC    Narrative:     Specimen Source->Urine     Independent review of the labs ordered include:   See ED course    Imaging:     Imaging Results    None            Medications Given:   Medications - No data to display     Medical Decision Making:    Additional Consideration:   Additional testing considered during clinical course: none    Social determinants of health considered during development of treatment plan include: poor access to care, language barrier    Current co-morbidities considered which impacted clinical decision making: HTN, GERD, DM, polyneuropathy, HLD, hypothyroidism    Case discussed with additional provider: none    ED Course as of 01/24/24 1728   Tue Jan 23, 2024 2042 SpO2: 96 % [SS]   2042 Resp: 18 [SS]   2042 Pulse: 73 [SS]   2042 Temp Source: Oral [SS]   2042 Temp: 98.2 °F (36.8 °C) [SS]   2042 BP(!): 222/95  Hypertensive, otherwise vitals reassuring [SS]   2042 BP(!): 183/71  BP improved without intervention [SS]   2126 CBC Without Differential(!)  Stable anemia [SS]   2139 Comprehensive metabolic panel(!)  Unremarkable [SS]   2139 Urinalysis, Reflex to Urine Culture Urine, Clean Catch(!) [SS]   2139 Urinalysis Microscopic  UA inconsistent with infection.  [SS]      ED Course User Index  [SS] Kody Haddad MD            Medical Decision Making  79 yo F with urinary frequency, dysuria for several months.  Vitals and exam benign.  Labs unremarkable, appear at baseline, UA without infection.   Suspect benign primary bladder dysfunction, patient otherwise well-appearing.  Will DC with Urology referral, supportive care discussed.     Problems Addressed:  Dysuria: acute illness or injury  Urinary frequency: acute illness or injury    Amount and/or Complexity of Data Reviewed  External Data Reviewed: notes.  Labs: ordered. Decision-making details documented in ED Course.    Risk  Prescription drug  management.        Clinical Impression:       ICD-10-CM ICD-9-CM   1. Dysuria  R30.0 788.1   2. Urinary frequency  R35.0 788.41       Discharge Medications:  Current Discharge Medication List        START taking these medications    Details   phenazopyridine (PYRIDIUM) 200 MG tablet Take 1 tablet (200 mg total) by mouth 3 (three) times daily. for 2 days  Qty: 6 tablet, Refills: 0               Follow-up Information       Follow up With Specialties Details Why Contact Info Additional Information    Jeison Sanchez MD Internal Medicine Schedule an appointment as soon as possible for a visit   [address]  [place] [address]  [phone]       [facility] [address] 4[address] Urology Schedule an appointment as soon as possible for a visit   [address] [address] [phone] Main Building, 4th Floor Please park in [facility] and take At[address] elevator             ED Disposition Condition    Discharge Stable              On re-evaluation, the patient's status has improved.  After complete ED evaluation, clinical impression is most consistent with urinary frequency.  PCP/Urology follow-up as soon as possible was recommended.    After taking into careful account the patient's history, physical exam findings, as well as empirical and objective data obtained throughout ED workup, I feel no emergent medical condition has been identified. No further evaluation or admission was felt to be required, and the patient is stable for discharge from the ED. The patient and any additional family present were updated with test results, overall clinical impression, and recommended further plan of care, including discharge instructions as provided and outpatient follow-up for continued evaluation and management as needed. All questions were answered. The patient expressed understanding and agreed with current plan for discharge and follow-up plan of care. Strict ED return precautions were  provided, including return/worsening of current symptoms, new symptoms, or any other concerns.       Kody Haddad MD  01/24/24 6551

## 2024-01-25 ENCOUNTER — TELEPHONE (OUTPATIENT)
Dept: UROLOGY | Facility: CLINIC | Age: 79
End: 2024-01-25
Payer: MEDICARE

## 2024-01-26 ENCOUNTER — OFFICE VISIT (OUTPATIENT)
Dept: UROLOGY | Facility: CLINIC | Age: 79
End: 2024-01-26
Payer: MEDICARE

## 2024-01-26 VITALS
HEART RATE: 78 BPM | WEIGHT: 166.88 LBS | SYSTOLIC BLOOD PRESSURE: 148 MMHG | DIASTOLIC BLOOD PRESSURE: 80 MMHG | BODY MASS INDEX: 27.77 KG/M2

## 2024-01-26 DIAGNOSIS — R30.0 DYSURIA: ICD-10-CM

## 2024-01-26 DIAGNOSIS — R35.0 URINARY FREQUENCY: ICD-10-CM

## 2024-01-26 DIAGNOSIS — R10.10 PAIN OF UPPER ABDOMEN: Primary | ICD-10-CM

## 2024-01-26 PROCEDURE — 1101F PT FALLS ASSESS-DOCD LE1/YR: CPT | Mod: CPTII,S$GLB,, | Performed by: UROLOGY

## 2024-01-26 PROCEDURE — 1159F MED LIST DOCD IN RCRD: CPT | Mod: CPTII,S$GLB,, | Performed by: UROLOGY

## 2024-01-26 PROCEDURE — 87088 URINE BACTERIA CULTURE: CPT | Performed by: UROLOGY

## 2024-01-26 PROCEDURE — 99999 PR PBB SHADOW E&M-EST. PATIENT-LVL IV: CPT | Mod: PBBFAC,,, | Performed by: UROLOGY

## 2024-01-26 PROCEDURE — 1125F AMNT PAIN NOTED PAIN PRSNT: CPT | Mod: CPTII,S$GLB,, | Performed by: UROLOGY

## 2024-01-26 PROCEDURE — 1157F ADVNC CARE PLAN IN RCRD: CPT | Mod: CPTII,S$GLB,, | Performed by: UROLOGY

## 2024-01-26 PROCEDURE — 3079F DIAST BP 80-89 MM HG: CPT | Mod: CPTII,S$GLB,, | Performed by: UROLOGY

## 2024-01-26 PROCEDURE — 99214 OFFICE O/P EST MOD 30 MIN: CPT | Mod: S$GLB,,, | Performed by: UROLOGY

## 2024-01-26 PROCEDURE — 3077F SYST BP >= 140 MM HG: CPT | Mod: CPTII,S$GLB,, | Performed by: UROLOGY

## 2024-01-26 PROCEDURE — 87086 URINE CULTURE/COLONY COUNT: CPT | Performed by: UROLOGY

## 2024-01-26 PROCEDURE — 3288F FALL RISK ASSESSMENT DOCD: CPT | Mod: CPTII,S$GLB,, | Performed by: UROLOGY

## 2024-01-28 LAB — BACTERIA UR CULT: ABNORMAL

## 2024-01-30 ENCOUNTER — OFFICE VISIT (OUTPATIENT)
Dept: OPTOMETRY | Facility: CLINIC | Age: 79
End: 2024-01-30
Payer: MEDICARE

## 2024-01-30 DIAGNOSIS — E11.40 TYPE 2 DIABETES MELLITUS WITH DIABETIC NEUROPATHY, WITHOUT LONG-TERM CURRENT USE OF INSULIN: ICD-10-CM

## 2024-01-30 DIAGNOSIS — H53.8 BLURRED VISION, BILATERAL: ICD-10-CM

## 2024-01-30 DIAGNOSIS — E11.9 TYPE 2 DIABETES MELLITUS WITHOUT RETINOPATHY: Primary | ICD-10-CM

## 2024-01-30 DIAGNOSIS — H31.011 MACULAR SCAR OF RIGHT EYE: ICD-10-CM

## 2024-01-30 DIAGNOSIS — H26.492 PCO (POSTERIOR CAPSULE OPACIFICATION), LEFT: ICD-10-CM

## 2024-01-30 DIAGNOSIS — H54.7 REDUCED VISION: ICD-10-CM

## 2024-01-30 DIAGNOSIS — Z96.1 PRESENCE OF INTRAOCULAR LENS: ICD-10-CM

## 2024-01-30 PROCEDURE — 1101F PT FALLS ASSESS-DOCD LE1/YR: CPT | Mod: CPTII,S$GLB,, | Performed by: OPTOMETRIST

## 2024-01-30 PROCEDURE — 99999 PR PBB SHADOW E&M-EST. PATIENT-LVL IV: CPT | Mod: PBBFAC,,, | Performed by: OPTOMETRIST

## 2024-01-30 PROCEDURE — 92004 COMPRE OPH EXAM NEW PT 1/>: CPT | Mod: S$GLB,,, | Performed by: OPTOMETRIST

## 2024-01-30 PROCEDURE — 1157F ADVNC CARE PLAN IN RCRD: CPT | Mod: CPTII,S$GLB,, | Performed by: OPTOMETRIST

## 2024-01-30 PROCEDURE — 1159F MED LIST DOCD IN RCRD: CPT | Mod: CPTII,S$GLB,, | Performed by: OPTOMETRIST

## 2024-01-30 PROCEDURE — 3288F FALL RISK ASSESSMENT DOCD: CPT | Mod: CPTII,S$GLB,, | Performed by: OPTOMETRIST

## 2024-01-30 PROCEDURE — 1125F AMNT PAIN NOTED PAIN PRSNT: CPT | Mod: CPTII,S$GLB,, | Performed by: OPTOMETRIST

## 2024-01-30 PROCEDURE — 2023F DILAT RTA XM W/O RTNOPTHY: CPT | Mod: CPTII,S$GLB,, | Performed by: OPTOMETRIST

## 2024-01-31 DIAGNOSIS — E03.9 HYPOTHYROIDISM, UNSPECIFIED TYPE: ICD-10-CM

## 2024-01-31 DIAGNOSIS — E11.40 TYPE 2 DIABETES MELLITUS WITH DIABETIC NEUROPATHY, WITHOUT LONG-TERM CURRENT USE OF INSULIN: ICD-10-CM

## 2024-01-31 DIAGNOSIS — I10 HTN (HYPERTENSION), BENIGN: ICD-10-CM

## 2024-01-31 DIAGNOSIS — E78.5 HYPERLIPIDEMIA, UNSPECIFIED HYPERLIPIDEMIA TYPE: ICD-10-CM

## 2024-01-31 NOTE — TELEPHONE ENCOUNTER
No care due was identified.  [facility] Embedded Care Due Messages. Reference number: [id number].   1/31/2024 5:17:45 PM CST

## 2024-02-01 ENCOUNTER — TELEPHONE (OUTPATIENT)
Dept: OPTOMETRY | Facility: CLINIC | Age: 79
End: 2024-02-01
Payer: MEDICARE

## 2024-02-01 RX ORDER — CARVEDILOL 25 MG/1
TABLET ORAL
Refills: 0 | OUTPATIENT
Start: 2024-02-01

## 2024-02-01 RX ORDER — METFORMIN HYDROCHLORIDE 1000 MG/1
TABLET ORAL
Refills: 0 | OUTPATIENT
Start: 2024-02-01

## 2024-02-01 RX ORDER — ROSUVASTATIN CALCIUM 20 MG/1
TABLET, COATED ORAL
Refills: 0 | OUTPATIENT
Start: 2024-02-01

## 2024-02-01 RX ORDER — GLIPIZIDE 5 MG/1
TABLET, FILM COATED, EXTENDED RELEASE ORAL
Refills: 0 | OUTPATIENT
Start: 2024-02-01

## 2024-02-01 RX ORDER — LISINOPRIL 40 MG/1
TABLET ORAL
Refills: 0 | OUTPATIENT
Start: 2024-02-01

## 2024-02-01 RX ORDER — LEVOTHYROXINE SODIUM 112 UG/1
TABLET ORAL
Refills: 0 | OUTPATIENT
Start: 2024-02-01

## 2024-02-01 NOTE — TELEPHONE ENCOUNTER
Refill Decision Note   Aminah Norton  is requesting a refill authorization.  Brief Assessment and Rationale for Refill:  Quick Discontinue     Medication Therapy Plan:    Pharmacy is requesting new scripts for the following medications without required information, (sig/ frequency/qty/etc)      Medication Reconciliation Completed: No     Comments: Pharmacies have been requesting medications for patients without required information, (sig, frequency, qty, etc.). In addition, requests are sent for medication(s) pt. are currently not taking, and medications patients have never taken.    We have spoken to the pharmacies about these request types and advised their teams previously that we are unable to assess these New Script requests and require all details for these requests. This is a known issue and has been reported.     Note composed:8:28 AM 02/01/2024

## 2024-02-01 NOTE — TELEPHONE ENCOUNTER
Left pt message letting her know I called to schedule for YAG.    ----- Message from Nancy Green MA sent at 2/1/2024  4:14 PM CST -----  Regarding: FW: Yag consult  Do you mind calling this patient?  ----- Message -----  From: Melissa Vazquez  Sent: 2/1/2024   4:10 PM CST  To: Amina YE Staff  Subject: Yag consult                                      Patient Requesting Sooner Appointment.     Reason for sooner appt.:Please schedule the patient w/ ophthalmology next available for a YAG consult.  When is the first available appointment?N/A  Communication Preference: [phone]  Additional Information:

## 2024-02-02 ENCOUNTER — HOSPITAL ENCOUNTER (OUTPATIENT)
Dept: RADIOLOGY | Facility: HOSPITAL | Age: 79
Discharge: HOME | End: 2024-02-02
Attending: UROLOGY
Payer: MEDICARE

## 2024-02-02 DIAGNOSIS — R10.10 PAIN OF UPPER ABDOMEN: ICD-10-CM

## 2024-02-02 PROCEDURE — 74176 CT ABD & PELVIS W/O CONTRAST: CPT | Mod: TC

## 2024-02-02 PROCEDURE — 74176 CT ABD & PELVIS W/O CONTRAST: CPT | Mod: 26,,, | Performed by: RADIOLOGY

## 2024-02-06 ENCOUNTER — PROCEDURE VISIT (OUTPATIENT)
Dept: UROLOGY | Facility: CLINIC | Age: 79
End: 2024-02-06
Payer: MEDICARE

## 2024-02-06 VITALS
BODY MASS INDEX: 28.03 KG/M2 | DIASTOLIC BLOOD PRESSURE: 72 MMHG | WEIGHT: 168.44 LBS | SYSTOLIC BLOOD PRESSURE: 162 MMHG | TEMPERATURE: 98 F | RESPIRATION RATE: 17 BRPM | HEART RATE: 71 BPM

## 2024-02-06 DIAGNOSIS — R10.10 PAIN OF UPPER ABDOMEN: ICD-10-CM

## 2024-02-06 DIAGNOSIS — R35.0 URINARY FREQUENCY: ICD-10-CM

## 2024-02-06 PROCEDURE — 52000 CYSTOURETHROSCOPY: CPT | Mod: S$GLB,,, | Performed by: UROLOGY

## 2024-02-06 RX ORDER — LIDOCAINE HYDROCHLORIDE 20 MG/ML
JELLY TOPICAL
Status: COMPLETED | OUTPATIENT
Start: 2024-02-06 | End: 2024-02-06

## 2024-02-06 RX ADMIN — LIDOCAINE HYDROCHLORIDE: 20 JELLY TOPICAL at 08:02

## 2024-02-06 NOTE — PATIENT INSTRUCTIONS
What to Expect After a Cystoscopy  For the next 24-48 hours, you may feel a mild burning when you urinate. This burning is normal and expected. Drink plenty of water to dilute the urine to help relieve the burning sensation. You may also see a small amount of blood in your urine after the procedure.    Unless you are already taking antibiotics, you may be given an antibiotic after the test to prevent infection.    Signs and Symptoms to Report  Call the [facility] at [phone] if you develop any of the following:  Fever of 101 degrees or higher  Chills or persistent bleeding  Inability to urinate

## 2024-02-06 NOTE — PROCEDURES
Office Cystourethroscopy Note    Date: 2/6/2024   Referring Provider: Brody Smith MD     Reason for Cystoscopy: Interstitial Cystitis/Bladder Pain Syndrome    Upper Tract Evaluation:   Stone Protocol CT:  Urolithiasis -  Right ureter (number 1; largest  3 mm)    Procedure Details: Informed consent was obtained and she was sterily prepped and 1% lidocaine jelly was injected per urethra. A flexible cystoscope was inserted into the bladder via the urethra. There was no evidence of stricture, stenosis, lesions, other obstruction, or diverticulum. Significant findings included a normal unobstructed urethra only. Cystoscopic examination of the bladder revealed orthotopically positioned, normal bilateral ureteral orifices with clear yellow urine effluxing from each orifice. All mucosal surfaces were examined with no apparent stones, tumors, foreign bodies, erythema, trabeculation, or diverticula. She had a Hunner lesion on the left posterior bladder wall. The procedure was concluded without complications. The patient was not administered a post-procedure antibiotic.     Specimens: No Specimens    Findings: Hunner lesions     Other Findings:  PVR - 0 mL    Pelvic Exam:  No Pelvic Exam Repeated Today     Assesment and Plan:   Interstitial Cystitis HL+: Will plan on ulcer injection in the OR    Obstructing Ureteral stone: Plan on right USE in OR

## 2024-02-07 ENCOUNTER — TELEPHONE (OUTPATIENT)
Dept: UROLOGY | Facility: CLINIC | Age: 79
End: 2024-02-07
Payer: MEDICARE

## 2024-02-07 NOTE — TELEPHONE ENCOUNTER
Call was placed to patient no answer left VM. In patient's Chart patient has a Cystoscopy with Dr. Smith The symptoms she has are normal no need to come in to be seen She was given instructions on After visit summary that Dysuria is NORMAL to drink plenty fluids to flush out system.

## 2024-02-08 ENCOUNTER — TELEPHONE (OUTPATIENT)
Dept: UROLOGY | Facility: CLINIC | Age: 79
End: 2024-02-08

## 2024-02-08 ENCOUNTER — TELEPHONE (OUTPATIENT)
Dept: UROLOGY | Facility: CLINIC | Age: 79
End: 2024-02-08
Payer: MEDICARE

## 2024-02-08 DIAGNOSIS — N30.10 INTERSTITIAL CYSTITIS: Primary | ICD-10-CM

## 2024-02-08 DIAGNOSIS — N20.1 RIGHT URETERAL STONE: ICD-10-CM

## 2024-02-26 ENCOUNTER — CLINICAL SUPPORT (OUTPATIENT)
Dept: DIABETES | Facility: CLINIC | Age: 79
End: 2024-02-26
Payer: MEDICARE

## 2024-02-26 DIAGNOSIS — E11.40 TYPE 2 DIABETES MELLITUS WITH DIABETIC NEUROPATHY, WITHOUT LONG-TERM CURRENT USE OF INSULIN: Primary | ICD-10-CM

## 2024-02-26 PROCEDURE — G0108 DIAB MANAGE TRN  PER INDIV: HCPCS | Mod: S$GLB,,,

## 2024-02-27 ENCOUNTER — TELEPHONE (OUTPATIENT)
Dept: UROLOGY | Facility: CLINIC | Age: 79
End: 2024-02-27
Payer: MEDICARE

## 2024-02-27 NOTE — TELEPHONE ENCOUNTER
URIELM to inform pt that sx times are not finalized until the afternoon prior to sx and to give us a call on Thursday afternoon for an arrival time    ----- Message from Keith JOAQUIN Route sent at 2/27/2024 12:22 PM CST -----  Regarding: Arrival Time  Contact: pt. [phone]  Pt is calling in ref to arrival time for her Fri 3/1 procedure. Patient Requesting Call Back @  [phone]

## 2024-02-28 NOTE — PROGRESS NOTES
Diabetes Care Specialist Progress Note  Author: Nancy العراقي RD  Date: 2/28/2024    Program Intake  Reason for Diabetes Program Visit:: Intervention  Type of Intervention:: Individual  Individual: Education  Education: Self-Management Skill Review, Nutrition and Meal Planning  Current diabetes risk level:: moderate  In the last 12 months, have you:: used emergency room services  Was the ER or hospital admission related to diabetes?: No  Continuous Glucose Monitoring  Patient has CGM: No    Lab Results   Component Value Date    HGBA1C 8.2 (H) 11/09/2023    HGBA1C 8.2 (H) 11/09/2023     Clinical      There is no height or weight on file to calculate BMI.    Clinical Assessment  Current Diabetes Treatment: Oral Medication    Medication Information  Medication adherence impacting ability to self-manage diabetes?: No    Labs  Do you have regular lab work to monitor your medications?: Yes  Type of Regular Lab Work: A1c, Cholesterol, Microalbumin, CBC, BMP  Lab Compliance Barriers: No    Nutritional Status  Diet: Regular  Meal Plan 24 Hour Recall: Breakfast, Lunch, Dinner, Snack  Meal Plan 24 Hour Recall - Breakfast: Toast with eggs  Meal Plan 24 Hour Recall - Lunch: Broccoli soup  Meal Plan 24 Hour Recall - Dinner: Beef with 2 tablespoons of rice  Meal Plan 24 Hour Recall - Snack: Drinks: water  Current nutritional status an area of need that is impacting patient's ability to self-manage diabetes?: No    Additional Social History    Support  Does anyone support you with your diabetes care?: yes  Who supports you?: self  Who takes you to your medical appointments?: self  Does the current support meet the patient's needs?: Yes  Is Support an area impacting ability to self-manage diabetes?: No    Cognitive/Behavioral Health  Alert and Oriented: Yes  Difficulty Thinking: No  Requires Prompting: No  Requires assistance for routine expression?: No  Cognitive or behavioral barriers impacting ability to self-manage diabetes?:  No    Communication  Language preference: Czech  Hearing Problems: No  Vision Problems: Yes  Vision problem type:: Decreased Vision  Vision Assistance: Glasses  Communication needs impacting ability to self-manage diabetes?: No    Health Literacy  Preferred Learning Method: Face to Face, Reading Materials  Health literacy needs impacting ability to self-manage diabetes?: No    Diabetes Self-Management Skills Assessment    Diabetes Disease Process/Treatment Options  Diabetes Disease Process/Treatment Options: Skills Assessment Completed: No  Deferred due to:: Time  Area of need?: Deferred    Nutrition/Healthy Eating  Challenges to healthy eating:: portion control  Method of carbohydrate measurement:: carb counting/reading labels  Nutrition/Healthy Eating Skills Assessment Completed:: Yes  Assessment indicates:: Instruction Needed  Area of need?: Yes    Physical Activity/Exercise  Patient's daily activity level:: lightly active  Patient formally exercises outside of work.: yes  Exercise Type: other (see comments) (Physical therapy exercises)  Patient can identify forms of physical activity.: yes  Stated forms of physical activity:: any movement performed by muscles that uses energy  Assessment indicates:: Adequate understanding  Area of need?: No    Medications  Patient is able to describe current diabetes management routine.: yes  Diabetes management routine:: oral medications  Patient is able to identify current diabetes medications, dosages, and appropriate timing of medications.: yes (Glipizide, Metformin, Januvia)  Patient reports problems or concerns with current medication regimen.: no  Medication regimen problems/concerns:: concerned about side effects  Medication Skills Assessment Completed:: Yes  Assessment indicates:: Instruction Needed  Area of need?: Yes    Home Blood Glucose Monitoring  Patient states that blood sugar is checked at home daily.: yes  Monitoring Method:: home glucometer  Reasons for not  "monitoring:: other (see comments) (Pt reports pharmacy sent wrong testing strips)  How often do you check your blood sugar?: Twice a day  When do you check your blood sugar?: Before breakfast, Before dinner  When you check what is your typical blood sugar range? : In AM:, at night:160, highest:160, lowest:96  Blood glucose logs:: no  Blood glucose logs reviewed today?: no  Assessment indicates:: Adequate understanding  Area of need?: No    Acute Complications  Patient can identify general symptoms of hypoglycemia: yes  Patient identified:: other (see comments) (Headache, "feels horrible")  Able to state proper treatment of hypoglycemia?: yes  Patient identified:: 1/2 can soda/fruit juice  Assessment indicates:: Adequate understanding  Deffered due to:: Time  Area of need?: No    Chronic Complications  Patient is taking statin?: Yes  Chronic Complications Skills Assessment Completed: : No  Deferred due to:: Time  Area of need?: Deferred    Psychosocial/Coping  Psychosocial/Coping Skills Assessment Completed: : No  Deffered due to:: Time  Area of need?: Deferred    Assessment Summary and Plan    Based on today's diabetes care assessment, the following areas of need were identified:          2/26/2024    12:01 AM   Social   Support No   Cognitive/Behavioral Health No   Communication No   Health Literacy No            2/26/2024    12:01 AM   Clinical   Medication Adherence No   Lab Compliance No   Nutritional Status No            2/26/2024    12:01 AM   Diabetes Self-Management Skills   Diabetes Disease Process/Treatment Options Deferred   Nutrition/Healthy Eating Yes, see care plan.   Physical Activity/Exercise No   Medication Yes, see care plan.   Home Blood Glucose Monitoring No   Acute Complications No   Chronic Complications Deferred   Psychosocial/Coping Deferred      Today's interventions were provided through individual discussion, instruction, and written materials were provided.      Patient verbalized " understanding of instruction and written materials.  Pt was able to return back demonstration of instructions today. Patient understood key points, needs reinforcement and further instruction.     Diabetes Self-Management Care Plan:    Today's Diabetes Self-Management Care Plan was developed with Aminah's input. Aminah has agreed to work toward the following goal(s) to improve his/her overall diabetes control.      Care Plan: Diabetes Management   Updates made since 1/29/2024 12:00 AM        Problem: Healthy Eating         Goal: Eat 2-3 meals daily with 30-45g/2-3 servings of Carbohydrate per meal. Limit snacking in between meal to 1 serving (15 grams).    Start Date: 6/16/2023   Expected End Date: 5/28/2024   This Visit's Progress: On track   Recent Progress: On track   Priority: High   Barriers: No Barriers Identified   Note:    Reviewed meal planning and general nutritional counseling with regards to diabetes management. Typical food intake obtained from patient. Patient currently is not measuring foods or carb counting.    9/20/23: Reviewed meal planning with pt.    12/11/23: Educator encouraged pt to continue to portion carbohydrates.    2/26/24: Pt reports reading labels and portioning carbohydrates.       Problem: Medications         Goal: Patient Agrees to take Diabetes Medications as prescribed.    Start Date: 12/11/2023   Expected End Date: 3/14/2024   This Visit's Progress: On track   Recent Progress: Deferred   Priority: Medium   Barriers: Other (comments)   Note:    Pt reports concern with frequent urination and abdominal pain. Educator encouraged pt to stay hydrated while taking Januvia. Pt reports drinking plenty of fluids. Educator messaged MD and encouraged pt to schedule appt with PCP.    2/26/24: Pt reports taking medications as prescribed by MD.       Follow Up Plan     Follow up in about 5 months (around 7/26/2024) for 5-month F/U.    Today's care plan and follow up schedule was discussed with  patient.  Aminah verbalized understanding of the care plan, goals, and agrees to follow up plan.        The patient was encouraged to communicate with his/her health care provider/physician and care team regarding his/her condition(s) and treatment.  I provided the patient with my contact information today and encouraged to contact me via phone or [facility] Patient Portal as needed.     Length of Visit   Total Time: 30 Minutes

## 2024-02-29 ENCOUNTER — TELEPHONE (OUTPATIENT)
Dept: PODIATRY | Facility: CLINIC | Age: 79
End: 2024-02-29
Payer: MEDICARE

## 2024-02-29 ENCOUNTER — LAB VISIT (OUTPATIENT)
Dept: LAB | Facility: HOSPITAL | Age: 79
End: 2024-02-29
Attending: INTERNAL MEDICINE
Payer: MEDICARE

## 2024-02-29 ENCOUNTER — OFFICE VISIT (OUTPATIENT)
Dept: INTERNAL MEDICINE | Facility: CLINIC | Age: 79
End: 2024-02-29
Payer: MEDICARE

## 2024-02-29 VITALS
SYSTOLIC BLOOD PRESSURE: 136 MMHG | WEIGHT: 168.63 LBS | BODY MASS INDEX: 28.1 KG/M2 | HEART RATE: 72 BPM | HEIGHT: 65 IN | DIASTOLIC BLOOD PRESSURE: 70 MMHG | OXYGEN SATURATION: 99 %

## 2024-02-29 DIAGNOSIS — E78.5 HYPERLIPIDEMIA, UNSPECIFIED HYPERLIPIDEMIA TYPE: ICD-10-CM

## 2024-02-29 DIAGNOSIS — E03.9 HYPOTHYROIDISM, UNSPECIFIED TYPE: ICD-10-CM

## 2024-02-29 DIAGNOSIS — R30.0 DYSURIA: ICD-10-CM

## 2024-02-29 DIAGNOSIS — E11.40 TYPE 2 DIABETES MELLITUS WITH DIABETIC NEUROPATHY, WITHOUT LONG-TERM CURRENT USE OF INSULIN: ICD-10-CM

## 2024-02-29 DIAGNOSIS — I70.0 AORTIC ATHEROSCLEROSIS: ICD-10-CM

## 2024-02-29 DIAGNOSIS — Z00.00 ANNUAL PHYSICAL EXAM: Primary | ICD-10-CM

## 2024-02-29 PROBLEM — D70.9 NEUTROPENIA, UNSPECIFIED TYPE: Status: RESOLVED | Noted: 2024-02-29 | Resolved: 2024-02-29

## 2024-02-29 PROBLEM — D70.9 NEUTROPENIA, UNSPECIFIED TYPE: Status: ACTIVE | Noted: 2024-02-29

## 2024-02-29 PROCEDURE — 3075F SYST BP GE 130 - 139MM HG: CPT | Mod: CPTII,S$GLB,, | Performed by: INTERNAL MEDICINE

## 2024-02-29 PROCEDURE — 3078F DIAST BP <80 MM HG: CPT | Mod: CPTII,S$GLB,, | Performed by: INTERNAL MEDICINE

## 2024-02-29 PROCEDURE — 1160F RVW MEDS BY RX/DR IN RCRD: CPT | Mod: CPTII,S$GLB,, | Performed by: INTERNAL MEDICINE

## 2024-02-29 PROCEDURE — 1101F PT FALLS ASSESS-DOCD LE1/YR: CPT | Mod: CPTII,S$GLB,, | Performed by: INTERNAL MEDICINE

## 2024-02-29 PROCEDURE — 99999 PR PBB SHADOW E&M-EST. PATIENT-LVL V: CPT | Mod: PBBFAC,,, | Performed by: INTERNAL MEDICINE

## 2024-02-29 PROCEDURE — 1126F AMNT PAIN NOTED NONE PRSNT: CPT | Mod: CPTII,S$GLB,, | Performed by: INTERNAL MEDICINE

## 2024-02-29 PROCEDURE — 99397 PER PM REEVAL EST PAT 65+ YR: CPT | Mod: S$GLB,,, | Performed by: INTERNAL MEDICINE

## 2024-02-29 PROCEDURE — 3288F FALL RISK ASSESSMENT DOCD: CPT | Mod: CPTII,S$GLB,, | Performed by: INTERNAL MEDICINE

## 2024-02-29 PROCEDURE — 1157F ADVNC CARE PLAN IN RCRD: CPT | Mod: CPTII,S$GLB,, | Performed by: INTERNAL MEDICINE

## 2024-02-29 PROCEDURE — 1159F MED LIST DOCD IN RCRD: CPT | Mod: CPTII,S$GLB,, | Performed by: INTERNAL MEDICINE

## 2024-02-29 RX ORDER — ROSUVASTATIN CALCIUM 20 MG/1
20 TABLET, COATED ORAL DAILY
Qty: 90 TABLET | Refills: 3 | Status: ON HOLD | OUTPATIENT
Start: 2024-02-29 | End: 2024-03-27 | Stop reason: HOSPADM

## 2024-02-29 NOTE — PRE-PROCEDURE INSTRUCTIONS
Your arrival time is 0730 and is roughly 2 hours before your anticipated procedure time to allow sufficient time for pre-op..      - Nothing to eat or drink after midinight, except AM meds with small sips of water or Gatorade/Powerade (no red)  - Hold all Diabetic meds AM of surgery  - Hold all Insulin AM of surgery  - Hold all Fluid pills AM of surgery  - Hold all non-insulin shots until after surgery (Ozempic, Mounjaro, Trulicity, Victoza, Byetta, Wegovy, Adlyxin, etc...) (7 days prior)  - Take all B/P meds, except those that contain a fluid pill  - Hold all vits and herbal meds AM of surgery  - Use inhalers as needed and bring AM of surgery  - Take blood thinner meds AM of surgery (unless directed otherwise)  - Use eye drops as directed  - Shower and wash face with dial soap for 3 mins PM prior and AM of surgery  - No powder, lotions, creams, (makeup),  jewelry or deodorant     - Wear comfortable clothing (button up shirt)      (Patients ride may not leave while patient is in surgery)        -- 2nd floor surgery ctr at [facility] @ [address] [place] [address]        Pt voiced understanding

## 2024-02-29 NOTE — PROGRESS NOTES
"    CHIEF COMPLAINT     Chief Complaint   Patient presents with    Urinary Tract Infection    Urinary Frequency       HPI     Aminah Norton is a 78 y.o. femaleHTN, GERD, TIIDM with polyneuropathy, HLD, hypothyroidism     Reports blood sugars were better controlled.  She was able to start taking the Januvia in addition to her glipizide 5 mg and metformin 1000 b.i.d..  Personally Reviewed Patient's Medical, surgical, family and social hx. Changes updated in [facility].  Care Team updated in Epic    Review of Systems:  Review of Systems   Respiratory:  Negative for cough and shortness of breath.    Genitourinary:  Positive for dysuria.   Neurological:  Positive for numbness.       Health Maintenance:   Reviewed with patient  Due for the following:      PHYSICAL EXAM     /70   Pulse 72   Ht 5' 5" (1.651 m)   Wt 76.5 kg (168 lb 10.4 oz)   SpO2 99%   BMI 28.07 kg/m²     Gen: Well Appearing, NAD  HEENT: PERR, EOMI  Neck: FROM, no thyromegaly, no cervical adenopathy  CVD: RRR, no M/R/G  Pulm: Normal work of breathing, CTAB, no wheezing  Abd:  Soft, NT, ND non TTP, no mass  MSK: no LE edema  Neuro: A&Ox3, gait normal, speech normal  Mood; Mood normal, behavior normal, thought process linear       LABS     Labs reviewed; Notable for    ASSESSMENT     1. Annual physical exam        2. Type 2 diabetes mellitus with diabetic neuropathy, without long-term current use of insulin  Hemoglobin A1C    LIPID PANEL    Microalbumin/Creatinine Ratio, Urine      3. Aortic atherosclerosis        4. Hypothyroidism, unspecified type  TSH      5. Dysuria  Urinalysis, Reflex to Urine Culture Urine, Clean Catch    CANCELED: Urinalysis, Reflex to Urine Culture Urine, Clean Catch              Plan     Aminah Norton is a 78 y.o. female with HTN, GERD, TIIDM with polyneuropathy, HLD, hypothyroidism   1. Annual physical exam  Updated problem list, medical history, care team and discussed HM.     2. Type 2 " diabetes mellitus with diabetic neuropathy, without long-term current use of insulin  Recheck A1c  Suspect numbers will be better based on what she is reporting  Continue metformin 1000 b.i.d., Januvia 100 and glipizide 5 mg daily  On statin blood pressure at goal  Check urine today  Needs to follow back up with Podiatry  - Hemoglobin A1C; Future  - LIPID PANEL; Future  - Microalbumin/Creatinine Ratio, Urine; Future    3. Aortic atherosclerosis  Radiologic diagnosis continue to monitor    4. Hypothyroidism, unspecified type  Continue levothyroxine 112 mcg.  Will recheck TSH  - TSH; Future    5. Dysuria  ICS.  Also has bladder ulceration she is undergoing fulguration tomorrow  Will check urine to look for signs of infection  - Urinalysis, Reflex to Urine Culture Urine, Clean Catch; Future    Rtc   Jeison Sanchez MD

## 2024-03-01 ENCOUNTER — TELEPHONE (OUTPATIENT)
Dept: INTERNAL MEDICINE | Facility: CLINIC | Age: 79
End: 2024-03-01
Payer: MEDICARE

## 2024-03-01 ENCOUNTER — ANESTHESIA EVENT (OUTPATIENT)
Dept: SURGERY | Facility: HOSPITAL | Age: 79
End: 2024-03-01
Payer: MEDICARE

## 2024-03-01 ENCOUNTER — HOSPITAL ENCOUNTER (OUTPATIENT)
Facility: HOSPITAL | Age: 79
Discharge: HOME | End: 2024-03-01
Attending: UROLOGY | Admitting: UROLOGY
Payer: MEDICARE

## 2024-03-01 ENCOUNTER — ANESTHESIA (OUTPATIENT)
Dept: SURGERY | Facility: HOSPITAL | Age: 79
End: 2024-03-01
Payer: MEDICARE

## 2024-03-01 VITALS
BODY MASS INDEX: 27.66 KG/M2 | WEIGHT: 166 LBS | RESPIRATION RATE: 12 BRPM | TEMPERATURE: 98 F | HEART RATE: 70 BPM | OXYGEN SATURATION: 96 % | SYSTOLIC BLOOD PRESSURE: 166 MMHG | DIASTOLIC BLOOD PRESSURE: 67 MMHG | HEIGHT: 65 IN

## 2024-03-01 DIAGNOSIS — N30.10 INTERSTITIAL CYSTITIS: Primary | ICD-10-CM

## 2024-03-01 DIAGNOSIS — N20.0 KIDNEY STONE: ICD-10-CM

## 2024-03-01 DIAGNOSIS — N20.1 RIGHT URETERAL STONE: ICD-10-CM

## 2024-03-01 LAB — POCT GLUCOSE: 120 MG/DL (ref 70–110)

## 2024-03-01 PROCEDURE — 74420 UROGRAPHY RTRGR +-KUB: CPT | Mod: 26,,, | Performed by: UROLOGY

## 2024-03-01 PROCEDURE — 71000016 HC POSTOP RECOV ADDL HR: Performed by: UROLOGY

## 2024-03-01 PROCEDURE — 71000033 HC RECOVERY, INTIAL HOUR: Performed by: UROLOGY

## 2024-03-01 PROCEDURE — 63600175 PHARM REV CODE 636 W HCPCS: Performed by: ANESTHESIOLOGY

## 2024-03-01 PROCEDURE — 25000003 PHARM REV CODE 250: Performed by: UROLOGY

## 2024-03-01 PROCEDURE — 27200651 HC AIRWAY, LMA: Performed by: ANESTHESIOLOGY

## 2024-03-01 PROCEDURE — 63600175 PHARM REV CODE 636 W HCPCS: Performed by: NURSE ANESTHETIST, CERTIFIED REGISTERED

## 2024-03-01 PROCEDURE — C2617 STENT, NON-COR, TEM W/O DEL: HCPCS | Performed by: UROLOGY

## 2024-03-01 PROCEDURE — 82962 GLUCOSE BLOOD TEST: CPT | Performed by: UROLOGY

## 2024-03-01 PROCEDURE — 99900035 HC TECH TIME PER 15 MIN (STAT)

## 2024-03-01 PROCEDURE — D9220A PRA ANESTHESIA: Mod: ,,, | Performed by: NURSE ANESTHETIST, CERTIFIED REGISTERED

## 2024-03-01 PROCEDURE — 53899 UNLISTED PX URINARY SYSTEM: CPT | Mod: ,,, | Performed by: UROLOGY

## 2024-03-01 PROCEDURE — C1758 CATHETER, URETERAL: HCPCS | Performed by: UROLOGY

## 2024-03-01 PROCEDURE — 71000039 HC RECOVERY, EACH ADD'L HOUR: Performed by: UROLOGY

## 2024-03-01 PROCEDURE — 37000008 HC ANESTHESIA 1ST 15 MINUTES: Performed by: UROLOGY

## 2024-03-01 PROCEDURE — 94761 N-INVAS EAR/PLS OXIMETRY MLT: CPT

## 2024-03-01 PROCEDURE — 36000706: Performed by: UROLOGY

## 2024-03-01 PROCEDURE — 27201423 OPTIME MED/SURG SUP & DEVICES STERILE SUPPLY: Performed by: UROLOGY

## 2024-03-01 PROCEDURE — 52356 CYSTO/URETERO W/LITHOTRIPSY: CPT | Mod: RT,,, | Performed by: UROLOGY

## 2024-03-01 PROCEDURE — 82365 CALCULUS SPECTROSCOPY: CPT | Performed by: UROLOGY

## 2024-03-01 PROCEDURE — 71000015 HC POSTOP RECOV 1ST HR: Performed by: UROLOGY

## 2024-03-01 PROCEDURE — 36000707: Performed by: UROLOGY

## 2024-03-01 PROCEDURE — C1769 GUIDE WIRE: HCPCS | Performed by: UROLOGY

## 2024-03-01 PROCEDURE — 37000009 HC ANESTHESIA EA ADD 15 MINS: Performed by: UROLOGY

## 2024-03-01 PROCEDURE — 25000003 PHARM REV CODE 250: Performed by: NURSE ANESTHETIST, CERTIFIED REGISTERED

## 2024-03-01 PROCEDURE — 63600175 PHARM REV CODE 636 W HCPCS: Performed by: UROLOGY

## 2024-03-01 PROCEDURE — 25500020 PHARM REV CODE 255: Performed by: UROLOGY

## 2024-03-01 DEVICE — STENT URETERAL UNIV 6FR 24CM
Type: IMPLANTABLE DEVICE | Site: URETER | Status: NON-FUNCTIONAL
Removed: 2024-05-24

## 2024-03-01 RX ORDER — SODIUM CHLORIDE 9 MG/ML
INJECTION, SOLUTION INTRAVENOUS CONTINUOUS
Status: DISCONTINUED | OUTPATIENT
Start: 2024-03-01 | End: 2024-03-01 | Stop reason: HOSPADM

## 2024-03-01 RX ORDER — CIPROFLOXACIN 2 MG/ML
400 INJECTION, SOLUTION INTRAVENOUS ONCE
Status: DISCONTINUED | OUTPATIENT
Start: 2024-03-01 | End: 2024-03-01 | Stop reason: HOSPADM

## 2024-03-01 RX ORDER — ONDANSETRON HYDROCHLORIDE 2 MG/ML
4 INJECTION, SOLUTION INTRAVENOUS ONCE AS NEEDED
Status: COMPLETED | OUTPATIENT
Start: 2024-03-01 | End: 2024-03-01

## 2024-03-01 RX ORDER — CEFAZOLIN SODIUM 1 G/3ML
INJECTION, POWDER, FOR SOLUTION INTRAMUSCULAR; INTRAVENOUS
Status: DISCONTINUED | OUTPATIENT
Start: 2024-03-01 | End: 2024-03-01

## 2024-03-01 RX ORDER — PHENYLEPHRINE HCL IN 0.9% NACL 1 MG/10 ML
SYRINGE (ML) INTRAVENOUS
Status: DISCONTINUED | OUTPATIENT
Start: 2024-03-01 | End: 2024-03-01

## 2024-03-01 RX ORDER — ONDANSETRON HYDROCHLORIDE 2 MG/ML
INJECTION, SOLUTION INTRAVENOUS
Status: DISCONTINUED | OUTPATIENT
Start: 2024-03-01 | End: 2024-03-01

## 2024-03-01 RX ORDER — PROPOFOL 10 MG/ML
VIAL (ML) INTRAVENOUS
Status: DISCONTINUED | OUTPATIENT
Start: 2024-03-01 | End: 2024-03-01

## 2024-03-01 RX ORDER — MIDAZOLAM HYDROCHLORIDE 1 MG/ML
INJECTION, SOLUTION INTRAMUSCULAR; INTRAVENOUS
Status: DISCONTINUED | OUTPATIENT
Start: 2024-03-01 | End: 2024-03-01

## 2024-03-01 RX ORDER — LIDOCAINE HYDROCHLORIDE 10 MG/ML
1 INJECTION, SOLUTION EPIDURAL; INFILTRATION; INTRACAUDAL; PERINEURAL ONCE
Status: DISCONTINUED | OUTPATIENT
Start: 2024-03-01 | End: 2024-03-01 | Stop reason: HOSPADM

## 2024-03-01 RX ORDER — FAMOTIDINE 10 MG/ML
INJECTION INTRAVENOUS
Status: DISCONTINUED | OUTPATIENT
Start: 2024-03-01 | End: 2024-03-01

## 2024-03-01 RX ORDER — HYDRALAZINE HYDROCHLORIDE 20 MG/ML
INJECTION INTRAMUSCULAR; INTRAVENOUS
Status: DISCONTINUED | OUTPATIENT
Start: 2024-03-01 | End: 2024-03-01

## 2024-03-01 RX ORDER — HYDROCODONE BITARTRATE AND ACETAMINOPHEN 5; 325 MG/1; MG/1
1 TABLET ORAL EVERY 6 HOURS PRN
Qty: 12 TABLET | Refills: 0 | Status: SHIPPED | OUTPATIENT
Start: 2024-03-01 | End: 2024-03-04

## 2024-03-01 RX ORDER — CEFAZOLIN SODIUM 1 G/3ML
2 INJECTION, POWDER, FOR SOLUTION INTRAMUSCULAR; INTRAVENOUS
Status: DISCONTINUED | OUTPATIENT
Start: 2024-03-01 | End: 2024-03-01 | Stop reason: HOSPADM

## 2024-03-01 RX ORDER — FENTANYL CITRATE 50 UG/ML
INJECTION, SOLUTION INTRAMUSCULAR; INTRAVENOUS
Status: DISCONTINUED | OUTPATIENT
Start: 2024-03-01 | End: 2024-03-01

## 2024-03-01 RX ORDER — TRIAMCINOLONE ACETONIDE 40 MG/ML
INJECTION, SUSPENSION INTRA-ARTICULAR; INTRAMUSCULAR
Status: DISCONTINUED | OUTPATIENT
Start: 2024-03-01 | End: 2024-03-01 | Stop reason: HOSPADM

## 2024-03-01 RX ORDER — LIDOCAINE HYDROCHLORIDE 20 MG/ML
INJECTION INTRAVENOUS
Status: DISCONTINUED | OUTPATIENT
Start: 2024-03-01 | End: 2024-03-01

## 2024-03-01 RX ORDER — SODIUM CHLORIDE 0.9 % (FLUSH) 0.9 %
10 SYRINGE (ML) INJECTION
Status: DISCONTINUED | OUTPATIENT
Start: 2024-03-01 | End: 2024-03-01 | Stop reason: HOSPADM

## 2024-03-01 RX ORDER — HYDROMORPHONE HYDROCHLORIDE 1 MG/ML
0.5 INJECTION, SOLUTION INTRAMUSCULAR; INTRAVENOUS; SUBCUTANEOUS EVERY 5 MIN PRN
Status: DISCONTINUED | OUTPATIENT
Start: 2024-03-01 | End: 2024-03-01 | Stop reason: HOSPADM

## 2024-03-01 RX ORDER — DEXAMETHASONE SODIUM PHOSPHATE 4 MG/ML
INJECTION, SOLUTION INTRA-ARTICULAR; INTRALESIONAL; INTRAMUSCULAR; INTRAVENOUS; SOFT TISSUE
Status: DISCONTINUED | OUTPATIENT
Start: 2024-03-01 | End: 2024-03-01

## 2024-03-01 RX ADMIN — ONDANSETRON 4 MG: 2 INJECTION INTRAMUSCULAR; INTRAVENOUS at 01:03

## 2024-03-01 RX ADMIN — FAMOTIDINE 20 MG: 10 INJECTION, SOLUTION INTRAVENOUS at 12:03

## 2024-03-01 RX ADMIN — PROPOFOL 60 MG: 10 INJECTION, EMULSION INTRAVENOUS at 12:03

## 2024-03-01 RX ADMIN — CEFAZOLIN 2 G: 330 INJECTION, POWDER, FOR SOLUTION INTRAMUSCULAR; INTRAVENOUS at 12:03

## 2024-03-01 RX ADMIN — DEXAMETHASONE SODIUM PHOSPHATE 8 MG: 4 INJECTION INTRA-ARTICULAR; INTRALESIONAL; INTRAMUSCULAR; INTRAVENOUS; SOFT TISSUE at 12:03

## 2024-03-01 RX ADMIN — ONDANSETRON 4 MG: 2 INJECTION INTRAMUSCULAR; INTRAVENOUS at 12:03

## 2024-03-01 RX ADMIN — Medication 50 MCG: at 12:03

## 2024-03-01 RX ADMIN — HYDRALAZINE HYDROCHLORIDE 2 MG: 20 INJECTION, SOLUTION INTRAMUSCULAR; INTRAVENOUS at 12:03

## 2024-03-01 RX ADMIN — HYDROMORPHONE HYDROCHLORIDE 0.5 MG: 1 INJECTION, SOLUTION INTRAMUSCULAR; INTRAVENOUS; SUBCUTANEOUS at 02:03

## 2024-03-01 RX ADMIN — SODIUM CHLORIDE: 9 INJECTION, SOLUTION INTRAVENOUS at 10:03

## 2024-03-01 RX ADMIN — HYDRALAZINE HYDROCHLORIDE 2 MG: 20 INJECTION, SOLUTION INTRAMUSCULAR; INTRAVENOUS at 01:03

## 2024-03-01 RX ADMIN — FENTANYL CITRATE 25 MCG: 50 INJECTION INTRAMUSCULAR; INTRAVENOUS at 12:03

## 2024-03-01 RX ADMIN — Medication 100 MCG: at 12:03

## 2024-03-01 RX ADMIN — MIDAZOLAM 2 MG: 1 INJECTION INTRAMUSCULAR; INTRAVENOUS at 12:03

## 2024-03-01 RX ADMIN — LIDOCAINE HYDROCHLORIDE 6 MG: 20 INJECTION INTRAVENOUS at 12:03

## 2024-03-01 RX ADMIN — HYDROMORPHONE HYDROCHLORIDE 0.5 MG: 1 INJECTION, SOLUTION INTRAMUSCULAR; INTRAVENOUS; SUBCUTANEOUS at 01:03

## 2024-03-01 NOTE — ANESTHESIA PREPROCEDURE EVALUATION
03/01/2024  Aminah Norton is a 78 y.o., female for ULCER INJECTION FULGURATION (N/A), CYSTOURETEROSCOPY, WITH HOLMIUM LASER LITHOTRIPSY OF URETERAL CALCULUS AND STENT INSERTION (Right)    Past Medical History:   Diagnosis Date    Anticoagulant long-term use     Arthritis     Cataract     left eye    Choroidal rupture of left eye     Diabetes mellitus type II     GERD (gastroesophageal reflux disease)     Hyperlipidemia     Hypertension     Hypothyroidism     Macular scar     right eye    Retinal degeneration     chorioretinal OD    Thyroid disease     Vitamin B 12 deficiency      Past Surgical History:   Procedure Laterality Date    APPENDECTOMY      BLADDER SUSPENSION      CATARACT EXTRACTION      right eye     CHOLECYSTECTOMY      COLONOSCOPY N/A 7/20/2020    Procedure: COLONOSCOPY;  Surgeon: Juan Parker MD;  Location: [facility] (4TH FLR);  Service: Endoscopy;  Laterality: N/A;  Hx of chronic anemia.  patient needs a   4/6/20 - removed from 4/20/20, rescheduled 7/20/20 - pg  covid 7/17-[facility] 2[facility]    COLONOSCOPY N/A 2/9/2023    Procedure: COLONOSCOPY;  Surgeon: Renan Sanchez MD;  Location: [facility] (4TH FLR);  Service: Colon and Rectal;  Laterality: N/A;  instr handed to patient, -ml    CYSTOSCOPY N/A 5/19/2021    Procedure: CYSTOSCOPY;  Surgeon: Brody Smith MD;  Location: [facility] OR [facility] FLR;  Service: Urology;  Laterality: N/A;    EYE SURGERY Bilateral     cataract removal    TOTAL ABDOMINAL HYSTERECTOMY      DUB   Pre-op Assessment       I have reviewed the Medications.     Review of Systems  Anesthesia Hx:  No problems with previous Anesthesia             Denies Family Hx of Anesthesia complications.     Cardiovascular:     Hypertension                                  Hypertension         Hepatic/GI:     GERD      Gerd           Neurological:    Neuromuscular Disease,                                 Neuromuscular Disease   Endocrine:  Diabetes Hypothyroidism   Diabetes                   Hypothyroidism            Physical Exam  General: Well nourished    Airway:  Mallampati: II   TM Distance: Normal  Neck ROM: Normal ROM    Dental:  Intact    Chest/Lungs:  Clear to auscultation    Heart:  Rate: Normal  Rhythm: Regular Rhythm      Anesthesia Plan  Type of Anesthesia, risks & benefits discussed:    Anesthesia Type: Gen Supraglottic Airway  Intra-op Monitoring Plan: Standard ASA Monitors  Post Op Pain Control Plan: multimodal analgesia  Informed Consent: Informed consent signed with the Patient and all parties understand the risks and agree with anesthesia plan.  All questions answered.   ASA Score: 3    Ready For Surgery From Anesthesia Perspective.     .

## 2024-03-01 NOTE — ANESTHESIA POSTPROCEDURE EVALUATION
Anesthesia Post Evaluation    Patient: Aminah Alicea-Shelly    Procedure(s) Performed: Procedure(s) (LRB):  ULCER INJECTION FULGURATION (N/A)  CYSTOURETEROSCOPY, WITH HOLMIUM LASER LITHOTRIPSY OF URETERAL CALCULUS AND STENT INSERTION (Right)    Final Anesthesia Type: general      Patient location during evaluation: PACU  Patient participation: Yes- Able to Participate  Level of consciousness: awake and alert  Post-procedure vital signs: reviewed and stable  Pain management: adequate  Airway patency: patent    PONV status at discharge: No PONV  Anesthetic complications: no      Cardiovascular status: blood pressure returned to baseline  Respiratory status: unassisted  Hydration status: euvolemic  Follow-up not needed.          Vitals Value Taken Time   /67 03/01/24 1502   Temp 36.6 °C (97.9 °F) 03/01/24 1309   Pulse 78 03/01/24 1515   Resp 15 03/01/24 1515   SpO2 97 % 03/01/24 1515   Vitals shown include unvalidated device data.      Event Time   Out of Recovery 14:30:00         Pain/Lorenza Score: Pain Rating Prior to Med Admin: 6 (3/1/2024  2:35 PM)  Lorenza Score: 10 (3/1/2024  2:00 PM)

## 2024-03-01 NOTE — DISCHARGE SUMMARY
[facility]  Discharge Note  Short Stay    Procedure(s) (LRB):  ULCER INJECTION FULGURATION (N/A)  REMOVAL, CALCULUS, URETER, URETEROSCOPIC (N/A)  CYSTOSCOPY (N/A)      OUTCOME: Patient tolerated treatment/procedure well without complication and is now ready for discharge.    DISPOSITION: Home or Self Care    FINAL DIAGNOSIS:  Urolithiasis and interstitial cystitis    FOLLOWUP: In clinic    DISCHARGE INSTRUCTIONS:  No discharge procedures on file.     TIME SPENT ON DISCHARGE: 15 minutes

## 2024-03-01 NOTE — H&P
[facility]        History and Physical      3/1/2024     Referred by:  No ref. provider found     HPI: Aminah Bravo is a very pleasant 78 y.o. female who has is a return patient for [facility]. She underwent a cystoscopy 3 years ago which was normal except hypervascularity of the mucosa of the ureter. There were no tumors, masses, stones or strictures to explain the right hydronephrosis. She has a biopsy for the same finding 3 years prior, negative for malignancy.     She was last seen about 1-2 years ago.     She reported pain is associated with urinary frequency (10x daily) with nocturia (4x per night). Based on the history provided today, this urinary frequency is driven by pain, pressure or discomfort and not fear of incontinence. She does not require daily pad use. She has not made changes to fluid intake.  She is on Myrbertriq.     She reports symptoms are triggered by consumption of no particular foods or beverages. She does report periodic flares lasting several weeks. She  reports no sexual activity, though not from avoidance due to pain.     She denies symptoms of obstructive voiding including decreased stream, hesitancy, intermittency, post void dribbling and sense of incomplete emptying. Bladder scan PVR was 30 mL.  Her history includes no notation of urolithiasis, hematuria, prior pelvic surgery, previous prolapse or incontinence procedures or neurological symptoms/diagnoses. She denies all other prior pelvic surgeries or neurologic diagnoses. She does not report symptoms suggestive of advanced POP.      Previous evaluation has included:   Cystoscopy: Hunner Lesions noted on recent cystoscopy     A review of 10+ systems was conducted with pertinent positive and negative findings documented in HPI with all other systems reviewed and negative.     Past medical, family, surgical and social history reviewed as documented in chart with pertinent positive medical, family,  surgical and social history detailed in HPI.     Exam Findings:     Const: no acute distress, conversant and alert  Eyes: anicteric, extraocular muscles intact  ENMT: normocephalic, Nl oral membranes  Cardio: no cyanosis, nl cap refill  Pulm: no tachypnea; no resp distress  Abd: soft, no tenderness  Musc: no laceration, no tenderness  Neuro: alert; oriented x 3  Skin: warm, dry; no petichiae  Psych: no anxiety; normal speech      Assessment/Plan:     Bladder Pain (estab, worsening): Plan to inject vs fulgurate Hunner Lesions noted on recent cystoscopy

## 2024-03-01 NOTE — PLAN OF CARE
Pt in preop bay 40, VSS and IV inserted. Pt denies any open wounds on body or the use of any weight loss injections. Pt needs an  procedural consents, an admit order and anesthesia consents, otherwise ready to roll.

## 2024-03-01 NOTE — OP NOTE
Ureteroscopic Stone Extraction Operative Note  3/1/2024    Preoperative Diagnosis:   Urolithiasis  Interstitial cystitis HL+    Postoperative Diagnosis:  Urolithiasis  Interstitial cystitis HL+    Procedure:  Ureteroscopic stone extraction with lithotripsy and right ureteral stent placement (right) (63911)  Injection of Hunner Lesion with triamcinolone    Attending Surgeon: Brody Smith MD    Anesthesia: Laryngeal Mask Airway    EBL: <10 mL    Complications: None    Findings: No remaining stone fragments were seen    Drains: 6 x 24 cm ureteral stent WITHOUT externalized string    Reason for procedure: Aminah Norton is a very pleasant 78 y.o. female who presented with a history of bladder pain. On cystoscopy, she was found to have Hunner lesions. She was also seen to have an obstructing 3 mm distal right ureteral stone. She is scheduled to address both of these.    Procedure in detail:  Informed consent was obtained by explaining all risks and benefits of the procedure to the patient in detail.  After informed consent, the patient was brought to the OR where Laryngeal Mask Airway anesthesia was administered by the anesthesia staff. Appropriate perioperative antibiotics were given within 30 minutes of beginning the procedure. A formal timeout was performed prior to the procedure. After induction, the patient was gently placed in lithotomy position with all pressure points padded. Bilateral sequential compression devices were applied and activated prior to induction. The patient was prepped and draped in standard fashion.    A 19-Macedonian rigid cystoscope was passed per urethra into the bladder. Inspection of the bladder demonstrated hunner ulcerations which were injected with triamcinolone totaling 100 mg using 3 injections    A 19 Macedonian rigid cystoscope was passed per urethra into the bladder. Inspection of the bladder demonstrated no primary bladder pathology and the suspected urolithiasis was  not seen to have passed into the bladder. The right ureteral orifice was cannulated with a 5 Croatian open-ended ureteral catheter and a retrograde pyelogram was performed to demonstrate any hydroureteronephrosis from obstruction or filling defects from urolithiasis as well as to define the ureter. A flexible-tipped Glidewire was advanced into the ureter under fluoroscopic vision.     A dual-lumen ureteral catheter was used to place a second working wire. With a working wire and safety wire in place, the semi-rigid ureterscope was advanced to the level of the stone.     Under direct vision, the stone was fragmented with the holmium laser into fragments appropriate for extraction.  Stone fragments within the lumen were extracted. There was significant edema in the ureteral wall that may have masked a remaining fragment. I did not manipulate within the ureteral wall. Instead, I elected to place a ureteral stent and will return in 2-3 weeks once the edema has improved to see if any fragments remain.     She tolerated the procedure well and was extubated and transferred in stable condition to the recovery room.     We will plan to see her back in 2 weeks for a second look in the ureter and extraction of any fragments remaining.

## 2024-03-01 NOTE — PLAN OF CARE
Discharge instructions reviewed with pt via , verbalized understanding, IV removed, all belongings with pt. Escorted to POV by nursing staff.

## 2024-03-01 NOTE — TRANSFER OF CARE
"Anesthesia Transfer of Care Note    Patient: Aminah Norton    Procedure(s) Performed: Procedure(s) (LRB):  ULCER INJECTION FULGURATION (N/A)  CYSTOURETEROSCOPY, WITH HOLMIUM LASER LITHOTRIPSY OF URETERAL CALCULUS AND STENT INSERTION (Right)    Patient location: PACU    Anesthesia Type: general    Transport from OR: Transported from OR on 6-10 L/min O2 by face mask with adequate spontaneous ventilation    Post pain: adequate analgesia    Post assessment: no apparent anesthetic complications    Post vital signs: stable    Level of consciousness: awake    Nausea/Vomiting: no nausea/vomiting    Complications: none    Transfer of care protocol was followed      Last vitals: Visit Vitals  BP (!) 192/83 (BP Location: Left arm, Patient Position: Sitting)   Pulse 81   Temp 36.6 °C (97.9 °F) (Temporal)   Resp 14   Ht 5' 5" (1.651 m)   Wt 75.3 kg (166 lb)   SpO2 100%   Breastfeeding No   BMI 27.62 kg/m²     "

## 2024-03-01 NOTE — ANESTHESIA PROCEDURE NOTES
Intubation    Date/Time: 3/1/2024 12:23 PM    Performed by: Harshad Gonzalez CRNA  Authorized by: Wilda Leyva MD    Intubation:     Induction:  Intravenous    Intubated:  Postinduction    Mask Ventilation:  Easy mask    Attempts:  1    Attempted By:  CRNA    Difficult Airway Encountered?: No      Complications:  None    Airway Device:  Supraglottic airway/LMA    Airway Device Size:  4.0    Secured at:  The lips    Placement Verified By:  Capnometry    Complicating Factors:  None    Findings Post-Intubation:  BS equal bilateral and atraumatic/condition of teeth unchanged

## 2024-03-06 ENCOUNTER — TELEPHONE (OUTPATIENT)
Dept: UROLOGY | Facility: CLINIC | Age: 79
End: 2024-03-06
Payer: MEDICARE

## 2024-03-06 DIAGNOSIS — N20.1 RIGHT URETERAL STONE: Primary | ICD-10-CM

## 2024-03-06 LAB
COMPN STONE: NORMAL
LABORATORY COMMENT REPORT: NORMAL
SPECIMEN SOURCE: NORMAL
STONE ANALYSIS IR-IMP: NORMAL

## 2024-03-06 NOTE — TELEPHONE ENCOUNTER
I called Ms Stack Per note from Dr Smith on surgery request.2nd attempt, to get the patient scheduled. No answer. I left a VM with a offer date of 03- and my contact number to verify this is a good date for the patient.

## 2024-03-12 ENCOUNTER — OFFICE VISIT (OUTPATIENT)
Dept: PODIATRY | Facility: CLINIC | Age: 79
End: 2024-03-12
Payer: MEDICARE

## 2024-03-12 VITALS
DIASTOLIC BLOOD PRESSURE: 55 MMHG | WEIGHT: 166 LBS | SYSTOLIC BLOOD PRESSURE: 127 MMHG | BODY MASS INDEX: 27.66 KG/M2 | HEART RATE: 72 BPM | HEIGHT: 65 IN

## 2024-03-12 DIAGNOSIS — B35.1 DERMATOPHYTOSIS OF NAIL: ICD-10-CM

## 2024-03-12 DIAGNOSIS — E11.49 TYPE II DIABETES MELLITUS WITH NEUROLOGICAL MANIFESTATIONS: Primary | ICD-10-CM

## 2024-03-12 PROCEDURE — 99999 PR PBB SHADOW E&M-EST. PATIENT-LVL IV: CPT | Mod: PBBFAC,,, | Performed by: PODIATRIST

## 2024-03-12 PROCEDURE — 1126F AMNT PAIN NOTED NONE PRSNT: CPT | Mod: CPTII,S$GLB,, | Performed by: PODIATRIST

## 2024-03-12 PROCEDURE — 99213 OFFICE O/P EST LOW 20 MIN: CPT | Mod: S$GLB,,, | Performed by: PODIATRIST

## 2024-03-12 PROCEDURE — 3288F FALL RISK ASSESSMENT DOCD: CPT | Mod: CPTII,S$GLB,, | Performed by: PODIATRIST

## 2024-03-12 PROCEDURE — 3078F DIAST BP <80 MM HG: CPT | Mod: CPTII,S$GLB,, | Performed by: PODIATRIST

## 2024-03-12 PROCEDURE — 1101F PT FALLS ASSESS-DOCD LE1/YR: CPT | Mod: CPTII,S$GLB,, | Performed by: PODIATRIST

## 2024-03-12 PROCEDURE — 3074F SYST BP LT 130 MM HG: CPT | Mod: CPTII,S$GLB,, | Performed by: PODIATRIST

## 2024-03-12 PROCEDURE — 1157F ADVNC CARE PLAN IN RCRD: CPT | Mod: CPTII,S$GLB,, | Performed by: PODIATRIST

## 2024-03-12 NOTE — PROGRESS NOTES
Subjective:      Patient ID: Aminah Norton is a 78 y.o. female.    Chief Complaint: Diabetic Foot Exam (PCP Jeison Sanchez MD 02/29/2024)    Aminah is a 78 y.o. female who presents to the clinic  for evaluation and treatment of diabetic feet.   Patient was seen and evaluated via  ([facility]).  Patient reports tingling and numbness in her feet.  Present mainly in the evening, especially when she is sitting.  She does have chronic back pain that prevents from daily activities.  She is not currently seeing a specialist for the back pain.    She reports no surgery or history of trauma to her feet.       PCP: Jeison Sanchez MD    Date Last Seen by PCP:  Diabetic Foot Exam (PCP Jeison Sanchez MD 02/29/2024)       Current shoe gear: Casual shoes      Patient Active Problem List   Diagnosis    Myalgia    Plantar fasciitis of left foot    Hypothyroidism    Elevated cholesterol with high triglycerides    HTN (hypertension), benign    Vitamin B 12 deficiency    GERD (gastroesophageal reflux disease)    Neuropathy    Type 2 diabetes mellitus with neurologic complication, without long-term current use of insulin    Diabetic polyneuropathy    Cataracts, bilateral    Seborrheic dermatitis    Back pain    Facet syndrome    Bilateral low back pain without sciatica    Spondylisthesis    Vitamin D insufficiency    Lower urinary tract symptoms (LUTS)    Hematuria    Bilateral leg pain    Aortic atherosclerosis    Bilateral shoulder pain    TB lung, latent    Hyperlipidemia    Neck pain    Upper extremity weakness    Posture abnormality    Bladder pain    Lower extremity weakness    Right ureteral stone    Interstitial cystitis    Pyelonephritis    Sepsis    ANAIS (acute kidney injury)    Enterococcal bacteremia    Anemia    Ureteral stent present    Positive QuantiFERON-TB Gold test         No current facility-administered medications on file prior to visit.     Current Outpatient Medications on File  Prior to Visit   Medication Sig Dispense Refill    ACCU-CHEK SOFTCLIX LANCETS Misc CHECK BLOOD SUGAR ONE TIME DAILY 100 each 2    BD ALCOHOL SWABS PadM USE AS DIRECTED TOPICALLY AS NEEDED 300 each 1    biotin 1 mg tablet Take 1,000 mcg by mouth 3 (three) times daily.      blood sugar diagnostic (ACCU-CHEK MATTIE PLUS TEST STRP) Strp TEST ONE TIME DAILY 100 strip 3    blood-glucose meter kit To check BG 2  times daily, to use with insurance preferred meter, use as directed. 1 each 0    carvediloL (COREG) 25 MG tablet TAKE 1 TABLET TWICE DAILY 180 tablet 1    cyanocobalamin (VITAMIN B-12) 1000 MCG tablet Take 100 mcg by mouth once daily.      gabapentin (NEURONTIN) 300 MG capsule Take 1 capsule (300 mg total) by mouth every evening. 90 capsule 3    glipiZIDE 5 MG TR24 Take 1 tablet (5 mg total) by mouth daily with breakfast. 90 tablet 3    ketoconazole (NIZORAL) 2 % cream Apply topically once daily. 60 g 1    lancets (ACCU-CHEK MULTICLIX LANCET) Misc To use as directed with Accu Chek Sahra FastClix lancing device 100 each 2    lancets Misc To check BG 2 times daily, to use with insurance preferred meter. 200 each 3    levothyroxine (SYNTHROID) 112 MCG tablet Take 1 tablet (112 mcg total) by mouth before breakfast. 90 tablet 3    lisinopriL (PRINIVIL,ZESTRIL) 40 MG tablet Take 1 tablet (40 mg total) by mouth once daily. 90 tablet 3    metFORMIN (GLUCOPHAGE) 1000 MG tablet Take 1 tablet (1,000 mg total) by mouth 2 (two) times daily with meals. 180 tablet 1    naproxen (NAPROSYN) 500 MG tablet Take 1 tablet (500 mg total) by mouth 2 (two) times daily with meals. 180 tablet 3    rosuvastatin (CRESTOR) 20 MG tablet Take 1 tablet (20 mg total) by mouth once daily. 90 tablet 3    SITagliptin phosphate (JANUVIA) 100 MG Tab Take 1 tablet (100 mg total) by mouth once daily. 90 tablet 3    turmeric root extract 500 mg Cap Take by mouth.      VITAMIN B COMPLEX ORAL Take 1 tablet by mouth.      vitamin D (VITAMIN D3) 1000 units Tab  "Take 1,000 Units by mouth once daily.      amitriptyline (ELAVIL) 10 MG tablet Take 1 tablet (10 mg total) by mouth every evening. 30 tablet 11    mirabegron (MYRBETRIQ) 25 mg Tb24 ER tablet Take 1 tablet (25 mg total) by mouth once daily. 90 tablet 0         Review of patient's allergies indicates:   Allergen Reactions    Bufferin [aspirin, buffered] Itching    Atorvastatin      Myalgias and arthralgias    Shellfish containing products Itching     Shellfish causes her to itch per her daughter, Caro.     Warfarin     Ibuprofen Itching     Itching of eyes, head         Hemoglobin A1C   Date Value Ref Range Status   11/09/2023 8.2 (H) 4.0 - 5.6 % Final     Comment:     ADA Screening Guidelines:  5.7-6.4%  Consistent with prediabetes  >or=6.5%  Consistent with diabetes    High levels of fetal hemoglobin interfere with the HbA1C  assay. Heterozygous hemoglobin variants (HbS, HgC, etc)do  not significantly interfere with this assay.   However, presence of multiple variants may affect accuracy.     11/09/2023 8.2 (H) 4.0 - 5.6 % Final     Comment:     ADA Screening Guidelines:  5.7-6.4%  Consistent with prediabetes  >or=6.5%  Consistent with diabetes    High levels of fetal hemoglobin interfere with the HbA1C  assay. Heterozygous hemoglobin variants (HbS, HgC, etc)do  not significantly interfere with this assay.   However, presence of multiple variants may affect accuracy.     08/07/2023 8.2 (H) 4.0 - 5.6 % Final     Comment:     ADA Screening Guidelines:  5.7-6.4%  Consistent with prediabetes  >or=6.5%  Consistent with diabetes    High levels of fetal hemoglobin interfere with the HbA1C  assay. Heterozygous hemoglobin variants (HbS, HgC, etc)do  not significantly interfere with this assay.   However, presence of multiple variants may affect accuracy.                   Objective:       Vitals:    03/12/24 1334   BP: (!) 127/55   Pulse: 72   Weight: 75.3 kg (166 lb)   Height: 5' 5" (1.651 m)        Physical Exam  Vitals " reviewed.   Constitutional:       Appearance: She is well-developed.   Cardiovascular:      Pulses:           Dorsalis pedis pulses are 2+ on the right side and 2+ on the left side.        Posterior tibial pulses are 2+ on the right side and 2+ on the left side.   Pulmonary:      Effort: Pulmonary effort is normal.   Musculoskeletal:         General: Normal range of motion.      Comments: Inspection and palpation of the muscles joints and bones of both lower extremities reveal that muscle strength for the anterior lateral and posterior muscle groups and intrinsic muscle groups of the foot are all 5 over 5 symmetrical.  Ankle subtalar midtarsal and digital joint range of motion are within normal limits, nonpainful, without crepitus or effusion.  Patient exhibits a normal angle and base of gait.  Palpation of the tendons reveal no defects.   Skin:     General: Skin is warm and dry.      Capillary Refill: Capillary refill takes 2 to 3 seconds.      Comments: Skin turgor is normal bilaterally.  Skin texture is well hydrated to both lower extremities.  No lesions or rashes or wounds appreciated bilaterally. Mild incurvation noted to bilateral hallux nails with mild tenderness no erythema.   Neurological:      Mental Status: She is alert and oriented to person, place, and time.      Comments: Sharp dull light touch vibratory proprioceptive sensation are diminished bilaterally.  Deep tendon reflexes to patellar and Achilles tendon are symmetrical 2 over 4 bilaterally.  No ankle clonus or Babinski reflexes noted bilaterally.  Coordination is fair to both feet and lower extremities.               Assessment:       Problem List Items Addressed This Visit    None  Visit Diagnoses       Type II diabetes mellitus with neurological manifestations    -  Primary    Dermatophytosis of nail                    Plan:         I counseled the patient on her conditions, their implications and medical management.  Shoe inspection.      Continue good nutrition and blood sugar control to help prevent podiatric complications of diabetes.   Maintain proper foot hygiene.   Continue wearing proper shoe gear, daily foot inspections, never walking without protective shoe gear, never putting sharp instruments to feet.  General nail care measures for abnormal nails include:  Keeping nails trimmed short, but avoid overzealous trimming  Avoiding trauma   Avoiding contact irritants   Keeping nails dry (avoiding wet work)  Avoiding all nail cosmetics  Wearing shoes that fit well at the toe box.  Avoid tight shoes.     Annual diabetes foot exam.           I spent a total of 20 minutes on the day of the visit.  This includes face to face time and non-face to face time preparing to see the patient (eg, review of tests), obtaining and/or reviewing separately obtained history, documenting clinical information in the electronic or other health record, independently interpreting results and communicating results to the patient/family/caregiver, or care coordinator.

## 2024-03-14 ENCOUNTER — ANESTHESIA (OUTPATIENT)
Dept: SURGERY | Facility: HOSPITAL | Age: 79
DRG: 854 | End: 2024-03-14
Payer: MEDICARE

## 2024-03-14 ENCOUNTER — ANESTHESIA EVENT (OUTPATIENT)
Dept: SURGERY | Facility: HOSPITAL | Age: 79
DRG: 854 | End: 2024-03-14
Payer: MEDICARE

## 2024-03-14 PROCEDURE — 25000003 PHARM REV CODE 250: Performed by: NURSE ANESTHETIST, CERTIFIED REGISTERED

## 2024-03-14 PROCEDURE — 0TC68ZZ EXTIRPATION OF MATTER FROM RIGHT URETER, VIA NATURAL OR ARTIFICIAL OPENING ENDOSCOPIC: ICD-10-PCS | Performed by: UROLOGY

## 2024-03-14 PROCEDURE — 63600175 PHARM REV CODE 636 W HCPCS: Performed by: NURSE ANESTHETIST, CERTIFIED REGISTERED

## 2024-03-14 PROCEDURE — 25000003 PHARM REV CODE 250: Performed by: UROLOGY

## 2024-03-14 PROCEDURE — 0TP98DZ REMOVAL OF INTRALUMINAL DEVICE FROM URETER, VIA NATURAL OR ARTIFICIAL OPENING ENDOSCOPIC: ICD-10-PCS | Performed by: UROLOGY

## 2024-03-14 PROCEDURE — D9220A PRA ANESTHESIA: Mod: ,,, | Performed by: NURSE ANESTHETIST, CERTIFIED REGISTERED

## 2024-03-14 PROCEDURE — 63600175 PHARM REV CODE 636 W HCPCS: Performed by: UROLOGY

## 2024-03-14 PROCEDURE — 0T768DZ DILATION OF RIGHT URETER WITH INTRALUMINAL DEVICE, VIA NATURAL OR ARTIFICIAL OPENING ENDOSCOPIC: ICD-10-PCS | Performed by: UROLOGY

## 2024-03-14 RX ORDER — LIDOCAINE HYDROCHLORIDE 20 MG/ML
INJECTION INTRAVENOUS
Status: DISCONTINUED | OUTPATIENT
Start: 2024-03-14 | End: 2024-03-14

## 2024-03-14 RX ORDER — ONDANSETRON HYDROCHLORIDE 2 MG/ML
INJECTION, SOLUTION INTRAVENOUS
Status: DISCONTINUED | OUTPATIENT
Start: 2024-03-14 | End: 2024-03-14

## 2024-03-14 RX ORDER — DEXAMETHASONE SODIUM PHOSPHATE 4 MG/ML
INJECTION, SOLUTION INTRA-ARTICULAR; INTRALESIONAL; INTRAMUSCULAR; INTRAVENOUS; SOFT TISSUE
Status: DISCONTINUED | OUTPATIENT
Start: 2024-03-14 | End: 2024-03-14

## 2024-03-14 RX ORDER — EPHEDRINE SULFATE 50 MG/ML
INJECTION, SOLUTION INTRAVENOUS
Status: DISCONTINUED | OUTPATIENT
Start: 2024-03-14 | End: 2024-03-14

## 2024-03-14 RX ORDER — ROCURONIUM BROMIDE 10 MG/ML
INJECTION, SOLUTION INTRAVENOUS
Status: DISCONTINUED | OUTPATIENT
Start: 2024-03-14 | End: 2024-03-14

## 2024-03-14 RX ORDER — FENTANYL CITRATE 50 UG/ML
INJECTION, SOLUTION INTRAMUSCULAR; INTRAVENOUS
Status: DISCONTINUED | OUTPATIENT
Start: 2024-03-14 | End: 2024-03-14

## 2024-03-14 RX ORDER — PROPOFOL 10 MG/ML
VIAL (ML) INTRAVENOUS
Status: DISCONTINUED | OUTPATIENT
Start: 2024-03-14 | End: 2024-03-14

## 2024-03-14 RX ADMIN — PROPOFOL 150 MG: 10 INJECTION, EMULSION INTRAVENOUS at 09:03

## 2024-03-14 RX ADMIN — FENTANYL CITRATE 50 MCG: 50 INJECTION, SOLUTION INTRAMUSCULAR; INTRAVENOUS at 09:03

## 2024-03-14 RX ADMIN — CEFAZOLIN 2 G: 330 INJECTION, POWDER, FOR SOLUTION INTRAMUSCULAR; INTRAVENOUS at 09:03

## 2024-03-14 RX ADMIN — LIDOCAINE HYDROCHLORIDE 100 MG: 20 INJECTION INTRAVENOUS at 09:03

## 2024-03-14 RX ADMIN — EPHEDRINE SULFATE 5 MG: 50 INJECTION INTRAVENOUS at 10:03

## 2024-03-14 RX ADMIN — SUGAMMADEX 400 MG: 100 INJECTION, SOLUTION INTRAVENOUS at 10:03

## 2024-03-14 RX ADMIN — ONDANSETRON 4 MG: 2 INJECTION INTRAMUSCULAR; INTRAVENOUS at 09:03

## 2024-03-14 RX ADMIN — SODIUM CHLORIDE: 0.9 INJECTION, SOLUTION INTRAVENOUS at 09:03

## 2024-03-14 RX ADMIN — ROCURONIUM BROMIDE 40 MG: 10 INJECTION, SOLUTION INTRAVENOUS at 09:03

## 2024-03-14 RX ADMIN — DEXAMETHASONE SODIUM PHOSPHATE 4 MG: 4 INJECTION INTRA-ARTICULAR; INTRALESIONAL; INTRAMUSCULAR; INTRAVENOUS; SOFT TISSUE at 09:03

## 2024-03-14 NOTE — ANESTHESIA PREPROCEDURE EVALUATION
03/14/2024  Aminah Norton is a 78 y.o., female.      Pre-op Assessment    I have reviewed the Patient Summary Reports.     I have reviewed the Nursing Notes. I have reviewed the NPO Status.   I have reviewed the Medications.     Review of Systems  Anesthesia Hx:             Denies Family Hx of Anesthesia complications.    Denies Personal Hx of Anesthesia complications.                    Cardiovascular:     Hypertension                                            Physical Exam  General: Well nourished    Airway:  Mallampati: II   Mouth Opening: Normal  TM Distance: Normal    Chest/Lungs:  Normal Respiratory Rate        Anesthesia Plan  Type of Anesthesia, risks & benefits discussed:    Anesthesia Type: Gen ETT  Intra-op Monitoring Plan: Standard ASA Monitors  Post Op Pain Control Plan: multimodal analgesia  Induction:  IV  Airway Plan: Video  Informed Consent: Informed consent signed with the Patient and all parties understand the risks and agree with anesthesia plan.  All questions answered.   ASA Score: 2  Day of Surgery Review of History & Physical: H&P Update referred to the surgeon/provider.    Ready For Surgery From Anesthesia Perspective.     .

## 2024-03-14 NOTE — TRANSFER OF CARE
"Anesthesia Transfer of Care Note    Patient: Aminah Norton    Procedure(s) Performed: Procedure(s) (LRB):  CYSTOURETEROSCOPY,WITH HOLMIUM LASER LITHOTRIPSY OF URETERAL CALCULUS (Right)  PLACEMENT-STENT (Right)  REMOVAL-STENT (Right)  INJECTION, STEROID (N/A)    Patient location: PACU    Anesthesia Type: general    Transport from OR: Transported from OR on 2-3 L/min O2 by NC with adequate spontaneous ventilation    Post pain: adequate analgesia    Post assessment: no apparent anesthetic complications and tolerated procedure well    Post vital signs: stable    Level of consciousness: awake and alert    Nausea/Vomiting: no nausea/vomiting    Complications: none    Transfer of care protocol was followed      Last vitals: Visit Vitals  BP (!) 189/83 (BP Location: Right arm, Patient Position: Lying)   Pulse 68   Temp 36.4 °C (97.5 °F) (Temporal)   Resp 16   Ht 5' 5" (1.651 m)   Wt 75.3 kg (166 lb 0.1 oz)   SpO2 99%   Breastfeeding No   BMI 27.62 kg/m²     "

## 2024-03-14 NOTE — ANESTHESIA POSTPROCEDURE EVALUATION
Anesthesia Post Evaluation    Patient: Aminah Alicea-Shelly    Procedure(s) Performed: Procedure(s) (LRB):  CYSTOURETEROSCOPY,WITH HOLMIUM LASER LITHOTRIPSY OF URETERAL CALCULUS (Right)  PLACEMENT-STENT (Right)  REMOVAL-STENT (Right)  INJECTION, STEROID (N/A)    Final Anesthesia Type: general      Patient location during evaluation: PACU  Patient participation: Yes- Able to Participate  Level of consciousness: awake and alert  Post-procedure vital signs: reviewed and stable  Pain management: adequate  Airway patency: patent    PONV status at discharge: No PONV  Anesthetic complications: no      Cardiovascular status: stable  Respiratory status: spontaneous ventilation  Hydration status: euvolemic  Follow-up not needed.              Vitals Value Taken Time   /72 03/14/24 1132   Temp 36.6 °C (97.9 °F) 03/14/24 1050   Pulse 71 03/14/24 1141   Resp 26 03/14/24 1138   SpO2 97 % 03/14/24 1141   Vitals shown include unvalidated device data.      Event Time   Out of Recovery 11:05:00         Pain/Lorenza Score: Lorenza Score: 10 (3/14/2024 11:05 AM)

## 2024-03-14 NOTE — ANESTHESIA PROCEDURE NOTES
Intubation    Date/Time: 3/14/2024 9:58 AM    Performed by: Maggie Carmona CRNA  Authorized by: Colton Posada MD    Intubation:     Induction:  Intravenous    Intubated:  Postinduction    Mask Ventilation:  Easy with oral airway    Attempts:  1    Attempted By:  CRNA    Method of Intubation:  Video laryngoscopy    Blade:  Bhatt 3    Laryngeal View Grade: Grade I - full view of cords      Difficult Airway Encountered?: No      Complications:  None    Airway Device:  Oral endotracheal tube    Airway Device Size:  7.0    Style/Cuff Inflation:  Cuffed (inflated to minimal occlusive pressure)    Tube secured:  21    Secured at:  The lips    Placement Verified By:  Capnometry    Complicating Factors:  None    Findings Post-Intubation:  BS equal bilateral and atraumatic/condition of teeth unchanged

## 2024-03-16 ENCOUNTER — HOSPITAL ENCOUNTER (INPATIENT)
Facility: HOSPITAL | Age: 79
LOS: 12 days | Discharge: HOME HEALTH | DRG: 854 | End: 2024-03-28
Attending: EMERGENCY MEDICINE | Admitting: INTERNAL MEDICINE
Payer: MEDICARE

## 2024-03-16 DIAGNOSIS — R76.12 POSITIVE QUANTIFERON-TB GOLD TEST: ICD-10-CM

## 2024-03-16 DIAGNOSIS — B95.2 ENTEROCOCCAL BACTEREMIA: ICD-10-CM

## 2024-03-16 DIAGNOSIS — E11.40 TYPE 2 DIABETES MELLITUS WITH DIABETIC NEUROPATHY, WITHOUT LONG-TERM CURRENT USE OF INSULIN: ICD-10-CM

## 2024-03-16 DIAGNOSIS — Z96.0 URETERAL STENT PRESENT: ICD-10-CM

## 2024-03-16 DIAGNOSIS — R07.9 CHEST PAIN: ICD-10-CM

## 2024-03-16 DIAGNOSIS — R78.81 GRAM-POSITIVE BACTEREMIA: ICD-10-CM

## 2024-03-16 DIAGNOSIS — N12 PYELONEPHRITIS: Primary | ICD-10-CM

## 2024-03-16 DIAGNOSIS — N20.1 RIGHT URETERAL STONE: ICD-10-CM

## 2024-03-16 DIAGNOSIS — R53.1 WEAKNESS: ICD-10-CM

## 2024-03-16 DIAGNOSIS — R78.81 ENTEROCOCCAL BACTEREMIA: ICD-10-CM

## 2024-03-16 DIAGNOSIS — A41.9 SEPSIS, DUE TO UNSPECIFIED ORGANISM, UNSPECIFIED WHETHER ACUTE ORGAN DYSFUNCTION PRESENT: ICD-10-CM

## 2024-03-16 DIAGNOSIS — R78.81 BACTEREMIA: ICD-10-CM

## 2024-03-16 PROBLEM — N17.9 AKI (ACUTE KIDNEY INJURY): Status: ACTIVE | Noted: 2024-03-16

## 2024-03-16 LAB
ALBUMIN SERPL BCP-MCNC: 3.8 G/DL (ref 3.5–5.2)
ALLENS TEST: ABNORMAL
ALLENS TEST: NORMAL
ALP SERPL-CCNC: 77 U/L (ref 55–135)
ALT SERPL W/O P-5'-P-CCNC: 13 U/L (ref 10–44)
ANION GAP SERPL CALC-SCNC: 11 MMOL/L (ref 8–16)
AST SERPL-CCNC: 22 U/L (ref 10–40)
BACTERIA #/AREA URNS AUTO: ABNORMAL /HPF
BASOPHILS # BLD AUTO: 0.02 K/UL (ref 0–0.2)
BASOPHILS NFR BLD: 0.2 % (ref 0–1.9)
BILIRUB SERPL-MCNC: 0.6 MG/DL (ref 0.1–1)
BILIRUB UR QL STRIP: NEGATIVE
BUN SERPL-MCNC: 21 MG/DL (ref 8–23)
CALCIUM SERPL-MCNC: 10.2 MG/DL (ref 8.7–10.5)
CHLORIDE SERPL-SCNC: 100 MMOL/L (ref 95–110)
CLARITY UR REFRACT.AUTO: ABNORMAL
CO2 SERPL-SCNC: 21 MMOL/L (ref 23–29)
COLOR UR AUTO: YELLOW
CREAT SERPL-MCNC: 1.2 MG/DL (ref 0.5–1.4)
DIFFERENTIAL METHOD BLD: ABNORMAL
EOSINOPHIL # BLD AUTO: 0 K/UL (ref 0–0.5)
EOSINOPHIL NFR BLD: 0 % (ref 0–8)
ERYTHROCYTE [DISTWIDTH] IN BLOOD BY AUTOMATED COUNT: 18.2 % (ref 11.5–14.5)
EST. GFR  (NO RACE VARIABLE): 46.3 ML/MIN/1.73 M^2
GLUCOSE SERPL-MCNC: 249 MG/DL (ref 70–110)
GLUCOSE UR QL STRIP: ABNORMAL
HCT VFR BLD AUTO: 34.5 % (ref 37–48.5)
HGB BLD-MCNC: 11 G/DL (ref 12–16)
HGB UR QL STRIP: ABNORMAL
HYALINE CASTS UR QL AUTO: 0 /LPF
IMM GRANULOCYTES # BLD AUTO: 0.1 K/UL (ref 0–0.04)
IMM GRANULOCYTES NFR BLD AUTO: 0.8 % (ref 0–0.5)
KETONES UR QL STRIP: NEGATIVE
LACTATE SERPL-SCNC: 1.1 MMOL/L (ref 0.5–2.2)
LDH SERPL L TO P-CCNC: 0.89 MMOL/L (ref 0.5–2.2)
LDH SERPL L TO P-CCNC: 3.21 MMOL/L (ref 0.5–2.2)
LEUKOCYTE ESTERASE UR QL STRIP: ABNORMAL
LYMPHOCYTES # BLD AUTO: 0.6 K/UL (ref 1–4.8)
LYMPHOCYTES NFR BLD: 4.7 % (ref 18–48)
MAGNESIUM SERPL-MCNC: 1.8 MG/DL (ref 1.6–2.6)
MCH RBC QN AUTO: 23 PG (ref 27–31)
MCHC RBC AUTO-ENTMCNC: 31.9 G/DL (ref 32–36)
MCV RBC AUTO: 72 FL (ref 82–98)
MICROSCOPIC COMMENT: ABNORMAL
MONOCYTES # BLD AUTO: 0.8 K/UL (ref 0.3–1)
MONOCYTES NFR BLD: 6.4 % (ref 4–15)
NEUTROPHILS # BLD AUTO: 10.8 K/UL (ref 1.8–7.7)
NEUTROPHILS NFR BLD: 87.9 % (ref 38–73)
NITRITE UR QL STRIP: NEGATIVE
NRBC BLD-RTO: 0 /100 WBC
PH UR STRIP: 6 [PH] (ref 5–8)
PLATELET # BLD AUTO: 241 K/UL (ref 150–450)
PMV BLD AUTO: 10.1 FL (ref 9.2–12.9)
POCT GLUCOSE: 205 MG/DL (ref 70–110)
POTASSIUM SERPL-SCNC: 4.9 MMOL/L (ref 3.5–5.1)
PROCALCITONIN SERPL IA-MCNC: 1.05 NG/ML
PROT SERPL-MCNC: 7.5 G/DL (ref 6–8.4)
PROT UR QL STRIP: ABNORMAL
RBC # BLD AUTO: 4.79 M/UL (ref 4–5.4)
RBC #/AREA URNS AUTO: 14 /HPF (ref 0–4)
SAMPLE: ABNORMAL
SAMPLE: NORMAL
SITE: ABNORMAL
SITE: NORMAL
SODIUM SERPL-SCNC: 132 MMOL/L (ref 136–145)
SP GR UR STRIP: 1.02 (ref 1–1.03)
SQUAMOUS #/AREA URNS AUTO: 1 /HPF
TROPONIN I SERPL DL<=0.01 NG/ML-MCNC: 0.02 NG/ML (ref 0–0.03)
URN SPEC COLLECT METH UR: ABNORMAL
WBC # BLD AUTO: 12.3 K/UL (ref 3.9–12.7)
WBC #/AREA URNS AUTO: 37 /HPF (ref 0–5)
YEAST UR QL AUTO: ABNORMAL

## 2024-03-16 PROCEDURE — 83735 ASSAY OF MAGNESIUM: CPT | Performed by: EMERGENCY MEDICINE

## 2024-03-16 PROCEDURE — 87154 CUL TYP ID BLD PTHGN 6+ TRGT: CPT | Performed by: EMERGENCY MEDICINE

## 2024-03-16 PROCEDURE — 20600001 HC STEP DOWN PRIVATE ROOM

## 2024-03-16 PROCEDURE — 87040 BLOOD CULTURE FOR BACTERIA: CPT | Mod: 59 | Performed by: EMERGENCY MEDICINE

## 2024-03-16 PROCEDURE — 99900035 HC TECH TIME PER 15 MIN (STAT)

## 2024-03-16 PROCEDURE — 93010 ELECTROCARDIOGRAM REPORT: CPT | Mod: ,,, | Performed by: INTERNAL MEDICINE

## 2024-03-16 PROCEDURE — 99285 EMERGENCY DEPT VISIT HI MDM: CPT | Mod: 25

## 2024-03-16 PROCEDURE — 83605 ASSAY OF LACTIC ACID: CPT

## 2024-03-16 PROCEDURE — 25000003 PHARM REV CODE 250: Performed by: INTERNAL MEDICINE

## 2024-03-16 PROCEDURE — 87186 SC STD MICRODIL/AGAR DIL: CPT | Performed by: EMERGENCY MEDICINE

## 2024-03-16 PROCEDURE — 99222 1ST HOSP IP/OBS MODERATE 55: CPT | Mod: GC,,, | Performed by: UROLOGY

## 2024-03-16 PROCEDURE — 96361 HYDRATE IV INFUSION ADD-ON: CPT

## 2024-03-16 PROCEDURE — 87086 URINE CULTURE/COLONY COUNT: CPT | Performed by: EMERGENCY MEDICINE

## 2024-03-16 PROCEDURE — 51798 US URINE CAPACITY MEASURE: CPT

## 2024-03-16 PROCEDURE — 83605 ASSAY OF LACTIC ACID: CPT | Performed by: INTERNAL MEDICINE

## 2024-03-16 PROCEDURE — 25000003 PHARM REV CODE 250: Performed by: EMERGENCY MEDICINE

## 2024-03-16 PROCEDURE — 84484 ASSAY OF TROPONIN QUANT: CPT | Performed by: EMERGENCY MEDICINE

## 2024-03-16 PROCEDURE — 80053 COMPREHEN METABOLIC PANEL: CPT | Performed by: EMERGENCY MEDICINE

## 2024-03-16 PROCEDURE — 85025 COMPLETE CBC W/AUTO DIFF WBC: CPT | Performed by: EMERGENCY MEDICINE

## 2024-03-16 PROCEDURE — 84145 PROCALCITONIN (PCT): CPT | Performed by: EMERGENCY MEDICINE

## 2024-03-16 PROCEDURE — 63600175 PHARM REV CODE 636 W HCPCS: Performed by: EMERGENCY MEDICINE

## 2024-03-16 PROCEDURE — 81001 URINALYSIS AUTO W/SCOPE: CPT | Performed by: EMERGENCY MEDICINE

## 2024-03-16 PROCEDURE — 93005 ELECTROCARDIOGRAM TRACING: CPT

## 2024-03-16 PROCEDURE — 87088 URINE BACTERIA CULTURE: CPT | Performed by: EMERGENCY MEDICINE

## 2024-03-16 PROCEDURE — 87077 CULTURE AEROBIC IDENTIFY: CPT | Performed by: EMERGENCY MEDICINE

## 2024-03-16 PROCEDURE — 96375 TX/PRO/DX INJ NEW DRUG ADDON: CPT

## 2024-03-16 PROCEDURE — 96365 THER/PROPH/DIAG IV INF INIT: CPT

## 2024-03-16 PROCEDURE — 63600175 PHARM REV CODE 636 W HCPCS: Performed by: INTERNAL MEDICINE

## 2024-03-16 RX ORDER — IPRATROPIUM BROMIDE AND ALBUTEROL SULFATE 2.5; .5 MG/3ML; MG/3ML
3 SOLUTION RESPIRATORY (INHALATION) EVERY 6 HOURS PRN
Status: DISCONTINUED | OUTPATIENT
Start: 2024-03-16 | End: 2024-03-28 | Stop reason: HOSPADM

## 2024-03-16 RX ORDER — GLUCAGON 1 MG
1 KIT INJECTION
Status: DISCONTINUED | OUTPATIENT
Start: 2024-03-16 | End: 2024-03-28 | Stop reason: HOSPADM

## 2024-03-16 RX ORDER — LEVOTHYROXINE SODIUM 112 UG/1
112 TABLET ORAL
Status: DISCONTINUED | OUTPATIENT
Start: 2024-03-17 | End: 2024-03-28 | Stop reason: HOSPADM

## 2024-03-16 RX ORDER — MORPHINE SULFATE 4 MG/ML
4 INJECTION, SOLUTION INTRAMUSCULAR; INTRAVENOUS
Status: COMPLETED | OUTPATIENT
Start: 2024-03-16 | End: 2024-03-16

## 2024-03-16 RX ORDER — IBUPROFEN 200 MG
16 TABLET ORAL
Status: DISCONTINUED | OUTPATIENT
Start: 2024-03-16 | End: 2024-03-28 | Stop reason: HOSPADM

## 2024-03-16 RX ORDER — TALC
6 POWDER (GRAM) TOPICAL NIGHTLY PRN
Status: DISCONTINUED | OUTPATIENT
Start: 2024-03-16 | End: 2024-03-28 | Stop reason: HOSPADM

## 2024-03-16 RX ORDER — AMITRIPTYLINE HYDROCHLORIDE 10 MG/1
10 TABLET, FILM COATED ORAL NIGHTLY
Status: DISCONTINUED | OUTPATIENT
Start: 2024-03-16 | End: 2024-03-28 | Stop reason: HOSPADM

## 2024-03-16 RX ORDER — SODIUM CHLORIDE 0.9 % (FLUSH) 0.9 %
10 SYRINGE (ML) INJECTION
Status: DISCONTINUED | OUTPATIENT
Start: 2024-03-16 | End: 2024-03-28 | Stop reason: HOSPADM

## 2024-03-16 RX ORDER — ONDANSETRON 4 MG/1
4 TABLET, ORALLY DISINTEGRATING ORAL EVERY 8 HOURS PRN
Status: DISCONTINUED | OUTPATIENT
Start: 2024-03-16 | End: 2024-03-28 | Stop reason: HOSPADM

## 2024-03-16 RX ORDER — MUPIROCIN 20 MG/G
OINTMENT TOPICAL 2 TIMES DAILY
Status: COMPLETED | OUTPATIENT
Start: 2024-03-16 | End: 2024-03-21

## 2024-03-16 RX ORDER — ACETAMINOPHEN 325 MG/1
650 TABLET ORAL EVERY 8 HOURS PRN
Status: DISCONTINUED | OUTPATIENT
Start: 2024-03-16 | End: 2024-03-28 | Stop reason: HOSPADM

## 2024-03-16 RX ORDER — SIMETHICONE 80 MG
1 TABLET,CHEWABLE ORAL 4 TIMES DAILY PRN
Status: DISCONTINUED | OUTPATIENT
Start: 2024-03-16 | End: 2024-03-28 | Stop reason: HOSPADM

## 2024-03-16 RX ORDER — ACETAMINOPHEN 500 MG
1000 TABLET ORAL
Status: COMPLETED | OUTPATIENT
Start: 2024-03-16 | End: 2024-03-16

## 2024-03-16 RX ORDER — MORPHINE SULFATE 2 MG/ML
2 INJECTION, SOLUTION INTRAMUSCULAR; INTRAVENOUS EVERY 4 HOURS PRN
Status: DISCONTINUED | OUTPATIENT
Start: 2024-03-16 | End: 2024-03-28 | Stop reason: HOSPADM

## 2024-03-16 RX ORDER — INSULIN ASPART 100 [IU]/ML
0-5 INJECTION, SOLUTION INTRAVENOUS; SUBCUTANEOUS
Status: DISCONTINUED | OUTPATIENT
Start: 2024-03-16 | End: 2024-03-28 | Stop reason: HOSPADM

## 2024-03-16 RX ORDER — SODIUM CHLORIDE, SODIUM LACTATE, POTASSIUM CHLORIDE, CALCIUM CHLORIDE 600; 310; 30; 20 MG/100ML; MG/100ML; MG/100ML; MG/100ML
INJECTION, SOLUTION INTRAVENOUS CONTINUOUS
Status: ACTIVE | OUTPATIENT
Start: 2024-03-16 | End: 2024-03-17

## 2024-03-16 RX ORDER — ATORVASTATIN CALCIUM 40 MG/1
80 TABLET, FILM COATED ORAL DAILY
Status: DISCONTINUED | OUTPATIENT
Start: 2024-03-17 | End: 2024-03-16

## 2024-03-16 RX ORDER — NALOXONE HCL 0.4 MG/ML
0.02 VIAL (ML) INJECTION
Status: DISCONTINUED | OUTPATIENT
Start: 2024-03-16 | End: 2024-03-28 | Stop reason: HOSPADM

## 2024-03-16 RX ORDER — PROCHLORPERAZINE EDISYLATE 5 MG/ML
5 INJECTION INTRAMUSCULAR; INTRAVENOUS EVERY 6 HOURS PRN
Status: DISCONTINUED | OUTPATIENT
Start: 2024-03-16 | End: 2024-03-28 | Stop reason: HOSPADM

## 2024-03-16 RX ORDER — IBUPROFEN 200 MG
24 TABLET ORAL
Status: DISCONTINUED | OUTPATIENT
Start: 2024-03-16 | End: 2024-03-28 | Stop reason: HOSPADM

## 2024-03-16 RX ORDER — HYDROCODONE BITARTRATE AND ACETAMINOPHEN 5; 325 MG/1; MG/1
1 TABLET ORAL EVERY 6 HOURS PRN
Status: DISCONTINUED | OUTPATIENT
Start: 2024-03-16 | End: 2024-03-28 | Stop reason: HOSPADM

## 2024-03-16 RX ORDER — GABAPENTIN 300 MG/1
300 CAPSULE ORAL NIGHTLY
Status: DISCONTINUED | OUTPATIENT
Start: 2024-03-16 | End: 2024-03-28 | Stop reason: HOSPADM

## 2024-03-16 RX ORDER — CARVEDILOL 25 MG/1
25 TABLET ORAL 2 TIMES DAILY
Status: DISCONTINUED | OUTPATIENT
Start: 2024-03-16 | End: 2024-03-17

## 2024-03-16 RX ORDER — POLYETHYLENE GLYCOL 3350 17 G/17G
17 POWDER, FOR SOLUTION ORAL 2 TIMES DAILY PRN
Status: DISCONTINUED | OUTPATIENT
Start: 2024-03-16 | End: 2024-03-28 | Stop reason: HOSPADM

## 2024-03-16 RX ORDER — ENOXAPARIN SODIUM 100 MG/ML
40 INJECTION SUBCUTANEOUS EVERY 24 HOURS
Status: DISCONTINUED | OUTPATIENT
Start: 2024-03-16 | End: 2024-03-28 | Stop reason: HOSPADM

## 2024-03-16 RX ADMIN — MUPIROCIN: 20 OINTMENT TOPICAL at 08:03

## 2024-03-16 RX ADMIN — MORPHINE SULFATE 4 MG: 4 INJECTION INTRAVENOUS at 02:03

## 2024-03-16 RX ADMIN — SODIUM CHLORIDE, POTASSIUM CHLORIDE, SODIUM LACTATE AND CALCIUM CHLORIDE: 600; 310; 30; 20 INJECTION, SOLUTION INTRAVENOUS at 08:03

## 2024-03-16 RX ADMIN — AMITRIPTYLINE HYDROCHLORIDE 10 MG: 10 TABLET, FILM COATED ORAL at 08:03

## 2024-03-16 RX ADMIN — CARVEDILOL 25 MG: 12.5 TABLET, FILM COATED ORAL at 08:03

## 2024-03-16 RX ADMIN — GABAPENTIN 300 MG: 300 CAPSULE ORAL at 08:03

## 2024-03-16 RX ADMIN — ACETAMINOPHEN 1000 MG: 500 TABLET ORAL at 06:03

## 2024-03-16 RX ADMIN — CEFTRIAXONE 1 G: 1 INJECTION, POWDER, FOR SOLUTION INTRAMUSCULAR; INTRAVENOUS at 05:03

## 2024-03-16 RX ADMIN — ENOXAPARIN SODIUM 40 MG: 40 INJECTION SUBCUTANEOUS at 07:03

## 2024-03-16 NOTE — ED PROVIDER NOTES
Encounter Date: 3/16/2024       History     Chief Complaint   Patient presents with    Weakness     Pt comes from home, she was d/c 3/14 after kidney stone removal. She is back today for weakness, body aches, chills, had a fall yesterday and now c/o back pain. Fluids given on route per EMS.     The history is provided by the patient, a relative and medical records. The history is limited by a language barrier. A  was used.     Aminah Norton is a 78-year-old female with a history of long-term anticoagulation use, type 2 diabetes, GERD, hyperlipidemia, hypertension, hypothyroidism, vitamin B12 deficiency and recent kidney stone removal presenting with weakness, chills and back pain.  She arrives via EMS for evaluation.  Patient reports she was discharged from the hospital on March 14th after a kidney stone removal, she now presents with increasing back pain, weakness, chills but denies any fevers, nausea, vomiting, diarrhea, chest pain.  She does report a ground level fall yesterday with some back pain.  She denies any bowel or bladder dysfunction,  denies any paresthesias, paralysis, weakness, saddle anesthesia, numbness or tingling.  Much of her symptoms are related to flank pain, chills, generalized body aches and weakness.  She denies any chest pain, neck pain, arm pain, visual changes or headaches.    Review of patient's allergies indicates:   Allergen Reactions    Bufferin [aspirin, buffered] Itching    Atorvastatin      Myalgias and arthralgias    Shellfish containing products Itching     Shellfish causes her to itch per her daughter, Caro.     Warfarin     Ibuprofen Itching     Itching of eyes, head     Past Medical History:   Diagnosis Date    Anticoagulant long-term use     Arthritis     Cataract     left eye    Choroidal rupture of left eye     Diabetes mellitus type II     GERD (gastroesophageal reflux disease)     Hyperlipidemia     Hypertension     Hypothyroidism      Macular scar     right eye    Retinal degeneration     chorioretinal OD    Thyroid disease     Vitamin B 12 deficiency      Past Surgical History:   Procedure Laterality Date    APPENDECTOMY      BLADDER FULGURATION N/A 3/1/2024    Procedure: ULCER INJECTION FULGURATION;  Surgeon: Brody Smith MD;  [facility];  Service: Urology;  Laterality: N/A;    BLADDER SUSPENSION      CATARACT EXTRACTION      right eye     CHOLECYSTECTOMY      COLONOSCOPY N/A 7/20/2020    Procedure: COLONOSCOPY;  Surgeon: Juan Parker MD;  Location: [facility] ENDO (4TH FLR);  Service: Endoscopy;  Laterality: N/A;  Hx of chronic anemia.  patient needs a   4/6/20 - removed from 4/20/20, rescheduled 7/20/20 - pg  [place] [place]    COLONOSCOPY N/A 2/9/2023    Procedure: COLONOSCOPY;  Surgeon: Renan Sanchez MD;  Location: [facility] ENDO (4TH FLR);  Service: Colon and Rectal;  Laterality: N/A;  instr handed to patient, -ml    CYSTOSCOPY N/A 5/19/2021    Procedure: CYSTOSCOPY;  Surgeon: Brody Smith MD;  Location: [facility] OR 1ST FLR;  Service: Urology;  Laterality: N/A;    CYSTOURETEROSCOPY, WITH HOLMIUM LASER LITHOTRIPSY OF URETERAL CALCULUS AND STENT INSERTION Right 3/1/2024    Procedure: CYSTOURETEROSCOPY, WITH HOLMIUM LASER LITHOTRIPSY OF URETERAL CALCULUS AND STENT INSERTION;  Surgeon: Brody Smith MD;  [facility];  Service: Urology;  Laterality: Right;    CYSTOURETEROSCOPY,WITH HOLMIUM LASER LITHOTRIPSY OF URETERAL CALCULUS Right 3/14/2024    Procedure: CYSTOURETEROSCOPY,WITH HOLMIUM LASER LITHOTRIPSY OF URETERAL CALCULUS;  Surgeon: Brody Smith MD;  [facility];  Service: Urology;  Laterality: Right;  1 HR    EYE SURGERY Bilateral     cataract removal    INJECTION OF STEROID N/A 3/14/2024    Procedure: INJECTION, STEROID;  Surgeon: Brody Smith MD;  [facility];  Service: Urology;  Laterality: N/A;    PLACEMENT-STENT Right 3/14/2024     Procedure: PLACEMENT-STENT;  Surgeon: Brody Smith MD;  Location: [facility] OR;  Service: Urology;  Laterality: Right;    REMOVAL-STENT Right 3/14/2024    Procedure: REMOVAL-STENT;  Surgeon: Brody Smtih MD;  Location: [facility] OR;  Service: Urology;  Laterality: Right;    TOTAL ABDOMINAL HYSTERECTOMY      DUB     Family History   Problem Relation Age of Onset    Cancer Mother         uterine    Breast cancer Mother     Ovarian cancer Mother     COPD Father     Cancer Sister         liver    Ovarian cancer Sister     Hypertension Sister     Hypertension Sister     Hypertension Daughter     Thyroid disease Daughter     Cancer Daughter         uterine    Thyroid disease Daughter     Hypertension Daughter     Diabetes Son     Colon cancer Neg Hx     Amblyopia Neg Hx     Blindness Neg Hx     Cataracts Neg Hx     Glaucoma Neg Hx     Macular degeneration Neg Hx     Retinal detachment Neg Hx     Strabismus Neg Hx     Stroke Neg Hx      Social History     Tobacco Use    Smoking status: Never    Smokeless tobacco: Never   Substance Use Topics    Alcohol use: No    Drug use: No     Review of Systems  All other systems reviewed and were negative; see HPI also for additional ROS.    Physical Exam     Initial Vitals [03/16/24 1328]   BP Pulse Resp Temp SpO2   (!) 128/58 (!) 114 18 98.9 °F (37.2 °C) 96 %      MAP       --         Physical Exam    Nursing note and vitals reviewed.      Gen/Constitutional: Interactive. No acute distress, febrile  Head: Normocephalic, Atraumatic  Neck: supple, no masses or LAD, no JVD  Eyes: PERRLA, conjunctiva clear  Ears, Nose and Throat: No rhinorrhea or stridor.  Cardiac: tachycardic rate, Reg Rhythm, No murmur  Pulmonary: CTA Bilat, no wheezes, rhonchi, rales.  No increased work of breathing.  GI: Abdomen soft, non-tender, non-distended; no rebound or guarding  : positive CVA tenderness.  Musculoskeletal: Extremities warm, well perfused, no erythema, no edema  Skin: No rashes,  cyanosis or jaundice.  Neuro: Alert and Oriented x 3; No focal motor or sensory deficits.    Psych: Normal affect      ED Course   Critical Care    Date/Time: 3/16/2024 2:55 PM    Performed by: Wolfgang Jennings DO  Authorized by: Wolfgang Jennings DO  Direct patient critical care time: 10 minutes  Additional history critical care time: 10 minutes  Ordering / reviewing critical care time: 25 minutes  Documentation critical care time: 10 minutes  Consulting other physicians critical care time: 8 minutes  Total critical care time (exclusive of procedural time) : 63 minutes  Critical care was necessary to treat or prevent imminent or life-threatening deterioration of the following conditions: sepsis.  Critical care was time spent personally by me on the following activities: blood draw for specimens, development of treatment plan with patient or surrogate, discussions with consultants, evaluation of patient's response to treatment, examination of patient, obtaining history from patient or surrogate, ordering and performing treatments and interventions, ordering and review of laboratory studies, ordering and review of radiographic studies, pulse oximetry, re-evaluation of patient's condition and review of old charts.        Labs Reviewed   CBC W/ AUTO DIFFERENTIAL - Abnormal; Notable for the following components:       Result Value    Hemoglobin 11.0 (*)     Hematocrit 34.5 (*)     MCV 72 (*)     MCH 23.0 (*)     MCHC 31.9 (*)     RDW 18.2 (*)     Immature Granulocytes 0.8 (*)     Gran # (ANC) 10.8 (*)     Immature Grans (Abs) 0.10 (*)     Lymph # 0.6 (*)     Gran % 87.9 (*)     Lymph % 4.7 (*)     All other components within normal limits   COMPREHENSIVE METABOLIC PANEL - Abnormal; Notable for the following components:    Sodium 132 (*)     CO2 21 (*)     Glucose 249 (*)     eGFR 46.3 (*)     All other components within normal limits   URINALYSIS, REFLEX TO URINE CULTURE - Abnormal; Notable for the following  components:    Appearance, UA Hazy (*)     Protein, UA 1+ (*)     Glucose, UA 3+ (*)     Occult Blood UA 2+ (*)     Leukocytes, UA 2+ (*)     All other components within normal limits    Narrative:     Specimen Source->Urine   PROCALCITONIN - Abnormal; Notable for the following components:    Procalcitonin 1.05 (*)     All other components within normal limits   URINALYSIS MICROSCOPIC - Abnormal; Notable for the following components:    RBC, UA 14 (*)     WBC, UA 37 (*)     All other components within normal limits    Narrative:     Specimen Source->Urine   ISTAT LACTATE - Abnormal; Notable for the following components:    POC Lactate 3.21 (*)     All other components within normal limits   POCT GLUCOSE - Abnormal; Notable for the following components:    POCT Glucose 205 (*)     All other components within normal limits   CULTURE, URINE   MAGNESIUM   TROPONIN I   LACTIC ACID, PLASMA   ISTAT LACTATE     EKG Readings: (Independently Interpreted)   Initial Reading: No STEMI. Previous EKG: Compared with most recent EKG Rhythm: Sinus Tachycardia. Heart Rate: 111. ST Segments: Non-Specific ST Segment Depression.     ECG Results              EKG 12-lead (Final result)        Collection Time Result Time QRS Duration [facility] QTC Calculation    03/16/24 14:48:04 03/17/24 10:45:51 82 427                     Final result by Interface, Lab In [facility] (03/17/24 10:45:57)                   Narrative:    Test Reason : R53.1,    Vent. Rate : 111 BPM     Atrial Rate : 111 BPM     P-R Int : 152 ms          QRS Dur : 082 ms      QT Int : 314 ms       P-R-T Axes : 060 053 127 degrees     QTc Int : 427 ms    Sinus tachycardia  Nonspecific ST and T wave abnormality  Abnormal ECG  When compared with ECG of 26-APR-2023 14:56,  Vent. rate has increased BY  39 BPM  T wave inversion now evident in Inferior leads  Confirmed by Cal Restrepo MD ([id number]) on 3/17/2024 10:45:49 AM    Referred By: KARINAERR   SELF           Confirmed By:Cal Restrepo  MD                                  Imaging Results              CT Renal Stone Study ABD Pelvis WO (Final result)  Result time 03/16/24 17:23:14      Final result by Jerzy Ortiz MD (03/16/24 17:23:14)                   Impression:      1. Right ureteral stent in place noting right perinephric and periureteral inflammation.  Correlation with urinalysis recommended to exclude superimposed infection.  Mild urinary bladder wall thickening may be on the basis of nondistention however urinalysis advised.  2. Moderate to large amount of stool in the colon, may reflect developing constipation.  3. Please see above for several additional findings in this motion limited exam.      Electronically signed by: Jerzy Ortiz MD  Date:    03/16/2024  Time:    17:23               Narrative:    EXAMINATION:  CT RENAL STONE STUDY ABD PELVIS WO    CLINICAL HISTORY:  Flank pain, kidney stone suspected;    TECHNIQUE:  Low dose axial images, sagittal and coronal reformations were obtained from the lung bases to the pubic symphysis.  Contrast was not administered.    COMPARISON:  02/02/2024    FINDINGS:  Are remarkable for bilateral dependent atelectasis.    The liver is mildly hypoattenuating, possibly reflecting steatosis, correlation with LFTs recommended.  The spleen and adrenal glands have a grossly unremarkable noncontrast appearance noting questionable subcentimeter low attenuating lesion within the right adrenal gland versus volume averaging.  The stomach is decompressed without wall thickening.  There is atrophic change of the pancreas.  The gallbladder is absent, no significant biliary dilation status post cholecystectomy.  No significant abdominal lymphadenopathy.    There is left perinephric fat stranding without hydronephrosis or nephrolithiasis.  The left ureter is unremarkable without calculi seen.  There is right perinephric fat stranding.  There is mild right hydronephrosis.  There is a right ureteral stent in  place, no definite calculi seen along the course of the stent.  The stent terminates within the urinary bladder lumen.  The urinary bladder walls are mildly thickened noting the urinary bladder is partially decompressed.  The uterus is absent adnexa is unremarkable.    There are surgical changes within the left deep pelvis.  There is moderate stool throughout the large bowel.  The terminal ileum is unremarkable.  The appendix is not confidently identified, no pericecal inflammation.  The small bowel is grossly unremarkable.  There are a few scattered shotty periaortic, pericaval, and mesenteric lymph nodes.  There is atherosclerotic calcification of the aorta and its branches.  There is trace fluid in the pelvis.  No focal organized pelvic fluid collection.    There is osteopenia.  There are degenerative changes of the sacroiliac joints, pubic symphysis, and spine.  There is grade 1 anterolisthesis of L5 on S1.  No significant inguinal lymphadenopathy.                                       X-Ray Chest AP Portable (Final result)  Result time 03/16/24 14:48:23      Final result by Jerzy Ortiz MD (03/16/24 14:48:23)                   Impression:      1. No acute cardiopulmonary process.      Electronically signed by: Jerzy Ortiz MD  Date:    03/16/2024  Time:    14:48               Narrative:    EXAMINATION:  XR CHEST AP PORTABLE    CLINICAL HISTORY:  Sepsis;    TECHNIQUE:  Single frontal view of the chest was performed.    COMPARISON:  09/17/2019    FINDINGS:  The cardiomediastinal silhouette is not enlarged noting calcification of the aorta.  There is elevation of the right hemidiaphragm..  There is no pleural effusion.  The trachea is midline.  The lungs are symmetrically expanded bilaterally with mildly coarse interstitial attenuation, accentuated by habitus..  No large focal consolidation seen.  There is no pneumothorax.  The osseous structures are remarkable for degenerative change..                                     X-Rays:   Independently Interpreted Readings:   Abdomen:   Abdomen and Pelvis without Contrast - Right ureteral stent in place with right perinephric and periureteral inflammation, no obstruction, perforation or free air     Medications   sodium chloride 0.9% flush 10 mL (has no administration in time range)   melatonin tablet 6 mg (has no administration in time range)   amitriptyline tablet 10 mg (10 mg Oral Given 3/16/24 2029)   gabapentin capsule 300 mg (300 mg Oral Given 3/16/24 2010)   levothyroxine tablet 112 mcg (112 mcg Oral Given 3/17/24 0832)   sodium chloride 0.9% flush 10 mL (has no administration in time range)   albuterol-ipratropium 2.5 mg-0.5 mg/3 mL nebulizer solution 3 mL (has no administration in time range)   ondansetron disintegrating tablet 4 mg (has no administration in time range)   prochlorperazine injection Soln 5 mg (has no administration in time range)   polyethylene glycol packet 17 g (has no administration in time range)   acetaminophen tablet 650 mg (650 mg Oral Given 3/17/24 0419)   simethicone chewable tablet 80 mg (has no administration in time range)   naloxone 0.4 mg/mL injection 0.02 mg (has no administration in time range)   glucose chewable tablet 16 g (has no administration in time range)   glucose chewable tablet 24 g (has no administration in time range)   glucagon (human recombinant) injection 1 mg (has no administration in time range)   enoxaparin injection 40 mg (40 mg Subcutaneous Given 3/16/24 1956)   insulin aspart U-100 pen 0-5 Units (has no administration in time range)   dextrose 10% bolus 125 mL 125 mL (has no administration in time range)   dextrose 10% bolus 250 mL 250 mL (has no administration in time range)   HYDROcodone-acetaminophen 5-325 mg per tablet 1 tablet (has no administration in time range)   mupirocin 2 % ointment ( Nasal Given 3/17/24 0833)   lactated ringers infusion (0 mL/hr Intravenous Stopped 3/17/24 0600)   morphine injection 2  mg (has no administration in time range)   ampicillin (OMNIPEN) 2 g in sodium chloride 0.9 % 100 mL IVPB (MB+) (2 g Intravenous New Bag 3/17/24 1023)   lactated ringers bolus 1,710 mL (0 mLs Intravenous Stopped 3/16/24 1700)   morphine injection 4 mg (4 mg Intravenous Given 3/16/24 1440)   cefTRIAXone (Rocephin) 1 g in dextrose 5 % in water (D5W) 100 mL IVPB (MB+) (0 g Intravenous Stopped 3/16/24 1757)   acetaminophen tablet 1,000 mg (1,000 mg Oral Given 3/16/24 1841)     Medical Decision Making  Amount and/or Complexity of Data Reviewed  Independent Historian: friend     Details: Friend present at bedside, providing history of events including flank pain and chills followed by weakness and malaise.  External Data Reviewed: notes.     Details: 3/14/24:  Urology discharge summary post ureteral stent placement  Labs: ordered.     Details: Point of care lactate 3.21, WBC of 12.3 with left shift, creatinine 1.2, procalcitonin 1.05  Radiology: ordered and independent interpretation performed.  ECG/medicine tests: ordered and independent interpretation performed.  Discussion of management or test interpretation with external provider(s): Hospital Medicine and Urology - urology recommends Ch catheter placement, IV antibiotics to include ceftriaxone, and admission to Medicine for pyelonephritis, they will follow along.    Risk  OTC drugs.  Prescription drug management.  Parenteral controlled substances.  Drug therapy requiring intensive monitoring for toxicity.  Decision regarding hospitalization.    Critical Care  Total time providing critical care: 63 minutes    This patient does have evidence of infective focus  My overall impression is sepsis.  Source: Urinary Tract  Antibiotics given-   Antibiotics (72h ago, onward)      Start     Stop Route Frequency Ordered    03/17/24 1100  ampicillin (OMNIPEN) 2 g in sodium chloride 0.9 % 100 mL IVPB (MB+)         -- IV Every 4 hours (non-standard times) 03/17/24 1544     03/16/24 2100  mupirocin 2 % ointment         03/21/24 2059 Nasl 2 times daily 03/16/24 1953          Latest lactate reviewed- Initial lactate of 3.1  Recent Labs   Lab 03/16/24 2004 03/16/24 2010   LACTATE 1.1  --    POCLAC  --  0.89     Organ dysfunction indicated by Acute kidney injury    Fluid challenge Ideal Body Weight- The patient's ideal body weight is Ideal body weight: 57 kg (125 lb 10.6 oz) which will be used to calculate fluid bolus of 30 ml/kg for treatment of septic shock.      Post- resuscitation assessment Yes Perfusion exam was performed within 6 hours of septic shock presentation after bolus shows Adequate tissue perfusion assessed by non-invasive monitoring       Will Not start Pressors- Levophed for MAP of 65  Source control achieved by: IV abx                   Patient was here for evaluation of flank pain, chills and then began running fevers in the emergency department.  She is post stent placement by Urology for recent kidney stone management discharged on 03/14.  Broad workup for sepsis and pyelonephritis given concern for stent.  She is hemodynamically stable but she is tachycardic and febrile in the emergency department without hypotension.  Sepsis workup initiated with IV fluids, labs and IV ceftriaxone.  Her labs show leukocytosis of 12.3 with a procalcitonin of 1.05 and a lactate of 3.1.  She does have flank pain, CT imaging shows perinephric and periureteral stranding in the setting of pyelonephritis.  Discussed case with Urology who recommends Ch catheter placement and continued IV antibiotics and hospital admission.  I feel this is reasonable given sepsis with a urinary focus.  Will continue IV fluids, IV antibiotics and no need for pressors at this time.  Please see critical care note for critical care time.  Discussed case with hospital medicine patient admitted for inpatient status for pyelonephritis with ureteral stent in place at this time associated with urinary retention  and sepsis development.                 Clinical Impression:  Final diagnoses:  [R53.1] Weakness  [N12] Pyelonephritis (Primary)  [A41.9] Sepsis, due to unspecified organism, unspecified whether acute organ dysfunction present  [Z96.0] Ureteral stent present          ED Disposition Condition    Admit Stable             Wolfgang Jennings DO, FAAEM  Emergency Staff Physician   Dept of Emergency Medicine   [facility]  Spectralink: [id number]        Disclaimer: This note has been generated using voice-recognition software. There may be typographical errors that have been missed during proof-reading.       Wolfgang Jennings DO  03/17/24 1201

## 2024-03-16 NOTE — ED NOTES
..Patient identifiers for Aminah Alicea-Dermody 78 y.o. female checked and correct.  Chief Complaint   Patient presents with    Weakness     Pt comes from home, she was d/c 3/14 after kidney stone removal. She is back today for weakness, body aches, chills, had a fall yesterday and now c/o back pain. Fluids given on route per EMS.     Past Medical History:   Diagnosis Date    Anticoagulant long-term use     Arthritis     Cataract     left eye    Choroidal rupture of left eye     Diabetes mellitus type II     GERD (gastroesophageal reflux disease)     Hyperlipidemia     Hypertension     Hypothyroidism     Macular scar     right eye    Retinal degeneration     chorioretinal OD    Thyroid disease     Vitamin B 12 deficiency      Allergies reported:   Review of patient's allergies indicates:   Allergen Reactions    Bufferin [aspirin, buffered] Itching    Atorvastatin      Myalgias and arthralgias    Shellfish containing products Itching     Shellfish causes her to itch per her daughter, Caro.     Warfarin     Ibuprofen Itching     Itching of eyes, head         LOC: Patient is awake, alert, and aware of environment with an appropriate affect. Patient is oriented x 3 and speaking appropriately.  APPEARANCE: Patient resting comfortably and in no acute distress. Patient is clean and well groomed, patient's clothing is properly fastened.  HEENT: **AAO---Arrived by EMS with lower back pain radiating  left flank pain yesterday. Fell last night .Spaniah speaking. Daughter at bedside--Denies nausea/vomiting  SKIN: The skin is warm and dry. Patient has normal skin turgor and moist mucus membranes. Skin is intact; no bruising or breakdown noted.  MUSKULOSKELETAL: Patient is moving all extremities well, no obvious deformities noted. Pulses intact.   RESPIRATORY: Airway is open and patent. Respirations are spontaneous and non-labored with normal effort and rate, BBS=clear  CARDIAC: Patient has a normal rate and  rhythm.Sinus tach on cardiac monitor,No peripheral edema noted.   ABDOMEN: No distention noted. Bowel sounds active in all 4 quadrants. Soft and non-tender upon palpation.Pain with urination  NEUROLOGICAL. Facial expression is symmetrical. Hand grasps are equal bilaterally. Normal sensation in all extremities when touched with finger.

## 2024-03-17 PROBLEM — R78.81 ENTEROCOCCAL BACTEREMIA: Status: ACTIVE | Noted: 2024-03-17

## 2024-03-17 PROBLEM — B95.2 ENTEROCOCCAL BACTEREMIA: Status: ACTIVE | Noted: 2024-03-17

## 2024-03-17 LAB
ACINETOBACTER CALCOACETICUS/BAUMANNII COMPLEX: NOT DETECTED
ALBUMIN SERPL BCP-MCNC: 2.5 G/DL (ref 3.5–5.2)
ALP SERPL-CCNC: 63 U/L (ref 55–135)
ALT SERPL W/O P-5'-P-CCNC: 11 U/L (ref 10–44)
ANION GAP SERPL CALC-SCNC: 6 MMOL/L (ref 8–16)
AST SERPL-CCNC: 13 U/L (ref 10–40)
BACTEROIDES FRAGILIS: NOT DETECTED
BASOPHILS # BLD AUTO: 0.01 K/UL (ref 0–0.2)
BASOPHILS NFR BLD: 0.1 % (ref 0–1.9)
BILIRUB SERPL-MCNC: 0.5 MG/DL (ref 0.1–1)
BUN SERPL-MCNC: 16 MG/DL (ref 8–23)
CALCIUM SERPL-MCNC: 8.8 MG/DL (ref 8.7–10.5)
CANDIDA ALBICANS: NOT DETECTED
CANDIDA AURIS: NOT DETECTED
CANDIDA GLABRATA: NOT DETECTED
CANDIDA KRUSEI: NOT DETECTED
CANDIDA PARAPSILOSIS: NOT DETECTED
CANDIDA TROPICALIS: NOT DETECTED
CHLORIDE SERPL-SCNC: 101 MMOL/L (ref 95–110)
CO2 SERPL-SCNC: 24 MMOL/L (ref 23–29)
CREAT SERPL-MCNC: 0.9 MG/DL (ref 0.5–1.4)
CRYPTOCOCCUS NEOFORMANS/GATTII: NOT DETECTED
CTX-M GENE (ESBL PRODUCER): ABNORMAL
DIFFERENTIAL METHOD BLD: ABNORMAL
ENTEROBACTER CLOACAE COMPLEX: NOT DETECTED
ENTEROBACTERALES: NOT DETECTED
ENTEROCOCCUS FAECALIS: DETECTED
ENTEROCOCCUS FAECIUM: NOT DETECTED
EOSINOPHIL # BLD AUTO: 0 K/UL (ref 0–0.5)
EOSINOPHIL NFR BLD: 0 % (ref 0–8)
ERYTHROCYTE [DISTWIDTH] IN BLOOD BY AUTOMATED COUNT: 18.3 % (ref 11.5–14.5)
ESCHERICHIA COLI: NOT DETECTED
EST. GFR  (NO RACE VARIABLE): >60 ML/MIN/1.73 M^2
GLUCOSE SERPL-MCNC: 153 MG/DL (ref 70–110)
HAEMOPHILUS INFLUENZAE: NOT DETECTED
HCT VFR BLD AUTO: 26.3 % (ref 37–48.5)
HGB BLD-MCNC: 8.4 G/DL (ref 12–16)
IMM GRANULOCYTES # BLD AUTO: 0.19 K/UL (ref 0–0.04)
IMM GRANULOCYTES NFR BLD AUTO: 2.1 % (ref 0–0.5)
IMP GENE (CARBAPENEM RESISTANT): ABNORMAL
KLEBSIELLA AEROGENES: NOT DETECTED
KLEBSIELLA OXYTOCA: NOT DETECTED
KLEBSIELLA PNEUMONIAE GROUP: NOT DETECTED
KPC RESISTANCE GENE (CARBAPENEM): ABNORMAL
LISTERIA MONOCYTOGENES: NOT DETECTED
LYMPHOCYTES # BLD AUTO: 0.6 K/UL (ref 1–4.8)
LYMPHOCYTES NFR BLD: 7 % (ref 18–48)
MAGNESIUM SERPL-MCNC: 1.6 MG/DL (ref 1.6–2.6)
MCH RBC QN AUTO: 22.9 PG (ref 27–31)
MCHC RBC AUTO-ENTMCNC: 31.9 G/DL (ref 32–36)
MCR-1: ABNORMAL
MCV RBC AUTO: 72 FL (ref 82–98)
MEC A/C AND MREJ (MRSA): ABNORMAL
MEC A/C: ABNORMAL
MONOCYTES # BLD AUTO: 0.7 K/UL (ref 0.3–1)
MONOCYTES NFR BLD: 7.2 % (ref 4–15)
NDM GENE (CARBAPENEM RESISTANT): ABNORMAL
NEISSERIA MENINGITIDIS: NOT DETECTED
NEUTROPHILS # BLD AUTO: 7.7 K/UL (ref 1.8–7.7)
NEUTROPHILS NFR BLD: 83.6 % (ref 38–73)
NRBC BLD-RTO: 0 /100 WBC
OHS QRS DURATION: 82 MS
OHS QTC CALCULATION: 427 MS
OXA-48-LIKE (CARBAPENEM RESISTANT): ABNORMAL
PHOSPHATE SERPL-MCNC: 3 MG/DL (ref 2.7–4.5)
PLATELET # BLD AUTO: 182 K/UL (ref 150–450)
PMV BLD AUTO: 10.4 FL (ref 9.2–12.9)
POCT GLUCOSE: 215 MG/DL (ref 70–110)
POCT GLUCOSE: 238 MG/DL (ref 70–110)
POTASSIUM SERPL-SCNC: 3.7 MMOL/L (ref 3.5–5.1)
PROT SERPL-MCNC: 5.3 G/DL (ref 6–8.4)
PROTEUS SPECIES: NOT DETECTED
PSEUDOMONAS AERUGINOSA: NOT DETECTED
RBC # BLD AUTO: 3.67 M/UL (ref 4–5.4)
SALMONELLA SP: NOT DETECTED
SERRATIA MARCESCENS: NOT DETECTED
SODIUM SERPL-SCNC: 131 MMOL/L (ref 136–145)
STAPHYLOCOCCUS AUREUS: NOT DETECTED
STAPHYLOCOCCUS EPIDERMIDIS: NOT DETECTED
STAPHYLOCOCCUS LUGDUNESIS: NOT DETECTED
STAPHYLOCOCCUS SPECIES: NOT DETECTED
STENOTROPHOMONAS MALTOPHILIA: NOT DETECTED
STREPTOCOCCUS AGALACTIAE: NOT DETECTED
STREPTOCOCCUS PNEUMONIAE: NOT DETECTED
STREPTOCOCCUS PYOGENES: NOT DETECTED
STREPTOCOCCUS SPECIES: NOT DETECTED
VAN A/B (VRE GENE): NOT DETECTED
VIM GENE (CARBAPENEM RESISTANT): ABNORMAL
WBC # BLD AUTO: 9.15 K/UL (ref 3.9–12.7)

## 2024-03-17 PROCEDURE — 87040 BLOOD CULTURE FOR BACTERIA: CPT | Mod: 59 | Performed by: STUDENT IN AN ORGANIZED HEALTH CARE EDUCATION/TRAINING PROGRAM

## 2024-03-17 PROCEDURE — 25000003 PHARM REV CODE 250: Performed by: INTERNAL MEDICINE

## 2024-03-17 PROCEDURE — 63600175 PHARM REV CODE 636 W HCPCS: Performed by: INTERNAL MEDICINE

## 2024-03-17 PROCEDURE — 36415 COLL VENOUS BLD VENIPUNCTURE: CPT | Performed by: INTERNAL MEDICINE

## 2024-03-17 PROCEDURE — 36415 COLL VENOUS BLD VENIPUNCTURE: CPT | Mod: XB | Performed by: STUDENT IN AN ORGANIZED HEALTH CARE EDUCATION/TRAINING PROGRAM

## 2024-03-17 PROCEDURE — 83735 ASSAY OF MAGNESIUM: CPT | Performed by: INTERNAL MEDICINE

## 2024-03-17 PROCEDURE — 84100 ASSAY OF PHOSPHORUS: CPT | Performed by: INTERNAL MEDICINE

## 2024-03-17 PROCEDURE — 20600001 HC STEP DOWN PRIVATE ROOM

## 2024-03-17 PROCEDURE — 63600175 PHARM REV CODE 636 W HCPCS: Performed by: HOSPITALIST

## 2024-03-17 PROCEDURE — 99223 1ST HOSP IP/OBS HIGH 75: CPT | Mod: ,,, | Performed by: INTERNAL MEDICINE

## 2024-03-17 PROCEDURE — 25000003 PHARM REV CODE 250: Performed by: HOSPITALIST

## 2024-03-17 PROCEDURE — 80053 COMPREHEN METABOLIC PANEL: CPT | Performed by: INTERNAL MEDICINE

## 2024-03-17 PROCEDURE — 85025 COMPLETE CBC W/AUTO DIFF WBC: CPT | Performed by: INTERNAL MEDICINE

## 2024-03-17 RX ORDER — SODIUM CHLORIDE, SODIUM LACTATE, POTASSIUM CHLORIDE, CALCIUM CHLORIDE 600; 310; 30; 20 MG/100ML; MG/100ML; MG/100ML; MG/100ML
INJECTION, SOLUTION INTRAVENOUS CONTINUOUS
Status: DISCONTINUED | OUTPATIENT
Start: 2024-03-17 | End: 2024-03-17

## 2024-03-17 RX ADMIN — MUPIROCIN: 20 OINTMENT TOPICAL at 08:03

## 2024-03-17 RX ADMIN — CEFTRIAXONE 2 G: 2 INJECTION, POWDER, FOR SOLUTION INTRAMUSCULAR; INTRAVENOUS at 08:03

## 2024-03-17 RX ADMIN — MUPIROCIN: 20 OINTMENT TOPICAL at 09:03

## 2024-03-17 RX ADMIN — ENOXAPARIN SODIUM 40 MG: 40 INJECTION SUBCUTANEOUS at 05:03

## 2024-03-17 RX ADMIN — GABAPENTIN 300 MG: 300 CAPSULE ORAL at 09:03

## 2024-03-17 RX ADMIN — AMPICILLIN 2 G: 2 INJECTION, POWDER, FOR SOLUTION INTRAMUSCULAR; INTRAVENOUS at 06:03

## 2024-03-17 RX ADMIN — AMITRIPTYLINE HYDROCHLORIDE 10 MG: 10 TABLET, FILM COATED ORAL at 09:03

## 2024-03-17 RX ADMIN — ACETAMINOPHEN 650 MG: 325 TABLET ORAL at 04:03

## 2024-03-17 RX ADMIN — LEVOTHYROXINE SODIUM 112 MCG: 112 TABLET ORAL at 08:03

## 2024-03-17 RX ADMIN — AMPICILLIN 2 G: 2 INJECTION, POWDER, FOR SOLUTION INTRAMUSCULAR; INTRAVENOUS at 11:03

## 2024-03-17 RX ADMIN — AMPICILLIN 2 G: 2 INJECTION, POWDER, FOR SOLUTION INTRAMUSCULAR; INTRAVENOUS at 10:03

## 2024-03-17 RX ADMIN — AMPICILLIN 2 G: 2 INJECTION, POWDER, FOR SOLUTION INTRAMUSCULAR; INTRAVENOUS at 02:03

## 2024-03-17 RX ADMIN — HYDROCODONE BITARTRATE AND ACETAMINOPHEN 1 TABLET: 5; 325 TABLET ORAL at 01:03

## 2024-03-17 RX ADMIN — INSULIN ASPART 2 UNITS: 100 INJECTION, SOLUTION INTRAVENOUS; SUBCUTANEOUS at 05:03

## 2024-03-17 NOTE — PROGRESS NOTES
[facility] ([place] [id number])  Urology  Progress Note    Patient Name: Aminah Norton  MRN: [id number]  Admission Date: 3/16/2024  Hospital Length of Stay: 1 days  Code Status: Full Code   Attending Provider: Edwige Aly MD   Primary Care Physician: Jeison Sanchez MD    Subjective:     HPI:  Aminah Norton is a 78 y.o.F who underwent second look ureteroscopy on 03/14/2024 with Dr. Smith who presented with lower abdominal pain and dysuria. Urology consulted for pyelonephritis.     On assessment, febrile to 102.6, tachycardic. WBC 12.30, Hgb 11.0, Cr 1.2 (baseline 0.8). UA with occasional bacteria, nitrite negative.  ISTAT venous lactate 3.21.     CTRSS shows right ureteral stent in good position, with acceptable amount of right hydronephrosis considering stent reflux, no left hydronephrosis or ureteral stones. Bladder not significantly distended.    Patient reports inability to urinate since 11 AM. Also endorses dysuria and malaise that began last night. She denies fever at home, nausea or vomiting. Her abdominal pain improved with day placement.     Interval History: NAEO. AF on exam this AM however did have fevers overnight. Day draining clear yellow urine. Cr 0.9 from 1.2.     Objective:     Temp:  [98.9 °F (37.2 °C)-102.6 °F (39.2 °C)] 98.9 °F (37.2 °C)  Pulse:  [] 74  Resp:  [16-22] 16  SpO2:  [93 %-98 %] 93 %  BP: (102-157)/(51-71) 105/51     Body mass index is 27.79 kg/m².      Bladder Scan Volume (mL): 384 mL (03/16/24 1930)    Drains       Drain  Duration                  Urethral Catheter 03/16/24 1937 16 Fr. <1 day                     Physical Exam  Constitutional:       General: She is not in acute distress.     Appearance: Normal appearance. She is not ill-appearing.   HENT:      Head: Normocephalic and atraumatic.      Mouth/Throat:      Mouth: Mucous membranes are moist.   Eyes:      Extraocular Movements: Extraocular movements intact.    Cardiovascular:      Rate and Rhythm: Normal rate.   Pulmonary:      Effort: Pulmonary effort is normal.   Abdominal:      Palpations: Abdomen is soft.   Genitourinary:     Comments: 16 Fr Day draining c/y/u. Unable to visualize stent strings  Skin:     General: Skin is warm and dry.   Neurological:      Mental Status: She is alert and oriented to person, place, and time.           Significant Labs:    BMP:  Recent Labs   Lab 03/16/24  1410 03/17/24  0556   * 131*   K 4.9 3.7    101   CO2 21* 24   BUN 21 16   CREATININE 1.2 0.9   CALCIUM 10.2 8.8       CBC:   Recent Labs   Lab 03/16/24  1410 03/17/24  0556   WBC 12.30 9.15   HGB 11.0* 8.4*   HCT 34.5* 26.3*    182       Urine Studies:   Recent Labs   Lab 03/16/24  1359   COLORU Yellow   APPEARANCEUA Hazy*   PHUR 6.0   SPECGRAV 1.020   PROTEINUA 1+*   GLUCUA 3+*   KETONESU Negative   BILIRUBINUA Negative   OCCULTUA 2+*   NITRITE Negative   LEUKOCYTESUR 2+*   RBCUA 14*   WBCUA 37*   BACTERIA Occasional   SQUAMEPITHEL 1   HYALINECASTS 0     All pertinent labs results from the past 24 hours have been reviewed.    Significant Imaging:  All pertinent imaging results/findings from the past 24 hours have been reviewed.    Assessment/Plan:     * Pyelonephritis  Aminah Norton is a 78 y.o.F with pyelonephritis.    - IV antibiotics  - IV fluids  - Maintain stent  - Maintain day  - Trend Cr  - Recommend ID consult  - KUB to assess stent position  - Will need 2 weeks total of antibiotics  - Contact urology if patient decompensates  - Rest of care per iman Adamson MD  Urology  [facility] ([place])

## 2024-03-17 NOTE — SUBJECTIVE & OBJECTIVE
Interval History: Patient reports fever overnight. Denies pain. Denies chest pain, difficulty breathing, n/v, c/d.     Review of Systems  Objective:     Vital Signs (Most Recent):  Temp: 99 °F (37.2 °C) (03/17/24 1209)  Pulse: 80 (03/17/24 1209)  Resp: 19 (03/17/24 1209)  BP: (!) 150/65 (03/17/24 1209)  SpO2: 95 % (03/17/24 1209) Vital Signs (24h Range):  Temp:  [98.9 °F (37.2 °C)-102.6 °F (39.2 °C)] 99 °F (37.2 °C)  Pulse:  [] 80  Resp:  [16-22] 19  SpO2:  [93 %-98 %] 95 %  BP: (102-157)/(51-71) 150/65     Weight: 75.8 kg (167 lb)  Body mass index is 27.79 kg/m².    Intake/Output Summary (Last 24 hours) at 3/17/2024 1257  Last data filed at 3/17/2024 1140  Gross per 24 hour   Intake 1928.97 ml   Output 1750 ml   Net 178.97 ml         Physical Exam  Vitals and nursing note reviewed.   Constitutional:       General: She is not in acute distress.     Appearance: She is not ill-appearing.   HENT:      Mouth/Throat:      Mouth: Mucous membranes are moist.   Eyes:      General: No scleral icterus.     Extraocular Movements: Extraocular movements intact.      Pupils: Pupils are equal, round, and reactive to light.   Cardiovascular:      Rate and Rhythm: Normal rate and regular rhythm.      Pulses: Normal pulses.      Heart sounds: Normal heart sounds.   Pulmonary:      Effort: Pulmonary effort is normal. No respiratory distress.      Breath sounds: Normal breath sounds. No wheezing.   Abdominal:      General: There is no distension.      Palpations: Abdomen is soft.      Tenderness: There is no abdominal tenderness.   Musculoskeletal:      Right lower leg: No edema.      Left lower leg: No edema.   Skin:     General: Skin is warm and dry.      Findings: No rash.   Neurological:      General: No focal deficit present.      Mental Status: She is alert and oriented to person, place, and time.   Psychiatric:         Mood and Affect: Mood normal.         Behavior: Behavior normal.             Significant Labs: All  pertinent labs within the past 24 hours have been reviewed.  Blood Culture:   Recent Labs   Lab 03/16/24  1410 03/16/24  1420   LABBLOO Gram stain aer bottle: Gram positive cocci in chains resembling Strep  Gram stain vinay bottle: Gram positive cocci in chains resembling Strep  Results called to and read back by: Emilie Napoles RN 03/17/2024  05:37 Gram stain aer bottle: Gram positive cocci in chains resembling Strep  Positive results previously called 03/17/2024  06:25     BMP:   Recent Labs   Lab 03/17/24  0556   *   *   K 3.7      CO2 24   BUN 16   CREATININE 0.9   CALCIUM 8.8   MG 1.6     CBC:   Recent Labs   Lab 03/16/24  1410 03/17/24  0556   WBC 12.30 9.15   HGB 11.0* 8.4*   HCT 34.5* 26.3*    182       Significant Imaging: I have reviewed all pertinent imaging results/findings within the past 24 hours.

## 2024-03-17 NOTE — NURSING
Pt AAOx3, Greek speaker. Vitals stable, on RA. SR on tele. C/o L low back pain, managed with PRNs. IV ABX given. I/O in flowsheets. No acute events this shift. Safety precaution in place. Personal items and call light in reach.

## 2024-03-17 NOTE — PROGRESS NOTES
[facility] [facility]  [facility] Note    Patient Name: Aminah Norton  MRN: [id number]  Patient Class: IP- Inpatient   Admission Date: 3/16/2024  Length of Stay: 1 days  Attending Physician: Edwige Aly MD  Primary Care Provider: Jeison Sanchez MD        Subjective:     Principal Problem:Pyelonephritis        HPI:  79 yo female with DM2, GERD, HPLD, HTN, Hypothyroidism, recent stone removal presenting with chills, weakness, and back pain. Interpretor MERI used at bedside. She states that she was d/c'd on the 14th of March was doing okay until last night she started feeling weak and actually fell, she denies any leg pain or headache and did loss consciousness, however she did not feel any better today whenever she woke up so presented to the ER.    She had a fever measured here at 102.6F, CT scan showed perinephric stranding, UA consistent with infection, lactic acidosis. Urology was consulted who recommended day and antibiotics. Medicine called for admission.     Overview/Hospital Course:  Started on ceftriaxone at admission. Blood cultures quickly resulted positive for e faecalis. ID consulted.     Interval History: Patient reports fever overnight. Denies pain. Denies chest pain, difficulty breathing, n/v, c/d.     Review of Systems  Objective:     Vital Signs (Most Recent):  Temp: 99 °F (37.2 °C) (03/17/24 1209)  Pulse: 80 (03/17/24 1209)  Resp: 19 (03/17/24 1209)  BP: (!) 150/65 (03/17/24 1209)  SpO2: 95 % (03/17/24 1209) Vital Signs (24h Range):  Temp:  [98.9 °F (37.2 °C)-102.6 °F (39.2 °C)] 99 °F (37.2 °C)  Pulse:  [] 80  Resp:  [16-22] 19  SpO2:  [93 %-98 %] 95 %  BP: (102-157)/(51-71) 150/65     Weight: 75.8 kg (167 lb)  Body mass index is 27.79 kg/m².    Intake/Output Summary (Last 24 hours) at 3/17/2024 1257  Last data filed at 3/17/2024 1140  Gross per 24 hour   Intake 1928.97 ml   Output 1750 ml   Net 178.97 ml         Physical  Exam  Vitals and nursing note reviewed.   Constitutional:       General: She is not in acute distress.     Appearance: She is not ill-appearing.   HENT:      Mouth/Throat:      Mouth: Mucous membranes are moist.   Eyes:      General: No scleral icterus.     Extraocular Movements: Extraocular movements intact.      Pupils: Pupils are equal, round, and reactive to light.   Cardiovascular:      Rate and Rhythm: Normal rate and regular rhythm.      Pulses: Normal pulses.      Heart sounds: Normal heart sounds.   Pulmonary:      Effort: Pulmonary effort is normal. No respiratory distress.      Breath sounds: Normal breath sounds. No wheezing.   Abdominal:      General: There is no distension.      Palpations: Abdomen is soft.      Tenderness: There is no abdominal tenderness.   Musculoskeletal:      Right lower leg: No edema.      Left lower leg: No edema.   Skin:     General: Skin is warm and dry.      Findings: No rash.   Neurological:      General: No focal deficit present.      Mental Status: She is alert and oriented to person, place, and time.   Psychiatric:         Mood and Affect: Mood normal.         Behavior: Behavior normal.             Significant Labs: All pertinent labs within the past 24 hours have been reviewed.  Blood Culture:   Recent Labs   Lab 03/16/24  1410 03/16/24  1420   LABBLOO Gram stain aer bottle: Gram positive cocci in chains resembling Strep  Gram stain vinay bottle: Gram positive cocci in chains resembling Strep  Results called to and read back by: Emilie Napoles RN 03/17/2024  05:37 Gram stain aer bottle: Gram positive cocci in chains resembling Strep  Positive results previously called 03/17/2024  06:25     BMP:   Recent Labs   Lab 03/17/24  0556   *   *   K 3.7      CO2 24   BUN 16   CREATININE 0.9   CALCIUM 8.8   MG 1.6     CBC:   Recent Labs   Lab 03/16/24  1410 03/17/24  0556   WBC 12.30 9.15   HGB 11.0* 8.4*   HCT 34.5* 26.3*    182       Significant  Imaging: I have reviewed all pertinent imaging results/findings within the past 24 hours.    Assessment/Plan:      * Pyelonephritis  Acute, urine consistent with infection.   -  see above        Enterococcal bacteremia  Patient presented to ED with fevers and malaise 2 days after urologic procedure.  CT with evidence nephritic stranding.  Admitted for pyelonephritis.  Blood cultures on presentation 3/16 positive for Enterococcus faecalis. Suspect urologic source.  - ID consulted   - Repeat blood cultures ordered  - TTE  - S/p ceftriaxone in ED and on admission  - urology following     Sepsis  This patient does have evidence of infective focus  My overall impression is sepsis.  Source: Urinary Tract  Antibiotics given-   Antibiotics (72h ago, onward)      Start     Stop Route Frequency Ordered    03/17/24 1100  ampicillin (OMNIPEN) 2 g in sodium chloride 0.9 % 100 mL IVPB (MB+)         -- IV Every 4 hours (non-standard times) 03/17/24 0954    03/16/24 2100  mupirocin 2 % ointment         03/21/24 2059 Nasl 2 times daily 03/16/24 1953          Latest lactate reviewed-  Recent Labs   Lab 03/16/24 2004 03/16/24 2010   LACTATE 1.1  --    POCLAC  --  0.89       Fluid challenge Ideal Body Weight- The patient's ideal body weight is Ideal body weight: 57 kg (125 lb 10.6 oz) which will be used to calculate fluid bolus of 30 ml/kg for treatment of septic shock.      Post- resuscitation assessment No - Post resuscitation assessment not needed     ANAIS    Will Not start Pressors- Levophed for MAP of 65  Source control achieved by: rocephin    ANAIS (acute kidney injury)  Patient with acute kidney injury/acute renal failure likely due to pre-renal azotemia due to dehydration. Cr 1.2 on presentation. ANAIS is currently improving. Baseline creatinine  0.8  - Labs reviewed- Renal function/electrolytes with Estimated Creatinine Clearance: 52.5 mL/min (based on SCr of 0.9 mg/dL). according to latest data. Monitor urine output and serial  BMP and adjust therapy as needed. Avoid nephrotoxins and renally dose meds for GFR listed above.    Type 2 diabetes mellitus with neurologic complication, without long-term current use of insulin  Patient's FSGs are controlled on current medication regimen.  Last A1c reviewed-   Lab Results   Component Value Date    HGBA1C 8.2 (H) 11/09/2023    HGBA1C 8.2 (H) 11/09/2023     Most recent fingerstick glucose reviewed-   Recent Labs   Lab 03/16/24 2000   POCTGLUCOSE 205*     Current correctional scale  Low  Maintain anti-hyperglycemic dose as follows-   Antihyperglycemics (From admission, onward)      Start     Stop Route Frequency Ordered    03/16/24 2019  insulin aspart U-100 pen 0-5 Units         -- SubQ Before meals & nightly PRN 03/16/24 1920          Hold Oral hypoglycemics while patient is in the hospital.    GERD (gastroesophageal reflux disease)  Chronic, controlled, continue to monitor      HTN (hypertension), benign  Chronic, controlled. Latest blood pressure and vitals reviewed-     Temp:  [98.9 °F (37.2 °C)-102.6 °F (39.2 °C)]   Pulse:  []   Resp:  [16-22]   BP: (102-157)/(51-71)   SpO2:  [93 %-98 %] .   Home meds for hypertension were reviewed and noted below.   Hypertension Medications               carvediloL (COREG) 25 MG tablet TAKE 1 TABLET TWICE DAILY    lisinopriL (PRINIVIL,ZESTRIL) 40 MG tablet Take 1 tablet (40 mg total) by mouth once daily.            While in the hospital, will manage blood pressure as follows; Continue home antihypertensive regimen    Will utilize p.r.n. blood pressure medication only if patient's blood pressure greater than 180/110 and she develops symptoms such as worsening chest pain or shortness of breath.    Hypothyroidism  Chronic, controlled, continue home synthroid        VTE Risk Mitigation (From admission, onward)           Ordered     enoxaparin injection 40 mg  Daily         03/16/24 1920     IP VTE HIGH RISK PATIENT  Once         03/16/24 1920     Place  sequential compression device  Until discontinued         03/16/24 1920     Place sequential compression device  Until discontinued         03/16/24 1920                    Discharge Planning   RAJESH: 3/19/2024     Code Status: Full Code   Is the patient medically ready for discharge?: No    Reason for patient still in hospital (select all that apply): Patient trending condition, Laboratory test, and Consult recommendations                     Edwige Aly MD  Department of Hospital Medicine   [facility] ([place])

## 2024-03-17 NOTE — CONSULTS
[facility]  Infectious Disease  Consult Note    Patient Name: Aminah Norton  MRN: [id number]  Admission Date: 3/16/2024  Hospital Length of Stay: 1 days  Attending Physician: Edwige Aly MD  Primary Care Provider: Jeison Sanchez MD     Isolation Status: No active isolations    Patient information was obtained from patient, past medical records, and ER records.      Inpatient consult to Infectious Diseases  Consult performed by: Diane Gonsales MD  Consult ordered by: Edwige Aly MD        Assessment/Plan:     ID  * Enterococcal bacteremia  Likely secondary to UTI with translocation into blood.  Blood cultures with Gram-positive cocci in chains resembling strep rapid ID positive for Enterococcus faecalis.  New identify genes and rapid ID suspect antimicrobial resistance at this time.  Antibiotics as detailed in pyelonephritis.    We will repeat blood cultures tomorrow.  We will follow any cultures are in process at this time.    Pyelonephritis  I have reviewed hospital notes from   service and other specialty providers. I have also reviewed CBC, CMP/BMP,  cultures and imaging with my interpretation as documented.     Pyelonephritis likely secondary to recent urologic procedure removal of stone.  Suspect translocation from the stone into the renal system.  Appears to have distant seeding with positive blood cultures for Enterococcus.  She is afebrile and without leukocytosis at this time.  Stop ceftriaxone.  Start ampicillin.  Discussed management plan with the staff and/or members from  service.          Thank you for your consult. I will follow-up with patient. Please contact us if you have any additional questions.    Diane Quiñonez MD  Infectious Disease  [facility]    75 minutes of total time spent on the encounter, which includes face to face time and non-face to face time preparing to see the patient (eg,  review of tests), obtaining and/or reviewing separately obtained history, documenting clinical information in the electronic or other health record, independently interpreting results (not separately reported) and communicating results to the patient/family/caregiver, or care coordination (not separately reported).     Subjective:     Principal Problem: Enterococcal bacteremia    HPI: A 78-year-old woman with HTN, dm 2, hypothyroidism, interstitial cystitis, and status post ureteroscopic stone extraction with lithotripsy and right ureteral stent placement on 03/01/2024 1 day prior to admission developed fever to 102.6 F with associated shaking chills, suprapubic tenderness, and dysuria.  This was also associated with hematuria, sensation of incomplete bladder emptying, as well as an inability to initiate micturition however the patient denies urgency, frequency, and back pain.  [place] [facility] [place] was noted to be febrile to 101.3 F and tachycardic.  Laboratory workup revealed no evidence of leukocytosis.  Admission blood cultures are now positive for Gram-positive cocci in chains resembling strep with a rapid ID for Enterococcus faecalis.    Infectious disease is consulted for E faecalis bacteremia        Past Medical History:   Diagnosis Date    Anticoagulant long-term use     Arthritis     Cataract     left eye    Choroidal rupture of left eye     Diabetes mellitus type II     GERD (gastroesophageal reflux disease)     Hyperlipidemia     Hypertension     Hypothyroidism     Macular scar     right eye    Retinal degeneration     chorioretinal OD    Thyroid disease     Vitamin B 12 deficiency        Past Surgical History:   Procedure Laterality Date    APPENDECTOMY      BLADDER FULGURATION N/A 3/1/2024    Procedure: ULCER INJECTION FULGURATION;  Surgeon: Brody Smith MD;  Location: [facility];  Service: Urology;  Laterality: N/A;    BLADDER SUSPENSION      CATARACT EXTRACTION      right eye      CHOLECYSTECTOMY      COLONOSCOPY N/A 7/20/2020    Procedure: COLONOSCOPY;  Surgeon: Juan Parker MD;  Location: [facility] ENDO (4TH FLR);  Service: Endoscopy;  Laterality: N/A;  Hx of chronic anemia.  patient needs a   4/6/20 - removed from 4/20/20, rescheduled 7/20/20 - pg  [place]    COLONOSCOPY N/A 2/9/2023    Procedure: COLONOSCOPY;  Surgeon: Renan Sanchez MD;  Location: [facility] ENDO (4TH FLR);  Service: Colon and Rectal;  Laterality: N/A;  instr handed to patient, -ml    CYSTOSCOPY N/A 5/19/2021    Procedure: CYSTOSCOPY;  Surgeon: Brody Smith MD;  Location: [facility] OR 1ST FLR;  Service: Urology;  Laterality: N/A;    CYSTOURETEROSCOPY, WITH HOLMIUM LASER LITHOTRIPSY OF URETERAL CALCULUS AND STENT INSERTION Right 3/1/2024    Procedure: CYSTOURETEROSCOPY, WITH HOLMIUM LASER LITHOTRIPSY OF URETERAL CALCULUS AND STENT INSERTION;  Surgeon: Brody Smith MD;  Location: [facility] OR;  Service: Urology;  Laterality: Right;    CYSTOURETEROSCOPY,WITH HOLMIUM LASER LITHOTRIPSY OF URETERAL CALCULUS Right 3/14/2024    Procedure: CYSTOURETEROSCOPY,WITH HOLMIUM LASER LITHOTRIPSY OF URETERAL CALCULUS;  Surgeon: Brody Smith MD;  Location: [facility] OR;  Service: Urology;  Laterality: Right;  1 HR    EYE SURGERY Bilateral     cataract removal    INJECTION OF STEROID N/A 3/14/2024    Procedure: INJECTION, STEROID;  Surgeon: Brody Smith MD;  Location: [facility] OR;  Service: Urology;  Laterality: N/A;    PLACEMENT-STENT Right 3/14/2024    Procedure: PLACEMENT-STENT;  Surgeon: Brody Smith MD;  Location: [facility] OR;  Service: Urology;  Laterality: Right;    REMOVAL-STENT Right 3/14/2024    Procedure: REMOVAL-STENT;  Surgeon: Brody Smith MD;  Location: [facility] OR;  Service: Urology;  Laterality: Right;    TOTAL ABDOMINAL HYSTERECTOMY      DUB       Review of patient's allergies indicates:   Allergen Reactions    Bufferin [aspirin, buffered] Itching     Atorvastatin      Myalgias and arthralgias    Shellfish containing products Itching     Shellfish causes her to itch per her daughter, Caro.     Warfarin     Ibuprofen Itching     Itching of eyes, head       Medications:  Medications Prior to Admission   Medication Sig    ACCU-CHEK SOFTCLIX LANCETS Misc CHECK BLOOD SUGAR ONE TIME DAILY    amitriptyline (ELAVIL) 10 MG tablet Take 1 tablet (10 mg total) by mouth every evening.    BD ALCOHOL SWABS PadM USE AS DIRECTED TOPICALLY AS NEEDED    biotin 1 mg tablet Take 1,000 mcg by mouth 3 (three) times daily.    blood sugar diagnostic (ACCU-CHEK MATTIE PLUS TEST STRP) Strp TEST ONE TIME DAILY    blood-glucose meter kit To check BG 2  times daily, to use with insurance preferred meter, use as directed.    carvediloL (COREG) 25 MG tablet TAKE 1 TABLET TWICE DAILY    cyanocobalamin (VITAMIN B-12) 1000 MCG tablet Take 100 mcg by mouth once daily.    gabapentin (NEURONTIN) 300 MG capsule Take 1 capsule (300 mg total) by mouth every evening.    glipiZIDE 5 MG TR24 Take 1 tablet (5 mg total) by mouth daily with breakfast.    HYDROcodone-acetaminophen (NORCO) 5-325 mg per tablet Take 1 tablet by mouth every 6 (six) hours as needed for Pain.    ketoconazole (NIZORAL) 2 % cream Apply topically once daily.    lancets (ACCU-CHEK MULTICLIX LANCET) Misc To use as directed with Accu Chek Sahra FastClix lancing device    lancets Misc To check BG 2 times daily, to use with insurance preferred meter.    levothyroxine (SYNTHROID) 112 MCG tablet Take 1 tablet (112 mcg total) by mouth before breakfast.    lisinopriL (PRINIVIL,ZESTRIL) 40 MG tablet Take 1 tablet (40 mg total) by mouth once daily.    metFORMIN (GLUCOPHAGE) 1000 MG tablet Take 1 tablet (1,000 mg total) by mouth 2 (two) times daily with meals.    mirabegron (MYRBETRIQ) 25 mg Tb24 ER tablet Take 1 tablet (25 mg total) by mouth once daily.    naproxen (NAPROSYN) 500 MG tablet Take 1 tablet (500 mg total) by mouth 2 (two) times daily  with meals.    rosuvastatin (CRESTOR) 20 MG tablet Take 1 tablet (20 mg total) by mouth once daily.    SITagliptin phosphate (JANUVIA) 100 MG Tab Take 1 tablet (100 mg total) by mouth once daily.    turmeric root extract 500 mg Cap Take by mouth.    VITAMIN B COMPLEX ORAL Take 1 tablet by mouth.    vitamin D (VITAMIN D3) 1000 units Tab Take 1,000 Units by mouth once daily.     Antibiotics (From admission, onward)      Start     Stop Route Frequency Ordered    03/17/24 1100  ampicillin (OMNIPEN) 2 g in sodium chloride 0.9 % 100 mL IVPB (MB+)         -- IV Every 4 hours (non-standard times) 03/17/24 0954    03/16/24 2100  mupirocin 2 % ointment         03/21/24 2059 Nasl 2 times daily 03/16/24 1953          Antifungals (From admission, onward)      None          Antivirals (From admission, onward)      None             Immunization History   Administered Date(s) Administered    COVID-19, MRNA, LN-S, PF (Pfizer) (Gray Cap) 03/09/2022    COVID-19, mRNA, LNP-S, bivalent booster, PF (PFIZER OMICRON) 10/12/2022    COVID-19, vector-nr, rS-Ad26, PF (Lively) 03/05/2021    Influenza (FLUAD) - Quadrivalent - Adjuvanted - PF *Preferred* (65+) 12/10/2021, 10/10/2022, 11/09/2023    Influenza - High Dose - PF (65 years and older) 12/02/2015, 09/15/2017, 11/26/2018    Pneumococcal Conjugate - 13 Valent 11/26/2018    Pneumococcal Polysaccharide - 23 Valent 01/18/2023       Family History       Problem Relation (Age of Onset)    Breast cancer Mother    COPD Father    Cancer Mother, Sister, Daughter    Diabetes Son    Hypertension Sister, Sister, Daughter, Daughter    Ovarian cancer Mother, Sister    Thyroid disease Daughter, Daughter          Social History     Socioeconomic History    Marital status:    Tobacco Use    Smoking status: Never    Smokeless tobacco: Never   Substance and Sexual Activity    Alcohol use: No    Drug use: No    Sexual activity: Yes     Partners: Male     Birth control/protection: Post-menopausal      Comment:     Other Topics Concern    Are you pregnant or think you may be? No    Breast-feeding No   Social History Narrative     with 7 kids     Review of Systems   Constitutional:  Positive for chills and fever. Negative for fatigue and unexpected weight change.   HENT:  Negative for ear pain, facial swelling, hearing loss, mouth sores, nosebleeds, rhinorrhea, sinus pressure, sore throat, tinnitus, trouble swallowing and voice change.    Eyes:  Negative for photophobia, pain, redness and visual disturbance.   Respiratory:  Negative for cough, chest tightness, shortness of breath and wheezing.    Cardiovascular:  Negative for chest pain, palpitations and leg swelling.   Gastrointestinal:  Negative for abdominal pain, blood in stool, constipation, diarrhea, nausea and vomiting.   Endocrine: Negative for cold intolerance, heat intolerance, polydipsia, polyphagia and polyuria.   Genitourinary:  Positive for difficulty urinating, dysuria, hematuria and urgency. Negative for flank pain, frequency, menstrual problem, vaginal bleeding, vaginal discharge and vaginal pain.   Musculoskeletal:  Negative for arthralgias, back pain, joint swelling, myalgias and neck pain.   Skin:  Negative for rash.   Allergic/Immunologic: Negative for environmental allergies, food allergies and immunocompromised state.   Neurological:  Negative for dizziness, seizures, syncope, weakness, light-headedness, numbness and headaches.   Hematological:  Negative for adenopathy. Does not bruise/bleed easily.   Psychiatric/Behavioral:  Negative for confusion, hallucinations, self-injury, sleep disturbance and suicidal ideas. The patient is not nervous/anxious.      Objective:     Vital Signs (Most Recent):  Temp: 98.9 °F (37.2 °C) (03/17/24 0745)  Pulse: 74 (03/17/24 0745)  Resp: 16 (03/17/24 0745)  BP: (!) 105/51 (03/17/24 0745)  SpO2: (!) 93 % (03/17/24 0745) Vital Signs (24h Range):  Temp:  [98.9 °F (37.2 °C)-102.6 °F (39.2 °C)] 98.9 °F  (37.2 °C)  Pulse:  [] 74  Resp:  [16-22] 16  SpO2:  [93 %-98 %] 93 %  BP: (102-157)/(51-71) 105/51     Weight: 75.8 kg (167 lb)  Body mass index is 27.79 kg/m².    Estimated Creatinine Clearance: 52.5 mL/min (based on SCr of 0.9 mg/dL).     Physical Exam  Vitals and nursing note reviewed.   Constitutional:       Appearance: She is well-developed.   HENT:      Head: Normocephalic and atraumatic.      Right Ear: External ear normal.      Left Ear: External ear normal.   Eyes:      General: No scleral icterus.        Right eye: No discharge.         Left eye: No discharge.      Conjunctiva/sclera: Conjunctivae normal.   Cardiovascular:      Rate and Rhythm: Normal rate and regular rhythm.      Heart sounds: Normal heart sounds. No murmur heard.     No friction rub. No gallop.   Pulmonary:      Effort: Pulmonary effort is normal. No respiratory distress.      Breath sounds: Normal breath sounds. No stridor. No wheezing or rales.   Abdominal:      General: Bowel sounds are normal.      Tenderness: There is abdominal tenderness in the suprapubic area.   Musculoskeletal:         General: No tenderness. Normal range of motion.   Skin:     General: Skin is warm and dry.   Neurological:      Mental Status: She is alert and oriented to person, place, and time.          Significant Labs: CBC:   Recent Labs   Lab 03/16/24  1410 03/17/24  0556   WBC 12.30 9.15   HGB 11.0* 8.4*   HCT 34.5* 26.3*    182     CMP:   Recent Labs   Lab 03/16/24  1410 03/17/24  0556   * 131*   K 4.9 3.7    101   CO2 21* 24   * 153*   BUN 21 16   CREATININE 1.2 0.9   CALCIUM 10.2 8.8   PROT 7.5 5.3*   ALBUMIN 3.8 2.5*   BILITOT 0.6 0.5   ALKPHOS 77 63   AST 22 13   ALT 13 11   ANIONGAP 11 6*     Microbiology Results (last 7 days)       Procedure Component Value Units Date/Time    Blood culture [[id number]]     Order Status: Sent Specimen: Blood     Blood culture [[id number]]     Order Status: Sent Specimen: Blood     Rapid  Organism ID by PCR (from Blood culture) [[id number]]  (Abnormal) Collected: 03/16/24 1410    Order Status: Completed Updated: 03/17/24 0636     Enterococcus faecalis Detected     Enterococcus faecium Not Detected     Listeria monocytogenes Not Detected     Staphylococcus spp. Not Detected     Staphylococcus aureus Not Detected     Staphylococcus epidermidis Not Detected     Staphylococcus lugdunensis Not Detected     Streptococcus species Not Detected     Streptococcus agalactiae Not Detected     Streptococcus pneumoniae Not Detected     Streptococcus pyogenes Not Detected     Acinetobacter calcoaceticus/baumannii complex Not Detected     Bacteroides fragilis Not Detected     Enterobacterales Not Detected     Enterobacter cloacae complex Not Detected     Escherichia coli Not Detected     Klebsiella aerogenes Not Detected     Klebsiella oxytoca Not Detected     Klebsiella pneumoniae group Not Detected     Proteus Not Detected     Salmonella sp Not Detected     Serratia marcescens Not Detected     Haemophilus influenzae Not Detected     Neisseria meningtidis Not Detected     Pseudomonas aeruginosa Not Detected     Stenotrophomonas maltophilia Not Detected     Candida albicans Not Detected     Candida auris Not Detected     Candida glabrata Not Detected     Candida krusei Not Detected     Candida parapsilosis Not Detected     Candida tropicalis Not Detected     Cryptococcus neoformans/gattii Not Detected     CTX-M (ESBL ) Test Not Applicable     IMP (Carbapenem resistant) Test Not Applicable     KPC resistance gene (Carbapenem resistant) Test Not Applicable     mcr-1  Test Not Applicable     mec A/C  Test Not Applicable     mec A/C and MREJ (MRSA) gene Test Not Applicable     NDM (Carbapenem resistant) Test Not Applicable     OXA-48-like (Carbapenem resistant) Test Not Applicable     van A/B (VRE gene) Not Detected     VIM (Carbapenem resistant) Test Not Applicable    Narrative:      Aerobic and anaerobic     Blood culture x two cultures. Draw prior to antibiotics. [[id number]] Collected: 03/16/24 1420    Order Status: Completed Specimen: Blood from Wrist, Right Updated: 03/17/24 0625     Blood Culture, Routine Gram stain aer bottle: Gram positive cocci in chains resembling Strep      Positive results previously called 03/17/2024  06:25    Narrative:      Aerobic and anaerobic    Blood culture x two cultures. Draw prior to antibiotics. [[id number]] Collected: 03/16/24 1410    Order Status: Completed Specimen: Blood from Peripheral, Forearm, Left Updated: 03/17/24 0538     Blood Culture, Routine Gram stain aer bottle: Gram positive cocci in chains resembling Strep      Gram stain vinay bottle: Gram positive cocci in chains resembling Strep      Results called to and read back by: Emilie Napoles RN 03/17/2024  05:37    Narrative:      Aerobic and anaerobic    Urine culture [[id number]] Collected: 03/16/24 1359    Order Status: No result Specimen: Urine Updated: 03/16/24 1442          Urine Studies:   Recent Labs   Lab 03/16/24  1359   COLORU Yellow   APPEARANCEUA Hazy*   PHUR 6.0   SPECGRAV 1.020   PROTEINUA 1+*   GLUCUA 3+*   KETONESU Negative   BILIRUBINUA Negative   OCCULTUA 2+*   NITRITE Negative   LEUKOCYTESUR 2+*   RBCUA 14*   WBCUA 37*   BACTERIA Occasional   SQUAMEPITHEL 1   HYALINECASTS 0       Significant Imaging: I have reviewed all pertinent imaging results/findings within the past 24 hours.

## 2024-03-17 NOTE — HOSPITAL COURSE
Started on ceftriaxone at admission, ampicillin added on 3/17. Blood cultures quickly resulted positive for e faecalis, persistently positive. ID consulted. Patient passed voiding trial 3/18. TTE and CARLITO negative for vegetations. CT abdomen negative for abscess. Nuclear medicine scan done based on ID recommendations also negative. Patient to complete 4 weeks of ampicillin and ceftriaxone. Ampicillin 2gm IV q4h (or continuous) and ceftriaxone 2gm q12h until 4/18/24.

## 2024-03-17 NOTE — CONSULTS
[facility]  Urology  Consult Note    Patient Name: Aminah Norton  MRN: [id number]  Admission Date: 3/16/2024  Hospital Length of Stay: 0   Code Status: Full Code   Attending Provider: Luis Thibodeaux MD   Consulting Provider: Archie Adamson MD  Primary Care Physician: Jeison Sanchez MD  Principal Problem:Pyelonephritis    Inpatient consult to Urology  Consult performed by: Archie Adamson MD  Consult ordered by: Wolfgang Jennings DO  Reason for consult: Pyelonephritis          Subjective:     HPI:  Aminah Norton is a 78 y.o.F who underwent second look ureteroscopy on 03/14/2024 with Dr. Smith who presented with lower abdominal pain and dysuria. Urology consulted for pyelonephritis.     On assessment, febrile to 102.6, tachycardic. WBC 12.30, Hgb 11.0, Cr 1.2 (baseline 0.8). UA with occasional bacteria, nitrite negative.  ISTAT venous lactate 3.21.     CTRSS shows right ureteral stent in good position, with acceptable amount of right hydronephrosis considering stent reflux, no left hydronephrosis or ureteral stones. Bladder not significantly distended.    Patient reports inability to urinate since 11 AM. Also endorses dysuria and malaise that began last night. She denies fever at home, nausea or vomiting. Her abdominal pain improved with day placement.     Past Medical History:   Diagnosis Date    Anticoagulant long-term use     Arthritis     Cataract     left eye    Choroidal rupture of left eye     Diabetes mellitus type II     GERD (gastroesophageal reflux disease)     Hyperlipidemia     Hypertension     Hypothyroidism     Macular scar     right eye    Retinal degeneration     chorioretinal OD    Thyroid disease     Vitamin B 12 deficiency        Past Surgical History:   Procedure Laterality Date    APPENDECTOMY      BLADDER FULGURATION N/A 3/1/2024    Procedure: ULCER INJECTION FULGURATION;  Surgeon: Brody Smith MD;  Location: [facility];  Service: Urology;   Laterality: N/A;    BLADDER SUSPENSION      CATARACT EXTRACTION      right eye     CHOLECYSTECTOMY      COLONOSCOPY N/A 7/20/2020    Procedure: COLONOSCOPY;  Surgeon: Juan Parker MD;  Location: [facility] ENDO (4TH FLR);  Service: Endoscopy;  Laterality: N/A;  Hx of chronic anemia.  patient needs a   4/6/20 - removed from 4/20/20, rescheduled 7/20/20 - pg  covid 7/17-[facility] 2nd floor-tb    COLONOSCOPY N/A 2/9/2023    Procedure: COLONOSCOPY;  Surgeon: Renan Sanchez MD;  Location: [facility] ENDO (4TH FLR);  Service: Colon and Rectal;  Laterality: N/A;  instr handed to patient, -ml    CYSTOSCOPY N/A 5/19/2021    Procedure: CYSTOSCOPY;  Surgeon: Brody Smith MD;  Location: [facility] OR 1ST FLR;  Service: Urology;  Laterality: N/A;    CYSTOURETEROSCOPY, WITH HOLMIUM LASER LITHOTRIPSY OF URETERAL CALCULUS AND STENT INSERTION Right 3/1/2024    Procedure: CYSTOURETEROSCOPY, WITH HOLMIUM LASER LITHOTRIPSY OF URETERAL CALCULUS AND STENT INSERTION;  Surgeon: Brody Smith MD;  Location: [facility] OR;  Service: Urology;  Laterality: Right;    CYSTOURETEROSCOPY,WITH HOLMIUM LASER LITHOTRIPSY OF URETERAL CALCULUS Right 3/14/2024    Procedure: CYSTOURETEROSCOPY,WITH HOLMIUM LASER LITHOTRIPSY OF URETERAL CALCULUS;  Surgeon: Brody Smith MD;  Location: [facility] OR;  Service: Urology;  Laterality: Right;  1 HR    EYE SURGERY Bilateral     cataract removal    INJECTION OF STEROID N/A 3/14/2024    Procedure: INJECTION, STEROID;  Surgeon: Brody Smith MD;  Location: [facility] OR;  Service: Urology;  Laterality: N/A;    PLACEMENT-STENT Right 3/14/2024    Procedure: PLACEMENT-STENT;  Surgeon: Brody Smith MD;  Location: [facility] OR;  Service: Urology;  Laterality: Right;    REMOVAL-STENT Right 3/14/2024    Procedure: REMOVAL-STENT;  Surgeon: Brody Smith MD;  Location: [facility] OR;  Service: Urology;  Laterality: Right;    TOTAL ABDOMINAL HYSTERECTOMY      DUB       Review of patient's  allergies indicates:   Allergen Reactions    Bufferin [aspirin, buffered] Itching    Atorvastatin      Myalgias and arthralgias    Shellfish containing products Itching     Shellfish causes her to itch per her daughter, Caro.     Warfarin     Ibuprofen Itching     Itching of eyes, head       Family History       Problem Relation (Age of Onset)    Breast cancer Mother    COPD Father    Cancer Mother, Sister, Daughter    Diabetes Son    Hypertension Sister, Sister, Daughter, Daughter    Ovarian cancer Mother, Sister    Thyroid disease Daughter, Daughter            Tobacco Use    Smoking status: Never    Smokeless tobacco: Never   Substance and Sexual Activity    Alcohol use: No    Drug use: No    Sexual activity: Yes     Partners: Male     Birth control/protection: Post-menopausal     Comment:         Review of Systems  See HPI  Objective:     Temp:  [98.9 °F (37.2 °C)-102.6 °F (39.2 °C)] 101.3 °F (38.5 °C)  Pulse:  [] 98  Resp:  [16-22] 20  SpO2:  [94 %-98 %] 94 %  BP: (103-157)/(51-71) 103/51  Weight: 75.8 kg (167 lb)  Body mass index is 27.79 kg/m².      Bladder Scan Volume (mL): 384 mL (03/16/24 1930)    Drains       Drain  Duration                  Urethral Catheter 03/16/24 1937 16 Fr. <1 day                     Physical Exam  Constitutional:       General: She is not in acute distress.     Appearance: Normal appearance. She is not ill-appearing.   HENT:      Head: Normocephalic and atraumatic.      Mouth/Throat:      Mouth: Mucous membranes are moist.   Eyes:      Extraocular Movements: Extraocular movements intact.   Cardiovascular:      Rate and Rhythm: Normal rate.   Pulmonary:      Effort: Pulmonary effort is normal.   Abdominal:      Palpations: Abdomen is soft.   Genitourinary:     Comments: 16 Fr Ch draining c/y/u  Skin:     General: Skin is warm and dry.   Neurological:      Mental Status: She is alert and oriented to person, place, and time.          Significant Labs:    BMP:  Recent  Labs   Lab 03/16/24  1410   *   K 4.9      CO2 21*   BUN 21   CREATININE 1.2   CALCIUM 10.2       CBC:  Recent Labs   Lab 03/16/24  1410   WBC 12.30   HGB 11.0*   HCT 34.5*          Urine Studies:   Recent Labs   Lab 03/16/24  1359   COLORU Yellow   APPEARANCEUA Hazy*   PHUR 6.0   SPECGRAV 1.020   PROTEINUA 1+*   GLUCUA 3+*   KETONESU Negative   BILIRUBINUA Negative   OCCULTUA 2+*   NITRITE Negative   LEUKOCYTESUR 2+*   RBCUA 14*   WBCUA 37*   BACTERIA Occasional   SQUAMEPITHEL 1   HYALINECASTS 0     All pertinent labs results from the past 24 hours have been reviewed.    Significant Imaging:  All pertinent imaging results/findings from the past 24 hours have been reviewed.      Assessment and Plan:     * Pyelonephritis  Aminah Alicea-Shelly is a 78 y.o.F with pyelonephritis.    - Agree with admission to hospital medicine  - IV antibiotics  - IV fluids  - Maintain stent  - Maintain day  - Trend Cr  - Contact urology if patient decompensates  - Rest of care per iman Adamson MD  Urology  [facility] - [facility]

## 2024-03-17 NOTE — PLAN OF CARE
Problem: Adult Inpatient Plan of Care  Goal: Plan of Care Review  Outcome: Ongoing, Progressing  Goal: Patient-Specific Goal (Individualized)  Outcome: Ongoing, Progressing  Goal: Absence of Hospital-Acquired Illness or Injury  Outcome: Ongoing, Progressing  Goal: Optimal Comfort and Wellbeing  Outcome: Ongoing, Progressing  Goal: Readiness for Transition of Care  Outcome: Ongoing, Progressing     Problem: Infection  Goal: Absence of Infection Signs and Symptoms  Outcome: Ongoing, Progressing     Problem: Diabetes Comorbidity  Goal: Blood Glucose Level Within Targeted Range  Outcome: Ongoing, Progressing     Problem: Adjustment to Illness (Sepsis/Septic Shock)  Goal: Optimal Coping  Outcome: Ongoing, Progressing     Problem: Bleeding (Sepsis/Septic Shock)  Goal: Absence of Bleeding  Outcome: Ongoing, Progressing     Problem: Glycemic Control Impaired (Sepsis/Septic Shock)  Goal: Blood Glucose Level Within Desired Range  Outcome: Ongoing, Progressing     Problem: Infection Progression (Sepsis/Septic Shock)  Goal: Absence of Infection Signs and Symptoms  Outcome: Ongoing, Progressing     Problem: Nutrition Impaired (Sepsis/Septic Shock)  Goal: Optimal Nutrition Intake  Outcome: Ongoing, Progressing     Problem: Fluid and Electrolyte Imbalance (Acute Kidney Injury/Impairment)  Goal: Fluid and Electrolyte Balance  Outcome: Ongoing, Progressing     Problem: Oral Intake Inadequate (Acute Kidney Injury/Impairment)  Goal: Optimal Nutrition Intake  Outcome: Ongoing, Progressing     Problem: Pain Acute  Goal: Acceptable Pain Control and Functional Ability  Outcome: Ongoing, Progressing

## 2024-03-17 NOTE — ASSESSMENT & PLAN NOTE
Patient presented to ED with fevers and malaise 2 days after urologic procedure.  CT with evidence nephritic stranding.  Admitted for pyelonephritis.  Blood cultures on presentation 3/16 positive for Enterococcus faecalis. Suspect urologic source.  - ID consulted   - Repeat blood cultures ordered  - TTE  - S/p ceftriaxone in ED and on admission  - urology following

## 2024-03-17 NOTE — ASSESSMENT & PLAN NOTE
Chronic, controlled. Latest blood pressure and vitals reviewed-     Temp:  [98.9 °F (37.2 °C)-102.6 °F (39.2 °C)]   Pulse:  [109-124]   Resp:  [16-22]   BP: (128-157)/(58-71)   SpO2:  [95 %-98 %] .   Home meds for hypertension were reviewed and noted below.   Hypertension Medications               carvediloL (COREG) 25 MG tablet TAKE 1 TABLET TWICE DAILY    lisinopriL (PRINIVIL,ZESTRIL) 40 MG tablet Take 1 tablet (40 mg total) by mouth once daily.            While in the hospital, will manage blood pressure as follows; Continue home antihypertensive regimen    Will utilize p.r.n. blood pressure medication only if patient's blood pressure greater than 180/110 and she develops symptoms such as worsening chest pain or shortness of breath.

## 2024-03-17 NOTE — ASSESSMENT & PLAN NOTE
I have reviewed hospital notes from   service and other specialty providers. I have also reviewed CBC, CMP/BMP,  cultures and imaging with my interpretation as documented.     Pyelonephritis likely secondary to recent urologic procedure removal of stone.  Suspect translocation from the stone into the renal system.  Appears to have distant seeding with positive blood cultures for Enterococcus.  She is afebrile and without leukocytosis at this time.  Stop ceftriaxone.  Start ampicillin.  Discussed management plan with the staff and/or members from  service.

## 2024-03-17 NOTE — ASSESSMENT & PLAN NOTE
Aminah Norton is a 78 y.o.F with pyelonephritis.    - IV antibiotics  - IV fluids  - Maintain stent  - Maintain day  - Trend Cr  - Recommend ID consult  - KUB to assess stent position  - Will need 2 weeks total of antibiotics  - Contact urology if patient decompensates  - Rest of care per iman

## 2024-03-17 NOTE — ED NOTES
Telemetry Verification   Patient placed on Telemetry Box  Verified with War Room  Box # [id number]   Monitor Tech Елена   Rate 88   Rhythm NSR

## 2024-03-17 NOTE — ASSESSMENT & PLAN NOTE
This patient does have evidence of infective focus  My overall impression is sepsis.  Source: Urinary Tract  Antibiotics given-   Antibiotics (72h ago, onward)      Start     Stop Route Frequency Ordered    03/17/24 1700  cefTRIAXone (Rocephin) 1 g in dextrose 5 % in water (D5W) 100 mL IVPB (MB+)         -- IV Every 24 hours (non-standard times) 03/16/24 1948 03/16/24 2100  mupirocin 2 % ointment         03/21/24 2059 Nasl 2 times daily 03/16/24 1953          Latest lactate reviewed-  Recent Labs   Lab 03/16/24  1517   POCLAC 3.21*     Fluid challenge Ideal Body Weight- The patient's ideal body weight is Ideal body weight: 57 kg (125 lb 10.6 oz) which will be used to calculate fluid bolus of 30 ml/kg for treatment of septic shock.      Post- resuscitation assessment No - Post resuscitation assessment not needed     ANAIS    Will Not start Pressors- Levophed for MAP of 65  Source control achieved by: rocephin

## 2024-03-17 NOTE — SUBJECTIVE & OBJECTIVE
Past Medical History:   Diagnosis Date    Anticoagulant long-term use     Arthritis     Cataract     left eye    Choroidal rupture of left eye     Diabetes mellitus type II     GERD (gastroesophageal reflux disease)     Hyperlipidemia     Hypertension     Hypothyroidism     Macular scar     right eye    Retinal degeneration     chorioretinal OD    Thyroid disease     Vitamin B 12 deficiency        Past Surgical History:   Procedure Laterality Date    APPENDECTOMY      BLADDER FULGURATION N/A 3/1/2024    Procedure: ULCER INJECTION FULGURATION;  Surgeon: Brody Smith MD;  Location: [facility] OR;  Service: Urology;  Laterality: N/A;    BLADDER SUSPENSION      CATARACT EXTRACTION      right eye     CHOLECYSTECTOMY      COLONOSCOPY N/A 7/20/2020    Procedure: COLONOSCOPY;  Surgeon: Juan Parker MD;  Location: [facility] ENDO (4TH FLR);  Service: Endoscopy;  Laterality: N/A;  Hx of chronic anemia.  patient needs a   4/6/20 - removed from 4/20/20, rescheduled 7/20/20 - pg  covid 7[place]17[place] [place]    COLONOSCOPY N/A 2/9/2023    Procedure: COLONOSCOPY;  Surgeon: Renan Sanchez MD;  Location: [facility] ENDO (4TH FLR);  Service: Colon and Rectal;  Laterality: N/A;  instr handed to patient, -ml    CYSTOSCOPY N/A 5/19/2021    Procedure: CYSTOSCOPY;  Surgeon: Brody Smith MD;  Location: [facility] OR 1ST FLR;  Service: Urology;  Laterality: N/A;    CYSTOURETEROSCOPY, WITH HOLMIUM LASER LITHOTRIPSY OF URETERAL CALCULUS AND STENT INSERTION Right 3/1/2024    Procedure: CYSTOURETEROSCOPY, WITH HOLMIUM LASER LITHOTRIPSY OF URETERAL CALCULUS AND STENT INSERTION;  Surgeon: Brody Smith MD;  Location: [facility] OR;  Service: Urology;  Laterality: Right;    CYSTOURETEROSCOPY,WITH HOLMIUM LASER LITHOTRIPSY OF URETERAL CALCULUS Right 3/14/2024    Procedure: CYSTOURETEROSCOPY,WITH HOLMIUM LASER LITHOTRIPSY OF URETERAL CALCULUS;  Surgeon: Brody Smith MD;  Location: [facility] OR;  Service: Urology;   Laterality: Right;  1 HR    EYE SURGERY Bilateral     cataract removal    INJECTION OF STEROID N/A 3/14/2024    Procedure: INJECTION, STEROID;  Surgeon: Brody Smith MD;  Location: [facility];  Service: Urology;  Laterality: N/A;    PLACEMENT-STENT Right 3/14/2024    Procedure: PLACEMENT-STENT;  Surgeon: Brody Smith MD;  Location: [facility] OR;  Service: Urology;  Laterality: Right;    REMOVAL-STENT Right 3/14/2024    Procedure: REMOVAL-STENT;  Surgeon: Brody Smith MD;  Location: [facility] OR;  Service: Urology;  Laterality: Right;    TOTAL ABDOMINAL HYSTERECTOMY      DUB       Review of patient's allergies indicates:   Allergen Reactions    Bufferin [aspirin, buffered] Itching    Atorvastatin      Myalgias and arthralgias    Shellfish containing products Itching     Shellfish causes her to itch per her daughter, Caro.     Warfarin     Ibuprofen Itching     Itching of eyes, head       Medications:  Medications Prior to Admission   Medication Sig    ACCU-CHEK SOFTCLIX LANCETS Misc CHECK BLOOD SUGAR ONE TIME DAILY    amitriptyline (ELAVIL) 10 MG tablet Take 1 tablet (10 mg total) by mouth every evening.    BD ALCOHOL SWABS PadM USE AS DIRECTED TOPICALLY AS NEEDED    biotin 1 mg tablet Take 1,000 mcg by mouth 3 (three) times daily.    blood sugar diagnostic (ACCU-CHEK MATTIE PLUS TEST STRP) Strp TEST ONE TIME DAILY    blood-glucose meter kit To check BG 2  times daily, to use with insurance preferred meter, use as directed.    carvediloL (COREG) 25 MG tablet TAKE 1 TABLET TWICE DAILY    cyanocobalamin (VITAMIN B-12) 1000 MCG tablet Take 100 mcg by mouth once daily.    gabapentin (NEURONTIN) 300 MG capsule Take 1 capsule (300 mg total) by mouth every evening.    glipiZIDE 5 MG TR24 Take 1 tablet (5 mg total) by mouth daily with breakfast.    HYDROcodone-acetaminophen (NORCO) 5-325 mg per tablet Take 1 tablet by mouth every 6 (six) hours as needed for Pain.    ketoconazole (NIZORAL) 2 % cream Apply  topically once daily.    lancets (ACCU-CHEK MULTICLIX LANCET) Misc To use as directed with Accu Chek Sahra FastClix lancing device    lancets Misc To check BG 2 times daily, to use with insurance preferred meter.    levothyroxine (SYNTHROID) 112 MCG tablet Take 1 tablet (112 mcg total) by mouth before breakfast.    lisinopriL (PRINIVIL,ZESTRIL) 40 MG tablet Take 1 tablet (40 mg total) by mouth once daily.    metFORMIN (GLUCOPHAGE) 1000 MG tablet Take 1 tablet (1,000 mg total) by mouth 2 (two) times daily with meals.    mirabegron (MYRBETRIQ) 25 mg Tb24 ER tablet Take 1 tablet (25 mg total) by mouth once daily.    naproxen (NAPROSYN) 500 MG tablet Take 1 tablet (500 mg total) by mouth 2 (two) times daily with meals.    rosuvastatin (CRESTOR) 20 MG tablet Take 1 tablet (20 mg total) by mouth once daily.    SITagliptin phosphate (JANUVIA) 100 MG Tab Take 1 tablet (100 mg total) by mouth once daily.    turmeric root extract 500 mg Cap Take by mouth.    VITAMIN B COMPLEX ORAL Take 1 tablet by mouth.    vitamin D (VITAMIN D3) 1000 units Tab Take 1,000 Units by mouth once daily.     Antibiotics (From admission, onward)      Start     Stop Route Frequency Ordered    03/17/24 1100  ampicillin (OMNIPEN) 2 g in sodium chloride 0.9 % 100 mL IVPB (MB+)         -- IV Every 4 hours (non-standard times) 03/17/24 0954    03/16/24 2100  mupirocin 2 % ointment         03/21/24 2059 Nasl 2 times daily 03/16/24 1953          Antifungals (From admission, onward)      None          Antivirals (From admission, onward)      None             Immunization History   Administered Date(s) Administered    COVID-19, MRNA, LN-S, PF (Pfizer) (Gray Cap) 03/09/2022    COVID-19, mRNA, LNP-S, bivalent booster, PF (PFIZER OMICRON) 10/12/2022    COVID-19, vector-nr, rS-Ad26, PF (Katelin) 03/05/2021    Influenza (FLUAD) - Quadrivalent - Adjuvanted - PF *Preferred* (65+) 12/10/2021, 10/10/2022, 11/09/2023    Influenza - High Dose - PF (65 years and older)  12/02/2015, 09/15/2017, 11/26/2018    Pneumococcal Conjugate - 13 Valent 11/26/2018    Pneumococcal Polysaccharide - 23 Valent 01/18/2023       Family History       Problem Relation (Age of Onset)    Breast cancer Mother    COPD Father    Cancer Mother, Sister, Daughter    Diabetes Son    Hypertension Sister, Sister, Daughter, Daughter    Ovarian cancer Mother, Sister    Thyroid disease Daughter, Daughter          Social History     Socioeconomic History    Marital status:    Tobacco Use    Smoking status: Never    Smokeless tobacco: Never   Substance and Sexual Activity    Alcohol use: No    Drug use: No    Sexual activity: Yes     Partners: Male     Birth control/protection: Post-menopausal     Comment:     Other Topics Concern    Are you pregnant or think you may be? No    Breast-feeding No   Social History Narrative     with 7 kids     Review of Systems   Constitutional:  Positive for chills and fever. Negative for fatigue and unexpected weight change.   HENT:  Negative for ear pain, facial swelling, hearing loss, mouth sores, nosebleeds, rhinorrhea, sinus pressure, sore throat, tinnitus, trouble swallowing and voice change.    Eyes:  Negative for photophobia, pain, redness and visual disturbance.   Respiratory:  Negative for cough, chest tightness, shortness of breath and wheezing.    Cardiovascular:  Negative for chest pain, palpitations and leg swelling.   Gastrointestinal:  Negative for abdominal pain, blood in stool, constipation, diarrhea, nausea and vomiting.   Endocrine: Negative for cold intolerance, heat intolerance, polydipsia, polyphagia and polyuria.   Genitourinary:  Positive for difficulty urinating, dysuria, hematuria and urgency. Negative for flank pain, frequency, menstrual problem, vaginal bleeding, vaginal discharge and vaginal pain.   Musculoskeletal:  Negative for arthralgias, back pain, joint swelling, myalgias and neck pain.   Skin:  Negative for rash.    Allergic/Immunologic: Negative for environmental allergies, food allergies and immunocompromised state.   Neurological:  Negative for dizziness, seizures, syncope, weakness, light-headedness, numbness and headaches.   Hematological:  Negative for adenopathy. Does not bruise/bleed easily.   Psychiatric/Behavioral:  Negative for confusion, hallucinations, self-injury, sleep disturbance and suicidal ideas. The patient is not nervous/anxious.      Objective:     Vital Signs (Most Recent):  Temp: 98.9 °F (37.2 °C) (03/17/24 0745)  Pulse: 74 (03/17/24 0745)  Resp: 16 (03/17/24 0745)  BP: (!) 105/51 (03/17/24 0745)  SpO2: (!) 93 % (03/17/24 0745) Vital Signs (24h Range):  Temp:  [98.9 °F (37.2 °C)-102.6 °F (39.2 °C)] 98.9 °F (37.2 °C)  Pulse:  [] 74  Resp:  [16-22] 16  SpO2:  [93 %-98 %] 93 %  BP: (102-157)/(51-71) 105/51     Weight: 75.8 kg (167 lb)  Body mass index is 27.79 kg/m².    Estimated Creatinine Clearance: 52.5 mL/min (based on SCr of 0.9 mg/dL).     Physical Exam  Vitals and nursing note reviewed.   Constitutional:       Appearance: She is well-developed.   HENT:      Head: Normocephalic and atraumatic.      Right Ear: External ear normal.      Left Ear: External ear normal.   Eyes:      General: No scleral icterus.        Right eye: No discharge.         Left eye: No discharge.      Conjunctiva/sclera: Conjunctivae normal.   Cardiovascular:      Rate and Rhythm: Normal rate and regular rhythm.      Heart sounds: Normal heart sounds. No murmur heard.     No friction rub. No gallop.   Pulmonary:      Effort: Pulmonary effort is normal. No respiratory distress.      Breath sounds: Normal breath sounds. No stridor. No wheezing or rales.   Abdominal:      General: Bowel sounds are normal.      Tenderness: There is abdominal tenderness in the suprapubic area.   Musculoskeletal:         General: No tenderness. Normal range of motion.   Skin:     General: Skin is warm and dry.   Neurological:      Mental  Status: She is alert and oriented to person, place, and time.          Significant Labs: CBC:   Recent Labs   Lab 03/16/24  1410 03/17/24  0556   WBC 12.30 9.15   HGB 11.0* 8.4*   HCT 34.5* 26.3*    182     CMP:   Recent Labs   Lab 03/16/24  1410 03/17/24  0556   * 131*   K 4.9 3.7    101   CO2 21* 24   * 153*   BUN 21 16   CREATININE 1.2 0.9   CALCIUM 10.2 8.8   PROT 7.5 5.3*   ALBUMIN 3.8 2.5*   BILITOT 0.6 0.5   ALKPHOS 77 63   AST 22 13   ALT 13 11   ANIONGAP 11 6*     Microbiology Results (last 7 days)       Procedure Component Value Units Date/Time    Blood culture [[id number]]     Order Status: Sent Specimen: Blood     Blood culture [[id number]]     Order Status: Sent Specimen: Blood     Rapid Organism ID by PCR (from Blood culture) [[id number]]  (Abnormal) Collected: 03/16/24 1410    Order Status: Completed Updated: 03/17/24 0636     Enterococcus faecalis Detected     Enterococcus faecium Not Detected     Listeria monocytogenes Not Detected     Staphylococcus spp. Not Detected     Staphylococcus aureus Not Detected     Staphylococcus epidermidis Not Detected     Staphylococcus lugdunensis Not Detected     Streptococcus species Not Detected     Streptococcus agalactiae Not Detected     Streptococcus pneumoniae Not Detected     Streptococcus pyogenes Not Detected     Acinetobacter calcoaceticus/baumannii complex Not Detected     Bacteroides fragilis Not Detected     Enterobacterales Not Detected     Enterobacter cloacae complex Not Detected     Escherichia coli Not Detected     Klebsiella aerogenes Not Detected     Klebsiella oxytoca Not Detected     Klebsiella pneumoniae group Not Detected     Proteus Not Detected     Salmonella sp Not Detected     Serratia marcescens Not Detected     Haemophilus influenzae Not Detected     Neisseria meningtidis Not Detected     Pseudomonas aeruginosa Not Detected     Stenotrophomonas maltophilia Not Detected     Candida albicans Not Detected      Candida auris Not Detected     Candida glabrata Not Detected     Candida krusei Not Detected     Candida parapsilosis Not Detected     Candida tropicalis Not Detected     Cryptococcus neoformans/gattii Not Detected     CTX-M (ESBL ) Test Not Applicable     IMP (Carbapenem resistant) Test Not Applicable     KPC resistance gene (Carbapenem resistant) Test Not Applicable     mcr-1  Test Not Applicable     mec A/C  Test Not Applicable     mec A/C and MREJ (MRSA) gene Test Not Applicable     NDM (Carbapenem resistant) Test Not Applicable     OXA-48-like (Carbapenem resistant) Test Not Applicable     van A/B (VRE gene) Not Detected     VIM (Carbapenem resistant) Test Not Applicable    Narrative:      Aerobic and anaerobic    Blood culture x two cultures. Draw prior to antibiotics. [[id number]] Collected: 03/16/24 1420    Order Status: Completed Specimen: Blood from Wrist, Right Updated: 03/17/24 0625     Blood Culture, Routine Gram stain aer bottle: Gram positive cocci in chains resembling Strep      Positive results previously called 03/17/2024  06:25    Narrative:      Aerobic and anaerobic    Blood culture x two cultures. Draw prior to antibiotics. [[id number]] Collected: 03/16/24 1410    Order Status: Completed Specimen: Blood from Peripheral, Forearm, Left Updated: 03/17/24 0538     Blood Culture, Routine Gram stain aer bottle: Gram positive cocci in chains resembling Strep      Gram stain vinay bottle: Gram positive cocci in chains resembling Strep      Results called to and read back by: Emilie Napoles RN 03/17/2024  05:37    Narrative:      Aerobic and anaerobic    Urine culture [[id number]] Collected: 03/16/24 1359    Order Status: No result Specimen: Urine Updated: 03/16/24 1442          Urine Studies:   Recent Labs   Lab 03/16/24  1359   COLORU Yellow   APPEARANCEUA Hazy*   PHUR 6.0   SPECGRAV 1.020   PROTEINUA 1+*   GLUCUA 3+*   KETONESU Negative   BILIRUBINUA Negative   OCCULTUA 2+*   NITRITE Negative    LEUKOCYTESUR 2+*   RBCUA 14*   WBCUA 37*   BACTERIA Occasional   SQUAMEPITHEL 1   HYALINECASTS 0       Significant Imaging: I have reviewed all pertinent imaging results/findings within the past 24 hours.

## 2024-03-17 NOTE — CLINICAL REVIEW
IP Sepsis Screen (most recent)       Sepsis Screen (IP) - 03/16/24 1919       Is the patient's history or complaint suggestive of a possible infection? Yes  -LW    Are there at least two of the following signs and symptoms present? Yes  -LW    Sepsis signs/symptoms - Hyper or Hypothermia Hyperthermia >100.4 or Hypothermia < 96.8  -LW    Sepsis signs/symptoms - Tachycardia Tachycardia     >90  -LW    Sepsis signs/symptoms - Tachypnea Tachypnea     >20  -LW    Sepsis signs/symptoms - WBC WBC < 4,000 or WBC > 12,000  -LW    Are any of the following organ dysfunction criteria present and not considered to be due to a chronic condition? Yes  -LW    Organ Dysfunction Criteria Lactate > 2.0  -LW    Initiate Sepsis Protocol No  -LW    Reason sepsis not considered Pt. receiving appropriate management  -              User Key  (r) = Recorded By, (t) = Taken By, (c) = Cosigned By      Initials Name    Ashley De Paz RN

## 2024-03-17 NOTE — HPI
77 yo female with DM2, GERD, HPLD, HTN, Hypothyroidism, recent stone removal presenting with chills, weakness, and back pain. Interpretor MERI used at bedside. She states that she was d/c'd on the 14th of March was doing okay until last night she started feeling weak and actually fell, she denies any leg pain or headache and did loss consciousness, however she did not feel any better today whenever she woke up so presented to the ER.    She had a fever measured here at 102.6F, CT scan showed perinephric stranding, UA consistent with infection, lactic acidosis. Urology was consulted who recommended day and antibiotics. Medicine called for admission.

## 2024-03-17 NOTE — ASSESSMENT & PLAN NOTE
Acute, urine consistent with infection  Urology following  Continue rocephin  Symptomatic management of pain

## 2024-03-17 NOTE — ASSESSMENT & PLAN NOTE
Chronic, controlled. Latest blood pressure and vitals reviewed-     Temp:  [98.9 °F (37.2 °C)-102.6 °F (39.2 °C)]   Pulse:  []   Resp:  [16-22]   BP: (102-157)/(51-71)   SpO2:  [93 %-98 %] .   Home meds for hypertension were reviewed and noted below.   Hypertension Medications               carvediloL (COREG) 25 MG tablet TAKE 1 TABLET TWICE DAILY    lisinopriL (PRINIVIL,ZESTRIL) 40 MG tablet Take 1 tablet (40 mg total) by mouth once daily.            While in the hospital, will manage blood pressure as follows; Continue home antihypertensive regimen    Will utilize p.r.n. blood pressure medication only if patient's blood pressure greater than 180/110 and she develops symptoms such as worsening chest pain or shortness of breath.

## 2024-03-17 NOTE — HPI
Aminah Norton is a 78 y.o.F who underwent second look ureteroscopy on 03/14/2024 with Dr. Smith who presented with lower abdominal pain and dysuria. Urology consulted for pyelonephritis.     On assessment, febrile to 102.6, tachycardic. WBC 12.30, Hgb 11.0, Cr 1.2 (baseline 0.8). UA with occasional bacteria, nitrite negative.  ISTAT venous lactate 3.21.     CTRSS shows right ureteral stent in good position, with acceptable amount of right hydronephrosis considering stent reflux, no left hydronephrosis or ureteral stones. Bladder not significantly distended.    Patient reports inability to urinate since 11 AM. Also endorses dysuria and malaise that began last night. She denies fever at home, nausea or vomiting. Her abdominal pain improved with day placement.

## 2024-03-17 NOTE — ASSESSMENT & PLAN NOTE
Patient with acute kidney injury/acute renal failure likely due to pre-renal azotemia due to dehydration ANAIS is currently worsening. Baseline creatinine  0.8  - Labs reviewed- Renal function/electrolytes with Estimated Creatinine Clearance: 39.3 mL/min (based on SCr of 1.2 mg/dL). according to latest data. Monitor urine output and serial BMP and adjust therapy as needed. Avoid nephrotoxins and renally dose meds for GFR listed above.

## 2024-03-17 NOTE — ASSESSMENT & PLAN NOTE
Patient's FSGs are controlled on current medication regimen.  Last A1c reviewed-   Lab Results   Component Value Date    HGBA1C 8.2 (H) 11/09/2023    HGBA1C 8.2 (H) 11/09/2023     Most recent fingerstick glucose reviewed-   Recent Labs   Lab 03/16/24 2000   POCTGLUCOSE 205*     Current correctional scale  Low  Maintain anti-hyperglycemic dose as follows-   Antihyperglycemics (From admission, onward)    Start     Stop Route Frequency Ordered    03/16/24 2019  insulin aspart U-100 pen 0-5 Units         -- SubQ Before meals & nightly PRN 03/16/24 1920        Hold Oral hypoglycemics while patient is in the hospital.

## 2024-03-17 NOTE — ASSESSMENT & PLAN NOTE
Aminah Norton is a 78 y.o.F with pyelonephritis.    - Agree with admission to hospital medicine  - IV antibiotics  - IV fluids  - Maintain stent  - Maintain day  - Trend Cr  - Contact urology if patient decompensates  - Rest of care per iman

## 2024-03-17 NOTE — SUBJECTIVE & OBJECTIVE
Past Medical History:   Diagnosis Date    Anticoagulant long-term use     Arthritis     Cataract     left eye    Choroidal rupture of left eye     Diabetes mellitus type II     GERD (gastroesophageal reflux disease)     Hyperlipidemia     Hypertension     Hypothyroidism     Macular scar     right eye    Retinal degeneration     chorioretinal OD    Thyroid disease     Vitamin B 12 deficiency        Past Surgical History:   Procedure Laterality Date    APPENDECTOMY      BLADDER FULGURATION N/A 3/1/2024    Procedure: ULCER INJECTION FULGURATION;  Surgeon: Brody Smith MD;  Location: [facility] OR;  Service: Urology;  Laterality: N/A;    BLADDER SUSPENSION      CATARACT EXTRACTION      right eye     CHOLECYSTECTOMY      COLONOSCOPY N/A 7/20/2020    Procedure: COLONOSCOPY;  Surgeon: Juan Parker MD;  Location: [facility] ENDO (4TH FLR);  Service: Endoscopy;  Laterality: N/A;  Hx of chronic anemia.  patient needs a   4/6/20 - removed from 4/20/20, rescheduled 7/20/20 - pg  covid 7/17-[facility] 2nd floor-tb    COLONOSCOPY N/A 2/9/2023    Procedure: COLONOSCOPY;  Surgeon: Renan Sanchez MD;  Location: [facility] ENDO (4TH FLR);  Service: Colon and Rectal;  Laterality: N/A;  instr handed to patient, -ml    CYSTOSCOPY N/A 5/19/2021    Procedure: CYSTOSCOPY;  Surgeon: Brody Smith MD;  Location: [facility] OR 1ST FLR;  Service: Urology;  Laterality: N/A;    CYSTOURETEROSCOPY, WITH HOLMIUM LASER LITHOTRIPSY OF URETERAL CALCULUS AND STENT INSERTION Right 3/1/2024    Procedure: CYSTOURETEROSCOPY, WITH HOLMIUM LASER LITHOTRIPSY OF URETERAL CALCULUS AND STENT INSERTION;  Surgeon: Brody Smith MD;  Location: [facility] OR;  Service: Urology;  Laterality: Right;    CYSTOURETEROSCOPY,WITH HOLMIUM LASER LITHOTRIPSY OF URETERAL CALCULUS Right 3/14/2024    Procedure: CYSTOURETEROSCOPY,WITH HOLMIUM LASER LITHOTRIPSY OF URETERAL CALCULUS;  Surgeon: Brody Smith MD;  Location: [facility] OR;  Service: Urology;   Laterality: Right;  1 HR    EYE SURGERY Bilateral     cataract removal    INJECTION OF STEROID N/A 3/14/2024    Procedure: INJECTION, STEROID;  Surgeon: Brody Smith MD;  Location: [facility] OR;  Service: Urology;  Laterality: N/A;    PLACEMENT-STENT Right 3/14/2024    Procedure: PLACEMENT-STENT;  Surgeon: Brody Smith MD;  Location: [facility] OR;  Service: Urology;  Laterality: Right;    REMOVAL-STENT Right 3/14/2024    Procedure: REMOVAL-STENT;  Surgeon: Brody Smith MD;  Location: [facility] OR;  Service: Urology;  Laterality: Right;    TOTAL ABDOMINAL HYSTERECTOMY      DUB       Review of patient's allergies indicates:   Allergen Reactions    Bufferin [aspirin, buffered] Itching    Atorvastatin      Myalgias and arthralgias    Shellfish containing products Itching     Shellfish causes her to itch per her daughter, Caro.     Warfarin     Ibuprofen Itching     Itching of eyes, head       No current facility-administered medications on file prior to encounter.     Current Outpatient Medications on File Prior to Encounter   Medication Sig    ACCU-CHEK SOFTCLIX LANCETS Misc CHECK BLOOD SUGAR ONE TIME DAILY    amitriptyline (ELAVIL) 10 MG tablet Take 1 tablet (10 mg total) by mouth every evening.    BD ALCOHOL SWABS PadM USE AS DIRECTED TOPICALLY AS NEEDED    biotin 1 mg tablet Take 1,000 mcg by mouth 3 (three) times daily.    blood sugar diagnostic (ACCU-CHEK MATTIE PLUS TEST STRP) Strp TEST ONE TIME DAILY    blood-glucose meter kit To check BG 2  times daily, to use with insurance preferred meter, use as directed.    carvediloL (COREG) 25 MG tablet TAKE 1 TABLET TWICE DAILY    cyanocobalamin (VITAMIN B-12) 1000 MCG tablet Take 100 mcg by mouth once daily.    gabapentin (NEURONTIN) 300 MG capsule Take 1 capsule (300 mg total) by mouth every evening.    glipiZIDE 5 MG TR24 Take 1 tablet (5 mg total) by mouth daily with breakfast.    HYDROcodone-acetaminophen (NORCO) 5-325 mg per tablet Take 1 tablet by  mouth every 6 (six) hours as needed for Pain.    ketoconazole (NIZORAL) 2 % cream Apply topically once daily.    lancets (ACCU-CHEK MULTICLIX LANCET) Misc To use as directed with Accu Chek Sahra FastClix lancing device    lancets Misc To check BG 2 times daily, to use with insurance preferred meter.    levothyroxine (SYNTHROID) 112 MCG tablet Take 1 tablet (112 mcg total) by mouth before breakfast.    lisinopriL (PRINIVIL,ZESTRIL) 40 MG tablet Take 1 tablet (40 mg total) by mouth once daily.    metFORMIN (GLUCOPHAGE) 1000 MG tablet Take 1 tablet (1,000 mg total) by mouth 2 (two) times daily with meals.    mirabegron (MYRBETRIQ) 25 mg Tb24 ER tablet Take 1 tablet (25 mg total) by mouth once daily.    naproxen (NAPROSYN) 500 MG tablet Take 1 tablet (500 mg total) by mouth 2 (two) times daily with meals.    rosuvastatin (CRESTOR) 20 MG tablet Take 1 tablet (20 mg total) by mouth once daily.    SITagliptin phosphate (JANUVIA) 100 MG Tab Take 1 tablet (100 mg total) by mouth once daily.    turmeric root extract 500 mg Cap Take by mouth.    VITAMIN B COMPLEX ORAL Take 1 tablet by mouth.    vitamin D (VITAMIN D3) 1000 units Tab Take 1,000 Units by mouth once daily.     Family History       Problem Relation (Age of Onset)    Breast cancer Mother    COPD Father    Cancer Mother, Sister, Daughter    Diabetes Son    Hypertension Sister, Sister, Daughter, Daughter    Ovarian cancer Mother, Sister    Thyroid disease Daughter, Daughter          Tobacco Use    Smoking status: Never    Smokeless tobacco: Never   Substance and Sexual Activity    Alcohol use: No    Drug use: No    Sexual activity: Yes     Partners: Male     Birth control/protection: Post-menopausal     Comment:       Review of Systems   Constitutional:  Positive for chills and fever. Negative for activity change and appetite change.   HENT:  Negative for congestion, hearing loss and rhinorrhea.    Eyes:  Negative for discharge, itching and visual disturbance.    Respiratory:  Negative for apnea, cough and shortness of breath.    Cardiovascular:  Negative for chest pain, palpitations and leg swelling.   Gastrointestinal:  Negative for abdominal distention, abdominal pain, constipation, diarrhea, nausea and vomiting.   Endocrine: Negative for cold intolerance and heat intolerance.   Genitourinary:  Positive for dysuria and flank pain. Negative for hematuria.   Musculoskeletal:  Negative for neck pain and neck stiffness.   Skin:  Negative for rash and wound.   Neurological:  Negative for dizziness, seizures, light-headedness and headaches.   Psychiatric/Behavioral:  Negative for agitation, confusion and suicidal ideas.      Objective:     Vital Signs (Most Recent):  Temp: (!) 101.3 °F (38.5 °C) (03/16/24 1916)  Pulse: (!) 118 (03/16/24 1916)  Resp: (!) 22 (03/16/24 1916)  BP: (!) 157/71 (03/16/24 1834)  SpO2: 95 % (03/16/24 1916) Vital Signs (24h Range):  Temp:  [98.9 °F (37.2 °C)-102.6 °F (39.2 °C)] 101.3 °F (38.5 °C)  Pulse:  [109-124] 118  Resp:  [16-22] 22  SpO2:  [95 %-98 %] 95 %  BP: (128-157)/(58-71) 157/71     Weight: 75.8 kg (167 lb)  Body mass index is 27.79 kg/m².     Physical Exam  Vitals reviewed.   Constitutional:       General: She is not in acute distress.     Appearance: She is well-developed.   HENT:      Head: Normocephalic and atraumatic.      Nose: Nose normal. No rhinorrhea.      Mouth/Throat:      Mouth: Mucous membranes are moist.   Eyes:      General: No scleral icterus.        Right eye: No discharge.         Left eye: No discharge.      Pupils: Pupils are equal, round, and reactive to light.   Neck:      Vascular: No JVD.   Cardiovascular:      Rate and Rhythm: Normal rate and regular rhythm.      Heart sounds: Normal heart sounds. No murmur heard.     No friction rub.   Pulmonary:      Effort: Pulmonary effort is normal. No respiratory distress.      Breath sounds: Normal breath sounds. No wheezing.   Abdominal:      General: Bowel sounds are  normal. There is no distension.      Palpations: Abdomen is soft.      Tenderness: There is no abdominal tenderness.   Musculoskeletal:         General: No deformity. Normal range of motion.      Cervical back: Normal range of motion and neck supple.   Skin:     General: Skin is warm and dry.   Neurological:      General: No focal deficit present.      Mental Status: She is alert and oriented to person, place, and time.   Psychiatric:         Mood and Affect: Mood normal.         Behavior: Behavior normal.              CRANIAL NERVES     CN III, IV, VI   Pupils are equal, round, and reactive to light.       Significant Labs: All pertinent labs within the past 24 hours have been reviewed.  CBC:   Recent Labs   Lab 03/16/24  1410   WBC 12.30   HGB 11.0*   HCT 34.5*        CMP:   Recent Labs   Lab 03/16/24  1410   *   K 4.9      CO2 21*   *   BUN 21   CREATININE 1.2   CALCIUM 10.2   PROT 7.5   ALBUMIN 3.8   BILITOT 0.6   ALKPHOS 77   AST 22   ALT 13   ANIONGAP 11     Urine Studies:   Recent Labs   Lab 03/16/24  1359   COLORU Yellow   APPEARANCEUA Hazy*   PHUR 6.0   SPECGRAV 1.020   PROTEINUA 1+*   GLUCUA 3+*   KETONESU Negative   BILIRUBINUA Negative   OCCULTUA 2+*   NITRITE Negative   LEUKOCYTESUR 2+*   RBCUA 14*   WBCUA 37*   BACTERIA Occasional   SQUAMEPITHEL 1   HYALINECASTS 0       Significant Imaging: I have reviewed all pertinent imaging results/findings within the past 24 hours.

## 2024-03-17 NOTE — PLAN OF CARE
Problem: Adult Inpatient Plan of Care  Goal: Plan of Care Review  Outcome: Ongoing, Progressing  Goal: Patient-Specific Goal (Individualized)  Outcome: Ongoing, Progressing  Goal: Absence of Hospital-Acquired Illness or Injury  Outcome: Ongoing, Progressing  Goal: Optimal Comfort and Wellbeing  Outcome: Ongoing, Progressing  Goal: Readiness for Transition of Care  Outcome: Ongoing, Progressing     Problem: Infection  Goal: Absence of Infection Signs and Symptoms  Outcome: Ongoing, Progressing     Problem: Diabetes Comorbidity  Goal: Blood Glucose Level Within Targeted Range  Outcome: Ongoing, Progressing     Problem: Adjustment to Illness (Sepsis/Septic Shock)  Goal: Optimal Coping  Outcome: Ongoing, Progressing     Problem: Bleeding (Sepsis/Septic Shock)  Goal: Absence of Bleeding  Outcome: Ongoing, Progressing     Problem: Glycemic Control Impaired (Sepsis/Septic Shock)  Goal: Blood Glucose Level Within Desired Range  Outcome: Ongoing, Progressing     Problem: Infection Progression (Sepsis/Septic Shock)  Goal: Absence of Infection Signs and Symptoms  Outcome: Ongoing, Progressing     Problem: Nutrition Impaired (Sepsis/Septic Shock)  Goal: Optimal Nutrition Intake  Outcome: Ongoing, Progressing     Problem: Fluid and Electrolyte Imbalance (Acute Kidney Injury/Impairment)  Goal: Fluid and Electrolyte Balance  Outcome: Ongoing, Progressing     Problem: Oral Intake Inadequate (Acute Kidney Injury/Impairment)  Goal: Optimal Nutrition Intake  Outcome: Ongoing, Progressing     Problem: Renal Function Impairment (Acute Kidney Injury/Impairment)  Goal: Effective Renal Function  Outcome: Ongoing, Progressing     Patient admitted from ER with UTI. VSS. No distress noted.RA. Temp. 99.4. No complaints.

## 2024-03-17 NOTE — SUBJECTIVE & OBJECTIVE
Interval History: NAEO. AF on exam this AM however did have fevers overnight. Ch draining clear yellow urine. Cr 0.9 from 1.2.     Objective:     Temp:  [98.9 °F (37.2 °C)-102.6 °F (39.2 °C)] 98.9 °F (37.2 °C)  Pulse:  [] 74  Resp:  [16-22] 16  SpO2:  [93 %-98 %] 93 %  BP: (102-157)/(51-71) 105/51     Body mass index is 27.79 kg/m².      Bladder Scan Volume (mL): 384 mL (03/16/24 1930)    Drains       Drain  Duration                  Urethral Catheter 03/16/24 1937 16 Fr. <1 day                     Physical Exam  Constitutional:       General: She is not in acute distress.     Appearance: Normal appearance. She is not ill-appearing.   HENT:      Head: Normocephalic and atraumatic.      Mouth/Throat:      Mouth: Mucous membranes are moist.   Eyes:      Extraocular Movements: Extraocular movements intact.   Cardiovascular:      Rate and Rhythm: Normal rate.   Pulmonary:      Effort: Pulmonary effort is normal.   Abdominal:      Palpations: Abdomen is soft.   Genitourinary:     Comments: 16 Fr Ch draining c/y/u. Unable to visualize stent strings  Skin:     General: Skin is warm and dry.   Neurological:      Mental Status: She is alert and oriented to person, place, and time.           Significant Labs:    BMP:  Recent Labs   Lab 03/16/24  1410 03/17/24  0556   * 131*   K 4.9 3.7    101   CO2 21* 24   BUN 21 16   CREATININE 1.2 0.9   CALCIUM 10.2 8.8       CBC:   Recent Labs   Lab 03/16/24  1410 03/17/24  0556   WBC 12.30 9.15   HGB 11.0* 8.4*   HCT 34.5* 26.3*    182       Urine Studies:   Recent Labs   Lab 03/16/24  1359   COLORU Yellow   APPEARANCEUA Hazy*   PHUR 6.0   SPECGRAV 1.020   PROTEINUA 1+*   GLUCUA 3+*   KETONESU Negative   BILIRUBINUA Negative   OCCULTUA 2+*   NITRITE Negative   LEUKOCYTESUR 2+*   RBCUA 14*   WBCUA 37*   BACTERIA Occasional   SQUAMEPITHEL 1   HYALINECASTS 0     All pertinent labs results from the past 24 hours have been reviewed.    Significant Imaging:  All  pertinent imaging results/findings from the past 24 hours have been reviewed.

## 2024-03-17 NOTE — ASSESSMENT & PLAN NOTE
"Patient's FSGs are controlled on current medication regimen.  Last A1c reviewed-   Lab Results   Component Value Date    HGBA1C 8.2 (H) 11/09/2023    HGBA1C 8.2 (H) 11/09/2023     Most recent fingerstick glucose reviewed- No results for input(s): "POCTGLUCOSE" in the last 24 hours.  Current correctional scale  Low  Maintain anti-hyperglycemic dose as follows-   Antihyperglycemics (From admission, onward)      Start     Stop Route Frequency Ordered    03/16/24 2019  insulin aspart U-100 pen 0-5 Units         -- SubQ Before meals & nightly PRN 03/16/24 1920          Hold Oral hypoglycemics while patient is in the hospital.  "

## 2024-03-17 NOTE — NURSING
Received via stretcher from ER. Daughter accompanying. Oriented to room, unit, and fall precautions. No distress on RA. Ch in place. IVF infusing. VSS. Telemetry box in place.  .Nurses Note -- 4 Eyes      3/17/2024   12:02 AM      Skin assessed during: Admit      [x] No Altered Skin Integrity Present    [x]Prevention Measures Documented      [] Yes- Altered Skin Integrity Present or Discovered   [] LDA Added if Not in Epic (Describe Wound)   [] New Altered Skin Integrity was Present on Admit and Documented in LDA   [] Wound Image Taken    Wound Care Consulted? Yes    Attending Nurse:  Nita Stevens RN/Staff Member:  Payal

## 2024-03-17 NOTE — ASSESSMENT & PLAN NOTE
Patient with acute kidney injury/acute renal failure likely due to pre-renal azotemia due to dehydration. Cr 1.2 on presentation. ANAIS is currently improving. Baseline creatinine  0.8  - Labs reviewed- Renal function/electrolytes with Estimated Creatinine Clearance: 52.5 mL/min (based on SCr of 0.9 mg/dL). according to latest data. Monitor urine output and serial BMP and adjust therapy as needed. Avoid nephrotoxins and renally dose meds for GFR listed above.

## 2024-03-17 NOTE — HPI
A 78-year-old woman with HTN, dm 2, hypothyroidism, interstitial cystitis, and status post ureteroscopic stone extraction with lithotripsy and right ureteral stent placement on 03/01/2024 1 day prior to admission developed fever to 102.6 F with associated shaking chills, suprapubic tenderness, and dysuria.  This was also associated with hematuria, sensation of incomplete bladder emptying, as well as an inability to initiate micturition however the patient denies urgency, frequency, and back pain.  On evaluation in [facility] ED she was noted to be febrile to 101.3 F and tachycardic.  Laboratory workup revealed no evidence of leukocytosis.  Admission blood cultures are now positive for Gram-positive cocci in chains resembling strep with a rapid ID for Enterococcus faecalis.    Infectious disease is consulted for E faecalis bacteremia

## 2024-03-17 NOTE — H&P
[facility] - Emergency Dept  Hospital Medicine  History & Physical    Patient Name: Aminah Norton  MRN: [id number]  Patient Class: IP- Inpatient  Admission Date: 3/16/2024  Attending Physician: Luis Thibodeaux MD   Primary Care Provider: Jeison Sanchez MD       Patient information was obtained from patient, caregiver / friend, past medical records, and ER records.     Subjective:     Principal Problem:Pyelonephritis    Chief Complaint:   Chief Complaint   Patient presents with    Weakness     Pt comes from home, she was d/c 3/14 after kidney stone removal. She is back today for weakness, body aches, chills, had a fall yesterday and now c/o back pain. Fluids given on route per EMS.        HPI: 79 yo female with DM2, GERD, HPLD, HTN, Hypothyroidism, recent stone removal presenting with chills, weakness, and back pain. Interpretor MERI used at bedside. She states that she was d/c'd on the 14th of March was doing okay until last night she started feeling weak and actually fell, she denies any leg pain or headache and did loss consciousness, however she did not feel any better today whenever she woke up so presented to the ER.    She had a fever measured here at 102.6F, CT scan showed perinephric stranding, UA consistent with infection, lactic acidosis. Urology was consulted who recommended day and antibiotics. Medicine called for admission.     Past Medical History:   Diagnosis Date    Anticoagulant long-term use     Arthritis     Cataract     left eye    Choroidal rupture of left eye     Diabetes mellitus type II     GERD (gastroesophageal reflux disease)     Hyperlipidemia     Hypertension     Hypothyroidism     Macular scar     right eye    Retinal degeneration     chorioretinal OD    Thyroid disease     Vitamin B 12 deficiency        Past Surgical History:   Procedure Laterality Date    APPENDECTOMY      BLADDER FULGURATION N/A 3/1/2024    Procedure: ULCER INJECTION FULGURATION;  Surgeon:  Brody Smith MD;  Location: [facility] OR;  Service: Urology;  Laterality: N/A;    BLADDER SUSPENSION      CATARACT EXTRACTION      right eye     CHOLECYSTECTOMY      COLONOSCOPY N/A 7/20/2020    Procedure: COLONOSCOPY;  Surgeon: Juan Parker MD;  Location: [facility] ENDO (4TH FLR);  Service: Endoscopy;  Laterality: N/A;  Hx of chronic anemia.  patient needs a   4/6/20 - removed from 4/20/20, rescheduled 7/20/20 - pg  [place] [place]    COLONOSCOPY N/A 2/9/2023    Procedure: COLONOSCOPY;  Surgeon: Renan Sanchez MD;  Location: [facility] ENDO (4TH FLR);  Service: Colon and Rectal;  Laterality: N/A;  instr handed to patient, -ml    CYSTOSCOPY N/A 5/19/2021    Procedure: CYSTOSCOPY;  Surgeon: Brody Smith MD;  Location: [facility] OR 1ST FLR;  Service: Urology;  Laterality: N/A;    CYSTOURETEROSCOPY, WITH HOLMIUM LASER LITHOTRIPSY OF URETERAL CALCULUS AND STENT INSERTION Right 3/1/2024    Procedure: CYSTOURETEROSCOPY, WITH HOLMIUM LASER LITHOTRIPSY OF URETERAL CALCULUS AND STENT INSERTION;  Surgeon: Brody Smith MD;  Location: [facility] OR;  Service: Urology;  Laterality: Right;    CYSTOURETEROSCOPY,WITH HOLMIUM LASER LITHOTRIPSY OF URETERAL CALCULUS Right 3/14/2024    Procedure: CYSTOURETEROSCOPY,WITH HOLMIUM LASER LITHOTRIPSY OF URETERAL CALCULUS;  Surgeon: Brody Smith MD;  Location: [facility] OR;  Service: Urology;  Laterality: Right;  1 HR    EYE SURGERY Bilateral     cataract removal    INJECTION OF STEROID N/A 3/14/2024    Procedure: INJECTION, STEROID;  Surgeon: Brody Smith MD;  Location: [facility] OR;  Service: Urology;  Laterality: N/A;    PLACEMENT-STENT Right 3/14/2024    Procedure: PLACEMENT-STENT;  Surgeon: Brody Smith MD;  Location: [facility] OR;  Service: Urology;  Laterality: Right;    REMOVAL-STENT Right 3/14/2024    Procedure: REMOVAL-STENT;  Surgeon: Brody Smith MD;  Location: [facility] OR;  Service: Urology;  Laterality: Right;     TOTAL ABDOMINAL HYSTERECTOMY      DUB       Review of patient's allergies indicates:   Allergen Reactions    Bufferin [aspirin, buffered] Itching    Atorvastatin      Myalgias and arthralgias    Shellfish containing products Itching     Shellfish causes her to itch per her daughter, Caro.     Warfarin     Ibuprofen Itching     Itching of eyes, head       No current facility-administered medications on file prior to encounter.     Current Outpatient Medications on File Prior to Encounter   Medication Sig    ACCU-CHEK SOFTCLIX LANCETS Misc CHECK BLOOD SUGAR ONE TIME DAILY    amitriptyline (ELAVIL) 10 MG tablet Take 1 tablet (10 mg total) by mouth every evening.    BD ALCOHOL SWABS PadM USE AS DIRECTED TOPICALLY AS NEEDED    biotin 1 mg tablet Take 1,000 mcg by mouth 3 (three) times daily.    blood sugar diagnostic (ACCU-CHEK MATTIE PLUS TEST STRP) Strp TEST ONE TIME DAILY    blood-glucose meter kit To check BG 2  times daily, to use with insurance preferred meter, use as directed.    carvediloL (COREG) 25 MG tablet TAKE 1 TABLET TWICE DAILY    cyanocobalamin (VITAMIN B-12) 1000 MCG tablet Take 100 mcg by mouth once daily.    gabapentin (NEURONTIN) 300 MG capsule Take 1 capsule (300 mg total) by mouth every evening.    glipiZIDE 5 MG TR24 Take 1 tablet (5 mg total) by mouth daily with breakfast.    HYDROcodone-acetaminophen (NORCO) 5-325 mg per tablet Take 1 tablet by mouth every 6 (six) hours as needed for Pain.    ketoconazole (NIZORAL) 2 % cream Apply topically once daily.    lancets (ACCU-CHEK MULTICLIX LANCET) Misc To use as directed with Accu Chek Sahra FastClix lancing device    lancets Misc To check BG 2 times daily, to use with insurance preferred meter.    levothyroxine (SYNTHROID) 112 MCG tablet Take 1 tablet (112 mcg total) by mouth before breakfast.    lisinopriL (PRINIVIL,ZESTRIL) 40 MG tablet Take 1 tablet (40 mg total) by mouth once daily.    metFORMIN (GLUCOPHAGE) 1000 MG tablet Take 1 tablet (1,000 mg  total) by mouth 2 (two) times daily with meals.    mirabegron (MYRBETRIQ) 25 mg Tb24 ER tablet Take 1 tablet (25 mg total) by mouth once daily.    naproxen (NAPROSYN) 500 MG tablet Take 1 tablet (500 mg total) by mouth 2 (two) times daily with meals.    rosuvastatin (CRESTOR) 20 MG tablet Take 1 tablet (20 mg total) by mouth once daily.    SITagliptin phosphate (JANUVIA) 100 MG Tab Take 1 tablet (100 mg total) by mouth once daily.    turmeric root extract 500 mg Cap Take by mouth.    VITAMIN B COMPLEX ORAL Take 1 tablet by mouth.    vitamin D (VITAMIN D3) 1000 units Tab Take 1,000 Units by mouth once daily.     Family History       Problem Relation (Age of Onset)    Breast cancer Mother    COPD Father    Cancer Mother, Sister, Daughter    Diabetes Son    Hypertension Sister, Sister, Daughter, Daughter    Ovarian cancer Mother, Sister    Thyroid disease Daughter, Daughter          Tobacco Use    Smoking status: Never    Smokeless tobacco: Never   Substance and Sexual Activity    Alcohol use: No    Drug use: No    Sexual activity: Yes     Partners: Male     Birth control/protection: Post-menopausal     Comment:       Review of Systems   Constitutional:  Positive for chills and fever. Negative for activity change and appetite change.   HENT:  Negative for congestion, hearing loss and rhinorrhea.    Eyes:  Negative for discharge, itching and visual disturbance.   Respiratory:  Negative for apnea, cough and shortness of breath.    Cardiovascular:  Negative for chest pain, palpitations and leg swelling.   Gastrointestinal:  Negative for abdominal distention, abdominal pain, constipation, diarrhea, nausea and vomiting.   Endocrine: Negative for cold intolerance and heat intolerance.   Genitourinary:  Positive for dysuria and flank pain. Negative for hematuria.   Musculoskeletal:  Negative for neck pain and neck stiffness.   Skin:  Negative for rash and wound.   Neurological:  Negative for dizziness, seizures,  light-headedness and headaches.   Psychiatric/Behavioral:  Negative for agitation, confusion and suicidal ideas.      Objective:     Vital Signs (Most Recent):  Temp: (!) 101.3 °F (38.5 °C) (03/16/24 1916)  Pulse: (!) 118 (03/16/24 1916)  Resp: (!) 22 (03/16/24 1916)  BP: (!) 157/71 (03/16/24 1834)  SpO2: 95 % (03/16/24 1916) Vital Signs (24h Range):  Temp:  [98.9 °F (37.2 °C)-102.6 °F (39.2 °C)] 101.3 °F (38.5 °C)  Pulse:  [109-124] 118  Resp:  [16-22] 22  SpO2:  [95 %-98 %] 95 %  BP: (128-157)/(58-71) 157/71     Weight: 75.8 kg (167 lb)  Body mass index is 27.79 kg/m².     Physical Exam  Vitals reviewed.   Constitutional:       General: She is not in acute distress.     Appearance: She is well-developed.   HENT:      Head: Normocephalic and atraumatic.      Nose: Nose normal. No rhinorrhea.      Mouth/Throat:      Mouth: Mucous membranes are moist.   Eyes:      General: No scleral icterus.        Right eye: No discharge.         Left eye: No discharge.      Pupils: Pupils are equal, round, and reactive to light.   Neck:      Vascular: No JVD.   Cardiovascular:      Rate and Rhythm: Normal rate and regular rhythm.      Heart sounds: Normal heart sounds. No murmur heard.     No friction rub.   Pulmonary:      Effort: Pulmonary effort is normal. No respiratory distress.      Breath sounds: Normal breath sounds. No wheezing.   Abdominal:      General: Bowel sounds are normal. There is no distension.      Palpations: Abdomen is soft.      Tenderness: There is no abdominal tenderness.   Musculoskeletal:         General: No deformity. Normal range of motion.      Cervical back: Normal range of motion and neck supple.   Skin:     General: Skin is warm and dry.   Neurological:      General: No focal deficit present.      Mental Status: She is alert and oriented to person, place, and time.   Psychiatric:         Mood and Affect: Mood normal.         Behavior: Behavior normal.              CRANIAL NERVES     CN III, IV, VI    Pupils are equal, round, and reactive to light.       Significant Labs: All pertinent labs within the past 24 hours have been reviewed.  CBC:   Recent Labs   Lab 03/16/24  1410   WBC 12.30   HGB 11.0*   HCT 34.5*        CMP:   Recent Labs   Lab 03/16/24  1410   *   K 4.9      CO2 21*   *   BUN 21   CREATININE 1.2   CALCIUM 10.2   PROT 7.5   ALBUMIN 3.8   BILITOT 0.6   ALKPHOS 77   AST 22   ALT 13   ANIONGAP 11     Urine Studies:   Recent Labs   Lab 03/16/24  1359   COLORU Yellow   APPEARANCEUA Hazy*   PHUR 6.0   SPECGRAV 1.020   PROTEINUA 1+*   GLUCUA 3+*   KETONESU Negative   BILIRUBINUA Negative   OCCULTUA 2+*   NITRITE Negative   LEUKOCYTESUR 2+*   RBCUA 14*   WBCUA 37*   BACTERIA Occasional   SQUAMEPITHEL 1   HYALINECASTS 0       Significant Imaging: I have reviewed all pertinent imaging results/findings within the past 24 hours.  Assessment/Plan:     * Pyelonephritis  Acute, urine consistent with infection  Urology following  Continue rocephin  Symptomatic management of pain        ANAIS (acute kidney injury)  Patient with acute kidney injury/acute renal failure likely due to pre-renal azotemia due to dehydration ANAIS is currently worsening. Baseline creatinine  0.8  - Labs reviewed- Renal function/electrolytes with Estimated Creatinine Clearance: 39.3 mL/min (based on SCr of 1.2 mg/dL). according to latest data. Monitor urine output and serial BMP and adjust therapy as needed. Avoid nephrotoxins and renally dose meds for GFR listed above.    Sepsis  This patient does have evidence of infective focus  My overall impression is sepsis.  Source: Urinary Tract  Antibiotics given-   Antibiotics (72h ago, onward)      Start     Stop Route Frequency Ordered    03/17/24 1700  cefTRIAXone (Rocephin) 1 g in dextrose 5 % in water (D5W) 100 mL IVPB (MB+)         -- IV Every 24 hours (non-standard times) 03/16/24 1948 03/16/24 2100  mupirocin 2 % ointment         03/21/24 2059 Nasl 2 times daily  "03/16/24 1953          Latest lactate reviewed-  Recent Labs   Lab 03/16/24  1517   POCLAC 3.21*     Fluid challenge Ideal Body Weight- The patient's ideal body weight is Ideal body weight: 57 kg (125 lb 10.6 oz) which will be used to calculate fluid bolus of 30 ml/kg for treatment of septic shock.      Post- resuscitation assessment No - Post resuscitation assessment not needed     ANAIS    Will Not start Pressors- Levophed for MAP of 65  Source control achieved by: rocephin    Type 2 diabetes mellitus with neurologic complication, without long-term current use of insulin  Patient's FSGs are controlled on current medication regimen.  Last A1c reviewed-   Lab Results   Component Value Date    HGBA1C 8.2 (H) 11/09/2023    HGBA1C 8.2 (H) 11/09/2023     Most recent fingerstick glucose reviewed- No results for input(s): "POCTGLUCOSE" in the last 24 hours.  Current correctional scale  Low  Maintain anti-hyperglycemic dose as follows-   Antihyperglycemics (From admission, onward)      Start     Stop Route Frequency Ordered    03/16/24 2019  insulin aspart U-100 pen 0-5 Units         -- SubQ Before meals & nightly PRN 03/16/24 1920          Hold Oral hypoglycemics while patient is in the hospital.    GERD (gastroesophageal reflux disease)  Chronic, controlled, continue to monitor      HTN (hypertension), benign  Chronic, controlled. Latest blood pressure and vitals reviewed-     Temp:  [98.9 °F (37.2 °C)-102.6 °F (39.2 °C)]   Pulse:  [109-124]   Resp:  [16-22]   BP: (128-157)/(58-71)   SpO2:  [95 %-98 %] .   Home meds for hypertension were reviewed and noted below.   Hypertension Medications               carvediloL (COREG) 25 MG tablet TAKE 1 TABLET TWICE DAILY    lisinopriL (PRINIVIL,ZESTRIL) 40 MG tablet Take 1 tablet (40 mg total) by mouth once daily.            While in the hospital, will manage blood pressure as follows; Continue home antihypertensive regimen    Will utilize p.r.n. blood pressure medication only if " patient's blood pressure greater than 180/110 and she develops symptoms such as worsening chest pain or shortness of breath.    Hypothyroidism  Chronic, controlled, continue home synthroid        VTE Risk Mitigation (From admission, onward)           Ordered     enoxaparin injection 40 mg  Daily         03/16/24 1920     IP VTE HIGH RISK PATIENT  Once         03/16/24 1920     Place sequential compression device  Until discontinued         03/16/24 1920     Place sequential compression device  Until discontinued         03/16/24 1920                        Luis Thibodeaux MD  Department of Hospital Medicine  [facility] - Emergency Dept

## 2024-03-17 NOTE — SUBJECTIVE & OBJECTIVE
Past Medical History:   Diagnosis Date    Anticoagulant long-term use     Arthritis     Cataract     left eye    Choroidal rupture of left eye     Diabetes mellitus type II     GERD (gastroesophageal reflux disease)     Hyperlipidemia     Hypertension     Hypothyroidism     Macular scar     right eye    Retinal degeneration     chorioretinal OD    Thyroid disease     Vitamin B 12 deficiency        Past Surgical History:   Procedure Laterality Date    APPENDECTOMY      BLADDER FULGURATION N/A 3/1/2024    Procedure: ULCER INJECTION FULGURATION;  Surgeon: Brody Smith MD;  Location: [facility] OR;  Service: Urology;  Laterality: N/A;    BLADDER SUSPENSION      CATARACT EXTRACTION      right eye     CHOLECYSTECTOMY      COLONOSCOPY N/A 7/20/2020    Procedure: COLONOSCOPY;  Surgeon: Juan Parker MD;  Location: [facility] ENDO (4TH FLR);  Service: Endoscopy;  Laterality: N/A;  Hx of chronic anemia.  patient needs a   4/6/20 - removed from 4/20/20, rescheduled 7/20/20 - pg  covid 7/17-[facility] [place] 2nd [place]    COLONOSCOPY N/A 2/9/2023    Procedure: COLONOSCOPY;  Surgeon: Renan Sanchez MD;  Location: [facility] ENDO (4TH FLR);  Service: Colon and Rectal;  Laterality: N/A;  instr handed to patient, -ml    CYSTOSCOPY N/A 5/19/2021    Procedure: CYSTOSCOPY;  Surgeon: Brody Smith MD;  Location: [facility] OR 1ST FLR;  Service: Urology;  Laterality: N/A;    CYSTOURETEROSCOPY, WITH HOLMIUM LASER LITHOTRIPSY OF URETERAL CALCULUS AND STENT INSERTION Right 3/1/2024    Procedure: CYSTOURETEROSCOPY, WITH HOLMIUM LASER LITHOTRIPSY OF URETERAL CALCULUS AND STENT INSERTION;  Surgeon: Brody Smith MD;  Location: [facility] OR;  Service: Urology;  Laterality: Right;    CYSTOURETEROSCOPY,WITH HOLMIUM LASER LITHOTRIPSY OF URETERAL CALCULUS Right 3/14/2024    Procedure: CYSTOURETEROSCOPY,WITH HOLMIUM LASER LITHOTRIPSY OF URETERAL CALCULUS;  Surgeon: Brody Smith MD;  Location: [facility] OR;  Service: Urology;   Laterality: Right;  1 HR    EYE SURGERY Bilateral     cataract removal    INJECTION OF STEROID N/A 3/14/2024    Procedure: INJECTION, STEROID;  Surgeon: Brody Smith MD;  Location: [facility] OR;  Service: Urology;  Laterality: N/A;    PLACEMENT-STENT Right 3/14/2024    Procedure: PLACEMENT-STENT;  Surgeon: Brody Smith MD;  Location: [facility] OR;  Service: Urology;  Laterality: Right;    REMOVAL-STENT Right 3/14/2024    Procedure: REMOVAL-STENT;  Surgeon: Brody Smith MD;  Location: [facility] OR;  Service: Urology;  Laterality: Right;    TOTAL ABDOMINAL HYSTERECTOMY      DUB       Review of patient's allergies indicates:   Allergen Reactions    Bufferin [aspirin, buffered] Itching    Atorvastatin      Myalgias and arthralgias    Shellfish containing products Itching     Shellfish causes her to itch per her daughter, Caro.     Warfarin     Ibuprofen Itching     Itching of eyes, head       Family History       Problem Relation (Age of Onset)    Breast cancer Mother    COPD Father    Cancer Mother, Sister, Daughter    Diabetes Son    Hypertension Sister, Sister, Daughter, Daughter    Ovarian cancer Mother, Sister    Thyroid disease Daughter, Daughter            Tobacco Use    Smoking status: Never    Smokeless tobacco: Never   Substance and Sexual Activity    Alcohol use: No    Drug use: No    Sexual activity: Yes     Partners: Male     Birth control/protection: Post-menopausal     Comment:         Review of Systems  See HPI  Objective:     Temp:  [98.9 °F (37.2 °C)-102.6 °F (39.2 °C)] 101.3 °F (38.5 °C)  Pulse:  [] 98  Resp:  [16-22] 20  SpO2:  [94 %-98 %] 94 %  BP: (103-157)/(51-71) 103/51  Weight: 75.8 kg (167 lb)  Body mass index is 27.79 kg/m².      Bladder Scan Volume (mL): 384 mL (03/16/24 1930)    Drains       Drain  Duration                  Urethral Catheter 03/16/24 1937 16 Fr. <1 day                     Physical Exam  Constitutional:       General: She is not in acute distress.      Appearance: Normal appearance. She is not ill-appearing.   HENT:      Head: Normocephalic and atraumatic.      Mouth/Throat:      Mouth: Mucous membranes are moist.   Eyes:      Extraocular Movements: Extraocular movements intact.   Cardiovascular:      Rate and Rhythm: Normal rate.   Pulmonary:      Effort: Pulmonary effort is normal.   Abdominal:      Palpations: Abdomen is soft.   Genitourinary:     Comments: 16 Fr Ch draining c/y/u  Skin:     General: Skin is warm and dry.   Neurological:      Mental Status: She is alert and oriented to person, place, and time.          Significant Labs:    BMP:  Recent Labs   Lab 03/16/24  1410   *   K 4.9      CO2 21*   BUN 21   CREATININE 1.2   CALCIUM 10.2       CBC:  Recent Labs   Lab 03/16/24  1410   WBC 12.30   HGB 11.0*   HCT 34.5*          Urine Studies:   Recent Labs   Lab 03/16/24  1359   COLORU Yellow   APPEARANCEUA Hazy*   PHUR 6.0   SPECGRAV 1.020   PROTEINUA 1+*   GLUCUA 3+*   KETONESU Negative   BILIRUBINUA Negative   OCCULTUA 2+*   NITRITE Negative   LEUKOCYTESUR 2+*   RBCUA 14*   WBCUA 37*   BACTERIA Occasional   SQUAMEPITHEL 1   HYALINECASTS 0     All pertinent labs results from the past 24 hours have been reviewed.    Significant Imaging:  All pertinent imaging results/findings from the past 24 hours have been reviewed.

## 2024-03-17 NOTE — ASSESSMENT & PLAN NOTE
Likely secondary to UTI with translocation into blood.  Blood cultures with Gram-positive cocci in chains resembling strep rapid ID positive for Enterococcus faecalis.  New identify genes and rapid ID suspect antimicrobial resistance at this time.  Antibiotics as detailed in pyelonephritis.    We will repeat blood cultures tomorrow.  We will follow any cultures are in process at this time.

## 2024-03-17 NOTE — ASSESSMENT & PLAN NOTE
This patient does have evidence of infective focus  My overall impression is sepsis.  Source: Urinary Tract  Antibiotics given-   Antibiotics (72h ago, onward)    Start     Stop Route Frequency Ordered    03/17/24 1100  ampicillin (OMNIPEN) 2 g in sodium chloride 0.9 % 100 mL IVPB (MB+)         -- IV Every 4 hours (non-standard times) 03/17/24 0954    03/16/24 2100  mupirocin 2 % ointment         03/21/24 2059 Nasl 2 times daily 03/16/24 1953        Latest lactate reviewed-  Recent Labs   Lab 03/16/24 2004 03/16/24 2010   LACTATE 1.1  --    POCLAC  --  0.89       Fluid challenge Ideal Body Weight- The patient's ideal body weight is Ideal body weight: 57 kg (125 lb 10.6 oz) which will be used to calculate fluid bolus of 30 ml/kg for treatment of septic shock.      Post- resuscitation assessment No - Post resuscitation assessment not needed     ANAIS    Will Not start Pressors- Levophed for MAP of 65  Source control achieved by: rocephin

## 2024-03-18 PROBLEM — R76.12 POSITIVE QUANTIFERON-TB GOLD TEST: Status: ACTIVE | Noted: 2024-03-18

## 2024-03-18 PROBLEM — D64.9 ANEMIA: Status: ACTIVE | Noted: 2024-03-18

## 2024-03-18 PROBLEM — Z96.0 URETERAL STENT PRESENT: Status: ACTIVE | Noted: 2024-03-18

## 2024-03-18 LAB
ALBUMIN SERPL BCP-MCNC: 2.4 G/DL (ref 3.5–5.2)
ALP SERPL-CCNC: 64 U/L (ref 55–135)
ALT SERPL W/O P-5'-P-CCNC: 19 U/L (ref 10–44)
ANION GAP SERPL CALC-SCNC: 6 MMOL/L (ref 8–16)
ASCENDING AORTA: 3.27 CM
AST SERPL-CCNC: 28 U/L (ref 10–40)
AV INDEX (PROSTH): 0.99
AV MEAN GRADIENT: 5 MMHG
AV PEAK GRADIENT: 8 MMHG
AV VALVE AREA BY VELOCITY RATIO: 2.89 CM²
AV VALVE AREA: 3.23 CM²
AV VELOCITY RATIO: 0.88
BACTERIA UR CULT: ABNORMAL
BASOPHILS # BLD AUTO: 0.01 K/UL (ref 0–0.2)
BASOPHILS NFR BLD: 0.1 % (ref 0–1.9)
BILIRUB SERPL-MCNC: 0.3 MG/DL (ref 0.1–1)
BSA FOR ECHO PROCEDURE: 1.86 M2
BUN SERPL-MCNC: 12 MG/DL (ref 8–23)
CALCIUM SERPL-MCNC: 8.9 MG/DL (ref 8.7–10.5)
CHLORIDE SERPL-SCNC: 103 MMOL/L (ref 95–110)
CO2 SERPL-SCNC: 23 MMOL/L (ref 23–29)
CREAT SERPL-MCNC: 0.8 MG/DL (ref 0.5–1.4)
CV ECHO LV RWT: 0.48 CM
DIFFERENTIAL METHOD BLD: ABNORMAL
DOP CALC AO PEAK VEL: 1.39 M/S
DOP CALC AO VTI: 28.31 CM
DOP CALC LVOT AREA: 3.3 CM2
DOP CALC LVOT DIAMETER: 2.04 CM
DOP CALC LVOT PEAK VEL: 1.23 M/S
DOP CALC LVOT STROKE VOLUME: 91.41 CM3
DOP CALCLVOT PEAK VEL VTI: 27.98 CM
E WAVE DECELERATION TIME: 244.37 MSEC
E/A RATIO: 0.95
E/E' RATIO: 15.63 M/S
ECHO LV POSTERIOR WALL: 0.94 CM (ref 0.6–1.1)
EOSINOPHIL # BLD AUTO: 0 K/UL (ref 0–0.5)
EOSINOPHIL NFR BLD: 0.3 % (ref 0–8)
ERYTHROCYTE [DISTWIDTH] IN BLOOD BY AUTOMATED COUNT: 18.3 % (ref 11.5–14.5)
EST. GFR  (NO RACE VARIABLE): >60 ML/MIN/1.73 M^2
FRACTIONAL SHORTENING: 28 % (ref 28–44)
GLUCOSE SERPL-MCNC: 178 MG/DL (ref 70–110)
HCT VFR BLD AUTO: 27.8 % (ref 37–48.5)
HGB BLD-MCNC: 8.8 G/DL (ref 12–16)
IMM GRANULOCYTES # BLD AUTO: 0.06 K/UL (ref 0–0.04)
IMM GRANULOCYTES NFR BLD AUTO: 0.9 % (ref 0–0.5)
INTERVENTRICULAR SEPTUM: 1.03 CM (ref 0.6–1.1)
IVRT: 119.89 MSEC
LA MAJOR: 5.78 CM
LA MINOR: 5.79 CM
LA WIDTH: 4 CM
LEFT ATRIUM SIZE: 3.82 CM
LEFT ATRIUM VOLUME INDEX MOD: 41.9 ML/M2
LEFT ATRIUM VOLUME INDEX: 41.1 ML/M2
LEFT ATRIUM VOLUME MOD: 76.72 CM3
LEFT ATRIUM VOLUME: 75.14 CM3
LEFT INTERNAL DIMENSION IN SYSTOLE: 2.84 CM (ref 2.1–4)
LEFT VENTRICLE DIASTOLIC VOLUME INDEX: 37.1 ML/M2
LEFT VENTRICLE DIASTOLIC VOLUME: 67.9 ML
LEFT VENTRICLE MASS INDEX: 67 G/M2
LEFT VENTRICLE SYSTOLIC VOLUME INDEX: 16.7 ML/M2
LEFT VENTRICLE SYSTOLIC VOLUME: 30.47 ML
LEFT VENTRICULAR INTERNAL DIMENSION IN DIASTOLE: 3.95 CM (ref 3.5–6)
LEFT VENTRICULAR MASS: 121.94 G
LV LATERAL E/E' RATIO: 15.63 M/S
LV SEPTAL E/E' RATIO: 15.63 M/S
LYMPHOCYTES # BLD AUTO: 0.5 K/UL (ref 1–4.8)
LYMPHOCYTES NFR BLD: 6.7 % (ref 18–48)
MAGNESIUM SERPL-MCNC: 2 MG/DL (ref 1.6–2.6)
MCH RBC QN AUTO: 22.7 PG (ref 27–31)
MCHC RBC AUTO-ENTMCNC: 31.7 G/DL (ref 32–36)
MCV RBC AUTO: 72 FL (ref 82–98)
MONOCYTES # BLD AUTO: 0.4 K/UL (ref 0.3–1)
MONOCYTES NFR BLD: 6.3 % (ref 4–15)
MV PEAK A VEL: 1.32 M/S
MV PEAK E VEL: 1.25 M/S
MV STENOSIS PRESSURE HALF TIME: 70.87 MS
MV VALVE AREA P 1/2 METHOD: 3.1 CM2
NEUTROPHILS # BLD AUTO: 5.9 K/UL (ref 1.8–7.7)
NEUTROPHILS NFR BLD: 85.7 % (ref 38–73)
NRBC BLD-RTO: 0 /100 WBC
PHOSPHATE SERPL-MCNC: 3 MG/DL (ref 2.7–4.5)
PISA TR MAX VEL: 2.7 M/S
PLATELET # BLD AUTO: 166 K/UL (ref 150–450)
PMV BLD AUTO: 10.1 FL (ref 9.2–12.9)
POCT GLUCOSE: 181 MG/DL (ref 70–110)
POCT GLUCOSE: 216 MG/DL (ref 70–110)
POCT GLUCOSE: 267 MG/DL (ref 70–110)
POCT GLUCOSE: 300 MG/DL (ref 70–110)
POTASSIUM SERPL-SCNC: 4.2 MMOL/L (ref 3.5–5.1)
PROT SERPL-MCNC: 5.1 G/DL (ref 6–8.4)
RA MAJOR: 5.3 CM
RA PRESSURE ESTIMATED: 3 MMHG
RA WIDTH: 3.02 CM
RBC # BLD AUTO: 3.88 M/UL (ref 4–5.4)
RIGHT VENTRICULAR END-DIASTOLIC DIMENSION: 3.17 CM
RV TB RVSP: 6 MMHG
SINUS: 2.78 CM
SODIUM SERPL-SCNC: 132 MMOL/L (ref 136–145)
STJ: 2.42 CM
TDI LATERAL: 0.08 M/S
TDI SEPTAL: 0.08 M/S
TDI: 0.08 M/S
TR MAX PG: 29 MMHG
TRICUSPID ANNULAR PLANE SYSTOLIC EXCURSION: 1.29 CM
TV REST PULMONARY ARTERY PRESSURE: 32 MMHG
WBC # BLD AUTO: 6.87 K/UL (ref 3.9–12.7)
Z-SCORE OF LEFT VENTRICULAR DIMENSION IN END DIASTOLE: -2.5
Z-SCORE OF LEFT VENTRICULAR DIMENSION IN END SYSTOLE: -0.77

## 2024-03-18 PROCEDURE — 87040 BLOOD CULTURE FOR BACTERIA: CPT | Mod: 59 | Performed by: INTERNAL MEDICINE

## 2024-03-18 PROCEDURE — 63600175 PHARM REV CODE 636 W HCPCS: Performed by: INTERNAL MEDICINE

## 2024-03-18 PROCEDURE — 20600001 HC STEP DOWN PRIVATE ROOM

## 2024-03-18 PROCEDURE — 25000003 PHARM REV CODE 250: Performed by: STUDENT IN AN ORGANIZED HEALTH CARE EDUCATION/TRAINING PROGRAM

## 2024-03-18 PROCEDURE — 36415 COLL VENOUS BLD VENIPUNCTURE: CPT | Mod: XB | Performed by: INTERNAL MEDICINE

## 2024-03-18 PROCEDURE — 99233 SBSQ HOSP IP/OBS HIGH 50: CPT | Mod: ,,, | Performed by: INTERNAL MEDICINE

## 2024-03-18 PROCEDURE — 25000003 PHARM REV CODE 250: Performed by: INTERNAL MEDICINE

## 2024-03-18 PROCEDURE — 36415 COLL VENOUS BLD VENIPUNCTURE: CPT | Performed by: INTERNAL MEDICINE

## 2024-03-18 PROCEDURE — 80053 COMPREHEN METABOLIC PANEL: CPT | Performed by: INTERNAL MEDICINE

## 2024-03-18 PROCEDURE — 84100 ASSAY OF PHOSPHORUS: CPT | Performed by: INTERNAL MEDICINE

## 2024-03-18 PROCEDURE — 85025 COMPLETE CBC W/AUTO DIFF WBC: CPT | Performed by: INTERNAL MEDICINE

## 2024-03-18 PROCEDURE — 83735 ASSAY OF MAGNESIUM: CPT | Performed by: INTERNAL MEDICINE

## 2024-03-18 RX ORDER — CARVEDILOL 6.25 MG/1
6.25 TABLET ORAL 2 TIMES DAILY
Status: DISCONTINUED | OUTPATIENT
Start: 2024-03-18 | End: 2024-03-28 | Stop reason: HOSPADM

## 2024-03-18 RX ADMIN — AMITRIPTYLINE HYDROCHLORIDE 10 MG: 10 TABLET, FILM COATED ORAL at 08:03

## 2024-03-18 RX ADMIN — ENOXAPARIN SODIUM 40 MG: 40 INJECTION SUBCUTANEOUS at 05:03

## 2024-03-18 RX ADMIN — INSULIN ASPART 2 UNITS: 100 INJECTION, SOLUTION INTRAVENOUS; SUBCUTANEOUS at 05:03

## 2024-03-18 RX ADMIN — AMPICILLIN 2 G: 2 INJECTION, POWDER, FOR SOLUTION INTRAMUSCULAR; INTRAVENOUS at 08:03

## 2024-03-18 RX ADMIN — AMPICILLIN 2 G: 2 INJECTION, POWDER, FOR SOLUTION INTRAMUSCULAR; INTRAVENOUS at 05:03

## 2024-03-18 RX ADMIN — INSULIN ASPART 3 UNITS: 100 INJECTION, SOLUTION INTRAVENOUS; SUBCUTANEOUS at 12:03

## 2024-03-18 RX ADMIN — LEVOTHYROXINE SODIUM 112 MCG: 112 TABLET ORAL at 05:03

## 2024-03-18 RX ADMIN — GABAPENTIN 300 MG: 300 CAPSULE ORAL at 08:03

## 2024-03-18 RX ADMIN — CARVEDILOL 6.25 MG: 6.25 TABLET, FILM COATED ORAL at 08:03

## 2024-03-18 RX ADMIN — MUPIROCIN: 20 OINTMENT TOPICAL at 12:03

## 2024-03-18 RX ADMIN — MUPIROCIN: 20 OINTMENT TOPICAL at 08:03

## 2024-03-18 RX ADMIN — AMPICILLIN 2 G: 2 INJECTION, POWDER, FOR SOLUTION INTRAMUSCULAR; INTRAVENOUS at 09:03

## 2024-03-18 RX ADMIN — CARVEDILOL 6.25 MG: 6.25 TABLET, FILM COATED ORAL at 12:03

## 2024-03-18 RX ADMIN — AMPICILLIN 2 G: 2 INJECTION, POWDER, FOR SOLUTION INTRAMUSCULAR; INTRAVENOUS at 03:03

## 2024-03-18 RX ADMIN — INSULIN ASPART 1 UNITS: 100 INJECTION, SOLUTION INTRAVENOUS; SUBCUTANEOUS at 08:03

## 2024-03-18 RX ADMIN — AMPICILLIN 2 G: 2 INJECTION, POWDER, FOR SOLUTION INTRAMUSCULAR; INTRAVENOUS at 02:03

## 2024-03-18 NOTE — ASSESSMENT & PLAN NOTE
Aminah Norton is a 78 y.o.F with pyelonephritis.    - IV antibiotics  - IV fluids  - Maintain stent  - Ch per primary, recommend voiding trial prior to d/c  - Trend Cr  - ID consulted, f/u recs  - KUB showing stent in adequate position  - F/u all cultures  - Will need 2 weeks total of antibiotics  - Contact urology with questions or concerns, we will plan for an outpt stent pull with Dr. Smith in 2-4 weeks  - I explicitly explained to the patient this AM that the ureteral stent cannot stay in for longer than 3 months as this is associated with risks of sepsis, renal failure and death.  - Rest of care per iman

## 2024-03-18 NOTE — ASSESSMENT & PLAN NOTE
Patient presented to ED with fevers and malaise 2 days after urologic procedure.  CT with evidence nephritic stranding.  Admitted for pyelonephritis.  Blood cultures on presentation 3/16 positive for Enterococcus faecalis. Suspect urologic source.  - ID consulted   - Repeat blood cultures ordered  - TTE  - S/p ceftriaxone in ED and on admission, switched to ampicillin 3/17   - urology following

## 2024-03-18 NOTE — PROGRESS NOTES
Juan Bernal - Stepdown [facility] ([place] [address])  [facility] Medicine  Progress Note    Patient Name: Aminah Norton  MRN: [id number]  Patient Class: IP- Inpatient   Admission Date: 3/16/2024  Length of Stay: 2 days  Attending Physician: Edwige Aly MD  Primary Care Provider: Jeison Sanchez MD        Subjective:     Principal Problem:Enterococcal bacteremia        HPI:  77 yo female with DM2, GERD, HPLD, HTN, Hypothyroidism, recent stone removal presenting with chills, weakness, and back pain. Interpretor MERI used at bedside. She states that she was d/c'd on the 14th of March was doing okay until last night she started feeling weak and actually fell, she denies any leg pain or headache and did loss consciousness, however she did not feel any better today whenever she woke up so presented to the ER.    She had a fever measured here at 102.6F, CT scan showed perinephric stranding, UA consistent with infection, lactic acidosis. Urology was consulted who recommended day and antibiotics. Medicine called for admission.     Overview/Hospital Course:  Started on ceftriaxone at admission, changed to ampicillin 3/17 as per ID. Blood cultures quickly resulted positive for e faecalis. ID consulted. Patient passed voiding trial 3/18.     Interval History: urinary catheter removed this morning. Patient reports urinating on her own since this time. Denies chest pain, SOB, n/v, c/d.       Review of Systems  Objective:     Vital Signs (Most Recent):  Temp: 98.2 °F (36.8 °C) (03/18/24 0740)  Pulse: 80 (03/18/24 0740)  Resp: 17 (03/18/24 0740)  BP: (!) 170/71 (03/18/24 0740)  SpO2: 95 % (03/18/24 0740) Vital Signs (24h Range):  Temp:  [98 °F (36.7 °C)-99.9 °F (37.7 °C)] 98.2 °F (36.8 °C)  Pulse:  [65-90] 80  Resp:  [16-19] 17  SpO2:  [92 %-95 %] 95 %  BP: (107-170)/(52-71) 170/71     Weight: 75.8 kg (167 lb)  Body mass index is 27.79 kg/m².    Intake/Output Summary (Last 24 hours) at 3/18/2024 1042  Last data filed at  3/18/2024 0844  Gross per 24 hour   Intake 1050 ml   Output 2950 ml   Net -1900 ml         Physical Exam  Vitals and nursing note reviewed.   Constitutional:       General: She is not in acute distress.     Appearance: She is not ill-appearing.   HENT:      Mouth/Throat:      Mouth: Mucous membranes are moist.   Eyes:      General: No scleral icterus.     Extraocular Movements: Extraocular movements intact.      Pupils: Pupils are equal, round, and reactive to light.   Cardiovascular:      Rate and Rhythm: Normal rate and regular rhythm.      Pulses: Normal pulses.      Heart sounds: Normal heart sounds.   Pulmonary:      Effort: Pulmonary effort is normal. No respiratory distress.      Breath sounds: Normal breath sounds. No wheezing.   Abdominal:      General: There is no distension.      Palpations: Abdomen is soft.      Tenderness: There is no abdominal tenderness.   Musculoskeletal:      Right lower leg: No edema.      Left lower leg: No edema.   Skin:     General: Skin is warm and dry.      Findings: No rash.   Neurological:      General: No focal deficit present.      Mental Status: She is alert and oriented to person, place, and time.   Psychiatric:         Mood and Affect: Mood normal.         Behavior: Behavior normal.             Significant Labs: All pertinent labs within the past 24 hours have been reviewed.  Blood Culture:   Recent Labs   Lab 03/16/24  1410 03/16/24  1420 03/17/24  1300   LABBLOO Gram stain aer bottle: Gram positive cocci in chains resembling Strep  Gram stain vinay bottle: Gram positive cocci in chains resembling Strep  Results called to and read back by: Emilie Napoles RN 03/17/2024  05:37  ENTEROCOCCUS SPECIES  Identification and susceptibility pending  * Gram stain aer bottle: Gram positive cocci in chains resembling Strep  Positive results previously called 03/17/2024  06:25  ENTEROCOCCUS SPECIES  Identification pending  For susceptibility see order #[id number]  * Gram stain aer  bottle: Gram positive cocci in chains resembling Strep  Results called to and read back by: Emilie Dela Cruz RN. 03/18/2024  06:17  Gram stain aer bottle: Gram positive cocci in chains resembling Strep  Positive results previously called 03/17/2024 05:37     BMP:   Recent Labs   Lab 03/18/24  0336   *   *   K 4.2      CO2 23   BUN 12   CREATININE 0.8   CALCIUM 8.9   MG 2.0     CBC:   Recent Labs   Lab 03/16/24  1410 03/17/24  0556 03/18/24  0336   WBC 12.30 9.15 6.87   HGB 11.0* 8.4* 8.8*   HCT 34.5* 26.3* 27.8*    182 166       Significant Imaging: I have reviewed all pertinent imaging results/findings within the past 24 hours.    Assessment/Plan:      * Enterococcal bacteremia  Patient presented to ED with fevers and malaise 2 days after urologic procedure.  CT with evidence nephritic stranding.  Admitted for pyelonephritis.  Blood cultures on presentation 3/16 positive for Enterococcus faecalis. Suspect urologic source.  - ID consulted   - Repeat blood cultures ordered  - TTE  - S/p ceftriaxone in ED and on admission, switched to ampicillin 3/17   - urology following     Pyelonephritis  Acute, urine consistent with infection.   -  see above        Sepsis  This patient does have evidence of infective focus  My overall impression is sepsis.  Source: Urinary Tract  Antibiotics given-   Antibiotics (72h ago, onward)      Start     Stop Route Frequency Ordered    03/17/24 1100  ampicillin (OMNIPEN) 2 g in sodium chloride 0.9 % 100 mL IVPB (MB+)         -- IV Every 4 hours (non-standard times) 03/17/24 0954    03/16/24 2100  mupirocin 2 % ointment         03/21/24 2059 Nasl 2 times daily 03/16/24 1953          Latest lactate reviewed-  Recent Labs   Lab 03/16/24 2004 03/16/24 2010   LACTATE 1.1  --    POCLAC  --  0.89       Fluid challenge Ideal Body Weight- The patient's ideal body weight is Ideal body weight: 57 kg (125 lb 10.6 oz) which will be used to calculate fluid bolus of 30 ml/kg  for treatment of septic shock.      Post- resuscitation assessment No - Post resuscitation assessment not needed     ANAIS    Will Not start Pressors- Levophed for MAP of 65  Source control achieved by: rocephin    ANAIS (acute kidney injury)  Resolved  Patient with acute kidney injury/acute renal failure likely due to pre-renal azotemia due to dehydration. Cr 1.2 on presentation. ANAIS is currently improving. Baseline creatinine  0.8  - Labs reviewed- Renal function/electrolytes with Estimated Creatinine Clearance: 59 mL/min (based on SCr of 0.8 mg/dL). according to latest data. Monitor urine output and serial BMP and adjust therapy as needed. Avoid nephrotoxins and renally dose meds for GFR listed above.    Anemia  Patient's anemia is currently uncontrolled. Has not received any PRBCs to date. Etiology likely d/t chronic disease v iron deficiency. No obvious signs of bleeding.  Current CBC reviewed-   Lab Results   Component Value Date    HGB 8.8 (L) 03/18/2024    HCT 27.8 (L) 03/18/2024     Monitor serial CBC and transfuse if patient becomes hemodynamically unstable, symptomatic or H/H drops below 7/21.  - iron panel ordered    Type 2 diabetes mellitus with neurologic complication, without long-term current use of insulin  Patient's FSGs are controlled on current medication regimen.  Last A1c reviewed-   Lab Results   Component Value Date    HGBA1C 8.2 (H) 11/09/2023    HGBA1C 8.2 (H) 11/09/2023     Most recent fingerstick glucose reviewed-   Recent Labs   Lab 03/17/24  1724 03/17/24  1955 03/18/24  0738   POCTGLUCOSE 215* 238* 181*     Current correctional scale  Low  Maintain anti-hyperglycemic dose as follows-   Antihyperglycemics (From admission, onward)      Start     Stop Route Frequency Ordered    03/16/24 2019  insulin aspart U-100 pen 0-5 Units         -- SubQ Before meals & nightly PRN 03/16/24 1920          Hold Oral hypoglycemics while patient is in the hospital.    GERD (gastroesophageal reflux  disease)  Chronic, controlled, continue to monitor      HTN (hypertension), benign  Chronic, controlled. Latest blood pressure and vitals reviewed-     Temp:  [98 °F (36.7 °C)-99.9 °F (37.7 °C)]   Pulse:  [65-90]   Resp:  [16-19]   BP: (107-170)/(52-71)   SpO2:  [92 %-95 %] .   Home meds for hypertension were reviewed and noted below.   Hypertension Medications               carvediloL (COREG) 25 MG tablet TAKE 1 TABLET TWICE DAILY    lisinopriL (PRINIVIL,ZESTRIL) 40 MG tablet Take 1 tablet (40 mg total) by mouth once daily.            While in the hospital, will manage blood pressure as follows; Adjust home antihypertensive regimen as follows- held on presentation, restart as tolerated    Will utilize p.r.n. blood pressure medication only if patient's blood pressure greater than 180/110 and she develops symptoms such as worsening chest pain or shortness of breath.    Hypothyroidism  Chronic, controlled, continue home synthroid        VTE Risk Mitigation (From admission, onward)           Ordered     enoxaparin injection 40 mg  Daily         03/16/24 1920     IP VTE HIGH RISK PATIENT  Once         03/16/24 1920     Place sequential compression device  Until discontinued         03/16/24 1920                    Discharge Planning   RAJESH: 3/21/2024     Code Status: Full Code   Is the patient medically ready for discharge?: No    Reason for patient still in hospital (select all that apply): Patient trending condition, Laboratory test, and Consult recommendations                     Edwige Aly MD  Department of Hospital Medicine   Juan Bernal - Stepdown Flex ([place])

## 2024-03-18 NOTE — ASSESSMENT & PLAN NOTE
Resolved  Patient with acute kidney injury/acute renal failure likely due to pre-renal azotemia due to dehydration. Cr 1.2 on presentation. ANAIS is currently improving. Baseline creatinine  0.8  - Labs reviewed- Renal function/electrolytes with Estimated Creatinine Clearance: 59 mL/min (based on SCr of 0.8 mg/dL). according to latest data. Monitor urine output and serial BMP and adjust therapy as needed. Avoid nephrotoxins and renally dose meds for GFR listed above.

## 2024-03-18 NOTE — PLAN OF CARE
Juan Bernal - Stepdown Flex ([place])  Initial Discharge Assessment       Primary Care Provider: Jeison Sanchez MD    Admission Diagnosis: Weakness [R53.1]  Pyelonephritis [N12]  Chest pain [R07.9]    Admission Date: 3/16/2024  Expected Discharge Date: 3/21/2024    Transition of Care Barriers: None    Payor: HUMANA MANAGED MEDICARE / Plan: HUMANA MEDICARE HMO / Product Type: Capitation /     Extended Emergency Contact Information  Primary Emergency Contact: Lea Nesbitt  Address: unknown   [place]  Mobile Phone: [phone]  Relation: Daughter  Secondary Emergency Contact: Seda Nesbitt   [place]  Mobile Phone: [phone]  Relation: Daughter    Discharge Plan A: Home Health, Home with family  Discharge Plan B: Home with family      [id number] [place], [place] - [address]  [address]  [place] [address]  Phone: [phone] Fax: [phone]    [facility] - [facility] [address] [facility]  [address] [place] [address]  Phone: [phone] Fax: [phone]    [facility] (Now [facility]) - [facility] [address] [facility]  [address] [place] [id number]  Phone: [phone] Fax: [phone]    [facility] [address] [address]  Phone: [phone] Fax: [phone]      Initial Assessment (most recent)       Adult Discharge Assessment - 03/18/24 1614          Discharge Assessment    Assessment Type Discharge Planning Assessment     Confirmed/corrected address, phone number and insurance Yes     Confirmed Demographics Correct on Facesheet     Source of Information family     If unable to respond/provide information was family/caregiver contacted? Yes   Daughter- Lea NesbittObbukn-[phone] and Daughter- Seda Nesbitt- [phone]    Contact Name/Number Daughter- Lea NesbittCuouyf-[phone] and Tor- Seda Nesbitt-  [phone]     Communicated RAJESH with patient/caregiver Yes     Reason For Admission Enterococcal bacteremia     People in Home child(david), adult   Daughter- Lea [phone] and Tor- Seda Encarnacion [phone]    Facility Arrived From: Home     Do you expect to return to your current living situation? Yes     Do you have help at home or someone to help you manage your care at home? Yes     Who are your caregiver(s) and their phone number(s)? Tor- Lea Caro-[phone] and Apryl Encarnacion [phone]     Prior to hospitilization cognitive status: Alert/Oriented     Current cognitive status: Alert/Oriented     Walking or Climbing Stairs Difficulty no     Dressing/Bathing Difficulty no     Do you have any problems with: --   Tor- Lea [phone] and Apryl Encarnacion [phone]    Home Accessibility stairs to enter home     Number of Stairs, Main Entrance four     Surface of Stairs, Main Entrance linoleum     Stair Railings, Main Entrance railings safe and in good condition     Landing, Stairs, Main Entrance adequate turning radius     Stairs Comment, Main Entrance four     Home Layout Able to live on 1st floor     Equipment Currently Used at Home none     Readmission within 30 days? No     Patient currently being followed by outpatient case management? No     Do you currently have service(s) that help you manage your care at home? No     Do you take prescription medications? Yes     Do you have prescription coverage? Yes     Coverage HUMANA MANAGED MEDICARE - HUMANA MEDICARE HMO -     Do you have any problems affording any of your prescribed medications? No     Is the patient taking medications as prescribed? yes     Who is going to help you get home at discharge? Tor- Lea [phone] and Apryl Encarnacion [phone]     How do you get to doctors appointments? family or friend will provide     Are you on dialysis? No     Do you  take coumadin? No     Discharge Plan A Home Health;Home with family     Discharge Plan B Home with family     Discharge Plan discussed with: Adult children   Daughter- Lea NesbittUczeag-[phone] and Daughter- Seda Nesbitt- [phone]    Transition of Care Barriers None        Physical Activity    On average, how many days per week do you engage in moderate to strenuous exercise (like a brisk walk)? 0 days     On average, how many minutes do you engage in exercise at this level? 0 min        Financial Resource Strain    How hard is it for you to pay for the very basics like food, housing, medical care, and heating? Not very hard        Housing Stability    In the last 12 months, was there a time when you were not able to pay the mortgage or rent on time? No     In the last 12 months, was there a time when you did not have a steady place to sleep or slept in a shelter (including now)? No        Transportation Needs    In the past 12 months, has lack of transportation kept you from medical appointments or from getting medications? No     In the past 12 months, has lack of transportation kept you from meetings, work, or from getting things needed for daily living? No        Food Insecurity    Within the past 12 months, you worried that your food would run out before you got the money to buy more. Never true     Within the past 12 months, the food you bought just didn't last and you didn't have money to get more. Never true        Stress    Do you feel stress - tense, restless, nervous, or anxious, or unable to sleep at night because your mind is troubled all the time - these days? Only a little        Social Connections    In a typical week, how many times do you talk on the phone with family, friends, or neighbors? More than three times a week     How often do you get together with friends or relatives? Twice a week     How often do you attend Gnosticism or Taoism services? 1 to 4 times per year     Do you belong to  any clubs or organizations such as Christian groups, unions, fraternal or athletic groups, or school groups? Yes     How often do you attend meetings of the clubs or organizations you belong to? Never     Are you , , , , never , or living with a partner? Patient declined        Alcohol Use    Q1: How often do you have a drink containing alcohol? Patient declined     Q2: How many drinks containing alcohol do you have on a typical day when you are drinking? Patient declined     Q3: How often do you have six or more drinks on one occasion? Patient declined        OTHER    Name(s) of People in Home Daughter- Lea [phone] and Tor- Seda Encarnacion [phone]                     SW called and spoke with patient in [facility] for Discharge Planning Assessment. Per daughter, Pt lives a daughter and son in a single family home on a slab foundation with four steps to porch and point of entry.  Patient was independent with ADLS and DID NOT use DME or in-home assistive equipment. Pt is not on dialysis  or coumadin,  takes medications as prescribed / keeps refilled / has resources for all daily and prescriptive needs. Preferred pharmacy is [facility] - Agreeable to bedside delivery.  Will have help from Daughter- Lea [phone] and Daughter- Seda Encarnacion [phone]   and other immediate family upon discharge - Family to provide transportation home.  All questions addressed. Unit and  direct numbers provided. Will continue to follow for course of hospitalization    3/16/2024  1:35 PM  Weakness [R53.1]  Pyelonephritis [N12]  Chest pain [R07.9]    PCP: Jeison Sanchez MD    PHARMACY:   [id number] [place] [facility] [address]  [address]  [place] [address]  Phone: [phone] Fax: [phone]    [facility] - [place], [place] - [address] [facility]  [address] [facility] [address]  Phone: [phone] Fax:  [phone]    [facility] (Now [facility]) - [place], [place] - [address] [facility]  [address] [facility] [address]  Phone: [phone] Fax: [phone]    [facility] [address] [place] [address]  Phone: [phone] Fax: [phone]      Payor: HUMANA MANAGED MEDICARE / Plan: HUMANA MEDICARE HMO / Product Type: Capitation /       Belen Rodriges LMSW  PRN - [facility]  [id number]

## 2024-03-18 NOTE — ASSESSMENT & PLAN NOTE
Chronic, controlled. Latest blood pressure and vitals reviewed-     Temp:  [98 °F (36.7 °C)-99.9 °F (37.7 °C)]   Pulse:  [65-90]   Resp:  [16-19]   BP: (107-170)/(52-71)   SpO2:  [92 %-95 %] .   Home meds for hypertension were reviewed and noted below.   Hypertension Medications               carvediloL (COREG) 25 MG tablet TAKE 1 TABLET TWICE DAILY    lisinopriL (PRINIVIL,ZESTRIL) 40 MG tablet Take 1 tablet (40 mg total) by mouth once daily.            While in the hospital, will manage blood pressure as follows; Adjust home antihypertensive regimen as follows- held on presentation, restart as tolerated    Will utilize p.r.n. blood pressure medication only if patient's blood pressure greater than 180/110 and she develops symptoms such as worsening chest pain or shortness of breath.

## 2024-03-18 NOTE — PROGRESS NOTES
Subjective/Objective  Interval history: NAEO. Feeling better. Reports dizziness  and a fall out of bed when getting to the bathroom prior to arrival. Had been having urinary freq/urgency and difficulty emptying, now better. Reports h/o ureteral stents, but says are removed and denies other indwelling hardware. Reports PCN allergy (itching eyes), but currently tolerating ampicillin. Reports some R sided chest pain with taking deep breath            Past Surgical History:   Procedure Laterality Date    APPENDECTOMY      BLADDER FULGURATION N/A 3/1/2024    Procedure: ULCER INJECTION FULGURATION;  Surgeon: Brody Smith MD;  Location: [facility] OR;  Service: Urology;  Laterality: N/A;    BLADDER SUSPENSION      CATARACT EXTRACTION      right eye     CHOLECYSTECTOMY      COLONOSCOPY N/A 7/20/2020    Procedure: COLONOSCOPY;  Surgeon: Juan Parker MD;  Location: [facility] (4TH FLR);  Service: Endoscopy;  Laterality: N/A;  Hx of chronic anemia.  patient needs a   4/6/20 - removed from 4/20/20, rescheduled 7/20/20 - pg  covid 7/17-[facility] [place] 2nd floor-tb    COLONOSCOPY N/A 2/9/2023    Procedure: COLONOSCOPY;  Surgeon: Renan Sanchez MD;  Location: [facility] ENDO (4TH FLR);  Service: Colon and Rectal;  Laterality: N/A;  instr handed to patient, -ml    CYSTOSCOPY N/A 5/19/2021    Procedure: CYSTOSCOPY;  Surgeon: Brody Smith MD;  Location: [facility] OR 1ST FLR;  Service: Urology;  Laterality: N/A;    CYSTOURETEROSCOPY, WITH HOLMIUM LASER LITHOTRIPSY OF URETERAL CALCULUS AND STENT INSERTION Right 3/1/2024    Procedure: CYSTOURETEROSCOPY, WITH HOLMIUM LASER LITHOTRIPSY OF URETERAL CALCULUS AND STENT INSERTION;  Surgeon: Brody Smith MD;  Location: [facility] OR;  Service: Urology;  Laterality: Right;    CYSTOURETEROSCOPY,WITH HOLMIUM LASER LITHOTRIPSY OF URETERAL CALCULUS Right 3/14/2024    Procedure: CYSTOURETEROSCOPY,WITH HOLMIUM LASER LITHOTRIPSY OF URETERAL CALCULUS;  Surgeon: Luis  Brody DUNCAN MD;  [facility];  Service: Urology;  Laterality: Right;  1 HR    EYE SURGERY Bilateral     cataract removal    INJECTION OF STEROID N/A 3/14/2024    Procedure: INJECTION, STEROID;  Surgeon: Brody Smith MD;  [facility];  Service: Urology;  Laterality: N/A;    PLACEMENT-STENT Right 3/14/2024    Procedure: PLACEMENT-STENT;  Surgeon: Brody Smith MD;  [facility];  Service: Urology;  Laterality: Right;    REMOVAL-STENT Right 3/14/2024    Procedure: REMOVAL-STENT;  Surgeon: Brody Smith MD;  [facility];  Service: Urology;  Laterality: Right;    TOTAL ABDOMINAL HYSTERECTOMY      DUB       Review of patient's allergies indicates:   Allergen Reactions    Bufferin [aspirin, buffered] Itching    Atorvastatin      Myalgias and arthralgias    Shellfish containing products Itching     Shellfish causes her to itch per her daughter, Caro.     Warfarin     Ibuprofen Itching     Itching of eyes, head       Medications:  Medications Prior to Admission   Medication Sig    ACCU-CHEK SOFTCLIX LANCETS Misc CHECK BLOOD SUGAR ONE TIME DAILY    amitriptyline (ELAVIL) 10 MG tablet Take 1 tablet (10 mg total) by mouth every evening.    BD ALCOHOL SWABS PadM USE AS DIRECTED TOPICALLY AS NEEDED    biotin 1 mg tablet Take 1,000 mcg by mouth 3 (three) times daily.    blood sugar diagnostic (ACCU-CHEK MATTIE PLUS TEST STRP) Strp TEST ONE TIME DAILY    blood-glucose meter kit To check BG 2  times daily, to use with insurance preferred meter, use as directed.    carvediloL (COREG) 25 MG tablet TAKE 1 TABLET TWICE DAILY    cyanocobalamin (VITAMIN B-12) 1000 MCG tablet Take 100 mcg by mouth once daily.    gabapentin (NEURONTIN) 300 MG capsule Take 1 capsule (300 mg total) by mouth every evening.    glipiZIDE 5 MG TR24 Take 1 tablet (5 mg total) by mouth daily with breakfast.    [] HYDROcodone-acetaminophen (NORCO) 5-325 mg per tablet Take 1 tablet by mouth every 6 (six) hours as  needed for Pain.    ketoconazole (NIZORAL) 2 % cream Apply topically once daily.    lancets (ACCU-CHEK MULTICLIX LANCET) Misc To use as directed with Accu Chek Sahra FastClix lancing device    lancets Misc To check BG 2 times daily, to use with insurance preferred meter.    levothyroxine (SYNTHROID) 112 MCG tablet Take 1 tablet (112 mcg total) by mouth before breakfast.    lisinopriL (PRINIVIL,ZESTRIL) 40 MG tablet Take 1 tablet (40 mg total) by mouth once daily.    metFORMIN (GLUCOPHAGE) 1000 MG tablet Take 1 tablet (1,000 mg total) by mouth 2 (two) times daily with meals.    mirabegron (MYRBETRIQ) 25 mg Tb24 ER tablet Take 1 tablet (25 mg total) by mouth once daily.    naproxen (NAPROSYN) 500 MG tablet Take 1 tablet (500 mg total) by mouth 2 (two) times daily with meals.    rosuvastatin (CRESTOR) 20 MG tablet Take 1 tablet (20 mg total) by mouth once daily.    SITagliptin phosphate (JANUVIA) 100 MG Tab Take 1 tablet (100 mg total) by mouth once daily.    turmeric root extract 500 mg Cap Take by mouth.    VITAMIN B COMPLEX ORAL Take 1 tablet by mouth.    vitamin D (VITAMIN D3) 1000 units Tab Take 1,000 Units by mouth once daily.     Antibiotics (From admission, onward)      Start     Stop Route Frequency Ordered    03/17/24 1100  ampicillin (OMNIPEN) 2 g in sodium chloride 0.9 % 100 mL IVPB (MB+)         -- IV Every 4 hours (non-standard times) 03/17/24 0954    03/16/24 2100  mupirocin 2 % ointment         03/21/24 2059 Nasl 2 times daily 03/16/24 1953          Antifungals (From admission, onward)      None          Antivirals (From admission, onward)      None             Immunization History   Administered Date(s) Administered    COVID-19, MRNA, LN-S, PF (Pfizer) ([place]) 03/09/2022    COVID-19, mRNA, LNP-S, bivalent booster, PF (PFIZER OMICRON) 10/12/2022    COVID-19, vector-nr, rS-Ad26, PF ([facility]) 03/05/2021    Influenza (FLUAD) - Quadrivalent - Adjuvanted - PF *Preferred* (65+) 12/10/2021, 10/10/2022,  11/09/2023    Influenza - High Dose - PF (65 years and older) 12/02/2015, 09/15/2017, 11/26/2018    Pneumococcal Conjugate - 13 Valent 11/26/2018    Pneumococcal Polysaccharide - 23 Valent 01/18/2023       Family History       Problem Relation (Age of Onset)    Breast cancer Mother    COPD Father    Cancer Mother, Sister, Daughter    Diabetes Son    Hypertension Sister, Sister, Daughter, Daughter    Ovarian cancer Mother, Sister    Thyroid disease Daughter, Daughter          Social History     Socioeconomic History    Marital status:    Tobacco Use    Smoking status: Never    Smokeless tobacco: Never   Substance and Sexual Activity    Alcohol use: No    Drug use: No    Sexual activity: Yes     Partners: Male     Birth control/protection: Post-menopausal     Comment:     Other Topics Concern    Are you pregnant or think you may be? No    Breast-feeding No   Social History Narrative     with 7 kids     Review of Systems   Constitutional:  Positive for chills and fever. Negative for fatigue and unexpected weight change.   HENT:  Negative for ear pain, facial swelling, hearing loss, mouth sores, nosebleeds, rhinorrhea, sinus pressure, sore throat, tinnitus, trouble swallowing and voice change.    Eyes:  Negative for photophobia, pain, redness and visual disturbance.   Respiratory:  Negative for cough, chest tightness, shortness of breath and wheezing.    Cardiovascular:  Negative for chest pain, palpitations and leg swelling.   Gastrointestinal:  Negative for abdominal pain, blood in stool, constipation, diarrhea, nausea and vomiting.   Endocrine: Negative for cold intolerance, heat intolerance, polydipsia, polyphagia and polyuria.   Genitourinary:  Positive for difficulty urinating, dysuria, hematuria and urgency. Negative for flank pain, frequency, menstrual problem, vaginal bleeding, vaginal discharge and vaginal pain.   Musculoskeletal:  Negative for arthralgias, back pain, joint swelling, myalgias  and neck pain.   Skin:  Negative for rash.   Allergic/Immunologic: Negative for environmental allergies, food allergies and immunocompromised state.   Neurological:  Negative for dizziness, seizures, syncope, weakness, light-headedness, numbness and headaches.   Hematological:  Negative for adenopathy. Does not bruise/bleed easily.   Psychiatric/Behavioral:  Negative for confusion, hallucinations, self-injury, sleep disturbance and suicidal ideas. The patient is not nervous/anxious.      Objective:     Vital Signs (Most Recent):  Temp: 98.1 °F (36.7 °C) (03/18/24 1155)  Pulse: 70 (03/18/24 1155)  Resp: 18 (03/18/24 1155)  BP: (!) 150/65 (03/18/24 1155)  SpO2: 98 % (03/18/24 1155) Vital Signs (24h Range):  Temp:  [98 °F (36.7 °C)-99.9 °F (37.7 °C)] 98.1 °F (36.7 °C)  Pulse:  [65-90] 70  Resp:  [16-18] 18  SpO2:  [92 %-98 %] 98 %  BP: (107-170)/(52-71) 150/65     Weight: 75.8 kg (167 lb)  Body mass index is 27.79 kg/m².    Estimated Creatinine Clearance: 59 mL/min (based on SCr of 0.8 mg/dL).     Physical Exam  Vitals and nursing note reviewed.   Constitutional:       Appearance: She is well-developed.   HENT:      Head: Normocephalic and atraumatic.      Right Ear: External ear normal.      Left Ear: External ear normal.   Eyes:      General: No scleral icterus.        Right eye: No discharge.         Left eye: No discharge.      Conjunctiva/sclera: Conjunctivae normal.   Cardiovascular:      Rate and Rhythm: Normal rate and regular rhythm.      Heart sounds: Normal heart sounds. No murmur heard.     No friction rub. No gallop.   Pulmonary:      Effort: Pulmonary effort is normal. No respiratory distress.      Breath sounds: Normal breath sounds. No stridor. No wheezing or rales.   Abdominal:      General: Bowel sounds are normal.      Tenderness: There is abdominal tenderness in the suprapubic area.   Musculoskeletal:         General: No tenderness. Normal range of motion.   Skin:     General: Skin is warm and dry.    Neurological:      Mental Status: She is alert and oriented to person, place, and time.          Significant Labs: CBC:   Recent Labs   Lab 03/16/24  1410 03/17/24  0556 03/18/24  0336   WBC 12.30 9.15 6.87   HGB 11.0* 8.4* 8.8*   HCT 34.5* 26.3* 27.8*    182 166       CMP:   Recent Labs   Lab 03/16/24  1410 03/17/24  0556 03/18/24  0336   * 131* 132*   K 4.9 3.7 4.2    101 103   CO2 21* 24 23   * 153* 178*   BUN 21 16 12   CREATININE 1.2 0.9 0.8   CALCIUM 10.2 8.8 8.9   PROT 7.5 5.3* 5.1*   ALBUMIN 3.8 2.5* 2.4*   BILITOT 0.6 0.5 0.3   ALKPHOS 77 63 64   AST 22 13 28   ALT 13 11 19   ANIONGAP 11 6* 6*       Microbiology Results (last 7 days)       Procedure Component Value Units Date/Time    Blood culture [[id number]] Collected: 03/18/24 0538    Order Status: Completed Specimen: Blood Updated: 03/18/24 1315     Blood Culture, Routine No Growth to date    Blood culture [[id number]] Collected: 03/18/24 0538    Order Status: Completed Specimen: Blood Updated: 03/18/24 1315     Blood Culture, Routine No Growth to date    Blood culture [[id number]] Collected: 03/17/24 1300    Order Status: Completed Specimen: Blood Updated: 03/18/24 1122     Blood Culture, Routine Gram stain aer bottle: Gram positive cocci in chains resembling Strep      Results called to and read back by: Emilie Dela Cruz RN. 03/18/2024  06:17      Gram stain vinay bottle: Gram positive cocci in chains resembling Strep      03/18/2024  11:21    Blood culture x two cultures. Draw prior to antibiotics. [[id number]]  (Abnormal) Collected: 03/16/24 1420    Order Status: Completed Specimen: Blood from Wrist, Right Updated: 03/18/24 0856     Blood Culture, Routine Gram stain aer bottle: Gram positive cocci in chains resembling Strep      Positive results previously called 03/17/2024  06:25      ENTEROCOCCUS SPECIES  Identification pending  For susceptibility see order #[id number]      Narrative:      Aerobic and anaerobic    Blood  culture x two cultures. Draw prior to antibiotics. [[id number]]  (Abnormal) Collected: 03/16/24 1410    Order Status: Completed Specimen: Blood from Peripheral, Forearm, Left Updated: 03/18/24 0855     Blood Culture, Routine Gram stain aer bottle: Gram positive cocci in chains resembling Strep      Gram stain vinay bottle: Gram positive cocci in chains resembling Strep      Results called to and read back by: Emilie Napoles RN 03/17/2024  05:37      ENTEROCOCCUS SPECIES  Identification and susceptibility pending      Narrative:      Aerobic and anaerobic    Blood culture [[id number]] Collected: 03/17/24 1300    Order Status: Completed Specimen: Blood Updated: 03/18/24 0450     Blood Culture, Routine Gram stain aer bottle: Gram positive cocci in chains resembling Strep      Positive results previously called 03/17/2024 05:37    Urine culture [[id number]]  (Abnormal) Collected: 03/16/24 1359    Order Status: Completed Specimen: Urine Updated: 03/17/24 1524     Urine Culture, Routine ENTEROCOCCUS SPECIES  >100,000 cfu/ml  Identification and susceptibility pending      Narrative:      Specimen Source->Urine    Rapid Organism ID by PCR (from Blood culture) [[id number]]  (Abnormal) Collected: 03/16/24 1410    Order Status: Completed Updated: 03/17/24 0636     Enterococcus faecalis Detected     Enterococcus faecium Not Detected     Listeria monocytogenes Not Detected     Staphylococcus spp. Not Detected     Staphylococcus aureus Not Detected     Staphylococcus epidermidis Not Detected     Staphylococcus lugdunensis Not Detected     Streptococcus species Not Detected     Streptococcus agalactiae Not Detected     Streptococcus pneumoniae Not Detected     Streptococcus pyogenes Not Detected     Acinetobacter calcoaceticus/baumannii complex Not Detected     Bacteroides fragilis Not Detected     Enterobacterales Not Detected     Enterobacter cloacae complex Not Detected     Escherichia coli Not Detected     Klebsiella aerogenes  Not Detected     Klebsiella oxytoca Not Detected     Klebsiella pneumoniae group Not Detected     Proteus Not Detected     Salmonella sp Not Detected     Serratia marcescens Not Detected     Haemophilus influenzae Not Detected     Neisseria meningtidis Not Detected     Pseudomonas aeruginosa Not Detected     Stenotrophomonas maltophilia Not Detected     Candida albicans Not Detected     Candida auris Not Detected     Candida glabrata Not Detected     Candida krusei Not Detected     Candida parapsilosis Not Detected     Candida tropicalis Not Detected     Cryptococcus neoformans/gattii Not Detected     CTX-M (ESBL ) Test Not Applicable     IMP (Carbapenem resistant) Test Not Applicable     KPC resistance gene (Carbapenem resistant) Test Not Applicable     mcr-1  Test Not Applicable     mec A/C  Test Not Applicable     mec A/C and MREJ (MRSA) gene Test Not Applicable     NDM (Carbapenem resistant) Test Not Applicable     OXA-48-like (Carbapenem resistant) Test Not Applicable     van A/B (VRE gene) Not Detected     VIM (Carbapenem resistant) Test Not Applicable    Narrative:      Aerobic and anaerobic          Urine Studies:   Recent Labs   Lab 03/16/24  1359   COLORU Yellow   APPEARANCEUA Hazy*   PHUR 6.0   SPECGRAV 1.020   PROTEINUA 1+*   GLUCUA 3+*   KETONESU Negative   BILIRUBINUA Negative   OCCULTUA 2+*   NITRITE Negative   LEUKOCYTESUR 2+*   RBCUA 14*   WBCUA 37*   BACTERIA Occasional   SQUAMEPITHEL 1   HYALINECASTS 0         Significant Imaging: I have reviewed all pertinent imaging results/findings within the past 24 hours.    Scheduled Meds:   amitriptyline  10 mg Oral QHS    ampicillin (OMNIPEN) 2 g in  mL EXTENDED INFUSION  2 g Intravenous Q4H    carvediloL  6.25 mg Oral BID    enoxparin  40 mg Subcutaneous Daily    gabapentin  300 mg Oral QHS    levothyroxine  112 mcg Oral Before breakfast    mupirocin   Nasal BID     Continuous Infusions:  PRN Meds:acetaminophen, albuterol-ipratropium,  dextrose 10%, dextrose 10%, glucagon (human recombinant), glucose, glucose, HYDROcodone-acetaminophen, insulin aspart U-100, melatonin, morphine, naloxone, ondansetron, polyethylene glycol, prochlorperazine, simethicone, sodium chloride 0.9%, sodium chloride 0.9%    Vital signs in last 24 hours:  Temp:  [98 °F (36.7 °C)-99.9 °F (37.7 °C)] 98.1 °F (36.7 °C)  Pulse:  [65-90] 70  Resp:  [16-18] 18  SpO2:  [92 %-98 %] 98 %  BP: (107-170)/(52-71) 150/65    Intake/Output last 3 shifts:  I/O last 3 completed shifts:  In: 2979 [P.O.:1050; I.V.:630; IV Piggyback:1299]  Out: 3850 [Urine:3850]  Intake/Output this shift:  I/O this shift:  In: -   Out: 450 [Urine:450]    Problem Assessment/Plan  ID  * Enterococcal bacteremia  Assessment & Plan  78F with h/o DM, HT, interstitial cystitis and urolithiasis s/p ureteroscopic stone extraction with lithotripsy and right ureteral stent placement (right) on 3/14 admitted 3/16 with chills, dizziness, flank pain, dysuria, freq, and a fall. Bcx 3/16, 3/17 with e.faecalis in both sets. Repeat 3/18 NGTD. Fever defervesced, leukocytosis trending down. 2d echo ordered, pending. Reports PCN allergy, but tolerating ampicillin.     Suspect bacteremia from  source; ureteral procedure likely contributed. Endocarditis needs to be excluded and 2d echo pending. Fortunately clinically improving. Reviewed labs and hiv and hep c screening neg. 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 No signs of pulmonary TB.     Recommendations:   - continue ampicillin, duration TBD but will need at least 2 weeks of iv abx followed by chronic supressive oral antibiotics until ureteral stent is removed or replaced  - please wait for f/u bcx to be clear x 48-72h before placing picc  - f/u 2d echo  - remove PCN allergy (tolerating ampicillin)

## 2024-03-18 NOTE — PLAN OF CARE
Problem: Adult Inpatient Plan of Care  Goal: Plan of Care Review  Outcome: Ongoing, Progressing     Problem: Infection  Goal: Absence of Infection Signs and Symptoms  Outcome: Ongoing, Progressing     Problem: Diabetes Comorbidity  Goal: Blood Glucose Level Within Targeted Range  Outcome: Ongoing, Not Progressing     Problem: Adjustment to Illness (Sepsis/Septic Shock)  Goal: Optimal Coping  Outcome: Ongoing, Progressing       Plan of care reviewed with pt. Pt aaox4. Jing Dc'd pt had good urinary outout. Pt scheduled to have a cystoscopy with stent removal tomorrow.  IV Abx given as ordered. VSS. Pt remained free of falls, trauma, and injury. No other concerns at this time.

## 2024-03-18 NOTE — PLAN OF CARE
Problem: Adult Inpatient Plan of Care  Goal: Plan of Care Review  Outcome: Ongoing, Progressing  Goal: Patient-Specific Goal (Individualized)  Outcome: Ongoing, Progressing  Goal: Absence of Hospital-Acquired Illness or Injury  Outcome: Ongoing, Progressing  Goal: Optimal Comfort and Wellbeing  Outcome: Ongoing, Progressing  Goal: Readiness for Transition of Care  Outcome: Ongoing, Progressing     Problem: Infection  Goal: Absence of Infection Signs and Symptoms  Outcome: Ongoing, Progressing     Problem: Diabetes Comorbidity  Goal: Blood Glucose Level Within Targeted Range  Outcome: Ongoing, Progressing     Problem: Adjustment to Illness (Sepsis/Septic Shock)  Goal: Optimal Coping  Outcome: Ongoing, Progressing     Problem: Bleeding (Sepsis/Septic Shock)  Goal: Absence of Bleeding  Outcome: Ongoing, Progressing     Problem: Glycemic Control Impaired (Sepsis/Septic Shock)  Goal: Blood Glucose Level Within Desired Range  Outcome: Ongoing, Progressing     Problem: Infection Progression (Sepsis/Septic Shock)  Goal: Absence of Infection Signs and Symptoms  Outcome: Ongoing, Progressing     Problem: Nutrition Impaired (Sepsis/Septic Shock)  Goal: Optimal Nutrition Intake  Outcome: Ongoing, Progressing     Problem: Fluid and Electrolyte Imbalance (Acute Kidney Injury/Impairment)  Goal: Fluid and Electrolyte Balance  Outcome: Ongoing, Progressing     Problem: Oral Intake Inadequate (Acute Kidney Injury/Impairment)  Goal: Optimal Nutrition Intake  Outcome: Ongoing, Progressing     Problem: Renal Function Impairment (Acute Kidney Injury/Impairment)  Goal: Effective Renal Function  Outcome: Ongoing, Progressing     Problem: Pain Acute  Goal: Acceptable Pain Control and Functional Ability  Outcome: Ongoing, Progressing    Patient in no acute distress. Antibiotics given as ordered. Increased urine output per day.

## 2024-03-18 NOTE — SUBJECTIVE & OBJECTIVE
Interval history: NAEO. Feeling better. Reports dizziness  and a fall out of bed when getting to the bathroom prior to arrival. Had been having urinary freq/urgency and difficulty emptying, now better. Reports h/o ureteral stents, but says are removed and denies other indwelling hardware. Reports PCN allergy (itching eyes), but currently tolerating ampicillin. Reports some R sided chest pain with taking deep breath            Past Surgical History:   Procedure Laterality Date    APPENDECTOMY      BLADDER FULGURATION N/A 3/1/2024    Procedure: ULCER INJECTION FULGURATION;  Surgeon: Brody Smith MD;  Location: [facility] OR;  Service: Urology;  Laterality: N/A;    BLADDER SUSPENSION      CATARACT EXTRACTION      right eye     CHOLECYSTECTOMY      COLONOSCOPY N/A 7/20/2020    Procedure: COLONOSCOPY;  Surgeon: Juan Parker MD;  Location: [facility] (4TH FLR);  Service: Endoscopy;  Laterality: N/A;  Hx of chronic anemia.  patient needs a   4/6/20 - removed from 4/20/20, rescheduled 7/20/20 - pg  [place] [place]    COLONOSCOPY N/A 2/9/2023    Procedure: COLONOSCOPY;  Surgeon: Renan Sanchez MD;  Location: [facility] ENDO (4TH FLR);  Service: Colon and Rectal;  Laterality: N/A;  instr handed to patient, -ml    CYSTOSCOPY N/A 5/19/2021    Procedure: CYSTOSCOPY;  Surgeon: Brody Smith MD;  Location: [facility] 1ST FLR;  Service: Urology;  Laterality: N/A;    CYSTOURETEROSCOPY, WITH HOLMIUM LASER LITHOTRIPSY OF URETERAL CALCULUS AND STENT INSERTION Right 3/1/2024    Procedure: CYSTOURETEROSCOPY, WITH HOLMIUM LASER LITHOTRIPSY OF URETERAL CALCULUS AND STENT INSERTION;  Surgeon: Brody Smith MD;  Location: [facility] OR;  Service: Urology;  Laterality: Right;    CYSTOURETEROSCOPY,WITH HOLMIUM LASER LITHOTRIPSY OF URETERAL CALCULUS Right 3/14/2024    Procedure: CYSTOURETEROSCOPY,WITH HOLMIUM LASER LITHOTRIPSY OF URETERAL CALCULUS;  Surgeon: Brody Smith MD;  Location:  [facility] OR;  Service: Urology;  Laterality: Right;  1 HR    EYE SURGERY Bilateral     cataract removal    INJECTION OF STEROID N/A 3/14/2024    Procedure: INJECTION, STEROID;  Surgeon: Brody Smith MD;  Location: [facility] OR;  Service: Urology;  Laterality: N/A;    PLACEMENT-STENT Right 3/14/2024    Procedure: PLACEMENT-STENT;  Surgeon: Brody Smith MD;  [facility]: [facility];  Service: Urology;  Laterality: Right;    REMOVAL-STENT Right 3/14/2024    Procedure: REMOVAL-STENT;  Surgeon: Brody Smith MD;  Location: [facility] OR;  Service: Urology;  Laterality: Right;    TOTAL ABDOMINAL HYSTERECTOMY      DUB       Review of patient's allergies indicates:   Allergen Reactions    Bufferin [aspirin, buffered] Itching    Atorvastatin      Myalgias and arthralgias    Shellfish containing products Itching     Shellfish causes her to itch per her daughter, Caro.     Warfarin     Ibuprofen Itching     Itching of eyes, head       Medications:  Medications Prior to Admission   Medication Sig    ACCU-CHEK SOFTCLIX LANCETS Misc CHECK BLOOD SUGAR ONE TIME DAILY    amitriptyline (ELAVIL) 10 MG tablet Take 1 tablet (10 mg total) by mouth every evening.    BD ALCOHOL SWABS PadM USE AS DIRECTED TOPICALLY AS NEEDED    biotin 1 mg tablet Take 1,000 mcg by mouth 3 (three) times daily.    blood sugar diagnostic (ACCU-CHEK MATTIE PLUS TEST STRP) Strp TEST ONE TIME DAILY    blood-glucose meter kit To check BG 2  times daily, to use with insurance preferred meter, use as directed.    carvediloL (COREG) 25 MG tablet TAKE 1 TABLET TWICE DAILY    cyanocobalamin (VITAMIN B-12) 1000 MCG tablet Take 100 mcg by mouth once daily.    gabapentin (NEURONTIN) 300 MG capsule Take 1 capsule (300 mg total) by mouth every evening.    glipiZIDE 5 MG TR24 Take 1 tablet (5 mg total) by mouth daily with breakfast.    [] HYDROcodone-acetaminophen (NORCO) 5-325 mg per tablet Take 1 tablet by mouth every 6 (six) hours as needed for Pain.     ketoconazole (NIZORAL) 2 % cream Apply topically once daily.    lancets (ACCU-CHEK MULTICLIX LANCET) Misc To use as directed with Accu Chek Sahra FastClix lancing device    lancets Misc To check BG 2 times daily, to use with insurance preferred meter.    levothyroxine (SYNTHROID) 112 MCG tablet Take 1 tablet (112 mcg total) by mouth before breakfast.    lisinopriL (PRINIVIL,ZESTRIL) 40 MG tablet Take 1 tablet (40 mg total) by mouth once daily.    metFORMIN (GLUCOPHAGE) 1000 MG tablet Take 1 tablet (1,000 mg total) by mouth 2 (two) times daily with meals.    mirabegron (MYRBETRIQ) 25 mg Tb24 ER tablet Take 1 tablet (25 mg total) by mouth once daily.    naproxen (NAPROSYN) 500 MG tablet Take 1 tablet (500 mg total) by mouth 2 (two) times daily with meals.    rosuvastatin (CRESTOR) 20 MG tablet Take 1 tablet (20 mg total) by mouth once daily.    SITagliptin phosphate (JANUVIA) 100 MG Tab Take 1 tablet (100 mg total) by mouth once daily.    turmeric root extract 500 mg Cap Take by mouth.    VITAMIN B COMPLEX ORAL Take 1 tablet by mouth.    vitamin D (VITAMIN D3) 1000 units Tab Take 1,000 Units by mouth once daily.     Antibiotics (From admission, onward)      Start     Stop Route Frequency Ordered    03/17/24 1100  ampicillin (OMNIPEN) 2 g in sodium chloride 0.9 % 100 mL IVPB (MB+)         -- IV Every 4 hours (non-standard times) 03/17/24 0954    03/16/24 2100  mupirocin 2 % ointment         03/21/24 2059 Nasl 2 times daily 03/16/24 1953          Antifungals (From admission, onward)      None          Antivirals (From admission, onward)      None             Immunization History   Administered Date(s) Administered    COVID-19, MRNA, LN-S, PF (Pfizer) ([place]) 03/09/2022    COVID-19, mRNA, LNP-S, bivalent booster, PF (PFIZER OMICRON) 10/12/2022    COVID-19, vector-nr, rS-Ad26, PF ([facility]) 03/05/2021    Influenza (FLUAD) - Quadrivalent - Adjuvanted - PF *Preferred* (65+) 12/10/2021, 10/10/2022, 11/09/2023    Influenza -  High Dose - PF (65 years and older) 12/02/2015, 09/15/2017, 11/26/2018    Pneumococcal Conjugate - 13 Valent 11/26/2018    Pneumococcal Polysaccharide - 23 Valent 01/18/2023       Family History       Problem Relation (Age of Onset)    Breast cancer Mother    COPD Father    Cancer Mother, Sister, Daughter    Diabetes Son    Hypertension Sister, Sister, Daughter, Daughter    Ovarian cancer Mother, Sister    Thyroid disease Daughter, Daughter          Social History     Socioeconomic History    Marital status:    Tobacco Use    Smoking status: Never    Smokeless tobacco: Never   Substance and Sexual Activity    Alcohol use: No    Drug use: No    Sexual activity: Yes     Partners: Male     Birth control/protection: Post-menopausal     Comment:     Other Topics Concern    Are you pregnant or think you may be? No    Breast-feeding No   Social History Narrative     with 7 kids     Review of Systems   Constitutional:  Positive for chills and fever. Negative for fatigue and unexpected weight change.   HENT:  Negative for ear pain, facial swelling, hearing loss, mouth sores, nosebleeds, rhinorrhea, sinus pressure, sore throat, tinnitus, trouble swallowing and voice change.    Eyes:  Negative for photophobia, pain, redness and visual disturbance.   Respiratory:  Negative for cough, chest tightness, shortness of breath and wheezing.    Cardiovascular:  Negative for chest pain, palpitations and leg swelling.   Gastrointestinal:  Negative for abdominal pain, blood in stool, constipation, diarrhea, nausea and vomiting.   Endocrine: Negative for cold intolerance, heat intolerance, polydipsia, polyphagia and polyuria.   Genitourinary:  Positive for difficulty urinating, dysuria, hematuria and urgency. Negative for flank pain, frequency, menstrual problem, vaginal bleeding, vaginal discharge and vaginal pain.   Musculoskeletal:  Negative for arthralgias, back pain, joint swelling, myalgias and neck pain.   Skin:   Negative for rash.   Allergic/Immunologic: Negative for environmental allergies, food allergies and immunocompromised state.   Neurological:  Negative for dizziness, seizures, syncope, weakness, light-headedness, numbness and headaches.   Hematological:  Negative for adenopathy. Does not bruise/bleed easily.   Psychiatric/Behavioral:  Negative for confusion, hallucinations, self-injury, sleep disturbance and suicidal ideas. The patient is not nervous/anxious.      Objective:     Vital Signs (Most Recent):  Temp: 98.1 °F (36.7 °C) (03/18/24 1155)  Pulse: 70 (03/18/24 1155)  Resp: 18 (03/18/24 1155)  BP: (!) 150/65 (03/18/24 1155)  SpO2: 98 % (03/18/24 1155) Vital Signs (24h Range):  Temp:  [98 °F (36.7 °C)-99.9 °F (37.7 °C)] 98.1 °F (36.7 °C)  Pulse:  [65-90] 70  Resp:  [16-18] 18  SpO2:  [92 %-98 %] 98 %  BP: (107-170)/(52-71) 150/65     Weight: 75.8 kg (167 lb)  Body mass index is 27.79 kg/m².    Estimated Creatinine Clearance: 59 mL/min (based on SCr of 0.8 mg/dL).     Physical Exam  Vitals and nursing note reviewed.   Constitutional:       Appearance: She is well-developed.   HENT:      Head: Normocephalic and atraumatic.      Right Ear: External ear normal.      Left Ear: External ear normal.   Eyes:      General: No scleral icterus.        Right eye: No discharge.         Left eye: No discharge.      Conjunctiva/sclera: Conjunctivae normal.   Cardiovascular:      Rate and Rhythm: Normal rate and regular rhythm.      Heart sounds: Normal heart sounds. No murmur heard.     No friction rub. No gallop.   Pulmonary:      Effort: Pulmonary effort is normal. No respiratory distress.      Breath sounds: Normal breath sounds. No stridor. No wheezing or rales.   Abdominal:      General: Bowel sounds are normal.      Tenderness: There is abdominal tenderness in the suprapubic area.   Musculoskeletal:         General: No tenderness. Normal range of motion.   Skin:     General: Skin is warm and dry.   Neurological:       Mental Status: She is alert and oriented to person, place, and time.          Significant Labs: CBC:   Recent Labs   Lab 03/16/24  1410 03/17/24  0556 03/18/24  0336   WBC 12.30 9.15 6.87   HGB 11.0* 8.4* 8.8*   HCT 34.5* 26.3* 27.8*    182 166       CMP:   Recent Labs   Lab 03/16/24  1410 03/17/24  0556 03/18/24  0336   * 131* 132*   K 4.9 3.7 4.2    101 103   CO2 21* 24 23   * 153* 178*   BUN 21 16 12   CREATININE 1.2 0.9 0.8   CALCIUM 10.2 8.8 8.9   PROT 7.5 5.3* 5.1*   ALBUMIN 3.8 2.5* 2.4*   BILITOT 0.6 0.5 0.3   ALKPHOS 77 63 64   AST 22 13 28   ALT 13 11 19   ANIONGAP 11 6* 6*       Microbiology Results (last 7 days)       Procedure Component Value Units Date/Time    Blood culture [[id number]] Collected: 03/18/24 0538    Order Status: Completed Specimen: Blood Updated: 03/18/24 1315     Blood Culture, Routine No Growth to date    Blood culture [[id number]] Collected: 03/18/24 0538    Order Status: Completed Specimen: Blood Updated: 03/18/24 1315     Blood Culture, Routine No Growth to date    Blood culture [[id number]] Collected: 03/17/24 1300    Order Status: Completed Specimen: Blood Updated: 03/18/24 1122     Blood Culture, Routine Gram stain aer bottle: Gram positive cocci in chains resembling Strep      Results called to and read back by: Emilie Dela Cruz RN. 03/18/2024  06:17      Gram stain vinay bottle: Gram positive cocci in chains resembling Strep      03/18/2024  11:21    Blood culture x two cultures. Draw prior to antibiotics. [[id number]]  (Abnormal) Collected: 03/16/24 1420    Order Status: Completed Specimen: Blood from Wrist, Right Updated: 03/18/24 0856     Blood Culture, Routine Gram stain aer bottle: Gram positive cocci in chains resembling Strep      Positive results previously called 03/17/2024  06:25      ENTEROCOCCUS SPECIES  Identification pending  For susceptibility see order #[id number]      Narrative:      Aerobic and anaerobic    Blood culture x two cultures.  Draw prior to antibiotics. [[id number]]  (Abnormal) Collected: 03/16/24 1410    Order Status: Completed Specimen: Blood from Peripheral, Forearm, Left Updated: 03/18/24 0855     Blood Culture, Routine Gram stain aer bottle: Gram positive cocci in chains resembling Strep      Gram stain vinay bottle: Gram positive cocci in chains resembling Strep      Results called to and read back by: Emilie Napoles RN 03/17/2024  05:37      ENTEROCOCCUS SPECIES  Identification and susceptibility pending      Narrative:      Aerobic and anaerobic    Blood culture [[id number]] Collected: 03/17/24 1300    Order Status: Completed Specimen: Blood Updated: 03/18/24 0450     Blood Culture, Routine Gram stain aer bottle: Gram positive cocci in chains resembling Strep      Positive results previously called 03/17/2024 05:37    Urine culture [[id number]]  (Abnormal) Collected: 03/16/24 1359    Order Status: Completed Specimen: Urine Updated: 03/17/24 1524     Urine Culture, Routine ENTEROCOCCUS SPECIES  >100,000 cfu/ml  Identification and susceptibility pending      Narrative:      Specimen Source->Urine    Rapid Organism ID by PCR (from Blood culture) [[id number]]  (Abnormal) Collected: 03/16/24 1410    Order Status: Completed Updated: 03/17/24 0636     Enterococcus faecalis Detected     Enterococcus faecium Not Detected     Listeria monocytogenes Not Detected     Staphylococcus spp. Not Detected     Staphylococcus aureus Not Detected     Staphylococcus epidermidis Not Detected     Staphylococcus lugdunensis Not Detected     Streptococcus species Not Detected     Streptococcus agalactiae Not Detected     Streptococcus pneumoniae Not Detected     Streptococcus pyogenes Not Detected     Acinetobacter calcoaceticus/baumannii complex Not Detected     Bacteroides fragilis Not Detected     Enterobacterales Not Detected     Enterobacter cloacae complex Not Detected     Escherichia coli Not Detected     Klebsiella aerogenes Not Detected      Klebsiella oxytoca Not Detected     Klebsiella pneumoniae group Not Detected     Proteus Not Detected     Salmonella sp Not Detected     Serratia marcescens Not Detected     Haemophilus influenzae Not Detected     Neisseria meningtidis Not Detected     Pseudomonas aeruginosa Not Detected     Stenotrophomonas maltophilia Not Detected     Candida albicans Not Detected     Candida auris Not Detected     Candida glabrata Not Detected     Candida krusei Not Detected     Candida parapsilosis Not Detected     Candida tropicalis Not Detected     Cryptococcus neoformans/gattii Not Detected     CTX-M (ESBL ) Test Not Applicable     IMP (Carbapenem resistant) Test Not Applicable     KPC resistance gene (Carbapenem resistant) Test Not Applicable     mcr-1  Test Not Applicable     mec A/C  Test Not Applicable     mec A/C and MREJ (MRSA) gene Test Not Applicable     NDM (Carbapenem resistant) Test Not Applicable     OXA-48-like (Carbapenem resistant) Test Not Applicable     van A/B (VRE gene) Not Detected     VIM (Carbapenem resistant) Test Not Applicable    Narrative:      Aerobic and anaerobic          Urine Studies:   Recent Labs   Lab 03/16/24  1359   COLORU Yellow   APPEARANCEUA Hazy*   PHUR 6.0   SPECGRAV 1.020   PROTEINUA 1+*   GLUCUA 3+*   KETONESU Negative   BILIRUBINUA Negative   OCCULTUA 2+*   NITRITE Negative   LEUKOCYTESUR 2+*   RBCUA 14*   WBCUA 37*   BACTERIA Occasional   SQUAMEPITHEL 1   HYALINECASTS 0         Significant Imaging: I have reviewed all pertinent imaging results/findings within the past 24 hours.

## 2024-03-18 NOTE — PROGRESS NOTES
[facility] - [facility] ([place] [id number])  Urology  Progress Note    Patient Name: Aminah Norton  MRN: [id number]  Admission Date: 3/16/2024  Hospital Length of Stay: 2 days  Code Status: Full Code   Attending Provider: Edwige Aly MD   Primary Care Physician: Jeison Sanchez MD    Subjective:     HPI:  Aminah Norton is a 78 y.o.F who underwent second look ureteroscopy on 03/14/2024 with Dr. Smith who presented with lower abdominal pain and dysuria. Urology consulted for pyelonephritis.     On assessment, febrile to 102.6, tachycardic. WBC 12.30, Hgb 11.0, Cr 1.2 (baseline 0.8). UA with occasional bacteria, nitrite negative.  ISTAT venous lactate 3.21.     CTRSS shows right ureteral stent in good position, with acceptable amount of right hydronephrosis considering stent reflux, no left hydronephrosis or ureteral stones. Bladder not significantly distended.    Patient reports inability to urinate since 11 AM. Also endorses dysuria and malaise that began last night. She denies fever at home, nausea or vomiting. Her abdominal pain improved with day placement.     Interval History: NAEON. AFVSS. Pain well controlled this AM. Tolerating PO diet. No f/c/n/v. Day with c/y/u. Cr 0.8, stable.       Objective:     Temp:  [98 °F (36.7 °C)-99.9 °F (37.7 °C)] 98 °F (36.7 °C)  Pulse:  [65-90] 65  Resp:  [16-19] 16  SpO2:  [92 %-95 %] 93 %  BP: (105-150)/(51-65) 122/58     Body mass index is 27.79 kg/m².      Bladder Scan Volume (mL): 384 mL (03/16/24 1930)    Drains       Drain  Duration                  Urethral Catheter 03/16/24 1937 16 Fr. 1 day                     Physical Exam  Constitutional:       General: She is not in acute distress.     Appearance: Normal appearance. She is not ill-appearing.   HENT:      Head: Normocephalic and atraumatic.      Mouth/Throat:      Mouth: Mucous membranes are moist.   Eyes:      Extraocular Movements: Extraocular movements intact.   Cardiovascular:       Rate and Rhythm: Normal rate.   Pulmonary:      Effort: Pulmonary effort is normal.   Abdominal:      Palpations: Abdomen is soft.   Genitourinary:     Comments: 16 Fr Ch draining c/y/u. Unable to visualize stent strings  Skin:     General: Skin is warm and dry.   Neurological:      Mental Status: She is alert and oriented to person, place, and time.           Significant Labs:    BMP:  Recent Labs   Lab 03/16/24  1410 03/17/24  0556 03/18/24  0336   * 131* 132*   K 4.9 3.7 4.2    101 103   CO2 21* 24 23   BUN 21 16 12   CREATININE 1.2 0.9 0.8   CALCIUM 10.2 8.8 8.9       CBC:   Recent Labs   Lab 03/16/24  1410 03/17/24  0556 03/18/24  0336   WBC 12.30 9.15 6.87   HGB 11.0* 8.4* 8.8*   HCT 34.5* 26.3* 27.8*    182 166       All pertinent labs results from the past 24 hours have been reviewed.    Significant Imaging:  All pertinent imaging results/findings from the past 24 hours have been reviewed.                  Assessment/Plan:     Pyelonephritis  Aminah Norton is a 78 y.o.F with pyelonephritis.    - IV antibiotics  - IV fluids  - Maintain stent  - Ch per primary, recommend voiding trial prior to d/c  - Trend Cr  - ID consulted, f/u recs  - KUB showing stent in adequate position  - F/u all cultures  - Will need 2 weeks total of antibiotics  - Contact urology with questions or concerns, we will plan for an outpt stent pull with Dr. Smith in 2-4 weeks  - I explicitly explained to the patient this AM that the ureteral stent cannot stay in for longer than 3 months as this is associated with risks of sepsis, renal failure and death.  - Rest of care per iman        VTE Risk Mitigation (From admission, onward)           Ordered     enoxaparin injection 40 mg  Daily         03/16/24 1920     IP VTE HIGH RISK PATIENT  Once         03/16/24 1920     Place sequential compression device  Until discontinued         03/16/24 1920     Place sequential compression device  Until  discontinued         03/16/24 1920                    Joce Stauffer MD  Urology  [facility] - Stepdown Flex ([place]

## 2024-03-18 NOTE — SUBJECTIVE & OBJECTIVE
Interval History: urinary catheter removed this morning. Patient reports urinating on her own since this time. Denies chest pain, SOB, n/v, c/d.       Review of Systems  Objective:     Vital Signs (Most Recent):  Temp: 98.2 °F (36.8 °C) (03/18/24 0740)  Pulse: 80 (03/18/24 0740)  Resp: 17 (03/18/24 0740)  BP: (!) 170/71 (03/18/24 0740)  SpO2: 95 % (03/18/24 0740) Vital Signs (24h Range):  Temp:  [98 °F (36.7 °C)-99.9 °F (37.7 °C)] 98.2 °F (36.8 °C)  Pulse:  [65-90] 80  Resp:  [16-19] 17  SpO2:  [92 %-95 %] 95 %  BP: (107-170)/(52-71) 170/71     Weight: 75.8 kg (167 lb)  Body mass index is 27.79 kg/m².    Intake/Output Summary (Last 24 hours) at 3/18/2024 1048  Last data filed at 3/18/2024 0844  Gross per 24 hour   Intake 1050 ml   Output 2950 ml   Net -1900 ml         Physical Exam  Vitals and nursing note reviewed.   Constitutional:       General: She is not in acute distress.     Appearance: She is not ill-appearing.   HENT:      Mouth/Throat:      Mouth: Mucous membranes are moist.   Eyes:      General: No scleral icterus.     Extraocular Movements: Extraocular movements intact.      Pupils: Pupils are equal, round, and reactive to light.   Cardiovascular:      Rate and Rhythm: Normal rate and regular rhythm.      Pulses: Normal pulses.      Heart sounds: Normal heart sounds.   Pulmonary:      Effort: Pulmonary effort is normal. No respiratory distress.      Breath sounds: Normal breath sounds. No wheezing.   Abdominal:      General: There is no distension.      Palpations: Abdomen is soft.      Tenderness: There is no abdominal tenderness.   Musculoskeletal:      Right lower leg: No edema.      Left lower leg: No edema.   Skin:     General: Skin is warm and dry.      Findings: No rash.   Neurological:      General: No focal deficit present.      Mental Status: She is alert and oriented to person, place, and time.   Psychiatric:         Mood and Affect: Mood normal.         Behavior: Behavior normal.              Significant Labs: All pertinent labs within the past 24 hours have been reviewed.  Blood Culture:   Recent Labs   Lab 03/16/24  1410 03/16/24  1420 03/17/24  1300   LABBLOO Gram stain aer bottle: Gram positive cocci in chains resembling Strep  Gram stain vinay bottle: Gram positive cocci in chains resembling Strep  Results called to and read back by: Emilie Napoles RN 03/17/2024  05:37  ENTEROCOCCUS SPECIES  Identification and susceptibility pending  * Gram stain aer bottle: Gram positive cocci in chains resembling Strep  Positive results previously called 03/17/2024  06:25  ENTEROCOCCUS SPECIES  Identification pending  For susceptibility see order #[id number]  * Gram stain aer bottle: Gram positive cocci in chains resembling Strep  Results called to and read back by: Emilie Dela Cruz RN. 03/18/2024  06:17  Gram stain aer bottle: Gram positive cocci in chains resembling Strep  Positive results previously called 03/17/2024 05:37     BMP:   Recent Labs   Lab 03/18/24  0336   *   *   K 4.2      CO2 23   BUN 12   CREATININE 0.8   CALCIUM 8.9   MG 2.0     CBC:   Recent Labs   Lab 03/16/24  1410 03/17/24  0556 03/18/24  0336   WBC 12.30 9.15 6.87   HGB 11.0* 8.4* 8.8*   HCT 34.5* 26.3* 27.8*    182 166       Significant Imaging: I have reviewed all pertinent imaging results/findings within the past 24 hours.

## 2024-03-18 NOTE — SUBJECTIVE & OBJECTIVE
Interval History: NAEON. AFVSS. Pain well controlled this AM. Tolerating PO diet. No f/c/n/v. Ch with c/y/u. Cr 0.8, stable.       Objective:     Temp:  [98 °F (36.7 °C)-99.9 °F (37.7 °C)] 98 °F (36.7 °C)  Pulse:  [65-90] 65  Resp:  [16-19] 16  SpO2:  [92 %-95 %] 93 %  BP: (105-150)/(51-65) 122/58     Body mass index is 27.79 kg/m².      Bladder Scan Volume (mL): 384 mL (03/16/24 1930)    Drains       Drain  Duration                  Urethral Catheter 03/16/24 1937 16 Fr. 1 day                     Physical Exam  Constitutional:       General: She is not in acute distress.     Appearance: Normal appearance. She is not ill-appearing.   HENT:      Head: Normocephalic and atraumatic.      Mouth/Throat:      Mouth: Mucous membranes are moist.   Eyes:      Extraocular Movements: Extraocular movements intact.   Cardiovascular:      Rate and Rhythm: Normal rate.   Pulmonary:      Effort: Pulmonary effort is normal.   Abdominal:      Palpations: Abdomen is soft.   Genitourinary:     Comments: 16 Fr Ch draining c/y/u. Unable to visualize stent strings  Skin:     General: Skin is warm and dry.   Neurological:      Mental Status: She is alert and oriented to person, place, and time.           Significant Labs:    BMP:  Recent Labs   Lab 03/16/24  1410 03/17/24  0556 03/18/24  0336   * 131* 132*   K 4.9 3.7 4.2    101 103   CO2 21* 24 23   BUN 21 16 12   CREATININE 1.2 0.9 0.8   CALCIUM 10.2 8.8 8.9       CBC:   Recent Labs   Lab 03/16/24  1410 03/17/24  0556 03/18/24  0336   WBC 12.30 9.15 6.87   HGB 11.0* 8.4* 8.8*   HCT 34.5* 26.3* 27.8*    182 166       All pertinent labs results from the past 24 hours have been reviewed.    Significant Imaging:  All pertinent imaging results/findings from the past 24 hours have been reviewed.

## 2024-03-18 NOTE — ASSESSMENT & PLAN NOTE
Patient's FSGs are controlled on current medication regimen.  Last A1c reviewed-   Lab Results   Component Value Date    HGBA1C 8.2 (H) 11/09/2023    HGBA1C 8.2 (H) 11/09/2023     Most recent fingerstick glucose reviewed-   Recent Labs   Lab 03/17/24  1724 03/17/24  1955 03/18/24  0738   POCTGLUCOSE 215* 238* 181*     Current correctional scale  Low  Maintain anti-hyperglycemic dose as follows-   Antihyperglycemics (From admission, onward)      Start     Stop Route Frequency Ordered    03/16/24 2019  insulin aspart U-100 pen 0-5 Units         -- SubQ Before meals & nightly PRN 03/16/24 1920          Hold Oral hypoglycemics while patient is in the hospital.

## 2024-03-18 NOTE — ASSESSMENT & PLAN NOTE
78F with h/o DM, HT, interstitial cystitis and urolithiasis s/p ureteroscopic stone extraction with lithotripsy and right ureteral stent placement (right) on 3/14 admitted 3/16 with chills, dizziness, flank pain, dysuria, freq, and a fall. Bcx 3/16, 3/17 with e.faecalis in both sets. Repeat 3/18 NGTD. Fever defervesced, leukocytosis trending down. 2d echo ordered, pending. Reports PCN allergy, but tolerating ampicillin.     Suspect bacteremia from  source; ureteral procedure likely contributed. Endocarditis needs to be excluded and 2d echo pending. Fortunately clinically improving. Reviewed labs and hiv and hep c screening neg. 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 No signs of pulmonary TB.     Recommendations:   - continue ampicillin, duration TBD but will need at least 2 weeks of iv abx followed by chronic supressive oral antibiotics until ureteral stent is removed or replaced  - please wait for f/u bcx to be clear x 48-72h before placing picc  - f/u 2d echo  - remove PCN allergy (tolerating ampicillin)

## 2024-03-18 NOTE — ASSESSMENT & PLAN NOTE
Patient's anemia is currently uncontrolled. Has not received any PRBCs to date. Etiology likely d/t chronic disease v iron deficiency. No obvious signs of bleeding.  Current CBC reviewed-   Lab Results   Component Value Date    HGB 8.8 (L) 03/18/2024    HCT 27.8 (L) 03/18/2024     Monitor serial CBC and transfuse if patient becomes hemodynamically unstable, symptomatic or H/H drops below 7/21.  - iron panel ordered

## 2024-03-19 ENCOUNTER — ANESTHESIA EVENT (OUTPATIENT)
Dept: MEDSURG UNIT | Facility: HOSPITAL | Age: 79
DRG: 854 | End: 2024-03-19
Payer: MEDICARE

## 2024-03-19 LAB
ALBUMIN SERPL BCP-MCNC: 2.4 G/DL (ref 3.5–5.2)
ALP SERPL-CCNC: 83 U/L (ref 55–135)
ALT SERPL W/O P-5'-P-CCNC: 34 U/L (ref 10–44)
ANION GAP SERPL CALC-SCNC: 6 MMOL/L (ref 8–16)
AST SERPL-CCNC: 29 U/L (ref 10–40)
BACTERIA BLD CULT: ABNORMAL
BASOPHILS # BLD AUTO: 0.01 K/UL (ref 0–0.2)
BASOPHILS NFR BLD: 0.2 % (ref 0–1.9)
BILIRUB SERPL-MCNC: 0.3 MG/DL (ref 0.1–1)
BUN SERPL-MCNC: 17 MG/DL (ref 8–23)
CALCIUM SERPL-MCNC: 9.9 MG/DL (ref 8.7–10.5)
CHLORIDE SERPL-SCNC: 105 MMOL/L (ref 95–110)
CO2 SERPL-SCNC: 23 MMOL/L (ref 23–29)
CREAT SERPL-MCNC: 0.8 MG/DL (ref 0.5–1.4)
DIFFERENTIAL METHOD BLD: ABNORMAL
EOSINOPHIL # BLD AUTO: 0.1 K/UL (ref 0–0.5)
EOSINOPHIL NFR BLD: 1.2 % (ref 0–8)
ERYTHROCYTE [DISTWIDTH] IN BLOOD BY AUTOMATED COUNT: 17.9 % (ref 11.5–14.5)
EST. GFR  (NO RACE VARIABLE): >60 ML/MIN/1.73 M^2
FERRITIN SERPL-MCNC: 132 NG/ML (ref 20–300)
GLUCOSE SERPL-MCNC: 216 MG/DL (ref 70–110)
HCT VFR BLD AUTO: 28.1 % (ref 37–48.5)
HGB BLD-MCNC: 8.7 G/DL (ref 12–16)
IMM GRANULOCYTES # BLD AUTO: 0.05 K/UL (ref 0–0.04)
IMM GRANULOCYTES NFR BLD AUTO: 0.8 % (ref 0–0.5)
IRON SERPL-MCNC: 16 UG/DL (ref 30–160)
LYMPHOCYTES # BLD AUTO: 0.5 K/UL (ref 1–4.8)
LYMPHOCYTES NFR BLD: 8.1 % (ref 18–48)
MAGNESIUM SERPL-MCNC: 1.8 MG/DL (ref 1.6–2.6)
MCH RBC QN AUTO: 22.4 PG (ref 27–31)
MCHC RBC AUTO-ENTMCNC: 31 G/DL (ref 32–36)
MCV RBC AUTO: 72 FL (ref 82–98)
MONOCYTES # BLD AUTO: 0.5 K/UL (ref 0.3–1)
MONOCYTES NFR BLD: 8.5 % (ref 4–15)
NEUTROPHILS # BLD AUTO: 4.8 K/UL (ref 1.8–7.7)
NEUTROPHILS NFR BLD: 81.2 % (ref 38–73)
NRBC BLD-RTO: 0 /100 WBC
PHOSPHATE SERPL-MCNC: 2.9 MG/DL (ref 2.7–4.5)
PLATELET # BLD AUTO: 177 K/UL (ref 150–450)
PMV BLD AUTO: 10.3 FL (ref 9.2–12.9)
POCT GLUCOSE: 179 MG/DL (ref 70–110)
POCT GLUCOSE: 221 MG/DL (ref 70–110)
POCT GLUCOSE: 231 MG/DL (ref 70–110)
POCT GLUCOSE: 264 MG/DL (ref 70–110)
POTASSIUM SERPL-SCNC: 4.2 MMOL/L (ref 3.5–5.1)
PROT SERPL-MCNC: 5.9 G/DL (ref 6–8.4)
RBC # BLD AUTO: 3.89 M/UL (ref 4–5.4)
SATURATED IRON: 6 % (ref 20–50)
SODIUM SERPL-SCNC: 134 MMOL/L (ref 136–145)
TOTAL IRON BINDING CAPACITY: 287 UG/DL (ref 250–450)
TRANSFERRIN SERPL-MCNC: 194 MG/DL (ref 200–375)
WBC # BLD AUTO: 5.89 K/UL (ref 3.9–12.7)

## 2024-03-19 PROCEDURE — 82728 ASSAY OF FERRITIN: CPT | Performed by: STUDENT IN AN ORGANIZED HEALTH CARE EDUCATION/TRAINING PROGRAM

## 2024-03-19 PROCEDURE — 25000003 PHARM REV CODE 250: Performed by: INTERNAL MEDICINE

## 2024-03-19 PROCEDURE — 83735 ASSAY OF MAGNESIUM: CPT | Performed by: INTERNAL MEDICINE

## 2024-03-19 PROCEDURE — 84100 ASSAY OF PHOSPHORUS: CPT | Performed by: INTERNAL MEDICINE

## 2024-03-19 PROCEDURE — 99233 SBSQ HOSP IP/OBS HIGH 50: CPT | Mod: ,,, | Performed by: INTERNAL MEDICINE

## 2024-03-19 PROCEDURE — 20600001 HC STEP DOWN PRIVATE ROOM

## 2024-03-19 PROCEDURE — 87040 BLOOD CULTURE FOR BACTERIA: CPT | Mod: 59 | Performed by: STUDENT IN AN ORGANIZED HEALTH CARE EDUCATION/TRAINING PROGRAM

## 2024-03-19 PROCEDURE — 83540 ASSAY OF IRON: CPT | Performed by: STUDENT IN AN ORGANIZED HEALTH CARE EDUCATION/TRAINING PROGRAM

## 2024-03-19 PROCEDURE — 87186 SC STD MICRODIL/AGAR DIL: CPT | Performed by: STUDENT IN AN ORGANIZED HEALTH CARE EDUCATION/TRAINING PROGRAM

## 2024-03-19 PROCEDURE — 80053 COMPREHEN METABOLIC PANEL: CPT | Performed by: INTERNAL MEDICINE

## 2024-03-19 PROCEDURE — 63600175 PHARM REV CODE 636 W HCPCS: Performed by: INTERNAL MEDICINE

## 2024-03-19 PROCEDURE — 85025 COMPLETE CBC W/AUTO DIFF WBC: CPT | Performed by: INTERNAL MEDICINE

## 2024-03-19 PROCEDURE — 87077 CULTURE AEROBIC IDENTIFY: CPT | Performed by: STUDENT IN AN ORGANIZED HEALTH CARE EDUCATION/TRAINING PROGRAM

## 2024-03-19 PROCEDURE — 36415 COLL VENOUS BLD VENIPUNCTURE: CPT | Performed by: STUDENT IN AN ORGANIZED HEALTH CARE EDUCATION/TRAINING PROGRAM

## 2024-03-19 PROCEDURE — 25000003 PHARM REV CODE 250: Performed by: STUDENT IN AN ORGANIZED HEALTH CARE EDUCATION/TRAINING PROGRAM

## 2024-03-19 RX ORDER — LISINOPRIL 20 MG/1
40 TABLET ORAL DAILY
Status: DISCONTINUED | OUTPATIENT
Start: 2024-03-19 | End: 2024-03-28 | Stop reason: HOSPADM

## 2024-03-19 RX ADMIN — AMPICILLIN 2 G: 2 INJECTION, POWDER, FOR SOLUTION INTRAMUSCULAR; INTRAVENOUS at 01:03

## 2024-03-19 RX ADMIN — MUPIROCIN: 20 OINTMENT TOPICAL at 09:03

## 2024-03-19 RX ADMIN — AMPICILLIN 2 G: 2 INJECTION, POWDER, FOR SOLUTION INTRAMUSCULAR; INTRAVENOUS at 08:03

## 2024-03-19 RX ADMIN — LEVOTHYROXINE SODIUM 112 MCG: 112 TABLET ORAL at 05:03

## 2024-03-19 RX ADMIN — AMPICILLIN 2 G: 2 INJECTION, POWDER, FOR SOLUTION INTRAMUSCULAR; INTRAVENOUS at 05:03

## 2024-03-19 RX ADMIN — HYDROCODONE BITARTRATE AND ACETAMINOPHEN 1 TABLET: 5; 325 TABLET ORAL at 12:03

## 2024-03-19 RX ADMIN — GABAPENTIN 300 MG: 300 CAPSULE ORAL at 08:03

## 2024-03-19 RX ADMIN — AMPICILLIN 2 G: 2 INJECTION, POWDER, FOR SOLUTION INTRAMUSCULAR; INTRAVENOUS at 10:03

## 2024-03-19 RX ADMIN — CEFTRIAXONE 2 G: 2 INJECTION, POWDER, FOR SOLUTION INTRAMUSCULAR; INTRAVENOUS at 01:03

## 2024-03-19 RX ADMIN — MUPIROCIN: 20 OINTMENT TOPICAL at 08:03

## 2024-03-19 RX ADMIN — ENOXAPARIN SODIUM 40 MG: 40 INJECTION SUBCUTANEOUS at 05:03

## 2024-03-19 RX ADMIN — HYDROCODONE BITARTRATE AND ACETAMINOPHEN 1 TABLET: 5; 325 TABLET ORAL at 08:03

## 2024-03-19 RX ADMIN — CARVEDILOL 6.25 MG: 6.25 TABLET, FILM COATED ORAL at 08:03

## 2024-03-19 RX ADMIN — LISINOPRIL 40 MG: 20 TABLET ORAL at 09:03

## 2024-03-19 RX ADMIN — CARVEDILOL 6.25 MG: 6.25 TABLET, FILM COATED ORAL at 09:03

## 2024-03-19 RX ADMIN — INSULIN ASPART 1 UNITS: 100 INJECTION, SOLUTION INTRAVENOUS; SUBCUTANEOUS at 08:03

## 2024-03-19 RX ADMIN — AMITRIPTYLINE HYDROCHLORIDE 10 MG: 10 TABLET, FILM COATED ORAL at 08:03

## 2024-03-19 RX ADMIN — INSULIN ASPART 2 UNITS: 100 INJECTION, SOLUTION INTRAVENOUS; SUBCUTANEOUS at 01:03

## 2024-03-19 NOTE — ASSESSMENT & PLAN NOTE
This patient does have evidence of infective focus  My overall impression is sepsis.  Source: Urinary Tract  Antibiotics given-   Antibiotics (72h ago, onward)      Start     Stop Route Frequency Ordered    03/19/24 1300  cefTRIAXone (ROCEPHIN) 2 g in dextrose 5 % in water (D5W) 100 mL IVPB (MB+)         -- IV Every 12 hours (non-standard times) 03/19/24 1145    03/17/24 1100  ampicillin (OMNIPEN) 2 g in sodium chloride 0.9 % 100 mL IVPB (MB+)         -- IV Every 4 hours (non-standard times) 03/17/24 0954    03/16/24 2100  mupirocin 2 % ointment         03/21/24 2059 Nasl 2 times daily 03/16/24 1953          Latest lactate reviewed-  Recent Labs   Lab 03/16/24 2004 03/16/24 2010   LACTATE 1.1  --    POCLAC  --  0.89     Fluid challenge Ideal Body Weight- The patient's ideal body weight is Ideal body weight: 57 kg (125 lb 10.6 oz) which will be used to calculate fluid bolus of 30 ml/kg for treatment of septic shock.      Post- resuscitation assessment No - Post resuscitation assessment not needed     ANAIS    Will Not start Pressors- Levophed for MAP of 65  Source control achieved by: rocephin

## 2024-03-19 NOTE — SUBJECTIVE & OBJECTIVE
Interval history: NAEO. Some persistent abd pain, but dysuria improved. Agreeable to CARLITO      Past Surgical History:   Procedure Laterality Date    APPENDECTOMY      BLADDER FULGURATION N/A 3/1/2024    Procedure: ULCER INJECTION FULGURATION;  Surgeon: Brody Smith MD;  [facility];  Service: Urology;  Laterality: N/A;    BLADDER SUSPENSION      CATARACT EXTRACTION      right eye     CHOLECYSTECTOMY      COLONOSCOPY N/A 7/20/2020    Procedure: COLONOSCOPY;  Surgeon: Juan Parker MD;  Location: [facility] ENDO (4TH FLR);  Service: Endoscopy;  Laterality: N/A;  Hx of chronic anemia.  patient needs a   4/6/20 - removed from 4/20/20, rescheduled 7/20/20 - pg  covid 7/17-[facility] 2nd floor-tb    COLONOSCOPY N/A 2/9/2023    Procedure: COLONOSCOPY;  Surgeon: Renan Sanchez MD;  Location: [facility] ENDO (4TH FLR);  Service: Colon and Rectal;  Laterality: N/A;  instr handed to patient, -ml    CYSTOSCOPY N/A 5/19/2021    Procedure: CYSTOSCOPY;  Surgeon: Brody Smith MD;  Location: [facility] OR 1ST FLR;  Service: Urology;  Laterality: N/A;    CYSTOURETEROSCOPY, WITH HOLMIUM LASER LITHOTRIPSY OF URETERAL CALCULUS AND STENT INSERTION Right 3/1/2024    Procedure: CYSTOURETEROSCOPY, WITH HOLMIUM LASER LITHOTRIPSY OF URETERAL CALCULUS AND STENT INSERTION;  Surgeon: Brody Smith MD;  [facility];  Service: Urology;  Laterality: Right;    CYSTOURETEROSCOPY,WITH HOLMIUM LASER LITHOTRIPSY OF URETERAL CALCULUS Right 3/14/2024    Procedure: CYSTOURETEROSCOPY,WITH HOLMIUM LASER LITHOTRIPSY OF URETERAL CALCULUS;  Surgeon: Brody Smith MD;  [facility];  Service: Urology;  Laterality: Right;  1 HR    EYE SURGERY Bilateral     cataract removal    INJECTION OF STEROID N/A 3/14/2024    Procedure: INJECTION, STEROID;  Surgeon: Brody Smith MD;  [facility];  Service: Urology;  Laterality: N/A;    PLACEMENT-STENT Right 3/14/2024    Procedure: PLACEMENT-STENT;   Surgeon: Brody Smith MD;  Location: [facility] OR;  Service: Urology;  Laterality: Right;    REMOVAL-STENT Right 3/14/2024    Procedure: REMOVAL-STENT;  Surgeon: Brody Smith MD;  Location: [facility] OR;  Service: Urology;  Laterality: Right;    TOTAL ABDOMINAL HYSTERECTOMY      DUB       Review of patient's allergies indicates:   Allergen Reactions    Bufferin [aspirin, buffered] Itching    Atorvastatin      Myalgias and arthralgias    Shellfish containing products Itching     Shellfish causes her to itch per her daughter, Caro.     Warfarin     Ibuprofen Itching     Itching of eyes, head       Medications:  Medications Prior to Admission   Medication Sig    ACCU-CHEK SOFTCLIX LANCETS Misc CHECK BLOOD SUGAR ONE TIME DAILY    amitriptyline (ELAVIL) 10 MG tablet Take 1 tablet (10 mg total) by mouth every evening.    BD ALCOHOL SWABS PadM USE AS DIRECTED TOPICALLY AS NEEDED    biotin 1 mg tablet Take 1,000 mcg by mouth 3 (three) times daily.    blood sugar diagnostic (ACCU-CHEK MATTIE PLUS TEST STRP) Strp TEST ONE TIME DAILY    blood-glucose meter kit To check BG 2  times daily, to use with insurance preferred meter, use as directed.    carvediloL (COREG) 25 MG tablet TAKE 1 TABLET TWICE DAILY    cyanocobalamin (VITAMIN B-12) 1000 MCG tablet Take 100 mcg by mouth once daily.    gabapentin (NEURONTIN) 300 MG capsule Take 1 capsule (300 mg total) by mouth every evening.    glipiZIDE 5 MG TR24 Take 1 tablet (5 mg total) by mouth daily with breakfast.    [] HYDROcodone-acetaminophen (NORCO) 5-325 mg per tablet Take 1 tablet by mouth every 6 (six) hours as needed for Pain.    ketoconazole (NIZORAL) 2 % cream Apply topically once daily.    lancets (ACCU-CHEK MULTICLIX LANCET) Misc To use as directed with Accu Chek Sahra FastClix lancing device    lancets Misc To check BG 2 times daily, to use with insurance preferred meter.    levothyroxine (SYNTHROID) 112 MCG tablet Take 1 tablet (112 mcg total) by mouth  before breakfast.    lisinopriL (PRINIVIL,ZESTRIL) 40 MG tablet Take 1 tablet (40 mg total) by mouth once daily.    metFORMIN (GLUCOPHAGE) 1000 MG tablet Take 1 tablet (1,000 mg total) by mouth 2 (two) times daily with meals.    mirabegron (MYRBETRIQ) 25 mg Tb24 ER tablet Take 1 tablet (25 mg total) by mouth once daily.    naproxen (NAPROSYN) 500 MG tablet Take 1 tablet (500 mg total) by mouth 2 (two) times daily with meals.    rosuvastatin (CRESTOR) 20 MG tablet Take 1 tablet (20 mg total) by mouth once daily.    SITagliptin phosphate (JANUVIA) 100 MG Tab Take 1 tablet (100 mg total) by mouth once daily.    turmeric root extract 500 mg Cap Take by mouth.    VITAMIN B COMPLEX ORAL Take 1 tablet by mouth.    vitamin D (VITAMIN D3) 1000 units Tab Take 1,000 Units by mouth once daily.     Antibiotics (From admission, onward)      Start     Stop Route Frequency Ordered    03/19/24 1300  cefTRIAXone (ROCEPHIN) 2 g in dextrose 5 % in water (D5W) 100 mL IVPB (MB+)         -- IV Every 12 hours (non-standard times) 03/19/24 1145    03/17/24 1100  ampicillin (OMNIPEN) 2 g in sodium chloride 0.9 % 100 mL IVPB (MB+)         -- IV Every 4 hours (non-standard times) 03/17/24 0954    03/16/24 2100  mupirocin 2 % ointment         03/21/24 2059 Nasl 2 times daily 03/16/24 1953          Antifungals (From admission, onward)      None          Antivirals (From admission, onward)      None             Immunization History   Administered Date(s) Administered    COVID-19, MRNA, LN-S, PF (Pfizer) (Gray Cap) 03/09/2022    COVID-19, mRNA, LNP-S, bivalent booster, PF (PFIZER OMICRON) 10/12/2022    COVID-19, vector-nr, rS-Ad26, PF (Katelin) 03/05/2021    Influenza (FLUAD) - Quadrivalent - Adjuvanted - PF *Preferred* (65+) 12/10/2021, 10/10/2022, 11/09/2023    Influenza - High Dose - PF (65 years and older) 12/02/2015, 09/15/2017, 11/26/2018    Pneumococcal Conjugate - 13 Valent 11/26/2018    Pneumococcal Polysaccharide - 23 Valent 01/18/2023        Family History       Problem Relation (Age of Onset)    Breast cancer Mother    COPD Father    Cancer Mother, Sister, Daughter    Diabetes Son    Hypertension Sister, Sister, Daughter, Daughter    Ovarian cancer Mother, Sister    Thyroid disease Daughter, Daughter          Social History     Socioeconomic History    Marital status:    Tobacco Use    Smoking status: Never    Smokeless tobacco: Never   Substance and Sexual Activity    Alcohol use: No    Drug use: No    Sexual activity: Yes     Partners: Male     Birth control/protection: Post-menopausal     Comment:     Other Topics Concern    Are you pregnant or think you may be? No    Breast-feeding No   Social History Narrative     with 7 kids     Social Determinants of Health     Financial Resource Strain: Low Risk  (3/18/2024)    Overall Financial Resource Strain (CARDIA)     Difficulty of Paying Living Expenses: Not very hard   Food Insecurity: No Food Insecurity (3/18/2024)    Hunger Vital Sign     Worried About Running Out of Food in the Last Year: Never true     Ran Out of Food in the Last Year: Never true   Transportation Needs: No Transportation Needs (3/18/2024)    PRAPARE - Transportation     Lack of Transportation (Medical): No     Lack of Transportation (Non-Medical): No   Physical Activity: Inactive (3/18/2024)    Exercise Vital Sign     Days of Exercise per Week: 0 days     Minutes of Exercise per Session: 0 min   Stress: No Stress Concern Present (3/18/2024)    [place] [employer] - Occupational Stress Questionnaire     Feeling of Stress : Only a little   Social Connections: Unknown (3/18/2024)    Social Connection and Isolation Panel [NHANES]     Frequency of Communication with Friends and Family: More than three times a week     Frequency of Social Gatherings with Friends and Family: Twice a week     Attends Faith Services: 1 to 4 times per year     Active Member of Clubs or Organizations: Yes      Attends Club or Organization Meetings: Never     Marital Status: Patient declined   Housing Stability: Unknown (3/18/2024)    Housing Stability Vital Sign     Unable to Pay for Housing in the Last Year: No     Unstable Housing in the Last Year: No     Review of Systems   Constitutional:  Positive for chills and fever. Negative for fatigue and unexpected weight change.   HENT:  Negative for ear pain, facial swelling, hearing loss, mouth sores, nosebleeds, rhinorrhea, sinus pressure, sore throat, tinnitus, trouble swallowing and voice change.    Eyes:  Negative for photophobia, pain, redness and visual disturbance.   Respiratory:  Negative for cough, chest tightness, shortness of breath and wheezing.    Cardiovascular:  Negative for chest pain, palpitations and leg swelling.   Gastrointestinal:  Negative for abdominal pain, blood in stool, constipation, diarrhea, nausea and vomiting.   Endocrine: Negative for cold intolerance, heat intolerance, polydipsia, polyphagia and polyuria.   Genitourinary:  Positive for difficulty urinating, dysuria, hematuria and urgency. Negative for flank pain, frequency, menstrual problem, vaginal bleeding, vaginal discharge and vaginal pain.   Musculoskeletal:  Negative for arthralgias, back pain, joint swelling, myalgias and neck pain.   Skin:  Negative for rash.   Allergic/Immunologic: Negative for environmental allergies, food allergies and immunocompromised state.   Neurological:  Negative for dizziness, seizures, syncope, weakness, light-headedness, numbness and headaches.   Hematological:  Negative for adenopathy. Does not bruise/bleed easily.   Psychiatric/Behavioral:  Negative for confusion, hallucinations, self-injury, sleep disturbance and suicidal ideas. The patient is not nervous/anxious.      Objective:     Vital Signs (Most Recent):  Temp: 98.7 °F (37.1 °C) (03/19/24 1612)  Pulse: 69 (03/19/24 1612)  Resp: 20 (03/19/24 1612)  BP: (!) 154/72 (03/19/24 1612)  SpO2: 96 %  (03/19/24 1612) Vital Signs (24h Range):  Temp:  [98.3 °F (36.8 °C)-99.1 °F (37.3 °C)] 98.7 °F (37.1 °C)  Pulse:  [60-73] 69  Resp:  [14-32] 20  SpO2:  [95 %-98 %] 96 %  BP: (138-177)/(65-92) 154/72     Weight: 74.7 kg (164 lb 10.9 oz)  Body mass index is 27.4 kg/m².    Estimated Creatinine Clearance: 58.6 mL/min (based on SCr of 0.8 mg/dL).     Physical Exam  Vitals and nursing note reviewed.   Constitutional:       Appearance: She is well-developed.   HENT:      Head: Normocephalic and atraumatic.      Right Ear: External ear normal.      Left Ear: External ear normal.   Eyes:      General: No scleral icterus.        Right eye: No discharge.         Left eye: No discharge.      Conjunctiva/sclera: Conjunctivae normal.   Cardiovascular:      Rate and Rhythm: Normal rate and regular rhythm.      Heart sounds: Normal heart sounds. No murmur heard.     No friction rub. No gallop.   Pulmonary:      Effort: Pulmonary effort is normal. No respiratory distress.      Breath sounds: Normal breath sounds. No stridor. No wheezing or rales.   Abdominal:      General: Bowel sounds are normal.      Tenderness: There is abdominal tenderness in the suprapubic area.   Musculoskeletal:         General: No tenderness. Normal range of motion.   Skin:     General: Skin is warm and dry.   Neurological:      Mental Status: She is alert and oriented to person, place, and time.          Significant Labs: CBC:   Recent Labs   Lab 03/18/24  0336 03/19/24  0540   WBC 6.87 5.89   HGB 8.8* 8.7*   HCT 27.8* 28.1*    177       CMP:   Recent Labs   Lab 03/18/24  0336 03/19/24  0540   * 134*   K 4.2 4.2    105   CO2 23 23   * 216*   BUN 12 17   CREATININE 0.8 0.8   CALCIUM 8.9 9.9   PROT 5.1* 5.9*   ALBUMIN 2.4* 2.4*   BILITOT 0.3 0.3   ALKPHOS 64 83   AST 28 29   ALT 19 34   ANIONGAP 6* 6*       Microbiology Results (last 7 days)       Procedure Component Value Units Date/Time    Blood culture [[id number]] Collected:  03/19/24 0822    Order Status: Completed Specimen: Blood Updated: 03/19/24 1545     Blood Culture, Routine No Growth to date    Blood culture [[id number]] Collected: 03/19/24 0825    Order Status: Completed Specimen: Blood Updated: 03/19/24 1545     Blood Culture, Routine No Growth to date    Blood culture [[id number]]  (Abnormal) Collected: 03/18/24 0538    Order Status: Completed Specimen: Blood Updated: 03/19/24 1307     Blood Culture, Routine Gram stain aer bottle: Gram positive cocci in chains resembling Strep      Positive results previously called 03/18/2024      ENTEROCOCCUS FAECALIS  For susceptibility see order #[id number]      Blood culture [[id number]]  (Abnormal) Collected: 03/18/24 0538    Order Status: Completed Specimen: Blood Updated: 03/19/24 1306     Blood Culture, Routine Gram stain aer bottle: Gram positive cocci in chains resembling Strep      Positive results previously called 03/18/2024      ENTEROCOCCUS FAECALIS  For susceptibility see order #[id number]      Blood culture [[id number]]  (Abnormal) Collected: 03/17/24 1300    Order Status: Completed Specimen: Blood Updated: 03/19/24 1306     Blood Culture, Routine Gram stain aer bottle: Gram positive cocci in chains resembling Strep      Positive results previously called 03/17/2024 05:37      ENTEROCOCCUS FAECALIS  For susceptibility see order #[id number]      Blood culture [[id number]]  (Abnormal) Collected: 03/17/24 1300    Order Status: Completed Specimen: Blood Updated: 03/19/24 1305     Blood Culture, Routine Gram stain aer bottle: Gram positive cocci in chains resembling Strep      Results called to and read back by: Emilie Dela Cruz RN. 03/18/2024  06:17      Gram stain vinay bottle: Gram positive cocci in chains resembling Strep      03/18/2024  11:21      ENTEROCOCCUS FAECALIS  For susceptibility see order #[id number]      Blood culture x two cultures. Draw prior to antibiotics. [[id number]]  (Abnormal) Collected: 03/16/24 1420    Order  Status: Completed Specimen: Blood from Wrist, Right Updated: 03/19/24 1054     Blood Culture, Routine Gram stain aer bottle: Gram positive cocci in chains resembling Strep      Positive results previously called 03/17/2024  06:25      ENTEROCOCCUS FAECALIS  For susceptibility see order #[id number]      Narrative:      Aerobic and anaerobic    Blood culture x two cultures. Draw prior to antibiotics. [[id number]]  (Abnormal)  (Susceptibility) Collected: 03/16/24 1410    Order Status: Completed Specimen: Blood from Peripheral, Forearm, Left Updated: 03/19/24 1053     Blood Culture, Routine Gram stain aer bottle: Gram positive cocci in chains resembling Strep      Gram stain vinay bottle: Gram positive cocci in chains resembling Strep      Results called to and read back by: Emilie Napoles RN 03/17/2024  05:37      ENTEROCOCCUS FAECALIS    Narrative:      Aerobic and anaerobic    Urine culture [[id number]]  (Abnormal)  (Susceptibility) Collected: 03/16/24 1359    Order Status: Completed Specimen: Urine Updated: 03/18/24 2120     Urine Culture, Routine ENTEROCOCCUS FAECALIS  >100,000 cfu/ml      Narrative:      Specimen Source->Urine    Rapid Organism ID by PCR (from Blood culture) [[id number]]  (Abnormal) Collected: 03/16/24 1410    Order Status: Completed Updated: 03/17/24 0636     Enterococcus faecalis Detected     Enterococcus faecium Not Detected     Listeria monocytogenes Not Detected     Staphylococcus spp. Not Detected     Staphylococcus aureus Not Detected     Staphylococcus epidermidis Not Detected     Staphylococcus lugdunensis Not Detected     Streptococcus species Not Detected     Streptococcus agalactiae Not Detected     Streptococcus pneumoniae Not Detected     Streptococcus pyogenes Not Detected     Acinetobacter calcoaceticus/baumannii complex Not Detected     Bacteroides fragilis Not Detected     Enterobacterales Not Detected     Enterobacter cloacae complex Not Detected     Escherichia coli Not Detected      Klebsiella aerogenes Not Detected     Klebsiella oxytoca Not Detected     Klebsiella pneumoniae group Not Detected     Proteus Not Detected     Salmonella sp Not Detected     Serratia marcescens Not Detected     Haemophilus influenzae Not Detected     Neisseria meningtidis Not Detected     Pseudomonas aeruginosa Not Detected     Stenotrophomonas maltophilia Not Detected     Candida albicans Not Detected     Candida auris Not Detected     Candida glabrata Not Detected     Candida krusei Not Detected     Candida parapsilosis Not Detected     Candida tropicalis Not Detected     Cryptococcus neoformans/gattii Not Detected     CTX-M (ESBL ) Test Not Applicable     IMP (Carbapenem resistant) Test Not Applicable     KPC resistance gene (Carbapenem resistant) Test Not Applicable     mcr-1  Test Not Applicable     mec A/C  Test Not Applicable     mec A/C and MREJ (MRSA) gene Test Not Applicable     NDM (Carbapenem resistant) Test Not Applicable     OXA-48-like (Carbapenem resistant) Test Not Applicable     van A/B (VRE gene) Not Detected     VIM (Carbapenem resistant) Test Not Applicable    Narrative:      Aerobic and anaerobic          Urine Studies:   Recent Labs   Lab 03/16/24  1359   COLORU Yellow   APPEARANCEUA Hazy*   PHUR 6.0   SPECGRAV 1.020   PROTEINUA 1+*   GLUCUA 3+*   KETONESU Negative   BILIRUBINUA Negative   OCCULTUA 2+*   NITRITE Negative   LEUKOCYTESUR 2+*   RBCUA 14*   WBCUA 37*   BACTERIA Occasional   SQUAMEPITHEL 1   HYALINECASTS 0         Significant Imaging: I have reviewed all pertinent imaging results/findings within the past 24 hours.

## 2024-03-19 NOTE — ASSESSMENT & PLAN NOTE
Patient's FSGs are controlled on current medication regimen.  Last A1c reviewed-   Lab Results   Component Value Date    HGBA1C 8.2 (H) 11/09/2023    HGBA1C 8.2 (H) 11/09/2023     Most recent fingerstick glucose reviewed-   Recent Labs   Lab 03/18/24  1655 03/18/24  2035 03/19/24  0809 03/19/24  1202   POCTGLUCOSE 216* 300* 179* 221*       Current correctional scale  Low  Maintain anti-hyperglycemic dose as follows-   Antihyperglycemics (From admission, onward)    Start     Stop Route Frequency Ordered    03/16/24 2019  insulin aspart U-100 pen 0-5 Units         -- SubQ Before meals & nightly PRN 03/16/24 1920        Hold Oral hypoglycemics while patient is in the hospital.

## 2024-03-19 NOTE — CONSULTS
[facility] ([place])  Infectious Disease  Consult Note    Patient Name: Aminah Norton  MRN: [id number]  Admission Date: 3/16/2024  Hospital Length of Stay: 3 days  Attending Physician: Edwige Aly MD  Primary Care Provider: Jeison Sanchez MD     Isolation Status: No active isolations    Patient information was obtained from patient and ER records.      Consults  Assessment/Plan:     ID  * Enterococcal bacteremia  78F with h/o DM, HT, interstitial cystitis and urolithiasis s/p ureteroscopic stone extraction with lithotripsy and right ureteral stent placement (right) on 3/14 admitted 3/16 with chills, dizziness, flank pain, dysuria, freq, and a fall. Bcx 3/16, 3/17, 3/18 with e.faecalis in both sets. Repeat 3/19 NGTD. Fever defervesced, leukocytosis trending down. 2d echo without veg. Reports PCN allergy, but tolerating ampicillin.     Suspect bacteremia from  source; ureteral procedure likely contributed. Endocarditis needs to be excluded Fortunately clinically improving. Reviewed labs and hiv and hep c screening neg. 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 No signs of pulmonary TB.     Recommendations:   - continue ampicillin, add ceftriaxone duration TBD but will need at least 2-4weeks of iv abx followed by chronic supressive oral antibiotics until ureteral  stent is removed or replaced  - please wait for f/u bcx to be clear x 48-72h before placing picc  - agree with CARLITO  - consider ureteral stent exchange, especially since she's failing to clear bacteremia            Thank you for your consult. I will follow-up with patient. Please contact us if you have any additional questions.    Samina Velasquez MD  Infectious Disease  [facility] ([place])    Subjective:     Principal Problem: Enterococcal bacteremia    HPI: A 78-year-old woman with HTN, dm 2, hypothyroidism, interstitial cystitis, and  status post ureteroscopic stone extraction with lithotripsy and right ureteral stent placement on 03/01/2024 1 day prior to admission developed fever to 102.6 F with associated shaking chills, suprapubic tenderness, and dysuria.  This was also associated with hematuria, sensation of incomplete bladder emptying, as well as an inability to initiate micturition however the patient denies urgency, frequency, and back pain.  On evaluation in [facility] ED she was noted to be febrile to 101.3 F and tachycardic.  Laboratory workup revealed no evidence of leukocytosis.  Admission blood cultures are now positive for Gram-positive cocci in chains resembling strep with a rapid ID for Enterococcus faecalis.    Infectious disease is consulted for E faecalis bacteremia        Interval history: NAEO. Some persistent abd pain, but dysuria improved. Agreeable to CARLITO      Past Surgical History:   Procedure Laterality Date    APPENDECTOMY      BLADDER FULGURATION N/A 3/1/2024    Procedure: ULCER INJECTION FULGURATION;  Surgeon: Brody Smith MD;  Location: [facility];  Service: Urology;  Laterality: N/A;    BLADDER SUSPENSION      CATARACT EXTRACTION      right eye     CHOLECYSTECTOMY      COLONOSCOPY N/A 7/20/2020    Procedure: COLONOSCOPY;  Surgeon: Juan Parker MD;  Location: [facility] ENDO (4TH FLR);  Service: Endoscopy;  Laterality: N/A;  Hx of chronic anemia.  patient needs a   4/6/20 - removed from 4/20/20, rescheduled 7/20/20 - pg  covid 7[place]17[place] 2nd [place]    COLONOSCOPY N/A 2/9/2023    Procedure: COLONOSCOPY;  Surgeon: Renan Sanchez MD;  Location: [facility] ENDO (4TH FLR);  Service: Colon and Rectal;  Laterality: N/A;  instr handed to patient, -ml    CYSTOSCOPY N/A 5/19/2021    Procedure: CYSTOSCOPY;  Surgeon: Brody Smith MD;  Location: [facility] OR 1ST FLR;  Service: Urology;  Laterality: N/A;    CYSTOURETEROSCOPY, WITH HOLMIUM LASER LITHOTRIPSY OF URETERAL CALCULUS AND STENT INSERTION Right  3/1/2024    Procedure: CYSTOURETEROSCOPY, WITH HOLMIUM LASER LITHOTRIPSY OF URETERAL CALCULUS AND STENT INSERTION;  Surgeon: Brody Smith MD;  Location: [facility] OR;  Service: Urology;  Laterality: Right;    CYSTOURETEROSCOPY,WITH HOLMIUM LASER LITHOTRIPSY OF URETERAL CALCULUS Right 3/14/2024    Procedure: CYSTOURETEROSCOPY,WITH HOLMIUM LASER LITHOTRIPSY OF URETERAL CALCULUS;  Surgeon: Brody Smith MD;  Location: [facility] OR;  Service: Urology;  Laterality: Right;  1 HR    EYE SURGERY Bilateral     cataract removal    INJECTION OF STEROID N/A 3/14/2024    Procedure: INJECTION, STEROID;  Surgeon: Brody Smith MD;  Location: [facility] OR;  Service: Urology;  Laterality: N/A;    PLACEMENT-STENT Right 3/14/2024    Procedure: PLACEMENT-STENT;  Surgeon: Brody Smith MD;  Location: [facility] OR;  Service: Urology;  Laterality: Right;    REMOVAL-STENT Right 3/14/2024    Procedure: REMOVAL-STENT;  Surgeon: Brody Smith MD;  Location: [facility] OR;  Service: Urology;  Laterality: Right;    TOTAL ABDOMINAL HYSTERECTOMY      DUB       Review of patient's allergies indicates:   Allergen Reactions    Bufferin [aspirin, buffered] Itching    Atorvastatin      Myalgias and arthralgias    Shellfish containing products Itching     Shellfish causes her to itch per her daughter, Caro.     Warfarin     Ibuprofen Itching     Itching of eyes, head       Medications:  Medications Prior to Admission   Medication Sig    ACCU-CHEK SOFTCLIX LANCETS [facility] CHECK BLOOD SUGAR ONE TIME DAILY    amitriptyline (ELAVIL) 10 MG tablet Take 1 tablet (10 mg total) by mouth every evening.    BD ALCOHOL SWABS PadM USE AS DIRECTED TOPICALLY AS NEEDED    biotin 1 mg tablet Take 1,000 mcg by mouth 3 (three) times daily.    blood sugar diagnostic (ACCU-CHEK MATTIE PLUS TEST STRP) Strp TEST ONE TIME DAILY    blood-glucose meter kit To check BG 2  times daily, to use with insurance preferred meter, use as directed.    carvediloL (COREG) 25 MG  tablet TAKE 1 TABLET TWICE DAILY    cyanocobalamin (VITAMIN B-12) 1000 MCG tablet Take 100 mcg by mouth once daily.    gabapentin (NEURONTIN) 300 MG capsule Take 1 capsule (300 mg total) by mouth every evening.    glipiZIDE 5 MG TR24 Take 1 tablet (5 mg total) by mouth daily with breakfast.    [] HYDROcodone-acetaminophen (NORCO) 5-325 mg per tablet Take 1 tablet by mouth every 6 (six) hours as needed for Pain.    ketoconazole (NIZORAL) 2 % cream Apply topically once daily.    lancets (ACCU-CHEK MULTICLIX LANCET) Misc To use as directed with Accu Chek Sahra FastClix lancing device    lancets Misc To check BG 2 times daily, to use with insurance preferred meter.    levothyroxine (SYNTHROID) 112 MCG tablet Take 1 tablet (112 mcg total) by mouth before breakfast.    lisinopriL (PRINIVIL,ZESTRIL) 40 MG tablet Take 1 tablet (40 mg total) by mouth once daily.    metFORMIN (GLUCOPHAGE) 1000 MG tablet Take 1 tablet (1,000 mg total) by mouth 2 (two) times daily with meals.    mirabegron (MYRBETRIQ) 25 mg Tb24 ER tablet Take 1 tablet (25 mg total) by mouth once daily.    naproxen (NAPROSYN) 500 MG tablet Take 1 tablet (500 mg total) by mouth 2 (two) times daily with meals.    rosuvastatin (CRESTOR) 20 MG tablet Take 1 tablet (20 mg total) by mouth once daily.    SITagliptin phosphate (JANUVIA) 100 MG Tab Take 1 tablet (100 mg total) by mouth once daily.    turmeric root extract 500 mg Cap Take by mouth.    VITAMIN B COMPLEX ORAL Take 1 tablet by mouth.    vitamin D (VITAMIN D3) 1000 units Tab Take 1,000 Units by mouth once daily.     Antibiotics (From admission, onward)      Start     Stop Route Frequency Ordered    24 1300  cefTRIAXone (ROCEPHIN) 2 g in dextrose 5 % in water (D5W) 100 mL IVPB (MB+)         -- IV Every 12 hours (non-standard times) 24 1145    24 1100  ampicillin (OMNIPEN) 2 g in sodium chloride 0.9 % 100 mL IVPB (MB+)         -- IV Every 4 hours (non-standard times) 24 0953     03/16/24 2100  mupirocin 2 % ointment         03/21/24 2059 Nasl 2 times daily 03/16/24 1953          Antifungals (From admission, onward)      None          Antivirals (From admission, onward)      None             Immunization History   Administered Date(s) Administered    COVID-19, MRNA, LN-S, PF (Pfizer) (Gray Cap) 03/09/2022    COVID-19, mRNA, LNP-S, bivalent booster, PF (PFIZER OMICRON) 10/12/2022    COVID-19, vector-nr, rS-Ad26, PF (Katelin) 03/05/2021    Influenza (FLUAD) - Quadrivalent - Adjuvanted - PF *Preferred* (65+) 12/10/2021, 10/10/2022, 11/09/2023    Influenza - High Dose - PF (65 years and older) 12/02/2015, 09/15/2017, 11/26/2018    Pneumococcal Conjugate - 13 Valent 11/26/2018    Pneumococcal Polysaccharide - 23 Valent 01/18/2023       Family History       Problem Relation (Age of Onset)    Breast cancer Mother    COPD Father    Cancer Mother, Sister, Daughter    Diabetes Son    Hypertension Sister, Sister, Daughter, Daughter    Ovarian cancer Mother, Sister    Thyroid disease Daughter, Daughter          Social History     Socioeconomic History    Marital status:    Tobacco Use    Smoking status: Never    Smokeless tobacco: Never   Substance and Sexual Activity    Alcohol use: No    Drug use: No    Sexual activity: Yes     Partners: Male     Birth control/protection: Post-menopausal     Comment:     Other Topics Concern    Are you pregnant or think you may be? No    Breast-feeding No   Social History Narrative     with 7 kids     Social Determinants of Health     Financial Resource Strain: Low Risk  (3/18/2024)    Overall Financial Resource Strain (CARDIA)     Difficulty of Paying Living Expenses: Not very hard   Food Insecurity: No Food Insecurity (3/18/2024)    Hunger Vital Sign     Worried About Running Out of Food in the Last Year: Never true     Ran Out of Food in the Last Year: Never true   Transportation Needs: No Transportation Needs (3/18/2024)    PRAPARE -  Transportation     Lack of Transportation (Medical): No     Lack of Transportation (Non-Medical): No   Physical Activity: Inactive (3/18/2024)    Exercise Vital Sign     Days of Exercise per Week: 0 days     Minutes of Exercise per Session: 0 min   Stress: No Stress Concern Present (3/18/2024)    [employer] - Occupational Stress Questionnaire     Feeling of Stress : Only a little   Social Connections: Unknown (3/18/2024)    Social Connection and Isolation Panel [NHANES]     Frequency of Communication with Friends and Family: More than three times a week     Frequency of Social Gatherings with Friends and Family: Twice a week     Attends Gn[employer] Services: 1 to 4 times per year     Active Member of Clubs or Organizations: Yes     Attends Club or Organization Meetings: Never     Marital Status: Patient declined   Housing Stability: Unknown (3/18/2024)    Housing Stability Vital Sign     Unable to Pay for Housing in the Last Year: No     Unstable Housing in the Last Year: No     Review of Systems   Constitutional:  Positive for chills and fever. Negative for fatigue and unexpected weight change.   HENT:  Negative for ear pain, facial swelling, hearing loss, mouth sores, nosebleeds, rhinorrhea, sinus pressure, sore throat, tinnitus, trouble swallowing and voice change.    Eyes:  Negative for photophobia, pain, redness and visual disturbance.   Respiratory:  Negative for cough, chest tightness, shortness of breath and wheezing.    Cardiovascular:  Negative for chest pain, palpitations and leg swelling.   Gastrointestinal:  Negative for abdominal pain, blood in stool, constipation, diarrhea, nausea and vomiting.   Endocrine: Negative for cold intolerance, heat intolerance, polydipsia, polyphagia and polyuria.   Genitourinary:  Positive for difficulty urinating, dysuria, hematuria and urgency. Negative for flank pain, frequency, menstrual problem, vaginal bleeding, vaginal discharge and  vaginal pain.   Musculoskeletal:  Negative for arthralgias, back pain, joint swelling, myalgias and neck pain.   Skin:  Negative for rash.   Allergic/Immunologic: Negative for environmental allergies, food allergies and immunocompromised state.   Neurological:  Negative for dizziness, seizures, syncope, weakness, light-headedness, numbness and headaches.   Hematological:  Negative for adenopathy. Does not bruise/bleed easily.   Psychiatric/Behavioral:  Negative for confusion, hallucinations, self-injury, sleep disturbance and suicidal ideas. The patient is not nervous/anxious.      Objective:     Vital Signs (Most Recent):  Temp: 98.7 °F (37.1 °C) (03/19/24 1612)  Pulse: 69 (03/19/24 1612)  Resp: 20 (03/19/24 1612)  BP: (!) 154/72 (03/19/24 1612)  SpO2: 96 % (03/19/24 1612) Vital Signs (24h Range):  Temp:  [98.3 °F (36.8 °C)-99.1 °F (37.3 °C)] 98.7 °F (37.1 °C)  Pulse:  [60-73] 69  Resp:  [14-32] 20  SpO2:  [95 %-98 %] 96 %  BP: (138-177)/(65-92) 154/72     Weight: 74.7 kg (164 lb 10.9 oz)  Body mass index is 27.4 kg/m².    Estimated Creatinine Clearance: 58.6 mL/min (based on SCr of 0.8 mg/dL).     Physical Exam  Vitals and nursing note reviewed.   Constitutional:       Appearance: She is well-developed.   HENT:      Head: Normocephalic and atraumatic.      Right Ear: External ear normal.      Left Ear: External ear normal.   Eyes:      General: No scleral icterus.        Right eye: No discharge.         Left eye: No discharge.      Conjunctiva/sclera: Conjunctivae normal.   Cardiovascular:      Rate and Rhythm: Normal rate and regular rhythm.      Heart sounds: Normal heart sounds. No murmur heard.     No friction rub. No gallop.   Pulmonary:      Effort: Pulmonary effort is normal. No respiratory distress.      Breath sounds: Normal breath sounds. No stridor. No wheezing or rales.   Abdominal:      General: Bowel sounds are normal.      Tenderness: There is abdominal tenderness in the suprapubic area.    Musculoskeletal:         General: No tenderness. Normal range of motion.   Skin:     General: Skin is warm and dry.   Neurological:      Mental Status: She is alert and oriented to person, place, and time.          Significant Labs: CBC:   Recent Labs   Lab 03/18/24  0336 03/19/24  0540   WBC 6.87 5.89   HGB 8.8* 8.7*   HCT 27.8* 28.1*    177       CMP:   Recent Labs   Lab 03/18/24  0336 03/19/24  0540   * 134*   K 4.2 4.2    105   CO2 23 23   * 216*   BUN 12 17   CREATININE 0.8 0.8   CALCIUM 8.9 9.9   PROT 5.1* 5.9*   ALBUMIN 2.4* 2.4*   BILITOT 0.3 0.3   ALKPHOS 64 83   AST 28 29   ALT 19 34   ANIONGAP 6* 6*       Microbiology Results (last 7 days)       Procedure Component Value Units Date/Time    Blood culture [[id number]] Collected: 03/19/24 0822    Order Status: Completed Specimen: Blood Updated: 03/19/24 1545     Blood Culture, Routine No Growth to date    Blood culture [[id number]] Collected: 03/19/24 0825    Order Status: Completed Specimen: Blood Updated: 03/19/24 1545     Blood Culture, Routine No Growth to date    Blood culture [[id number]]  (Abnormal) Collected: 03/18/24 0538    Order Status: Completed Specimen: Blood Updated: 03/19/24 1307     Blood Culture, Routine Gram stain aer bottle: Gram positive cocci in chains resembling Strep      Positive results previously called 03/18/2024      ENTEROCOCCUS FAECALIS  For susceptibility see order #[id number]      Blood culture [[id number]]  (Abnormal) Collected: 03/18/24 0538    Order Status: Completed Specimen: Blood Updated: 03/19/24 1306     Blood Culture, Routine Gram stain aer bottle: Gram positive cocci in chains resembling Strep      Positive results previously called 03/18/2024      ENTEROCOCCUS FAECALIS  For susceptibility see order #[id number]      Blood culture [[id number]]  (Abnormal) Collected: 03/17/24 1300    Order Status: Completed Specimen: Blood Updated: 03/19/24 1306     Blood Culture, Routine Gram stain aer  bottle: Gram positive cocci in chains resembling Strep      Positive results previously called 03/17/2024 05:37      ENTEROCOCCUS FAECALIS  For susceptibility see order #[id number]      Blood culture [[id number]]  (Abnormal) Collected: 03/17/24 1300    Order Status: Completed Specimen: Blood Updated: 03/19/24 1305     Blood Culture, Routine Gram stain aer bottle: Gram positive cocci in chains resembling Strep      Results called to and read back by: Emilie Dela Cruz RN. 03/18/2024  06:17      Gram stain vinay bottle: Gram positive cocci in chains resembling Strep      03/18/2024  11:21      ENTEROCOCCUS FAECALIS  For susceptibility see order #[id number]      Blood culture x two cultures. Draw prior to antibiotics. [[id number]]  (Abnormal) Collected: 03/16/24 1420    Order Status: Completed Specimen: Blood from Wrist, Right Updated: 03/19/24 1054     Blood Culture, Routine Gram stain aer bottle: Gram positive cocci in chains resembling Strep      Positive results previously called 03/17/2024  06:25      ENTEROCOCCUS FAECALIS  For susceptibility see order #[id number]      Narrative:      Aerobic and anaerobic    Blood culture x two cultures. Draw prior to antibiotics. [[id number]]  (Abnormal)  (Susceptibility) Collected: 03/16/24 1410    Order Status: Completed Specimen: Blood from Peripheral, Forearm, Left Updated: 03/19/24 1053     Blood Culture, Routine Gram stain aer bottle: Gram positive cocci in chains resembling Strep      Gram stain vinay bottle: Gram positive cocci in chains resembling Strep      Results called to and read back by: Emilie Napoles RN 03/17/2024  05:37      ENTEROCOCCUS FAECALIS    Narrative:      Aerobic and anaerobic    Urine culture [[id number]]  (Abnormal)  (Susceptibility) Collected: 03/16/24 1359    Order Status: Completed Specimen: Urine Updated: 03/18/24 2120     Urine Culture, Routine ENTEROCOCCUS FAECALIS  >100,000 cfu/ml      Narrative:      Specimen Source->Urine    Rapid Organism ID by PCR  (from Blood culture) [[id number]]  (Abnormal) Collected: 03/16/24 1410    Order Status: Completed Updated: 03/17/24 0636     Enterococcus faecalis Detected     Enterococcus faecium Not Detected     Listeria monocytogenes Not Detected     Staphylococcus spp. Not Detected     Staphylococcus aureus Not Detected     Staphylococcus epidermidis Not Detected     Staphylococcus lugdunensis Not Detected     Streptococcus species Not Detected     Streptococcus agalactiae Not Detected     Streptococcus pneumoniae Not Detected     Streptococcus pyogenes Not Detected     Acinetobacter calcoaceticus/baumannii complex Not Detected     Bacteroides fragilis Not Detected     Enterobacterales Not Detected     Enterobacter cloacae complex Not Detected     Escherichia coli Not Detected     Klebsiella aerogenes Not Detected     Klebsiella oxytoca Not Detected     Klebsiella pneumoniae group Not Detected     Proteus Not Detected     Salmonella sp Not Detected     Serratia marcescens Not Detected     Haemophilus influenzae Not Detected     Neisseria meningtidis Not Detected     Pseudomonas aeruginosa Not Detected     Stenotrophomonas maltophilia Not Detected     Candida albicans Not Detected     Candida auris Not Detected     Candida glabrata Not Detected     Candida krusei Not Detected     Candida parapsilosis Not Detected     Candida tropicalis Not Detected     Cryptococcus neoformans/gattii Not Detected     CTX-M (ESBL ) Test Not Applicable     IMP (Carbapenem resistant) Test Not Applicable     KPC resistance gene (Carbapenem resistant) Test Not Applicable     mcr-1  Test Not Applicable     mec A/C  Test Not Applicable     mec A/C and MREJ (MRSA) gene Test Not Applicable     NDM (Carbapenem resistant) Test Not Applicable     OXA-48-like (Carbapenem resistant) Test Not Applicable     van A/B (VRE gene) Not Detected     VIM (Carbapenem resistant) Test Not Applicable    Narrative:      Aerobic and anaerobic          Urine Studies:    Recent Labs   Lab 03/16/24  1359   COLORU Yellow   APPEARANCEUA Hazy*   PHUR 6.0   SPECGRAV 1.020   PROTEINUA 1+*   GLUCUA 3+*   KETONESU Negative   BILIRUBINUA Negative   OCCULTUA 2+*   NITRITE Negative   LEUKOCYTESUR 2+*   RBCUA 14*   WBCUA 37*   BACTERIA Occasional   SQUAMEPITHEL 1   HYALINECASTS 0         Significant Imaging: I have reviewed all pertinent imaging results/findings within the past 24 hours.

## 2024-03-19 NOTE — PROGRESS NOTES
[facility] ([place] [address])  [facility] Medicine  Progress Note    Patient Name: Aminah Norton  MRN: [id number]  Patient Class: IP- Inpatient   Admission Date: 3/16/2024  Length of Stay: 3 days  Attending Physician: Edwige Aly MD  Primary Care Provider: Jeison Sanchez MD        Subjective:     Principal Problem:Enterococcal bacteremia        HPI:  77 yo female with DM2, GERD, HPLD, HTN, Hypothyroidism, recent stone removal presenting with chills, weakness, and back pain. Interpretor MERI used at bedside. She states that she was d/c'd on the 14th of March was doing okay until last night she started feeling weak and actually fell, she denies any leg pain or headache and did loss consciousness, however she did not feel any better today whenever she woke up so presented to the ER.    She had a fever measured here at 102.6F, CT scan showed perinephric stranding, UA consistent with infection, lactic acidosis. Urology was consulted who recommended day and antibiotics. Medicine called for admission.     Overview/Hospital Course:  Started on ceftriaxone at admission, changed to ampicillin 3/17 as per ID. Blood cultures quickly resulted positive for e faecalis. ID consulted. Patient passed voiding trial 3/18.     Interval History: Blood cultures continue to be positive. Patient denies symptoms. Denies chest pain, SOB, n/v, c/d.       Review of Systems  Objective:     Vital Signs (Most Recent):  Temp: 98.3 °F (36.8 °C) (03/19/24 1202)  Pulse: 73 (03/19/24 1202)  Resp: 18 (03/19/24 1213)  BP: (!) 153/85 (03/19/24 1202)  SpO2: 96 % (03/19/24 1202) Vital Signs (24h Range):  Temp:  [97.9 °F (36.6 °C)-99.1 °F (37.3 °C)] 98.3 °F (36.8 °C)  Pulse:  [60-73] 73  Resp:  [14-32] 18  SpO2:  [95 %-98 %] 96 %  BP: (138-177)/(65-92) 153/85     Weight: 74.7 kg (164 lb 10.9 oz)  Body mass index is 27.4 kg/m².    Intake/Output Summary (Last 24 hours) at 3/19/2024 1328  Last data filed at 3/19/2024  0900  Gross per 24 hour   Intake 1065.2 ml   Output 1350 ml   Net -284.8 ml         Physical Exam  Vitals and nursing note reviewed.   Constitutional:       General: She is not in acute distress.     Appearance: She is not ill-appearing.   HENT:      Mouth/Throat:      Mouth: Mucous membranes are moist.   Eyes:      General: No scleral icterus.     Extraocular Movements: Extraocular movements intact.      Pupils: Pupils are equal, round, and reactive to light.   Cardiovascular:      Rate and Rhythm: Normal rate and regular rhythm.      Pulses: Normal pulses.      Heart sounds: Normal heart sounds.   Pulmonary:      Effort: Pulmonary effort is normal. No respiratory distress.      Breath sounds: Normal breath sounds. No wheezing.   Abdominal:      General: There is no distension.      Palpations: Abdomen is soft.      Tenderness: There is no abdominal tenderness.   Musculoskeletal:      Right lower leg: No edema.      Left lower leg: No edema.   Skin:     General: Skin is warm and dry.      Findings: No rash.   Neurological:      General: No focal deficit present.      Mental Status: She is alert and oriented to person, place, and time.   Psychiatric:         Mood and Affect: Mood normal.         Behavior: Behavior normal.             Significant Labs: All pertinent labs within the past 24 hours have been reviewed.    Significant Imaging: I have reviewed all pertinent imaging results/findings within the past 24 hours.    Assessment/Plan:      * Enterococcal bacteremia  Patient presented to ED with fevers and malaise 2 days after urologic procedure.  CT with evidence nephritic stranding.  Admitted for pyelonephritis.  Blood cultures on presentation 3/16 positive for Enterococcus faecalis. Suspect urologic source. TTE negative  - ID consulted   - Repeat blood cultures ordered  - CARLITO ordered  - S/p ceftriaxone in ED and on admission, continued for double coverage due to persistent bacteremia  - started on ampicillin  3/17   - urology following     Pyelonephritis  Acute, urine consistent with infection.   -  see above        Sepsis  This patient does have evidence of infective focus  My overall impression is sepsis.  Source: Urinary Tract  Antibiotics given-   Antibiotics (72h ago, onward)      Start     Stop Route Frequency Ordered    03/19/24 1300  cefTRIAXone (ROCEPHIN) 2 g in dextrose 5 % in water (D5W) 100 mL IVPB (MB+)         -- IV Every 12 hours (non-standard times) 03/19/24 1145    03/17/24 1100  ampicillin (OMNIPEN) 2 g in sodium chloride 0.9 % 100 mL IVPB (MB+)         -- IV Every 4 hours (non-standard times) 03/17/24 0954    03/16/24 2100  mupirocin 2 % ointment         03/21/24 2059 Nasl 2 times daily 03/16/24 1953          Latest lactate reviewed-  Recent Labs   Lab 03/16/24 2004 03/16/24 2010   LACTATE 1.1  --    POCLAC  --  0.89     Fluid challenge Ideal Body Weight- The patient's ideal body weight is Ideal body weight: 57 kg (125 lb 10.6 oz) which will be used to calculate fluid bolus of 30 ml/kg for treatment of septic shock.      Post- resuscitation assessment No - Post resuscitation assessment not needed     ANAIS    Will Not start Pressors- Levophed for MAP of 65  Source control achieved by: rocephin    ANAIS (acute kidney injury)  Resolved  Patient with acute kidney injury/acute renal failure likely due to pre-renal azotemia due to dehydration. Cr 1.2 on presentation. ANAIS is currently improving. Baseline creatinine  0.8  - Labs reviewed- Renal function/electrolytes with Estimated Creatinine Clearance: 58.6 mL/min (based on SCr of 0.8 mg/dL). according to latest data. Monitor urine output and serial BMP and adjust therapy as needed. Avoid nephrotoxins and renally dose meds for GFR listed above.    Anemia  Patient's anemia is currently controlled. Has not received any PRBCs to date. Etiology likely d/t chronic disease v iron deficiency. No obvious signs of bleeding.  Current CBC reviewed-   Lab Results    Component Value Date    HGB 8.7 (L) 03/19/2024    HCT 28.1 (L) 03/19/2024     Monitor serial CBC and transfuse if patient becomes hemodynamically unstable, symptomatic or H/H drops below 7/21.  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  Last A1c reviewed-   Lab Results   Component Value Date    HGBA1C 8.2 (H) 11/09/2023    HGBA1C 8.2 (H) 11/09/2023     Most recent fingerstick glucose reviewed-   Recent Labs   Lab 03/18/24  1655 03/18/24  2035 03/19/24  0809 03/19/24  1202   POCTGLUCOSE 216* 300* 179* 221*       Current correctional scale  Low  Maintain anti-hyperglycemic dose as follows-   Antihyperglycemics (From admission, onward)      Start     Stop Route Frequency Ordered    03/16/24 2019  insulin aspart U-100 pen 0-5 Units         -- SubQ Before meals & nightly PRN 03/16/24 1920          Hold Oral hypoglycemics while patient is in the hospital.    GERD (gastroesophageal reflux disease)  Chronic, controlled, continue to monitor      HTN (hypertension), benign  Chronic, controlled. Latest blood pressure and vitals reviewed-     Temp:  [97.9 °F (36.6 °C)-99.1 °F (37.3 °C)]   Pulse:  [60-73]   Resp:  [14-32]   BP: (138-177)/(65-92)   SpO2:  [95 %-98 %] .   Home meds for hypertension were reviewed and noted below.   Hypertension Medications               carvediloL (COREG) 25 MG tablet TAKE 1 TABLET TWICE DAILY    lisinopriL (PRINIVIL,ZESTRIL) 40 MG tablet Take 1 tablet (40 mg total) by mouth once daily.            While in the hospital, will manage blood pressure as follows; Adjust home antihypertensive regimen as follows- held on presentation, restart as tolerated    Will utilize p.r.n. blood pressure medication only if patient's blood  pressure greater than 180/110 and she develops symptoms such as worsening chest pain or shortness of breath.    Hypothyroidism  Chronic, controlled, continue home synthroid        VTE Risk Mitigation (From admission, onward)           Ordered     enoxaparin injection 40 mg  Daily         03/16/24 1920     IP VTE HIGH RISK PATIENT  Once         03/16/24 1920     Place sequential compression device  Until discontinued         03/16/24 1920                    Discharge Planning   RAJESH: 3/22/2024     Code Status: Full Code   Is the patient medically ready for discharge?: No    Reason for patient still in hospital (select all that apply): Patient trending condition, Laboratory test, and Consult recommendations  Discharge Plan A: Home Health, Home with family                  Edwige Aly MD  Department of Hospital Medicine   [facility] ([place])

## 2024-03-19 NOTE — ASSESSMENT & PLAN NOTE
Resolved  Patient with acute kidney injury/acute renal failure likely due to pre-renal azotemia due to dehydration. Cr 1.2 on presentation. ANAIS is currently improving. Baseline creatinine  0.8  - Labs reviewed- Renal function/electrolytes with Estimated Creatinine Clearance: 58.6 mL/min (based on SCr of 0.8 mg/dL). according to latest data. Monitor urine output and serial BMP and adjust therapy as needed. Avoid nephrotoxins and renally dose meds for GFR listed above.

## 2024-03-19 NOTE — ASSESSMENT & PLAN NOTE
Patient presented to ED with fevers and malaise 2 days after urologic procedure.  CT with evidence nephritic stranding.  Admitted for pyelonephritis.  Blood cultures on presentation 3/16 positive for Enterococcus faecalis. Suspect urologic source. TTE negative  - ID consulted   - Repeat blood cultures ordered  - CARLITO ordered  - S/p ceftriaxone in ED and on admission, continued for double coverage due to persistent bacteremia  - started on ampicillin 3/17   - urology following

## 2024-03-19 NOTE — PROGRESS NOTES
Urology Progress Note    Urine and blood cultures resulted with enterococcus. ID following and agree with their recommendations. Will continue IV ampicillin until stents can be removed    - Message sent to Dr. Smith's office for stent removal in 2 weeks  - No additional urologic interventions required while inpatient  - Remainder of care per primary    Urology will now sign off. Please call with any additional questions/concerns.     Ander Urban MD  Department of Urology  PGY-5  Pager: [phone]

## 2024-03-19 NOTE — SUBJECTIVE & OBJECTIVE
Interval History: Blood cultures continue to be positive. Patient denies symptoms. Denies chest pain, SOB, n/v, c/d.       Review of Systems  Objective:     Vital Signs (Most Recent):  Temp: 98.3 °F (36.8 °C) (03/19/24 1202)  Pulse: 73 (03/19/24 1202)  Resp: 18 (03/19/24 1213)  BP: (!) 153/85 (03/19/24 1202)  SpO2: 96 % (03/19/24 1202) Vital Signs (24h Range):  Temp:  [97.9 °F (36.6 °C)-99.1 °F (37.3 °C)] 98.3 °F (36.8 °C)  Pulse:  [60-73] 73  Resp:  [14-32] 18  SpO2:  [95 %-98 %] 96 %  BP: (138-177)/(65-92) 153/85     Weight: 74.7 kg (164 lb 10.9 oz)  Body mass index is 27.4 kg/m².    Intake/Output Summary (Last 24 hours) at 3/19/2024 1328  Last data filed at 3/19/2024 0900  Gross per 24 hour   Intake 1065.2 ml   Output 1350 ml   Net -284.8 ml         Physical Exam  Vitals and nursing note reviewed.   Constitutional:       General: She is not in acute distress.     Appearance: She is not ill-appearing.   HENT:      Mouth/Throat:      Mouth: Mucous membranes are moist.   Eyes:      General: No scleral icterus.     Extraocular Movements: Extraocular movements intact.      Pupils: Pupils are equal, round, and reactive to light.   Cardiovascular:      Rate and Rhythm: Normal rate and regular rhythm.      Pulses: Normal pulses.      Heart sounds: Normal heart sounds.   Pulmonary:      Effort: Pulmonary effort is normal. No respiratory distress.      Breath sounds: Normal breath sounds. No wheezing.   Abdominal:      General: There is no distension.      Palpations: Abdomen is soft.      Tenderness: There is no abdominal tenderness.   Musculoskeletal:      Right lower leg: No edema.      Left lower leg: No edema.   Skin:     General: Skin is warm and dry.      Findings: No rash.   Neurological:      General: No focal deficit present.      Mental Status: She is alert and oriented to person, place, and time.   Psychiatric:         Mood and Affect: Mood normal.         Behavior: Behavior normal.             Significant  Labs: All pertinent labs within the past 24 hours have been reviewed.    Significant Imaging: I have reviewed all pertinent imaging results/findings within the past 24 hours.

## 2024-03-19 NOTE — ASSESSMENT & PLAN NOTE
Chronic, controlled. Latest blood pressure and vitals reviewed-     Temp:  [97.9 °F (36.6 °C)-99.1 °F (37.3 °C)]   Pulse:  [60-73]   Resp:  [14-32]   BP: (138-177)/(65-92)   SpO2:  [95 %-98 %] .   Home meds for hypertension were reviewed and noted below.   Hypertension Medications               carvediloL (COREG) 25 MG tablet TAKE 1 TABLET TWICE DAILY    lisinopriL (PRINIVIL,ZESTRIL) 40 MG tablet Take 1 tablet (40 mg total) by mouth once daily.            While in the hospital, will manage blood pressure as follows; Adjust home antihypertensive regimen as follows- held on presentation, restart as tolerated    Will utilize p.r.n. blood pressure medication only if patient's blood pressure greater than 180/110 and she develops symptoms such as worsening chest pain or shortness of breath.

## 2024-03-19 NOTE — ASSESSMENT & PLAN NOTE
78F with h/o DM, HT, interstitial cystitis and urolithiasis s/p ureteroscopic stone extraction with lithotripsy and right ureteral stent placement (right) on 3/14 admitted 3/16 with chills, dizziness, flank pain, dysuria, freq, and a fall. Bcx 3/16, 3/17, 3/18 with e.faecalis in both sets. Repeat 3/19 NGTD. Fever defervesced, leukocytosis trending down. 2d echo without veg. Reports PCN allergy, but tolerating ampicillin.     Suspect bacteremia from  source; ureteral procedure likely contributed. Endocarditis needs to be excluded Fortunately clinically improving. Reviewed labs and hiv and hep c screening neg. 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 No signs of pulmonary TB.     Recommendations:   - continue ampicillin, add ceftriaxone duration TBD but will need at least 2-4weeks of iv abx followed by chronic supressive oral antibiotics until ureteral  stent is removed or replaced  - please wait for f/u bcx to be clear x 48-72h before placing picc  - agree with CARLITO  - consider ureteral stent exchange, especially since she's failing to clear bacteremia

## 2024-03-19 NOTE — PLAN OF CARE
Problem: Adult Inpatient Plan of Care  Goal: Plan of Care Review  Outcome: Ongoing, Progressing  Goal: Patient-Specific Goal (Individualized)  Outcome: Ongoing, Progressing  Goal: Absence of Hospital-Acquired Illness or Injury  Outcome: Ongoing, Progressing  Goal: Optimal Comfort and Wellbeing  Outcome: Ongoing, Progressing  Goal: Readiness for Transition of Care  Outcome: Ongoing, Progressing     Problem: Infection  Goal: Absence of Infection Signs and Symptoms  Outcome: Ongoing, Progressing     Problem: Diabetes Comorbidity  Goal: Blood Glucose Level Within Targeted Range  Outcome: Ongoing, Progressing     Problem: Adjustment to Illness (Sepsis/Septic Shock)  Goal: Optimal Coping  Outcome: Ongoing, Progressing     Problem: Fluid and Electrolyte Imbalance (Acute Kidney Injury/Impairment)  Goal: Fluid and Electrolyte Balance  Outcome: Ongoing, Progressing     Problem: Renal Function Impairment (Acute Kidney Injury/Impairment)  Goal: Effective Renal Function  Outcome: Ongoing, Progressing     Problem: Pain Acute  Goal: Acceptable Pain Control and Functional Ability  Outcome: Ongoing, Progressing

## 2024-03-19 NOTE — NURSING
PT AAOx4, vitals stable. Up to bedside commode with stand by assist. BG monitored. insulin and IV abx given as order. No acute event this shift. Bed low and locked, personal items and call light in reach.

## 2024-03-19 NOTE — CONSULTS
[facility], [place]  CARLITO Consult      Aminah Alicea-Dermody  YOB: 1945  Medical Record Number:  [id number]  Attending Physician:  Edwige Aly MD   Current Principal Problem:  Enterococcal bacteremia    History     Cc: Enterococcus faecalis bacteremia    HPI  77yo female with PMHx significant for HTN, DM2, hypothyroidism, interstitial cystitis, and status post ureteroscopic stone extraction with lithotripsy and right ureteral stent placement on 03/01/2024. 3/16, 3/17, 3/18 blood cultures are positive for Gram-positive cocci that grew as Enterococcus faecalis. TTE unremarkable. No prior history of endocarditis, valve replacement. No prior TEEs.    TTE 3/18/24    Left Ventricle: The left ventricle is normal in size. Normal wall thickness. There is concentric remodeling. There is normal systolic function with a visually estimated ejection fraction of 60 - 65%. There is normal diastolic function.    Right Ventricle: Normal right ventricular cavity size. Wall thickness is normal. Right ventricle wall motion  is normal. Systolic function is normal.    Left Atrium: Left atrium is mildly dilated.    Aortic Valve: The aortic valve is a trileaflet valve. There is mild aortic valve sclerosis.    Pulmonary Artery: The estimated pulmonary artery systolic pressure is 32 mmHg.    IVC/SVC: Normal venous pressure at 3 mmHg.    Dysphagia or odynophagia:  No  Liver Disease, esophageal disease, or known varices:  No  Upper GI Bleeding: No  Snoring:  No  Sleep Apnea:  No  Prior neck surgery or radiation:  No  History of anesthetic difficulties:  No  Family history of anesthetic difficulties:  No  Last oral intake: yesterday before midnight  Able to move neck in all directions:  Yes    Medications - Outpatient  Prior to Admission medications    Medication Sig Start Date End Date Taking? Authorizing Provider   ACCU-CHEK SOFTCLIX LANCETS [facility] CHECK BLOOD SUGAR ONE TIME DAILY 3/31/20   William  Dereje PERALTA MD   amitriptyline (ELAVIL) 10 MG tablet Take 1 tablet (10 mg total) by mouth every evening. 2/28/22 3/14/24  Brody Smith MD   BD ALCOHOL SWABS PadM USE AS DIRECTED TOPICALLY AS NEEDED 8/23/21   Dereje Thomas MD   biotin 1 mg tablet Take 1,000 mcg by mouth 3 (three) times daily.    Provider, Historical   blood sugar diagnostic (ACCU-CHEK MATTIE PLUS TEST STRP) Strp TEST ONE TIME DAILY 1/18/24   Jeison Sanchez MD   blood-glucose meter kit To check BG 2  times daily, to use with insurance preferred meter, use as directed. 8/11/23   Jeison Sanchez MD   carvediloL (COREG) 25 MG tablet TAKE 1 TABLET TWICE DAILY 10/3/23   Jeison Sanchez MD   cyanocobalamin (VITAMIN B-12) 1000 MCG tablet Take 100 mcg by mouth once daily.    Provider, Historical   gabapentin (NEURONTIN) 300 MG capsule Take 1 capsule (300 mg total) by mouth every evening. 5/9/23   Jeison Sanchez MD   glipiZIDE 5 MG TR24 Take 1 tablet (5 mg total) by mouth daily with breakfast. 11/9/23   Jeison Sanchez MD   ketoconazole (NIZORAL) 2 % cream Apply topically once daily. 2/26/19   Dereje Thomas MD   lancets (ACCU-CHEK MULTICLIX LANCET) Misc To use as directed with Accu Chek Sahra FastClix lancing device 11/1/16   Dereje Thomas MD   lancets Misc To check BG 2 times daily, to use with insurance preferred meter. 12/14/18   Parish Pepper APRN, FNP   levothyroxine (SYNTHROID) 112 MCG tablet Take 1 tablet (112 mcg total) by mouth before breakfast. 11/9/23   Jeison Sanchez MD   lisinopriL (PRINIVIL,ZESTRIL) 40 MG tablet Take 1 tablet (40 mg total) by mouth once daily. 1/19/24   Jeison Sanchez MD   metFORMIN (GLUCOPHAGE) 1000 MG tablet Take 1 tablet (1,000 mg total) by mouth 2 (two) times daily with meals. 8/11/23   Jeison Sanchez MD   mirabegron (MYRBETRIQ) 25 mg Tb24 ER tablet Take 1 tablet (25 mg total) by mouth once daily. 6/29/22 3/14/24  Bridget Burns MD   naproxen (NAPROSYN) 500 MG tablet  Take 1 tablet (500 mg total) by mouth 2 (two) times daily with meals. 11/9/23   Jeison Sanchez MD   rosuvastatin (CRESTOR) 20 MG tablet Take 1 tablet (20 mg total) by mouth once daily. 2/29/24 2/28/25  Jeison Sanchez MD   SITagliptin phosphate (JANUVIA) 100 MG Tab Take 1 tablet (100 mg total) by mouth once daily. 8/11/23 8/10/24  Jeison Sanchez MD   turmeric root extract 500 mg Cap Take by mouth.    Provider, Historical   VITAMIN B COMPLEX ORAL Take 1 tablet by mouth.    Provider, Historical   vitamin D (VITAMIN D3) 1000 units Tab Take 1,000 Units by mouth once daily.    Provider, Historical     Medications - Current  Scheduled Meds:   amitriptyline  10 mg Oral QHS    ampicillin (OMNIPEN) 2 g in  mL EXTENDED INFUSION  2 g Intravenous Q4H    carvediloL  6.25 mg Oral BID    enoxparin  40 mg Subcutaneous Daily    gabapentin  300 mg Oral QHS    levothyroxine  112 mcg Oral Before breakfast    lisinopriL  40 mg Oral Daily    mupirocin   Nasal BID     Allergies  Review of patient's allergies indicates:   Allergen Reactions    Bufferin [aspirin, buffered] Itching    Atorvastatin      Myalgias and arthralgias    Shellfish containing products Itching     Shellfish causes her to itch per her daughter, Caro.     Warfarin     Ibuprofen Itching     Itching of eyes, head     Past Medical History  Past Medical History:   Diagnosis Date    Anticoagulant long-term use     Arthritis     Cataract     left eye    Choroidal rupture of left eye     Diabetes mellitus type II     GERD (gastroesophageal reflux disease)     Hyperlipidemia     Hypertension     Hypothyroidism     Macular scar     right eye    Retinal degeneration     chorioretinal OD    Thyroid disease     Vitamin B 12 deficiency      Past Surgical History  Past Surgical History:   Procedure Laterality Date    APPENDECTOMY      BLADDER FULGURATION N/A 3/1/2024    Procedure: ULCER INJECTION FULGURATION;  Surgeon: Brody Smith MD;  Location: [facility] OR;   Service: Urology;  Laterality: N/A;    BLADDER SUSPENSION      CATARACT EXTRACTION      right eye     CHOLECYSTECTOMY      COLONOSCOPY N/A 7/20/2020    Procedure: COLONOSCOPY;  Surgeon: Juan Parker MD;  Location: [facility] ENDO (4TH FLR);  Service: Endoscopy;  Laterality: N/A;  Hx of chronic anemia.  patient needs a   4/6/20 - removed from 4/20/20, rescheduled 7/20/20 - pg  covid 7/17-[facility] 2nd floor-tb    COLONOSCOPY N/A 2/9/2023    Procedure: COLONOSCOPY;  Surgeon: Renan Sanchez MD;  Location: [facility] ENDO (4TH FLR);  Service: Colon and Rectal;  Laterality: N/A;  instr handed to patient, -ml    CYSTOSCOPY N/A 5/19/2021    Procedure: CYSTOSCOPY;  Surgeon: Brody Smith MD;  Location: [facility] OR 1ST FLR;  Service: Urology;  Laterality: N/A;    CYSTOURETEROSCOPY, WITH HOLMIUM LASER LITHOTRIPSY OF URETERAL CALCULUS AND STENT INSERTION Right 3/1/2024    Procedure: CYSTOURETEROSCOPY, WITH HOLMIUM LASER LITHOTRIPSY OF URETERAL CALCULUS AND STENT INSERTION;  Surgeon: Brody Smith MD;  Location: [facility] OR;  Service: Urology;  Laterality: Right;    CYSTOURETEROSCOPY,WITH HOLMIUM LASER LITHOTRIPSY OF URETERAL CALCULUS Right 3/14/2024    Procedure: CYSTOURETEROSCOPY,WITH HOLMIUM LASER LITHOTRIPSY OF URETERAL CALCULUS;  Surgeon: Brody Smith MD;  Location: [facility] OR;  Service: Urology;  Laterality: Right;  1 HR    EYE SURGERY Bilateral     cataract removal    INJECTION OF STEROID N/A 3/14/2024    Procedure: INJECTION, STEROID;  Surgeon: Brody Smith MD;  Location: [facility] OR;  Service: Urology;  Laterality: N/A;    PLACEMENT-STENT Right 3/14/2024    Procedure: PLACEMENT-STENT;  Surgeon: Brody Smith MD;  Location: [facility] OR;  Service: Urology;  Laterality: Right;    REMOVAL-STENT Right 3/14/2024    Procedure: REMOVAL-STENT;  Surgeon: Brody Smith MD;  Location: [facility] OR;  Service: Urology;  Laterality: Right;    TOTAL ABDOMINAL HYSTERECTOMY      DUB     ROS  10  point ROS performed and negative except as stated in HPI     Physical Examination   Vital Signs  Vitals  Temp: 99.1 °F (37.3 °C)  Temp Source: Oral  Pulse: 68  Heart Rate Source: Monitor  Resp: 19  SpO2: 95 %  Pulse Oximetry Type: Intermittent  BP: (!) 177/77  MAP (mmHg): 111  BP Location: Left arm  BP Method: Automatic  Patient Position: Lying    24 Hour VS Range  Temp:  [97.9 °F (36.6 °C)-99.1 °F (37.3 °C)]   Pulse:  [60-73]   Resp:  [14-19]   BP: (138-177)/(65-92)   SpO2:  [95 %-98 %]     Intake/Output Summary (Last 24 hours) at 3/19/2024 1106  Last data filed at 3/19/2024 0532  Gross per 24 hour   Intake 805.2 ml   Output 1350 ml   Net -544.8 ml         Physical Exam:   Constitutional: no acute distress  HEENT: NCAT, EOMI, no scleral icterus  Cardiovascular: Regular rate and rhythm  Pulmonary: Normal respiratory effort   Abdomen: nontender, non-distended   Neuro: alert and oriented, no focal deficits  Extremities: warm, no edema   MSK: no deformities  Skin: intact, no rashes     Data     Recent Labs   Lab 03/16/24  1410 03/17/24  0556 03/18/24  0336 03/19/24  0540   WBC 12.30 9.15 6.87 5.89   HGB 11.0* 8.4* 8.8* 8.7*   HCT 34.5* 26.3* 27.8* 28.1*    182 166 177      Recent Labs   Lab 03/16/24  1410 03/17/24  0556 03/18/24  0336 03/19/24  0540   * 131* 132* 134*   K 4.9 3.7 4.2 4.2    101 103 105   CO2 21* 24 23 23   BUN 21 16 12 17   CREATININE 1.2 0.9 0.8 0.8   ANIONGAP 11 6* 6* 6*   CALCIUM 10.2 8.8 8.9 9.9      Assessment & Plan     CARLITO for assessment of endocarditis  -No absolute contraindications of esophageal stricture, tumor, perforation, laceration,or diverticulum and/or active GI bleed  -The risks, benefits & alternatives of the procedure were explained to the patient.   -The risks of transesophageal echo include but are not limited to:  Dental trauma, esophageal trauma/perforation, bleeding, laryngospasm/brochospasm, aspiration, sore throat/hoarseness, & dislodgement of the  endotracheal tube/nasogastric tube (where applicable).    -The risks of ANES monitored sedation include hypotension, respiratory depression, arrhythmias, bronchospasm, & death.    -Informed consent was obtained. The patient is agreeable to proceed with the procedure and all questions and concerns addressed.    Case discussed with an attending in echocardiography lab.    Constantino Hart MD  [facility]   Cardiovascular Disease PGY-V

## 2024-03-19 NOTE — ANESTHESIA PREPROCEDURE EVALUATION
[facility]  Anesthesia Pre-Operative Evaluation         Patient Name: Aminah Norton  YOB: 1945  MRN: [id number]    SUBJECTIVE:     Pre-operative evaluation for Procedure(s) (LRB):  Transesophageal echo (CARLITO) intra-procedure log documentation (N/A)     03/19/2024    Aminah Norton is a 78 y.o. female w/ a significant PMHx of  DM, HT, interstitial cystitis and urolithiasis s/p ureteroscopic stone extraction with lithotripsy and right ureteral stent placement (right) on 3/14 admitted 3/16 with chills, dizziness, flank pain, dysuria, freq, and a fall. Bcx 3/16, 3/17 with e.faecalis in both sets. Reports PCN allergy, but tolerating ampicillin.     Consent done in [place] with an  with RN witness.       Patient now presents for the above procedure(s).    Echo Summary  Results for orders placed during the hospital encounter of 03/16/24    Echo    Interpretation Summary    Left Ventricle: The left ventricle is normal in size. Normal wall thickness. There is concentric remodeling. There is normal systolic function with a visually estimated ejection fraction of 60 - 65%. There is normal diastolic function.    Right Ventricle: Normal right ventricular cavity size. Wall thickness is normal. Right ventricle wall motion  is normal. Systolic function is normal.    Left Atrium: Left atrium is mildly dilated.    Aortic Valve: The aortic valve is a trileaflet valve. There is mild aortic valve sclerosis.    Pulmonary Artery: The estimated pulmonary artery systolic pressure is 32 mmHg.    IVC/SVC: Normal venous pressure at 3 mmHg.       Prev airway:     Intubation     Date/Time: 3/14/2024 9:58 AM     Performed by: Maggie Carmona CRNA  Authorized by: Colton Posada MD    Intubation:     Induction:  Intravenous    Intubated:  Postinduction    Mask Ventilation:  Easy with oral airway    Attempts:  1    Attempted By:  CRNA    Method of Intubation:  Video  laryngoscopy    Blade:  Bhatt 3    Laryngeal View Grade: Grade I - full view of cords      Difficult Airway Encountered?: No      Complications:  None    Airway Device:  Oral endotracheal tube    Airway Device Size:  7.0    Style/Cuff Inflation:  Cuffed (inflated to minimal occlusive pressure)    Tube secured:  21    Secured at:  The lips    Placement Verified By:  Capnometry    Complicating Factors:  None    Findings Post-Intubation:  BS equal bilateral and atraumatic/condition of teeth unchanged    LDA:        Peripheral IV - Single Lumen 03/18/24 1000 20 G Anterior;Right Forearm (Active)   Site Assessment Clean;Dry;Intact 03/19/24 0800   Line Status Flushed 03/19/24 0800   Dressing Status Intact;Dry;Clean 03/19/24 0800   Dressing Intervention Integrity maintained 03/19/24 0800   Number of days: 1         Patient Active Problem List   Diagnosis    Myalgia    Plantar fasciitis of left foot    Hypothyroidism    Elevated cholesterol with high triglycerides    HTN (hypertension), benign    Vitamin B 12 deficiency    GERD (gastroesophageal reflux disease)    Neuropathy    Type 2 diabetes mellitus with neurologic complication, without long-term current use of insulin    Diabetic polyneuropathy    Cataracts, bilateral    Seborrheic dermatitis    Back pain    Facet syndrome    Bilateral low back pain without sciatica    Spondylisthesis    Vitamin D insufficiency    Lower urinary tract symptoms (LUTS)    Hematuria    Bilateral leg pain    Aortic atherosclerosis    Bilateral shoulder pain    TB lung, latent    Hyperlipidemia    Neck pain    Upper extremity weakness    Posture abnormality    Bladder pain    Lower extremity weakness    Right ureteral stone    Interstitial cystitis    Pyelonephritis    Sepsis    ANAIS (acute kidney injury)    Enterococcal bacteremia    Anemia    Ureteral stent present    Positive QuantiFERON-TB Gold test       Review of patient's allergies indicates:   Allergen Reactions    Bufferin [aspirin,  buffered] Itching    Atorvastatin      Myalgias and arthralgias    Shellfish containing products Itching     Shellfish causes her to itch per her daughter, Caro.     Warfarin     Ibuprofen Itching     Itching of eyes, head       Current Inpatient Medications:   amitriptyline  10 mg Oral QHS    ampicillin (OMNIPEN) 2 g in  mL EXTENDED INFUSION  2 g Intravenous Q4H    carvediloL  6.25 mg Oral BID    enoxparin  40 mg Subcutaneous Daily    gabapentin  300 mg Oral QHS    levothyroxine  112 mcg Oral Before breakfast    lisinopriL  40 mg Oral Daily    mupirocin   Nasal BID       No current facility-administered medications on file prior to encounter.     Current Outpatient Medications on File Prior to Encounter   Medication Sig Dispense Refill    ACCU-CHEK SOFTCLIX LANCETS Misc CHECK BLOOD SUGAR ONE TIME DAILY 100 each 2    amitriptyline (ELAVIL) 10 MG tablet Take 1 tablet (10 mg total) by mouth every evening. 30 tablet 11    BD ALCOHOL SWABS PadM USE AS DIRECTED TOPICALLY AS NEEDED 300 each 1    biotin 1 mg tablet Take 1,000 mcg by mouth 3 (three) times daily.      blood sugar diagnostic (ACCU-CHEK MATTIE PLUS TEST STRP) Strp TEST ONE TIME DAILY 100 strip 3    blood-glucose meter kit To check BG 2  times daily, to use with insurance preferred meter, use as directed. 1 each 0    carvediloL (COREG) 25 MG tablet TAKE 1 TABLET TWICE DAILY 180 tablet 1    cyanocobalamin (VITAMIN B-12) 1000 MCG tablet Take 100 mcg by mouth once daily.      gabapentin (NEURONTIN) 300 MG capsule Take 1 capsule (300 mg total) by mouth every evening. 90 capsule 3    glipiZIDE 5 MG TR24 Take 1 tablet (5 mg total) by mouth daily with breakfast. 90 tablet 3    ketoconazole (NIZORAL) 2 % cream Apply topically once daily. 60 g 1    lancets (ACCU-CHEK MULTICLIX LANCET) Misc To use as directed with Accu Chek Sahra FastClix lancing device 100 each 2    lancets Misc To check BG 2 times daily, to use with insurance preferred meter. 200 each 3     levothyroxine (SYNTHROID) 112 MCG tablet Take 1 tablet (112 mcg total) by mouth before breakfast. 90 tablet 3    lisinopriL (PRINIVIL,ZESTRIL) 40 MG tablet Take 1 tablet (40 mg total) by mouth once daily. 90 tablet 3    metFORMIN (GLUCOPHAGE) 1000 MG tablet Take 1 tablet (1,000 mg total) by mouth 2 (two) times daily with meals. 180 tablet 1    mirabegron (MYRBETRIQ) 25 mg Tb24 ER tablet Take 1 tablet (25 mg total) by mouth once daily. 90 tablet 0    naproxen (NAPROSYN) 500 MG tablet Take 1 tablet (500 mg total) by mouth 2 (two) times daily with meals. 180 tablet 3    rosuvastatin (CRESTOR) 20 MG tablet Take 1 tablet (20 mg total) by mouth once daily. 90 tablet 3    SITagliptin phosphate (JANUVIA) 100 MG Tab Take 1 tablet (100 mg total) by mouth once daily. 90 tablet 3    turmeric root extract 500 mg Cap Take by mouth.      VITAMIN B COMPLEX ORAL Take 1 tablet by mouth.      vitamin D (VITAMIN D3) 1000 units Tab Take 1,000 Units by mouth once daily.         Past Surgical History:   Procedure Laterality Date    APPENDECTOMY      BLADDER FULGURATION N/A 3/1/2024    Procedure: ULCER INJECTION FULGURATION;  Surgeon: Brody Smith MD;  Location: [facility];  Service: Urology;  Laterality: N/A;    BLADDER SUSPENSION      CATARACT EXTRACTION      right eye     CHOLECYSTECTOMY      COLONOSCOPY N/A 7/20/2020    Procedure: COLONOSCOPY;  Surgeon: Juan Parker MD;  Location: [facility] (4TH FLR);  Service: Endoscopy;  Laterality: N/A;  Hx of chronic anemia.  patient needs a   4/6/20 - removed from 4/20/20, rescheduled 7/20/20 - pg  covid 7/17-[facility] 2nd floor-tb    COLONOSCOPY N/A 2/9/2023    Procedure: COLONOSCOPY;  Surgeon: Renan Sanchez MD;  Location: [facility] (4TH FLR);  Service: Colon and Rectal;  Laterality: N/A;  instr handed to patient, -ml    CYSTOSCOPY N/A 5/19/2021    Procedure: CYSTOSCOPY;  Surgeon: Brody Smith MD;  Location: [facility] OR 1ST FLR;  Service: Urology;   Laterality: N/A;    CYSTOURETEROSCOPY, WITH HOLMIUM LASER LITHOTRIPSY OF URETERAL CALCULUS AND STENT INSERTION Right 3/1/2024    Procedure: CYSTOURETEROSCOPY, WITH HOLMIUM LASER LITHOTRIPSY OF URETERAL CALCULUS AND STENT INSERTION;  Surgeon: Brody Smith MD;  Location: [facility] OR;  Service: Urology;  Laterality: Right;    CYSTOURETEROSCOPY,WITH HOLMIUM LASER LITHOTRIPSY OF URETERAL CALCULUS Right 3/14/2024    Procedure: CYSTOURETEROSCOPY,WITH HOLMIUM LASER LITHOTRIPSY OF URETERAL CALCULUS;  Surgeon: Brody Smith MD;  Location: [facility] OR;  Service: Urology;  Laterality: Right;  1 HR    EYE SURGERY Bilateral     cataract removal    INJECTION OF STEROID N/A 3/14/2024    Procedure: INJECTION, STEROID;  Surgeon: Brody Smith MD;  Location: [facility] OR;  Service: Urology;  Laterality: N/A;    PLACEMENT-STENT Right 3/14/2024    Procedure: PLACEMENT-STENT;  Surgeon: Brody Smith MD;  Location: [facility] OR;  Service: Urology;  Laterality: Right;    REMOVAL-STENT Right 3/14/2024    Procedure: REMOVAL-STENT;  Surgeon: Brody Smith MD;  Location: [facility] OR;  Service: Urology;  Laterality: Right;    TOTAL ABDOMINAL HYSTERECTOMY      DUB       Social History:  Tobacco Use: Low Risk  (3/15/2024)    Patient History     Smoking Tobacco Use: Never     Smokeless Tobacco Use: Never     Passive Exposure: Not on file      Alcohol Use: Patient Declined (3/18/2024)    AUDIT-C     Frequency of Alcohol Consumption: Patient declined     Average Number of Drinks: Patient declined     Frequency of Binge Drinking: Patient declined        OBJECTIVE:     Vital Signs Range (Last 24H):  Temp:  [36.6 °C (97.9 °F)-37.3 °C (99.1 °F)]   Pulse:  [60-73]   Resp:  [14-19]   BP: (138-177)/(65-92)   SpO2:  [95 %-98 %]       Significant Labs:  Lab Results   Component Value Date    WBC 5.89 03/19/2024    HGB 8.7 (L) 03/19/2024    HCT 28.1 (L) 03/19/2024     03/19/2024    CHOL 225 (H) 11/09/2023    TRIG 218 (H) 11/09/2023     HDL 59 11/09/2023    ALT 34 03/19/2024    AST 29 03/19/2024     (L) 03/19/2024    K 4.2 03/19/2024     03/19/2024    CREATININE 0.8 03/19/2024    BUN 17 03/19/2024    CO2 23 03/19/2024    TSH 3.405 11/09/2023    HGBA1C 8.2 (H) 11/09/2023    HGBA1C 8.2 (H) 11/09/2023       Diagnostic Studies: No relevant studies.    EKG:   Results for orders placed or performed during the hospital encounter of 03/16/24   EKG 12-lead    Collection Time: 03/16/24  2:48 PM   Result Value Ref Range    QRS Duration 82 ms    [facility] QTC Calculation 427 ms    Narrative    Test Reason : R53.1,    Vent. Rate : 111 BPM     Atrial Rate : 111 BPM     P-R Int : 152 ms          QRS Dur : 082 ms      QT Int : 314 ms       P-R-T Axes : 060 053 127 degrees     QTc Int : 427 ms    Sinus tachycardia  Nonspecific ST and T wave abnormality  Abnormal ECG  When compared with ECG of 26-APR-2023 14:56,  Vent. rate has increased BY  39 BPM  T wave inversion now evident in Inferior leads  Confirmed by Cal Restrepo MD ([id number]) on 3/17/2024 10:45:49 AM    Referred By: [email or web address]   SELF           Confirmed By:Cal Restrepo MD       2D ECHO:  TTE:  Results for orders placed or performed during the hospital encounter of 03/16/24   Echo   Result Value Ref Range    RA Width 3.02 cm    LA volume (mod) 76.72 cm3    Left Atrium Major Axis 5.78 cm    Left Atrium Minor Axis 5.79 cm    RA Major Axis 5.30 cm    LV Diastolic Volume 67.90 mL    LV Systolic Volume 30.47 mL    MV Peak A Safia 1.32 m/s    MV stenosis pressure 1/2 time 70.87 ms    TR Max Safia 2.70 m/s    MV Peak E Safia 1.25 m/s    Ao VTI 28.31 cm    Ao peak safia 1.39 m/s    LVOT peak VTI 27.98 cm    LVOT peak safia 1.23 m/s    LVOT diameter 2.04 cm    IVRT 119.89 msec    E wave deceleration time 244.37 msec    AV mean gradient 5 mmHg    TAPSE 1.29 cm    RVDD 3.17 cm    LA size 3.82 cm    Ascending aorta 3.27 cm    STJ 2.42 cm    Sinus 2.78 cm    LVIDs 2.84 2.1 - 4.0 cm    Posterior Wall 0.94 0.6 - 1.1 cm    IVS  1.03 0.6 - 1.1 cm    LVIDd 3.95 3.5 - 6.0 cm    TDI LATERAL 0.08 m/s    LA WIDTH 4.0 cm    TDI SEPTAL 0.08 m/s    LV LATERAL E/E' RATIO 15.63 m/s    LV SEPTAL E/E' RATIO 15.63 m/s    FS 28 28 - 44 %    LA volume 75.14 cm3    LV mass 121.94 g    ZLVIDD -2.50     ZLVIDS -0.77     Left Ventricle Relative Wall Thickness 0.48 cm    AV valve area 3.23 cm²    AV Velocity Ratio 0.88     AV index (prosthetic) 0.99     MV valve area p 1/2 method 3.10 cm2    E/A ratio 0.95     Mean e' 0.08 m/s    LVOT area 3.3 cm2    LVOT stroke volume 91.41 cm3    AV peak gradient 8 mmHg    E/E' ratio 15.63 m/s    LV Systolic Volume Index 16.7 mL/m2    LV Diastolic Volume Index 37.10 mL/m2    LA Volume Index 41.1 mL/m2    LV Mass Index 67 g/m2    Triscuspid Valve Regurgitation Peak Gradient 29 mmHg    LA Volume Index (Mod) 41.9 mL/m2    VICKI by Velocity Ratio 2.89 cm²    BSA 1.86 m2    TV resting pulmonary artery pressure 32 mmHg    RV TB RVSP 6 mmHg    Est. RA pres 3 mmHg    Narrative      Left Ventricle: The left ventricle is normal in size. Normal wall   thickness. There is concentric remodeling. There is normal systolic   function with a visually estimated ejection fraction of 60 - 65%. There is   normal diastolic function.    Right Ventricle: Normal right ventricular cavity size. Wall thickness   is normal. Right ventricle wall motion  is normal. Systolic function is   normal.    Left Atrium: Left atrium is mildly dilated.    Aortic Valve: The aortic valve is a trileaflet valve. There is mild   aortic valve sclerosis.    Pulmonary Artery: The estimated pulmonary artery systolic pressure is   32 mmHg.    IVC/SVC: Normal venous pressure at 3 mmHg.         CARLITO:  No results found for this or any previous visit.    ASSESSMENT/PLAN:           Pre-op Assessment    I have reviewed the Patient Summary Reports.     I have reviewed the Nursing Notes.    I have reviewed the Medications.     Review of Systems  Anesthesia Hx:  No problems with previous  Anesthesia             Denies Family Hx of Anesthesia complications.    Denies Personal Hx of Anesthesia complications.                    Cardiovascular:     Hypertension                                        Renal/:  Chronic Renal Disease                Hepatic/GI:     GERD             Endocrine:  Diabetes Hypothyroidism              Physical Exam  General: Well nourished, Cooperative, Oriented and Alert    Airway:  Mallampati: II   Mouth Opening: Normal  TM Distance: Normal    Dental:  Dentures        Anesthesia Plan  Type of Anesthesia, risks & benefits discussed:    Anesthesia Type: Gen ETT, Gen Supraglottic Airway, Gen Natural Airway, MAC  Intra-op Monitoring Plan: Standard ASA Monitors  Post Op Pain Control Plan: multimodal analgesia and IV/PO Opioids PRN  Induction:  IV  Airway Plan: Direct and Video  Informed Consent: Informed consent signed with the Patient and all parties understand the risks and agree with anesthesia plan.  All questions answered.   ASA Score: 3  Day of Surgery Review of History & Physical: H&P Update referred to the surgeon/provider.    Ready For Surgery From Anesthesia Perspective.     .

## 2024-03-19 NOTE — ASSESSMENT & PLAN NOTE
Patient's anemia is currently controlled. Has not received any PRBCs to date. Etiology likely d/t chronic disease v iron deficiency. No obvious signs of bleeding.  Current CBC reviewed-   Lab Results   Component Value Date    HGB 8.7 (L) 03/19/2024    HCT 28.1 (L) 03/19/2024     Monitor serial CBC and transfuse if patient becomes hemodynamically unstable, symptomatic or H/H drops below 7/21.  - iron panel ordered

## 2024-03-19 NOTE — PLAN OF CARE
Pt with no overnight events. Urinating in BSC without difficulty after cath removal.    Problem: Adult Inpatient Plan of Care  Goal: Plan of Care Review  Outcome: Ongoing, Progressing  Goal: Patient-Specific Goal (Individualized)  Outcome: Ongoing, Progressing  Goal: Absence of Hospital-Acquired Illness or Injury  Outcome: Ongoing, Progressing  Goal: Optimal Comfort and Wellbeing  Outcome: Ongoing, Progressing  Goal: Readiness for Transition of Care  Outcome: Ongoing, Progressing     Problem: Infection  Goal: Absence of Infection Signs and Symptoms  Outcome: Ongoing, Progressing     Problem: Diabetes Comorbidity  Goal: Blood Glucose Level Within Targeted Range  Outcome: Ongoing, Progressing     Problem: Adjustment to Illness (Sepsis/Septic Shock)  Goal: Optimal Coping  Outcome: Ongoing, Progressing     Problem: Bleeding (Sepsis/Septic Shock)  Goal: Absence of Bleeding  Outcome: Ongoing, Progressing     Problem: Glycemic Control Impaired (Sepsis/Septic Shock)  Goal: Blood Glucose Level Within Desired Range  Outcome: Ongoing, Progressing     Problem: Infection Progression (Sepsis/Septic Shock)  Goal: Absence of Infection Signs and Symptoms  Outcome: Ongoing, Progressing     Problem: Nutrition Impaired (Sepsis/Septic Shock)  Goal: Optimal Nutrition Intake  Outcome: Ongoing, Progressing     Problem: Fluid and Electrolyte Imbalance (Acute Kidney Injury/Impairment)  Goal: Fluid and Electrolyte Balance  Outcome: Ongoing, Progressing

## 2024-03-20 ENCOUNTER — ANESTHESIA (OUTPATIENT)
Dept: MEDSURG UNIT | Facility: HOSPITAL | Age: 79
DRG: 854 | End: 2024-03-20
Payer: MEDICARE

## 2024-03-20 LAB
ALBUMIN SERPL BCP-MCNC: 3 G/DL (ref 3.5–5.2)
ALP SERPL-CCNC: 88 U/L (ref 55–135)
ALT SERPL W/O P-5'-P-CCNC: 37 U/L (ref 10–44)
ANION GAP SERPL CALC-SCNC: 10 MMOL/L (ref 8–16)
ASCENDING AORTA: 2.7 CM
AST SERPL-CCNC: 19 U/L (ref 10–40)
BACTERIA BLD CULT: ABNORMAL
BASOPHILS # BLD AUTO: 0.03 K/UL (ref 0–0.2)
BASOPHILS NFR BLD: 0.5 % (ref 0–1.9)
BILIRUB SERPL-MCNC: 0.3 MG/DL (ref 0.1–1)
BSA FOR ECHO PROCEDURE: 1.81 M2
BUN SERPL-MCNC: 12 MG/DL (ref 8–23)
CALCIUM SERPL-MCNC: 10.1 MG/DL (ref 8.7–10.5)
CHLORIDE SERPL-SCNC: 101 MMOL/L (ref 95–110)
CO2 SERPL-SCNC: 23 MMOL/L (ref 23–29)
CREAT SERPL-MCNC: 0.9 MG/DL (ref 0.5–1.4)
DIFFERENTIAL METHOD BLD: ABNORMAL
EOSINOPHIL # BLD AUTO: 0.1 K/UL (ref 0–0.5)
EOSINOPHIL NFR BLD: 1.1 % (ref 0–8)
ERYTHROCYTE [DISTWIDTH] IN BLOOD BY AUTOMATED COUNT: 17.9 % (ref 11.5–14.5)
EST. GFR  (NO RACE VARIABLE): >60 ML/MIN/1.73 M^2
GLUCOSE SERPL-MCNC: 189 MG/DL (ref 70–110)
HCT VFR BLD AUTO: 34 % (ref 37–48.5)
HGB BLD-MCNC: 10.4 G/DL (ref 12–16)
IMM GRANULOCYTES # BLD AUTO: 0.04 K/UL (ref 0–0.04)
IMM GRANULOCYTES NFR BLD AUTO: 0.7 % (ref 0–0.5)
LAA PV: 50 CM/S
LYMPHOCYTES # BLD AUTO: 0.6 K/UL (ref 1–4.8)
LYMPHOCYTES NFR BLD: 10.3 % (ref 18–48)
MCH RBC QN AUTO: 22.3 PG (ref 27–31)
MCHC RBC AUTO-ENTMCNC: 30.6 G/DL (ref 32–36)
MCV RBC AUTO: 73 FL (ref 82–98)
MONOCYTES # BLD AUTO: 0.7 K/UL (ref 0.3–1)
MONOCYTES NFR BLD: 12.9 % (ref 4–15)
NEUTROPHILS # BLD AUTO: 4.2 K/UL (ref 1.8–7.7)
NEUTROPHILS NFR BLD: 74.5 % (ref 38–73)
NRBC BLD-RTO: 0 /100 WBC
PLATELET # BLD AUTO: 209 K/UL (ref 150–450)
PMV BLD AUTO: 10.2 FL (ref 9.2–12.9)
POCT GLUCOSE: 159 MG/DL (ref 70–110)
POCT GLUCOSE: 162 MG/DL (ref 70–110)
POCT GLUCOSE: 200 MG/DL (ref 70–110)
POCT GLUCOSE: 208 MG/DL (ref 70–110)
POCT GLUCOSE: 217 MG/DL (ref 70–110)
POCT GLUCOSE: 228 MG/DL (ref 70–110)
POCT GLUCOSE: 327 MG/DL (ref 70–110)
POCT GLUCOSE: 459 MG/DL (ref 70–110)
POTASSIUM SERPL-SCNC: 4.6 MMOL/L (ref 3.5–5.1)
PROT SERPL-MCNC: 6.5 G/DL (ref 6–8.4)
RBC # BLD AUTO: 4.66 M/UL (ref 4–5.4)
SINUS: 2.9 CM
SODIUM SERPL-SCNC: 134 MMOL/L (ref 136–145)
STJ: 2.7 CM
WBC # BLD AUTO: 5.65 K/UL (ref 3.9–12.7)

## 2024-03-20 PROCEDURE — 25500020 PHARM REV CODE 255: Performed by: STUDENT IN AN ORGANIZED HEALTH CARE EDUCATION/TRAINING PROGRAM

## 2024-03-20 PROCEDURE — 25000003 PHARM REV CODE 250: Performed by: STUDENT IN AN ORGANIZED HEALTH CARE EDUCATION/TRAINING PROGRAM

## 2024-03-20 PROCEDURE — 85025 COMPLETE CBC W/AUTO DIFF WBC: CPT | Performed by: STUDENT IN AN ORGANIZED HEALTH CARE EDUCATION/TRAINING PROGRAM

## 2024-03-20 PROCEDURE — 37000009 HC ANESTHESIA EA ADD 15 MINS

## 2024-03-20 PROCEDURE — 63600175 PHARM REV CODE 636 W HCPCS: Performed by: INTERNAL MEDICINE

## 2024-03-20 PROCEDURE — 25000003 PHARM REV CODE 250: Performed by: INTERNAL MEDICINE

## 2024-03-20 PROCEDURE — 63600175 PHARM REV CODE 636 W HCPCS: Performed by: HOSPITALIST

## 2024-03-20 PROCEDURE — B24BZZ4 ULTRASONOGRAPHY OF HEART WITH AORTA, TRANSESOPHAGEAL: ICD-10-PCS | Performed by: HOSPITALIST

## 2024-03-20 PROCEDURE — 99233 SBSQ HOSP IP/OBS HIGH 50: CPT | Mod: ,,, | Performed by: INTERNAL MEDICINE

## 2024-03-20 PROCEDURE — 20600001 HC STEP DOWN PRIVATE ROOM

## 2024-03-20 PROCEDURE — 80053 COMPREHEN METABOLIC PANEL: CPT | Performed by: STUDENT IN AN ORGANIZED HEALTH CARE EDUCATION/TRAINING PROGRAM

## 2024-03-20 PROCEDURE — 37000008 HC ANESTHESIA 1ST 15 MINUTES

## 2024-03-20 PROCEDURE — 25000003 PHARM REV CODE 250: Performed by: HOSPITALIST

## 2024-03-20 PROCEDURE — 63600175 PHARM REV CODE 636 W HCPCS: Performed by: STUDENT IN AN ORGANIZED HEALTH CARE EDUCATION/TRAINING PROGRAM

## 2024-03-20 PROCEDURE — 87040 BLOOD CULTURE FOR BACTERIA: CPT | Performed by: STUDENT IN AN ORGANIZED HEALTH CARE EDUCATION/TRAINING PROGRAM

## 2024-03-20 RX ORDER — PHENYLEPHRINE HCL IN 0.9% NACL 1 MG/10 ML
SYRINGE (ML) INTRAVENOUS
Status: DISCONTINUED | OUTPATIENT
Start: 2024-03-20 | End: 2024-03-20

## 2024-03-20 RX ORDER — PROPOFOL 10 MG/ML
VIAL (ML) INTRAVENOUS
Status: DISCONTINUED | OUTPATIENT
Start: 2024-03-20 | End: 2024-03-20

## 2024-03-20 RX ORDER — LIDOCAINE HYDROCHLORIDE 20 MG/ML
SOLUTION OROPHARYNGEAL
Status: DISCONTINUED | OUTPATIENT
Start: 2024-03-20 | End: 2024-03-20

## 2024-03-20 RX ORDER — INSULIN ASPART 100 [IU]/ML
3 INJECTION, SOLUTION INTRAVENOUS; SUBCUTANEOUS
Status: DISCONTINUED | OUTPATIENT
Start: 2024-03-21 | End: 2024-03-28 | Stop reason: HOSPADM

## 2024-03-20 RX ORDER — ONDANSETRON HYDROCHLORIDE 2 MG/ML
4 INJECTION, SOLUTION INTRAVENOUS DAILY PRN
Status: DISCONTINUED | OUTPATIENT
Start: 2024-03-20 | End: 2024-03-20 | Stop reason: HOSPADM

## 2024-03-20 RX ORDER — LIDOCAINE HYDROCHLORIDE 20 MG/ML
INJECTION INTRAVENOUS
Status: DISCONTINUED | OUTPATIENT
Start: 2024-03-20 | End: 2024-03-20

## 2024-03-20 RX ORDER — INSULIN ASPART 100 [IU]/ML
4 INJECTION, SOLUTION INTRAVENOUS; SUBCUTANEOUS ONCE
Status: COMPLETED | OUTPATIENT
Start: 2024-03-20 | End: 2024-03-20

## 2024-03-20 RX ADMIN — LISINOPRIL 40 MG: 20 TABLET ORAL at 08:03

## 2024-03-20 RX ADMIN — AMPICILLIN 2 G: 2 INJECTION, POWDER, FOR SOLUTION INTRAMUSCULAR; INTRAVENOUS at 09:03

## 2024-03-20 RX ADMIN — AMITRIPTYLINE HYDROCHLORIDE 10 MG: 10 TABLET, FILM COATED ORAL at 08:03

## 2024-03-20 RX ADMIN — MUPIROCIN: 20 OINTMENT TOPICAL at 08:03

## 2024-03-20 RX ADMIN — AMPICILLIN 2 G: 2 INJECTION, POWDER, FOR SOLUTION INTRAMUSCULAR; INTRAVENOUS at 05:03

## 2024-03-20 RX ADMIN — PROPOFOL 20 MG: 10 INJECTION, EMULSION INTRAVENOUS at 02:03

## 2024-03-20 RX ADMIN — CEFTRIAXONE 2 G: 2 INJECTION, POWDER, FOR SOLUTION INTRAMUSCULAR; INTRAVENOUS at 01:03

## 2024-03-20 RX ADMIN — Medication 100 MCG: at 02:03

## 2024-03-20 RX ADMIN — LEVOTHYROXINE SODIUM 112 MCG: 112 TABLET ORAL at 05:03

## 2024-03-20 RX ADMIN — INSULIN DETEMIR 7 UNITS: 100 INJECTION, SOLUTION SUBCUTANEOUS at 09:03

## 2024-03-20 RX ADMIN — INSULIN ASPART 4 UNITS: 100 INJECTION, SOLUTION INTRAVENOUS; SUBCUTANEOUS at 09:03

## 2024-03-20 RX ADMIN — GABAPENTIN 300 MG: 300 CAPSULE ORAL at 08:03

## 2024-03-20 RX ADMIN — LIDOCAINE HYDROCHLORIDE 50 MG: 20 INJECTION INTRAVENOUS at 02:03

## 2024-03-20 RX ADMIN — LIDOCAINE HYDROCHLORIDE 15 ML: 20 SOLUTION OROPHARYNGEAL at 02:03

## 2024-03-20 RX ADMIN — PROPOFOL 50 MG: 10 INJECTION, EMULSION INTRAVENOUS at 02:03

## 2024-03-20 RX ADMIN — AMPICILLIN 2 G: 2 INJECTION, POWDER, FOR SOLUTION INTRAMUSCULAR; INTRAVENOUS at 02:03

## 2024-03-20 RX ADMIN — CEFTRIAXONE 2 G: 2 INJECTION, POWDER, FOR SOLUTION INTRAMUSCULAR; INTRAVENOUS at 12:03

## 2024-03-20 RX ADMIN — INSULIN ASPART 3 UNITS: 100 INJECTION, SOLUTION INTRAVENOUS; SUBCUTANEOUS at 08:03

## 2024-03-20 RX ADMIN — CARVEDILOL 6.25 MG: 6.25 TABLET, FILM COATED ORAL at 08:03

## 2024-03-20 RX ADMIN — AMPICILLIN 2 G: 2 INJECTION, POWDER, FOR SOLUTION INTRAMUSCULAR; INTRAVENOUS at 04:03

## 2024-03-20 RX ADMIN — ENOXAPARIN SODIUM 40 MG: 40 INJECTION SUBCUTANEOUS at 04:03

## 2024-03-20 RX ADMIN — SODIUM CHLORIDE: 0.9 INJECTION, SOLUTION INTRAVENOUS at 02:03

## 2024-03-20 RX ADMIN — IOHEXOL 75 ML: 350 INJECTION, SOLUTION INTRAVENOUS at 04:03

## 2024-03-20 NOTE — PROGRESS NOTES
[facility] - [facility]down [facility] ([place] [id number])  [facility] Medicine  Progress Note    Patient Name: Aminah Norton  MRN: [id number]  Patient Class: IP- Inpatient   Admission Date: 3/16/2024  Length of Stay: 4 days  Attending Physician: Edwige Aly MD  Primary Care Provider: Jeison Sanchez MD        Subjective:     Principal Problem:Enterococcal bacteremia        HPI:  79 yo female with DM2, GERD, HPLD, HTN, Hypothyroidism, recent stone removal presenting with chills, weakness, and back pain. Interpretor MERI used at bedside. She states that she was d/c'd on the 14th of March was doing okay until last night she started feeling weak and actually fell, she denies any leg pain or headache and did loss consciousness, however she did not feel any better today whenever she woke up so presented to the ER.    She had a fever measured here at 102.6F, CT scan showed perinephric stranding, UA consistent with infection, lactic acidosis. Urology was consulted who recommended day and antibiotics. Medicine called for admission.     Overview/Hospital Course:  Started on ceftriaxone at admission, ampicillin added on 3/17. Blood cultures quickly resulted positive for e faecalis, persistently positive. ID consulted. Patient passed voiding trial 3/18. TTE negative. CARLITO to be done 3/20.     Interval History: Patient complaining of mild sore throat this morning. Denies chest pain, SOB, n/v, c/d.       Review of Systems  Objective:     Vital Signs (Most Recent):  Temp: 98.3 °F (36.8 °C) (03/20/24 1109)  Pulse: 83 (03/20/24 1109)  Resp: 18 (03/20/24 1109)  BP: (!) 143/65 (03/20/24 1109)  SpO2: 96 % (03/20/24 1109) Vital Signs (24h Range):  Temp:  [98 °F (36.7 °C)-98.7 °F (37.1 °C)] 98.3 °F (36.8 °C)  Pulse:  [] 83  Resp:  [16-20] 18  SpO2:  [96 %] 96 %  BP: (143-190)/(65-81) 143/65     Weight: 71.8 kg (158 lb 4.6 oz)  Body mass index is 26.34 kg/m².    Intake/Output Summary (Last 24 hours) at 3/20/2024 1336  Last data  filed at 3/20/2024 0400  Gross per 24 hour   Intake 1090 ml   Output --   Net 1090 ml         Physical Exam  Vitals and nursing note reviewed.   Constitutional:       General: She is not in acute distress.     Appearance: She is not ill-appearing.   HENT:      Mouth/Throat:      Mouth: Mucous membranes are moist.   Eyes:      General: No scleral icterus.     Extraocular Movements: Extraocular movements intact.      Pupils: Pupils are equal, round, and reactive to light.   Cardiovascular:      Rate and Rhythm: Normal rate and regular rhythm.      Pulses: Normal pulses.      Heart sounds: Normal heart sounds.   Pulmonary:      Effort: Pulmonary effort is normal. No respiratory distress.      Breath sounds: Normal breath sounds. No wheezing.   Abdominal:      General: There is no distension.      Palpations: Abdomen is soft.      Tenderness: There is no abdominal tenderness.   Musculoskeletal:      Right lower leg: No edema.      Left lower leg: No edema.   Skin:     General: Skin is warm and dry.      Findings: No rash.   Neurological:      General: No focal deficit present.      Mental Status: She is alert and oriented to person, place, and time.   Psychiatric:         Mood and Affect: Mood normal.         Behavior: Behavior normal.             Significant Labs: All pertinent labs within the past 24 hours have been reviewed.    Significant Imaging: I have reviewed all pertinent imaging results/findings within the past 24 hours.    Assessment/Plan:      * Enterococcal bacteremia  Patient presented to ED with fevers and malaise 2 days after urologic procedure.  CT with evidence nephritic stranding.  Admitted for pyelonephritis.  Blood cultures on presentation 3/16 positive for Enterococcus faecalis. Suspect urologic source. TTE negative  - ID consulted   - repeat cultures  - CARLITO ordered  - S/p ceftriaxone in ED and on admission, continued for double coverage due to persistent bacteremia  - started on ampicillin 3/17  "  - urology to follow-up in clinic, stated no role for stent removal as it is source control    Pyelonephritis  Acute, urine consistent with infection.   -  see above        Sepsis  This patient does have evidence of infective focus  My overall impression is sepsis.  Source: Urinary Tract  Antibiotics given-   Antibiotics (72h ago, onward)      Start     Stop Route Frequency Ordered    03/19/24 1300  cefTRIAXone (ROCEPHIN) 2 g in dextrose 5 % in water (D5W) 100 mL IVPB (MB+)         -- IV Every 12 hours (non-standard times) 03/19/24 1145    03/17/24 1100  ampicillin (OMNIPEN) 2 g in sodium chloride 0.9 % 100 mL IVPB (MB+)         -- IV Every 4 hours (non-standard times) 03/17/24 0954    03/16/24 2100  mupirocin 2 % ointment         03/21/24 2059 Nasl 2 times daily 03/16/24 1953          Latest lactate reviewed-  No results for input(s): "LACTATE", "POCLAC" in the last 72 hours.    Fluid challenge Ideal Body Weight- The patient's ideal body weight is Ideal body weight: 57 kg (125 lb 10.6 oz) which will be used to calculate fluid bolus of 30 ml/kg for treatment of septic shock.      Post- resuscitation assessment No - Post resuscitation assessment not needed     ANAIS    Will Not start Pressors- Levophed for MAP of 65  Source control achieved by: rocephin    ANAIS (acute kidney injury)  Resolved  Patient with acute kidney injury/acute renal failure likely due to pre-renal azotemia due to dehydration. Cr 1.2 on presentation. ANAIS is currently improving. Baseline creatinine  0.8  - Labs reviewed- Renal function/electrolytes with Estimated Creatinine Clearance: 51.2 mL/min (based on SCr of 0.9 mg/dL). according to latest data. Monitor urine output and serial BMP and adjust therapy as needed. Avoid nephrotoxins and renally dose meds for GFR listed above.    Anemia  Patient's anemia is currently controlled. Has not received any PRBCs to date. Etiology likely d/t chronic disease v iron deficiency. No obvious signs of " bleeding.  Current CBC reviewed-   Lab Results   Component Value Date    HGB 10.4 (L) 03/20/2024    HCT 34.0 (L) 03/20/2024     Monitor serial CBC and transfuse if patient becomes hemodynamically unstable, symptomatic or H/H drops below 7/21.  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  Last A1c reviewed-   Lab Results   Component Value Date    HGBA1C 8.2 (H) 11/09/2023    HGBA1C 8.2 (H) 11/09/2023     Most recent fingerstick glucose reviewed-   Recent Labs   Lab 03/19/24  1944 03/19/24  2349 03/20/24  0350 03/20/24  0739   POCTGLUCOSE 264* 228* 208* 200*       Current correctional scale  Low  Maintain anti-hyperglycemic dose as follows-   Antihyperglycemics (From admission, onward)      Start     Stop Route Frequency Ordered    03/16/24 2019  insulin aspart U-100 pen 0-5 Units         -- SubQ Before meals & nightly PRN 03/16/24 1920          Hold Oral hypoglycemics while patient is in the hospital.    GERD (gastroesophageal reflux disease)  Chronic, controlled, continue to monitor      HTN (hypertension), benign  Chronic, controlled. Latest blood pressure and vitals reviewed-     Temp:  [98 °F (36.7 °C)-98.7 °F (37.1 °C)]   Pulse:  []   Resp:  [16-20]   BP: (143-190)/(65-81)   SpO2:  [96 %] .   Home meds for hypertension were reviewed and noted below.   Hypertension Medications               carvediloL (COREG) 25 MG tablet TAKE 1 TABLET TWICE DAILY    lisinopriL (PRINIVIL,ZESTRIL) 40 MG tablet Take 1 tablet (40 mg total) by mouth once daily.            While in the hospital, will manage blood pressure as follows; Adjust home antihypertensive regimen as follows- held on presentation, restart as tolerated    Will utilize p.r.n. blood  pressure medication only if patient's blood pressure greater than 180/110 and she develops symptoms such as worsening chest pain or shortness of breath.    Hypothyroidism  Chronic, controlled, continue home synthroid        VTE Risk Mitigation (From admission, onward)           Ordered     enoxaparin injection 40 mg  Daily         03/16/24 1920     IP VTE HIGH RISK PATIENT  Once         03/16/24 1920     Place sequential compression device  Until discontinued         03/16/24 1920                    Discharge Planning   RAJESH: 3/23/2024     Code Status: Full Code   Is the patient medically ready for discharge?: No    Reason for patient still in hospital (select all that apply): Patient trending condition, Laboratory test, Treatment, and Consult recommendations  Discharge Plan A: Home Health   Discharge Delays: (!) Procedure Scheduling (IR, OR, Labs, Echo, Cath, Echo, EEG)              Edwige Aly MD  Department of Hospital Medicine   [facility] - [facility]down [facility] ([place])

## 2024-03-20 NOTE — NURSING TRANSFER
Nursing Transfer Note      3/20/2024   3:30 PM    Nurse giving handoff:Jen  Nurse receiving handoff:Shabnam    Reason patient is being transferred: Continue monitoring    Transfer From: [facility] recovery bay 10 to CT then to Primary hospital room [address]    Transfer via stretcher    Transported by transporter    Transfer Vital Signs:  Blood Pressure:138/60 88  Heart Rate:87  O2:98  Temperature:96.8  Respirations:23    Order for Tele Monitor? No    Any special needs or follow-up needed:     Patient belongings transferred with patient: Yes  Glasses and partial teeth    Chart send with patient: Yes    Pt's vs taken and wnl. Pt is AAOx4. Pt is free from s/s of pain and distress. Pt was able to walk and use restroom without issues. Pt remained NPO for abdominal CT per MD Aly. Pt waiting to be wheeled in stretcher to CT then to primary Room.

## 2024-03-20 NOTE — SUBJECTIVE & OBJECTIVE
Interval history: NAEO. Ongoing abdominal discomfort, diffuse. Daughter at bedside. Awaiting CARLITO      Past Surgical History:   Procedure Laterality Date    APPENDECTOMY      BLADDER FULGURATION N/A 3/1/2024    Procedure: ULCER INJECTION FULGURATION;  Surgeon: Brody Smith MD;  Location: [facility] OR;  Service: Urology;  Laterality: N/A;    BLADDER SUSPENSION      CATARACT EXTRACTION      right eye     CHOLECYSTECTOMY      COLONOSCOPY N/A 7/20/2020    Procedure: COLONOSCOPY;  Surgeon: Juan Parker MD;  Location: [facility] ENDO (4TH FLR);  Service: Endoscopy;  Laterality: N/A;  Hx of chronic anemia.  patient needs a   4/6/20 - removed from 4/20/20, rescheduled 7/20/20 - pg  covid 7/17-[facility] [place] 2nd floor-tb    COLONOSCOPY N/A 2/9/2023    Procedure: COLONOSCOPY;  Surgeon: Renan Sanchez MD;  Location: [facility] ENDO (4TH FLR);  Service: Colon and Rectal;  Laterality: N/A;  instr handed to patient, -ml    CYSTOSCOPY N/A 5/19/2021    Procedure: CYSTOSCOPY;  Surgeon: Brody Smith MD;  Location: [facility] OR 1ST FLR;  Service: Urology;  Laterality: N/A;    CYSTOURETEROSCOPY, WITH HOLMIUM LASER LITHOTRIPSY OF URETERAL CALCULUS AND STENT INSERTION Right 3/1/2024    Procedure: CYSTOURETEROSCOPY, WITH HOLMIUM LASER LITHOTRIPSY OF URETERAL CALCULUS AND STENT INSERTION;  Surgeon: Brody Smith MD;  Location: [facility] OR;  Service: Urology;  Laterality: Right;    CYSTOURETEROSCOPY,WITH HOLMIUM LASER LITHOTRIPSY OF URETERAL CALCULUS Right 3/14/2024    Procedure: CYSTOURETEROSCOPY,WITH HOLMIUM LASER LITHOTRIPSY OF URETERAL CALCULUS;  Surgeon: Brody Smith MD;  Location: [facility] OR;  Service: Urology;  Laterality: Right;  1 HR    EYE SURGERY Bilateral     cataract removal    INJECTION OF STEROID N/A 3/14/2024    Procedure: INJECTION, STEROID;  Surgeon: Brody Smith MD;  Location: [facility] OR;  Service: Urology;  Laterality: N/A;    PLACEMENT-STENT Right 3/14/2024    Procedure:  PLACEMENT-STENT;  Surgeon: Brody Smith MD;  Location: [facility] OR;  Service: Urology;  Laterality: Right;    REMOVAL-STENT Right 3/14/2024    Procedure: REMOVAL-STENT;  Surgeon: Brody Smith MD;  Location: [facility] OR;  Service: Urology;  Laterality: Right;    TOTAL ABDOMINAL HYSTERECTOMY      DUB       Review of patient's allergies indicates:   Allergen Reactions    Bufferin [aspirin, buffered] Itching    Atorvastatin      Myalgias and arthralgias    Shellfish containing products Itching     Shellfish causes her to itch per her daughter, Caro.     Warfarin     Ibuprofen Itching     Itching of eyes, head       Medications:  Medications Prior to Admission   Medication Sig    ACCU-CHEK SOFTCLIX LANCETS Misc CHECK BLOOD SUGAR ONE TIME DAILY    amitriptyline (ELAVIL) 10 MG tablet Take 1 tablet (10 mg total) by mouth every evening.    BD ALCOHOL SWABS PadM USE AS DIRECTED TOPICALLY AS NEEDED    biotin 1 mg tablet Take 1,000 mcg by mouth 3 (three) times daily.    blood sugar diagnostic (ACCU-CHEK MATTIE PLUS TEST STRP) Strp TEST ONE TIME DAILY    blood-glucose meter kit To check BG 2  times daily, to use with insurance preferred meter, use as directed.    carvediloL (COREG) 25 MG tablet TAKE 1 TABLET TWICE DAILY    cyanocobalamin (VITAMIN B-12) 1000 MCG tablet Take 100 mcg by mouth once daily.    gabapentin (NEURONTIN) 300 MG capsule Take 1 capsule (300 mg total) by mouth every evening.    glipiZIDE 5 MG TR24 Take 1 tablet (5 mg total) by mouth daily with breakfast.    [] HYDROcodone-acetaminophen (NORCO) 5-325 mg per tablet Take 1 tablet by mouth every 6 (six) hours as needed for Pain.    ketoconazole (NIZORAL) 2 % cream Apply topically once daily.    lancets (ACCU-CHEK MULTICLIX LANCET) Misc To use as directed with Accu Chek Sahra FastClix lancing device    lancets Misc To check BG 2 times daily, to use with insurance preferred meter.    levothyroxine (SYNTHROID) 112 MCG tablet Take 1 tablet (112 mcg  total) by mouth before breakfast.    lisinopriL (PRINIVIL,ZESTRIL) 40 MG tablet Take 1 tablet (40 mg total) by mouth once daily.    metFORMIN (GLUCOPHAGE) 1000 MG tablet Take 1 tablet (1,000 mg total) by mouth 2 (two) times daily with meals.    mirabegron (MYRBETRIQ) 25 mg Tb24 ER tablet Take 1 tablet (25 mg total) by mouth once daily.    naproxen (NAPROSYN) 500 MG tablet Take 1 tablet (500 mg total) by mouth 2 (two) times daily with meals.    rosuvastatin (CRESTOR) 20 MG tablet Take 1 tablet (20 mg total) by mouth once daily.    SITagliptin phosphate (JANUVIA) 100 MG Tab Take 1 tablet (100 mg total) by mouth once daily.    turmeric root extract 500 mg Cap Take by mouth.    VITAMIN B COMPLEX ORAL Take 1 tablet by mouth.    vitamin D (VITAMIN D3) 1000 units Tab Take 1,000 Units by mouth once daily.     Antibiotics (From admission, onward)      Start     Stop Route Frequency Ordered    03/19/24 1300  cefTRIAXone (ROCEPHIN) 2 g in dextrose 5 % in water (D5W) 100 mL IVPB (MB+)         -- IV Every 12 hours (non-standard times) 03/19/24 1145    03/17/24 1100  ampicillin (OMNIPEN) 2 g in sodium chloride 0.9 % 100 mL IVPB (MB+)         -- IV Every 4 hours (non-standard times) 03/17/24 0954    03/16/24 2100  mupirocin 2 % ointment         03/21/24 2059 Nasl 2 times daily 03/16/24 1953          Antifungals (From admission, onward)      None          Antivirals (From admission, onward)      None             Immunization History   Administered Date(s) Administered    COVID-19, MRNA, LN-S, PF (Pfizer) (Gray Cap) 03/09/2022    COVID-19, mRNA, LNP-S, bivalent booster, PF (PFIZER OMICRON) 10/12/2022    COVID-19, vector-nr, rS-Ad26, PF (Katelin) 03/05/2021    Influenza (FLUAD) - Quadrivalent - Adjuvanted - PF *Preferred* (65+) 12/10/2021, 10/10/2022, 11/09/2023    Influenza - High Dose - PF (65 years and older) 12/02/2015, 09/15/2017, 11/26/2018    Pneumococcal Conjugate - 13 Valent 11/26/2018    Pneumococcal Polysaccharide - 23  Becca 01/18/2023       Family History       Problem Relation (Age of Onset)    Breast cancer Mother    COPD Father    Cancer Mother, Sister, Daughter    Diabetes Son    Hypertension Sister, Sister, Daughter, Daughter    Ovarian cancer Mother, Sister    Thyroid disease Daughter, Daughter          Social History     Socioeconomic History    Marital status:    Tobacco Use    Smoking status: Never    Smokeless tobacco: Never   Substance and Sexual Activity    Alcohol use: No    Drug use: No    Sexual activity: Yes     Partners: Male     Birth control/protection: Post-menopausal     Comment:     Other Topics Concern    Are you pregnant or think you may be? No    Breast-feeding No   Social History Narrative     with 7 kids     Social Determinants of Health     Financial Resource Strain: Low Risk  (3/18/2024)    Overall Financial Resource Strain (CARDIA)     Difficulty of Paying Living Expenses: Not very hard   Food Insecurity: No Food Insecurity (3/18/2024)    Hunger Vital Sign     Worried About Running Out of Food in the Last Year: Never true     Ran Out of Food in the Last Year: Never true   Transportation Needs: No Transportation Needs (3/18/2024)    PRAPARE - Transportation     Lack of Transportation (Medical): No     Lack of Transportation (Non-Medical): No   Physical Activity: Inactive (3/18/2024)    Exercise Vital Sign     Days of Exercise per Week: 0 days     Minutes of Exercise per Session: 0 min   Stress: No Stress Concern Present (3/18/2024)    [employer] - Occupational Stress Questionnaire     Feeling of Stress : Only a little   Social Connections: Unknown (3/18/2024)    Social Connection and Isolation Panel [NHANES]     Frequency of Communication with Friends and Family: More than three times a week     Frequency of Social Gatherings with Friends and Family: Twice a week     Attends [employer] Services: 1 to 4 times per year     Active Member of Clubs or  Organizations: Yes     Attends Club or Organization Meetings: Never     Marital Status: Patient declined   Housing Stability: Unknown (3/18/2024)    Housing Stability Vital Sign     Unable to Pay for Housing in the Last Year: No     Unstable Housing in the Last Year: No     Review of Systems   Constitutional:  Positive for chills and fever. Negative for fatigue and unexpected weight change.   HENT:  Negative for ear pain, facial swelling, hearing loss, mouth sores, nosebleeds, rhinorrhea, sinus pressure, sore throat, tinnitus, trouble swallowing and voice change.    Eyes:  Negative for photophobia, pain, redness and visual disturbance.   Respiratory:  Negative for cough, chest tightness, shortness of breath and wheezing.    Cardiovascular:  Negative for chest pain, palpitations and leg swelling.   Gastrointestinal:  Negative for abdominal pain, blood in stool, constipation, diarrhea, nausea and vomiting.   Endocrine: Negative for cold intolerance, heat intolerance, polydipsia, polyphagia and polyuria.   Genitourinary:  Positive for difficulty urinating, dysuria, hematuria and urgency. Negative for flank pain, frequency, menstrual problem, vaginal bleeding, vaginal discharge and vaginal pain.   Musculoskeletal:  Negative for arthralgias, back pain, joint swelling, myalgias and neck pain.   Skin:  Negative for rash.   Allergic/Immunologic: Negative for environmental allergies, food allergies and immunocompromised state.   Neurological:  Negative for dizziness, seizures, syncope, weakness, light-headedness, numbness and headaches.   Hematological:  Negative for adenopathy. Does not bruise/bleed easily.   Psychiatric/Behavioral:  Negative for confusion, hallucinations, self-injury, sleep disturbance and suicidal ideas. The patient is not nervous/anxious.      Objective:     Vital Signs (Most Recent):  Temp: 98.3 °F (36.8 °C) (03/20/24 1109)  Pulse: 83 (03/20/24 1109)  Resp: 18 (03/20/24 1109)  BP: (!) 143/65 (03/20/24  1109)  SpO2: 96 % (03/20/24 1109) Vital Signs (24h Range):  Temp:  [98 °F (36.7 °C)-98.7 °F (37.1 °C)] 98.3 °F (36.8 °C)  Pulse:  [] 83  Resp:  [16-20] 18  SpO2:  [96 %] 96 %  BP: (143-190)/(65-81) 143/65     Weight: 71.8 kg (158 lb 4.6 oz)  Body mass index is 26.34 kg/m².    Estimated Creatinine Clearance: 51.2 mL/min (based on SCr of 0.9 mg/dL).     Physical Exam  Vitals and nursing note reviewed.   Constitutional:       Appearance: She is well-developed.   HENT:      Head: Normocephalic and atraumatic.      Right Ear: External ear normal.      Left Ear: External ear normal.   Eyes:      General: No scleral icterus.        Right eye: No discharge.         Left eye: No discharge.      Conjunctiva/sclera: Conjunctivae normal.   Cardiovascular:      Rate and Rhythm: Normal rate and regular rhythm.      Heart sounds: Normal heart sounds. No murmur heard.     No friction rub. No gallop.   Pulmonary:      Effort: Pulmonary effort is normal. No respiratory distress.      Breath sounds: Normal breath sounds. No stridor. No wheezing or rales.   Abdominal:      General: Bowel sounds are normal.      Tenderness: There is abdominal tenderness in the suprapubic area.   Musculoskeletal:         General: No tenderness. Normal range of motion.   Skin:     General: Skin is warm and dry.   Neurological:      Mental Status: She is alert and oriented to person, place, and time.          Significant Labs: CBC:   Recent Labs   Lab 03/19/24  0540 03/20/24  0550   WBC 5.89 5.65   HGB 8.7* 10.4*   HCT 28.1* 34.0*    209       CMP:   Recent Labs   Lab 03/19/24  0540 03/20/24  0550   * 134*   K 4.2 4.6    101   CO2 23 23   * 189*   BUN 17 12   CREATININE 0.8 0.9   CALCIUM 9.9 10.1   PROT 5.9* 6.5   ALBUMIN 2.4* 3.0*   BILITOT 0.3 0.3   ALKPHOS 83 88   AST 29 19   ALT 34 37   ANIONGAP 6* 10       Microbiology Results (last 7 days)       Procedure Component Value Units Date/Time    Blood culture [[id number]]  Collected: 03/20/24 0550    Order Status: Completed Specimen: Blood Updated: 03/20/24 1315     Blood Culture, Routine No Growth to date    Blood culture [[id number]] Collected: 03/20/24 0550    Order Status: Completed Specimen: Blood Updated: 03/20/24 1315     Blood Culture, Routine No Growth to date    Blood culture [[id number]]  (Abnormal) Collected: 03/18/24 0538    Order Status: Completed Specimen: Blood Updated: 03/20/24 1016     Blood Culture, Routine Gram stain aer bottle: Gram positive cocci in chains resembling Strep      Positive results previously called 03/18/2024      ENTEROCOCCUS FAECALIS  For susceptibility see order #[id number]      Blood culture [[id number]]  (Abnormal) Collected: 03/18/24 0538    Order Status: Completed Specimen: Blood Updated: 03/20/24 1016     Blood Culture, Routine Gram stain aer bottle: Gram positive cocci in chains resembling Strep      Positive results previously called 03/18/2024      ENTEROCOCCUS FAECALIS  For susceptibility see order #[id number]      Blood culture [[id number]]  (Abnormal) Collected: 03/17/24 1300    Order Status: Completed Specimen: Blood Updated: 03/20/24 1016     Blood Culture, Routine Gram stain aer bottle: Gram positive cocci in chains resembling Strep      Positive results previously called 03/17/2024 05:37      ENTEROCOCCUS FAECALIS  For susceptibility see order #[id number]      Blood culture [[id number]]  (Abnormal) Collected: 03/17/24 1300    Order Status: Completed Specimen: Blood Updated: 03/20/24 1015     Blood Culture, Routine Gram stain aer bottle: Gram positive cocci in chains resembling Strep      Results called to and read back by: Emilie Dela Cruz RN. 03/18/2024  06:17      Gram stain vinay bottle: Gram positive cocci in chains resembling Strep      03/18/2024  11:21      ENTEROCOCCUS FAECALIS  For susceptibility see order #[id number]      Blood culture [[id number]] Collected: 03/19/24 0825    Order Status: Completed Specimen: Blood Updated:  03/20/24 0433     Blood Culture, Routine Gram stain aer bottle: Gram positive cocci in chains resembling Strep      Positive results previously called 03/20/2024 00:00    Blood culture [[id number]] Collected: 03/19/24 0822    Order Status: Completed Specimen: Blood Updated: 03/20/24 0002     Blood Culture, Routine Gram stain aer bottle: Gram positive cocci in chains resembling Strep      Results called to and read back by: Jaimee Morillo RN. 03/20/2024  00:00    Blood culture x two cultures. Draw prior to antibiotics. [[id number]]  (Abnormal) Collected: 03/16/24 1420    Order Status: Completed Specimen: Blood from Wrist, Right Updated: 03/19/24 1054     Blood Culture, Routine Gram stain aer bottle: Gram positive cocci in chains resembling Strep      Positive results previously called 03/17/2024  06:25      ENTEROCOCCUS FAECALIS  For susceptibility see order #[id number]      Narrative:      Aerobic and anaerobic    Blood culture x two cultures. Draw prior to antibiotics. [[id number]]  (Abnormal)  (Susceptibility) Collected: 03/16/24 1410    Order Status: Completed Specimen: Blood from Peripheral, Forearm, Left Updated: 03/19/24 1053     Blood Culture, Routine Gram stain aer bottle: Gram positive cocci in chains resembling Strep      Gram stain vinay bottle: Gram positive cocci in chains resembling Strep      Results called to and read back by: Emilie Napoles RN 03/17/2024  05:37      ENTEROCOCCUS FAECALIS    Narrative:      Aerobic and anaerobic    Urine culture [[id number]]  (Abnormal)  (Susceptibility) Collected: 03/16/24 1359    Order Status: Completed Specimen: Urine Updated: 03/18/24 2120     Urine Culture, Routine ENTEROCOCCUS FAECALIS  >100,000 cfu/ml      Narrative:      Specimen Source->Urine    Rapid Organism ID by PCR (from Blood culture) [[id number]]  (Abnormal) Collected: 03/16/24 1410    Order Status: Completed Updated: 03/17/24 0636     Enterococcus faecalis Detected     Enterococcus faecium Not Detected      Listeria monocytogenes Not Detected     Staphylococcus spp. Not Detected     Staphylococcus aureus Not Detected     Staphylococcus epidermidis Not Detected     Staphylococcus lugdunensis Not Detected     Streptococcus species Not Detected     Streptococcus agalactiae Not Detected     Streptococcus pneumoniae Not Detected     Streptococcus pyogenes Not Detected     Acinetobacter calcoaceticus/baumannii complex Not Detected     Bacteroides fragilis Not Detected     Enterobacterales Not Detected     Enterobacter cloacae complex Not Detected     Escherichia coli Not Detected     Klebsiella aerogenes Not Detected     Klebsiella oxytoca Not Detected     Klebsiella pneumoniae group Not Detected     Proteus Not Detected     Salmonella sp Not Detected     Serratia marcescens Not Detected     Haemophilus influenzae Not Detected     Neisseria meningtidis Not Detected     Pseudomonas aeruginosa Not Detected     Stenotrophomonas maltophilia Not Detected     Candida albicans Not Detected     Candida auris Not Detected     Candida glabrata Not Detected     Candida krusei Not Detected     Candida parapsilosis Not Detected     Candida tropicalis Not Detected     Cryptococcus neoformans/gattii Not Detected     CTX-M (ESBL ) Test Not Applicable     IMP (Carbapenem resistant) Test Not Applicable     KPC resistance gene (Carbapenem resistant) Test Not Applicable     mcr-1  Test Not Applicable     mec A/C  Test Not Applicable     mec A/C and MREJ (MRSA) gene Test Not Applicable     NDM (Carbapenem resistant) Test Not Applicable     OXA-48-like (Carbapenem resistant) Test Not Applicable     van A/B (VRE gene) Not Detected     VIM (Carbapenem resistant) Test Not Applicable    Narrative:      Aerobic and anaerobic          Urine Studies:   Recent Labs   Lab 03/16/24  1359   COLORU Yellow   APPEARANCEUA Hazy*   PHUR 6.0   SPECGRAV 1.020   PROTEINUA 1+*   GLUCUA 3+*   KETONESU Negative   BILIRUBINUA Negative   OCCULTUA 2+*   NITRITE  Negative   LEUKOCYTESUR 2+*   RBCUA 14*   WBCUA 37*   BACTERIA Occasional   SQUAMEPITHEL 1   HYALINECASTS 0         Significant Imaging: I have reviewed all pertinent imaging results/findings within the past 24 hours.

## 2024-03-20 NOTE — ASSESSMENT & PLAN NOTE
Patient's anemia is currently controlled. Has not received any PRBCs to date. Etiology likely d/t chronic disease v iron deficiency. No obvious signs of bleeding.  Current CBC reviewed-   Lab Results   Component Value Date    HGB 10.4 (L) 03/20/2024    HCT 34.0 (L) 03/20/2024     Monitor serial CBC and transfuse if patient becomes hemodynamically unstable, symptomatic or H/H drops below 7/21.  - iron panel ordered

## 2024-03-20 NOTE — CONSULTS
[facility] ([place])  Infectious Disease  Consult Note    Patient Name: Aminah Norton  MRN: [id number]  Admission Date: 3/16/2024  Hospital Length of Stay: 4 days  Attending Physician: Edwige Aly MD  Primary Care Provider: Jeison Sanchez MD     Isolation Status: No active isolations    Patient information was obtained from patient and ER records.      Consults  Assessment/Plan:     ID  * Enterococcal bacteremia  78F with h/o DM, HT, interstitial cystitis and urolithiasis s/p ureteroscopic stone extraction with lithotripsy and right ureteral stent placement (right) on 3/14 admitted 3/16 with chills, dizziness, flank pain, dysuria, freq, and a fall. Bcx 3/16, 3/17, 3/18 with e.faecalis in both sets. Repeat 3/19 NGTD. Fever defervesced, leukocytosis trending down. 2d echo without veg. Reports PCN allergy, but tolerating ampicillin.     Suspect bacteremia from  source; ureteral procedure likely contributed. Endocarditis needs to be excluded. fortunately clinically improving. Reviewed labs and hiv and hep c screening neg. 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 No signs of pulmonary TB.     Recommendations:   - continue ampicilli/ceftriaxone duration TBD but will need at least 4weeks of iv abx followed by chronic supressive oral antibiotics until ureteral  stent is removed or replaced  - please wait for f/u bcx to be clear x 48-72h before placing picc  - agree with CARLITO  - consider ureteral stent exchange, especially since she's failing to clear bacteremia  - consider repeating abdominal imaging (either ultrasound or CT) to r/o abscesss    Discussed with hospitalist            Thank you for your consult. I will follow-up with patient. Please contact us if you have any additional questions.    Samina Velasquez MD  Infectious Disease  [facility] ([place])    Subjective:     Principal Problem:  Enterococcal bacteremia    HPI: A 78-year-old woman with HTN, dm 2, hypothyroidism, interstitial cystitis, and status post ureteroscopic stone extraction with lithotripsy and right ureteral stent placement on 03/01/2024 1 day prior to admission developed fever to 102.6 F with associated shaking chills, suprapubic tenderness, and dysuria.  This was also associated with hematuria, sensation of incomplete bladder emptying, as well as an inability to initiate micturition however the patient denies urgency, frequency, and back pain.  On evaluation in [facility] ED she was noted to be febrile to 101.3 F and tachycardic.  Laboratory workup revealed no evidence of leukocytosis.  Admission blood cultures are now positive for Gram-positive cocci in chains resembling strep with a rapid ID for Enterococcus faecalis.    Infectious disease is consulted for E faecalis bacteremia        Interval history: NAEO. Ongoing abdominal discomfort, diffuse. Daughter at bedside. Awaiting CARLITO      Past Surgical History:   Procedure Laterality Date    APPENDECTOMY      BLADDER FULGURATION N/A 3/1/2024    Procedure: ULCER INJECTION FULGURATION;  Surgeon: Brody Smith MD;  Location: [facility];  Service: Urology;  Laterality: N/A;    BLADDER SUSPENSION      CATARACT EXTRACTION      right eye     CHOLECYSTECTOMY      COLONOSCOPY N/A 7/20/2020    Procedure: COLONOSCOPY;  Surgeon: Juan Parker MD;  Location: [facility] (4TH FLR);  Service: Endoscopy;  Laterality: N/A;  Hx of chronic anemia.  patient needs a   4/6/20 - removed from 4/20/20, rescheduled 7/20/20 - pg  covid 7/17[place] [place] [facility]    COLONOSCOPY N/A 2/9/2023    Procedure: COLONOSCOPY;  Surgeon: Renan Sanchez MD;  Location: [facility] ENDO (4TH FLR);  Service: Colon and Rectal;  Laterality: N/A;  instr handed to patient, -ml    CYSTOSCOPY N/A 5/19/2021    Procedure: CYSTOSCOPY;  Surgeon: Brody Smith MD;  Location: [facility] OR 1ST FLR;  Service:  Urology;  Laterality: N/A;    CYSTOURETEROSCOPY, WITH HOLMIUM LASER LITHOTRIPSY OF URETERAL CALCULUS AND STENT INSERTION Right 3/1/2024    Procedure: CYSTOURETEROSCOPY, WITH HOLMIUM LASER LITHOTRIPSY OF URETERAL CALCULUS AND STENT INSERTION;  Surgeon: Brody Smith MD;  Location: [facility] OR;  Service: Urology;  Laterality: Right;    CYSTOURETEROSCOPY,WITH HOLMIUM LASER LITHOTRIPSY OF URETERAL CALCULUS Right 3/14/2024    Procedure: CYSTOURETEROSCOPY,WITH HOLMIUM LASER LITHOTRIPSY OF URETERAL CALCULUS;  Surgeon: Brody Smith MD;  Location: [facility] OR;  Service: Urology;  Laterality: Right;  1 HR    EYE SURGERY Bilateral     cataract removal    INJECTION OF STEROID N/A 3/14/2024    Procedure: INJECTION, STEROID;  Surgeon: Brody Smith MD;  Location: [facility] OR;  Service: Urology;  Laterality: N/A;    PLACEMENT-STENT Right 3/14/2024    Procedure: PLACEMENT-STENT;  Surgeon: Brody Smith MD;  Location: [facility] OR;  Service: Urology;  Laterality: Right;    REMOVAL-STENT Right 3/14/2024    Procedure: REMOVAL-STENT;  Surgeon: Brody Smith MD;  Location: [facility] OR;  Service: Urology;  Laterality: Right;    TOTAL ABDOMINAL HYSTERECTOMY      DUB       Review of patient's allergies indicates:   Allergen Reactions    Bufferin [aspirin, buffered] Itching    Atorvastatin      Myalgias and arthralgias    Shellfish containing products Itching     Shellfish causes her to itch per her daughter, Caro.     Warfarin     Ibuprofen Itching     Itching of eyes, head       Medications:  Medications Prior to Admission   Medication Sig    ACCU-CHEK SOFTCLIX LANCETS Misc CHECK BLOOD SUGAR ONE TIME DAILY    amitriptyline (ELAVIL) 10 MG tablet Take 1 tablet (10 mg total) by mouth every evening.    BD ALCOHOL SWABS PadM USE AS DIRECTED TOPICALLY AS NEEDED    biotin 1 mg tablet Take 1,000 mcg by mouth 3 (three) times daily.    blood sugar diagnostic (ACCU-CHEK MATTIE PLUS TEST STRP) Strp TEST ONE TIME DAILY     blood-glucose meter kit To check BG 2  times daily, to use with insurance preferred meter, use as directed.    carvediloL (COREG) 25 MG tablet TAKE 1 TABLET TWICE DAILY    cyanocobalamin (VITAMIN B-12) 1000 MCG tablet Take 100 mcg by mouth once daily.    gabapentin (NEURONTIN) 300 MG capsule Take 1 capsule (300 mg total) by mouth every evening.    glipiZIDE 5 MG TR24 Take 1 tablet (5 mg total) by mouth daily with breakfast.    [] HYDROcodone-acetaminophen (NORCO) 5-325 mg per tablet Take 1 tablet by mouth every 6 (six) hours as needed for Pain.    ketoconazole (NIZORAL) 2 % cream Apply topically once daily.    lancets (ACCU-CHEK MULTICLIX LANCET) Misc To use as directed with Accu Chek Sahra FastClix lancing device    lancets Misc To check BG 2 times daily, to use with insurance preferred meter.    levothyroxine (SYNTHROID) 112 MCG tablet Take 1 tablet (112 mcg total) by mouth before breakfast.    lisinopriL (PRINIVIL,ZESTRIL) 40 MG tablet Take 1 tablet (40 mg total) by mouth once daily.    metFORMIN (GLUCOPHAGE) 1000 MG tablet Take 1 tablet (1,000 mg total) by mouth 2 (two) times daily with meals.    mirabegron (MYRBETRIQ) 25 mg Tb24 ER tablet Take 1 tablet (25 mg total) by mouth once daily.    naproxen (NAPROSYN) 500 MG tablet Take 1 tablet (500 mg total) by mouth 2 (two) times daily with meals.    rosuvastatin (CRESTOR) 20 MG tablet Take 1 tablet (20 mg total) by mouth once daily.    SITagliptin phosphate (JANUVIA) 100 MG Tab Take 1 tablet (100 mg total) by mouth once daily.    turmeric root extract 500 mg Cap Take by mouth.    VITAMIN B COMPLEX ORAL Take 1 tablet by mouth.    vitamin D (VITAMIN D3) 1000 units Tab Take 1,000 Units by mouth once daily.     Antibiotics (From admission, onward)      Start     Stop Route Frequency Ordered    24 1300  cefTRIAXone (ROCEPHIN) 2 g in dextrose 5 % in water (D5W) 100 mL IVPB (MB+)         -- IV Every 12 hours (non-standard times) 24 1145    24 1100   ampicillin (OMNIPEN) 2 g in sodium chloride 0.9 % 100 mL IVPB (MB+)         -- IV Every 4 hours (non-standard times) 03/17/24 0954    03/16/24 2100  mupirocin 2 % ointment         03/21/24 2059 Nasl 2 times daily 03/16/24 1953          Antifungals (From admission, onward)      None          Antivirals (From admission, onward)      None             Immunization History   Administered Date(s) Administered    COVID-19, MRNA, LN-S, PF (Pfizer) (Gray Cap) 03/09/2022    COVID-19, mRNA, LNP-S, bivalent booster, PF (PFIZER OMICRON) 10/12/2022    COVID-19, vector-nr, rS-Ad26, PF ([place] Pellets) 03/05/2021    Influenza (FLUAD) - Quadrivalent - Adjuvanted - PF *Preferred* (65+) 12/10/2021, 10/10/2022, 11/09/2023    Influenza - High Dose - PF (65 years and older) 12/02/2015, 09/15/2017, 11/26/2018    Pneumococcal Conjugate - 13 Valent 11/26/2018    Pneumococcal Polysaccharide - 23 Valent 01/18/2023       Family History       Problem Relation (Age of Onset)    Breast cancer Mother    COPD Father    Cancer Mother, Sister, Daughter    Diabetes Son    Hypertension Sister, Sister, Daughter, Daughter    Ovarian cancer Mother, Sister    Thyroid disease Daughter, Daughter          Social History     Socioeconomic History    Marital status:    Tobacco Use    Smoking status: Never    Smokeless tobacco: Never   Substance and Sexual Activity    Alcohol use: No    Drug use: No    Sexual activity: Yes     Partners: Male     Birth control/protection: Post-menopausal     Comment:     Other Topics Concern    Are you pregnant or think you may be? No    Breast-feeding No   Social History Narrative     with 7 kids     Social Determinants of Health     Financial Resource Strain: Low Risk  (3/18/2024)    Overall Financial Resource Strain (CARDIA)     Difficulty of Paying Living Expenses: Not very hard   Food Insecurity: No Food Insecurity (3/18/2024)    Hunger Vital Sign     Worried About Running Out of Food in the Last Year: Never true      Ran Out of Food in the Last Year: Never true   Transportation Needs: No Transportation Needs (3/18/2024)    PRAPARE - Transportation     Lack of Transportation (Medical): No     Lack of Transportation (Non-Medical): No   Physical Activity: Inactive (3/18/2024)    Exercise Vital Sign     Days of Exercise per Week: 0 days     Minutes of Exercise per Session: 0 min   Stress: No Stress Concern Present (3/18/2024)    [employer] - Occupational Stress Questionnaire     Feeling of Stress : Only a little   Social Connections: Unknown (3/18/2024)    Social Connection and Isolation Panel [NHANES]     Frequency of Communication with Friends and Family: More than three times a week     Frequency of Social Gatherings with Friends and Family: Twice a week     Attends Faith Services: 1 to 4 times per year     Active Member of Clubs or Organizations: Yes     Attends Club or Organization Meetings: Never     Marital Status: Patient declined   Housing Stability: Unknown (3/18/2024)    Housing Stability Vital Sign     Unable to Pay for Housing in the Last Year: No     Unstable Housing in the Last Year: No     Review of Systems   Constitutional:  Positive for chills and fever. Negative for fatigue and unexpected weight change.   HENT:  Negative for ear pain, facial swelling, hearing loss, mouth sores, nosebleeds, rhinorrhea, sinus pressure, sore throat, tinnitus, trouble swallowing and voice change.    Eyes:  Negative for photophobia, pain, redness and visual disturbance.   Respiratory:  Negative for cough, chest tightness, shortness of breath and wheezing.    Cardiovascular:  Negative for chest pain, palpitations and leg swelling.   Gastrointestinal:  Negative for abdominal pain, blood in stool, constipation, diarrhea, nausea and vomiting.   Endocrine: Negative for cold intolerance, heat intolerance, polydipsia, polyphagia and polyuria.   Genitourinary:  Positive for difficulty urinating, dysuria,  hematuria and urgency. Negative for flank pain, frequency, menstrual problem, vaginal bleeding, vaginal discharge and vaginal pain.   Musculoskeletal:  Negative for arthralgias, back pain, joint swelling, myalgias and neck pain.   Skin:  Negative for rash.   Allergic/Immunologic: Negative for environmental allergies, food allergies and immunocompromised state.   Neurological:  Negative for dizziness, seizures, syncope, weakness, light-headedness, numbness and headaches.   Hematological:  Negative for adenopathy. Does not bruise/bleed easily.   Psychiatric/Behavioral:  Negative for confusion, hallucinations, self-injury, sleep disturbance and suicidal ideas. The patient is not nervous/anxious.      Objective:     Vital Signs (Most Recent):  Temp: 98.3 °F (36.8 °C) (03/20/24 1109)  Pulse: 83 (03/20/24 1109)  Resp: 18 (03/20/24 1109)  BP: (!) 143/65 (03/20/24 1109)  SpO2: 96 % (03/20/24 1109) Vital Signs (24h Range):  Temp:  [98 °F (36.7 °C)-98.7 °F (37.1 °C)] 98.3 °F (36.8 °C)  Pulse:  [] 83  Resp:  [16-20] 18  SpO2:  [96 %] 96 %  BP: (143-190)/(65-81) 143/65     Weight: 71.8 kg (158 lb 4.6 oz)  Body mass index is 26.34 kg/m².    Estimated Creatinine Clearance: 51.2 mL/min (based on SCr of 0.9 mg/dL).     Physical Exam  Vitals and nursing note reviewed.   Constitutional:       Appearance: She is well-developed.   HENT:      Head: Normocephalic and atraumatic.      Right Ear: External ear normal.      Left Ear: External ear normal.   Eyes:      General: No scleral icterus.        Right eye: No discharge.         Left eye: No discharge.      Conjunctiva/sclera: Conjunctivae normal.   Cardiovascular:      Rate and Rhythm: Normal rate and regular rhythm.      Heart sounds: Normal heart sounds. No murmur heard.     No friction rub. No gallop.   Pulmonary:      Effort: Pulmonary effort is normal. No respiratory distress.      Breath sounds: Normal breath sounds. No stridor. No wheezing or rales.   Abdominal:       General: Bowel sounds are normal.      Tenderness: There is abdominal tenderness in the suprapubic area.   Musculoskeletal:         General: No tenderness. Normal range of motion.   Skin:     General: Skin is warm and dry.   Neurological:      Mental Status: She is alert and oriented to person, place, and time.          Significant Labs: CBC:   Recent Labs   Lab 03/19/24  0540 03/20/24  0550   WBC 5.89 5.65   HGB 8.7* 10.4*   HCT 28.1* 34.0*    209       CMP:   Recent Labs   Lab 03/19/24  0540 03/20/24  0550   * 134*   K 4.2 4.6    101   CO2 23 23   * 189*   BUN 17 12   CREATININE 0.8 0.9   CALCIUM 9.9 10.1   PROT 5.9* 6.5   ALBUMIN 2.4* 3.0*   BILITOT 0.3 0.3   ALKPHOS 83 88   AST 29 19   ALT 34 37   ANIONGAP 6* 10       Microbiology Results (last 7 days)       Procedure Component Value Units Date/Time    Blood culture [[id number]] Collected: 03/20/24 0550    Order Status: Completed Specimen: Blood Updated: 03/20/24 1315     Blood Culture, Routine No Growth to date    Blood culture [[id number]] Collected: 03/20/24 0550    Order Status: Completed Specimen: Blood Updated: 03/20/24 1315     Blood Culture, Routine No Growth to date    Blood culture [[id number]]  (Abnormal) Collected: 03/18/24 0538    Order Status: Completed Specimen: Blood Updated: 03/20/24 1016     Blood Culture, Routine Gram stain aer bottle: Gram positive cocci in chains resembling Strep      Positive results previously called 03/18/2024      ENTEROCOCCUS FAECALIS  For susceptibility see order #[id number]      Blood culture [[id number]]  (Abnormal) Collected: 03/18/24 0538    Order Status: Completed Specimen: Blood Updated: 03/20/24 1016     Blood Culture, Routine Gram stain aer bottle: Gram positive cocci in chains resembling Strep      Positive results previously called 03/18/2024      ENTEROCOCCUS FAECALIS  For susceptibility see order #[id number]      Blood culture [[id number]]  (Abnormal) Collected: 03/17/24 1300     Order Status: Completed Specimen: Blood Updated: 03/20/24 1016     Blood Culture, Routine Gram stain aer bottle: Gram positive cocci in chains resembling Strep      Positive results previously called 03/17/2024 05:37      ENTEROCOCCUS FAECALIS  For susceptibility see order #[id number]      Blood culture [[id number]]  (Abnormal) Collected: 03/17/24 1300    Order Status: Completed Specimen: Blood Updated: 03/20/24 1015     Blood Culture, Routine Gram stain aer bottle: Gram positive cocci in chains resembling Strep      Results called to and read back by: Emilie Dela Cruz RN. 03/18/2024  06:17      Gram stain vinay bottle: Gram positive cocci in chains resembling Strep      03/18/2024  11:21      ENTEROCOCCUS FAECALIS  For susceptibility see order #[id number]      Blood culture [[id number]] Collected: 03/19/24 0825    Order Status: Completed Specimen: Blood Updated: 03/20/24 0433     Blood Culture, Routine Gram stain aer bottle: Gram positive cocci in chains resembling Strep      Positive results previously called 03/20/2024 00:00    Blood culture [[id number]] Collected: 03/19/24 0822    Order Status: Completed Specimen: Blood Updated: 03/20/24 0002     Blood Culture, Routine Gram stain aer bottle: Gram positive cocci in chains resembling Strep      Results called to and read back by: Jaimee Morillo RN. 03/20/2024  00:00    Blood culture x two cultures. Draw prior to antibiotics. [[id number]]  (Abnormal) Collected: 03/16/24 1420    Order Status: Completed Specimen: Blood from Wrist, Right Updated: 03/19/24 1054     Blood Culture, Routine Gram stain aer bottle: Gram positive cocci in chains resembling Strep      Positive results previously called 03/17/2024  06:25      ENTEROCOCCUS FAECALIS  For susceptibility see order #[id number]      Narrative:      Aerobic and anaerobic    Blood culture x two cultures. Draw prior to antibiotics. [[id number]]  (Abnormal)  (Susceptibility) Collected: 03/16/24 1410    Order Status: Completed  Specimen: Blood from Peripheral, Forearm, Left Updated: 03/19/24 1053     Blood Culture, Routine Gram stain aer bottle: Gram positive cocci in chains resembling Strep      Gram stain vinay bottle: Gram positive cocci in chains resembling Strep      Results called to and read back by: Emilie Napoles RN 03/17/2024  05:37      ENTEROCOCCUS FAECALIS    Narrative:      Aerobic and anaerobic    Urine culture [[id number]]  (Abnormal)  (Susceptibility) Collected: 03/16/24 1359    Order Status: Completed Specimen: Urine Updated: 03/18/24 2120     Urine Culture, Routine ENTEROCOCCUS FAECALIS  >100,000 cfu/ml      Narrative:      Specimen Source->Urine    Rapid Organism ID by PCR (from Blood culture) [[id number]]  (Abnormal) Collected: 03/16/24 1410    Order Status: Completed Updated: 03/17/24 0636     Enterococcus faecalis Detected     Enterococcus faecium Not Detected     Listeria monocytogenes Not Detected     Staphylococcus spp. Not Detected     Staphylococcus aureus Not Detected     Staphylococcus epidermidis Not Detected     Staphylococcus lugdunensis Not Detected     Streptococcus species Not Detected     Streptococcus agalactiae Not Detected     Streptococcus pneumoniae Not Detected     Streptococcus pyogenes Not Detected     Acinetobacter calcoaceticus/baumannii complex Not Detected     Bacteroides fragilis Not Detected     Enterobacterales Not Detected     Enterobacter cloacae complex Not Detected     Escherichia coli Not Detected     Klebsiella aerogenes Not Detected     Klebsiella oxytoca Not Detected     Klebsiella pneumoniae group Not Detected     Proteus Not Detected     Salmonella sp Not Detected     Serratia marcescens Not Detected     Haemophilus influenzae Not Detected     Neisseria meningtidis Not Detected     Pseudomonas aeruginosa Not Detected     Stenotrophomonas maltophilia Not Detected     Candida albicans Not Detected     Candida auris Not Detected     Candida glabrata Not Detected     Vandana krusei Not  Detected     Candida parapsilosis Not Detected     Candida tropicalis Not Detected     Cryptococcus neoformans/gattii Not Detected     CTX-M (ESBL ) Test Not Applicable     IMP (Carbapenem resistant) Test Not Applicable     KPC resistance gene (Carbapenem resistant) Test Not Applicable     mcr-1  Test Not Applicable     mec A/C  Test Not Applicable     mec A/C and MREJ (MRSA) gene Test Not Applicable     NDM (Carbapenem resistant) Test Not Applicable     OXA-48-like (Carbapenem resistant) Test Not Applicable     van A/B (VRE gene) Not Detected     VIM (Carbapenem resistant) Test Not Applicable    Narrative:      Aerobic and anaerobic          Urine Studies:   Recent Labs   Lab 03/16/24  1359   COLORU Yellow   APPEARANCEUA Hazy*   PHUR 6.0   SPECGRAV 1.020   PROTEINUA 1+*   GLUCUA 3+*   KETONESU Negative   BILIRUBINUA Negative   OCCULTUA 2+*   NITRITE Negative   LEUKOCYTESUR 2+*   RBCUA 14*   WBCUA 37*   BACTERIA Occasional   SQUAMEPITHEL 1   HYALINECASTS 0         Significant Imaging: I have reviewed all pertinent imaging results/findings within the past 24 hours.

## 2024-03-20 NOTE — ASSESSMENT & PLAN NOTE
Resolved  Patient with acute kidney injury/acute renal failure likely due to pre-renal azotemia due to dehydration. Cr 1.2 on presentation. ANAIS is currently improving. Baseline creatinine  0.8  - Labs reviewed- Renal function/electrolytes with Estimated Creatinine Clearance: 51.2 mL/min (based on SCr of 0.9 mg/dL). according to latest data. Monitor urine output and serial BMP and adjust therapy as needed. Avoid nephrotoxins and renally dose meds for GFR listed above.

## 2024-03-20 NOTE — TRANSFER OF CARE
"Anesthesia Transfer of Care Note    Patient: Aminah Alicea-Shelly    Procedure(s) Performed: Procedure(s) (LRB):  Transesophageal echo (CARLITO) intra-procedure log documentation (N/A)    Patient location: Cath Lab    Anesthesia Type: general    Transport from OR: Transported from OR on room air with adequate spontaneous ventilation    Post pain: adequate analgesia    Post assessment: no apparent anesthetic complications    Post vital signs: stable    Level of consciousness: sedated and responds to stimulation    Nausea/Vomiting: no nausea/vomiting    Complications: none    Transfer of care protocol was followed      Last vitals: Visit Vitals  BP (!) 148/65   Pulse 82   Temp 36.8 °C (98.3 °F) (Oral)   Resp 16   Ht 5' 5" (1.651 m)   Wt 71.7 kg (158 lb)   SpO2 96%   Breastfeeding No   BMI 26.29 kg/m²     "

## 2024-03-20 NOTE — SUBJECTIVE & OBJECTIVE
Interval History: Patient complaining of mild sore throat this morning. Denies chest pain, SOB, n/v, c/d.       Review of Systems  Objective:     Vital Signs (Most Recent):  Temp: 98.3 °F (36.8 °C) (03/20/24 1109)  Pulse: 83 (03/20/24 1109)  Resp: 18 (03/20/24 1109)  BP: (!) 143/65 (03/20/24 1109)  SpO2: 96 % (03/20/24 1109) Vital Signs (24h Range):  Temp:  [98 °F (36.7 °C)-98.7 °F (37.1 °C)] 98.3 °F (36.8 °C)  Pulse:  [] 83  Resp:  [16-20] 18  SpO2:  [96 %] 96 %  BP: (143-190)/(65-81) 143/65     Weight: 71.8 kg (158 lb 4.6 oz)  Body mass index is 26.34 kg/m².    Intake/Output Summary (Last 24 hours) at 3/20/2024 1336  Last data filed at 3/20/2024 0400  Gross per 24 hour   Intake 1090 ml   Output --   Net 1090 ml         Physical Exam  Vitals and nursing note reviewed.   Constitutional:       General: She is not in acute distress.     Appearance: She is not ill-appearing.   HENT:      Mouth/Throat:      Mouth: Mucous membranes are moist.   Eyes:      General: No scleral icterus.     Extraocular Movements: Extraocular movements intact.      Pupils: Pupils are equal, round, and reactive to light.   Cardiovascular:      Rate and Rhythm: Normal rate and regular rhythm.      Pulses: Normal pulses.      Heart sounds: Normal heart sounds.   Pulmonary:      Effort: Pulmonary effort is normal. No respiratory distress.      Breath sounds: Normal breath sounds. No wheezing.   Abdominal:      General: There is no distension.      Palpations: Abdomen is soft.      Tenderness: There is no abdominal tenderness.   Musculoskeletal:      Right lower leg: No edema.      Left lower leg: No edema.   Skin:     General: Skin is warm and dry.      Findings: No rash.   Neurological:      General: No focal deficit present.      Mental Status: She is alert and oriented to person, place, and time.   Psychiatric:         Mood and Affect: Mood normal.         Behavior: Behavior normal.             Significant Labs: All pertinent labs  within the past 24 hours have been reviewed.    Significant Imaging: I have reviewed all pertinent imaging results/findings within the past 24 hours.

## 2024-03-20 NOTE — PLAN OF CARE
Problem: Adult Inpatient Plan of Care  Goal: Plan of Care Review  Outcome: Ongoing, Progressing  Goal: Patient-Specific Goal (Individualized)  Outcome: Ongoing, Progressing  Goal: Absence of Hospital-Acquired Illness or Injury  Outcome: Ongoing, Progressing  Goal: Optimal Comfort and Wellbeing  Outcome: Ongoing, Progressing  Goal: Readiness for Transition of Care  Outcome: Ongoing, Progressing     Problem: Infection  Goal: Absence of Infection Signs and Symptoms  Outcome: Ongoing, Progressing     Problem: Diabetes Comorbidity  Goal: Blood Glucose Level Within Targeted Range  Outcome: Ongoing, Progressing     Problem: Adjustment to Illness (Sepsis/Septic Shock)  Goal: Optimal Coping  Outcome: Ongoing, Progressing     Problem: Infection Progression (Sepsis/Septic Shock)  Goal: Absence of Infection Signs and Symptoms  Outcome: Ongoing, Progressing     Problem: Glycemic Control Impaired (Sepsis/Septic Shock)  Goal: Blood Glucose Level Within Desired Range  Outcome: Ongoing, Progressing     Problem: Bleeding (Sepsis/Septic Shock)  Goal: Absence of Bleeding  Outcome: Ongoing, Progressing

## 2024-03-20 NOTE — ASSESSMENT & PLAN NOTE
Chronic, controlled. Latest blood pressure and vitals reviewed-     Temp:  [98 °F (36.7 °C)-98.7 °F (37.1 °C)]   Pulse:  []   Resp:  [16-20]   BP: (143-190)/(65-81)   SpO2:  [96 %] .   Home meds for hypertension were reviewed and noted below.   Hypertension Medications               carvediloL (COREG) 25 MG tablet TAKE 1 TABLET TWICE DAILY    lisinopriL (PRINIVIL,ZESTRIL) 40 MG tablet Take 1 tablet (40 mg total) by mouth once daily.            While in the hospital, will manage blood pressure as follows; Adjust home antihypertensive regimen as follows- held on presentation, restart as tolerated    Will utilize p.r.n. blood pressure medication only if patient's blood pressure greater than 180/110 and she develops symptoms such as worsening chest pain or shortness of breath.

## 2024-03-20 NOTE — ASSESSMENT & PLAN NOTE
"This patient does have evidence of infective focus  My overall impression is sepsis.  Source: Urinary Tract  Antibiotics given-   Antibiotics (72h ago, onward)    Start     Stop Route Frequency Ordered    03/19/24 1300  cefTRIAXone (ROCEPHIN) 2 g in dextrose 5 % in water (D5W) 100 mL IVPB (MB+)         -- IV Every 12 hours (non-standard times) 03/19/24 1145    03/17/24 1100  ampicillin (OMNIPEN) 2 g in sodium chloride 0.9 % 100 mL IVPB (MB+)         -- IV Every 4 hours (non-standard times) 03/17/24 0954    03/16/24 2100  mupirocin 2 % ointment         03/21/24 2059 Nasl 2 times daily 03/16/24 1953        Latest lactate reviewed-  No results for input(s): "LACTATE", "POCLAC" in the last 72 hours.    Fluid challenge Ideal Body Weight- The patient's ideal body weight is Ideal body weight: 57 kg (125 lb 10.6 oz) which will be used to calculate fluid bolus of 30 ml/kg for treatment of septic shock.      Post- resuscitation assessment No - Post resuscitation assessment not needed     ANAIS    Will Not start Pressors- Levophed for MAP of 65  Source control achieved by: rocephin  "

## 2024-03-20 NOTE — PLAN OF CARE
No overnight events. Blood cultures from 3/19 results still showing cocci in chains. Overnight provider notified. Repeat cultures to be drawn today. Pt needs negative cultures prior to PICC line placement for long term IVAbx,  Problem: Adult Inpatient Plan of Care  Goal: Plan of Care Review  Outcome: Ongoing, Progressing  Goal: Patient-Specific Goal (Individualized)  Outcome: Ongoing, Progressing  Goal: Absence of Hospital-Acquired Illness or Injury  Outcome: Ongoing, Progressing  Goal: Optimal Comfort and Wellbeing  Outcome: Ongoing, Progressing  Goal: Readiness for Transition of Care  Outcome: Ongoing, Progressing     Problem: Infection  Goal: Absence of Infection Signs and Symptoms  Outcome: Ongoing, Progressing     Problem: Diabetes Comorbidity  Goal: Blood Glucose Level Within Targeted Range  Outcome: Ongoing, Progressing     Problem: Adjustment to Illness (Sepsis/Septic Shock)  Goal: Optimal Coping  Outcome: Ongoing, Progressing     Problem: Glycemic Control Impaired (Sepsis/Septic Shock)  Goal: Blood Glucose Level Within Desired Range  Outcome: Ongoing, Progressing     Problem: Infection Progression (Sepsis/Septic Shock)  Goal: Absence of Infection Signs and Symptoms  Outcome: Ongoing, Progressing     Problem: Fluid and Electrolyte Imbalance (Acute Kidney Injury/Impairment)  Goal: Fluid and Electrolyte Balance  Outcome: Ongoing, Progressing

## 2024-03-20 NOTE — ANESTHESIA POSTPROCEDURE EVALUATION
Anesthesia Post Evaluation    Patient: Aminah Alicea-Shelly    Procedure(s) Performed: Procedure(s) (LRB):  Transesophageal echo (CARLITO) intra-procedure log documentation (N/A)    Final Anesthesia Type: general      Patient location during evaluation: PACU  Patient participation: Yes- Able to Participate  Level of consciousness: awake and alert  Post-procedure vital signs: reviewed and stable  Pain management: adequate  Airway patency: patent    PONV status at discharge: No PONV  Anesthetic complications: no      Cardiovascular status: blood pressure returned to baseline  Respiratory status: unassisted  Hydration status: euvolemic  Follow-up not needed.              Vitals Value Taken Time   /60 03/20/24 1532   Temp 36 °C (96.8 °F) 03/20/24 1530   Pulse 86 03/20/24 1541   Resp 22 03/20/24 1541   SpO2 97 % 03/20/24 1541   Vitals shown include unvalidated device data.      No case tracking events are documented in the log.      Pain/Lorenza Score: Pain Rating Prior to Med Admin: 5 (3/20/2024  1:34 PM)  Pain Rating Post Med Admin: 5 (3/20/2024  1:34 PM)

## 2024-03-20 NOTE — NURSING
PT AAO x4, vital stable, on RA. Echo and CT done today. BG checked as order. IV abx given. Pt up to bedside commode with stand by assist.  No acute event this shift. Family at bedside. Bed low and locked. Personal items and call light in reach.

## 2024-03-20 NOTE — ASSESSMENT & PLAN NOTE
Patient's FSGs are controlled on current medication regimen.  Last A1c reviewed-   Lab Results   Component Value Date    HGBA1C 8.2 (H) 11/09/2023    HGBA1C 8.2 (H) 11/09/2023     Most recent fingerstick glucose reviewed-   Recent Labs   Lab 03/19/24  1944 03/19/24  2349 03/20/24  0350 03/20/24  0739   POCTGLUCOSE 264* 228* 208* 200*       Current correctional scale  Low  Maintain anti-hyperglycemic dose as follows-   Antihyperglycemics (From admission, onward)    Start     Stop Route Frequency Ordered    03/16/24 2019  insulin aspart U-100 pen 0-5 Units         -- SubQ Before meals & nightly PRN 03/16/24 1920        Hold Oral hypoglycemics while patient is in the hospital.

## 2024-03-20 NOTE — PLAN OF CARE
03/20/24 1112   Discharge Reassessment   Assessment Type Discharge Planning Reassessment   Did the patient's condition or plan change since previous assessment? No   Discharge Plan discussed with: Adult children   Communicated RAJESH with patient/caregiver Date not available/Unable to determine   Discharge Plan A Home Health   Discharge Plan B Home with family   DME Needed Upon Discharge  none   Transition of Care Barriers None   Why the patient remains in the hospital Requires continued medical care   Post-Acute Status   Post-Acute Authorization Home Health   Home Health Status Referrals Sent   Coverage [employer] Manged Medicare Hospital Resources/Appts/Education Provided Provided patient/caregiver with written discharge plan information   Patient choice form signed by patient/caregiver List with quality metrics by geographic area provided   Discharge Delays (!) Procedure Scheduling (IR, OR, Labs, Echo, Cath, Echo, EEG)       [address] - Stepdown Flex ([place]-[facility])  Discharge Reassessment    Discharge Plan A and Plan B have been determined by review of patient's clinical status, future medical and therapeutic needs, and coverage/benefits for post-acute care in coordination with multidisciplinary team members.      SW sent Home Health referrals and IV abx through care port.   Will follow up.     Radha Simpson MSW, CSW

## 2024-03-20 NOTE — ASSESSMENT & PLAN NOTE
78F with h/o DM, HT, interstitial cystitis and urolithiasis s/p ureteroscopic stone extraction with lithotripsy and right ureteral stent placement (right) on 3/14 admitted 3/16 with chills, dizziness, flank pain, dysuria, freq, and a fall. Bcx 3/16, 3/17, 3/18 with e.faecalis in both sets. Repeat 3/19 NGTD. Fever defervesced, leukocytosis trending down. 2d echo without veg. Reports PCN allergy, but tolerating ampicillin.     Suspect bacteremia from  source; ureteral procedure likely contributed. Endocarditis needs to be excluded. fortunately clinically improving. Reviewed labs and hiv and hep c screening neg. 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 No signs of pulmonary TB.     Recommendations:   - continue ampicilli/ceftriaxone duration TBD but will need at least 4weeks of iv abx followed by chronic supressive oral antibiotics until ureteral  stent is removed or replaced  - please wait for f/u bcx to be clear x 48-72h before placing picc  - agree with CARLITO  - consider ureteral stent exchange, especially since she's failing to clear bacteremia  - consider repeating abdominal imaging (either ultrasound or CT) to r/o abscesss    Discussed with hospitalist

## 2024-03-20 NOTE — ASSESSMENT & PLAN NOTE
Patient presented to ED with fevers and malaise 2 days after urologic procedure.  CT with evidence nephritic stranding.  Admitted for pyelonephritis.  Blood cultures on presentation 3/16 positive for Enterococcus faecalis. Suspect urologic source. TTE negative  - ID consulted   - repeat cultures  - CARLITO ordered  - S/p ceftriaxone in ED and on admission, continued for double coverage due to persistent bacteremia  - started on ampicillin 3/17   - urology to follow-up in clinic, stated no role for stent removal as it is source control

## 2024-03-21 LAB
ALBUMIN SERPL BCP-MCNC: 2.5 G/DL (ref 3.5–5.2)
ALP SERPL-CCNC: 76 U/L (ref 55–135)
ALT SERPL W/O P-5'-P-CCNC: 35 U/L (ref 10–44)
ANION GAP SERPL CALC-SCNC: 10 MMOL/L (ref 8–16)
AST SERPL-CCNC: 19 U/L (ref 10–40)
BASOPHILS # BLD AUTO: 0.03 K/UL (ref 0–0.2)
BASOPHILS NFR BLD: 0.5 % (ref 0–1.9)
BILIRUB SERPL-MCNC: 0.2 MG/DL (ref 0.1–1)
BUN SERPL-MCNC: 11 MG/DL (ref 8–23)
CALCIUM SERPL-MCNC: 9.5 MG/DL (ref 8.7–10.5)
CHLORIDE SERPL-SCNC: 101 MMOL/L (ref 95–110)
CO2 SERPL-SCNC: 24 MMOL/L (ref 23–29)
CREAT SERPL-MCNC: 0.9 MG/DL (ref 0.5–1.4)
DIFFERENTIAL METHOD BLD: ABNORMAL
EOSINOPHIL # BLD AUTO: 0.1 K/UL (ref 0–0.5)
EOSINOPHIL NFR BLD: 1.5 % (ref 0–8)
ERYTHROCYTE [DISTWIDTH] IN BLOOD BY AUTOMATED COUNT: 18 % (ref 11.5–14.5)
EST. GFR  (NO RACE VARIABLE): >60 ML/MIN/1.73 M^2
ESTIMATED AVG GLUCOSE: 171 MG/DL (ref 68–131)
GLUCOSE SERPL-MCNC: 162 MG/DL (ref 70–110)
HBA1C MFR BLD: 7.6 % (ref 4–5.6)
HCT VFR BLD AUTO: 27.4 % (ref 37–48.5)
HGB BLD-MCNC: 8.6 G/DL (ref 12–16)
IMM GRANULOCYTES # BLD AUTO: 0.04 K/UL (ref 0–0.04)
IMM GRANULOCYTES NFR BLD AUTO: 0.7 % (ref 0–0.5)
LYMPHOCYTES # BLD AUTO: 0.6 K/UL (ref 1–4.8)
LYMPHOCYTES NFR BLD: 11.7 % (ref 18–48)
MCH RBC QN AUTO: 22 PG (ref 27–31)
MCHC RBC AUTO-ENTMCNC: 31.4 G/DL (ref 32–36)
MCV RBC AUTO: 70 FL (ref 82–98)
MONOCYTES # BLD AUTO: 0.8 K/UL (ref 0.3–1)
MONOCYTES NFR BLD: 14.1 % (ref 4–15)
NEUTROPHILS # BLD AUTO: 3.9 K/UL (ref 1.8–7.7)
NEUTROPHILS NFR BLD: 71.5 % (ref 38–73)
NRBC BLD-RTO: 0 /100 WBC
PLATELET # BLD AUTO: 224 K/UL (ref 150–450)
PMV BLD AUTO: 10.2 FL (ref 9.2–12.9)
POCT GLUCOSE: 153 MG/DL (ref 70–110)
POCT GLUCOSE: 157 MG/DL (ref 70–110)
POCT GLUCOSE: 188 MG/DL (ref 70–110)
POCT GLUCOSE: 202 MG/DL (ref 70–110)
POCT GLUCOSE: 213 MG/DL (ref 70–110)
POCT GLUCOSE: 214 MG/DL (ref 70–110)
POCT GLUCOSE: 233 MG/DL (ref 70–110)
POTASSIUM SERPL-SCNC: 4.2 MMOL/L (ref 3.5–5.1)
PROT SERPL-MCNC: 5.6 G/DL (ref 6–8.4)
RBC # BLD AUTO: 3.91 M/UL (ref 4–5.4)
SODIUM SERPL-SCNC: 135 MMOL/L (ref 136–145)
WBC # BLD AUTO: 5.47 K/UL (ref 3.9–12.7)

## 2024-03-21 PROCEDURE — 63600175 PHARM REV CODE 636 W HCPCS: Performed by: INTERNAL MEDICINE

## 2024-03-21 PROCEDURE — 85025 COMPLETE CBC W/AUTO DIFF WBC: CPT | Performed by: STUDENT IN AN ORGANIZED HEALTH CARE EDUCATION/TRAINING PROGRAM

## 2024-03-21 PROCEDURE — 25000003 PHARM REV CODE 250: Performed by: INTERNAL MEDICINE

## 2024-03-21 PROCEDURE — 20600001 HC STEP DOWN PRIVATE ROOM

## 2024-03-21 PROCEDURE — 63600175 PHARM REV CODE 636 W HCPCS: Performed by: HOSPITALIST

## 2024-03-21 PROCEDURE — 25000003 PHARM REV CODE 250: Performed by: STUDENT IN AN ORGANIZED HEALTH CARE EDUCATION/TRAINING PROGRAM

## 2024-03-21 PROCEDURE — 99233 SBSQ HOSP IP/OBS HIGH 50: CPT | Mod: ,,, | Performed by: INTERNAL MEDICINE

## 2024-03-21 PROCEDURE — 87040 BLOOD CULTURE FOR BACTERIA: CPT | Performed by: STUDENT IN AN ORGANIZED HEALTH CARE EDUCATION/TRAINING PROGRAM

## 2024-03-21 PROCEDURE — 80053 COMPREHEN METABOLIC PANEL: CPT | Performed by: STUDENT IN AN ORGANIZED HEALTH CARE EDUCATION/TRAINING PROGRAM

## 2024-03-21 PROCEDURE — 83036 HEMOGLOBIN GLYCOSYLATED A1C: CPT | Performed by: HOSPITALIST

## 2024-03-21 RX ORDER — INSULIN ASPART 100 [IU]/ML
3 INJECTION, SOLUTION INTRAVENOUS; SUBCUTANEOUS ONCE
Status: COMPLETED | OUTPATIENT
Start: 2024-03-21 | End: 2024-03-21

## 2024-03-21 RX ADMIN — GABAPENTIN 300 MG: 300 CAPSULE ORAL at 08:03

## 2024-03-21 RX ADMIN — AMPICILLIN 2 G: 2 INJECTION, POWDER, FOR SOLUTION INTRAMUSCULAR; INTRAVENOUS at 05:03

## 2024-03-21 RX ADMIN — AMPICILLIN 2 G: 2 INJECTION, POWDER, FOR SOLUTION INTRAMUSCULAR; INTRAVENOUS at 10:03

## 2024-03-21 RX ADMIN — CEFTRIAXONE 2 G: 2 INJECTION, POWDER, FOR SOLUTION INTRAMUSCULAR; INTRAVENOUS at 12:03

## 2024-03-21 RX ADMIN — LISINOPRIL 40 MG: 20 TABLET ORAL at 08:03

## 2024-03-21 RX ADMIN — INSULIN ASPART 3 UNITS: 100 INJECTION, SOLUTION INTRAVENOUS; SUBCUTANEOUS at 02:03

## 2024-03-21 RX ADMIN — ENOXAPARIN SODIUM 40 MG: 40 INJECTION SUBCUTANEOUS at 05:03

## 2024-03-21 RX ADMIN — INSULIN ASPART 1 UNITS: 100 INJECTION, SOLUTION INTRAVENOUS; SUBCUTANEOUS at 08:03

## 2024-03-21 RX ADMIN — CARVEDILOL 6.25 MG: 6.25 TABLET, FILM COATED ORAL at 08:03

## 2024-03-21 RX ADMIN — INSULIN ASPART 3 UNITS: 100 INJECTION, SOLUTION INTRAVENOUS; SUBCUTANEOUS at 05:03

## 2024-03-21 RX ADMIN — MUPIROCIN: 20 OINTMENT TOPICAL at 08:03

## 2024-03-21 RX ADMIN — INSULIN ASPART 2 UNITS: 100 INJECTION, SOLUTION INTRAVENOUS; SUBCUTANEOUS at 05:03

## 2024-03-21 RX ADMIN — INSULIN ASPART 3 UNITS: 100 INJECTION, SOLUTION INTRAVENOUS; SUBCUTANEOUS at 08:03

## 2024-03-21 RX ADMIN — AMPICILLIN 2 G: 2 INJECTION, POWDER, FOR SOLUTION INTRAMUSCULAR; INTRAVENOUS at 02:03

## 2024-03-21 RX ADMIN — INSULIN DETEMIR 7 UNITS: 100 INJECTION, SOLUTION SUBCUTANEOUS at 08:03

## 2024-03-21 RX ADMIN — AMPICILLIN 2 G: 2 INJECTION, POWDER, FOR SOLUTION INTRAMUSCULAR; INTRAVENOUS at 12:03

## 2024-03-21 RX ADMIN — INSULIN ASPART 3 UNITS: 100 INJECTION, SOLUTION INTRAVENOUS; SUBCUTANEOUS at 12:03

## 2024-03-21 RX ADMIN — AMPICILLIN 2 G: 2 INJECTION, POWDER, FOR SOLUTION INTRAMUSCULAR; INTRAVENOUS at 09:03

## 2024-03-21 RX ADMIN — LEVOTHYROXINE SODIUM 112 MCG: 112 TABLET ORAL at 05:03

## 2024-03-21 RX ADMIN — AMITRIPTYLINE HYDROCHLORIDE 10 MG: 10 TABLET, FILM COATED ORAL at 08:03

## 2024-03-21 NOTE — ASSESSMENT & PLAN NOTE
Patient presented to ED with fevers and malaise 2 days after urologic procedure.  CT with evidence nephritic stranding.  Admitted for pyelonephritis.  Blood cultures on presentation 3/16 positive for Enterococcus faecalis. Suspect urologic source. TTE negative  - ID consulted   - repeat cultures  - CARLITO no vegetation  - S/p ceftriaxone in ED and on admission, continued for double coverage due to persistent bacteremia  - started on ampicillin 3/17   - urology to follow-up in clinic, stated no role for stent removal as it is source control  - NM scan ordered

## 2024-03-21 NOTE — PLAN OF CARE
Problem: Adult Inpatient Plan of Care  Goal: Plan of Care Review  3/21/2024 0428 by Norma Nava RN  Outcome: Ongoing, Progressing  Flowsheets (Taken 3/21/2024 0428)  Plan of Care Reviewed With: patient  3/21/2024 0428 by Norma Nava RN  Outcome: Ongoing, Progressing  Goal: Patient-Specific Goal (Individualized)  3/21/2024 0428 by Norma Nava RN  Outcome: Ongoing, Progressing  3/21/2024 0428 by Norma Nava RN  Outcome: Ongoing, Progressing  Goal: Absence of Hospital-Acquired Illness or Injury  3/21/2024 0428 by Norma Nava RN  Outcome: Ongoing, Progressing  3/21/2024 0428 by Norma Nava RN  Outcome: Ongoing, Progressing  Goal: Optimal Comfort and Wellbeing  3/21/2024 0428 by Norma Nava RN  Outcome: Ongoing, Progressing  3/21/2024 0428 by Norma Nava RN  Outcome: Ongoing, Progressing  Goal: Readiness for Transition of Care  3/21/2024 0428 by Norma Nava RN  Outcome: Ongoing, Progressing  3/21/2024 0428 by Norma Nava RN  Outcome: Ongoing, Progressing     Problem: Infection  Goal: Absence of Infection Signs and Symptoms  3/21/2024 0428 by Norma Nava RN  Outcome: Ongoing, Progressing  3/21/2024 0428 by Norma Nava RN  Outcome: Ongoing, Progressing  Intervention: Prevent or Manage Infection  Flowsheets (Taken 3/21/2024 0428)  Fever Reduction/Comfort Measures:   lightweight bedding   lightweight clothing  Infection Management:   aseptic technique maintained   cultures obtained and sent to lab  Isolation Precautions:   protective   precautions maintained     Problem: Diabetes Comorbidity  Goal: Blood Glucose Level Within Targeted Range  3/21/2024 0428 by Norma Nava RN  Outcome: Ongoing, Progressing  3/21/2024 0428 by Norma Nava RN  Outcome: Ongoing, Progressing  Intervention: Monitor and Manage Glycemia  Flowsheets (Taken 3/21/2024 0428)  Glycemic Management:   blood glucose monitored   supplemental insulin given     Problem:  Adjustment to Illness (Sepsis/Septic Shock)  Goal: Optimal Coping  3/21/2024 0428 by Norma Nava RN  Outcome: Ongoing, Progressing  3/21/2024 0428 by Norma Nava RN  Outcome: Ongoing, Progressing     Problem: Bleeding (Sepsis/Septic Shock)  Goal: Absence of Bleeding  3/21/2024 0428 by Norma Nava RN  Outcome: Ongoing, Progressing  3/21/2024 0428 by Norma Nava RN  Outcome: Ongoing, Progressing     Problem: Glycemic Control Impaired (Sepsis/Septic Shock)  Goal: Blood Glucose Level Within Desired Range  3/21/2024 0428 by Norma Nava RN  Outcome: Ongoing, Progressing  3/21/2024 0428 by Norma Nava RN  Outcome: Ongoing, Progressing  Intervention: Optimize Glycemic Control  Flowsheets (Taken 3/21/2024 0428)  Glycemic Management:   blood glucose monitored   supplemental insulin given     Problem: Infection Progression (Sepsis/Septic Shock)  Goal: Absence of Infection Signs and Symptoms  3/21/2024 0428 by Norma Nava RN  Outcome: Ongoing, Progressing  3/21/2024 0428 by Norma Nava RN  Outcome: Ongoing, Progressing  Intervention: Promote Recovery  Flowsheets (Taken 3/21/2024 0428)  Sleep/Rest Enhancement:   awakenings minimized   consistent schedule promoted   noise level reduced   regular sleep/rest pattern promoted   relaxation techniques promoted   room darkened  Activity Management: Rolling - L1     Problem: Nutrition Impaired (Sepsis/Septic Shock)  Goal: Optimal Nutrition Intake  3/21/2024 0428 by Norma Nava RN  Outcome: Ongoing, Progressing  3/21/2024 0428 by Norma Nava RN  Outcome: Ongoing, Progressing     Problem: Fluid and Electrolyte Imbalance (Acute Kidney Injury/Impairment)  Goal: Fluid and Electrolyte Balance  3/21/2024 0428 by Norma Nava RN  Outcome: Ongoing, Progressing  3/21/2024 0428 by Norma Nava RN  Outcome: Ongoing, Progressing     Problem: Oral Intake Inadequate (Acute Kidney Injury/Impairment)  Goal: Optimal Nutrition  Intake  3/21/2024 0428 by Norma Nava RN  Outcome: Ongoing, Progressing  3/21/2024 0428 by Norma Nava RN  Outcome: Ongoing, Progressing     Problem: Renal Function Impairment (Acute Kidney Injury/Impairment)  Goal: Effective Renal Function  3/21/2024 0428 by Norma Nava RN  Outcome: Ongoing, Progressing  3/21/2024 0428 by Norma Nava RN  Outcome: Ongoing, Progressing     Problem: Pain Acute  Goal: Acceptable Pain Control and Functional Ability  3/21/2024 0428 by Norma Nava RN  Outcome: Ongoing, Progressing  3/21/2024 0428 by Norma Nava RN  Outcome: Ongoing, Progressing

## 2024-03-21 NOTE — NURSING
Patient A&OX4,VSS, blood sugar 459- MD aware, covered with subq insulin overnight. No complaints of pain, IV abx administered. Bed in lowest position, bed alarm on, bed locked, X2 side rails raised, call light and personal belongings within reach.

## 2024-03-21 NOTE — SUBJECTIVE & OBJECTIVE
Interval history: NAEO. Reports persistnat dysuria. Reports itching at R hip, but denies pain or trouble moving joint. S/p CARLITO yesterday. Pt reports completing treatment for TB in 2018. Thinks she took a medication for 3 months      Past Surgical History:   Procedure Laterality Date    APPENDECTOMY      BLADDER FULGURATION N/A 3/1/2024    Procedure: ULCER INJECTION FULGURATION;  Surgeon: Brody Smith MD;  Location: [facility] OR;  Service: Urology;  Laterality: N/A;    BLADDER SUSPENSION      CATARACT EXTRACTION      right eye     CHOLECYSTECTOMY      COLONOSCOPY N/A 7/20/2020    Procedure: COLONOSCOPY;  Surgeon: Juan Parker MD;  Location: [facility] ENDO (4TH FLR);  Service: Endoscopy;  Laterality: N/A;  Hx of chronic anemia.  patient needs a   4/6/20 - removed from 4/20/20, rescheduled 7/20/20 - pg  covid 7/17-[facility] 2nd floor-tb    COLONOSCOPY N/A 2/9/2023    Procedure: COLONOSCOPY;  Surgeon: Renan Sanchez MD;  Location: [facility] ENDO (4TH FLR);  Service: Colon and Rectal;  Laterality: N/A;  instr handed to patient, -ml    CYSTOSCOPY N/A 5/19/2021    Procedure: CYSTOSCOPY;  Surgeon: Brody Smith MD;  Location: [facility] 1ST FLR;  Service: Urology;  Laterality: N/A;    CYSTOURETEROSCOPY, WITH HOLMIUM LASER LITHOTRIPSY OF URETERAL CALCULUS AND STENT INSERTION Right 3/1/2024    Procedure: CYSTOURETEROSCOPY, WITH HOLMIUM LASER LITHOTRIPSY OF URETERAL CALCULUS AND STENT INSERTION;  Surgeon: Brody Smith MD;  Location: [facility] OR;  Service: Urology;  Laterality: Right;    CYSTOURETEROSCOPY,WITH HOLMIUM LASER LITHOTRIPSY OF URETERAL CALCULUS Right 3/14/2024    Procedure: CYSTOURETEROSCOPY,WITH HOLMIUM LASER LITHOTRIPSY OF URETERAL CALCULUS;  Surgeon: Brody Smith MD;  Location: [facility];  Service: Urology;  Laterality: Right;  1 HR    ECHOCARDIOGRAM,TRANSESOPHAGEAL N/A 3/20/2024    Procedure: Transesophageal echo (CARLITO) intra-procedure log documentation;  Surgeon: Provider,  [facility];  Location: [facility] EP LAB;  Service: Cardiology;  Laterality: N/A;    EYE SURGERY Bilateral     cataract removal    INJECTION OF STEROID N/A 3/14/2024    Procedure: INJECTION, STEROID;  Surgeon: Brody Smith MD;  Location: [facility] OR;  Service: Urology;  Laterality: N/A;    PLACEMENT-STENT Right 3/14/2024    Procedure: PLACEMENT-STENT;  Surgeon: Brody Smith MD;  Location: [facility] OR;  Service: Urology;  Laterality: Right;    REMOVAL-STENT Right 3/14/2024    Procedure: REMOVAL-STENT;  Surgeon: Brody Smith MD;  Location: [facility] OR;  Service: Urology;  Laterality: Right;    TOTAL ABDOMINAL HYSTERECTOMY      DUB       Review of patient's allergies indicates:   Allergen Reactions    Bufferin [aspirin, buffered] Itching    Atorvastatin      Myalgias and arthralgias    Shellfish containing products Itching     Shellfish causes her to itch per her daughter, Caro.     Warfarin     Ibuprofen Itching     Itching of eyes, head       Medications:  Medications Prior to Admission   Medication Sig    [facility]CLIX LANCETS [facility]LOOD [facility]GAR ONE TIME DAILY    amitriptyline (ELAVIL) 10 MG tablet Take 1 tablet (10 mg total) by mouth every evening.    BD ALCOHOL SWABS PadM USE AS DIRECTED TOPICALLY AS NEEDED    biotin 1 mg tablet Take 1,000 mcg by mouth 3 (three) times daily.    blood sugar diagnostic (ACCU-CHEK MATTIE PLUS TEST STRP) Strp TEST ONE TIME DAILY    blood-glucose meter kit To check BG 2  times daily, to use with insurance preferred meter, use as directed.    carvediloL (COREG) 25 MG tablet TAKE 1 TABLET TWICE DAILY    cyanocobalamin (VITAMIN B-12) 1000 MCG tablet Take 100 mcg by mouth once daily.    gabapentin (NEURONTIN) 300 MG capsule Take 1 capsule (300 mg total) by mouth every evening.    glipiZIDE 5 MG TR24 Take 1 tablet (5 mg total) by mouth daily with breakfast.    [] HYDROcodone-acetaminophen (NORCO) 5-325 mg per tablet Take 1 tablet by mouth every 6 (six) hours as  needed for Pain.    ketoconazole (NIZORAL) 2 % cream Apply topically once daily.    lancets (ACCU-CHEK MULTICLIX LANCET) Misc To use as directed with Accu Chek Sahra FastClix lancing device    lancets Misc To check BG 2 times daily, to use with insurance preferred meter.    levothyroxine (SYNTHROID) 112 MCG tablet Take 1 tablet (112 mcg total) by mouth before breakfast.    lisinopriL (PRINIVIL,ZESTRIL) 40 MG tablet Take 1 tablet (40 mg total) by mouth once daily.    metFORMIN (GLUCOPHAGE) 1000 MG tablet Take 1 tablet (1,000 mg total) by mouth 2 (two) times daily with meals.    mirabegron (MYRBETRIQ) 25 mg Tb24 ER tablet Take 1 tablet (25 mg total) by mouth once daily.    naproxen (NAPROSYN) 500 MG tablet Take 1 tablet (500 mg total) by mouth 2 (two) times daily with meals.    rosuvastatin (CRESTOR) 20 MG tablet Take 1 tablet (20 mg total) by mouth once daily.    SITagliptin phosphate (JANUVIA) 100 MG Tab Take 1 tablet (100 mg total) by mouth once daily.    turmeric root extract 500 mg Cap Take by mouth.    VITAMIN B COMPLEX ORAL Take 1 tablet by mouth.    vitamin D (VITAMIN D3) 1000 units Tab Take 1,000 Units by mouth once daily.     Antibiotics (From admission, onward)      Start     Stop Route Frequency Ordered    03/19/24 1300  cefTRIAXone (ROCEPHIN) 2 g in dextrose 5 % in water (D5W) 100 mL IVPB (MB+)         -- IV Every 12 hours (non-standard times) 03/19/24 1145    03/17/24 1100  ampicillin (OMNIPEN) 2 g in sodium chloride 0.9 % 100 mL IVPB (MB+)         -- IV Every 4 hours (non-standard times) 03/17/24 0954          Antifungals (From admission, onward)      None          Antivirals (From admission, onward)      None             Immunization History   Administered Date(s) Administered    COVID-19, MRNA, LN-S, PF (Pfizer) (Gray Cap) 03/09/2022    COVID-19, mRNA, LNP-S, bivalent booster, PF (PFIZER OMICRON) 10/12/2022    COVID-19, vector-nr, rS-Ad26, PF (Katelin) 03/05/2021    Influenza (FLUAD) - Quadrivalent -  Adjuvanted - PF *Preferred* (65+) 12/10/2021, 10/10/2022, 11/09/2023    Influenza - High Dose - PF (65 years and older) 12/02/2015, 09/15/2017, 11/26/2018    Pneumococcal Conjugate - 13 Valent 11/26/2018    Pneumococcal Polysaccharide - 23 Valent 01/18/2023       Family History       Problem Relation (Age of Onset)    Breast cancer Mother    COPD Father    Cancer Mother, Sister, Daughter    Diabetes Son    Hypertension Sister, Sister, Daughter, Daughter    Ovarian cancer Mother, Sister    Thyroid disease Daughter, Daughter          Social History     Socioeconomic History    Marital status:    Tobacco Use    Smoking status: Never    Smokeless tobacco: Never   Substance and Sexual Activity    Alcohol use: No    Drug use: No    Sexual activity: Yes     Partners: Male     Birth control/protection: Post-menopausal     Comment:     Other Topics Concern    Are you pregnant or think you may be? No    Breast-feeding No   Social History Narrative     with 7 kids     Social Determinants of Health     Financial Resource Strain: Low Risk  (3/18/2024)    Overall Financial Resource Strain (CARDIA)     Difficulty of Paying Living Expenses: Not very hard   Food Insecurity: No Food Insecurity (3/18/2024)    Hunger Vital Sign     Worried About Running Out of Food in the Last Year: Never true     Ran Out of Food in the Last Year: Never true   Transportation Needs: No Transportation Needs (3/18/2024)    PRAPARE - Transportation     Lack of Transportation (Medical): No     Lack of Transportation (Non-Medical): No   Physical Activity: Inactive (3/18/2024)    Exercise Vital Sign     Days of Exercise per Week: 0 days     Minutes of Exercise per Session: 0 min   Stress: No Stress Concern Present (3/18/2024)    [employer] [employer] - Occupational Stress Questionnaire     Feeling of Stress : Only a little   Social Connections: Unknown (3/18/2024)    Social Connection and Isolation Panel [NHANES]      Frequency of Communication with Friends and Family: More than three times a week     Frequency of Social Gatherings with Friends and Family: Twice a week     Attends Nondenominational Services: 1 to 4 times per year     Active Member of Clubs or Organizations: Yes     Attends Club or Organization Meetings: Never     Marital Status: Patient declined   Housing Stability: Unknown (3/18/2024)    Housing Stability Vital Sign     Unable to Pay for Housing in the Last Year: No     Unstable Housing in the Last Year: No     Review of Systems   Constitutional:  Positive for chills and fever. Negative for fatigue and unexpected weight change.   HENT:  Negative for ear pain, facial swelling, hearing loss, mouth sores, nosebleeds, rhinorrhea, sinus pressure, sore throat, tinnitus, trouble swallowing and voice change.    Eyes:  Negative for photophobia, pain, redness and visual disturbance.   Respiratory:  Negative for cough, chest tightness, shortness of breath and wheezing.    Cardiovascular:  Negative for chest pain, palpitations and leg swelling.   Gastrointestinal:  Negative for abdominal pain, blood in stool, constipation, diarrhea, nausea and vomiting.   Endocrine: Negative for cold intolerance, heat intolerance, polydipsia, polyphagia and polyuria.   Genitourinary:  Positive for difficulty urinating, dysuria, hematuria and urgency. Negative for flank pain, frequency, menstrual problem, vaginal bleeding, vaginal discharge and vaginal pain.   Musculoskeletal:  Negative for arthralgias, back pain, joint swelling, myalgias and neck pain.   Skin:  Negative for rash.   Allergic/Immunologic: Negative for environmental allergies, food allergies and immunocompromised state.   Neurological:  Negative for dizziness, seizures, syncope, weakness, light-headedness, numbness and headaches.   Hematological:  Negative for adenopathy. Does not bruise/bleed easily.   Psychiatric/Behavioral:  Negative for confusion, hallucinations, self-injury,  sleep disturbance and suicidal ideas. The patient is not nervous/anxious.      Objective:     Vital Signs (Most Recent):  Temp: 98.9 °F (37.2 °C) (03/21/24 1150)  Pulse: 82 (03/21/24 1150)  Resp: 19 (03/21/24 1150)  BP: (!) 140/65 (03/21/24 1150)  SpO2: 95 % (03/21/24 1150) Vital Signs (24h Range):  Temp:  [96.8 °F (36 °C)-99.5 °F (37.5 °C)] 98.9 °F (37.2 °C)  Pulse:  [79-98] 82  Resp:  [18-25] 19  SpO2:  [95 %-99 %] 95 %  BP: (116-161)/(57-70) 140/65     Weight: 72.5 kg (159 lb 13.3 oz)  Body mass index is 26.6 kg/m².    Estimated Creatinine Clearance: 51.4 mL/min (based on SCr of 0.9 mg/dL).     Physical Exam  Vitals and nursing note reviewed.   Constitutional:       Appearance: She is well-developed.   HENT:      Head: Normocephalic and atraumatic.      Right Ear: External ear normal.      Left Ear: External ear normal.   Eyes:      General: No scleral icterus.        Right eye: No discharge.         Left eye: No discharge.      Conjunctiva/sclera: Conjunctivae normal.   Cardiovascular:      Rate and Rhythm: Normal rate and regular rhythm.      Heart sounds: Normal heart sounds. No murmur heard.     No friction rub. No gallop.   Pulmonary:      Effort: Pulmonary effort is normal. No respiratory distress.      Breath sounds: Normal breath sounds. No stridor. No wheezing or rales.   Abdominal:      General: Bowel sounds are normal.      Tenderness: There is abdominal tenderness in the suprapubic area.   Musculoskeletal:         General: No tenderness. Normal range of motion.   Skin:     General: Skin is warm and dry.   Neurological:      Mental Status: She is alert and oriented to person, place, and time.          Significant Labs: CBC:   Recent Labs   Lab 03/20/24  0550 03/21/24  0343   WBC 5.65 5.47   HGB 10.4* 8.6*   HCT 34.0* 27.4*    224       CMP:   Recent Labs   Lab 03/20/24  0550 03/21/24  0343   * 135*   K 4.6 4.2    101   CO2 23 24   * 162*   BUN 12 11   CREATININE 0.9 0.9    CALCIUM 10.1 9.5   PROT 6.5 5.6*   ALBUMIN 3.0* 2.5*   BILITOT 0.3 0.2   ALKPHOS 88 76   AST 19 19   ALT 37 35   ANIONGAP 10 10       Microbiology Results (last 7 days)       Procedure Component Value Units Date/Time    Blood culture [[id number]]  (Abnormal) Collected: 03/19/24 0825    Order Status: Completed Specimen: Blood Updated: 03/21/24 1438     Blood Culture, Routine Gram stain aer bottle: Gram positive cocci in chains resembling Strep      Positive results previously called 03/20/2024 00:00      ENTEROCOCCUS FAECALIS  For susceptibility see order #[id number]      Blood culture [[id number]]  (Abnormal) Collected: 03/19/24 0822    Order Status: Completed Specimen: Blood Updated: 03/21/24 1437     Blood Culture, Routine Gram stain aer bottle: Gram positive cocci in chains resembling Strep      Results called to and read back by: Jaimee Morillo RN. 03/20/2024  00:00      ENTEROCOCCUS FAECALIS  Susceptibility pending      Blood culture [[id number]] Collected: 03/21/24 0343    Order Status: Completed Specimen: Blood Updated: 03/21/24 1315     Blood Culture, Routine No Growth to date    Blood culture [[id number]] Collected: 03/21/24 0343    Order Status: Completed Specimen: Blood Updated: 03/21/24 1315     Blood Culture, Routine No Growth to date    Blood culture [[id number]] Collected: 03/20/24 0550    Order Status: Completed Specimen: Blood Updated: 03/21/24 0812     Blood Culture, Routine No Growth to date      No Growth to date    Blood culture [[id number]] Collected: 03/20/24 0550    Order Status: Completed Specimen: Blood Updated: 03/21/24 0044     Blood Culture, Routine Gram stain aer bottle: Gram positive cocci in chains resembling Strep      Positive results previously called 03/21/2024  00:44    Blood culture [[id number]]  (Abnormal) Collected: 03/18/24 0538    Order Status: Completed Specimen: Blood Updated: 03/20/24 1016     Blood Culture, Routine Gram stain aer bottle: Gram positive cocci in chains  resembling Strep      Positive results previously called 03/18/2024      ENTEROCOCCUS FAECALIS  For susceptibility see order #[id number]      Blood culture [[id number]]  (Abnormal) Collected: 03/18/24 0538    Order Status: Completed Specimen: Blood Updated: 03/20/24 1016     Blood Culture, Routine Gram stain aer bottle: Gram positive cocci in chains resembling Strep      Positive results previously called 03/18/2024      ENTEROCOCCUS FAECALIS  For susceptibility see order #[id number]      Blood culture [[id number]]  (Abnormal) Collected: 03/17/24 1300    Order Status: Completed Specimen: Blood Updated: 03/20/24 1016     Blood Culture, Routine Gram stain aer bottle: Gram positive cocci in chains resembling Strep      Positive results previously called 03/17/2024 05:37      ENTEROCOCCUS FAECALIS  For susceptibility see order #[id number]      Blood culture [[id number]]  (Abnormal) Collected: 03/17/24 1300    Order Status: Completed Specimen: Blood Updated: 03/20/24 1015     Blood Culture, Routine Gram stain aer bottle: Gram positive cocci in chains resembling Strep      Results called to and read back by: Emilie Dela Cruz RN. 03/18/2024  06:17      Gram stain vinay bottle: Gram positive cocci in chains resembling Strep      03/18/2024  11:21      ENTEROCOCCUS FAECALIS  For susceptibility see order #[id number]      Blood culture x two cultures. Draw prior to antibiotics. [[id number]]  (Abnormal) Collected: 03/16/24 1420    Order Status: Completed Specimen: Blood from Wrist, Right Updated: 03/19/24 1054     Blood Culture, Routine Gram stain aer bottle: Gram positive cocci in chains resembling Strep      Positive results previously called 03/17/2024  06:25      ENTEROCOCCUS FAECALIS  For susceptibility see order #[id number]      Narrative:      Aerobic and anaerobic    Blood culture x two cultures. Draw prior to antibiotics. [[id number]]  (Abnormal)  (Susceptibility) Collected: 03/16/24 1410    Order Status: Completed  Specimen: Blood from Peripheral, Forearm, Left Updated: 03/19/24 1053     Blood Culture, Routine Gram stain aer bottle: Gram positive cocci in chains resembling Strep      Gram stain vinay bottle: Gram positive cocci in chains resembling Strep      Results called to and read back by: Emilie Napoles RN 03/17/2024  05:37      ENTEROCOCCUS FAECALIS    Narrative:      Aerobic and anaerobic    Urine culture [[id number]]  (Abnormal)  (Susceptibility) Collected: 03/16/24 1359    Order Status: Completed Specimen: Urine Updated: 03/18/24 2120     Urine Culture, Routine ENTEROCOCCUS FAECALIS  >100,000 cfu/ml      Narrative:      Specimen Source->Urine    Rapid Organism ID by PCR (from Blood culture) [[id number]]  (Abnormal) Collected: 03/16/24 1410    Order Status: Completed Updated: 03/17/24 0636     Enterococcus faecalis Detected     Enterococcus faecium Not Detected     Listeria monocytogenes Not Detected     Staphylococcus spp. Not Detected     Staphylococcus aureus Not Detected     Staphylococcus epidermidis Not Detected     Staphylococcus lugdunensis Not Detected     Streptococcus species Not Detected     Streptococcus agalactiae Not Detected     Streptococcus pneumoniae Not Detected     Streptococcus pyogenes Not Detected     Acinetobacter calcoaceticus/baumannii complex Not Detected     Bacteroides fragilis Not Detected     Enterobacterales Not Detected     Enterobacter cloacae complex Not Detected     Escherichia coli Not Detected     Klebsiella aerogenes Not Detected     Klebsiella oxytoca Not Detected     Klebsiella pneumoniae group Not Detected     Proteus Not Detected     Salmonella sp Not Detected     Serratia marcescens Not Detected     Haemophilus influenzae Not Detected     Neisseria meningtidis Not Detected     Pseudomonas aeruginosa Not Detected     Stenotrophomonas maltophilia Not Detected     Candida albicans Not Detected     Candida auris Not Detected     Candida glabrata Not Detected     Vandana krusei Not  Detected     Candida parapsilosis Not Detected     Candida tropicalis Not Detected     Cryptococcus neoformans/gattii Not Detected     CTX-M (ESBL ) Test Not Applicable     IMP (Carbapenem resistant) Test Not Applicable     KPC resistance gene (Carbapenem resistant) Test Not Applicable     mcr-1  Test Not Applicable     mec A/C  Test Not Applicable     mec A/C and MREJ (MRSA) gene Test Not Applicable     NDM (Carbapenem resistant) Test Not Applicable     OXA-48-like (Carbapenem resistant) Test Not Applicable     van A/B (VRE gene) Not Detected     VIM (Carbapenem resistant) Test Not Applicable    Narrative:      Aerobic and anaerobic          Urine Studies:   Recent Labs   Lab 03/16/24  1359   COLORU Yellow   APPEARANCEUA Hazy*   PHUR 6.0   SPECGRAV 1.020   PROTEINUA 1+*   GLUCUA 3+*   KETONESU Negative   BILIRUBINUA Negative   OCCULTUA 2+*   NITRITE Negative   LEUKOCYTESUR 2+*   RBCUA 14*   WBCUA 37*   BACTERIA Occasional   SQUAMEPITHEL 1   HYALINECASTS 0         Significant Imaging: I have reviewed all pertinent imaging results/findings within the past 24 hours.

## 2024-03-21 NOTE — PLAN OF CARE
Problem: Adult Inpatient Plan of Care  Goal: Plan of Care Review  Outcome: Ongoing, Progressing  Goal: Patient-Specific Goal (Individualized)  Outcome: Ongoing, Progressing  Goal: Absence of Hospital-Acquired Illness or Injury  Outcome: Ongoing, Progressing  Goal: Optimal Comfort and Wellbeing  Outcome: Ongoing, Progressing  Goal: Readiness for Transition of Care  Outcome: Ongoing, Progressing     Problem: Infection  Goal: Absence of Infection Signs and Symptoms  Outcome: Ongoing, Progressing     Problem: Diabetes Comorbidity  Goal: Blood Glucose Level Within Targeted Range  Outcome: Ongoing, Progressing     Problem: Adjustment to Illness (Sepsis/Septic Shock)  Goal: Optimal Coping  Outcome: Ongoing, Progressing     Problem: Pain Acute  Goal: Acceptable Pain Control and Functional Ability  Outcome: Ongoing, Progressing     Problem: Renal Function Impairment (Acute Kidney Injury/Impairment)  Goal: Effective Renal Function  Outcome: Ongoing, Progressing     Problem: Oral Intake Inadequate (Acute Kidney Injury/Impairment)  Goal: Optimal Nutrition Intake  Outcome: Ongoing, Progressing     Problem: Fluid and Electrolyte Imbalance (Acute Kidney Injury/Impairment)  Goal: Fluid and Electrolyte Balance  Outcome: Ongoing, Progressing

## 2024-03-21 NOTE — SUBJECTIVE & OBJECTIVE
Interval History:   3/21: Pending repeat blood cultures. NM scan ordered but may not be able to complete until Monday.    Review of Systems   Constitutional:  Positive for chills and fever. Negative for fatigue and unexpected weight change.   HENT:  Negative for ear pain, facial swelling, hearing loss, mouth sores, nosebleeds, rhinorrhea, sinus pressure, sore throat, tinnitus, trouble swallowing and voice change.    Eyes:  Negative for photophobia, pain, redness and visual disturbance.   Respiratory:  Negative for cough, chest tightness, shortness of breath and wheezing.    Cardiovascular:  Negative for chest pain, palpitations and leg swelling.   Gastrointestinal:  Negative for abdominal pain, blood in stool, constipation, diarrhea, nausea and vomiting.   Endocrine: Negative for cold intolerance, heat intolerance, polydipsia, polyphagia and polyuria.   Genitourinary:  Positive for difficulty urinating, dysuria, hematuria and urgency. Negative for flank pain, frequency, menstrual problem, vaginal bleeding, vaginal discharge and vaginal pain.   Musculoskeletal:  Negative for arthralgias, back pain, joint swelling, myalgias and neck pain.   Skin:  Negative for rash.   Allergic/Immunologic: Negative for environmental allergies, food allergies and immunocompromised state.   Neurological:  Negative for dizziness, seizures, syncope, weakness, light-headedness, numbness and headaches.   Hematological:  Negative for adenopathy. Does not bruise/bleed easily.   Psychiatric/Behavioral:  Negative for confusion, hallucinations, self-injury, sleep disturbance and suicidal ideas. The patient is not nervous/anxious.      Objective:     Vital Signs (Most Recent):  Temp: 98.9 °F (37.2 °C) (03/21/24 1150)  Pulse: 82 (03/21/24 1150)  Resp: 19 (03/21/24 1150)  BP: (!) 140/65 (03/21/24 1150)  SpO2: 95 % (03/21/24 1150) Vital Signs (24h Range):  Temp:  [98.9 °F (37.2 °C)-99.5 °F (37.5 °C)] 98.9 °F (37.2 °C)  Pulse:  [79-98] 82  Resp:   [18-20] 19  SpO2:  [95 %-99 %] 95 %  BP: (116-161)/(57-70) 140/65     Weight: 72.5 kg (159 lb 13.3 oz)  Body mass index is 26.6 kg/m².    Intake/Output Summary (Last 24 hours) at 3/21/2024 1615  Last data filed at 3/21/2024 1403  Gross per 24 hour   Intake 600 ml   Output 1551 ml   Net -951 ml         Physical Exam  Vitals and nursing note reviewed.   Constitutional:       Appearance: She is well-developed.   HENT:      Head: Normocephalic and atraumatic.      Right Ear: External ear normal.      Left Ear: External ear normal.   Eyes:      General: No scleral icterus.        Right eye: No discharge.         Left eye: No discharge.      Conjunctiva/sclera: Conjunctivae normal.   Cardiovascular:      Rate and Rhythm: Normal rate and regular rhythm.      Heart sounds: Normal heart sounds. No murmur heard.     No friction rub. No gallop.   Pulmonary:      Effort: Pulmonary effort is normal. No respiratory distress.      Breath sounds: Normal breath sounds. No stridor. No wheezing or rales.   Abdominal:      General: Bowel sounds are normal.      Tenderness: There is abdominal tenderness in the suprapubic area.   Musculoskeletal:         General: No tenderness. Normal range of motion.   Skin:     General: Skin is warm and dry.   Neurological:      Mental Status: She is alert and oriented to person, place, and time.             Significant Labs: All pertinent labs within the past 24 hours have been reviewed.    Significant Imaging: I have reviewed all pertinent imaging results/findings within the past 24 hours.

## 2024-03-21 NOTE — SIGNIFICANT EVENT
Messaged by nursing that glucose >400 this evening    Patient drinking sprite.     Plan  -given 3units sliding scale.  Give another 4 units aspart now  -start basal insulin levemir 7units bid (0.2u/kg dosing)   -start prandial insulin in AM - 3unit AC TID  -changed to diabetic - low sodium diet

## 2024-03-21 NOTE — PROGRESS NOTES
[facility] - Stepdown Flex ([place] [address])  [facility] Medicine  Progress Note    Patient Name: Aminah lAicea-Shelly  MRN: [id number]  Patient Class: IP- Inpatient   Admission Date: 3/16/2024  Length of Stay: 5 days  Attending Physician: Jaleesa Jiames MD  Primary Care Provider: Jeison Sanchez MD        Subjective:     Principal Problem:Enterococcal bacteremia        HPI:  77 yo female with DM2, GERD, HPLD, HTN, Hypothyroidism, recent stone removal presenting with chills, weakness, and back pain. Interpretor MERI used at bedside. She states that she was d/c'd on the 14th of March was doing okay until last night she started feeling weak and actually fell, she denies any leg pain or headache and did loss consciousness, however she did not feel any better today whenever she woke up so presented to the ER.    She had a fever measured here at 102.6F, CT scan showed perinephric stranding, UA consistent with infection, lactic acidosis. Urology was consulted who recommended day and antibiotics. Medicine called for admission.     Overview/Hospital Course:  Started on ceftriaxone at admission, ampicillin added on 3/17. Blood cultures quickly resulted positive for e faecalis, persistently positive. ID consulted. Patient passed voiding trial 3/18. TTE negative. CARLITO to be done 3/20.     Interval History:   3/21: Pending repeat blood cultures. NM scan ordered but may not be able to complete until Monday.    Review of Systems   Constitutional:  Positive for chills and fever. Negative for fatigue and unexpected weight change.   HENT:  Negative for ear pain, facial swelling, hearing loss, mouth sores, nosebleeds, rhinorrhea, sinus pressure, sore throat, tinnitus, trouble swallowing and voice change.    Eyes:  Negative for photophobia, pain, redness and visual disturbance.   Respiratory:  Negative for cough, chest tightness, shortness of breath and wheezing.    Cardiovascular:  Negative for chest pain, palpitations and  leg swelling.   Gastrointestinal:  Negative for abdominal pain, blood in stool, constipation, diarrhea, nausea and vomiting.   Endocrine: Negative for cold intolerance, heat intolerance, polydipsia, polyphagia and polyuria.   Genitourinary:  Positive for difficulty urinating, dysuria, hematuria and urgency. Negative for flank pain, frequency, menstrual problem, vaginal bleeding, vaginal discharge and vaginal pain.   Musculoskeletal:  Negative for arthralgias, back pain, joint swelling, myalgias and neck pain.   Skin:  Negative for rash.   Allergic/Immunologic: Negative for environmental allergies, food allergies and immunocompromised state.   Neurological:  Negative for dizziness, seizures, syncope, weakness, light-headedness, numbness and headaches.   Hematological:  Negative for adenopathy. Does not bruise/bleed easily.   Psychiatric/Behavioral:  Negative for confusion, hallucinations, self-injury, sleep disturbance and suicidal ideas. The patient is not nervous/anxious.      Objective:     Vital Signs (Most Recent):  Temp: 98.9 °F (37.2 °C) (03/21/24 1150)  Pulse: 82 (03/21/24 1150)  Resp: 19 (03/21/24 1150)  BP: (!) 140/65 (03/21/24 1150)  SpO2: 95 % (03/21/24 1150) Vital Signs (24h Range):  Temp:  [98.9 °F (37.2 °C)-99.5 °F (37.5 °C)] 98.9 °F (37.2 °C)  Pulse:  [79-98] 82  Resp:  [18-20] 19  SpO2:  [95 %-99 %] 95 %  BP: (116-161)/(57-70) 140/65     Weight: 72.5 kg (159 lb 13.3 oz)  Body mass index is 26.6 kg/m².    Intake/Output Summary (Last 24 hours) at 3/21/2024 1615  Last data filed at 3/21/2024 1403  Gross per 24 hour   Intake 600 ml   Output 1551 ml   Net -951 ml         Physical Exam  Vitals and nursing note reviewed.   Constitutional:       Appearance: She is well-developed.   HENT:      Head: Normocephalic and atraumatic.      Right Ear: External ear normal.      Left Ear: External ear normal.   Eyes:      General: No scleral icterus.        Right eye: No discharge.         Left eye: No discharge.       Conjunctiva/sclera: Conjunctivae normal.   Cardiovascular:      Rate and Rhythm: Normal rate and regular rhythm.      Heart sounds: Normal heart sounds. No murmur heard.     No friction rub. No gallop.   Pulmonary:      Effort: Pulmonary effort is normal. No respiratory distress.      Breath sounds: Normal breath sounds. No stridor. No wheezing or rales.   Abdominal:      General: Bowel sounds are normal.      Tenderness: There is abdominal tenderness in the suprapubic area.   Musculoskeletal:         General: No tenderness. Normal range of motion.   Skin:     General: Skin is warm and dry.   Neurological:      Mental Status: She is alert and oriented to person, place, and time.             Significant Labs: All pertinent labs within the past 24 hours have been reviewed.    Significant Imaging: I have reviewed all pertinent imaging results/findings within the past 24 hours.    Assessment/Plan:      * Enterococcal bacteremia  Patient presented to ED with fevers and malaise 2 days after urologic procedure.  CT with evidence nephritic stranding.  Admitted for pyelonephritis.  Blood cultures on presentation 3/16 positive for Enterococcus faecalis. Suspect urologic source. TTE negative  - ID consulted   - repeat cultures  - CARLITO no vegetation  - S/p ceftriaxone in ED and on admission, continued for double coverage due to persistent bacteremia  - started on ampicillin 3/17   - urology to follow-up in clinic, stated no role for stent removal as it is source control  - NM scan ordered    Positive QuantiFERON-TB Gold test  - history noted      Ureteral stent present  Management as per urology   - outpatient follow-up      Anemia  Patient's anemia is currently controlled. Has not received any PRBCs to date. Etiology likely d/t chronic disease v iron deficiency. No obvious signs of bleeding.  Current CBC reviewed-   Lab Results   Component Value Date    HGB 10.4 (L) 03/20/2024    HCT 34.0 (L) 03/20/2024     Monitor serial CBC  "and transfuse if patient becomes hemodynamically unstable, symptomatic or H/H drops below 7/21.  - iron panel ordered    ANAIS (acute kidney injury)  Resolved  Patient with acute kidney injury/acute renal failure likely due to pre-renal azotemia due to dehydration. Cr 1.2 on presentation. ANAIS is currently improving. Baseline creatinine  0.8  - Labs reviewed- Renal function/electrolytes with Estimated Creatinine Clearance: 51.2 mL/min (based on SCr of 0.9 mg/dL). according to latest data. Monitor urine output and serial BMP and adjust therapy as needed. Avoid nephrotoxins and renally dose meds for GFR listed above.    Sepsis  This patient does have evidence of infective focus  My overall impression is sepsis.  Source: Urinary Tract  Antibiotics given-   Antibiotics (72h ago, onward)      Start     Stop Route Frequency Ordered    03/19/24 1300  cefTRIAXone (ROCEPHIN) 2 g in dextrose 5 % in water (D5W) 100 mL IVPB (MB+)         -- IV Every 12 hours (non-standard times) 03/19/24 1145    03/17/24 1100  ampicillin (OMNIPEN) 2 g in sodium chloride 0.9 % 100 mL IVPB (MB+)         -- IV Every 4 hours (non-standard times) 03/17/24 0954    03/16/24 2100  mupirocin 2 % ointment         03/21/24 2059 Nasl 2 times daily 03/16/24 1953          Latest lactate reviewed-  No results for input(s): "LACTATE", "POCLAC" in the last 72 hours.    Fluid challenge Ideal Body Weight- The patient's ideal body weight is Ideal body weight: 57 kg (125 lb 10.6 oz) which will be used to calculate fluid bolus of 30 ml/kg for treatment of septic shock.      Post- resuscitation assessment No - Post resuscitation assessment not needed     ANAIS    Will Not start Pressors- Levophed for MAP of 65  Source control achieved by: rocephin    Pyelonephritis  Acute, urine consistent with infection.   -  see above        Type 2 diabetes mellitus with neurologic complication, without long-term current use of insulin  Patient's FSGs are controlled on current " medication regimen.  Last A1c reviewed-   Lab Results   Component Value Date    HGBA1C 8.2 (H) 11/09/2023    HGBA1C 8.2 (H) 11/09/2023     Most recent fingerstick glucose reviewed-   Recent Labs   Lab 03/19/24  1944 03/19/24  2349 03/20/24  0350 03/20/24  0739   POCTGLUCOSE 264* 228* 208* 200*       Current correctional scale  Low  Maintain anti-hyperglycemic dose as follows-   Antihyperglycemics (From admission, onward)      Start     Stop Route Frequency Ordered    03/16/24 2019  insulin aspart U-100 pen 0-5 Units         -- SubQ Before meals & nightly PRN 03/16/24 1920          Hold Oral hypoglycemics while patient is in the hospital.    GERD (gastroesophageal reflux disease)  Chronic, controlled, continue to monitor      HTN (hypertension), benign  Chronic, controlled. Latest blood pressure and vitals reviewed-     Temp:  [98 °F (36.7 °C)-98.7 °F (37.1 °C)]   Pulse:  []   Resp:  [16-20]   BP: (143-190)/(65-81)   SpO2:  [96 %] .   Home meds for hypertension were reviewed and noted below.   Hypertension Medications               carvediloL (COREG) 25 MG tablet TAKE 1 TABLET TWICE DAILY    lisinopriL (PRINIVIL,ZESTRIL) 40 MG tablet Take 1 tablet (40 mg total) by mouth once daily.            While in the hospital, will manage blood pressure as follows; Adjust home antihypertensive regimen as follows- held on presentation, restart as tolerated    Will utilize p.r.n. blood pressure medication only if patient's blood pressure greater than 180/110 and she develops symptoms such as worsening chest pain or shortness of breath.    Hypothyroidism  Chronic, controlled, continue home synthroid        VTE Risk Mitigation (From admission, onward)           Ordered     enoxaparin injection 40 mg  Daily         03/16/24 1920     IP VTE HIGH RISK PATIENT  Once         03/16/24 1920     Place sequential compression device  Until discontinued         03/16/24 1920                    Discharge Planning   RAJESH: 3/23/2024     Code  Status: Full Code   Is the patient medically ready for discharge?: No    Reason for patient still in hospital (select all that apply): Patient trending condition  Discharge Plan A: Home Health   Discharge Delays: (!) Procedure Scheduling (IR, OR, Labs, Echo, Cath, Echo, EEG)              Jaleesa Jaimes MD  Department of Hospital Medicine   [facility] - Stepdown Flex ([place]-14)

## 2024-03-21 NOTE — ASSESSMENT & PLAN NOTE
78F with h/o DM, HT, interstitial cystitis and urolithiasis s/p ureteroscopic stone extraction with lithotripsy and right ureteral stent placement (right) on 3/14 admitted 3/16 with chills, dizziness, flank pain, dysuria, freq, and a fall. Bcx 3/16, 3/17, 3/18 with e.faecalis in both sets. Repeat 3/19 NGTD. Fever defervesced, leukocytosis trending down. 2d echo without veg. Reports PCN allergy, but tolerating ampicillin.     Suspect bacteremia from  source; ureteral procedure likely contributed. Fortunately CARLITO negative for endocarditis. Unclear why her bcx are still positive. Worried we're missing a source of infection. Discussed with urology and they don't think ureteral stents are infected and want to hold off exchanging at this time.     Recommendations:   - continue ampicillin/ceftriaxone duration TBD but will need at least 4weeks of iv abx followed by chronic supressive oral antibiotics until ureteral  stent is removed or replaced  - please wait for f/u bcx to be clear x 48-72h before placing picc  - consider ureteral stent exchange, especially since she's failing to clear bacteremia  - consider NM tagged WBC scan to eval for source of infection    Discussed with hospitalist

## 2024-03-21 NOTE — NURSING
VSS stable throughout shift. Patient remains on IV ABX, blood cultures pending. Patient with good UOP as charted. Patient currently resting at this time with no complaints. Safety measures in place, bed in lowest position, and call light within reach     03/21/24 0848   Pain/Comfort/Sleep   Preferred Pain Scale word (verbal rating pain scale)   Comfort/Acceptable Pain Level 0   Pain Rating (0-10): Rest 0   Pain Rating: Rest 0 - no pain   Cognitive   Cognitive/Neuro/Behavioral WDL WDL   Level of Consciousness (AVPU) alert   Orientation oriented x 4   Speech clear/fluent   [place] Coma Scale   Best Eye Response 4-->(E4) spontaneous   Best Motor Response 6-->(M6) obeys commands   Best Verbal Response 5-->(V5) oriented   [place] Coma Scale Score 15   Motor Response   Motor Response general motor response   General Motor Response purposeful motor response   Hand /Ankle Strength   Hand , Left strong   Hand , Right strong   Dorsiflexion, Left strong   Dorsiflexion, Right strong   Plantarflexion, Left strong   Plantarflexion, Right strong   HEENT   HEENT WDL ex;vision aid   Vision Aid glasses at bedside   Mouth/Teeth WDL   Mouth/Teeth WDL ex;teeth   Teeth Symptoms tooth/teeth missing   Respiratory   Respiratory WDL WDL   Cough And Deep Breathing done independently per patient   Breath Sounds   Breath Sounds All Fields   All Lung Fields Breath Sounds Anterior:;diminished   Cardiac   Cardiac WDL WDL   Peripheral Neurovascular   Peripheral Neurovascular WDL WDL   VTE Required Core Measure Pharmacological prophylaxis initiated/maintained   VTE Prevention/Management ambulation promoted;bleeding risk assessed;bleeding risk factor(s) identified, provider notified   Pulse Radial   Left Radial Pulse 2+ (normal)   Right Radial Pulse 2+ (normal)   Pulse Dorsalis Pedis   Left Dorsalis Pedis Pulse 2+ (normal)   Right Dorsalis Pedis Pulse 2+ (normal)        Peripheral IV - Single Lumen 03/18/24 1000 20 G Anterior;Right  Forearm   Placement Date/Time: 03/18/24 1000   Inserted by: RN  Size/Length: 20 G  Orientation: Anterior;Right  Location: Forearm   Site Assessment Clean;Dry;Intact;No redness;No swelling;No warmth;No drainage   Extremity Assessment Distal to IV No warmth;No swelling;No redness;No abnormal discoloration   Line Status Flushed;Infusing   Dressing Status Dry;Clean;Intact   Dressing Intervention Integrity maintained   Dressing Change Due 03/22/24   Site Change Due 03/24/24   Reason Not Rotated Not due        Peripheral IV - Single Lumen 03/19/24 1300 20 G Left;Posterior Forearm   Placement Date/Time: 03/19/24 1300   Present Prior to Hospital Arrival?: No  Size/Length: 20 G  Orientation: Left;Posterior  Location: Forearm  Site Prep: Alcohol;Chlorhexidine   Insertion attempts (enter comment if more than 2 attempts): 1   Site Assessment Clean;Dry;Intact;No redness;No swelling;No warmth;No drainage   Extremity Assessment Distal to IV No warmth;No swelling;No redness;No abnormal discoloration   Line Status Flushed;Saline locked   Dressing Status Clean;Dry;Intact   Dressing Intervention Integrity maintained   Skin   Skin WDL ex;characteristics   Skin Color/Characteristics redness blanchable   Skin Temperature warm   Skin Moisture dry   Skin Elasticity quick return to original state   Skin Integrity bruised (ecchymotic)   Specialty Bed/Overlay Low air loss   Bed Support Surface Assessed   [place] Risk Assessment   Sensory Perception 4-->no impairment   Moisture 4-->rarely moist   Activity 3-->walks occasionally   Mobility 3-->slightly limited   Nutrition 3-->adequate   Friction and Shear 3-->no apparent problem   [place] Score 20   Musculoskeletal   Musculoskeletal WDL ex;mobility   General Mobility generalized weakness   Range of Motion active ROM (range of motion) encouraged   Functional Screen (every 3 days/change)   Ambulation 2 - assistive person   Transferring 2 - assistive person   Toileting 0 - independent   Bathing 0 -  independent   Dressing 0 - independent   Eating 0 - independent   Communication 0 - understands/communicates without difficulty   Swallowing 0 - swallows foods/liquids without difficulty   Gastrointestinal   GI WDL WDL   Last Bowel Movement 03/20/24   Genitourinary   Genitourinary WDL WDL   Voiding Characteristics voids spontaneously without difficulty   Urine Characteristics yellow   Coping/Psychosocial   Observed Emotional State calm;cooperative;accepting   Verbalized Emotional State acceptance   Plan of Care Reviewed With patient   Psychosocial Support   Trust Relationship/Rapport care explained;thoughts/feelings acknowledged;reassurance provided;questions encouraged;questions answered;choices provided   Nutrition   Diet/Nutrition Received 2 gram sodium;consistent carb/diabetic diet   Diet/Feeding Assistance tray set-up   Intake (%) 50%   Nutrition Risk Screen no indicators present   Safety   Safety WDL WDL   Safety Factors ID band on;call light in reach;wheels locked;bed in low position;upper side rails raised x 2;patient up in chair   Enhanced Safety Measures bed alarm set   Fall Risk Assessment (every shift)   History Of Fall (W/I 3 Mos) 4-->Yes   Polypharmacy 3-->Yes   Central Nervous System/Psychotropic Medication 0-->No   Cardiovascular Medication 3-->Yes   Age Greater Than 65 Years 2-->Yes   Altered Elimination 0-->No   Cognitive Deficit 0-->No   Sensory Deficit 0-->No   Dizziness/Vertigo 0-->No   Depression 0-->No   Mobility Deficit/Weakness 2-->Yes   Male 0-->No   Fall Risk Score 14   ABC Risk for Fall with Injury Assessment   A= Age: Is the patient greater than or equal to 85 years old or frail due to clinical condition? No   B=Bones: Does the patient have osteoporosis, previous fracture, prolonged steroid use, or metastatic bone cancer? No   C=Coagulation Disorders: Does the patient have a bleeding disorder, either through anticoagulants or underlying clinical condition? No   S=recent Surgery: Is the  patient post-op surgicalwith a recent lower limb amputation or recent major abdominal or thoracic surgery? No   Safety Management   Safety Promotion/Fall Prevention bed alarm set;assistive device/personal item within reach   Patient Rounds bed in low position;clutter free environment maintained;placement of personal items at bedside;visualized patient;toileting offered;ID band on;call light in patient/parent reach;bed wheels locked   Daily Care   Activity Management Ambulated in room - L4   Activity Assistance Provided assistance, stand-by   Elimination Assistance up to bedside commode   Mobility   GEMS ([place] Early Mobility Scale) Level 3-Standing activities with assist   Positioning   Body Position position changed independently

## 2024-03-21 NOTE — CONSULTS
[facility] ([place])  Infectious Disease  Consult Note    Patient Name: Aminah Norton  MRN: [id number]  Admission Date: 3/16/2024  Hospital Length of Stay: 5 days  Attending Physician: Jaleesa Jaimes MD  Primary Care Provider: Jeison Sanchez MD     Isolation Status: No active isolations    Patient information was obtained from patient and ER records.      Consults  Assessment/Plan:     ID  * Enterococcal bacteremia  78F with h/o DM, HT, interstitial cystitis and urolithiasis s/p ureteroscopic stone extraction with lithotripsy and right ureteral stent placement (right) on 3/14 admitted 3/16 with chills, dizziness, flank pain, dysuria, freq, and a fall. Bcx 3/16, 3/17, 3/18 with e.faecalis in both sets. Repeat 3/19 NGTD. Fever defervesced, leukocytosis trending down. 2d echo without veg. Reports PCN allergy, but tolerating ampicillin.     Suspect bacteremia from  source; ureteral procedure likely contributed. Fortunately CARLITO negative for endocarditis. Unclear why her bcx are still positive. Worried we're missing a source of infection. Discussed with urology and they don't think ureteral stents are infected and want to hold off exchanging at this time.     Recommendations:   - continue ampicillin/ceftriaxone duration TBD but will need at least 4weeks of iv abx followed by chronic supressive oral antibiotics until ureteral  stent is removed or replaced  - please wait for f/u bcx to be clear x 48-72h before placing picc  - consider ureteral stent exchange, especially since she's failing to clear bacteremia  - consider NM tagged WBC scan to eval for source of infection    Discussed with hospitalist            Thank you for your consult. I will follow-up with patient. Please contact us if you have any additional questions.    Samina Velasquez MD  Infectious Disease  [facility] ([place])    Subjective:     Principal Problem: Enterococcal bacteremia    HPI: A  78-year-old woman with HTN, dm 2, hypothyroidism, interstitial cystitis, and status post ureteroscopic stone extraction with lithotripsy and right ureteral stent placement on 03/01/2024 1 day prior to admission developed fever to 102.6 F with associated shaking chills, suprapubic tenderness, and dysuria.  This was also associated with hematuria, sensation of incomplete bladder emptying, as well as an inability to initiate micturition however the patient denies urgency, frequency, and back pain.  On evaluation in [facility] ED she was noted to be febrile to 101.3 F and tachycardic.  Laboratory workup revealed no evidence of leukocytosis.  Admission blood cultures are now positive for Gram-positive cocci in chains resembling strep with a rapid ID for Enterococcus faecalis.    Infectious disease is consulted for E faecalis bacteremia        Interval history: NAEO. Reports persistnat dysuria. Reports itching at R hip, but denies pain or trouble moving joint. S/p CARLITO yesterday. Pt reports completing treatment for TB in 2018. Thinks she took a medication for 3 months      Past Surgical History:   Procedure Laterality Date    APPENDECTOMY      BLADDER FULGURATION N/A 3/1/2024    Procedure: ULCER INJECTION FULGURATION;  Surgeon: Brody Smith MD;  Location: [facility];  Service: Urology;  Laterality: N/A;    BLADDER SUSPENSION      CATARACT EXTRACTION      right eye     CHOLECYSTECTOMY      COLONOSCOPY N/A 7/20/2020    Procedure: COLONOSCOPY;  Surgeon: Juan Parker MD;  Location: [facility] (4TH FLR);  Service: Endoscopy;  Laterality: N/A;  Hx of chronic anemia.  patient needs a   4/6/20 - removed from 4/20/20, rescheduled 7/20/20 - pg  covid [place]    COLONOSCOPY N/A 2/9/2023    Procedure: COLONOSCOPY;  Surgeon: Reann Sanchez MD;  Location: [facility] (4TH FLR);  Service: Colon and Rectal;  Laterality: N/A;  instr handed to patient, -ml    CYSTOSCOPY N/A 5/19/2021     Procedure: CYSTOSCOPY;  Surgeon: Brody Smith MD;  Location: [facility] FLR;  Service: Urology;  Laterality: N/A;    CYSTOURETEROSCOPY, WITH HOLMIUM LASER LITHOTRIPSY OF URETERAL CALCULUS AND STENT INSERTION Right 3/1/2024    Procedure: CYSTOURETEROSCOPY, WITH HOLMIUM LASER LITHOTRIPSY OF URETERAL CALCULUS AND STENT INSERTION;  Surgeon: Brody Smith MD;  Location: [facility] OR;  Service: Urology;  Laterality: Right;    CYSTOURETEROSCOPY,WITH HOLMIUM LASER LITHOTRIPSY OF URETERAL CALCULUS Right 3/14/2024    Procedure: CYSTOURETEROSCOPY,WITH HOLMIUM LASER LITHOTRIPSY OF URETERAL CALCULUS;  Surgeon: Brody Smith MD;  Location: [facility] OR;  Service: Urology;  Laterality: Right;  1 HR    ECHOCARDIOGRAM,TRANSESOPHAGEAL N/A 3/20/2024    Procedure: Transesophageal echo (CARLITO) intra-procedure log documentation;  Surgeon: Provider, [facility]osc [facility];  Location: [facility] EP LAB;  Service: Cardiology;  Laterality: N/A;    EYE SURGERY Bilateral     cataract removal    INJECTION OF STEROID N/A 3/14/2024    Procedure: INJECTION, STEROID;  Surgeon: Bordy Smith MD;  Location: [facility] OR;  Service: Urology;  Laterality: N/A;    PLACEMENT-STENT Right 3/14/2024    Procedure: PLACEMENT-STENT;  Surgeon: Brody Smith MD;  Location: [facility] OR;  Service: Urology;  Laterality: Right;    REMOVAL-STENT Right 3/14/2024    Procedure: REMOVAL-STENT;  Surgeon: Brody Smith MD;  Location: [facility] OR;  Service: Urology;  Laterality: Right;    TOTAL ABDOMINAL HYSTERECTOMY      DUB       Review of patient's allergies indicates:   Allergen Reactions    Bufferin [aspirin, buffered] Itching    Atorvastatin      Myalgias and arthralgias    Shellfish containing products Itching     Shellfish causes her to itch per her daughter, Caro.     Warfarin     Ibuprofen Itching     Itching of eyes, head       Medications:  Medications Prior to Admission   Medication Sig    ACCU-CHEK SOFTCLIX LANCETS Misc CHECK BLOOD SUGAR ONE TIME  DAILY    amitriptyline (ELAVIL) 10 MG tablet Take 1 tablet (10 mg total) by mouth every evening.    BD ALCOHOL SWABS PadM USE AS DIRECTED TOPICALLY AS NEEDED    biotin 1 mg tablet Take 1,000 mcg by mouth 3 (three) times daily.    blood sugar diagnostic (ACCU-CHEK MATTIE PLUS TEST STRP) Strp TEST ONE TIME DAILY    blood-glucose meter kit To check BG 2  times daily, to use with insurance preferred meter, use as directed.    carvediloL (COREG) 25 MG tablet TAKE 1 TABLET TWICE DAILY    cyanocobalamin (VITAMIN B-12) 1000 MCG tablet Take 100 mcg by mouth once daily.    gabapentin (NEURONTIN) 300 MG capsule Take 1 capsule (300 mg total) by mouth every evening.    glipiZIDE 5 MG TR24 Take 1 tablet (5 mg total) by mouth daily with breakfast.    [] HYDROcodone-acetaminophen (NORCO) 5-325 mg per tablet Take 1 tablet by mouth every 6 (six) hours as needed for Pain.    ketoconazole (NIZORAL) 2 % cream Apply topically once daily.    lancets (ACCU-CHEK MULTICLIX LANCET) Misc To use as directed with Accu Chek Sahra FastClix lancing device    lancets Misc To check BG 2 times daily, to use with insurance preferred meter.    levothyroxine (SYNTHROID) 112 MCG tablet Take 1 tablet (112 mcg total) by mouth before breakfast.    lisinopriL (PRINIVIL,ZESTRIL) 40 MG tablet Take 1 tablet (40 mg total) by mouth once daily.    metFORMIN (GLUCOPHAGE) 1000 MG tablet Take 1 tablet (1,000 mg total) by mouth 2 (two) times daily with meals.    mirabegron (MYRBETRIQ) 25 mg Tb24 ER tablet Take 1 tablet (25 mg total) by mouth once daily.    naproxen (NAPROSYN) 500 MG tablet Take 1 tablet (500 mg total) by mouth 2 (two) times daily with meals.    rosuvastatin (CRESTOR) 20 MG tablet Take 1 tablet (20 mg total) by mouth once daily.    SITagliptin phosphate (JANUVIA) 100 MG Tab Take 1 tablet (100 mg total) by mouth once daily.    turmeric root extract 500 mg Cap Take by mouth.    VITAMIN B COMPLEX ORAL Take 1 tablet by mouth.    vitamin D (VITAMIN D3)  1000 units Tab Take 1,000 Units by mouth once daily.     Antibiotics (From admission, onward)      Start     Stop Route Frequency Ordered    03/19/24 1300  cefTRIAXone (ROCEPHIN) 2 g in dextrose 5 % in water (D5W) 100 mL IVPB (MB+)         -- IV Every 12 hours (non-standard times) 03/19/24 1145    03/17/24 1100  ampicillin (OMNIPEN) 2 g in sodium chloride 0.9 % 100 mL IVPB (MB+)         -- IV Every 4 hours (non-standard times) 03/17/24 0954          Antifungals (From admission, onward)      None          Antivirals (From admission, onward)      None             Immunization History   Administered Date(s) Administered    COVID-19, MRNA, LN-S, PF (Pfizer) (Gray Cap) 03/09/2022    COVID-19, mRNA, LNP-S, bivalent booster, PF (PFIZER OMICRON) 10/12/2022    COVID-19, vector-nr, rS-Ad26, PF (Kiind.me) 03/05/2021    Influenza (FLUAD) - Quadrivalent - Adjuvanted - PF *Preferred* (65+) 12/10/2021, 10/10/2022, 11/09/2023    Influenza - High Dose - PF (65 years and older) 12/02/2015, 09/15/2017, 11/26/2018    Pneumococcal Conjugate - 13 Valent 11/26/2018    Pneumococcal Polysaccharide - 23 Valent 01/18/2023       Family History       Problem Relation (Age of Onset)    Breast cancer Mother    COPD Father    Cancer Mother, Sister, Daughter    Diabetes Son    Hypertension Sister, Sister, Daughter, Daughter    Ovarian cancer Mother, Sister    Thyroid disease Daughter, Daughter          Social History     Socioeconomic History    Marital status:    Tobacco Use    Smoking status: Never    Smokeless tobacco: Never   Substance and Sexual Activity    Alcohol use: No    Drug use: No    Sexual activity: Yes     Partners: Male     Birth control/protection: Post-menopausal     Comment:     Other Topics Concern    Are you pregnant or think you may be? No    Breast-feeding No   Social History Narrative     with 7 kids     Social Determinants of Health     Financial Resource Strain: Low Risk  (3/18/2024)    Overall Financial  Resource Strain (CARDIA)     Difficulty of Paying Living Expenses: Not very hard   Food Insecurity: No Food Insecurity (3/18/2024)    Hunger Vital Sign     Worried About Running Out of Food in the Last Year: Never true     Ran Out of Food in the Last Year: Never true   Transportation Needs: No Transportation Needs (3/18/2024)    PRAPARE - Transportation     Lack of Transportation (Medical): No     Lack of Transportation (Non-Medical): No   Physical Activity: Inactive (3/18/2024)    Exercise Vital Sign     Days of Exercise per Week: 0 days     Minutes of Exercise per Session: 0 min   Stress: No Stress Concern Present (3/18/2024)    [employer] [place] [employer] - Occupational Stress Questionnaire     Feeling of Stress : Only a little   Social Connections: Unknown (3/18/2024)    Social Connection and Isolation Panel [NHANES]     Frequency of Communication with Friends and Family: More than three times a week     Frequency of Social Gatherings with Friends and Family: Twice a week     Attends Confucianism Services: 1 to 4 times per year     Active Member of Clubs or Organizations: Yes     Attends Club or Organization Meetings: Never     Marital Status: Patient declined   Housing Stability: Unknown (3/18/2024)    Housing Stability Vital Sign     Unable to Pay for Housing in the Last Year: No     Unstable Housing in the Last Year: No     Review of Systems   Constitutional:  Positive for chills and fever. Negative for fatigue and unexpected weight change.   HENT:  Negative for ear pain, facial swelling, hearing loss, mouth sores, nosebleeds, rhinorrhea, sinus pressure, sore throat, tinnitus, trouble swallowing and voice change.    Eyes:  Negative for photophobia, pain, redness and visual disturbance.   Respiratory:  Negative for cough, chest tightness, shortness of breath and wheezing.    Cardiovascular:  Negative for chest pain, palpitations and leg swelling.   Gastrointestinal:  Negative for abdominal pain,  blood in stool, constipation, diarrhea, nausea and vomiting.   Endocrine: Negative for cold intolerance, heat intolerance, polydipsia, polyphagia and polyuria.   Genitourinary:  Positive for difficulty urinating, dysuria, hematuria and urgency. Negative for flank pain, frequency, menstrual problem, vaginal bleeding, vaginal discharge and vaginal pain.   Musculoskeletal:  Negative for arthralgias, back pain, joint swelling, myalgias and neck pain.   Skin:  Negative for rash.   Allergic/Immunologic: Negative for environmental allergies, food allergies and immunocompromised state.   Neurological:  Negative for dizziness, seizures, syncope, weakness, light-headedness, numbness and headaches.   Hematological:  Negative for adenopathy. Does not bruise/bleed easily.   Psychiatric/Behavioral:  Negative for confusion, hallucinations, self-injury, sleep disturbance and suicidal ideas. The patient is not nervous/anxious.      Objective:     Vital Signs (Most Recent):  Temp: 98.9 °F (37.2 °C) (03/21/24 1150)  Pulse: 82 (03/21/24 1150)  Resp: 19 (03/21/24 1150)  BP: (!) 140/65 (03/21/24 1150)  SpO2: 95 % (03/21/24 1150) Vital Signs (24h Range):  Temp:  [96.8 °F (36 °C)-99.5 °F (37.5 °C)] 98.9 °F (37.2 °C)  Pulse:  [79-98] 82  Resp:  [18-25] 19  SpO2:  [95 %-99 %] 95 %  BP: (116-161)/(57-70) 140/65     Weight: 72.5 kg (159 lb 13.3 oz)  Body mass index is 26.6 kg/m².    Estimated Creatinine Clearance: 51.4 mL/min (based on SCr of 0.9 mg/dL).     Physical Exam  Vitals and nursing note reviewed.   Constitutional:       Appearance: She is well-developed.   HENT:      Head: Normocephalic and atraumatic.      Right Ear: External ear normal.      Left Ear: External ear normal.   Eyes:      General: No scleral icterus.        Right eye: No discharge.         Left eye: No discharge.      Conjunctiva/sclera: Conjunctivae normal.   Cardiovascular:      Rate and Rhythm: Normal rate and regular rhythm.      Heart sounds: Normal heart sounds.  No murmur heard.     No friction rub. No gallop.   Pulmonary:      Effort: Pulmonary effort is normal. No respiratory distress.      Breath sounds: Normal breath sounds. No stridor. No wheezing or rales.   Abdominal:      General: Bowel sounds are normal.      Tenderness: There is abdominal tenderness in the suprapubic area.   Musculoskeletal:         General: No tenderness. Normal range of motion.   Skin:     General: Skin is warm and dry.   Neurological:      Mental Status: She is alert and oriented to person, place, and time.          Significant Labs: CBC:   Recent Labs   Lab 03/20/24  0550 03/21/24  0343   WBC 5.65 5.47   HGB 10.4* 8.6*   HCT 34.0* 27.4*    224       CMP:   Recent Labs   Lab 03/20/24  0550 03/21/24 0343   * 135*   K 4.6 4.2    101   CO2 23 24   * 162*   BUN 12 11   CREATININE 0.9 0.9   CALCIUM 10.1 9.5   PROT 6.5 5.6*   ALBUMIN 3.0* 2.5*   BILITOT 0.3 0.2   ALKPHOS 88 76   AST 19 19   ALT 37 35   ANIONGAP 10 10       Microbiology Results (last 7 days)       Procedure Component Value Units Date/Time    Blood culture [[id number]]  (Abnormal) Collected: 03/19/24 0825    Order Status: Completed Specimen: Blood Updated: 03/21/24 1438     Blood Culture, Routine Gram stain aer bottle: Gram positive cocci in chains resembling Strep      Positive results previously called 03/20/2024 00:00      ENTEROCOCCUS FAECALIS  For susceptibility see order #[id number]      Blood culture [[id number]]  (Abnormal) Collected: 03/19/24 0822    Order Status: Completed Specimen: Blood Updated: 03/21/24 1437     Blood Culture, Routine Gram stain aer bottle: Gram positive cocci in chains resembling Strep      Results called to and read back by: Jaimee Morillo RN. 03/20/2024  00:00      ENTEROCOCCUS FAECALIS  Susceptibility pending      Blood culture [[id number]] Collected: 03/21/24 0343    Order Status: Completed Specimen: Blood Updated: 03/21/24 1315     Blood Culture, Routine No Growth to date     Blood culture [[id number]] Collected: 03/21/24 0343    Order Status: Completed Specimen: Blood Updated: 03/21/24 1315     Blood Culture, Routine No Growth to date    Blood culture [[id number]] Collected: 03/20/24 0550    Order Status: Completed Specimen: Blood Updated: 03/21/24 0812     Blood Culture, Routine No Growth to date      No Growth to date    Blood culture [[id number]] Collected: 03/20/24 0550    Order Status: Completed Specimen: Blood Updated: 03/21/24 0044     Blood Culture, Routine Gram stain aer bottle: Gram positive cocci in chains resembling Strep      Positive results previously called 03/21/2024  00:44    Blood culture [[id number]]  (Abnormal) Collected: 03/18/24 0538    Order Status: Completed Specimen: Blood Updated: 03/20/24 1016     Blood Culture, Routine Gram stain aer bottle: Gram positive cocci in chains resembling Strep      Positive results previously called 03/18/2024      ENTEROCOCCUS FAECALIS  For susceptibility see order #[id number]      Blood culture [[id number]]  (Abnormal) Collected: 03/18/24 0538    Order Status: Completed Specimen: Blood Updated: 03/20/24 1016     Blood Culture, Routine Gram stain aer bottle: Gram positive cocci in chains resembling Strep      Positive results previously called 03/18/2024      ENTEROCOCCUS FAECALIS  For susceptibility see order #[id number]      Blood culture [[id number]]  (Abnormal) Collected: 03/17/24 1300    Order Status: Completed Specimen: Blood Updated: 03/20/24 1016     Blood Culture, Routine Gram stain aer bottle: Gram positive cocci in chains resembling Strep      Positive results previously called 03/17/2024 05:37      ENTEROCOCCUS FAECALIS  For susceptibility see order #[id number]      Blood culture [[id number]]  (Abnormal) Collected: 03/17/24 1300    Order Status: Completed Specimen: Blood Updated: 03/20/24 1015     Blood Culture, Routine Gram stain aer bottle: Gram positive cocci in chains resembling Strep      Results called to and  read back by: Emilie Dela Cruz RN. 03/18/2024  06:17      Gram stain vinay bottle: Gram positive cocci in chains resembling Strep      03/18/2024  11:21      ENTEROCOCCUS FAECALIS  For susceptibility see order #[id number]      Blood culture x two cultures. Draw prior to antibiotics. [[id number]]  (Abnormal) Collected: 03/16/24 1420    Order Status: Completed Specimen: Blood from Wrist, Right Updated: 03/19/24 1054     Blood Culture, Routine Gram stain aer bottle: Gram positive cocci in chains resembling Strep      Positive results previously called 03/17/2024  06:25      ENTEROCOCCUS FAECALIS  For susceptibility see order #[id number]      Narrative:      Aerobic and anaerobic    Blood culture x two cultures. Draw prior to antibiotics. [[id number]]  (Abnormal)  (Susceptibility) Collected: 03/16/24 1410    Order Status: Completed Specimen: Blood from Peripheral, Forearm, Left Updated: 03/19/24 1053     Blood Culture, Routine Gram stain aer bottle: Gram positive cocci in chains resembling Strep      Gram stain vinay bottle: Gram positive cocci in chains resembling Strep      Results called to and read back by: Emilie Napoles RN 03/17/2024  05:37      ENTEROCOCCUS FAECALIS    Narrative:      Aerobic and anaerobic    Urine culture [[id number]]  (Abnormal)  (Susceptibility) Collected: 03/16/24 1359    Order Status: Completed Specimen: Urine Updated: 03/18/24 2120     Urine Culture, Routine ENTEROCOCCUS FAECALIS  >100,000 cfu/ml      Narrative:      Specimen Source->Urine    Rapid Organism ID by PCR (from Blood culture) [[id number]]  (Abnormal) Collected: 03/16/24 1410    Order Status: Completed Updated: 03/17/24 0636     Enterococcus faecalis Detected     Enterococcus faecium Not Detected     Listeria monocytogenes Not Detected     Staphylococcus spp. Not Detected     Staphylococcus aureus Not Detected     Staphylococcus epidermidis Not Detected     Staphylococcus lugdunensis Not Detected     Streptococcus species Not Detected      Streptococcus agalactiae Not Detected     Streptococcus pneumoniae Not Detected     Streptococcus pyogenes Not Detected     Acinetobacter calcoaceticus/baumannii complex Not Detected     Bacteroides fragilis Not Detected     Enterobacterales Not Detected     Enterobacter cloacae complex Not Detected     Escherichia coli Not Detected     Klebsiella aerogenes Not Detected     Klebsiella oxytoca Not Detected     Klebsiella pneumoniae group Not Detected     Proteus Not Detected     Salmonella sp Not Detected     Serratia marcescens Not Detected     Haemophilus influenzae Not Detected     Neisseria meningtidis Not Detected     Pseudomonas aeruginosa Not Detected     Stenotrophomonas maltophilia Not Detected     Candida albicans Not Detected     Candida auris Not Detected     Candida glabrata Not Detected     Candida krusei Not Detected     Candida parapsilosis Not Detected     Candida tropicalis Not Detected     Cryptococcus neoformans/gattii Not Detected     CTX-M (ESBL ) Test Not Applicable     IMP (Carbapenem resistant) Test Not Applicable     KPC resistance gene (Carbapenem resistant) Test Not Applicable     mcr-1  Test Not Applicable     mec A/C  Test Not Applicable     mec A/C and MREJ (MRSA) gene Test Not Applicable     NDM (Carbapenem resistant) Test Not Applicable     OXA-48-like (Carbapenem resistant) Test Not Applicable     van A/B (VRE gene) Not Detected     VIM (Carbapenem resistant) Test Not Applicable    Narrative:      Aerobic and anaerobic          Urine Studies:   Recent Labs   Lab 03/16/24  1359   COLORU Yellow   APPEARANCEUA Hazy*   PHUR 6.0   SPECGRAV 1.020   PROTEINUA 1+*   GLUCUA 3+*   KETONESU Negative   BILIRUBINUA Negative   OCCULTUA 2+*   NITRITE Negative   LEUKOCYTESUR 2+*   RBCUA 14*   WBCUA 37*   BACTERIA Occasional   SQUAMEPITHEL 1   HYALINECASTS 0         Significant Imaging: I have reviewed all pertinent imaging results/findings within the past 24 hours.

## 2024-03-22 LAB
ALBUMIN SERPL BCP-MCNC: 2.4 G/DL (ref 3.5–5.2)
ALP SERPL-CCNC: 78 U/L (ref 55–135)
ALT SERPL W/O P-5'-P-CCNC: 47 U/L (ref 10–44)
ANION GAP SERPL CALC-SCNC: 10 MMOL/L (ref 8–16)
AST SERPL-CCNC: 27 U/L (ref 10–40)
BACTERIA BLD CULT: ABNORMAL
BASOPHILS # BLD AUTO: 0.01 K/UL (ref 0–0.2)
BASOPHILS NFR BLD: 0.2 % (ref 0–1.9)
BILIRUB SERPL-MCNC: 0.2 MG/DL (ref 0.1–1)
BUN SERPL-MCNC: 10 MG/DL (ref 8–23)
CALCIUM SERPL-MCNC: 9.8 MG/DL (ref 8.7–10.5)
CHLORIDE SERPL-SCNC: 101 MMOL/L (ref 95–110)
CO2 SERPL-SCNC: 24 MMOL/L (ref 23–29)
CREAT SERPL-MCNC: 0.9 MG/DL (ref 0.5–1.4)
DIFFERENTIAL METHOD BLD: ABNORMAL
EOSINOPHIL # BLD AUTO: 0.1 K/UL (ref 0–0.5)
EOSINOPHIL NFR BLD: 2.8 % (ref 0–8)
ERYTHROCYTE [DISTWIDTH] IN BLOOD BY AUTOMATED COUNT: 17.9 % (ref 11.5–14.5)
EST. GFR  (NO RACE VARIABLE): >60 ML/MIN/1.73 M^2
GLUCOSE SERPL-MCNC: 173 MG/DL (ref 70–110)
HCT VFR BLD AUTO: 30.5 % (ref 37–48.5)
HGB BLD-MCNC: 9.4 G/DL (ref 12–16)
IMM GRANULOCYTES # BLD AUTO: 0.04 K/UL (ref 0–0.04)
IMM GRANULOCYTES NFR BLD AUTO: 0.9 % (ref 0–0.5)
LYMPHOCYTES # BLD AUTO: 0.6 K/UL (ref 1–4.8)
LYMPHOCYTES NFR BLD: 14.2 % (ref 18–48)
MCH RBC QN AUTO: 22.4 PG (ref 27–31)
MCHC RBC AUTO-ENTMCNC: 30.8 G/DL (ref 32–36)
MCV RBC AUTO: 73 FL (ref 82–98)
MONOCYTES # BLD AUTO: 0.5 K/UL (ref 0.3–1)
MONOCYTES NFR BLD: 11.6 % (ref 4–15)
NEUTROPHILS # BLD AUTO: 3 K/UL (ref 1.8–7.7)
NEUTROPHILS NFR BLD: 70.3 % (ref 38–73)
NRBC BLD-RTO: 0 /100 WBC
PLATELET # BLD AUTO: 225 K/UL (ref 150–450)
PMV BLD AUTO: 10.7 FL (ref 9.2–12.9)
POCT GLUCOSE: 157 MG/DL (ref 70–110)
POCT GLUCOSE: 219 MG/DL (ref 70–110)
POCT GLUCOSE: 234 MG/DL (ref 70–110)
POCT GLUCOSE: 279 MG/DL (ref 70–110)
POTASSIUM SERPL-SCNC: 4.1 MMOL/L (ref 3.5–5.1)
PROT SERPL-MCNC: 6.3 G/DL (ref 6–8.4)
RBC # BLD AUTO: 4.2 M/UL (ref 4–5.4)
SODIUM SERPL-SCNC: 135 MMOL/L (ref 136–145)
WBC # BLD AUTO: 4.31 K/UL (ref 3.9–12.7)

## 2024-03-22 PROCEDURE — 25000003 PHARM REV CODE 250: Performed by: HOSPITALIST

## 2024-03-22 PROCEDURE — 25000003 PHARM REV CODE 250: Performed by: STUDENT IN AN ORGANIZED HEALTH CARE EDUCATION/TRAINING PROGRAM

## 2024-03-22 PROCEDURE — 94761 N-INVAS EAR/PLS OXIMETRY MLT: CPT

## 2024-03-22 PROCEDURE — 25000003 PHARM REV CODE 250: Performed by: INTERNAL MEDICINE

## 2024-03-22 PROCEDURE — 99900035 HC TECH TIME PER 15 MIN (STAT)

## 2024-03-22 PROCEDURE — 20600001 HC STEP DOWN PRIVATE ROOM

## 2024-03-22 PROCEDURE — 80053 COMPREHEN METABOLIC PANEL: CPT | Performed by: STUDENT IN AN ORGANIZED HEALTH CARE EDUCATION/TRAINING PROGRAM

## 2024-03-22 PROCEDURE — 63600175 PHARM REV CODE 636 W HCPCS: Performed by: INTERNAL MEDICINE

## 2024-03-22 PROCEDURE — 36415 COLL VENOUS BLD VENIPUNCTURE: CPT | Performed by: STUDENT IN AN ORGANIZED HEALTH CARE EDUCATION/TRAINING PROGRAM

## 2024-03-22 PROCEDURE — 85025 COMPLETE CBC W/AUTO DIFF WBC: CPT | Performed by: STUDENT IN AN ORGANIZED HEALTH CARE EDUCATION/TRAINING PROGRAM

## 2024-03-22 RX ORDER — TRIAMCINOLONE ACETONIDE 5 MG/G
CREAM TOPICAL 2 TIMES DAILY
Status: DISCONTINUED | OUTPATIENT
Start: 2024-03-22 | End: 2024-03-28 | Stop reason: HOSPADM

## 2024-03-22 RX ADMIN — AMITRIPTYLINE HYDROCHLORIDE 10 MG: 10 TABLET, FILM COATED ORAL at 08:03

## 2024-03-22 RX ADMIN — INSULIN ASPART 3 UNITS: 100 INJECTION, SOLUTION INTRAVENOUS; SUBCUTANEOUS at 01:03

## 2024-03-22 RX ADMIN — AMPICILLIN 2 G: 2 INJECTION, POWDER, FOR SOLUTION INTRAMUSCULAR; INTRAVENOUS at 03:03

## 2024-03-22 RX ADMIN — LEVOTHYROXINE SODIUM 112 MCG: 112 TABLET ORAL at 06:03

## 2024-03-22 RX ADMIN — INSULIN DETEMIR 7 UNITS: 100 INJECTION, SOLUTION SUBCUTANEOUS at 09:03

## 2024-03-22 RX ADMIN — LISINOPRIL 40 MG: 20 TABLET ORAL at 09:03

## 2024-03-22 RX ADMIN — CEFTRIAXONE 2 G: 2 INJECTION, POWDER, FOR SOLUTION INTRAMUSCULAR; INTRAVENOUS at 01:03

## 2024-03-22 RX ADMIN — GABAPENTIN 300 MG: 300 CAPSULE ORAL at 08:03

## 2024-03-22 RX ADMIN — INSULIN ASPART 3 UNITS: 100 INJECTION, SOLUTION INTRAVENOUS; SUBCUTANEOUS at 06:03

## 2024-03-22 RX ADMIN — ENOXAPARIN SODIUM 40 MG: 40 INJECTION SUBCUTANEOUS at 06:03

## 2024-03-22 RX ADMIN — CARVEDILOL 6.25 MG: 6.25 TABLET, FILM COATED ORAL at 08:03

## 2024-03-22 RX ADMIN — AMPICILLIN 2 G: 2 INJECTION, POWDER, FOR SOLUTION INTRAMUSCULAR; INTRAVENOUS at 10:03

## 2024-03-22 RX ADMIN — AMPICILLIN 2 G: 2 INJECTION, POWDER, FOR SOLUTION INTRAMUSCULAR; INTRAVENOUS at 02:03

## 2024-03-22 RX ADMIN — INSULIN ASPART 3 UNITS: 100 INJECTION, SOLUTION INTRAVENOUS; SUBCUTANEOUS at 11:03

## 2024-03-22 RX ADMIN — INSULIN ASPART 3 UNITS: 100 INJECTION, SOLUTION INTRAVENOUS; SUBCUTANEOUS at 09:03

## 2024-03-22 RX ADMIN — CARVEDILOL 6.25 MG: 6.25 TABLET, FILM COATED ORAL at 09:03

## 2024-03-22 RX ADMIN — AMPICILLIN 2 G: 2 INJECTION, POWDER, FOR SOLUTION INTRAMUSCULAR; INTRAVENOUS at 06:03

## 2024-03-22 RX ADMIN — AMPICILLIN 2 G: 2 INJECTION, POWDER, FOR SOLUTION INTRAMUSCULAR; INTRAVENOUS at 08:03

## 2024-03-22 RX ADMIN — TRIAMCINOLONE ACETONIDE: 5 CREAM TOPICAL at 01:03

## 2024-03-22 RX ADMIN — INSULIN ASPART 2 UNITS: 100 INJECTION, SOLUTION INTRAVENOUS; SUBCUTANEOUS at 06:03

## 2024-03-22 RX ADMIN — INSULIN ASPART 1 UNITS: 100 INJECTION, SOLUTION INTRAVENOUS; SUBCUTANEOUS at 08:03

## 2024-03-22 RX ADMIN — CEFTRIAXONE 2 G: 2 INJECTION, POWDER, FOR SOLUTION INTRAMUSCULAR; INTRAVENOUS at 02:03

## 2024-03-22 RX ADMIN — INSULIN DETEMIR 7 UNITS: 100 INJECTION, SOLUTION SUBCUTANEOUS at 08:03

## 2024-03-22 NOTE — PLAN OF CARE
03/22/24 1552   Discharge Reassessment   Assessment Type Discharge Planning Reassessment   Did the patient's condition or plan change since previous assessment? No   Discharge Plan discussed with: Adult children   Communicated RAJESH with patient/caregiver Date not available/Unable to determine   Discharge Plan A Home Health   Discharge Plan B Home with family   DME Needed Upon Discharge  none   Transition of Care Barriers None   Why the patient remains in the hospital Requires continued medical care   Post-Acute Status   Post-Acute Authorization Home Health   Home Health Status Referrals Sent   Coverage [employer] Managed Medicare   Hospital Resources/Appts/Education Provided Provided patient/caregiver with written discharge plan information   Patient choice form signed by patient/caregiver List with quality metrics by geographic area provided   Discharge Delays (!) Procedure Scheduling (IR, OR, Labs, Echo, Cath, Echo, EEG)     [facility] - Stepdown Flex ([place])  Discharge Reassessment    SW sent Home Health referrals and IV abx through care port.   Will follow up.     Discharge Plan A and Plan B have been determined by review of patient's clinical status, future medical and therapeutic needs, and coverage/benefits for post-acute care in coordination with multidisciplinary team members.    Radha Simpson MSW, CSW

## 2024-03-22 NOTE — ASSESSMENT & PLAN NOTE
Resolved  Patient with acute kidney injury/acute renal failure likely due to pre-renal azotemia due to dehydration. Cr 1.2 on presentation. ANAIS is currently improving. Baseline creatinine  0.8  - Labs reviewed- Renal function/electrolytes with Estimated Creatinine Clearance: 51.1 mL/min (based on SCr of 0.9 mg/dL). according to latest data. Monitor urine output and serial BMP and adjust therapy as needed. Avoid nephrotoxins and renally dose meds for GFR listed above.

## 2024-03-22 NOTE — SUBJECTIVE & OBJECTIVE
Interval history: NAEO. Feeling better. Denies complaints      Past Surgical History:   Procedure Laterality Date    APPENDECTOMY      BLADDER FULGURATION N/A 3/1/2024    Procedure: ULCER INJECTION FULGURATION;  Surgeon: Brody Smith MD;  Location: [facility] OR;  Service: Urology;  Laterality: N/A;    BLADDER SUSPENSION      CATARACT EXTRACTION      right eye     CHOLECYSTECTOMY      COLONOSCOPY N/A 7/20/2020    Procedure: COLONOSCOPY;  Surgeon: Juan Parker MD;  Location: [facility] ENDO (4TH FLR);  Service: Endoscopy;  Laterality: N/A;  Hx of chronic anemia.  patient needs a   4/6/20 - removed from 4/20/20, rescheduled 7/20/20 - pg  covid 7/17-om 2nd floor-tb    COLONOSCOPY N/A 2/9/2023    Procedure: COLONOSCOPY;  Surgeon: Renan Sanchez MD;  Location: [facility] ENDO (4TH FLR);  Service: Colon and Rectal;  Laterality: N/A;  instr handed to patient, -ml    CYSTOSCOPY N/A 5/19/2021    Procedure: CYSTOSCOPY;  Surgeon: Brody Smith MD;  Location: [facility] OR 1ST FLR;  Service: Urology;  Laterality: N/A;    CYSTOURETEROSCOPY, WITH HOLMIUM LASER LITHOTRIPSY OF URETERAL CALCULUS AND STENT INSERTION Right 3/1/2024    Procedure: CYSTOURETEROSCOPY, WITH HOLMIUM LASER LITHOTRIPSY OF URETERAL CALCULUS AND STENT INSERTION;  Surgeon: Brody Smith MD;  Location: [facility] OR;  Service: Urology;  Laterality: Right;    CYSTOURETEROSCOPY,WITH HOLMIUM LASER LITHOTRIPSY OF URETERAL CALCULUS Right 3/14/2024    Procedure: CYSTOURETEROSCOPY,WITH HOLMIUM LASER LITHOTRIPSY OF URETERAL CALCULUS;  Surgeon: Brody Smith MD;  Location: [facility] OR;  Service: Urology;  Laterality: Right;  1 HR    ECHOCARDIOGRAM,TRANSESOPHAGEAL N/A 3/20/2024    Procedure: Transesophageal echo (CARLITO) intra-procedure log documentation;  Surgeon: Provider, Dosc [facility];  Location: [facility] EP LAB;  Service: Cardiology;  Laterality: N/A;    EYE SURGERY Bilateral     cataract removal    INJECTION OF STEROID N/A 3/14/2024     Procedure: INJECTION, STEROID;  Surgeon: Brody Smith MD;  Location: [facility] OR;  Service: Urology;  Laterality: N/A;    PLACEMENT-STENT Right 3/14/2024    Procedure: PLACEMENT-STENT;  Surgeon: Brody Smith MD;  Location: [facility] OR;  Service: Urology;  Laterality: Right;    REMOVAL-STENT Right 3/14/2024    Procedure: REMOVAL-STENT;  Surgeon: Brody Smith MD;  Location: [facility] OR;  Service: Urology;  Laterality: Right;    TOTAL ABDOMINAL HYSTERECTOMY      DUB       Review of patient's allergies indicates:   Allergen Reactions    Bufferin [aspirin, buffered] Itching    Atorvastatin      Myalgias and arthralgias    Shellfish containing products Itching     Shellfish causes her to itch per her daughter, Caro.     Warfarin     Ibuprofen Itching     Itching of eyes, head       Medications:  Medications Prior to Admission   Medication Sig    ACCU-CHEK SOFTCLIX LANCETS Misc CHECK BLOOD SUGAR ONE TIME DAILY    amitriptyline (ELAVIL) 10 MG tablet Take 1 tablet (10 mg total) by mouth every evening.    BD ALCOHOL SWABS PadM USE AS DIRECTED TOPICALLY AS NEEDED    biotin 1 mg tablet Take 1,000 mcg by mouth 3 (three) times daily.    blood sugar diagnostic (ACCU-CHEK MATTIE PLUS TEST STRP) Strp TEST ONE TIME DAILY    blood-glucose meter kit To check BG 2  times daily, to use with insurance preferred meter, use as directed.    carvediloL (COREG) 25 MG tablet TAKE 1 TABLET TWICE DAILY    cyanocobalamin (VITAMIN B-12) 1000 MCG tablet Take 100 mcg by mouth once daily.    gabapentin (NEURONTIN) 300 MG capsule Take 1 capsule (300 mg total) by mouth every evening.    glipiZIDE 5 MG TR24 Take 1 tablet (5 mg total) by mouth daily with breakfast.    [] HYDROcodone-acetaminophen (NORCO) 5-325 mg per tablet Take 1 tablet by mouth every 6 (six) hours as needed for Pain.    ketoconazole (NIZORAL) 2 % cream Apply topically once daily.    lancets (ACCU-CHEK MULTICLIX LANCET) Misc To use as directed with Accu Chek Sahra  FastClix lancing device    lancets Misc To check BG 2 times daily, to use with insurance preferred meter.    levothyroxine (SYNTHROID) 112 MCG tablet Take 1 tablet (112 mcg total) by mouth before breakfast.    lisinopriL (PRINIVIL,ZESTRIL) 40 MG tablet Take 1 tablet (40 mg total) by mouth once daily.    metFORMIN (GLUCOPHAGE) 1000 MG tablet Take 1 tablet (1,000 mg total) by mouth 2 (two) times daily with meals.    mirabegron (MYRBETRIQ) 25 mg Tb24 ER tablet Take 1 tablet (25 mg total) by mouth once daily.    naproxen (NAPROSYN) 500 MG tablet Take 1 tablet (500 mg total) by mouth 2 (two) times daily with meals.    rosuvastatin (CRESTOR) 20 MG tablet Take 1 tablet (20 mg total) by mouth once daily.    SITagliptin phosphate (JANUVIA) 100 MG Tab Take 1 tablet (100 mg total) by mouth once daily.    turmeric root extract 500 mg Cap Take by mouth.    VITAMIN B COMPLEX ORAL Take 1 tablet by mouth.    vitamin D (VITAMIN D3) 1000 units Tab Take 1,000 Units by mouth once daily.     Antibiotics (From admission, onward)      Start     Stop Route Frequency Ordered    03/19/24 1300  cefTRIAXone (ROCEPHIN) 2 g in dextrose 5 % in water (D5W) 100 mL IVPB (MB+)         -- IV Every 12 hours (non-standard times) 03/19/24 1145    03/17/24 1100  ampicillin (OMNIPEN) 2 g in sodium chloride 0.9 % 100 mL IVPB (MB+)         -- IV Every 4 hours (non-standard times) 03/17/24 0954          Antifungals (From admission, onward)      None          Antivirals (From admission, onward)      None             Immunization History   Administered Date(s) Administered    COVID-19, MRNA, LN-S, PF (Pfizer) (Gray Cap) 03/09/2022    COVID-19, mRNA, LNP-S, bivalent booster, PF (PFIZER OMICRON) 10/12/2022    COVID-19, vector-nr, rS-Ad26, PF (Katelin) 03/05/2021    Influenza (FLUAD) - Quadrivalent - Adjuvanted - PF *Preferred* (65+) 12/10/2021, 10/10/2022, 11/09/2023    Influenza - High Dose - PF (65 years and older) 12/02/2015, 09/15/2017, 11/26/2018     Pneumococcal Conjugate - 13 Valent 11/26/2018    Pneumococcal Polysaccharide - 23 Valent 01/18/2023       Family History       Problem Relation (Age of Onset)    Breast cancer Mother    COPD Father    Cancer Mother, Sister, Daughter    Diabetes Son    Hypertension Sister, Sister, Daughter, Daughter    Ovarian cancer Mother, Sister    Thyroid disease Daughter, Daughter          Social History     Socioeconomic History    Marital status:    Tobacco Use    Smoking status: Never    Smokeless tobacco: Never   Substance and Sexual Activity    Alcohol use: No    Drug use: No    Sexual activity: Yes     Partners: Male     Birth control/protection: Post-menopausal     Comment:     Other Topics Concern    Are you pregnant or think you may be? No    Breast-feeding No   Social History Narrative     with 7 kids     Social Determinants of Health     Financial Resource Strain: Low Risk  (3/18/2024)    Overall Financial Resource Strain (CARDIA)     Difficulty of Paying Living Expenses: Not very hard   Food Insecurity: No Food Insecurity (3/18/2024)    Hunger Vital Sign     Worried About Running Out of Food in the Last Year: Never true     Ran Out of Food in the Last Year: Never true   Transportation Needs: No Transportation Needs (3/18/2024)    PRAPARE - Transportation     Lack of Transportation (Medical): No     Lack of Transportation (Non-Medical): No   Physical Activity: Inactive (3/18/2024)    Exercise Vital Sign     Days of Exercise per Week: 0 days     Minutes of Exercise per Session: 0 min   Stress: No Stress Concern Present (3/18/2024)    [employer] [place] [employer] - Occupational Stress Questionnaire     Feeling of Stress : Only a little   Social Connections: Unknown (3/18/2024)    Social Connection and Isolation Panel [NHANES]     Frequency of Communication with Friends and Family: More than three times a week     Frequency of Social Gatherings with Friends and Family: Twice a week      Attends [employer] Services: 1 to 4 times per year     Active Member of Clubs or Organizations: Yes     Attends Club or Organization Meetings: Never     Marital Status: Patient declined   Housing Stability: Unknown (3/18/2024)    Housing Stability Vital Sign     Unable to Pay for Housing in the Last Year: No     Unstable Housing in the Last Year: No     Review of Systems   Constitutional:  Negative for fatigue and unexpected weight change.   HENT:  Negative for ear pain, facial swelling, hearing loss, mouth sores, nosebleeds, rhinorrhea, sinus pressure, sore throat, tinnitus, trouble swallowing and voice change.    Eyes:  Negative for photophobia, pain, redness and visual disturbance.   Respiratory:  Negative for cough, chest tightness, shortness of breath and wheezing.    Cardiovascular:  Negative for chest pain, palpitations and leg swelling.   Gastrointestinal:  Negative for abdominal pain, blood in stool, constipation, diarrhea, nausea and vomiting.   Endocrine: Negative for cold intolerance, heat intolerance, polydipsia, polyphagia and polyuria.   Genitourinary:  Negative for flank pain, frequency, menstrual problem, vaginal bleeding, vaginal discharge and vaginal pain.   Musculoskeletal:  Negative for arthralgias, back pain, joint swelling, myalgias and neck pain.   Skin:  Negative for rash.   Allergic/Immunologic: Negative for environmental allergies, food allergies and immunocompromised state.   Neurological:  Negative for dizziness, seizures, syncope, weakness, light-headedness, numbness and headaches.   Hematological:  Negative for adenopathy. Does not bruise/bleed easily.   Psychiatric/Behavioral:  Negative for confusion, hallucinations, self-injury, sleep disturbance and suicidal ideas. The patient is not nervous/anxious.      Objective:     Vital Signs (Most Recent):  Temp: 98.5 °F (36.9 °C) (03/22/24 1200)  Pulse: 73 (03/22/24 1200)  Resp: 18 (03/22/24 1200)  BP: (!) 116/58 (03/22/24 1200)  SpO2: (!) 92  % (03/22/24 1253) Vital Signs (24h Range):  Temp:  [97.6 °F (36.4 °C)-98.9 °F (37.2 °C)] 98.5 °F (36.9 °C)  Pulse:  [73-88] 73  Resp:  [18] 18  SpO2:  [92 %-98 %] 92 %  BP: (113-151)/(56-67) 116/58     Weight: 71.4 kg (157 lb 6.5 oz)  Body mass index is 26.19 kg/m².    Estimated Creatinine Clearance: 51.1 mL/min (based on SCr of 0.9 mg/dL).     Physical Exam  Vitals and nursing note reviewed.   Constitutional:       Appearance: She is well-developed.   HENT:      Head: Normocephalic and atraumatic.      Right Ear: External ear normal.      Left Ear: External ear normal.   Eyes:      General: No scleral icterus.        Right eye: No discharge.         Left eye: No discharge.      Conjunctiva/sclera: Conjunctivae normal.   Cardiovascular:      Rate and Rhythm: Normal rate and regular rhythm.      Heart sounds: Normal heart sounds. No murmur heard.     No friction rub. No gallop.   Pulmonary:      Effort: Pulmonary effort is normal. No respiratory distress.      Breath sounds: Normal breath sounds. No stridor. No wheezing or rales.   Abdominal:      General: Bowel sounds are normal.      Tenderness: There is abdominal tenderness in the suprapubic area.   Musculoskeletal:         General: No tenderness. Normal range of motion.   Skin:     General: Skin is warm and dry.   Neurological:      Mental Status: She is alert and oriented to person, place, and time.          Significant Labs: CBC:   Recent Labs   Lab 03/21/24  0343 03/22/24  0338   WBC 5.47 4.31   HGB 8.6* 9.4*   HCT 27.4* 30.5*    225       CMP:   Recent Labs   Lab 03/21/24  0343 03/22/24  0338   * 135*   K 4.2 4.1    101   CO2 24 24   * 173*   BUN 11 10   CREATININE 0.9 0.9   CALCIUM 9.5 9.8   PROT 5.6* 6.3   ALBUMIN 2.5* 2.4*   BILITOT 0.2 0.2   ALKPHOS 76 78   AST 19 27   ALT 35 47*   ANIONGAP 10 10       Microbiology Results (last 7 days)       Procedure Component Value Units Date/Time    Blood culture [[id number]]  (Abnormal)  Collected: 03/19/24 0825    Order Status: Completed Specimen: Blood Updated: 03/22/24 1142     Blood Culture, Routine Gram stain aer bottle: Gram positive cocci in chains resembling Strep      Positive results previously called 03/20/2024 00:00      ENTEROCOCCUS FAECALIS  For susceptibility see order #[id number]      Blood culture [[id number]]  (Abnormal) Collected: 03/19/24 0822    Order Status: Completed Specimen: Blood Updated: 03/22/24 1142     Blood Culture, Routine Gram stain aer bottle: Gram positive cocci in chains resembling Strep      Results called to and read back by: Jaimee Morillo RN. 03/20/2024  00:00      ENTEROCOCCUS FAECALIS  Susceptibility pending      Blood culture [[id number]]  (Abnormal) Collected: 03/20/24 0550    Order Status: Completed Specimen: Blood Updated: 03/22/24 0946     Blood Culture, Routine Gram stain aer bottle: Gram positive cocci in chains resembling Strep      Positive results previously called 03/21/2024  00:44      ENTEROCOCCUS SPECIES  Identification pending  For susceptibility see order # [id number]      Blood culture [[id number]] Collected: 03/20/24 0550    Order Status: Completed Specimen: Blood Updated: 03/22/24 0812     Blood Culture, Routine No Growth to date      No Growth to date      No Growth to date    Blood culture [[id number]] Collected: 03/21/24 0343    Order Status: Completed Specimen: Blood Updated: 03/22/24 0613     Blood Culture, Routine No Growth to date      No Growth to date    Blood culture [[id number]] Collected: 03/21/24 0343    Order Status: Completed Specimen: Blood Updated: 03/22/24 0613     Blood Culture, Routine No Growth to date      No Growth to date    Blood culture [[id number]]  (Abnormal) Collected: 03/18/24 0538    Order Status: Completed Specimen: Blood Updated: 03/20/24 1016     Blood Culture, Routine Gram stain aer bottle: Gram positive cocci in chains resembling Strep      Positive results previously called 03/18/2024      ENTEROCOCCUS  FAECALIS  For susceptibility see order #[id number]      Blood culture [[id number]]  (Abnormal) Collected: 03/18/24 0538    Order Status: Completed Specimen: Blood Updated: 03/20/24 1016     Blood Culture, Routine Gram stain aer bottle: Gram positive cocci in chains resembling Strep      Positive results previously called 03/18/2024      ENTEROCOCCUS FAECALIS  For susceptibility see order #[id number]      Blood culture [[id number]]  (Abnormal) Collected: 03/17/24 1300    Order Status: Completed Specimen: Blood Updated: 03/20/24 1016     Blood Culture, Routine Gram stain aer bottle: Gram positive cocci in chains resembling Strep      Positive results previously called 03/17/2024 05:37      ENTEROCOCCUS FAECALIS  For susceptibility see order #[id number]      Blood culture [[id number]]  (Abnormal) Collected: 03/17/24 1300    Order Status: Completed Specimen: Blood Updated: 03/20/24 1015     Blood Culture, Routine Gram stain aer bottle: Gram positive cocci in chains resembling Strep      Results called to and read back by: Emilie Dela Cruz RN. 03/18/2024  06:17      Gram stain vinay bottle: Gram positive cocci in chains resembling Strep      03/18/2024  11:21      ENTEROCOCCUS FAECALIS  For susceptibility see order #[id number]      Blood culture x two cultures. Draw prior to antibiotics. [[id number]]  (Abnormal) Collected: 03/16/24 1420    Order Status: Completed Specimen: Blood from Wrist, Right Updated: 03/19/24 1054     Blood Culture, Routine Gram stain aer bottle: Gram positive cocci in chains resembling Strep      Positive results previously called 03/17/2024  06:25      ENTEROCOCCUS FAECALIS  For susceptibility see order #[id number]      Narrative:      Aerobic and anaerobic    Blood culture x two cultures. Draw prior to antibiotics. [[id number]]  (Abnormal)  (Susceptibility) Collected: 03/16/24 1410    Order Status: Completed Specimen: Blood from Peripheral, Forearm, Left Updated: 03/19/24 1053     Blood Culture,  Routine Gram stain aer bottle: Gram positive cocci in chains resembling Strep      Gram stain vinay bottle: Gram positive cocci in chains resembling Strep      Results called to and read back by: Emilie Napoles RN 03/17/2024  05:37      ENTEROCOCCUS FAECALIS    Narrative:      Aerobic and anaerobic    Urine culture [[id number]]  (Abnormal)  (Susceptibility) Collected: 03/16/24 1359    Order Status: Completed Specimen: Urine Updated: 03/18/24 2120     Urine Culture, Routine ENTEROCOCCUS FAECALIS  >100,000 cfu/ml      Narrative:      Specimen Source->Urine    Rapid Organism ID by PCR (from Blood culture) [[id number]]  (Abnormal) Collected: 03/16/24 1410    Order Status: Completed Updated: 03/17/24 0636     Enterococcus faecalis Detected     Enterococcus faecium Not Detected     Listeria monocytogenes Not Detected     Staphylococcus spp. Not Detected     Staphylococcus aureus Not Detected     Staphylococcus epidermidis Not Detected     Staphylococcus lugdunensis Not Detected     Streptococcus species Not Detected     Streptococcus agalactiae Not Detected     Streptococcus pneumoniae Not Detected     Streptococcus pyogenes Not Detected     Acinetobacter calcoaceticus/baumannii complex Not Detected     Bacteroides fragilis Not Detected     Enterobacterales Not Detected     Enterobacter cloacae complex Not Detected     Escherichia coli Not Detected     Klebsiella aerogenes Not Detected     Klebsiella oxytoca Not Detected     Klebsiella pneumoniae group Not Detected     Proteus Not Detected     Salmonella sp Not Detected     Serratia marcescens Not Detected     Haemophilus influenzae Not Detected     Neisseria meningtidis Not Detected     Pseudomonas aeruginosa Not Detected     Stenotrophomonas maltophilia Not Detected     Candida albicans Not Detected     Candida auris Not Detected     Candida glabrata Not Detected     Candida krusei Not Detected     Candida parapsilosis Not Detected     Candida tropicalis Not Detected      Cryptococcus neoformans/gattii Not Detected     CTX-M (ESBL ) Test Not Applicable     IMP (Carbapenem resistant) Test Not Applicable     KPC resistance gene (Carbapenem resistant) Test Not Applicable     mcr-1  Test Not Applicable     mec A/C  Test Not Applicable     mec A/C and MREJ (MRSA) gene Test Not Applicable     NDM (Carbapenem resistant) Test Not Applicable     OXA-48-like (Carbapenem resistant) Test Not Applicable     van A/B (VRE gene) Not Detected     VIM (Carbapenem resistant) Test Not Applicable    Narrative:      Aerobic and anaerobic          Urine Studies:   Recent Labs   Lab 03/16/24  1359   COLORU Yellow   APPEARANCEUA Hazy*   PHUR 6.0   SPECGRAV 1.020   PROTEINUA 1+*   GLUCUA 3+*   KETONESU Negative   BILIRUBINUA Negative   OCCULTUA 2+*   NITRITE Negative   LEUKOCYTESUR 2+*   RBCUA 14*   WBCUA 37*   BACTERIA Occasional   SQUAMEPITHEL 1   HYALINECASTS 0         Significant Imaging: I have reviewed all pertinent imaging results/findings within the past 24 hours.

## 2024-03-22 NOTE — PLAN OF CARE
Problem: Adult Inpatient Plan of Care  Goal: Plan of Care Review  Outcome: Ongoing, Progressing  Flowsheets (Taken 3/22/2024 0146)  Plan of Care Reviewed With: patient  Goal: Patient-Specific Goal (Individualized)  Outcome: Ongoing, Progressing  Goal: Absence of Hospital-Acquired Illness or Injury  Outcome: Ongoing, Progressing  Goal: Optimal Comfort and Wellbeing  Outcome: Ongoing, Progressing  Intervention: Provide Person-Centered Care  Flowsheets (Taken 3/22/2024 0146)  Trust Relationship/Rapport:   care explained   choices provided   emotional support provided   empathic listening provided   questions answered   questions encouraged   reassurance provided   thoughts/feelings acknowledged  Goal: Readiness for Transition of Care  Outcome: Ongoing, Progressing     Problem: Infection  Goal: Absence of Infection Signs and Symptoms  Outcome: Ongoing, Progressing     Problem: Diabetes Comorbidity  Goal: Blood Glucose Level Within Targeted Range  Outcome: Ongoing, Progressing     Problem: Adjustment to Illness (Sepsis/Septic Shock)  Goal: Optimal Coping  Outcome: Ongoing, Progressing     Problem: Bleeding (Sepsis/Septic Shock)  Goal: Absence of Bleeding  Outcome: Ongoing, Progressing     Problem: Glycemic Control Impaired (Sepsis/Septic Shock)  Goal: Blood Glucose Level Within Desired Range  Outcome: Ongoing, Progressing  Intervention: Optimize Glycemic Control  Flowsheets (Taken 3/22/2024 0146)  Glycemic Management:   blood glucose monitored   supplemental insulin given     Problem: Infection Progression (Sepsis/Septic Shock)  Goal: Absence of Infection Signs and Symptoms  Outcome: Ongoing, Progressing     Problem: Nutrition Impaired (Sepsis/Septic Shock)  Goal: Optimal Nutrition Intake  Outcome: Ongoing, Progressing     Problem: Fluid and Electrolyte Imbalance (Acute Kidney Injury/Impairment)  Goal: Fluid and Electrolyte Balance  Outcome: Ongoing, Progressing  Intervention: Monitor and Manage Fluid and Electrolyte  Balance  Flowsheets (Taken 3/22/2024 0146)  Fluid/Electrolyte Management: fluids provided     Problem: Oral Intake Inadequate (Acute Kidney Injury/Impairment)  Goal: Optimal Nutrition Intake  Outcome: Ongoing, Progressing     Problem: Renal Function Impairment (Acute Kidney Injury/Impairment)  Goal: Effective Renal Function  Outcome: Ongoing, Progressing     Problem: Pain Acute  Goal: Acceptable Pain Control and Functional Ability  Outcome: Ongoing, Progressing  Intervention: Develop Pain Management Plan  Flowsheets (Taken 3/22/2024 0146)  Pain Management Interventions:   care clustered   medication offered but refused   pain management plan reviewed with patient/caregiver   pillow support provided   position adjusted   quiet environment facilitated   relaxation techniques promoted  Intervention: Optimize Psychosocial Wellbeing  Flowsheets (Taken 3/22/2024 0146)  Supportive Measures:   active listening utilized   decision-making supported   problem-solving facilitated   relaxation techniques promoted   self-care encouraged   verbalization of feelings encouraged  Diversional Activities: smartphone

## 2024-03-22 NOTE — PLAN OF CARE
Patient is alert and oriented x 4. Speech is clear. Vs stable. On room air. No wob or sign of distress. Knows basic english. Safety measures in place. Bed in low position. Call light in reach. Family at bedside.      Problem: Adult Inpatient Plan of Care  Goal: Plan of Care Review  Outcome: Ongoing, Progressing  Goal: Patient-Specific Goal (Individualized)  Outcome: Ongoing, Progressing  Goal: Absence of Hospital-Acquired Illness or Injury  Outcome: Ongoing, Progressing  Goal: Optimal Comfort and Wellbeing  Outcome: Ongoing, Progressing  Goal: Readiness for Transition of Care  Outcome: Ongoing, Progressing     Problem: Infection  Goal: Absence of Infection Signs and Symptoms  Outcome: Ongoing, Progressing     Problem: Diabetes Comorbidity  Goal: Blood Glucose Level Within Targeted Range  Outcome: Ongoing, Progressing

## 2024-03-22 NOTE — ASSESSMENT & PLAN NOTE
Patient's anemia is currently controlled. Has not received any PRBCs to date. Etiology likely d/t chronic disease v iron deficiency. No obvious signs of bleeding.  Current CBC reviewed-   Lab Results   Component Value Date    HGB 9.4 (L) 03/22/2024    HCT 30.5 (L) 03/22/2024     Monitor serial CBC and transfuse if patient becomes hemodynamically unstable, symptomatic or H/H drops below 7/21.

## 2024-03-22 NOTE — ASSESSMENT & PLAN NOTE
Patient's FSGs are controlled on current medication regimen.  Last A1c reviewed-   Lab Results   Component Value Date    HGBA1C 7.6 (H) 03/21/2024     Most recent fingerstick glucose reviewed-   Recent Labs   Lab 03/21/24  1153 03/21/24  1622 03/21/24  1942 03/22/24  0808   POCTGLUCOSE 157* 214* 233* 157*       Current correctional scale  Low  Maintain anti-hyperglycemic dose as follows-   Antihyperglycemics (From admission, onward)    Start     Stop Route Frequency Ordered    03/21/24 0715  insulin aspart U-100 pen 3 Units         -- SubQ 3 times daily with meals 03/20/24 2112 03/20/24 2115  insulin detemir U-100 (Levemir) pen 7 Units         -- SubQ 2 times daily 03/20/24 2111 03/16/24 2019  insulin aspart U-100 pen 0-5 Units         -- SubQ Before meals & nightly PRN 03/16/24 1920        Hold Oral hypoglycemics while patient is in the hospital.

## 2024-03-22 NOTE — ASSESSMENT & PLAN NOTE
Chronic, controlled. Latest blood pressure and vitals reviewed-     Temp:  [98.5 °F (36.9 °C)-98.9 °F (37.2 °C)]   Pulse:  [74-84]   Resp:  [18-19]   BP: (113-151)/(56-67)   SpO2:  [93 %-95 %] .   Home meds for hypertension were reviewed and noted below.   Hypertension Medications               carvediloL (COREG) 25 MG tablet TAKE 1 TABLET TWICE DAILY    lisinopriL (PRINIVIL,ZESTRIL) 40 MG tablet Take 1 tablet (40 mg total) by mouth once daily.            While in the hospital, will manage blood pressure as follows; Adjust home antihypertensive regimen as follows- held on presentation, restart as tolerated    Will utilize p.r.n. blood pressure medication only if patient's blood pressure greater than 180/110 and she develops symptoms such as worsening chest pain or shortness of breath.

## 2024-03-22 NOTE — ASSESSMENT & PLAN NOTE
78F with h/o DM, treated LTBI (6 months INH in 2018),  interstitial cystitis and urolithiasis s/p ureteroscopic stone extraction with lithotripsy and right ureteral stent placement (right) on 3/14 admitted 3/16 with chills, dizziness, flank pain, dysuria, freq, and a fall. Bcx 3/16, 3/17, 3/18 with e.faecalis in both sets. Repeat 3/19 NGTD. Fever defervesced, leukocytosis trending down. 2d echo without veg. Reports PCN allergy, but tolerating ampicillin.     Suspect bacteremia from  source; ureteral procedure likely contributed. Fortunately CARLITO negative for endocarditis. Unclear why her bcx are still positive. Worried we're missing a source of infection. Discussed with urology and they don't think ureteral stents are infected and want to hold off exchanging at this time. Awaiting clearance of cultures and results of NM scan.     Recommendations:   - continue ampicillin/ceftriaxone duration TBD but given high grade bacteremia, will need at least 4 weeks of iv abx followed by chronic supressive oral antibiotics until ureteral  stent is removed or replaced  - please wait for f/u bcx to be clear x 48-72h before placing picc  - f/u NM tagged WBC scan to eval for source of infection

## 2024-03-22 NOTE — ASSESSMENT & PLAN NOTE
Patient presented to ED with fevers and malaise 2 days after urologic procedure.  CT with evidence nephritic stranding.  Admitted for pyelonephritis.  Blood cultures on presentation 3/16 positive for Enterococcus faecalis. Suspect urologic source. TTE negative  - ID consulted   - repeat cultures  - CARLITO no vegetation  - S/p ceftriaxone in ED and on admission, continued for double coverage due to persistent bacteremia  - started on ampicillin 3/17   - urology to follow-up in clinic, stated no role for stent removal as it is source control  - NM scan ordered to be started on monday

## 2024-03-22 NOTE — ASSESSMENT & PLAN NOTE
"This patient does have evidence of infective focus  My overall impression is sepsis.  Source: Urinary Tract  Antibiotics given-   Antibiotics (72h ago, onward)    Start     Stop Route Frequency Ordered    03/19/24 1300  cefTRIAXone (ROCEPHIN) 2 g in dextrose 5 % in water (D5W) 100 mL IVPB (MB+)         -- IV Every 12 hours (non-standard times) 03/19/24 1145    03/17/24 1100  ampicillin (OMNIPEN) 2 g in sodium chloride 0.9 % 100 mL IVPB (MB+)         -- IV Every 4 hours (non-standard times) 03/17/24 0954        Latest lactate reviewed-  No results for input(s): "LACTATE", "POCLAC" in the last 72 hours.    Fluid challenge Ideal Body Weight- The patient's ideal body weight is Ideal body weight: 57 kg (125 lb 10.6 oz) which will be used to calculate fluid bolus of 30 ml/kg for treatment of septic shock.      Post- resuscitation assessment No - Post resuscitation assessment not needed     ANAIS    Will Not start Pressors- Levophed for MAP of 65  Source control achieved by: rocephin  "

## 2024-03-22 NOTE — NURSING
Patient A&OX4,VSS, blood sugar controlled and covered with subq insulin overnight. No complaints of pain, IV abx administered. Bed in lowest position, bed alarm on, bed locked, X2 side rails raised, call light and personal belongings within reach.

## 2024-03-22 NOTE — PROGRESS NOTES
[facility] - [facility] ([place] [id number])  [facility] Medicine  Progress Note    Patient Name: Aminah Alicea-Shelly  MRN: [id number]  Patient Class: IP- Inpatient   Admission Date: 3/16/2024  Length of Stay: 6 days  Attending Physician: Jaleesa Jaimes MD  Primary Care Provider: Jeison Sanchez MD        Subjective:     Principal Problem:Enterococcal bacteremia        HPI:  77 yo female with DM2, GERD, HPLD, HTN, Hypothyroidism, recent stone removal presenting with chills, weakness, and back pain. Interpretor MERI used at bedside. She states that she was d/c'd on the 14th of March was doing okay until last night she started feeling weak and actually fell, she denies any leg pain or headache and did loss consciousness, however she did not feel any better today whenever she woke up so presented to the ER.    She had a fever measured here at 102.6F, CT scan showed perinephric stranding, UA consistent with infection, lactic acidosis. Urology was consulted who recommended day and antibiotics. Medicine called for admission.     Overview/Hospital Course:  Started on ceftriaxone at admission, ampicillin added on 3/17. Blood cultures quickly resulted positive for e faecalis, persistently positive. ID consulted. Patient passed voiding trial 3/18. TTE negative. CARLITO to be done 3/20.     Interval History: 3/22: Eating breakfast during rounds today. Only complaint is rash on sacral area. Discussed with patient staying over weekend.     Review of Systems   Constitutional:  Positive for chills and fever. Negative for fatigue and unexpected weight change.   HENT:  Negative for ear pain, facial swelling, hearing loss, mouth sores, nosebleeds, rhinorrhea, sinus pressure, sore throat, tinnitus, trouble swallowing and voice change.    Eyes:  Negative for photophobia, pain, redness and visual disturbance.   Respiratory:  Negative for cough, chest tightness, shortness of breath and wheezing.    Cardiovascular:  Negative for  chest pain, palpitations and leg swelling.   Gastrointestinal:  Negative for abdominal pain, blood in stool, constipation, diarrhea, nausea and vomiting.   Endocrine: Negative for cold intolerance, heat intolerance, polydipsia, polyphagia and polyuria.   Genitourinary:  Positive for difficulty urinating, dysuria, hematuria and urgency. Negative for flank pain, frequency, menstrual problem, vaginal bleeding, vaginal discharge and vaginal pain.   Musculoskeletal:  Negative for arthralgias, back pain, joint swelling, myalgias and neck pain.   Skin:  Negative for rash.   Allergic/Immunologic: Negative for environmental allergies, food allergies and immunocompromised state.   Neurological:  Negative for dizziness, seizures, syncope, weakness, light-headedness, numbness and headaches.   Hematological:  Negative for adenopathy. Does not bruise/bleed easily.   Psychiatric/Behavioral:  Negative for confusion, hallucinations, self-injury, sleep disturbance and suicidal ideas. The patient is not nervous/anxious.      Objective:     Vital Signs (Most Recent):  Temp: 98.9 °F (37.2 °C) (03/22/24 0333)  Pulse: 74 (03/22/24 0333)  Resp: 18 (03/22/24 0333)  BP: (!) 113/56 (03/22/24 0333)  SpO2: (!) 94 % (03/22/24 0333) Vital Signs (24h Range):  Temp:  [98.5 °F (36.9 °C)-98.9 °F (37.2 °C)] 98.9 °F (37.2 °C)  Pulse:  [74-84] 74  Resp:  [18-19] 18  SpO2:  [93 %-95 %] 94 %  BP: (113-151)/(56-67) 113/56     Weight: 71.4 kg (157 lb 6.5 oz)  Body mass index is 26.19 kg/m².    Intake/Output Summary (Last 24 hours) at 3/22/2024 0908  Last data filed at 3/22/2024 0614  Gross per 24 hour   Intake 560 ml   Output 1550 ml   Net -990 ml         Physical Exam  Vitals and nursing note reviewed.   Constitutional:       Appearance: She is well-developed.   HENT:      Head: Normocephalic and atraumatic.      Right Ear: External ear normal.      Left Ear: External ear normal.   Eyes:      General: No scleral icterus.        Right eye: No discharge.          Left eye: No discharge.      Conjunctiva/sclera: Conjunctivae normal.   Cardiovascular:      Rate and Rhythm: Normal rate and regular rhythm.      Heart sounds: Normal heart sounds. No murmur heard.     No friction rub. No gallop.   Pulmonary:      Effort: Pulmonary effort is normal. No respiratory distress.      Breath sounds: Normal breath sounds. No stridor. No wheezing or rales.   Abdominal:      General: Bowel sounds are normal.      Tenderness: There is abdominal tenderness in the suprapubic area.   Musculoskeletal:         General: No tenderness. Normal range of motion.   Skin:     General: Skin is warm and dry.   Neurological:      Mental Status: She is alert and oriented to person, place, and time.             Significant Labs: All pertinent labs within the past 24 hours have been reviewed.    Significant Imaging: I have reviewed all pertinent imaging results/findings within the past 24 hours.    Assessment/Plan:      * Enterococcal bacteremia  Patient presented to ED with fevers and malaise 2 days after urologic procedure.  CT with evidence nephritic stranding.  Admitted for pyelonephritis.  Blood cultures on presentation 3/16 positive for Enterococcus faecalis. Suspect urologic source. TTE negative  - ID consulted   - repeat cultures  - CARLITO no vegetation  - S/p ceftriaxone in ED and on admission, continued for double coverage due to persistent bacteremia  - started on ampicillin 3/17   - urology to follow-up in clinic, stated no role for stent removal as it is source control  - NM scan ordered to be started on monday    Positive QuantiFERON-TB Gold test  - history noted      Ureteral stent present  Management as per urology   - outpatient follow-up      Anemia  Patient's anemia is currently controlled. Has not received any PRBCs to date. Etiology likely d/t chronic disease v iron deficiency. No obvious signs of bleeding.  Current CBC reviewed-   Lab Results   Component Value Date    HGB 9.4 (L)  "03/22/2024    HCT 30.5 (L) 03/22/2024     Monitor serial CBC and transfuse if patient becomes hemodynamically unstable, symptomatic or H/H drops below 7/21.      ANAIS (acute kidney injury)  Resolved  Patient with acute kidney injury/acute renal failure likely due to pre-renal azotemia due to dehydration. Cr 1.2 on presentation. ANAIS is currently improving. Baseline creatinine  0.8  - Labs reviewed- Renal function/electrolytes with Estimated Creatinine Clearance: 51.1 mL/min (based on SCr of 0.9 mg/dL). according to latest data. Monitor urine output and serial BMP and adjust therapy as needed. Avoid nephrotoxins and renally dose meds for GFR listed above.    Sepsis  This patient does have evidence of infective focus  My overall impression is sepsis.  Source: Urinary Tract  Antibiotics given-   Antibiotics (72h ago, onward)      Start     Stop Route Frequency Ordered    03/19/24 1300  cefTRIAXone (ROCEPHIN) 2 g in dextrose 5 % in water (D5W) 100 mL IVPB (MB+)         -- IV Every 12 hours (non-standard times) 03/19/24 1145    03/17/24 1100  ampicillin (OMNIPEN) 2 g in sodium chloride 0.9 % 100 mL IVPB (MB+)         -- IV Every 4 hours (non-standard times) 03/17/24 0954          Latest lactate reviewed-  No results for input(s): "LACTATE", "POCLAC" in the last 72 hours.    Fluid challenge Ideal Body Weight- The patient's ideal body weight is Ideal body weight: 57 kg (125 lb 10.6 oz) which will be used to calculate fluid bolus of 30 ml/kg for treatment of septic shock.      Post- resuscitation assessment No - Post resuscitation assessment not needed     ANAIS    Will Not start Pressors- Levophed for MAP of 65  Source control achieved by: rocephin    Pyelonephritis  Acute, urine consistent with infection.   -  see above        Type 2 diabetes mellitus with neurologic complication, without long-term current use of insulin  Patient's FSGs are controlled on current medication regimen.  Last A1c reviewed-   Lab Results "   Component Value Date    HGBA1C 7.6 (H) 03/21/2024     Most recent fingerstick glucose reviewed-   Recent Labs   Lab 03/21/24  1153 03/21/24  1622 03/21/24  1942 03/22/24  0808   POCTGLUCOSE 157* 214* 233* 157*       Current correctional scale  Low  Maintain anti-hyperglycemic dose as follows-   Antihyperglycemics (From admission, onward)      Start     Stop Route Frequency Ordered    03/21/24 0715  insulin aspart U-100 pen 3 Units         -- SubQ 3 times daily with meals 03/20/24 2112 03/20/24 2115  insulin detemir U-100 (Levemir) pen 7 Units         -- SubQ 2 times daily 03/20/24 2111 03/16/24 2019  insulin aspart U-100 pen 0-5 Units         -- SubQ Before meals & nightly PRN 03/16/24 1920          Hold Oral hypoglycemics while patient is in the hospital.    GERD (gastroesophageal reflux disease)  Chronic, controlled, continue to monitor      HTN (hypertension), benign  Chronic, controlled. Latest blood pressure and vitals reviewed-     Temp:  [98.5 °F (36.9 °C)-98.9 °F (37.2 °C)]   Pulse:  [74-84]   Resp:  [18-19]   BP: (113-151)/(56-67)   SpO2:  [93 %-95 %] .   Home meds for hypertension were reviewed and noted below.   Hypertension Medications               carvediloL (COREG) 25 MG tablet TAKE 1 TABLET TWICE DAILY    lisinopriL (PRINIVIL,ZESTRIL) 40 MG tablet Take 1 tablet (40 mg total) by mouth once daily.            While in the hospital, will manage blood pressure as follows; Adjust home antihypertensive regimen as follows- held on presentation, restart as tolerated    Will utilize p.r.n. blood pressure medication only if patient's blood pressure greater than 180/110 and she develops symptoms such as worsening chest pain or shortness of breath.    Hypothyroidism  Chronic, controlled, continue home synthroid        VTE Risk Mitigation (From admission, onward)           Ordered     enoxaparin injection 40 mg  Daily         03/16/24 1920     IP VTE HIGH RISK PATIENT  Once         03/16/24 1920     Place  sequential compression device  Until discontinued         03/16/24 1920                    Discharge Planning   RAJESH: 3/23/2024     Code Status: Full Code   Is the patient medically ready for discharge?: No    Reason for patient still in hospital (select all that apply): Patient trending condition  Discharge Plan A: Home Health   Discharge Delays: (!) Procedure Scheduling (IR, OR, Labs, Echo, Cath, Echo, EEG)              Jaleesa Jaimes MD  Department of Hospital Medicine   [facility] - Stepdown Flex ([place])

## 2024-03-22 NOTE — PROGRESS NOTES
[facility] ([place])  Infectious Disease  Progress Note    Patient Name: Aminah Norton  MRN: [id number]  Admission Date: 3/16/2024  Length of Stay: 6 days  Attending Physician: Jaleesa Jaimes MD  Primary Care Provider: Jeison Sanchez MD    Isolation Status: No active isolations  Assessment/Plan:      ID  * Enterococcal bacteremia  78F with h/o DM, treated LTBI (6 months INH in 2018),  interstitial cystitis and urolithiasis s/p ureteroscopic stone extraction with lithotripsy and right ureteral stent placement (right) on 3/14 admitted 3/16 with chills, dizziness, flank pain, dysuria, freq, and a fall. Bcx 3/16, 3/17, 3/18 with e.faecalis in both sets. Repeat 3/19 NGTD. Fever defervesced, leukocytosis trending down. 2d echo without veg. Reports PCN allergy, but tolerating ampicillin.     Suspect bacteremia from  source; ureteral procedure likely contributed. Fortunately CARLITO negative for endocarditis. Unclear why her bcx are still positive. Worried we're missing a source of infection. Discussed with urology and they don't think ureteral stents are infected and want to hold off exchanging at this time. Awaiting clearance of cultures and results of NM scan.     Recommendations:   - continue ampicillin/ceftriaxone duration TBD but given high grade bacteremia, will need at least 4 weeks of iv abx followed by chronic supressive oral antibiotics until ureteral  stent is removed or replaced  - please wait for f/u bcx to be clear x 48-72h before placing picc  - f/u NM tagged WBC scan to eval for source of infection            Thank you for your consult. I will follow-up with patient. Please contact us if you have any additional questions.    Samina Velasquez MD  Infectious Disease  [facility]    Subjective:     Principal Problem:Enterococcal bacteremia    HPI: A 78-year-old woman with HTN, dm 2, hypothyroidism, interstitial cystitis, and status post  ureteroscopic stone extraction with lithotripsy and right ureteral stent placement on 03/01/2024 1 day prior to admission developed fever to 102.6 F with associated shaking chills, suprapubic tenderness, and dysuria.  This was also associated with hematuria, sensation of incomplete bladder emptying, as well as an inability to initiate micturition however the patient denies urgency, frequency, and back pain.  On evaluation in [facility] ED she was noted to be febrile to 101.3 F and tachycardic.  Laboratory workup revealed no evidence of leukocytosis.  Admission blood cultures are now positive for Gram-positive cocci in chains resembling strep with a rapid ID for Enterococcus faecalis.    Infectious disease is consulted for E faecalis bacteremia      Interval history: NAEO. Feeling better. Denies complaints      Past Surgical History:   Procedure Laterality Date    APPENDECTOMY      BLADDER FULGURATION N/A 3/1/2024    Procedure: ULCER INJECTION FULGURATION;  Surgeon: Brody Smith MD;  Location: [facility];  Service: Urology;  Laterality: N/A;    BLADDER SUSPENSION      CATARACT EXTRACTION      right eye     CHOLECYSTECTOMY      COLONOSCOPY N/A 7/20/2020    Procedure: COLONOSCOPY;  Surgeon: Juan Parker MD;  Location: [facility] (4TH FLR);  Service: Endoscopy;  Laterality: N/A;  Hx of chronic anemia.  patient needs a   4/6/20 - removed from 4/20/20, rescheduled 7/20/20 - pg  covid 7/17-[facility] 2nd floor-tb    COLONOSCOPY N/A 2/9/2023    Procedure: COLONOSCOPY;  Surgeon: Renan Sanchez MD;  Location: [facility] ENDO (4TH FLR);  Service: Colon and Rectal;  Laterality: N/A;  instr handed to patient, -ml    CYSTOSCOPY N/A 5/19/2021    Procedure: CYSTOSCOPY;  Surgeon: Brody Smith MD;  Location: [facility] OR 1ST FLR;  Service: Urology;  Laterality: N/A;    CYSTOURETEROSCOPY, WITH HOLMIUM LASER LITHOTRIPSY OF URETERAL CALCULUS AND STENT INSERTION Right 3/1/2024    Procedure: CYSTOURETEROSCOPY,  WITH HOLMIUM LASER LITHOTRIPSY OF URETERAL CALCULUS AND STENT INSERTION;  Surgeon: Brody Smith MD;  Location: [facility] OR;  Service: Urology;  Laterality: Right;    CYSTOURETEROSCOPY,WITH HOLMIUM LASER LITHOTRIPSY OF URETERAL CALCULUS Right 3/14/2024    Procedure: CYSTOURETEROSCOPY,WITH HOLMIUM LASER LITHOTRIPSY OF URETERAL CALCULUS;  Surgeon: Brody Smith MD;  Location: [facility] OR;  Service: Urology;  Laterality: Right;  1 HR    ECHOCARDIOGRAM,TRANSESOPHAGEAL N/A 3/20/2024    Procedure: Transesophageal echo (CARLITO) intra-procedure log documentation;  Surgeon: Provider, [facility];  Location: [facility] EP LAB;  Service: Cardiology;  Laterality: N/A;    EYE SURGERY Bilateral     cataract removal    INJECTION OF STEROID N/A 3/14/2024    Procedure: INJECTION, STEROID;  Surgeon: Brody Smith MD;  Location: [facility] OR;  Service: Urology;  Laterality: N/A;    PLACEMENT-STENT Right 3/14/2024    Procedure: PLACEMENT-STENT;  Surgeon: Brody Smith MD;  Location: [facility] OR;  Service: Urology;  Laterality: Right;    REMOVAL-STENT Right 3/14/2024    Procedure: REMOVAL-STENT;  Surgeon: Brody Smith MD;  Location: [facility] OR;  Service: Urology;  Laterality: Right;    TOTAL ABDOMINAL HYSTERECTOMY      DUB       Review of patient's allergies indicates:   Allergen Reactions    Bufferin [aspirin, buffered] Itching    Atorvastatin      Myalgias and arthralgias    Shellfish containing products Itching     Shellfish causes her to itch per her daughter, Caro.     Warfarin     Ibuprofen Itching     Itching of eyes, head       Medications:  Medications Prior to Admission   Medication Sig    ACCU-CHEK SOFTCLIX LANCETS Misc CHECK BLOOD SUGAR ONE TIME DAILY    amitriptyline (ELAVIL) 10 MG tablet Take 1 tablet (10 mg total) by mouth every evening.    BD ALCOHOL SWABS PadM USE AS DIRECTED TOPICALLY AS NEEDED    biotin 1 mg tablet Take 1,000 mcg by mouth 3 (three) times daily.    blood sugar diagnostic (ACCU-CHEK  MATTIE PLUS TEST STRP) Strp TEST ONE TIME DAILY    blood-glucose meter kit To check BG 2  times daily, to use with insurance preferred meter, use as directed.    carvediloL (COREG) 25 MG tablet TAKE 1 TABLET TWICE DAILY    cyanocobalamin (VITAMIN B-12) 1000 MCG tablet Take 100 mcg by mouth once daily.    gabapentin (NEURONTIN) 300 MG capsule Take 1 capsule (300 mg total) by mouth every evening.    glipiZIDE 5 MG TR24 Take 1 tablet (5 mg total) by mouth daily with breakfast.    [] HYDROcodone-acetaminophen (NORCO) 5-325 mg per tablet Take 1 tablet by mouth every 6 (six) hours as needed for Pain.    ketoconazole (NIZORAL) 2 % cream Apply topically once daily.    lancets (ACCU-CHEK MULTICLIX LANCET) Misc To use as directed with Accu Chek Sahra FastClix lancing device    lancets Misc To check BG 2 times daily, to use with insurance preferred meter.    levothyroxine (SYNTHROID) 112 MCG tablet Take 1 tablet (112 mcg total) by mouth before breakfast.    lisinopriL (PRINIVIL,ZESTRIL) 40 MG tablet Take 1 tablet (40 mg total) by mouth once daily.    metFORMIN (GLUCOPHAGE) 1000 MG tablet Take 1 tablet (1,000 mg total) by mouth 2 (two) times daily with meals.    mirabegron (MYRBETRIQ) 25 mg Tb24 ER tablet Take 1 tablet (25 mg total) by mouth once daily.    naproxen (NAPROSYN) 500 MG tablet Take 1 tablet (500 mg total) by mouth 2 (two) times daily with meals.    rosuvastatin (CRESTOR) 20 MG tablet Take 1 tablet (20 mg total) by mouth once daily.    SITagliptin phosphate (JANUVIA) 100 MG Tab Take 1 tablet (100 mg total) by mouth once daily.    turmeric root extract 500 mg Cap Take by mouth.    VITAMIN B COMPLEX ORAL Take 1 tablet by mouth.    vitamin D (VITAMIN D3) 1000 units Tab Take 1,000 Units by mouth once daily.     Antibiotics (From admission, onward)      Start     Stop Route Frequency Ordered    24 1300  cefTRIAXone (ROCEPHIN) 2 g in dextrose 5 % in water (D5W) 100 mL IVPB (MB+)         -- IV Every 12 hours  (non-standard times) 03/19/24 1145    03/17/24 1100  ampicillin (OMNIPEN) 2 g in sodium chloride 0.9 % 100 mL IVPB (MB+)         -- IV Every 4 hours (non-standard times) 03/17/24 0954          Antifungals (From admission, onward)      None          Antivirals (From admission, onward)      None             Immunization History   Administered Date(s) Administered    COVID-19, MRNA, LN-S, PF (Pfizer) ([place]) 03/09/2022    COVID-19, mRNA, LNP-S, bivalent booster, PF (PFIZER OMICRON) 10/12/2022    COVID-19, vector-nr, rS-Ad26, PF ([employer]) 03/05/2021    Influenza (FLUAD) - Quadrivalent - Adjuvanted - PF *Preferred* (65+) 12/10/2021, 10/10/2022, 11/09/2023    Influenza - High Dose - PF (65 years and older) 12/02/2015, 09/15/2017, 11/26/2018    Pneumococcal Conjugate - 13 Valent 11/26/2018    Pneumococcal Polysaccharide - 23 Valent 01/18/2023       Family History       Problem Relation (Age of Onset)    Breast cancer Mother    COPD Father    Cancer Mother, Sister, Daughter    Diabetes Son    Hypertension Sister, Sister, Daughter, Daughter    Ovarian cancer Mother, Sister    Thyroid disease Daughter, Daughter          Social History     Socioeconomic History    Marital status:    Tobacco Use    Smoking status: Never    Smokeless tobacco: Never   Substance and Sexual Activity    Alcohol use: No    Drug use: No    Sexual activity: Yes     Partners: Male     Birth control/protection: Post-menopausal     Comment:     Other Topics Concern    Are you pregnant or think you may be? No    Breast-feeding No   Social History Narrative     with 7 kids     Social Determinants of Health     Financial Resource Strain: Low Risk  (3/18/2024)    Overall Financial Resource Strain (CARDIA)     Difficulty of Paying Living Expenses: Not very hard   Food Insecurity: No Food Insecurity (3/18/2024)    Hunger Vital Sign     Worried About Running Out of Food in the Last Year: Never true     Ran Out of Food in the Last Year: Never  true   Transportation Needs: No Transportation Needs (3/18/2024)    PRAPARE - Transportation     Lack of Transportation (Medical): No     Lack of Transportation (Non-Medical): No   Physical Activity: Inactive (3/18/2024)    Exercise Vital Sign     Days of Exercise per Week: 0 days     Minutes of Exercise per Session: 0 min   Stress: No Stress Concern Present (3/18/2024)    [employer] [place] [employer] - Occupational Stress Questionnaire     Feeling of Stress : Only a little   Social Connections: Unknown (3/18/2024)    Social Connection and Isolation Panel [NHANES]     Frequency of Communication with Friends and Family: More than three times a week     Frequency of Social Gatherings with Friends and Family: Twice a week     Attends Buddhism Services: 1 to 4 times per year     Active Member of Clubs or Organizations: Yes     Attends Club or Organization Meetings: Never     Marital Status: Patient declined   Housing Stability: Unknown (3/18/2024)    Housing Stability Vital Sign     Unable to Pay for Housing in the Last Year: No     Unstable Housing in the Last Year: No     Review of Systems   Constitutional:  Negative for fatigue and unexpected weight change.   HENT:  Negative for ear pain, facial swelling, hearing loss, mouth sores, nosebleeds, rhinorrhea, sinus pressure, sore throat, tinnitus, trouble swallowing and voice change.    Eyes:  Negative for photophobia, pain, redness and visual disturbance.   Respiratory:  Negative for cough, chest tightness, shortness of breath and wheezing.    Cardiovascular:  Negative for chest pain, palpitations and leg swelling.   Gastrointestinal:  Negative for abdominal pain, blood in stool, constipation, diarrhea, nausea and vomiting.   Endocrine: Negative for cold intolerance, heat intolerance, polydipsia, polyphagia and polyuria.   Genitourinary:  Negative for flank pain, frequency, menstrual problem, vaginal bleeding, vaginal discharge and vaginal pain.    Musculoskeletal:  Negative for arthralgias, back pain, joint swelling, myalgias and neck pain.   Skin:  Negative for rash.   Allergic/Immunologic: Negative for environmental allergies, food allergies and immunocompromised state.   Neurological:  Negative for dizziness, seizures, syncope, weakness, light-headedness, numbness and headaches.   Hematological:  Negative for adenopathy. Does not bruise/bleed easily.   Psychiatric/Behavioral:  Negative for confusion, hallucinations, self-injury, sleep disturbance and suicidal ideas. The patient is not nervous/anxious.      Objective:     Vital Signs (Most Recent):  Temp: 98.5 °F (36.9 °C) (03/22/24 1200)  Pulse: 73 (03/22/24 1200)  Resp: 18 (03/22/24 1200)  BP: (!) 116/58 (03/22/24 1200)  SpO2: (!) 92 % (03/22/24 1253) Vital Signs (24h Range):  Temp:  [97.6 °F (36.4 °C)-98.9 °F (37.2 °C)] 98.5 °F (36.9 °C)  Pulse:  [73-88] 73  Resp:  [18] 18  SpO2:  [92 %-98 %] 92 %  BP: (113-151)/(56-67) 116/58     Weight: 71.4 kg (157 lb 6.5 oz)  Body mass index is 26.19 kg/m².    Estimated Creatinine Clearance: 51.1 mL/min (based on SCr of 0.9 mg/dL).     Physical Exam  Vitals and nursing note reviewed.   Constitutional:       Appearance: She is well-developed.   HENT:      Head: Normocephalic and atraumatic.      Right Ear: External ear normal.      Left Ear: External ear normal.   Eyes:      General: No scleral icterus.        Right eye: No discharge.         Left eye: No discharge.      Conjunctiva/sclera: Conjunctivae normal.   Cardiovascular:      Rate and Rhythm: Normal rate and regular rhythm.      Heart sounds: Normal heart sounds. No murmur heard.     No friction rub. No gallop.   Pulmonary:      Effort: Pulmonary effort is normal. No respiratory distress.      Breath sounds: Normal breath sounds. No stridor. No wheezing or rales.   Abdominal:      General: Bowel sounds are normal.      Tenderness: There is abdominal tenderness in the suprapubic area.   Musculoskeletal:          General: No tenderness. Normal range of motion.   Skin:     General: Skin is warm and dry.   Neurological:      Mental Status: She is alert and oriented to person, place, and time.          Significant Labs: CBC:   Recent Labs   Lab 03/21/24  0343 03/22/24  0338   WBC 5.47 4.31   HGB 8.6* 9.4*   HCT 27.4* 30.5*    225       CMP:   Recent Labs   Lab 03/21/24 0343 03/22/24  0338   * 135*   K 4.2 4.1    101   CO2 24 24   * 173*   BUN 11 10   CREATININE 0.9 0.9   CALCIUM 9.5 9.8   PROT 5.6* 6.3   ALBUMIN 2.5* 2.4*   BILITOT 0.2 0.2   ALKPHOS 76 78   AST 19 27   ALT 35 47*   ANIONGAP 10 10       Microbiology Results (last 7 days)       Procedure Component Value Units Date/Time    Blood culture [[id number]]  (Abnormal) Collected: 03/19/24 0825    Order Status: Completed Specimen: Blood Updated: 03/22/24 1142     Blood Culture, Routine Gram stain aer bottle: Gram positive cocci in chains resembling Strep      Positive results previously called 03/20/2024 00:00      ENTEROCOCCUS FAECALIS  For susceptibility see order #[id number]      Blood culture [[id number]]  (Abnormal) Collected: 03/19/24 0822    Order Status: Completed Specimen: Blood Updated: 03/22/24 1142     Blood Culture, Routine Gram stain aer bottle: Gram positive cocci in chains resembling Strep      Results called to and read back by: Jaimee Morillo RN. 03/20/2024  00:00      ENTEROCOCCUS FAECALIS  Susceptibility pending      Blood culture [[id number]]  (Abnormal) Collected: 03/20/24 0550    Order Status: Completed Specimen: Blood Updated: 03/22/24 0946     Blood Culture, Routine Gram stain aer bottle: Gram positive cocci in chains resembling Strep      Positive results previously called 03/21/2024  00:44      ENTEROCOCCUS SPECIES  Identification pending  For susceptibility see order # [id number]      Blood culture [[id number]] Collected: 03/20/24 0550    Order Status: Completed Specimen: Blood Updated: 03/22/24 0812     Blood Culture,  Routine No Growth to date      No Growth to date      No Growth to date    Blood culture [[id number]] Collected: 03/21/24 0343    Order Status: Completed Specimen: Blood Updated: 03/22/24 0613     Blood Culture, Routine No Growth to date      No Growth to date    Blood culture [[id number]] Collected: 03/21/24 0343    Order Status: Completed Specimen: Blood Updated: 03/22/24 0613     Blood Culture, Routine No Growth to date      No Growth to date    Blood culture [[id number]]  (Abnormal) Collected: 03/18/24 0538    Order Status: Completed Specimen: Blood Updated: 03/20/24 1016     Blood Culture, Routine Gram stain aer bottle: Gram positive cocci in chains resembling Strep      Positive results previously called 03/18/2024      ENTEROCOCCUS FAECALIS  For susceptibility see order #[id number]      Blood culture [[id number]]  (Abnormal) Collected: 03/18/24 0538    Order Status: Completed Specimen: Blood Updated: 03/20/24 1016     Blood Culture, Routine Gram stain aer bottle: Gram positive cocci in chains resembling Strep      Positive results previously called 03/18/2024      ENTEROCOCCUS FAECALIS  For susceptibility see order #[id number]      Blood culture [[id number]]  (Abnormal) Collected: 03/17/24 1300    Order Status: Completed Specimen: Blood Updated: 03/20/24 1016     Blood Culture, Routine Gram stain aer bottle: Gram positive cocci in chains resembling Strep      Positive results previously called 03/17/2024 05:37      ENTEROCOCCUS FAECALIS  For susceptibility see order #[id number]      Blood culture [[id number]]  (Abnormal) Collected: 03/17/24 1300    Order Status: Completed Specimen: Blood Updated: 03/20/24 1015     Blood Culture, Routine Gram stain aer bottle: Gram positive cocci in chains resembling Strep      Results called to and read back by: Emilie Dela Cruz RN. 03/18/2024  06:17      Gram stain vinay bottle: Gram positive cocci in chains resembling Strep      03/18/2024  11:21      ENTEROCOCCUS FAECALIS  For  susceptibility see order #[id number]      Blood culture x two cultures. Draw prior to antibiotics. [[id number]]  (Abnormal) Collected: 03/16/24 1420    Order Status: Completed Specimen: Blood from Wrist, Right Updated: 03/19/24 1054     Blood Culture, Routine Gram stain aer bottle: Gram positive cocci in chains resembling Strep      Positive results previously called 03/17/2024  06:25      ENTEROCOCCUS FAECALIS  For susceptibility see order #[id number]      Narrative:      Aerobic and anaerobic    Blood culture x two cultures. Draw prior to antibiotics. [[id number]]  (Abnormal)  (Susceptibility) Collected: 03/16/24 1410    Order Status: Completed Specimen: Blood from Peripheral, Forearm, Left Updated: 03/19/24 1053     Blood Culture, Routine Gram stain aer bottle: Gram positive cocci in chains resembling Strep      Gram stain vinay bottle: Gram positive cocci in chains resembling Strep      Results called to and read back by: Emilie Napoles RN 03/17/2024  05:37      ENTEROCOCCUS FAECALIS    Narrative:      Aerobic and anaerobic    Urine culture [[id number]]  (Abnormal)  (Susceptibility) Collected: 03/16/24 1359    Order Status: Completed Specimen: Urine Updated: 03/18/24 2120     Urine Culture, Routine ENTEROCOCCUS FAECALIS  >100,000 cfu/ml      Narrative:      Specimen Source->Urine    Rapid Organism ID by PCR (from Blood culture) [[id number]]  (Abnormal) Collected: 03/16/24 1410    Order Status: Completed Updated: 03/17/24 0636     Enterococcus faecalis Detected     Enterococcus faecium Not Detected     Listeria monocytogenes Not Detected     Staphylococcus spp. Not Detected     Staphylococcus aureus Not Detected     Staphylococcus epidermidis Not Detected     Staphylococcus lugdunensis Not Detected     Streptococcus species Not Detected     Streptococcus agalactiae Not Detected     Streptococcus pneumoniae Not Detected     Streptococcus pyogenes Not Detected     Acinetobacter calcoaceticus/baumannii complex Not  Detected     Bacteroides fragilis Not Detected     Enterobacterales Not Detected     Enterobacter cloacae complex Not Detected     Escherichia coli Not Detected     Klebsiella aerogenes Not Detected     Klebsiella oxytoca Not Detected     Klebsiella pneumoniae group Not Detected     Proteus Not Detected     Salmonella sp Not Detected     Serratia marcescens Not Detected     Haemophilus influenzae Not Detected     Neisseria meningtidis Not Detected     Pseudomonas aeruginosa Not Detected     Stenotrophomonas maltophilia Not Detected     Candida albicans Not Detected     Candida auris Not Detected     Candida glabrata Not Detected     Candida krusei Not Detected     Candida parapsilosis Not Detected     Candida tropicalis Not Detected     Cryptococcus neoformans/gattii Not Detected     CTX-M (ESBL ) Test Not Applicable     IMP (Carbapenem resistant) Test Not Applicable     KPC resistance gene (Carbapenem resistant) Test Not Applicable     mcr-1  Test Not Applicable     mec A/C  Test Not Applicable     mec A/C and MREJ (MRSA) gene Test Not Applicable     NDM (Carbapenem resistant) Test Not Applicable     OXA-48-like (Carbapenem resistant) Test Not Applicable     van A/B (VRE gene) Not Detected     VIM (Carbapenem resistant) Test Not Applicable    Narrative:      Aerobic and anaerobic          Urine Studies:   Recent Labs   Lab 03/16/24  1359   COLORU Yellow   APPEARANCEUA Hazy*   PHUR 6.0   SPECGRAV 1.020   PROTEINUA 1+*   GLUCUA 3+*   KETONESU Negative   BILIRUBINUA Negative   OCCULTUA 2+*   NITRITE Negative   LEUKOCYTESUR 2+*   RBCUA 14*   WBCUA 37*   BACTERIA Occasional   SQUAMEPITHEL 1   HYALINECASTS 0         Significant Imaging: I have reviewed all pertinent imaging results/findings within the past 24 hours.

## 2024-03-22 NOTE — SUBJECTIVE & OBJECTIVE
Interval History: 3/22: Eating breakfast during rounds today. Only complaint is rash on sacral area. Discussed with patient staying over weekend.     Review of Systems   Constitutional:  Positive for chills and fever. Negative for fatigue and unexpected weight change.   HENT:  Negative for ear pain, facial swelling, hearing loss, mouth sores, nosebleeds, rhinorrhea, sinus pressure, sore throat, tinnitus, trouble swallowing and voice change.    Eyes:  Negative for photophobia, pain, redness and visual disturbance.   Respiratory:  Negative for cough, chest tightness, shortness of breath and wheezing.    Cardiovascular:  Negative for chest pain, palpitations and leg swelling.   Gastrointestinal:  Negative for abdominal pain, blood in stool, constipation, diarrhea, nausea and vomiting.   Endocrine: Negative for cold intolerance, heat intolerance, polydipsia, polyphagia and polyuria.   Genitourinary:  Positive for difficulty urinating, dysuria, hematuria and urgency. Negative for flank pain, frequency, menstrual problem, vaginal bleeding, vaginal discharge and vaginal pain.   Musculoskeletal:  Negative for arthralgias, back pain, joint swelling, myalgias and neck pain.   Skin:  Negative for rash.   Allergic/Immunologic: Negative for environmental allergies, food allergies and immunocompromised state.   Neurological:  Negative for dizziness, seizures, syncope, weakness, light-headedness, numbness and headaches.   Hematological:  Negative for adenopathy. Does not bruise/bleed easily.   Psychiatric/Behavioral:  Negative for confusion, hallucinations, self-injury, sleep disturbance and suicidal ideas. The patient is not nervous/anxious.      Objective:     Vital Signs (Most Recent):  Temp: 98.9 °F (37.2 °C) (03/22/24 0333)  Pulse: 74 (03/22/24 0333)  Resp: 18 (03/22/24 0333)  BP: (!) 113/56 (03/22/24 0333)  SpO2: (!) 94 % (03/22/24 0333) Vital Signs (24h Range):  Temp:  [98.5 °F (36.9 °C)-98.9 °F (37.2 °C)] 98.9 °F (37.2  °C)  Pulse:  [74-84] 74  Resp:  [18-19] 18  SpO2:  [93 %-95 %] 94 %  BP: (113-151)/(56-67) 113/56     Weight: 71.4 kg (157 lb 6.5 oz)  Body mass index is 26.19 kg/m².    Intake/Output Summary (Last 24 hours) at 3/22/2024 0917  Last data filed at 3/22/2024 0614  Gross per 24 hour   Intake 560 ml   Output 1550 ml   Net -990 ml         Physical Exam  Vitals and nursing note reviewed.   Constitutional:       Appearance: She is well-developed.   HENT:      Head: Normocephalic and atraumatic.      Right Ear: External ear normal.      Left Ear: External ear normal.   Eyes:      General: No scleral icterus.        Right eye: No discharge.         Left eye: No discharge.      Conjunctiva/sclera: Conjunctivae normal.   Cardiovascular:      Rate and Rhythm: Normal rate and regular rhythm.      Heart sounds: Normal heart sounds. No murmur heard.     No friction rub. No gallop.   Pulmonary:      Effort: Pulmonary effort is normal. No respiratory distress.      Breath sounds: Normal breath sounds. No stridor. No wheezing or rales.   Abdominal:      General: Bowel sounds are normal.      Tenderness: There is abdominal tenderness in the suprapubic area.   Musculoskeletal:         General: No tenderness. Normal range of motion.   Skin:     General: Skin is warm and dry.   Neurological:      Mental Status: She is alert and oriented to person, place, and time.             Significant Labs: All pertinent labs within the past 24 hours have been reviewed.    Significant Imaging: I have reviewed all pertinent imaging results/findings within the past 24 hours.

## 2024-03-23 LAB
ALBUMIN SERPL BCP-MCNC: 2.5 G/DL (ref 3.5–5.2)
ALP SERPL-CCNC: 82 U/L (ref 55–135)
ALT SERPL W/O P-5'-P-CCNC: 40 U/L (ref 10–44)
ANION GAP SERPL CALC-SCNC: 11 MMOL/L (ref 8–16)
AST SERPL-CCNC: 17 U/L (ref 10–40)
BACTERIA BLD CULT: ABNORMAL
BASOPHILS # BLD AUTO: 0.02 K/UL (ref 0–0.2)
BASOPHILS NFR BLD: 0.4 % (ref 0–1.9)
BILIRUB SERPL-MCNC: 0.2 MG/DL (ref 0.1–1)
BUN SERPL-MCNC: 9 MG/DL (ref 8–23)
CALCIUM SERPL-MCNC: 8.8 MG/DL (ref 8.7–10.5)
CHLORIDE SERPL-SCNC: 103 MMOL/L (ref 95–110)
CO2 SERPL-SCNC: 22 MMOL/L (ref 23–29)
CREAT SERPL-MCNC: 0.9 MG/DL (ref 0.5–1.4)
DIFFERENTIAL METHOD BLD: ABNORMAL
EOSINOPHIL # BLD AUTO: 0.2 K/UL (ref 0–0.5)
EOSINOPHIL NFR BLD: 3.4 % (ref 0–8)
ERYTHROCYTE [DISTWIDTH] IN BLOOD BY AUTOMATED COUNT: 18.1 % (ref 11.5–14.5)
EST. GFR  (NO RACE VARIABLE): >60 ML/MIN/1.73 M^2
GLUCOSE SERPL-MCNC: 146 MG/DL (ref 70–110)
HCT VFR BLD AUTO: 27.4 % (ref 37–48.5)
HGB BLD-MCNC: 8.5 G/DL (ref 12–16)
IMM GRANULOCYTES # BLD AUTO: 0.06 K/UL (ref 0–0.04)
IMM GRANULOCYTES NFR BLD AUTO: 1.2 % (ref 0–0.5)
LYMPHOCYTES # BLD AUTO: 0.9 K/UL (ref 1–4.8)
LYMPHOCYTES NFR BLD: 18.3 % (ref 18–48)
MCH RBC QN AUTO: 22.4 PG (ref 27–31)
MCHC RBC AUTO-ENTMCNC: 31 G/DL (ref 32–36)
MCV RBC AUTO: 72 FL (ref 82–98)
MONOCYTES # BLD AUTO: 0.5 K/UL (ref 0.3–1)
MONOCYTES NFR BLD: 10.3 % (ref 4–15)
NEUTROPHILS # BLD AUTO: 3.3 K/UL (ref 1.8–7.7)
NEUTROPHILS NFR BLD: 66.4 % (ref 38–73)
NRBC BLD-RTO: 0 /100 WBC
PLATELET # BLD AUTO: 256 K/UL (ref 150–450)
PMV BLD AUTO: 9.4 FL (ref 9.2–12.9)
POCT GLUCOSE: 157 MG/DL (ref 70–110)
POCT GLUCOSE: 201 MG/DL (ref 70–110)
POCT GLUCOSE: 205 MG/DL (ref 70–110)
POCT GLUCOSE: 212 MG/DL (ref 70–110)
POTASSIUM SERPL-SCNC: 4.3 MMOL/L (ref 3.5–5.1)
PROT SERPL-MCNC: 5.8 G/DL (ref 6–8.4)
RBC # BLD AUTO: 3.8 M/UL (ref 4–5.4)
SODIUM SERPL-SCNC: 136 MMOL/L (ref 136–145)
WBC # BLD AUTO: 4.97 K/UL (ref 3.9–12.7)

## 2024-03-23 PROCEDURE — 25000003 PHARM REV CODE 250: Performed by: INTERNAL MEDICINE

## 2024-03-23 PROCEDURE — 25000003 PHARM REV CODE 250: Performed by: STUDENT IN AN ORGANIZED HEALTH CARE EDUCATION/TRAINING PROGRAM

## 2024-03-23 PROCEDURE — 63600175 PHARM REV CODE 636 W HCPCS: Performed by: INTERNAL MEDICINE

## 2024-03-23 PROCEDURE — 85025 COMPLETE CBC W/AUTO DIFF WBC: CPT | Performed by: STUDENT IN AN ORGANIZED HEALTH CARE EDUCATION/TRAINING PROGRAM

## 2024-03-23 PROCEDURE — 36415 COLL VENOUS BLD VENIPUNCTURE: CPT | Performed by: STUDENT IN AN ORGANIZED HEALTH CARE EDUCATION/TRAINING PROGRAM

## 2024-03-23 PROCEDURE — 80053 COMPREHEN METABOLIC PANEL: CPT | Performed by: STUDENT IN AN ORGANIZED HEALTH CARE EDUCATION/TRAINING PROGRAM

## 2024-03-23 PROCEDURE — 20600001 HC STEP DOWN PRIVATE ROOM

## 2024-03-23 RX ADMIN — AMPICILLIN 2 G: 2 INJECTION, POWDER, FOR SOLUTION INTRAMUSCULAR; INTRAVENOUS at 09:03

## 2024-03-23 RX ADMIN — GABAPENTIN 300 MG: 300 CAPSULE ORAL at 09:03

## 2024-03-23 RX ADMIN — AMPICILLIN 2 G: 2 INJECTION, POWDER, FOR SOLUTION INTRAMUSCULAR; INTRAVENOUS at 11:03

## 2024-03-23 RX ADMIN — AMPICILLIN 2 G: 2 INJECTION, POWDER, FOR SOLUTION INTRAMUSCULAR; INTRAVENOUS at 05:03

## 2024-03-23 RX ADMIN — TRIAMCINOLONE ACETONIDE: 5 CREAM TOPICAL at 09:03

## 2024-03-23 RX ADMIN — CARVEDILOL 6.25 MG: 6.25 TABLET, FILM COATED ORAL at 10:03

## 2024-03-23 RX ADMIN — CEFTRIAXONE 2 G: 2 INJECTION, POWDER, FOR SOLUTION INTRAMUSCULAR; INTRAVENOUS at 12:03

## 2024-03-23 RX ADMIN — INSULIN DETEMIR 7 UNITS: 100 INJECTION, SOLUTION SUBCUTANEOUS at 09:03

## 2024-03-23 RX ADMIN — INSULIN ASPART 2 UNITS: 100 INJECTION, SOLUTION INTRAVENOUS; SUBCUTANEOUS at 06:03

## 2024-03-23 RX ADMIN — AMPICILLIN 2 G: 2 INJECTION, POWDER, FOR SOLUTION INTRAMUSCULAR; INTRAVENOUS at 01:03

## 2024-03-23 RX ADMIN — INSULIN ASPART 2 UNITS: 100 INJECTION, SOLUTION INTRAVENOUS; SUBCUTANEOUS at 12:03

## 2024-03-23 RX ADMIN — INSULIN ASPART 3 UNITS: 100 INJECTION, SOLUTION INTRAVENOUS; SUBCUTANEOUS at 09:03

## 2024-03-23 RX ADMIN — LISINOPRIL 40 MG: 20 TABLET ORAL at 10:03

## 2024-03-23 RX ADMIN — INSULIN ASPART 3 UNITS: 100 INJECTION, SOLUTION INTRAVENOUS; SUBCUTANEOUS at 12:03

## 2024-03-23 RX ADMIN — CARVEDILOL 6.25 MG: 6.25 TABLET, FILM COATED ORAL at 09:03

## 2024-03-23 RX ADMIN — HYDROCODONE BITARTRATE AND ACETAMINOPHEN 1 TABLET: 5; 325 TABLET ORAL at 03:03

## 2024-03-23 RX ADMIN — INSULIN ASPART 1 UNITS: 100 INJECTION, SOLUTION INTRAVENOUS; SUBCUTANEOUS at 09:03

## 2024-03-23 RX ADMIN — TRIAMCINOLONE ACETONIDE: 5 CREAM TOPICAL at 10:03

## 2024-03-23 RX ADMIN — CEFTRIAXONE 2 G: 2 INJECTION, POWDER, FOR SOLUTION INTRAMUSCULAR; INTRAVENOUS at 02:03

## 2024-03-23 RX ADMIN — ENOXAPARIN SODIUM 40 MG: 40 INJECTION SUBCUTANEOUS at 05:03

## 2024-03-23 RX ADMIN — AMITRIPTYLINE HYDROCHLORIDE 10 MG: 10 TABLET, FILM COATED ORAL at 09:03

## 2024-03-23 RX ADMIN — LEVOTHYROXINE SODIUM 112 MCG: 112 TABLET ORAL at 05:03

## 2024-03-23 RX ADMIN — INSULIN ASPART 3 UNITS: 100 INJECTION, SOLUTION INTRAVENOUS; SUBCUTANEOUS at 06:03

## 2024-03-23 NOTE — PLAN OF CARE
Problem: Adult Inpatient Plan of Care  Goal: Plan of Care Review  Outcome: Ongoing, Progressing  Goal: Patient-Specific Goal (Individualized)  Outcome: Ongoing, Progressing  Goal: Absence of Hospital-Acquired Illness or Injury  Outcome: Ongoing, Progressing  Goal: Optimal Comfort and Wellbeing  Outcome: Ongoing, Progressing  Goal: Readiness for Transition of Care  Outcome: Ongoing, Progressing     Problem: Infection  Goal: Absence of Infection Signs and Symptoms  Outcome: Ongoing, Progressing     Problem: Diabetes Comorbidity  Goal: Blood Glucose Level Within Targeted Range  Outcome: Ongoing, Progressing     Problem: Adjustment to Illness (Sepsis/Septic Shock)  Goal: Optimal Coping  Outcome: Ongoing, Progressing     Problem: Bleeding (Sepsis/Septic Shock)  Goal: Absence of Bleeding  Outcome: Ongoing, Progressing     Problem: Glycemic Control Impaired (Sepsis/Septic Shock)  Goal: Blood Glucose Level Within Desired Range  Outcome: Ongoing, Progressing     Problem: Infection Progression (Sepsis/Septic Shock)  Goal: Absence of Infection Signs and Symptoms  Outcome: Ongoing, Progressing     Problem: Nutrition Impaired (Sepsis/Septic Shock)  Goal: Optimal Nutrition Intake  Outcome: Ongoing, Progressing     Problem: Fluid and Electrolyte Imbalance (Acute Kidney Injury/Impairment)  Goal: Fluid and Electrolyte Balance  Outcome: Ongoing, Progressing     Problem: Oral Intake Inadequate (Acute Kidney Injury/Impairment)  Goal: Optimal Nutrition Intake  Outcome: Ongoing, Progressing     Problem: Renal Function Impairment (Acute Kidney Injury/Impairment)  Goal: Effective Renal Function  Outcome: Ongoing, Progressing     Problem: Pain Acute  Goal: Acceptable Pain Control and Functional Ability  Outcome: Ongoing, Progressing     Problem: Skin Injury Risk Increased  Goal: Skin Health and Integrity  Outcome: Ongoing, Progressing

## 2024-03-23 NOTE — SUBJECTIVE & OBJECTIVE
Interval history: NAEO. Feeling better. C/o itching at hip, but denies pain. Denies issues ambulating to commode      Past Surgical History:   Procedure Laterality Date    APPENDECTOMY      BLADDER FULGURATION N/A 3/1/2024    Procedure: ULCER INJECTION FULGURATION;  Surgeon: Brody Smith MD;  Location: [facility] OR;  Service: Urology;  Laterality: N/A;    BLADDER SUSPENSION      CATARACT EXTRACTION      right eye     CHOLECYSTECTOMY      COLONOSCOPY N/A 7/20/2020    Procedure: COLONOSCOPY;  Surgeon: Juan Parker MD;  Location: [facility] ENDO (4TH FLR);  Service: Endoscopy;  Laterality: N/A;  Hx of chronic anemia.  patient needs a   4/6/20 - removed from 4/20/20, rescheduled 7/20/20 - pg  [place] [place] [facility]    COLONOSCOPY N/A 2/9/2023    Procedure: COLONOSCOPY;  Surgeon: Renan Sanchez MD;  Location: [facility] ENDO (4TH FLR);  Service: Colon and Rectal;  Laterality: N/A;  instr handed to patient, -ml    CYSTOSCOPY N/A 5/19/2021    Procedure: CYSTOSCOPY;  Surgeon: Brody Smith MD;  Location: [facility] 1ST FLR;  Service: Urology;  Laterality: N/A;    CYSTOURETEROSCOPY, WITH HOLMIUM LASER LITHOTRIPSY OF URETERAL CALCULUS AND STENT INSERTION Right 3/1/2024    Procedure: CYSTOURETEROSCOPY, WITH HOLMIUM LASER LITHOTRIPSY OF URETERAL CALCULUS AND STENT INSERTION;  Surgeon: Brody Smith MD;  Location: [facility] OR;  Service: Urology;  Laterality: Right;    CYSTOURETEROSCOPY,WITH HOLMIUM LASER LITHOTRIPSY OF URETERAL CALCULUS Right 3/14/2024    Procedure: CYSTOURETEROSCOPY,WITH HOLMIUM LASER LITHOTRIPSY OF URETERAL CALCULUS;  Surgeon: Brody Smith MD;  Location: [facility] OR;  Service: Urology;  Laterality: Right;  1 HR    ECHOCARDIOGRAM,TRANSESOPHAGEAL N/A 3/20/2024    Procedure: Transesophageal echo (CARLITO) intra-procedure log documentation;  Surgeon: Provider, Dosc [facility];  Location: [facility] EP LAB;  Service: Cardiology;  Laterality: N/A;    EYE SURGERY Bilateral      cataract removal    INJECTION OF STEROID N/A 3/14/2024    Procedure: INJECTION, STEROID;  Surgeon: Brody Smith MD;  Location: [facility] OR;  Service: Urology;  Laterality: N/A;    PLACEMENT-STENT Right 3/14/2024    Procedure: PLACEMENT-STENT;  Surgeon: Brody Smith MD;  Location: [facility] OR;  Service: Urology;  Laterality: Right;    REMOVAL-STENT Right 3/14/2024    Procedure: REMOVAL-STENT;  Surgeon: Brody Smith MD;  Location: [facility] OR;  Service: Urology;  Laterality: Right;    TOTAL ABDOMINAL HYSTERECTOMY      DUB       Review of patient's allergies indicates:   Allergen Reactions    Bufferin [aspirin, buffered] Itching    Atorvastatin      Myalgias and arthralgias    Shellfish containing products Itching     Shellfish causes her to itch per her daughter, Caro.     Warfarin     Ibuprofen Itching     Itching of eyes, head       Medications:  Medications Prior to Admission   Medication Sig    ACCU-CHEK SOFTCLIX LANCETS Misc CHECK BLOOD SUGAR ONE TIME DAILY    amitriptyline (ELAVIL) 10 MG tablet Take 1 tablet (10 mg total) by mouth every evening.    BD ALCOHOL SWABS PadM USE AS DIRECTED TOPICALLY AS NEEDED    biotin 1 mg tablet Take 1,000 mcg by mouth 3 (three) times daily.    blood sugar diagnostic (ACCU-CHEK MATTIE PLUS TEST STRP) Strp TEST ONE TIME DAILY    blood-glucose meter kit To check BG 2  times daily, to use with insurance preferred meter, use as directed.    carvediloL (COREG) 25 MG tablet TAKE 1 TABLET TWICE DAILY    cyanocobalamin (VITAMIN B-12) 1000 MCG tablet Take 100 mcg by mouth once daily.    gabapentin (NEURONTIN) 300 MG capsule Take 1 capsule (300 mg total) by mouth every evening.    glipiZIDE 5 MG TR24 Take 1 tablet (5 mg total) by mouth daily with breakfast.    [] HYDROcodone-acetaminophen (NORCO) 5-325 mg per tablet Take 1 tablet by mouth every 6 (six) hours as needed for Pain.    ketoconazole (NIZORAL) 2 % cream Apply topically once daily.    lancets (ACCU-CHEK  MULTICLIX LANCET) Misc To use as directed with Accu Chek Sahra FastClix lancing device    lancets Misc To check BG 2 times daily, to use with insurance preferred meter.    levothyroxine (SYNTHROID) 112 MCG tablet Take 1 tablet (112 mcg total) by mouth before breakfast.    lisinopriL (PRINIVIL,ZESTRIL) 40 MG tablet Take 1 tablet (40 mg total) by mouth once daily.    metFORMIN (GLUCOPHAGE) 1000 MG tablet Take 1 tablet (1,000 mg total) by mouth 2 (two) times daily with meals.    mirabegron (MYRBETRIQ) 25 mg Tb24 ER tablet Take 1 tablet (25 mg total) by mouth once daily.    naproxen (NAPROSYN) 500 MG tablet Take 1 tablet (500 mg total) by mouth 2 (two) times daily with meals.    rosuvastatin (CRESTOR) 20 MG tablet Take 1 tablet (20 mg total) by mouth once daily.    SITagliptin phosphate (JANUVIA) 100 MG Tab Take 1 tablet (100 mg total) by mouth once daily.    turmeric root extract 500 mg Cap Take by mouth.    VITAMIN B COMPLEX ORAL Take 1 tablet by mouth.    vitamin D (VITAMIN D3) 1000 units Tab Take 1,000 Units by mouth once daily.     Antibiotics (From admission, onward)      Start     Stop Route Frequency Ordered    03/19/24 1300  cefTRIAXone (ROCEPHIN) 2 g in dextrose 5 % in water (D5W) 100 mL IVPB (MB+)         -- IV Every 12 hours (non-standard times) 03/19/24 1145    03/17/24 1100  ampicillin (OMNIPEN) 2 g in sodium chloride 0.9 % 100 mL IVPB (MB+)         -- IV Every 4 hours (non-standard times) 03/17/24 0954          Antifungals (From admission, onward)      None          Antivirals (From admission, onward)      None             Immunization History   Administered Date(s) Administered    COVID-19, MRNA, LN-S, PF (Pfizer) (Gray Cap) 03/09/2022    COVID-19, mRNA, LNP-S, bivalent booster, PF (PFIZER OMICRON) 10/12/2022    COVID-19, vector-nr, rS-Ad26, PF (Katelin) 03/05/2021    Influenza (FLUAD) - Quadrivalent - Adjuvanted - PF *Preferred* (65+) 12/10/2021, 10/10/2022, 11/09/2023    Influenza - High Dose - PF (65  years and older) 12/02/2015, 09/15/2017, 11/26/2018    Pneumococcal Conjugate - 13 Valent 11/26/2018    Pneumococcal Polysaccharide - 23 Valent 01/18/2023       Family History       Problem Relation (Age of Onset)    Breast cancer Mother    COPD Father    Cancer Mother, Sister, Daughter    Diabetes Son    Hypertension Sister, Sister, Daughter, Daughter    Ovarian cancer Mother, Sister    Thyroid disease Daughter, Daughter          Social History     Socioeconomic History    Marital status:    Tobacco Use    Smoking status: Never    Smokeless tobacco: Never   Substance and Sexual Activity    Alcohol use: No    Drug use: No    Sexual activity: Yes     Partners: Male     Birth control/protection: Post-menopausal     Comment:     Other Topics Concern    Are you pregnant or think you may be? No    Breast-feeding No   Social History Narrative     with 7 kids     Social Determinants of Health     Financial Resource Strain: Low Risk  (3/18/2024)    Overall Financial Resource Strain (CARDIA)     Difficulty of Paying Living Expenses: Not very hard   Food Insecurity: No Food Insecurity (3/18/2024)    Hunger Vital Sign     Worried About Running Out of Food in the Last Year: Never true     Ran Out of Food in the Last Year: Never true   Transportation Needs: No Transportation Needs (3/18/2024)    PRAPARE - Transportation     Lack of Transportation (Medical): No     Lack of Transportation (Non-Medical): No   Physical Activity: Inactive (3/18/2024)    Exercise Vital Sign     Days of Exercise per Week: 0 days     Minutes of Exercise per Session: 0 min   Stress: No Stress Concern Present (3/18/2024)    [employer] - Occupational Stress Questionnaire     Feeling of Stress : Only a little   Social Connections: Unknown (3/18/2024)    Social Connection and Isolation Panel [NHANES]     Frequency of Communication with Friends and Family: More than three times a week     Frequency of Social  Gatherings with Friends and Family: Twice a week     Attends [employer] Services: 1 to 4 times per year     Active Member of Clubs or Organizations: Yes     Attends Club or Organization Meetings: Never     Marital Status: Patient declined   Housing Stability: Unknown (3/18/2024)    Housing Stability Vital Sign     Unable to Pay for Housing in the Last Year: No     Unstable Housing in the Last Year: No     Review of Systems   Constitutional:  Negative for fatigue and unexpected weight change.   HENT:  Negative for ear pain, facial swelling, hearing loss, mouth sores, nosebleeds, rhinorrhea, sinus pressure, sore throat, tinnitus, trouble swallowing and voice change.    Eyes:  Negative for photophobia, pain, redness and visual disturbance.   Respiratory:  Negative for cough, chest tightness, shortness of breath and wheezing.    Cardiovascular:  Negative for chest pain, palpitations and leg swelling.   Gastrointestinal:  Negative for abdominal pain, blood in stool, constipation, diarrhea, nausea and vomiting.   Endocrine: Negative for cold intolerance, heat intolerance, polydipsia, polyphagia and polyuria.   Genitourinary:  Negative for flank pain, frequency, menstrual problem, vaginal bleeding, vaginal discharge and vaginal pain.   Musculoskeletal:  Negative for arthralgias, back pain, joint swelling, myalgias and neck pain.   Skin:  Negative for rash.   Allergic/Immunologic: Negative for environmental allergies, food allergies and immunocompromised state.   Neurological:  Negative for dizziness, seizures, syncope, weakness, light-headedness, numbness and headaches.   Hematological:  Negative for adenopathy. Does not bruise/bleed easily.   Psychiatric/Behavioral:  Negative for confusion, hallucinations, self-injury, sleep disturbance and suicidal ideas. The patient is not nervous/anxious.      Objective:     Vital Signs (Most Recent):  Temp: 98.1 °F (36.7 °C) (03/23/24 1613)  Pulse: 70 (03/23/24 1613)  Resp: 20  (03/23/24 1613)  BP: (!) 137/58 (03/23/24 1613)  SpO2: 97 % (03/23/24 1613) Vital Signs (24h Range):  Temp:  [98 °F (36.7 °C)-98.6 °F (37 °C)] 98.1 °F (36.7 °C)  Pulse:  [70-88] 70  Resp:  [16-20] 20  SpO2:  [93 %-97 %] 97 %  BP: (124-149)/(58-68) 137/58     Weight: 72.7 kg (160 lb 4.4 oz)  Body mass index is 26.67 kg/m².    Estimated Creatinine Clearance: 51.5 mL/min (based on SCr of 0.9 mg/dL).     Physical Exam  Vitals and nursing note reviewed.   Constitutional:       Appearance: She is well-developed.   HENT:      Head: Normocephalic and atraumatic.      Right Ear: External ear normal.      Left Ear: External ear normal.   Eyes:      General: No scleral icterus.        Right eye: No discharge.         Left eye: No discharge.      Conjunctiva/sclera: Conjunctivae normal.   Cardiovascular:      Rate and Rhythm: Normal rate and regular rhythm.      Heart sounds: Normal heart sounds. No murmur heard.     No friction rub. No gallop.   Pulmonary:      Effort: Pulmonary effort is normal. No respiratory distress.      Breath sounds: Normal breath sounds. No stridor. No wheezing or rales.   Abdominal:      General: Bowel sounds are normal.      Tenderness: There is abdominal tenderness in the suprapubic area.   Musculoskeletal:         General: No tenderness. Normal range of motion.   Skin:     General: Skin is warm and dry.   Neurological:      Mental Status: She is alert and oriented to person, place, and time.          Significant Labs: CBC:   Recent Labs   Lab 03/22/24  0338 03/23/24  0503   WBC 4.31 4.97   HGB 9.4* 8.5*   HCT 30.5* 27.4*    256       CMP:   Recent Labs   Lab 03/22/24  0338 03/23/24  0503   * 136   K 4.1 4.3    103   CO2 24 22*   * 146*   BUN 10 9   CREATININE 0.9 0.9   CALCIUM 9.8 8.8   PROT 6.3 5.8*   ALBUMIN 2.4* 2.5*   BILITOT 0.2 0.2   ALKPHOS 78 82   AST 27 17   ALT 47* 40   ANIONGAP 10 11       Microbiology Results (last 7 days)       Procedure Component Value Units  Date/Time    Blood culture [[id number]]  (Abnormal) Collected: 03/20/24 0550    Order Status: Completed Specimen: Blood Updated: 03/23/24 1244     Blood Culture, Routine Gram stain aer bottle: Gram positive cocci in chains resembling Strep      Positive results previously called 03/21/2024  00:44      ENTEROCOCCUS FAECALIS  For susceptibility see order # [id number]      Blood culture [[id number]] Collected: 03/20/24 0550    Order Status: Completed Specimen: Blood Updated: 03/23/24 0812     Blood Culture, Routine No Growth to date      No Growth to date      No Growth to date      No Growth to date    Blood culture [[id number]] Collected: 03/21/24 0343    Order Status: Completed Specimen: Blood Updated: 03/23/24 0613     Blood Culture, Routine No Growth to date      No Growth to date      No Growth to date    Blood culture [[id number]] Collected: 03/21/24 0343    Order Status: Completed Specimen: Blood Updated: 03/23/24 0612     Blood Culture, Routine No Growth to date      No Growth to date      No Growth to date    Blood culture [[id number]]  (Abnormal) Collected: 03/19/24 0825    Order Status: Completed Specimen: Blood Updated: 03/22/24 1508     Blood Culture, Routine Gram stain aer bottle: Gram positive cocci in chains resembling Strep      Positive results previously called 03/20/2024 00:00      ENTEROCOCCUS FAECALIS  For susceptibility see order #[id number]      Blood culture [[id number]]  (Abnormal)  (Susceptibility) Collected: 03/19/24 0822    Order Status: Completed Specimen: Blood Updated: 03/22/24 1507     Blood Culture, Routine Gram stain aer bottle: Gram positive cocci in chains resembling Strep      Results called to and read back by: Jaimee Morillo RN. 03/20/2024  00:00      ENTEROCOCCUS FAECALIS    Blood culture [[id number]]  (Abnormal) Collected: 03/18/24 0538    Order Status: Completed Specimen: Blood Updated: 03/20/24 1016     Blood Culture, Routine Gram stain aer bottle: Gram positive cocci in  chains resembling Strep      Positive results previously called 03/18/2024      ENTEROCOCCUS FAECALIS  For susceptibility see order #[id number]      Blood culture [[id number]]  (Abnormal) Collected: 03/18/24 0538    Order Status: Completed Specimen: Blood Updated: 03/20/24 1016     Blood Culture, Routine Gram stain aer bottle: Gram positive cocci in chains resembling Strep      Positive results previously called 03/18/2024      ENTEROCOCCUS FAECALIS  For susceptibility see order #[id number]      Blood culture [[id number]]  (Abnormal) Collected: 03/17/24 1300    Order Status: Completed Specimen: Blood Updated: 03/20/24 1016     Blood Culture, Routine Gram stain aer bottle: Gram positive cocci in chains resembling Strep      Positive results previously called 03/17/2024 05:37      ENTEROCOCCUS FAECALIS  For susceptibility see order #[id number]      Blood culture [[id number]]  (Abnormal) Collected: 03/17/24 1300    Order Status: Completed Specimen: Blood Updated: 03/20/24 1015     Blood Culture, Routine Gram stain aer bottle: Gram positive cocci in chains resembling Strep      Results called to and read back by: Emilie Dela Cruz RN. 03/18/2024  06:17      Gram stain vinay bottle: Gram positive cocci in chains resembling Strep      03/18/2024  11:21      ENTEROCOCCUS FAECALIS  For susceptibility see order #[id number]      Blood culture x two cultures. Draw prior to antibiotics. [[id number]]  (Abnormal) Collected: 03/16/24 1420    Order Status: Completed Specimen: Blood from Wrist, Right Updated: 03/19/24 1054     Blood Culture, Routine Gram stain aer bottle: Gram positive cocci in chains resembling Strep      Positive results previously called 03/17/2024  06:25      ENTEROCOCCUS FAECALIS  For susceptibility see order #[id number]      Narrative:      Aerobic and anaerobic    Blood culture x two cultures. Draw prior to antibiotics. [[id number]]  (Abnormal)  (Susceptibility) Collected: 03/16/24 1410    Order Status: Completed  Specimen: Blood from Peripheral, Forearm, Left Updated: 03/19/24 1053     Blood Culture, Routine Gram stain aer bottle: Gram positive cocci in chains resembling Strep      Gram stain vinay bottle: Gram positive cocci in chains resembling Strep      Results called to and read back by: Emilie Napoles RN 03/17/2024  05:37      ENTEROCOCCUS FAECALIS    Narrative:      Aerobic and anaerobic    Urine culture [[id number]]  (Abnormal)  (Susceptibility) Collected: 03/16/24 1359    Order Status: Completed Specimen: Urine Updated: 03/18/24 2120     Urine Culture, Routine ENTEROCOCCUS FAECALIS  >100,000 cfu/ml      Narrative:      Specimen Source->Urine    Rapid Organism ID by PCR (from Blood culture) [[id number]]  (Abnormal) Collected: 03/16/24 1410    Order Status: Completed Updated: 03/17/24 0636     Enterococcus faecalis Detected     Enterococcus faecium Not Detected     Listeria monocytogenes Not Detected     Staphylococcus spp. Not Detected     Staphylococcus aureus Not Detected     Staphylococcus epidermidis Not Detected     Staphylococcus lugdunensis Not Detected     Streptococcus species Not Detected     Streptococcus agalactiae Not Detected     Streptococcus pneumoniae Not Detected     Streptococcus pyogenes Not Detected     Acinetobacter calcoaceticus/baumannii complex Not Detected     Bacteroides fragilis Not Detected     Enterobacterales Not Detected     Enterobacter cloacae complex Not Detected     Escherichia coli Not Detected     Klebsiella aerogenes Not Detected     Klebsiella oxytoca Not Detected     Klebsiella pneumoniae group Not Detected     Proteus Not Detected     Salmonella sp Not Detected     Serratia marcescens Not Detected     Haemophilus influenzae Not Detected     Neisseria meningtidis Not Detected     Pseudomonas aeruginosa Not Detected     Stenotrophomonas maltophilia Not Detected     Candida albicans Not Detected     Candida auris Not Detected     Candida glabrata Not Detected     Vandana krusei Not  Detected     Candida parapsilosis Not Detected     Candida tropicalis Not Detected     Cryptococcus neoformans/gattii Not Detected     CTX-M (ESBL ) Test Not Applicable     IMP (Carbapenem resistant) Test Not Applicable     KPC resistance gene (Carbapenem resistant) Test Not Applicable     mcr-1  Test Not Applicable     mec A/C  Test Not Applicable     mec A/C and MREJ (MRSA) gene Test Not Applicable     NDM (Carbapenem resistant) Test Not Applicable     OXA-48-like (Carbapenem resistant) Test Not Applicable     van A/B (VRE gene) Not Detected     VIM (Carbapenem resistant) Test Not Applicable    Narrative:      Aerobic and anaerobic          Urine Studies:   Recent Labs   Lab 03/16/24  1359   COLORU Yellow   APPEARANCEUA Hazy*   PHUR 6.0   SPECGRAV 1.020   PROTEINUA 1+*   GLUCUA 3+*   KETONESU Negative   BILIRUBINUA Negative   OCCULTUA 2+*   NITRITE Negative   LEUKOCYTESUR 2+*   RBCUA 14*   WBCUA 37*   BACTERIA Occasional   SQUAMEPITHEL 1   HYALINECASTS 0         Significant Imaging: I have reviewed all pertinent imaging results/findings within the past 24 hours.

## 2024-03-23 NOTE — ASSESSMENT & PLAN NOTE
78F with h/o DM, treated LTBI (6 months INH in 2018),  interstitial cystitis and urolithiasis s/p ureteroscopic stone extraction with lithotripsy and right ureteral stent placement (right) on 3/14 admitted 3/16 with chills, dizziness, flank pain, dysuria, freq, and a fall. Bcx 3/16, 3/17, 3/18 with e.faecalis in both sets. Repeat 3/19 NGTD. Fever defervesced, leukocytosis trending down. 2d echo without veg. Reports PCN allergy, but tolerating ampicillin.     Suspect bacteremia from  source; ureteral procedure likely contributed. Fortunately CARLITO negative for endocarditis and now clearing cultures.  Discussed with urology and they don't think ureteral stents are infected and want to hold off exchanging at this time. Awaiting results of NM scan.     Recommendations:   - continue ampicillin/ceftriaxone duration TBD but given high grade bacteremia, will need at least 4 weeks of iv abx followed by chronic supressive oral antibiotics until ureteral  stent is removed or replaced. Given she cleared on the combo amp/ceftriaxone, would continue both  - no objections to picc line tomorrow if cultures remain neg  - f/u NM tagged WBC scan to eval for source of infection

## 2024-03-23 NOTE — PROGRESS NOTES
[facility] - Stepdown Flex ([place] [id number])  [facility] Medicine  Progress Note    Patient Name: Aminah Alicea-Shelly  MRN: [id number]  Patient Class: IP- Inpatient   Admission Date: 3/16/2024  Length of Stay: 7 days  Attending Physician: Jaleesa Jaimes MD  Primary Care Provider: Jeison Sanchez MD        Subjective:     Principal Problem:Enterococcal bacteremia        HPI:  77 yo female with DM2, GERD, HPLD, HTN, Hypothyroidism, recent stone removal presenting with chills, weakness, and back pain. Interpretor MERI used at bedside. She states that she was d/c'd on the 14th of March was doing okay until last night she started feeling weak and actually fell, she denies any leg pain or headache and did loss consciousness, however she did not feel any better today whenever she woke up so presented to the ER.    She had a fever measured here at 102.6F, CT scan showed perinephric stranding, UA consistent with infection, lactic acidosis. Urology was consulted who recommended day and antibiotics. Medicine called for admission.     Overview/Hospital Course:  Started on ceftriaxone at admission, ampicillin added on 3/17. Blood cultures quickly resulted positive for e faecalis, persistently positive. ID consulted. Patient passed voiding trial 3/18. TTE negative. CARLITO to be done 3/20.     Interval History:   3/23: sleeping comfortable during rounds.     Review of Systems   Constitutional:  Positive for chills and fever. Negative for fatigue and unexpected weight change.   HENT:  Negative for ear pain, facial swelling, hearing loss, mouth sores, nosebleeds, rhinorrhea, sinus pressure, sore throat, tinnitus, trouble swallowing and voice change.    Eyes:  Negative for photophobia, pain, redness and visual disturbance.   Respiratory:  Negative for cough, chest tightness, shortness of breath and wheezing.    Cardiovascular:  Negative for chest pain, palpitations and leg swelling.   Gastrointestinal:  Negative for abdominal  pain, blood in stool, constipation, diarrhea, nausea and vomiting.   Endocrine: Negative for cold intolerance, heat intolerance, polydipsia, polyphagia and polyuria.   Genitourinary:  Positive for difficulty urinating, dysuria, hematuria and urgency. Negative for flank pain, frequency, menstrual problem, vaginal bleeding, vaginal discharge and vaginal pain.   Musculoskeletal:  Negative for arthralgias, back pain, joint swelling, myalgias and neck pain.   Skin:  Negative for rash.   Allergic/Immunologic: Negative for environmental allergies, food allergies and immunocompromised state.   Neurological:  Negative for dizziness, seizures, syncope, weakness, light-headedness, numbness and headaches.   Hematological:  Negative for adenopathy. Does not bruise/bleed easily.   Psychiatric/Behavioral:  Negative for confusion, hallucinations, self-injury, sleep disturbance and suicidal ideas. The patient is not nervous/anxious.      Objective:     Vital Signs (Most Recent):  Temp: 98 °F (36.7 °C) (03/23/24 1236)  Pulse: 83 (03/23/24 1236)  Resp: 20 (03/23/24 1236)  BP: (!) 145/64 (03/23/24 1236)  SpO2: 95 % (03/23/24 1236) Vital Signs (24h Range):  Temp:  [98 °F (36.7 °C)-98.6 °F (37 °C)] 98 °F (36.7 °C)  Pulse:  [71-88] 83  Resp:  [16-20] 20  SpO2:  [93 %-96 %] 95 %  BP: (124-161)/(63-70) 145/64     Weight: 72.7 kg (160 lb 4.4 oz)  Body mass index is 26.67 kg/m².    Intake/Output Summary (Last 24 hours) at 3/23/2024 8418  Last data filed at 3/23/2024 0439  Gross per 24 hour   Intake 440 ml   Output 1050 ml   Net -610 ml         Physical Exam  Vitals and nursing note reviewed.   Constitutional:       Appearance: She is well-developed.   HENT:      Head: Normocephalic and atraumatic.      Right Ear: External ear normal.      Left Ear: External ear normal.   Eyes:      General: No scleral icterus.        Right eye: No discharge.         Left eye: No discharge.      Conjunctiva/sclera: Conjunctivae normal.   Cardiovascular:       Rate and Rhythm: Normal rate and regular rhythm.      Heart sounds: Normal heart sounds. No murmur heard.     No friction rub. No gallop.   Pulmonary:      Effort: Pulmonary effort is normal. No respiratory distress.      Breath sounds: Normal breath sounds. No stridor. No wheezing or rales.   Abdominal:      General: Bowel sounds are normal.      Tenderness: There is abdominal tenderness in the suprapubic area.   Musculoskeletal:         General: No tenderness. Normal range of motion.   Skin:     General: Skin is warm and dry.   Neurological:      Mental Status: She is alert and oriented to person, place, and time.             Significant Labs: All pertinent labs within the past 24 hours have been reviewed.    Significant Imaging: I have reviewed all pertinent imaging results/findings within the past 24 hours.    Assessment/Plan:      * Enterococcal bacteremia  Patient presented to ED with fevers and malaise 2 days after urologic procedure.  CT with evidence nephritic stranding.  Admitted for pyelonephritis.  Blood cultures on presentation 3/16 positive for Enterococcus faecalis. Suspect urologic source. TTE negative  - ID consulted   - repeat cultures  - CARLITO no vegetation  - S/p ceftriaxone in ED and on admission, continued for double coverage due to persistent bacteremia  - started on ampicillin 3/17   - urology to follow-up in clinic, stated no role for stent removal as it is source control  - NM scan ordered to be started on monday    Positive QuantiFERON-TB Gold test  - history noted      Ureteral stent present  Management as per urology   - outpatient follow-up      Anemia  Patient's anemia is currently controlled. Has not received any PRBCs to date. Etiology likely d/t chronic disease v iron deficiency. No obvious signs of bleeding.  Current CBC reviewed-   Lab Results   Component Value Date    HGB 9.4 (L) 03/22/2024    HCT 30.5 (L) 03/22/2024     Monitor serial CBC and transfuse if patient becomes  "hemodynamically unstable, symptomatic or H/H drops below 7/21.      ANAIS (acute kidney injury)  Resolved  Patient with acute kidney injury/acute renal failure likely due to pre-renal azotemia due to dehydration. Cr 1.2 on presentation. ANAIS is currently improving. Baseline creatinine  0.8  - Labs reviewed- Renal function/electrolytes with Estimated Creatinine Clearance: 51.1 mL/min (based on SCr of 0.9 mg/dL). according to latest data. Monitor urine output and serial BMP and adjust therapy as needed. Avoid nephrotoxins and renally dose meds for GFR listed above.    Sepsis  This patient does have evidence of infective focus  My overall impression is sepsis.  Source: Urinary Tract  Antibiotics given-   Antibiotics (72h ago, onward)      Start     Stop Route Frequency Ordered    03/19/24 1300  cefTRIAXone (ROCEPHIN) 2 g in dextrose 5 % in water (D5W) 100 mL IVPB (MB+)         -- IV Every 12 hours (non-standard times) 03/19/24 1145    03/17/24 1100  ampicillin (OMNIPEN) 2 g in sodium chloride 0.9 % 100 mL IVPB (MB+)         -- IV Every 4 hours (non-standard times) 03/17/24 0954          Latest lactate reviewed-  No results for input(s): "LACTATE", "POCLAC" in the last 72 hours.    Fluid challenge Ideal Body Weight- The patient's ideal body weight is Ideal body weight: 57 kg (125 lb 10.6 oz) which will be used to calculate fluid bolus of 30 ml/kg for treatment of septic shock.      Post- resuscitation assessment No - Post resuscitation assessment not needed     ANAIS    Will Not start Pressors- Levophed for MAP of 65  Source control achieved by: rocephin    Pyelonephritis  Acute, urine consistent with infection.   -  see above        Type 2 diabetes mellitus with neurologic complication, without long-term current use of insulin  Patient's FSGs are controlled on current medication regimen.  Last A1c reviewed-   Lab Results   Component Value Date    HGBA1C 7.6 (H) 03/21/2024     Most recent fingerstick glucose reviewed- "   Recent Labs   Lab 03/21/24  1153 03/21/24  1622 03/21/24  1942 03/22/24  0808   POCTGLUCOSE 157* 214* 233* 157*       Current correctional scale  Low  Maintain anti-hyperglycemic dose as follows-   Antihyperglycemics (From admission, onward)      Start     Stop Route Frequency Ordered    03/21/24 0715  insulin aspart U-100 pen 3 Units         -- SubQ 3 times daily with meals 03/20/24 2112 03/20/24 2115  insulin detemir U-100 (Levemir) pen 7 Units         -- SubQ 2 times daily 03/20/24 2111 03/16/24 2019  insulin aspart U-100 pen 0-5 Units         -- SubQ Before meals & nightly PRN 03/16/24 1920          Hold Oral hypoglycemics while patient is in the hospital.    GERD (gastroesophageal reflux disease)  Chronic, controlled, continue to monitor      HTN (hypertension), benign  Chronic, controlled. Latest blood pressure and vitals reviewed-     Temp:  [98.5 °F (36.9 °C)-98.9 °F (37.2 °C)]   Pulse:  [74-84]   Resp:  [18-19]   BP: (113-151)/(56-67)   SpO2:  [93 %-95 %] .   Home meds for hypertension were reviewed and noted below.   Hypertension Medications               carvediloL (COREG) 25 MG tablet TAKE 1 TABLET TWICE DAILY    lisinopriL (PRINIVIL,ZESTRIL) 40 MG tablet Take 1 tablet (40 mg total) by mouth once daily.            While in the hospital, will manage blood pressure as follows; Adjust home antihypertensive regimen as follows- held on presentation, restart as tolerated    Will utilize p.r.n. blood pressure medication only if patient's blood pressure greater than 180/110 and she develops symptoms such as worsening chest pain or shortness of breath.    Hypothyroidism  Chronic, controlled, continue home synthroid        VTE Risk Mitigation (From admission, onward)           Ordered     enoxaparin injection 40 mg  Daily         03/16/24 1920     IP VTE HIGH RISK PATIENT  Once         03/16/24 1920     Place sequential compression device  Until discontinued         03/16/24 1920                     Discharge Planning   RAJESH: 3/27/2024     Code Status: Full Code   Is the patient medically ready for discharge?: No    Reason for patient still in hospital (select all that apply): Patient trending condition  Discharge Plan A: Home Health   Discharge Delays: (!) Procedure Scheduling (IR, OR, Labs, Echo, Cath, Echo, EEG)              Jaleesa Jaimes MD  Department of Hospital Medicine   [facility] - Stepdown Flex ([place]-[facility])

## 2024-03-23 NOTE — PLAN OF CARE
Patient is alert and oriented x 4. Speech is clear with a language barrier. Understands basic english. On room air. No wob or sign of distress. Iv antibiotics given . Vs stable. Norco 5 given x 1 and effective. Safety measures in place. Bed in low position. Call light in reach.      Problem: Adult Inpatient Plan of Care  Goal: Plan of Care Review  Outcome: Ongoing, Progressing  Goal: Patient-Specific Goal (Individualized)  Outcome: Ongoing, Progressing  Goal: Absence of Hospital-Acquired Illness or Injury  Outcome: Ongoing, Progressing  Goal: Optimal Comfort and Wellbeing  Outcome: Ongoing, Progressing  Goal: Readiness for Transition of Care  Outcome: Ongoing, Progressing

## 2024-03-23 NOTE — SUBJECTIVE & OBJECTIVE
Interval History:   3/23: sleeping comfortable during rounds.     Review of Systems   Constitutional:  Positive for chills and fever. Negative for fatigue and unexpected weight change.   HENT:  Negative for ear pain, facial swelling, hearing loss, mouth sores, nosebleeds, rhinorrhea, sinus pressure, sore throat, tinnitus, trouble swallowing and voice change.    Eyes:  Negative for photophobia, pain, redness and visual disturbance.   Respiratory:  Negative for cough, chest tightness, shortness of breath and wheezing.    Cardiovascular:  Negative for chest pain, palpitations and leg swelling.   Gastrointestinal:  Negative for abdominal pain, blood in stool, constipation, diarrhea, nausea and vomiting.   Endocrine: Negative for cold intolerance, heat intolerance, polydipsia, polyphagia and polyuria.   Genitourinary:  Positive for difficulty urinating, dysuria, hematuria and urgency. Negative for flank pain, frequency, menstrual problem, vaginal bleeding, vaginal discharge and vaginal pain.   Musculoskeletal:  Negative for arthralgias, back pain, joint swelling, myalgias and neck pain.   Skin:  Negative for rash.   Allergic/Immunologic: Negative for environmental allergies, food allergies and immunocompromised state.   Neurological:  Negative for dizziness, seizures, syncope, weakness, light-headedness, numbness and headaches.   Hematological:  Negative for adenopathy. Does not bruise/bleed easily.   Psychiatric/Behavioral:  Negative for confusion, hallucinations, self-injury, sleep disturbance and suicidal ideas. The patient is not nervous/anxious.      Objective:     Vital Signs (Most Recent):  Temp: 98 °F (36.7 °C) (03/23/24 1236)  Pulse: 83 (03/23/24 1236)  Resp: 20 (03/23/24 1236)  BP: (!) 145/64 (03/23/24 1236)  SpO2: 95 % (03/23/24 1236) Vital Signs (24h Range):  Temp:  [98 °F (36.7 °C)-98.6 °F (37 °C)] 98 °F (36.7 °C)  Pulse:  [71-88] 83  Resp:  [16-20] 20  SpO2:  [93 %-96 %] 95 %  BP: (124-161)/(63-70) 145/64      Weight: 72.7 kg (160 lb 4.4 oz)  Body mass index is 26.67 kg/m².    Intake/Output Summary (Last 24 hours) at 3/23/2024 2404  Last data filed at 3/23/2024 0439  Gross per 24 hour   Intake 440 ml   Output 1050 ml   Net -610 ml         Physical Exam  Vitals and nursing note reviewed.   Constitutional:       Appearance: She is well-developed.   HENT:      Head: Normocephalic and atraumatic.      Right Ear: External ear normal.      Left Ear: External ear normal.   Eyes:      General: No scleral icterus.        Right eye: No discharge.         Left eye: No discharge.      Conjunctiva/sclera: Conjunctivae normal.   Cardiovascular:      Rate and Rhythm: Normal rate and regular rhythm.      Heart sounds: Normal heart sounds. No murmur heard.     No friction rub. No gallop.   Pulmonary:      Effort: Pulmonary effort is normal. No respiratory distress.      Breath sounds: Normal breath sounds. No stridor. No wheezing or rales.   Abdominal:      General: Bowel sounds are normal.      Tenderness: There is abdominal tenderness in the suprapubic area.   Musculoskeletal:         General: No tenderness. Normal range of motion.   Skin:     General: Skin is warm and dry.   Neurological:      Mental Status: She is alert and oriented to person, place, and time.             Significant Labs: All pertinent labs within the past 24 hours have been reviewed.    Significant Imaging: I have reviewed all pertinent imaging results/findings within the past 24 hours.

## 2024-03-24 LAB
ALBUMIN SERPL BCP-MCNC: 2.6 G/DL (ref 3.5–5.2)
ALP SERPL-CCNC: 85 U/L (ref 55–135)
ALT SERPL W/O P-5'-P-CCNC: 34 U/L (ref 10–44)
ANION GAP SERPL CALC-SCNC: 6 MMOL/L (ref 8–16)
AST SERPL-CCNC: 12 U/L (ref 10–40)
BASOPHILS # BLD AUTO: 0.01 K/UL (ref 0–0.2)
BASOPHILS NFR BLD: 0.2 % (ref 0–1.9)
BILIRUB SERPL-MCNC: 0.2 MG/DL (ref 0.1–1)
BUN SERPL-MCNC: 8 MG/DL (ref 8–23)
CALCIUM SERPL-MCNC: 9.1 MG/DL (ref 8.7–10.5)
CHLORIDE SERPL-SCNC: 103 MMOL/L (ref 95–110)
CO2 SERPL-SCNC: 26 MMOL/L (ref 23–29)
CREAT SERPL-MCNC: 0.9 MG/DL (ref 0.5–1.4)
DIFFERENTIAL METHOD BLD: ABNORMAL
EOSINOPHIL # BLD AUTO: 0.1 K/UL (ref 0–0.5)
EOSINOPHIL NFR BLD: 2.6 % (ref 0–8)
ERYTHROCYTE [DISTWIDTH] IN BLOOD BY AUTOMATED COUNT: 18 % (ref 11.5–14.5)
EST. GFR  (NO RACE VARIABLE): >60 ML/MIN/1.73 M^2
GLUCOSE SERPL-MCNC: 138 MG/DL (ref 70–110)
HCT VFR BLD AUTO: 28.3 % (ref 37–48.5)
HGB BLD-MCNC: 8.9 G/DL (ref 12–16)
IMM GRANULOCYTES # BLD AUTO: 0.06 K/UL (ref 0–0.04)
IMM GRANULOCYTES NFR BLD AUTO: 1.1 % (ref 0–0.5)
LYMPHOCYTES # BLD AUTO: 0.9 K/UL (ref 1–4.8)
LYMPHOCYTES NFR BLD: 17.3 % (ref 18–48)
MCH RBC QN AUTO: 23.1 PG (ref 27–31)
MCHC RBC AUTO-ENTMCNC: 31.4 G/DL (ref 32–36)
MCV RBC AUTO: 73 FL (ref 82–98)
MONOCYTES # BLD AUTO: 0.4 K/UL (ref 0.3–1)
MONOCYTES NFR BLD: 7.9 % (ref 4–15)
NEUTROPHILS # BLD AUTO: 3.9 K/UL (ref 1.8–7.7)
NEUTROPHILS NFR BLD: 70.9 % (ref 38–73)
NRBC BLD-RTO: 0 /100 WBC
PLATELET # BLD AUTO: 320 K/UL (ref 150–450)
PMV BLD AUTO: 9.4 FL (ref 9.2–12.9)
POCT GLUCOSE: 134 MG/DL (ref 70–110)
POCT GLUCOSE: 139 MG/DL (ref 70–110)
POCT GLUCOSE: 231 MG/DL (ref 70–110)
POCT GLUCOSE: 261 MG/DL (ref 70–110)
POCT GLUCOSE: 273 MG/DL (ref 70–110)
POTASSIUM SERPL-SCNC: 4.1 MMOL/L (ref 3.5–5.1)
PROT SERPL-MCNC: 6 G/DL (ref 6–8.4)
RBC # BLD AUTO: 3.86 M/UL (ref 4–5.4)
SODIUM SERPL-SCNC: 135 MMOL/L (ref 136–145)
WBC # BLD AUTO: 5.44 K/UL (ref 3.9–12.7)

## 2024-03-24 PROCEDURE — 25000003 PHARM REV CODE 250: Performed by: INTERNAL MEDICINE

## 2024-03-24 PROCEDURE — 63600175 PHARM REV CODE 636 W HCPCS: Performed by: INTERNAL MEDICINE

## 2024-03-24 PROCEDURE — 25000003 PHARM REV CODE 250: Performed by: STUDENT IN AN ORGANIZED HEALTH CARE EDUCATION/TRAINING PROGRAM

## 2024-03-24 PROCEDURE — 36415 COLL VENOUS BLD VENIPUNCTURE: CPT | Performed by: STUDENT IN AN ORGANIZED HEALTH CARE EDUCATION/TRAINING PROGRAM

## 2024-03-24 PROCEDURE — 80053 COMPREHEN METABOLIC PANEL: CPT | Performed by: STUDENT IN AN ORGANIZED HEALTH CARE EDUCATION/TRAINING PROGRAM

## 2024-03-24 PROCEDURE — 20600001 HC STEP DOWN PRIVATE ROOM

## 2024-03-24 PROCEDURE — 85025 COMPLETE CBC W/AUTO DIFF WBC: CPT | Performed by: STUDENT IN AN ORGANIZED HEALTH CARE EDUCATION/TRAINING PROGRAM

## 2024-03-24 RX ADMIN — AMPICILLIN 2 G: 2 INJECTION, POWDER, FOR SOLUTION INTRAMUSCULAR; INTRAVENOUS at 03:03

## 2024-03-24 RX ADMIN — INSULIN DETEMIR 7 UNITS: 100 INJECTION, SOLUTION SUBCUTANEOUS at 10:03

## 2024-03-24 RX ADMIN — AMITRIPTYLINE HYDROCHLORIDE 10 MG: 10 TABLET, FILM COATED ORAL at 08:03

## 2024-03-24 RX ADMIN — GABAPENTIN 300 MG: 300 CAPSULE ORAL at 08:03

## 2024-03-24 RX ADMIN — CEFTRIAXONE 2 G: 2 INJECTION, POWDER, FOR SOLUTION INTRAMUSCULAR; INTRAVENOUS at 03:03

## 2024-03-24 RX ADMIN — INSULIN ASPART 1 UNITS: 100 INJECTION, SOLUTION INTRAVENOUS; SUBCUTANEOUS at 08:03

## 2024-03-24 RX ADMIN — AMPICILLIN 2 G: 2 INJECTION, POWDER, FOR SOLUTION INTRAMUSCULAR; INTRAVENOUS at 05:03

## 2024-03-24 RX ADMIN — TRIAMCINOLONE ACETONIDE: 5 CREAM TOPICAL at 09:03

## 2024-03-24 RX ADMIN — INSULIN ASPART 3 UNITS: 100 INJECTION, SOLUTION INTRAVENOUS; SUBCUTANEOUS at 10:03

## 2024-03-24 RX ADMIN — CEFTRIAXONE 2 G: 2 INJECTION, POWDER, FOR SOLUTION INTRAMUSCULAR; INTRAVENOUS at 12:03

## 2024-03-24 RX ADMIN — CARVEDILOL 6.25 MG: 6.25 TABLET, FILM COATED ORAL at 08:03

## 2024-03-24 RX ADMIN — LISINOPRIL 40 MG: 20 TABLET ORAL at 10:03

## 2024-03-24 RX ADMIN — INSULIN ASPART 3 UNITS: 100 INJECTION, SOLUTION INTRAVENOUS; SUBCUTANEOUS at 06:03

## 2024-03-24 RX ADMIN — INSULIN DETEMIR 7 UNITS: 100 INJECTION, SOLUTION SUBCUTANEOUS at 08:03

## 2024-03-24 RX ADMIN — ENOXAPARIN SODIUM 40 MG: 40 INJECTION SUBCUTANEOUS at 06:03

## 2024-03-24 RX ADMIN — INSULIN ASPART 2 UNITS: 100 INJECTION, SOLUTION INTRAVENOUS; SUBCUTANEOUS at 01:03

## 2024-03-24 RX ADMIN — INSULIN ASPART 3 UNITS: 100 INJECTION, SOLUTION INTRAVENOUS; SUBCUTANEOUS at 01:03

## 2024-03-24 RX ADMIN — AMPICILLIN 2 G: 2 INJECTION, POWDER, FOR SOLUTION INTRAMUSCULAR; INTRAVENOUS at 10:03

## 2024-03-24 RX ADMIN — LEVOTHYROXINE SODIUM 112 MCG: 112 TABLET ORAL at 05:03

## 2024-03-24 RX ADMIN — AMPICILLIN 2 G: 2 INJECTION, POWDER, FOR SOLUTION INTRAMUSCULAR; INTRAVENOUS at 08:03

## 2024-03-24 RX ADMIN — AMPICILLIN 2 G: 2 INJECTION, POWDER, FOR SOLUTION INTRAMUSCULAR; INTRAVENOUS at 01:03

## 2024-03-24 RX ADMIN — CARVEDILOL 6.25 MG: 6.25 TABLET, FILM COATED ORAL at 10:03

## 2024-03-24 NOTE — PROGRESS NOTES
[facility] - [facility] ([place] [address])  [facility] Medicine  Progress Note    Patient Name: Aminah Alicea-Shelly  MRN: [id number]  Patient Class: IP- Inpatient   Admission Date: 3/16/2024  Length of Stay: 8 days  Attending Physician: Jaleesa Jaimes MD  Primary Care Provider: Jeison Sanchez MD        Subjective:     Principal Problem:Enterococcal bacteremia        HPI:  79 yo female with DM2, GERD, HPLD, HTN, Hypothyroidism, recent stone removal presenting with chills, weakness, and back pain. Interpretor MERI used at bedside. She states that she was d/c'd on the 14th of March was doing okay until last night she started feeling weak and actually fell, she denies any leg pain or headache and did loss consciousness, however she did not feel any better today whenever she woke up so presented to the ER.    She had a fever measured here at 102.6F, CT scan showed perinephric stranding, UA consistent with infection, lactic acidosis. Urology was consulted who recommended day and antibiotics. Medicine called for admission.     Overview/Hospital Course:  Started on ceftriaxone at admission, ampicillin added on 3/17. Blood cultures quickly resulted positive for e faecalis, persistently positive. ID consulted. Patient passed voiding trial 3/18. TTE negative. CARLITO to be done 3/20.     Interval History: 3/24: Patient feels well today. No issues reported during rounds    Review of Systems   Constitutional:  Positive for chills and fever. Negative for fatigue and unexpected weight change.   HENT:  Negative for ear pain, facial swelling, hearing loss, mouth sores, nosebleeds, rhinorrhea, sinus pressure, sore throat, tinnitus, trouble swallowing and voice change.    Eyes:  Negative for photophobia, pain, redness and visual disturbance.   Respiratory:  Negative for cough, chest tightness, shortness of breath and wheezing.    Cardiovascular:  Negative for chest pain, palpitations and leg swelling.   Gastrointestinal:   Negative for abdominal pain, blood in stool, constipation, diarrhea, nausea and vomiting.   Endocrine: Negative for cold intolerance, heat intolerance, polydipsia, polyphagia and polyuria.   Genitourinary:  Positive for difficulty urinating, dysuria, hematuria and urgency. Negative for flank pain, frequency, menstrual problem, vaginal bleeding, vaginal discharge and vaginal pain.   Musculoskeletal:  Negative for arthralgias, back pain, joint swelling, myalgias and neck pain.   Skin:  Negative for rash.   Allergic/Immunologic: Negative for environmental allergies, food allergies and immunocompromised state.   Neurological:  Negative for dizziness, seizures, syncope, weakness, light-headedness, numbness and headaches.   Hematological:  Negative for adenopathy. Does not bruise/bleed easily.   Psychiatric/Behavioral:  Negative for confusion, hallucinations, self-injury, sleep disturbance and suicidal ideas. The patient is not nervous/anxious.      Objective:     Vital Signs (Most Recent):  Temp: 98.6 °F (37 °C) (03/24/24 0845)  Pulse: 83 (03/24/24 0845)  Resp: 18 (03/24/24 0800)  BP: 118/73 (03/24/24 0845)  SpO2: 98 % (03/24/24 0845) Vital Signs (24h Range):  Temp:  [98 °F (36.7 °C)-98.6 °F (37 °C)] 98.6 °F (37 °C)  Pulse:  [70-83] 83  Resp:  [18-20] 18  SpO2:  [95 %-98 %] 98 %  BP: (118-161)/(58-73) 118/73     Weight: 71.6 kg (157 lb 13.6 oz)  Body mass index is 26.27 kg/m².    Intake/Output Summary (Last 24 hours) at 3/24/2024 0937  Last data filed at 3/24/2024 0537  Gross per 24 hour   Intake 300 ml   Output 1400 ml   Net -1100 ml         Physical Exam  Vitals and nursing note reviewed.   Constitutional:       Appearance: She is well-developed.   HENT:      Head: Normocephalic and atraumatic.      Right Ear: External ear normal.      Left Ear: External ear normal.   Eyes:      General: No scleral icterus.        Right eye: No discharge.         Left eye: No discharge.      Conjunctiva/sclera: Conjunctivae normal.    Cardiovascular:      Rate and Rhythm: Normal rate and regular rhythm.      Heart sounds: Normal heart sounds. No murmur heard.     No friction rub. No gallop.   Pulmonary:      Effort: Pulmonary effort is normal. No respiratory distress.      Breath sounds: Normal breath sounds. No stridor. No wheezing or rales.   Abdominal:      General: Bowel sounds are normal.      Tenderness: There is abdominal tenderness in the suprapubic area.   Musculoskeletal:         General: No tenderness. Normal range of motion.   Skin:     General: Skin is warm and dry.   Neurological:      Mental Status: She is alert and oriented to person, place, and time.             Significant Labs: All pertinent labs within the past 24 hours have been reviewed.    Significant Imaging: I have reviewed all pertinent imaging results/findings within the past 24 hours.    Assessment/Plan:      * Enterococcal bacteremia  Patient presented to ED with fevers and malaise 2 days after urologic procedure.  CT with evidence nephritic stranding.  Admitted for pyelonephritis.  Blood cultures on presentation 3/16 positive for Enterococcus faecalis. Suspect urologic source. TTE negative  - ID consulted   - repeat cultures  - CARLITO no vegetation  - S/p ceftriaxone in ED and on admission, continued for double coverage due to persistent bacteremia  - started on ampicillin 3/17   - urology to follow-up in clinic, stated no role for stent removal as it is source control  - NM scan ordered to be started on monday    Positive QuantiFERON-TB Gold test  - history noted      Ureteral stent present  Management as per urology   - outpatient follow-up      Anemia  Patient's anemia is currently controlled. Has not received any PRBCs to date. Etiology likely d/t chronic disease v iron deficiency. No obvious signs of bleeding.  Current CBC reviewed-   Lab Results   Component Value Date    HGB 9.4 (L) 03/22/2024    HCT 30.5 (L) 03/22/2024     Monitor serial CBC and transfuse if  "patient becomes hemodynamically unstable, symptomatic or H/H drops below 7/21.      ANAIS (acute kidney injury)  Resolved  Patient with acute kidney injury/acute renal failure likely due to pre-renal azotemia due to dehydration. Cr 1.2 on presentation. ANAIS is currently improving. Baseline creatinine  0.8  - Labs reviewed- Renal function/electrolytes with Estimated Creatinine Clearance: 51.1 mL/min (based on SCr of 0.9 mg/dL). according to latest data. Monitor urine output and serial BMP and adjust therapy as needed. Avoid nephrotoxins and renally dose meds for GFR listed above.    Sepsis  This patient does have evidence of infective focus  My overall impression is sepsis.  Source: Urinary Tract  Antibiotics given-   Antibiotics (72h ago, onward)      Start     Stop Route Frequency Ordered    03/19/24 1300  cefTRIAXone (ROCEPHIN) 2 g in dextrose 5 % in water (D5W) 100 mL IVPB (MB+)         -- IV Every 12 hours (non-standard times) 03/19/24 1145    03/17/24 1100  ampicillin (OMNIPEN) 2 g in sodium chloride 0.9 % 100 mL IVPB (MB+)         -- IV Every 4 hours (non-standard times) 03/17/24 0954          Latest lactate reviewed-  No results for input(s): "LACTATE", "POCLAC" in the last 72 hours.    Fluid challenge Ideal Body Weight- The patient's ideal body weight is Ideal body weight: 57 kg (125 lb 10.6 oz) which will be used to calculate fluid bolus of 30 ml/kg for treatment of septic shock.      Post- resuscitation assessment No - Post resuscitation assessment not needed     ANAIS    Will Not start Pressors- Levophed for MAP of 65  Source control achieved by: rocephin    Pyelonephritis  Acute, urine consistent with infection.   -  see above        Type 2 diabetes mellitus with neurologic complication, without long-term current use of insulin  Patient's FSGs are controlled on current medication regimen.  Last A1c reviewed-   Lab Results   Component Value Date    HGBA1C 7.6 (H) 03/21/2024     Most recent fingerstick glucose " reviewed-   Recent Labs   Lab 03/21/24  1153 03/21/24  1622 03/21/24  1942 03/22/24  0808   POCTGLUCOSE 157* 214* 233* 157*       Current correctional scale  Low  Maintain anti-hyperglycemic dose as follows-   Antihyperglycemics (From admission, onward)      Start     Stop Route Frequency Ordered    03/21/24 0715  insulin aspart U-100 pen 3 Units         -- SubQ 3 times daily with meals 03/20/24 2112 03/20/24 2115  insulin detemir U-100 (Levemir) pen 7 Units         -- SubQ 2 times daily 03/20/24 2111 03/16/24 2019  insulin aspart U-100 pen 0-5 Units         -- SubQ Before meals & nightly PRN 03/16/24 1920          Hold Oral hypoglycemics while patient is in the hospital.    GERD (gastroesophageal reflux disease)  Chronic, controlled, continue to monitor      HTN (hypertension), benign  Chronic, controlled. Latest blood pressure and vitals reviewed-     Temp:  [98.5 °F (36.9 °C)-98.9 °F (37.2 °C)]   Pulse:  [74-84]   Resp:  [18-19]   BP: (113-151)/(56-67)   SpO2:  [93 %-95 %] .   Home meds for hypertension were reviewed and noted below.   Hypertension Medications               carvediloL (COREG) 25 MG tablet TAKE 1 TABLET TWICE DAILY    lisinopriL (PRINIVIL,ZESTRIL) 40 MG tablet Take 1 tablet (40 mg total) by mouth once daily.            While in the hospital, will manage blood pressure as follows; Adjust home antihypertensive regimen as follows- held on presentation, restart as tolerated    Will utilize p.r.n. blood pressure medication only if patient's blood pressure greater than 180/110 and she develops symptoms such as worsening chest pain or shortness of breath.    Hypothyroidism  Chronic, controlled, continue home synthroid        VTE Risk Mitigation (From admission, onward)           Ordered     enoxaparin injection 40 mg  Daily         03/16/24 1920     IP VTE HIGH RISK PATIENT  Once         03/16/24 1920     Place sequential compression device  Until discontinued         03/16/24 1920                     Discharge Planning   RAJESH: 3/27/2024     Code Status: Full Code   Is the patient medically ready for discharge?: No    Reason for patient still in hospital (select all that apply): Patient trending condition  Discharge Plan A: Home Health   Discharge Delays: (!) Procedure Scheduling (IR, OR, Labs, Echo, Cath, Echo, EEG)              Jaleesa Jaimes MD  Department of Hospital Medicine   [facility] - [facility] ([place]-[facility])

## 2024-03-24 NOTE — SUBJECTIVE & OBJECTIVE
Interval History: 3/24: Patient feels well today. No issues reported during rounds    Review of Systems   Constitutional:  Positive for chills and fever. Negative for fatigue and unexpected weight change.   HENT:  Negative for ear pain, facial swelling, hearing loss, mouth sores, nosebleeds, rhinorrhea, sinus pressure, sore throat, tinnitus, trouble swallowing and voice change.    Eyes:  Negative for photophobia, pain, redness and visual disturbance.   Respiratory:  Negative for cough, chest tightness, shortness of breath and wheezing.    Cardiovascular:  Negative for chest pain, palpitations and leg swelling.   Gastrointestinal:  Negative for abdominal pain, blood in stool, constipation, diarrhea, nausea and vomiting.   Endocrine: Negative for cold intolerance, heat intolerance, polydipsia, polyphagia and polyuria.   Genitourinary:  Positive for difficulty urinating, dysuria, hematuria and urgency. Negative for flank pain, frequency, menstrual problem, vaginal bleeding, vaginal discharge and vaginal pain.   Musculoskeletal:  Negative for arthralgias, back pain, joint swelling, myalgias and neck pain.   Skin:  Negative for rash.   Allergic/Immunologic: Negative for environmental allergies, food allergies and immunocompromised state.   Neurological:  Negative for dizziness, seizures, syncope, weakness, light-headedness, numbness and headaches.   Hematological:  Negative for adenopathy. Does not bruise/bleed easily.   Psychiatric/Behavioral:  Negative for confusion, hallucinations, self-injury, sleep disturbance and suicidal ideas. The patient is not nervous/anxious.      Objective:     Vital Signs (Most Recent):  Temp: 98.6 °F (37 °C) (03/24/24 0845)  Pulse: 83 (03/24/24 0845)  Resp: 18 (03/24/24 0800)  BP: 118/73 (03/24/24 0845)  SpO2: 98 % (03/24/24 0845) Vital Signs (24h Range):  Temp:  [98 °F (36.7 °C)-98.6 °F (37 °C)] 98.6 °F (37 °C)  Pulse:  [70-83] 83  Resp:  [18-20] 18  SpO2:  [95 %-98 %] 98 %  BP:  (118-161)/(58-73) 118/73     Weight: 71.6 kg (157 lb 13.6 oz)  Body mass index is 26.27 kg/m².    Intake/Output Summary (Last 24 hours) at 3/24/2024 0937  Last data filed at 3/24/2024 0537  Gross per 24 hour   Intake 300 ml   Output 1400 ml   Net -1100 ml         Physical Exam  Vitals and nursing note reviewed.   Constitutional:       Appearance: She is well-developed.   HENT:      Head: Normocephalic and atraumatic.      Right Ear: External ear normal.      Left Ear: External ear normal.   Eyes:      General: No scleral icterus.        Right eye: No discharge.         Left eye: No discharge.      Conjunctiva/sclera: Conjunctivae normal.   Cardiovascular:      Rate and Rhythm: Normal rate and regular rhythm.      Heart sounds: Normal heart sounds. No murmur heard.     No friction rub. No gallop.   Pulmonary:      Effort: Pulmonary effort is normal. No respiratory distress.      Breath sounds: Normal breath sounds. No stridor. No wheezing or rales.   Abdominal:      General: Bowel sounds are normal.      Tenderness: There is abdominal tenderness in the suprapubic area.   Musculoskeletal:         General: No tenderness. Normal range of motion.   Skin:     General: Skin is warm and dry.   Neurological:      Mental Status: She is alert and oriented to person, place, and time.             Significant Labs: All pertinent labs within the past 24 hours have been reviewed.    Significant Imaging: I have reviewed all pertinent imaging results/findings within the past 24 hours.

## 2024-03-25 LAB
ALBUMIN SERPL BCP-MCNC: 2.5 G/DL (ref 3.5–5.2)
ALP SERPL-CCNC: 88 U/L (ref 55–135)
ALT SERPL W/O P-5'-P-CCNC: 24 U/L (ref 10–44)
ANION GAP SERPL CALC-SCNC: 9 MMOL/L (ref 8–16)
AST SERPL-CCNC: 9 U/L (ref 10–40)
BACTERIA BLD CULT: NORMAL
BASOPHILS # BLD AUTO: 0.04 K/UL (ref 0–0.2)
BASOPHILS NFR BLD: 0.8 % (ref 0–1.9)
BILIRUB SERPL-MCNC: 0.1 MG/DL (ref 0.1–1)
BUN SERPL-MCNC: 9 MG/DL (ref 8–23)
CALCIUM SERPL-MCNC: 8.9 MG/DL (ref 8.7–10.5)
CHLORIDE SERPL-SCNC: 103 MMOL/L (ref 95–110)
CO2 SERPL-SCNC: 22 MMOL/L (ref 23–29)
CREAT SERPL-MCNC: 0.9 MG/DL (ref 0.5–1.4)
DIFFERENTIAL METHOD BLD: ABNORMAL
EOSINOPHIL # BLD AUTO: 0.1 K/UL (ref 0–0.5)
EOSINOPHIL NFR BLD: 1.5 % (ref 0–8)
ERYTHROCYTE [DISTWIDTH] IN BLOOD BY AUTOMATED COUNT: 18 % (ref 11.5–14.5)
EST. GFR  (NO RACE VARIABLE): >60 ML/MIN/1.73 M^2
GLUCOSE SERPL-MCNC: 189 MG/DL (ref 70–110)
HCT VFR BLD AUTO: 28.3 % (ref 37–48.5)
HGB BLD-MCNC: 8.7 G/DL (ref 12–16)
IMM GRANULOCYTES # BLD AUTO: 0.05 K/UL (ref 0–0.04)
IMM GRANULOCYTES NFR BLD AUTO: 1.1 % (ref 0–0.5)
LYMPHOCYTES # BLD AUTO: 0.9 K/UL (ref 1–4.8)
LYMPHOCYTES NFR BLD: 18.7 % (ref 18–48)
MCH RBC QN AUTO: 22.1 PG (ref 27–31)
MCHC RBC AUTO-ENTMCNC: 30.7 G/DL (ref 32–36)
MCV RBC AUTO: 72 FL (ref 82–98)
MONOCYTES # BLD AUTO: 0.3 K/UL (ref 0.3–1)
MONOCYTES NFR BLD: 6.7 % (ref 4–15)
NEUTROPHILS # BLD AUTO: 3.4 K/UL (ref 1.8–7.7)
NEUTROPHILS NFR BLD: 71.2 % (ref 38–73)
NRBC BLD-RTO: 0 /100 WBC
PLATELET # BLD AUTO: 321 K/UL (ref 150–450)
PMV BLD AUTO: 9.5 FL (ref 9.2–12.9)
POCT GLUCOSE: 169 MG/DL (ref 70–110)
POCT GLUCOSE: 200 MG/DL (ref 70–110)
POCT GLUCOSE: 240 MG/DL (ref 70–110)
POCT GLUCOSE: 263 MG/DL (ref 70–110)
POTASSIUM SERPL-SCNC: 4.5 MMOL/L (ref 3.5–5.1)
PROT SERPL-MCNC: 5.9 G/DL (ref 6–8.4)
RBC # BLD AUTO: 3.93 M/UL (ref 4–5.4)
SODIUM SERPL-SCNC: 134 MMOL/L (ref 136–145)
WBC # BLD AUTO: 4.76 K/UL (ref 3.9–12.7)

## 2024-03-25 PROCEDURE — A9570 INDIUM IN-111 AUTO WBC: HCPCS | Performed by: HOSPITALIST

## 2024-03-25 PROCEDURE — 63600175 PHARM REV CODE 636 W HCPCS: Performed by: INTERNAL MEDICINE

## 2024-03-25 PROCEDURE — 25000003 PHARM REV CODE 250: Performed by: INTERNAL MEDICINE

## 2024-03-25 PROCEDURE — 25000003 PHARM REV CODE 250: Performed by: STUDENT IN AN ORGANIZED HEALTH CARE EDUCATION/TRAINING PROGRAM

## 2024-03-25 PROCEDURE — 02HV33Z INSERTION OF INFUSION DEVICE INTO SUPERIOR VENA CAVA, PERCUTANEOUS APPROACH: ICD-10-PCS | Performed by: HOSPITALIST

## 2024-03-25 PROCEDURE — 80053 COMPREHEN METABOLIC PANEL: CPT | Performed by: STUDENT IN AN ORGANIZED HEALTH CARE EDUCATION/TRAINING PROGRAM

## 2024-03-25 PROCEDURE — 36573 INSJ PICC RS&I 5 YR+: CPT

## 2024-03-25 PROCEDURE — C1751 CATH, INF, PER/CENT/MIDLINE: HCPCS

## 2024-03-25 PROCEDURE — 85025 COMPLETE CBC W/AUTO DIFF WBC: CPT | Performed by: STUDENT IN AN ORGANIZED HEALTH CARE EDUCATION/TRAINING PROGRAM

## 2024-03-25 PROCEDURE — 20600001 HC STEP DOWN PRIVATE ROOM

## 2024-03-25 PROCEDURE — 76937 US GUIDE VASCULAR ACCESS: CPT

## 2024-03-25 PROCEDURE — 36415 COLL VENOUS BLD VENIPUNCTURE: CPT | Performed by: STUDENT IN AN ORGANIZED HEALTH CARE EDUCATION/TRAINING PROGRAM

## 2024-03-25 RX ORDER — SODIUM CHLORIDE 0.9 % (FLUSH) 0.9 %
10 SYRINGE (ML) INJECTION
Status: DISCONTINUED | OUTPATIENT
Start: 2024-03-25 | End: 2024-03-28 | Stop reason: HOSPADM

## 2024-03-25 RX ORDER — SODIUM CHLORIDE 0.9 % (FLUSH) 0.9 %
10 SYRINGE (ML) INJECTION EVERY 6 HOURS
Status: DISCONTINUED | OUTPATIENT
Start: 2024-03-25 | End: 2024-03-28 | Stop reason: HOSPADM

## 2024-03-25 RX ORDER — INDIUM IN-111 OXYQUINOLINE 1 UG/ML
0.56 SOLUTION INTRAVENOUS
Status: COMPLETED | OUTPATIENT
Start: 2024-03-25 | End: 2024-03-25

## 2024-03-25 RX ADMIN — INSULIN DETEMIR 7 UNITS: 100 INJECTION, SOLUTION SUBCUTANEOUS at 08:03

## 2024-03-25 RX ADMIN — AMPICILLIN 2 G: 2 INJECTION, POWDER, FOR SOLUTION INTRAMUSCULAR; INTRAVENOUS at 01:03

## 2024-03-25 RX ADMIN — INSULIN ASPART 1 UNITS: 100 INJECTION, SOLUTION INTRAVENOUS; SUBCUTANEOUS at 09:03

## 2024-03-25 RX ADMIN — HYDROCODONE BITARTRATE AND ACETAMINOPHEN 1 TABLET: 5; 325 TABLET ORAL at 06:03

## 2024-03-25 RX ADMIN — CEFTRIAXONE 2 G: 2 INJECTION, POWDER, FOR SOLUTION INTRAMUSCULAR; INTRAVENOUS at 12:03

## 2024-03-25 RX ADMIN — AMPICILLIN 2 G: 2 INJECTION, POWDER, FOR SOLUTION INTRAMUSCULAR; INTRAVENOUS at 08:03

## 2024-03-25 RX ADMIN — MORPHINE SULFATE 2 MG: 2 INJECTION, SOLUTION INTRAMUSCULAR; INTRAVENOUS at 09:03

## 2024-03-25 RX ADMIN — INSULIN ASPART 3 UNITS: 100 INJECTION, SOLUTION INTRAVENOUS; SUBCUTANEOUS at 12:03

## 2024-03-25 RX ADMIN — INSULIN DETEMIR 7 UNITS: 100 INJECTION, SOLUTION SUBCUTANEOUS at 09:03

## 2024-03-25 RX ADMIN — LEVOTHYROXINE SODIUM 112 MCG: 112 TABLET ORAL at 06:03

## 2024-03-25 RX ADMIN — TRIAMCINOLONE ACETONIDE: 5 CREAM TOPICAL at 09:03

## 2024-03-25 RX ADMIN — ENOXAPARIN SODIUM 40 MG: 40 INJECTION SUBCUTANEOUS at 05:03

## 2024-03-25 RX ADMIN — AMPICILLIN 2 G: 2 INJECTION, POWDER, FOR SOLUTION INTRAMUSCULAR; INTRAVENOUS at 09:03

## 2024-03-25 RX ADMIN — AMPICILLIN 2 G: 2 INJECTION, POWDER, FOR SOLUTION INTRAMUSCULAR; INTRAVENOUS at 06:03

## 2024-03-25 RX ADMIN — INSULIN ASPART 3 UNITS: 100 INJECTION, SOLUTION INTRAVENOUS; SUBCUTANEOUS at 08:03

## 2024-03-25 RX ADMIN — CARVEDILOL 6.25 MG: 6.25 TABLET, FILM COATED ORAL at 08:03

## 2024-03-25 RX ADMIN — INSULIN ASPART 3 UNITS: 100 INJECTION, SOLUTION INTRAVENOUS; SUBCUTANEOUS at 05:03

## 2024-03-25 RX ADMIN — LISINOPRIL 40 MG: 20 TABLET ORAL at 08:03

## 2024-03-25 RX ADMIN — GABAPENTIN 300 MG: 300 CAPSULE ORAL at 09:03

## 2024-03-25 RX ADMIN — INDIUM IN-111 OXYQUINOLINE 0.56 MILLICURIE: 1 SOLUTION INTRAVENOUS at 11:03

## 2024-03-25 RX ADMIN — CARVEDILOL 6.25 MG: 6.25 TABLET, FILM COATED ORAL at 09:03

## 2024-03-25 RX ADMIN — AMITRIPTYLINE HYDROCHLORIDE 10 MG: 10 TABLET, FILM COATED ORAL at 09:03

## 2024-03-25 RX ADMIN — AMPICILLIN 2 G: 2 INJECTION, POWDER, FOR SOLUTION INTRAMUSCULAR; INTRAVENOUS at 05:03

## 2024-03-25 NOTE — CONSULTS
[facility] - Stepdown Flex ([place]-14)  Infectious Disease  Consult Note    Patient Name: Aminah Norton  MRN: [id number]  Admission Date: 3/16/2024  Hospital Length of Stay: 8 days  Attending Physician: Jaleesa Jaimes MD  Primary Care Provider: Jeison Sanchez MD     Isolation Status: No active isolations    Patient information was obtained from patient and ER records.      Consults  Assessment/Plan:     ID  * Enterococcal bacteremia  78F with h/o DM, treated LTBI (6 months INH in 2018),  interstitial cystitis and urolithiasis s/p ureteroscopic stone extraction with lithotripsy and right ureteral stent placement (right) on 3/14 admitted 3/16 with chills, dizziness, flank pain, dysuria, freq, and a fall. Bcx 3/16, 3/17, 3/18 with e.faecalis in both sets. Repeat 3/19 NGTD. Fever defervesced, leukocytosis trending down. 2d echo without veg. Reports PCN allergy, but tolerating ampicillin.     Suspect bacteremia from  source; ureteral procedure likely contributed. Fortunately CARLITO negative for endocarditis and now clearing cultures.  Discussed with urology and they don't think ureteral stents are infected and want to hold off exchanging at this time. Awaiting results of NM scan.     Recommendations:   - continue ampicillin/ceftriaxone. given high grade bacteremia, will need at least 4 weeks of iv abx followed by chronic supressive oral antibiotics until ureteral  stent is removed or replaced. Given she cleared on the combo amp/ceftriaxone, would continue both  - no objections to picc line   - f/u NM tagged WBC scan to eval for source of infection; please notify ID if there is localization on scan concerning for infection      Outpatient Antibiotic Therapy Plan:    Please send referral to [facility].    1) Infection: enterococcus bacteremia and pyleonephritis    2) Discharge Antibiotics:    Intravenous antibiotics:  Ampicillin 2gm iv q4h (or continuous  infusion)  Ceftriaxone 2gm iv q12h    3) Therapy Duration:  4weeks    Estimated end date of IV antibiotics: 4/18/24    4) Outpatient Weekly Labs:    Order the following labs to be drawn on Mondays:   CBC  CMP   CRP      5) Fax Lab Results to Infectious Diseases Provider: Dr Velasquez    [facility] Fax Number: [phone]    6) Outpatient Infectious Diseases Follow-up    Follow-up appointment will be arranged by the ID clinic and will be found in the patient's appointments tab.    Prior to discharge, please ensure the patient's follow-up has been scheduled.    If there is still no follow-up scheduled prior to discharge, please send an EPIC message to Brandie Kinsey in Infectious Diseases.                  Thank you for your consult. I will sign off. Please contact us if you have any additional questions.    Samina Velasquez MD  Infectious Disease  [facility] - Stepdown Flex ([place])    Subjective:     Principal Problem: Enterococcal bacteremia    HPI: A 78-year-old woman with HTN, dm 2, hypothyroidism, interstitial cystitis, and status post ureteroscopic stone extraction with lithotripsy and right ureteral stent placement on 03/01/2024 1 day prior to admission developed fever to 102.6 F with associated shaking chills, suprapubic tenderness, and dysuria.  This was also associated with hematuria, sensation of incomplete bladder emptying, as well as an inability to initiate micturition however the patient denies urgency, frequency, and back pain.  On evaluation in [facility] ED she was noted to be febrile to 101.3 F and tachycardic.  Laboratory workup revealed no evidence of leukocytosis.  Admission blood cultures are now positive for Gram-positive cocci in chains resembling strep with a rapid ID for Enterococcus faecalis.    Infectious disease is consulted for E faecalis bacteremia        Interval history: NAEO. Tolerating abx. Awaiting NM scan      Past Surgical History:   Procedure Laterality Date     APPENDECTOMY      BLADDER FULGURATION N/A 3/1/2024    Procedure: ULCER INJECTION FULGURATION;  Surgeon: Brody Smith MD;  Location: [facility] OR;  Service: Urology;  Laterality: N/A;    BLADDER SUSPENSION      CATARACT EXTRACTION      right eye     CHOLECYSTECTOMY      COLONOSCOPY N/A 7/20/2020    Procedure: COLONOSCOPY;  Surgeon: Juan Parker MD;  Location: [facility] ENDO (4TH FLR);  Service: Endoscopy;  Laterality: N/A;  Hx of chronic anemia.  patient needs a   4/6/20 - removed from 4/20/20, rescheduled 7/20/20 - pg  covid 7/17-om 2nd floor-tb    COLONOSCOPY N/A 2/9/2023    Procedure: COLONOSCOPY;  Surgeon: Renan Sanchez MD;  Location: [facility] ENDO (4TH FLR);  Service: Colon and Rectal;  Laterality: N/A;  instr handed to patient, -ml    CYSTOSCOPY N/A 5/19/2021    Procedure: CYSTOSCOPY;  Surgeon: Brody Smith MD;  Location: [facility] 1ST FLR;  Service: Urology;  Laterality: N/A;    CYSTOURETEROSCOPY, WITH HOLMIUM LASER LITHOTRIPSY OF URETERAL CALCULUS AND STENT INSERTION Right 3/1/2024    Procedure: CYSTOURETEROSCOPY, WITH HOLMIUM LASER LITHOTRIPSY OF URETERAL CALCULUS AND STENT INSERTION;  Surgeon: Brody Smith MD;  Location: [facility] OR;  Service: Urology;  Laterality: Right;    CYSTOURETEROSCOPY,WITH HOLMIUM LASER LITHOTRIPSY OF URETERAL CALCULUS Right 3/14/2024    Procedure: CYSTOURETEROSCOPY,WITH HOLMIUM LASER LITHOTRIPSY OF URETERAL CALCULUS;  Surgeon: Brody Smith MD;  Location: [facility] OR;  Service: Urology;  Laterality: Right;  1 HR    ECHOCARDIOGRAM,TRANSESOPHAGEAL N/A 3/20/2024    Procedure: Transesophageal echo (CARLITO) intra-procedure log documentation;  Surgeon: Provider, Dosc [facility];  Location: [facility] EP LAB;  Service: Cardiology;  Laterality: N/A;    EYE SURGERY Bilateral     cataract removal    INJECTION OF STEROID N/A 3/14/2024    Procedure: INJECTION, STEROID;  Surgeon: Brody Smith MD;  Location: [facility] OR;  Service: Urology;  Laterality:  N/A;    PLACEMENT-STENT Right 3/14/2024    Procedure: PLACEMENT-STENT;  Surgeon: Brody Smith MD;  Location: [facility] OR;  Service: Urology;  Laterality: Right;    REMOVAL-STENT Right 3/14/2024    Procedure: REMOVAL-STENT;  Surgeon: Brody Smith MD;  Location: [facility] OR;  Service: Urology;  Laterality: Right;    TOTAL ABDOMINAL HYSTERECTOMY      DUB       Review of patient's allergies indicates:   Allergen Reactions    Bufferin [aspirin, buffered] Itching    Atorvastatin      Myalgias and arthralgias    Shellfish containing products Itching     Shellfish causes her to itch per her daughter, Caro.     Warfarin     Ibuprofen Itching     Itching of eyes, head       Medications:  Medications Prior to Admission   Medication Sig    ACCU-CHEK SOFTCLIX LANCETS Misc CHECK BLOOD SUGAR ONE TIME DAILY    amitriptyline (ELAVIL) 10 MG tablet Take 1 tablet (10 mg total) by mouth every evening.    BD ALCOHOL SWABS PadM USE AS DIRECTED TOPICALLY AS NEEDED    biotin 1 mg tablet Take 1,000 mcg by mouth 3 (three) times daily.    blood sugar diagnostic (ACCU-CHEK MATTIE PLUS TEST STRP) Strp TEST ONE TIME DAILY    blood-glucose meter kit To check BG 2  times daily, to use with insurance preferred meter, use as directed.    carvediloL (COREG) 25 MG tablet TAKE 1 TABLET TWICE DAILY    cyanocobalamin (VITAMIN B-12) 1000 MCG tablet Take 100 mcg by mouth once daily.    gabapentin (NEURONTIN) 300 MG capsule Take 1 capsule (300 mg total) by mouth every evening.    glipiZIDE 5 MG TR24 Take 1 tablet (5 mg total) by mouth daily with breakfast.    [] HYDROcodone-acetaminophen (NORCO) 5-325 mg per tablet Take 1 tablet by mouth every 6 (six) hours as needed for Pain.    ketoconazole (NIZORAL) 2 % cream Apply topically once daily.    lancets (ACCU-CHEK MULTICLIX LANCET) Misc To use as directed with Accu Chek Sahra FastClix lancing device    lancets Misc To check BG 2 times daily, to use with insurance preferred meter.     levothyroxine (SYNTHROID) 112 MCG tablet Take 1 tablet (112 mcg total) by mouth before breakfast.    lisinopriL (PRINIVIL,ZESTRIL) 40 MG tablet Take 1 tablet (40 mg total) by mouth once daily.    metFORMIN (GLUCOPHAGE) 1000 MG tablet Take 1 tablet (1,000 mg total) by mouth 2 (two) times daily with meals.    mirabegron (MYRBETRIQ) 25 mg Tb24 ER tablet Take 1 tablet (25 mg total) by mouth once daily.    naproxen (NAPROSYN) 500 MG tablet Take 1 tablet (500 mg total) by mouth 2 (two) times daily with meals.    rosuvastatin (CRESTOR) 20 MG tablet Take 1 tablet (20 mg total) by mouth once daily.    SITagliptin phosphate (JANUVIA) 100 MG Tab Take 1 tablet (100 mg total) by mouth once daily.    turmeric root extract 500 mg Cap Take by mouth.    VITAMIN B COMPLEX ORAL Take 1 tablet by mouth.    vitamin D (VITAMIN D3) 1000 units Tab Take 1,000 Units by mouth once daily.     Antibiotics (From admission, onward)      Start     Stop Route Frequency Ordered    03/19/24 1300  cefTRIAXone (ROCEPHIN) 2 g in dextrose 5 % in water (D5W) 100 mL IVPB (MB+)         -- IV Every 12 hours (non-standard times) 03/19/24 1145    03/17/24 1100  ampicillin (OMNIPEN) 2 g in sodium chloride 0.9 % 100 mL IVPB (MB+)         -- IV Every 4 hours (non-standard times) 03/17/24 0954          Antifungals (From admission, onward)      None          Antivirals (From admission, onward)      None             Immunization History   Administered Date(s) Administered    COVID-19, MRNA, LN-S, PF (Pfizer) (Gray Cap) 03/09/2022    COVID-19, mRNA, LNP-S, bivalent booster, PF (PFIZER OMICRON) 10/12/2022    COVID-19, vector-nr, rS-Ad26, PF (Katelin) 03/05/2021    Influenza (FLUAD) - Quadrivalent - Adjuvanted - PF *Preferred* (65+) 12/10/2021, 10/10/2022, 11/09/2023    Influenza - High Dose - PF (65 years and older) 12/02/2015, 09/15/2017, 11/26/2018    Pneumococcal Conjugate - 13 Valent 11/26/2018    Pneumococcal Polysaccharide - 23 Valent 01/18/2023       Family  History       Problem Relation (Age of Onset)    Breast cancer Mother    COPD Father    Cancer Mother, Sister, Daughter    Diabetes Son    Hypertension Sister, Sister, Daughter, Daughter    Ovarian cancer Mother, Sister    Thyroid disease Daughter, Daughter          Social History     Socioeconomic History    Marital status:    Tobacco Use    Smoking status: Never    Smokeless tobacco: Never   Substance and Sexual Activity    Alcohol use: No    Drug use: No    Sexual activity: Yes     Partners: Male     Birth control/protection: Post-menopausal     Comment:     Other Topics Concern    Are you pregnant or think you may be? No    Breast-feeding No   Social History Narrative     with 7 kids     Social Determinants of Health     Financial Resource Strain: Low Risk  (3/18/2024)    Overall Financial Resource Strain (CARDIA)     Difficulty of Paying Living Expenses: Not very hard   Food Insecurity: No Food Insecurity (3/18/2024)    Hunger Vital Sign     Worried About Running Out of Food in the Last Year: Never true     Ran Out of Food in the Last Year: Never true   Transportation Needs: No Transportation Needs (3/18/2024)    PRAPARE - Transportation     Lack of Transportation (Medical): No     Lack of Transportation (Non-Medical): No   Physical Activity: Inactive (3/18/2024)    Exercise Vital Sign     Days of Exercise per Week: 0 days     Minutes of Exercise per Session: 0 min   Stress: No Stress Concern Present (3/18/2024)    [employer] - Occupational Stress Questionnaire     Feeling of Stress : Only a little   Social Connections: Unknown (3/18/2024)    Social Connection and Isolation Panel [NHANES]     Frequency of Communication with Friends and Family: More than three times a week     Frequency of Social Gatherings with Friends and Family: Twice a week     Attends [employer] Services: 1 to 4 times per year     Active Member of Clubs or Organizations: Yes     Attends Club or  Organization Meetings: Never     Marital Status: Patient declined   Housing Stability: Unknown (3/18/2024)    Housing Stability Vital Sign     Unable to Pay for Housing in the Last Year: No     Unstable Housing in the Last Year: No     Review of Systems   Constitutional:  Negative for fatigue and unexpected weight change.   HENT:  Negative for ear pain, facial swelling, hearing loss, mouth sores, nosebleeds, rhinorrhea, sinus pressure, sore throat, tinnitus, trouble swallowing and voice change.    Eyes:  Negative for photophobia, pain, redness and visual disturbance.   Respiratory:  Negative for cough, chest tightness, shortness of breath and wheezing.    Cardiovascular:  Negative for chest pain, palpitations and leg swelling.   Gastrointestinal:  Negative for abdominal pain, blood in stool, constipation, diarrhea, nausea and vomiting.   Endocrine: Negative for cold intolerance, heat intolerance, polydipsia, polyphagia and polyuria.   Genitourinary:  Negative for flank pain, frequency, menstrual problem, vaginal bleeding, vaginal discharge and vaginal pain.   Musculoskeletal:  Negative for arthralgias, back pain, joint swelling, myalgias and neck pain.   Skin:  Negative for rash.   Allergic/Immunologic: Negative for environmental allergies, food allergies and immunocompromised state.   Neurological:  Negative for dizziness, seizures, syncope, weakness, light-headedness, numbness and headaches.   Hematological:  Negative for adenopathy. Does not bruise/bleed easily.   Psychiatric/Behavioral:  Negative for confusion, hallucinations, self-injury, sleep disturbance and suicidal ideas. The patient is not nervous/anxious.      Objective:     Vital Signs (Most Recent):  Temp: 98.4 °F (36.9 °C) (03/24/24 1540)  Pulse: 82 (03/24/24 2016)  Resp: 18 (03/24/24 1540)  BP: (!) 140/63 (03/24/24 2016)  SpO2: 98 % (03/24/24 1540) Vital Signs (24h Range):  Temp:  [98.3 °F (36.8 °C)-98.6 °F (37 °C)] 98.4 °F (36.9 °C)  Pulse:  [75-83]  82  Resp:  [18] 18  SpO2:  [95 %-98 %] 98 %  BP: (118-161)/(60-73) 140/63     Weight: 71.6 kg (157 lb 13.6 oz)  Body mass index is 26.27 kg/m².    Estimated Creatinine Clearance: 51.1 mL/min (based on SCr of 0.9 mg/dL).     Physical Exam  Vitals and nursing note reviewed.   Constitutional:       Appearance: She is well-developed.   HENT:      Head: Normocephalic and atraumatic.      Right Ear: External ear normal.      Left Ear: External ear normal.   Eyes:      General: No scleral icterus.        Right eye: No discharge.         Left eye: No discharge.      Conjunctiva/sclera: Conjunctivae normal.   Cardiovascular:      Rate and Rhythm: Normal rate and regular rhythm.      Heart sounds: Normal heart sounds. No murmur heard.     No friction rub. No gallop.   Pulmonary:      Effort: Pulmonary effort is normal. No respiratory distress.      Breath sounds: Normal breath sounds. No stridor. No wheezing or rales.   Abdominal:      General: Bowel sounds are normal.      Tenderness: There is abdominal tenderness in the suprapubic area.   Musculoskeletal:         General: No tenderness. Normal range of motion.   Skin:     General: Skin is warm and dry.   Neurological:      Mental Status: She is alert and oriented to person, place, and time.          Significant Labs: CBC:   Recent Labs   Lab 03/23/24  0503 03/24/24  0527   WBC 4.97 5.44   HGB 8.5* 8.9*   HCT 27.4* 28.3*    320       CMP:   Recent Labs   Lab 03/23/24  0503 03/24/24  0527    135*   K 4.3 4.1    103   CO2 22* 26   * 138*   BUN 9 8   CREATININE 0.9 0.9   CALCIUM 8.8 9.1   PROT 5.8* 6.0   ALBUMIN 2.5* 2.6*   BILITOT 0.2 0.2   ALKPHOS 82 85   AST 17 12   ALT 40 34   ANIONGAP 11 6*       Microbiology Results (last 7 days)       Procedure Component Value Units Date/Time    Blood culture [[id number]] Collected: 03/20/24 0550    Order Status: Completed Specimen: Blood Updated: 03/24/24 0812     Blood Culture, Routine No Growth to date       No Growth to date      No Growth to date      No Growth to date      No Growth to date    Blood culture [[id number]] Collected: 03/21/24 0343    Order Status: Completed Specimen: Blood Updated: 03/24/24 0612     Blood Culture, Routine No Growth to date      No Growth to date      No Growth to date      No Growth to date    Blood culture [[id number]] Collected: 03/21/24 0343    Order Status: Completed Specimen: Blood Updated: 03/24/24 0612     Blood Culture, Routine No Growth to date      No Growth to date      No Growth to date      No Growth to date    Blood culture [[id number]]  (Abnormal) Collected: 03/20/24 0550    Order Status: Completed Specimen: Blood Updated: 03/23/24 1244     Blood Culture, Routine Gram stain aer bottle: Gram positive cocci in chains resembling Strep      Positive results previously called 03/21/2024  00:44      ENTEROCOCCUS FAECALIS  For susceptibility see order # [id number]      Blood culture [[id number]]  (Abnormal) Collected: 03/19/24 0825    Order Status: Completed Specimen: Blood Updated: 03/22/24 1508     Blood Culture, Routine Gram stain aer bottle: Gram positive cocci in chains resembling Strep      Positive results previously called 03/20/2024 00:00      ENTEROCOCCUS FAECALIS  For susceptibility see order #[id number]      Blood culture [[id number]]  (Abnormal)  (Susceptibility) Collected: 03/19/24 0822    Order Status: Completed Specimen: Blood Updated: 03/22/24 1507     Blood Culture, Routine Gram stain aer bottle: Gram positive cocci in chains resembling Strep      Results called to and read back by: Jaimee Morillo RN. 03/20/2024  00:00      ENTEROCOCCUS FAECALIS    Blood culture [[id number]]  (Abnormal) Collected: 03/18/24 0538    Order Status: Completed Specimen: Blood Updated: 03/20/24 1016     Blood Culture, Routine Gram stain aer bottle: Gram positive cocci in chains resembling Strep      Positive results previously called 03/18/2024      ENTEROCOCCUS FAECALIS  For  susceptibility see order #[id number]      Blood culture [[id number]]  (Abnormal) Collected: 03/18/24 0538    Order Status: Completed Specimen: Blood Updated: 03/20/24 1016     Blood Culture, Routine Gram stain aer bottle: Gram positive cocci in chains resembling Strep      Positive results previously called 03/18/2024      ENTEROCOCCUS FAECALIS  For susceptibility see order #[id number]      Blood culture [[id number]]  (Abnormal) Collected: 03/17/24 1300    Order Status: Completed Specimen: Blood Updated: 03/20/24 1016     Blood Culture, Routine Gram stain aer bottle: Gram positive cocci in chains resembling Strep      Positive results previously called 03/17/2024 05:37      ENTEROCOCCUS FAECALIS  For susceptibility see order #[id number]      Blood culture [[id number]]  (Abnormal) Collected: 03/17/24 1300    Order Status: Completed Specimen: Blood Updated: 03/20/24 1015     Blood Culture, Routine Gram stain aer bottle: Gram positive cocci in chains resembling Strep      Results called to and read back by: Emilie Dela Cruz RN. 03/18/2024  06:17      Gram stain vinay bottle: Gram positive cocci in chains resembling Strep      03/18/2024  11:21      ENTEROCOCCUS FAECALIS  For susceptibility see order #[id number]      Blood culture x two cultures. Draw prior to antibiotics. [[id number]]  (Abnormal) Collected: 03/16/24 1420    Order Status: Completed Specimen: Blood from Wrist, Right Updated: 03/19/24 1054     Blood Culture, Routine Gram stain aer bottle: Gram positive cocci in chains resembling Strep      Positive results previously called 03/17/2024  06:25      ENTEROCOCCUS FAECALIS  For susceptibility see order #[id number]      Narrative:      Aerobic and anaerobic    Blood culture x two cultures. Draw prior to antibiotics. [[id number]]  (Abnormal)  (Susceptibility) Collected: 03/16/24 1410    Order Status: Completed Specimen: Blood from Peripheral, Forearm, Left Updated: 03/19/24 1053     Blood Culture, Routine Gram stain  aer bottle: Gram positive cocci in chains resembling Strep      Gram stain vinay bottle: Gram positive cocci in chains resembling Strep      Results called to and read back by: Emilie Napoles RN 03/17/2024  05:37      ENTEROCOCCUS FAECALIS    Narrative:      Aerobic and anaerobic    Urine culture [[id number]]  (Abnormal)  (Susceptibility) Collected: 03/16/24 1359    Order Status: Completed Specimen: Urine Updated: 03/18/24 2120     Urine Culture, Routine ENTEROCOCCUS FAECALIS  >100,000 cfu/ml      Narrative:      Specimen Source->Urine          Urine Studies:   Recent Labs   Lab 03/16/24  1359   COLORU Yellow   APPEARANCEUA Hazy*   PHUR 6.0   SPECGRAV 1.020   PROTEINUA 1+*   GLUCUA 3+*   KETONESU Negative   BILIRUBINUA Negative   OCCULTUA 2+*   NITRITE Negative   LEUKOCYTESUR 2+*   RBCUA 14*   WBCUA 37*   BACTERIA Occasional   SQUAMEPITHEL 1   HYALINECASTS 0         Significant Imaging: I have reviewed all pertinent imaging results/findings within the past 24 hours.

## 2024-03-25 NOTE — PLAN OF CARE
Problem: Adult Inpatient Plan of Care  Goal: Plan of Care Review  Outcome: Ongoing, Progressing  Goal: Patient-Specific Goal (Individualized)  Outcome: Ongoing, Progressing  Goal: Absence of Hospital-Acquired Illness or Injury  Outcome: Ongoing, Progressing  Goal: Optimal Comfort and Wellbeing  Outcome: Ongoing, Progressing  Goal: Readiness for Transition of Care  Outcome: Ongoing, Progressing     Problem: Infection  Goal: Absence of Infection Signs and Symptoms  Outcome: Ongoing, Progressing     Problem: Diabetes Comorbidity  Goal: Blood Glucose Level Within Targeted Range  Outcome: Ongoing, Progressing     Problem: Adjustment to Illness (Sepsis/Septic Shock)  Goal: Optimal Coping  Outcome: Ongoing, Progressing     Problem: Bleeding (Sepsis/Septic Shock)  Goal: Absence of Bleeding  Outcome: Ongoing, Progressing     Problem: Glycemic Control Impaired (Sepsis/Septic Shock)  Goal: Blood Glucose Level Within Desired Range  Outcome: Ongoing, Progressing     Problem: Infection Progression (Sepsis/Septic Shock)  Goal: Absence of Infection Signs and Symptoms  Outcome: Ongoing, Progressing     Problem: Nutrition Impaired (Sepsis/Septic Shock)  Goal: Optimal Nutrition Intake  Outcome: Ongoing, Progressing     Problem: Fluid and Electrolyte Imbalance (Acute Kidney Injury/Impairment)  Goal: Fluid and Electrolyte Balance  Outcome: Ongoing, Progressing     Problem: Oral Intake Inadequate (Acute Kidney Injury/Impairment)  Goal: Optimal Nutrition Intake  Outcome: Ongoing, Progressing     Problem: Renal Function Impairment (Acute Kidney Injury/Impairment)  Goal: Effective Renal Function  Outcome: Ongoing, Progressing     Problem: Pain Acute  Goal: Acceptable Pain Control and Functional Ability  Outcome: Ongoing, Progressing     Problem: Skin Injury Risk Increased  Goal: Skin Health and Integrity  Outcome: Ongoing, Progressing    Patient alert and oriented. Cooperative with care. Medicated as ordered. New Picc line inserted this  afternoon. Awaiting xray results. Patient oob to bedside commode. Patient continues on iv abx. Nuclear med here in am.

## 2024-03-25 NOTE — ASSESSMENT & PLAN NOTE
78F with h/o DM, treated LTBI (6 months INH in 2018),  interstitial cystitis and urolithiasis s/p ureteroscopic stone extraction with lithotripsy and right ureteral stent placement (right) on 3/14 admitted 3/16 with chills, dizziness, flank pain, dysuria, freq, and a fall. Bcx 3/16, 3/17, 3/18 with e.faecalis in both sets. Repeat 3/19 NGTD. Fever defervesced, leukocytosis trending down. 2d echo without veg. Reports PCN allergy, but tolerating ampicillin.     Suspect bacteremia from  source; ureteral procedure likely contributed. Fortunately CARLITO negative for endocarditis and now clearing cultures.  Discussed with urology and they don't think ureteral stents are infected and want to hold off exchanging at this time. Awaiting results of NM scan.     Recommendations:   - continue ampicillin/ceftriaxone. given high grade bacteremia, will need at least 4 weeks of iv abx followed by chronic supressive oral antibiotics until ureteral  stent is removed or replaced. Given she cleared on the combo amp/ceftriaxone, would continue both  - no objections to picc line   - f/u NM tagged WBC scan to eval for source of infection; please notify ID if there is localization on scan concerning for infection      Outpatient Antibiotic Therapy Plan:    Please send referral to [facility].    1) Infection: enterococcus bacteremia and pyleonephritis    2) Discharge Antibiotics:    Intravenous antibiotics:  Ampicillin 2gm iv q4h (or continuous infusion)  Ceftriaxone 2gm iv q12h    3) Therapy Duration:  4weeks    Estimated end date of IV antibiotics: 4/18/24    4) Outpatient Weekly Labs:    Order the following labs to be drawn on Mondays:   CBC  CMP   CRP      5) Fax Lab Results to Infectious Diseases Provider: Dr Velasquez    [facility] Fax Number: [phone]    6) Outpatient Infectious Diseases Follow-up    Follow-up appointment will be arranged by the ID clinic and will be found in the patient's  appointments tab.    Prior to discharge, please ensure the patient's follow-up has been scheduled.    If there is still no follow-up scheduled prior to discharge, please send an EPIC message to Brandie Kinsey in Infectious Diseases.

## 2024-03-25 NOTE — ASSESSMENT & PLAN NOTE
Patient presented to ED with fevers and malaise 2 days after urologic procedure.  CT with evidence nephritic stranding.  Admitted for pyelonephritis.  Blood cultures on presentation 3/16 positive for Enterococcus faecalis. Suspect urologic source. TTE negative  - ID consulted   - repeat cultures  - CARLIOT no vegetation  - S/p ceftriaxone in ED and on admission, continued for double coverage due to persistent bacteremia  - started on ampicillin 3/17   - urology to follow-up in clinic, stated no role for stent removal as it is source control  - NM scan ordered to be started on Monday    ID final recs: Outpatient Antibiotic Therapy Plan:     Please send referral to [facility].     1) Infection: enterococcus bacteremia and pyleonephritis     2) Discharge Antibiotics:     Intravenous antibiotics:  Ampicillin 2gm iv q4h (or continuous infusion)  Ceftriaxone 2gm iv q12h     3) Therapy Duration:  4weeks     Estimated end date of IV antibiotics: 4/18/24     4) Outpatient Weekly Labs:     Order the following labs to be drawn on Mondays:   CBC  CMP   CRP        5) Fax Lab Results to Infectious Diseases Provider: Dr Velasquez     [facility] Fax Number: [phone]     6) Outpatient Infectious Diseases Follow-up     Follow-up appointment will be arranged by the ID clinic and will be found in the patient's appointments tab.     Prior to discharge, please ensure the patient's follow-up has been scheduled.    If there is still no follow-up scheduled prior to discharge, please send an EPIC message to Brandie Kinsey in Infectious Diseases.

## 2024-03-25 NOTE — SUBJECTIVE & OBJECTIVE
Interval History:   3.25: discussed antiboitic plan with patient. May need SNF> NM tagged scan ordered.     Review of Systems   Constitutional:  Positive for chills and fever. Negative for fatigue and unexpected weight change.   HENT:  Negative for ear pain, facial swelling, hearing loss, mouth sores, nosebleeds, rhinorrhea, sinus pressure, sore throat, tinnitus, trouble swallowing and voice change.    Eyes:  Negative for photophobia, pain, redness and visual disturbance.   Respiratory:  Negative for cough, chest tightness, shortness of breath and wheezing.    Cardiovascular:  Negative for chest pain, palpitations and leg swelling.   Gastrointestinal:  Negative for abdominal pain, blood in stool, constipation, diarrhea, nausea and vomiting.   Endocrine: Negative for cold intolerance, heat intolerance, polydipsia, polyphagia and polyuria.   Genitourinary:  Positive for difficulty urinating, dysuria, hematuria and urgency. Negative for flank pain, frequency, menstrual problem, vaginal bleeding, vaginal discharge and vaginal pain.   Musculoskeletal:  Negative for arthralgias, back pain, joint swelling, myalgias and neck pain.   Skin:  Negative for rash.   Allergic/Immunologic: Negative for environmental allergies, food allergies and immunocompromised state.   Neurological:  Negative for dizziness, seizures, syncope, weakness, light-headedness, numbness and headaches.   Hematological:  Negative for adenopathy. Does not bruise/bleed easily.   Psychiatric/Behavioral:  Negative for confusion, hallucinations, self-injury, sleep disturbance and suicidal ideas. The patient is not nervous/anxious.      Objective:     Vital Signs (Most Recent):  Temp: 99 °F (37.2 °C) (03/25/24 0750)  Pulse: 77 (03/25/24 0750)  Resp: 17 (03/25/24 0750)  BP: (!) 171/72 (03/25/24 0750)  SpO2: 95 % (03/25/24 0750) Vital Signs (24h Range):  Temp:  [98.4 °F (36.9 °C)-99.4 °F (37.4 °C)] 99 °F (37.2 °C)  Pulse:  [74-83] 77  Resp:  [16-18] 17  SpO2:   [94 %-98 %] 95 %  BP: (121-171)/(60-72) 171/72     Weight: 71.1 kg (156 lb 12 oz)  Body mass index is 26.08 kg/m².    Intake/Output Summary (Last 24 hours) at 3/25/2024 0944  Last data filed at 3/25/2024 0021  Gross per 24 hour   Intake --   Output 650 ml   Net -650 ml         Physical Exam  Vitals and nursing note reviewed.   Constitutional:       Appearance: She is well-developed.   HENT:      Head: Normocephalic and atraumatic.      Right Ear: External ear normal.      Left Ear: External ear normal.   Eyes:      General: No scleral icterus.        Right eye: No discharge.         Left eye: No discharge.      Conjunctiva/sclera: Conjunctivae normal.   Cardiovascular:      Rate and Rhythm: Normal rate and regular rhythm.      Heart sounds: Normal heart sounds. No murmur heard.     No friction rub. No gallop.   Pulmonary:      Effort: Pulmonary effort is normal. No respiratory distress.      Breath sounds: Normal breath sounds. No stridor. No wheezing or rales.   Abdominal:      General: Bowel sounds are normal.      Tenderness: There is abdominal tenderness in the suprapubic area.   Musculoskeletal:         General: No tenderness. Normal range of motion.   Skin:     General: Skin is warm and dry.   Neurological:      Mental Status: She is alert and oriented to person, place, and time.             Significant Labs: All pertinent labs within the past 24 hours have been reviewed.    Significant Imaging: I have reviewed all pertinent imaging results/findings within the past 24 hours.

## 2024-03-25 NOTE — ASSESSMENT & PLAN NOTE
Chronic, controlled. Latest blood pressure and vitals reviewed-     Temp:  [98.4 °F (36.9 °C)-99.4 °F (37.4 °C)]   Pulse:  [74-83]   Resp:  [16-18]   BP: (121-171)/(60-72)   SpO2:  [94 %-98 %] .   Home meds for hypertension were reviewed and noted below.   Hypertension Medications               carvediloL (COREG) 25 MG tablet TAKE 1 TABLET TWICE DAILY    lisinopriL (PRINIVIL,ZESTRIL) 40 MG tablet Take 1 tablet (40 mg total) by mouth once daily.            While in the hospital, will manage blood pressure as follows; Adjust home antihypertensive regimen as follows- held on presentation, restart as tolerated    Will utilize p.r.n. blood pressure medication only if patient's blood pressure greater than 180/110 and she develops symptoms such as worsening chest pain or shortness of breath.

## 2024-03-25 NOTE — ASSESSMENT & PLAN NOTE
Resolved  Patient with acute kidney injury/acute renal failure likely due to pre-renal azotemia due to dehydration. Cr 1.2 on presentation. ANAIS is currently improving. Baseline creatinine  0.8  - Labs reviewed- Renal function/electrolytes with Estimated Creatinine Clearance: 50.9 mL/min (based on SCr of 0.9 mg/dL). according to latest data. Monitor urine output and serial BMP and adjust therapy as needed. Avoid nephrotoxins and renally dose meds for GFR listed above.

## 2024-03-25 NOTE — ASSESSMENT & PLAN NOTE
"This patient does have evidence of infective focus  My overall impression is sepsis.  Source: Urinary Tract  Antibiotics given-   Antibiotics (72h ago, onward)      Start     Stop Route Frequency Ordered    03/19/24 1300  cefTRIAXone (ROCEPHIN) 2 g in dextrose 5 % in water (D5W) 100 mL IVPB (MB+)         -- IV Every 12 hours (non-standard times) 03/19/24 1145    03/17/24 1100  ampicillin (OMNIPEN) 2 g in sodium chloride 0.9 % 100 mL IVPB (MB+)         -- IV Every 4 hours (non-standard times) 03/17/24 0954          Latest lactate reviewed-  No results for input(s): "LACTATE", "POCLAC" in the last 72 hours.    Fluid challenge Ideal Body Weight- The patient's ideal body weight is Ideal body weight: 57 kg (125 lb 10.6 oz) which will be used to calculate fluid bolus of 30 ml/kg for treatment of septic shock.      Post- resuscitation assessment No - Post resuscitation assessment not needed     ANAIS    Will Not start Pressors- Levophed for MAP of 65  Source control achieved by: rocephin and ampicillin  "

## 2024-03-25 NOTE — PLAN OF CARE
03/25/24 1645   Discharge Reassessment   Assessment Type Discharge Planning Reassessment   Did the patient's condition or plan change since previous assessment? No   Discharge Plan discussed with: Adult children   Communicated RAJESH with patient/caregiver Date not available/Unable to determine   Discharge Plan A Home Health   Discharge Plan B Home with family   DME Needed Upon Discharge  none   Transition of Care Barriers None   Why the patient remains in the hospital Requires continued medical care   Post-Acute Status   Post-Acute Authorization Home Health   Home Health Status Referrals Sent   Coverage [employer] Managed Medicare   Hospital Resources/Appts/Education Provided Provided patient/caregiver with written discharge plan information   Patient choice form signed by patient/caregiver List with quality metrics by geographic area provided   Discharge Delays (!) Procedure Scheduling (IR, OR, Labs, Echo, Cath, Echo, EEG)     [address] - Stepdown Flex ([place]-[facility])  Discharge Reassessment          Discharge Plan A and Plan B have been determined by review of patient's clinical status, future medical and therapeutic needs, and coverage/benefits for post-acute care in coordination with multidisciplinary team members.    Radha Simpson MSW, CSW

## 2024-03-25 NOTE — ASSESSMENT & PLAN NOTE
Patient's FSGs are controlled on current medication regimen.  Last A1c reviewed-   Lab Results   Component Value Date    HGBA1C 7.6 (H) 03/21/2024     Most recent fingerstick glucose reviewed-   Recent Labs   Lab 03/24/24  1230 03/24/24  1542 03/24/24 2019 03/25/24  0746   POCTGLUCOSE 231* 273* 261* 169*       Current correctional scale  Low  Maintain anti-hyperglycemic dose as follows-   Antihyperglycemics (From admission, onward)    Start     Stop Route Frequency Ordered    03/21/24 0715  insulin aspart U-100 pen 3 Units         -- SubQ 3 times daily with meals 03/20/24 2112 03/20/24 2115  insulin detemir U-100 (Levemir) pen 7 Units         -- SubQ 2 times daily 03/20/24 2111 03/16/24 2019  insulin aspart U-100 pen 0-5 Units         -- SubQ Before meals & nightly PRN 03/16/24 1920        Hold Oral hypoglycemics while patient is in the hospital.

## 2024-03-25 NOTE — PROGRESS NOTES
[facility] - Stepdown [facility] ([place] [id number])  [facility] Medicine  Progress Note    Patient Name: Aminah Alicea-Shelly  MRN: [id number]  Patient Class: IP- Inpatient   Admission Date: 3/16/2024  Length of Stay: 9 days  Attending Physician: Jaleesa Jaimes MD  Primary Care Provider: Jeison Sanchez MD        Subjective:     Principal Problem:Enterococcal bacteremia        HPI:  77 yo female with DM2, GERD, HPLD, HTN, Hypothyroidism, recent stone removal presenting with chills, weakness, and back pain. Interpretor MERI used at bedside. She states that she was d/c'd on the 14th of March was doing okay until last night she started feeling weak and actually fell, she denies any leg pain or headache and did loss consciousness, however she did not feel any better today whenever she woke up so presented to the ER.    She had a fever measured here at 102.6F, CT scan showed perinephric stranding, UA consistent with infection, lactic acidosis. Urology was consulted who recommended day and antibiotics. Medicine called for admission.     Overview/Hospital Course:  Started on ceftriaxone at admission, ampicillin added on 3/17. Blood cultures quickly resulted positive for e faecalis, persistently positive. ID consulted. Patient passed voiding trial 3/18. TTE negative. CARLITO to be done 3/20.     Interval History:   3.25: discussed antiboitic plan with patient. May need SNF> NM tagged scan ordered.     Review of Systems   Constitutional:  Positive for chills and fever. Negative for fatigue and unexpected weight change.   HENT:  Negative for ear pain, facial swelling, hearing loss, mouth sores, nosebleeds, rhinorrhea, sinus pressure, sore throat, tinnitus, trouble swallowing and voice change.    Eyes:  Negative for photophobia, pain, redness and visual disturbance.   Respiratory:  Negative for cough, chest tightness, shortness of breath and wheezing.    Cardiovascular:  Negative for chest pain, palpitations and leg swelling.    Gastrointestinal:  Negative for abdominal pain, blood in stool, constipation, diarrhea, nausea and vomiting.   Endocrine: Negative for cold intolerance, heat intolerance, polydipsia, polyphagia and polyuria.   Genitourinary:  Positive for difficulty urinating, dysuria, hematuria and urgency. Negative for flank pain, frequency, menstrual problem, vaginal bleeding, vaginal discharge and vaginal pain.   Musculoskeletal:  Negative for arthralgias, back pain, joint swelling, myalgias and neck pain.   Skin:  Negative for rash.   Allergic/Immunologic: Negative for environmental allergies, food allergies and immunocompromised state.   Neurological:  Negative for dizziness, seizures, syncope, weakness, light-headedness, numbness and headaches.   Hematological:  Negative for adenopathy. Does not bruise/bleed easily.   Psychiatric/Behavioral:  Negative for confusion, hallucinations, self-injury, sleep disturbance and suicidal ideas. The patient is not nervous/anxious.      Objective:     Vital Signs (Most Recent):  Temp: 99 °F (37.2 °C) (03/25/24 0750)  Pulse: 77 (03/25/24 0750)  Resp: 17 (03/25/24 0750)  BP: (!) 171/72 (03/25/24 0750)  SpO2: 95 % (03/25/24 0750) Vital Signs (24h Range):  Temp:  [98.4 °F (36.9 °C)-99.4 °F (37.4 °C)] 99 °F (37.2 °C)  Pulse:  [74-83] 77  Resp:  [16-18] 17  SpO2:  [94 %-98 %] 95 %  BP: (121-171)/(60-72) 171/72     Weight: 71.1 kg (156 lb 12 oz)  Body mass index is 26.08 kg/m².    Intake/Output Summary (Last 24 hours) at 3/25/2024 0944  Last data filed at 3/25/2024 0021  Gross per 24 hour   Intake --   Output 650 ml   Net -650 ml         Physical Exam  Vitals and nursing note reviewed.   Constitutional:       Appearance: She is well-developed.   HENT:      Head: Normocephalic and atraumatic.      Right Ear: External ear normal.      Left Ear: External ear normal.   Eyes:      General: No scleral icterus.        Right eye: No discharge.         Left eye: No discharge.      Conjunctiva/sclera:  Conjunctivae normal.   Cardiovascular:      Rate and Rhythm: Normal rate and regular rhythm.      Heart sounds: Normal heart sounds. No murmur heard.     No friction rub. No gallop.   Pulmonary:      Effort: Pulmonary effort is normal. No respiratory distress.      Breath sounds: Normal breath sounds. No stridor. No wheezing or rales.   Abdominal:      General: Bowel sounds are normal.      Tenderness: There is abdominal tenderness in the suprapubic area.   Musculoskeletal:         General: No tenderness. Normal range of motion.   Skin:     General: Skin is warm and dry.   Neurological:      Mental Status: She is alert and oriented to person, place, and time.             Significant Labs: All pertinent labs within the past 24 hours have been reviewed.    Significant Imaging: I have reviewed all pertinent imaging results/findings within the past 24 hours.    Assessment/Plan:      * Enterococcal bacteremia  Patient presented to ED with fevers and malaise 2 days after urologic procedure.  CT with evidence nephritic stranding.  Admitted for pyelonephritis.  Blood cultures on presentation 3/16 positive for Enterococcus faecalis. Suspect urologic source. TTE negative  - ID consulted   - repeat cultures  - CARLITO no vegetation  - S/p ceftriaxone in ED and on admission, continued for double coverage due to persistent bacteremia  - started on ampicillin 3/17   - urology to follow-up in clinic, stated no role for stent removal as it is source control  - NM scan ordered to be started on Monday    ID final recs: Outpatient Antibiotic Therapy Plan:     Please send referral to [facility].     1) Infection: enterococcus bacteremia and pyleonephritis     2) Discharge Antibiotics:     Intravenous antibiotics:  Ampicillin 2gm iv q4h (or continuous infusion)  Ceftriaxone 2gm iv q12h     3) Therapy Duration:  4weeks     Estimated end date of IV antibiotics: 4/18/24     4) Outpatient Weekly Labs:      Order the following labs to be drawn on Mondays:   CBC  CMP   CRP        5) Fax Lab Results to Infectious Diseases Provider: Dr Velasquez     [facility] Fax Number: [phone]     6) Outpatient Infectious Diseases Follow-up     Follow-up appointment will be arranged by the ID clinic and will be found in the patient's appointments tab.     Prior to discharge, please ensure the patient's follow-up has been scheduled.    If there is still no follow-up scheduled prior to discharge, please send an EPIC message to Brandie Kinsey in Infectious Diseases.          Positive QuantiFERON-TB Gold test  - history noted      Ureteral stent present  Management as per urology   - outpatient follow-up      Anemia  Patient's anemia is currently controlled. Has not received any PRBCs to date. Etiology likely d/t chronic disease v iron deficiency. No obvious signs of bleeding.  Current CBC reviewed-   Lab Results   Component Value Date    HGB 8.7 (L) 03/25/2024    HCT 28.3 (L) 03/25/2024     Monitor serial CBC and transfuse if patient becomes hemodynamically unstable, symptomatic or H/H drops below 7/21.      AANIS (acute kidney injury)  Resolved  Patient with acute kidney injury/acute renal failure likely due to pre-renal azotemia due to dehydration. Cr 1.2 on presentation. ANAIS is currently improving. Baseline creatinine  0.8  - Labs reviewed- Renal function/electrolytes with Estimated Creatinine Clearance: 50.9 mL/min (based on SCr of 0.9 mg/dL). according to latest data. Monitor urine output and serial BMP and adjust therapy as needed. Avoid nephrotoxins and renally dose meds for GFR listed above.    Sepsis  This patient does have evidence of infective focus  My overall impression is sepsis.  Source: Urinary Tract  Antibiotics given-   Antibiotics (72h ago, onward)      Start     Stop Route Frequency Ordered    03/19/24 1300  cefTRIAXone (ROCEPHIN) 2 g in dextrose 5 % in water (D5W) 100 mL IVPB (MB+)         -- IV Every 12  "hours (non-standard times) 03/19/24 1145    03/17/24 1100  ampicillin (OMNIPEN) 2 g in sodium chloride 0.9 % 100 mL IVPB (MB+)         -- IV Every 4 hours (non-standard times) 03/17/24 0954          Latest lactate reviewed-  No results for input(s): "LACTATE", "POCLAC" in the last 72 hours.    Fluid challenge Ideal Body Weight- The patient's ideal body weight is Ideal body weight: 57 kg (125 lb 10.6 oz) which will be used to calculate fluid bolus of 30 ml/kg for treatment of septic shock.      Post- resuscitation assessment No - Post resuscitation assessment not needed     ANAIS    Will Not start Pressors- Levophed for MAP of 65  Source control achieved by: rocephin and ampicillin    Pyelonephritis  Acute, urine consistent with infection.   -  see above        Type 2 diabetes mellitus with neurologic complication, without long-term current use of insulin  Patient's FSGs are controlled on current medication regimen.  Last A1c reviewed-   Lab Results   Component Value Date    HGBA1C 7.6 (H) 03/21/2024     Most recent fingerstick glucose reviewed-   Recent Labs   Lab 03/24/24  1230 03/24/24  1542 03/24/24  2019 03/25/24  0746   POCTGLUCOSE 231* 273* 261* 169*       Current correctional scale  Low  Maintain anti-hyperglycemic dose as follows-   Antihyperglycemics (From admission, onward)      Start     Stop Route Frequency Ordered    03/21/24 0715  insulin aspart U-100 pen 3 Units         -- SubQ 3 times daily with meals 03/20/24 2112 03/20/24 2115  insulin detemir U-100 (Levemir) pen 7 Units         -- SubQ 2 times daily 03/20/24 2111 03/16/24 2019  insulin aspart U-100 pen 0-5 Units         -- SubQ Before meals & nightly PRN 03/16/24 1920          Hold Oral hypoglycemics while patient is in the hospital.    GERD (gastroesophageal reflux disease)  Chronic, controlled, continue to monitor      HTN (hypertension), benign  Chronic, controlled. Latest blood pressure and vitals reviewed-     Temp:  [98.4 °F (36.9 " °C)-99.4 °F (37.4 °C)]   Pulse:  [74-83]   Resp:  [16-18]   BP: (121-171)/(60-72)   SpO2:  [94 %-98 %] .   Home meds for hypertension were reviewed and noted below.   Hypertension Medications               carvediloL (COREG) 25 MG tablet TAKE 1 TABLET TWICE DAILY    lisinopriL (PRINIVIL,ZESTRIL) 40 MG tablet Take 1 tablet (40 mg total) by mouth once daily.            While in the hospital, will manage blood pressure as follows; Adjust home antihypertensive regimen as follows- held on presentation, restart as tolerated    Will utilize p.r.n. blood pressure medication only if patient's blood pressure greater than 180/110 and she develops symptoms such as worsening chest pain or shortness of breath.    Hypothyroidism  Chronic, controlled, continue home synthroid        VTE Risk Mitigation (From admission, onward)           Ordered     enoxaparin injection 40 mg  Daily         03/16/24 1920     IP VTE HIGH RISK PATIENT  Once         03/16/24 1920     Place sequential compression device  Until discontinued         03/16/24 1920                    Discharge Planning   RAJESH: 3/27/2024     Code Status: Full Code   Is the patient medically ready for discharge?: No    Reason for patient still in hospital (select all that apply): Patient trending condition  Discharge Plan A: Home Health   Discharge Delays: (!) Procedure Scheduling (IR, OR, Labs, Echo, Cath, Echo, EEG)              Jaleesa Jaimes MD  Department of Hospital Medicine   [facility] - Stepdown Flex ([place]-[facility])

## 2024-03-25 NOTE — PROCEDURES
"Aminah Norton is a 78 y.o. female patient.    Temp: 99 °F (37.2 °C) (03/25/24 0750)  Pulse: 77 (03/25/24 0750)  Resp: 17 (03/25/24 0750)  BP: 127/71 (03/25/24 1141)  SpO2: 95 % (03/25/24 0750)  Weight: 71.1 kg (156 lb 12 oz) (03/25/24 0400)  Height: 5' 5" (165.1 cm) (03/20/24 1412)    PICC  Date/Time: 3/25/2024 4:54 PM  Performed by: Orly Hernandez RN  Assisting provider: Tiff Nieves LPN  Consent Done: Yes  Time out: Immediately prior to procedure a time out was called to verify the correct patient, procedure, equipment, support staff and site/side marked as required  Indications: med administration and vascular access  Anesthesia: local infiltration  Local anesthetic: lidocaine 1% without epinephrine  Anesthetic Total (mL): 3  Description of findings: picc  Preparation: skin prepped with ChloraPrep  Skin prep agent dried: skin prep agent completely dried prior to procedure  Sterile barriers: all five maximum sterile barriers used - cap, mask, sterile gown, sterile gloves, and large sterile sheet  Hand hygiene: hand hygiene performed prior to central venous catheter insertion  Location details: left basilic  Catheter type: double lumen  Catheter size: 5 Fr  Catheter Length: 34cm    Ultrasound guidance: yes  Vessel Caliber: medium and patent, compressibility normal  Vascular Doppler: not done  Needle advanced into vessel with real time Ultrasound guidance.  Guidewire confirmed in vessel.  Image recorded and saved.  Sterile sheath used.  no esophageal manometryNumber of attempts: 1  Post-procedure: blood return through all ports, chlorhexidine patch and sterile dressing applied  Technical procedures used: 3CG  Specimens: No  Implants: No  Assessment: placement verified by x-ray  Complications: none          Name Tiff Nieves LPN  3/25/2024    "

## 2024-03-25 NOTE — SUBJECTIVE & OBJECTIVE
Interval history: NAEO. Tolerating abx. Awaiting NM scan      Past Surgical History:   Procedure Laterality Date    APPENDECTOMY      BLADDER FULGURATION N/A 3/1/2024    Procedure: ULCER INJECTION FULGURATION;  Surgeon: Brody Smith MD;  Location: [facility] OR;  Service: Urology;  Laterality: N/A;    BLADDER SUSPENSION      CATARACT EXTRACTION      right eye     CHOLECYSTECTOMY      COLONOSCOPY N/A 7/20/2020    Procedure: COLONOSCOPY;  Surgeon: Juan Parker MD;  Location: [facility] ENDO (4TH FLR);  Service: Endoscopy;  Laterality: N/A;  Hx of chronic anemia.  patient needs a   4/6/20 - removed from 4/20/20, rescheduled 7/20/20 - pg  covid 7/17-[facility] 2nd floor-tb    COLONOSCOPY N/A 2/9/2023    Procedure: COLONOSCOPY;  Surgeon: Renan Sanchez MD;  Location: [facility] ENDO (4TH FLR);  Service: Colon and Rectal;  Laterality: N/A;  instr handed to patient, -ml    CYSTOSCOPY N/A 5/19/2021    Procedure: CYSTOSCOPY;  Surgeon: Brody Smith MD;  Location: [facility] 1ST FLR;  Service: Urology;  Laterality: N/A;    CYSTOURETEROSCOPY, WITH HOLMIUM LASER LITHOTRIPSY OF URETERAL CALCULUS AND STENT INSERTION Right 3/1/2024    Procedure: CYSTOURETEROSCOPY, WITH HOLMIUM LASER LITHOTRIPSY OF URETERAL CALCULUS AND STENT INSERTION;  Surgeon: Brody Smith MD;  Location: [facility] OR;  Service: Urology;  Laterality: Right;    CYSTOURETEROSCOPY,WITH HOLMIUM LASER LITHOTRIPSY OF URETERAL CALCULUS Right 3/14/2024    Procedure: CYSTOURETEROSCOPY,WITH HOLMIUM LASER LITHOTRIPSY OF URETERAL CALCULUS;  Surgeon: Brody Smith MD;  Location: [facility] OR;  Service: Urology;  Laterality: Right;  1 HR    ECHOCARDIOGRAM,TRANSESOPHAGEAL N/A 3/20/2024    Procedure: Transesophageal echo (CARLITO) intra-procedure log documentation;  Surgeon: Provider, [facility];  Location: [facility] EP LAB;  Service: Cardiology;  Laterality: N/A;    EYE SURGERY Bilateral     cataract removal    INJECTION OF STEROID N/A 3/14/2024     Procedure: INJECTION, STEROID;  Surgeon: Brody Smith MD;  Location: [facility] OR;  Service: Urology;  Laterality: N/A;    PLACEMENT-STENT Right 3/14/2024    Procedure: PLACEMENT-STENT;  Surgeon: Brody Smith MD;  Location: [facility] OR;  Service: Urology;  Laterality: Right;    REMOVAL-STENT Right 3/14/2024    Procedure: REMOVAL-STENT;  Surgeon: Brody Smith MD;  Location: [facility] OR;  Service: Urology;  Laterality: Right;    TOTAL ABDOMINAL HYSTERECTOMY      DUB       Review of patient's allergies indicates:   Allergen Reactions    Bufferin [aspirin, buffered] Itching    Atorvastatin      Myalgias and arthralgias    Shellfish containing products Itching     Shellfish causes her to itch per her daughter, Caro.     Warfarin     Ibuprofen Itching     Itching of eyes, head       Medications:  Medications Prior to Admission   Medication Sig    ACCU-CHEK SOFTCLIX LANCETS Misc CHECK BLOOD SUGAR ONE TIME DAILY    amitriptyline (ELAVIL) 10 MG tablet Take 1 tablet (10 mg total) by mouth every evening.    BD ALCOHOL SWABS PadM USE AS DIRECTED TOPICALLY AS NEEDED    biotin 1 mg tablet Take 1,000 mcg by mouth 3 (three) times daily.    blood sugar diagnostic (ACCU-CHEK MATTIE PLUS TEST STRP) Strp TEST ONE TIME DAILY    blood-glucose meter kit To check BG 2  times daily, to use with insurance preferred meter, use as directed.    carvediloL (COREG) 25 MG tablet TAKE 1 TABLET TWICE DAILY    cyanocobalamin (VITAMIN B-12) 1000 MCG tablet Take 100 mcg by mouth once daily.    gabapentin (NEURONTIN) 300 MG capsule Take 1 capsule (300 mg total) by mouth every evening.    glipiZIDE 5 MG TR24 Take 1 tablet (5 mg total) by mouth daily with breakfast.    [] HYDROcodone-acetaminophen (NORCO) 5-325 mg per tablet Take 1 tablet by mouth every 6 (six) hours as needed for Pain.    ketoconazole (NIZORAL) 2 % cream Apply topically once daily.    lancets (ACCU-CHEK MULTICLIX LANCET) Misc To use as directed with Accu Chek Sahra  FastClix lancing device    lancets Misc To check BG 2 times daily, to use with insurance preferred meter.    levothyroxine (SYNTHROID) 112 MCG tablet Take 1 tablet (112 mcg total) by mouth before breakfast.    lisinopriL (PRINIVIL,ZESTRIL) 40 MG tablet Take 1 tablet (40 mg total) by mouth once daily.    metFORMIN (GLUCOPHAGE) 1000 MG tablet Take 1 tablet (1,000 mg total) by mouth 2 (two) times daily with meals.    mirabegron (MYRBETRIQ) 25 mg Tb24 ER tablet Take 1 tablet (25 mg total) by mouth once daily.    naproxen (NAPROSYN) 500 MG tablet Take 1 tablet (500 mg total) by mouth 2 (two) times daily with meals.    rosuvastatin (CRESTOR) 20 MG tablet Take 1 tablet (20 mg total) by mouth once daily.    SITagliptin phosphate (JANUVIA) 100 MG Tab Take 1 tablet (100 mg total) by mouth once daily.    turmeric root extract 500 mg Cap Take by mouth.    VITAMIN B COMPLEX ORAL Take 1 tablet by mouth.    vitamin D (VITAMIN D3) 1000 units Tab Take 1,000 Units by mouth once daily.     Antibiotics (From admission, onward)      Start     Stop Route Frequency Ordered    03/19/24 1300  cefTRIAXone (ROCEPHIN) 2 g in dextrose 5 % in water (D5W) 100 mL IVPB (MB+)         -- IV Every 12 hours (non-standard times) 03/19/24 1145    03/17/24 1100  ampicillin (OMNIPEN) 2 g in sodium chloride 0.9 % 100 mL IVPB (MB+)         -- IV Every 4 hours (non-standard times) 03/17/24 0954          Antifungals (From admission, onward)      None          Antivirals (From admission, onward)      None             Immunization History   Administered Date(s) Administered    COVID-19, MRNA, LN-S, PF (Pfizer) (Gray Cap) 03/09/2022    COVID-19, mRNA, LNP-S, bivalent booster, PF (PFIZER OMICRON) 10/12/2022    COVID-19, vector-nr, rS-Ad26, PF (Katelin) 03/05/2021    Influenza (FLUAD) - Quadrivalent - Adjuvanted - PF *Preferred* (65+) 12/10/2021, 10/10/2022, 11/09/2023    Influenza - High Dose - PF (65 years and older) 12/02/2015, 09/15/2017, 11/26/2018     Pneumococcal Conjugate - 13 Valent 11/26/2018    Pneumococcal Polysaccharide - 23 Valent 01/18/2023       Family History       Problem Relation (Age of Onset)    Breast cancer Mother    COPD Father    Cancer Mother, Sister, Daughter    Diabetes Son    Hypertension Sister, Sister, Daughter, Daughter    Ovarian cancer Mother, Sister    Thyroid disease Daughter, Daughter          Social History     Socioeconomic History    Marital status:    Tobacco Use    Smoking status: Never    Smokeless tobacco: Never   Substance and Sexual Activity    Alcohol use: No    Drug use: No    Sexual activity: Yes     Partners: Male     Birth control/protection: Post-menopausal     Comment:     Other Topics Concern    Are you pregnant or think you may be? No    Breast-feeding No   Social History Narrative     with 7 kids     Social Determinants of Health     Financial Resource Strain: Low Risk  (3/18/2024)    Overall Financial Resource Strain (CARDIA)     Difficulty of Paying Living Expenses: Not very hard   Food Insecurity: No Food Insecurity (3/18/2024)    Hunger Vital Sign     Worried About Running Out of Food in the Last Year: Never true     Ran Out of Food in the Last Year: Never true   Transportation Needs: No Transportation Needs (3/18/2024)    PRAPARE - Transportation     Lack of Transportation (Medical): No     Lack of Transportation (Non-Medical): No   Physical Activity: Inactive (3/18/2024)    Exercise Vital Sign     Days of Exercise per Week: 0 days     Minutes of Exercise per Session: 0 min   Stress: No Stress Concern Present (3/18/2024)    [place] [employer] - Occupational Stress Questionnaire     Feeling of Stress : Only a little   Social Connections: Unknown (3/18/2024)    Social Connection and Isolation Panel [NHANES]     Frequency of Communication with Friends and Family: More than three times a week     Frequency of Social Gatherings with Friends and Family: Twice a week      Attends Sabianist Services: 1 to 4 times per year     Active Member of Clubs or Organizations: Yes     Attends Club or Organization Meetings: Never     Marital Status: Patient declined   Housing Stability: Unknown (3/18/2024)    Housing Stability Vital Sign     Unable to Pay for Housing in the Last Year: No     Unstable Housing in the Last Year: No     Review of Systems   Constitutional:  Negative for fatigue and unexpected weight change.   HENT:  Negative for ear pain, facial swelling, hearing loss, mouth sores, nosebleeds, rhinorrhea, sinus pressure, sore throat, tinnitus, trouble swallowing and voice change.    Eyes:  Negative for photophobia, pain, redness and visual disturbance.   Respiratory:  Negative for cough, chest tightness, shortness of breath and wheezing.    Cardiovascular:  Negative for chest pain, palpitations and leg swelling.   Gastrointestinal:  Negative for abdominal pain, blood in stool, constipation, diarrhea, nausea and vomiting.   Endocrine: Negative for cold intolerance, heat intolerance, polydipsia, polyphagia and polyuria.   Genitourinary:  Negative for flank pain, frequency, menstrual problem, vaginal bleeding, vaginal discharge and vaginal pain.   Musculoskeletal:  Negative for arthralgias, back pain, joint swelling, myalgias and neck pain.   Skin:  Negative for rash.   Allergic/Immunologic: Negative for environmental allergies, food allergies and immunocompromised state.   Neurological:  Negative for dizziness, seizures, syncope, weakness, light-headedness, numbness and headaches.   Hematological:  Negative for adenopathy. Does not bruise/bleed easily.   Psychiatric/Behavioral:  Negative for confusion, hallucinations, self-injury, sleep disturbance and suicidal ideas. The patient is not nervous/anxious.      Objective:     Vital Signs (Most Recent):  Temp: 98.4 °F (36.9 °C) (03/24/24 1540)  Pulse: 82 (03/24/24 2016)  Resp: 18 (03/24/24 1540)  BP: (!) 140/63 (03/24/24 2016)  SpO2: 98 %  (03/24/24 1540) Vital Signs (24h Range):  Temp:  [98.3 °F (36.8 °C)-98.6 °F (37 °C)] 98.4 °F (36.9 °C)  Pulse:  [75-83] 82  Resp:  [18] 18  SpO2:  [95 %-98 %] 98 %  BP: (118-161)/(60-73) 140/63     Weight: 71.6 kg (157 lb 13.6 oz)  Body mass index is 26.27 kg/m².    Estimated Creatinine Clearance: 51.1 mL/min (based on SCr of 0.9 mg/dL).     Physical Exam  Vitals and nursing note reviewed.   Constitutional:       Appearance: She is well-developed.   HENT:      Head: Normocephalic and atraumatic.      Right Ear: External ear normal.      Left Ear: External ear normal.   Eyes:      General: No scleral icterus.        Right eye: No discharge.         Left eye: No discharge.      Conjunctiva/sclera: Conjunctivae normal.   Cardiovascular:      Rate and Rhythm: Normal rate and regular rhythm.      Heart sounds: Normal heart sounds. No murmur heard.     No friction rub. No gallop.   Pulmonary:      Effort: Pulmonary effort is normal. No respiratory distress.      Breath sounds: Normal breath sounds. No stridor. No wheezing or rales.   Abdominal:      General: Bowel sounds are normal.      Tenderness: There is abdominal tenderness in the suprapubic area.   Musculoskeletal:         General: No tenderness. Normal range of motion.   Skin:     General: Skin is warm and dry.   Neurological:      Mental Status: She is alert and oriented to person, place, and time.          Significant Labs: CBC:   Recent Labs   Lab 03/23/24  0503 03/24/24  0527   WBC 4.97 5.44   HGB 8.5* 8.9*   HCT 27.4* 28.3*    320       CMP:   Recent Labs   Lab 03/23/24  0503 03/24/24  0527    135*   K 4.3 4.1    103   CO2 22* 26   * 138*   BUN 9 8   CREATININE 0.9 0.9   CALCIUM 8.8 9.1   PROT 5.8* 6.0   ALBUMIN 2.5* 2.6*   BILITOT 0.2 0.2   ALKPHOS 82 85   AST 17 12   ALT 40 34   ANIONGAP 11 6*       Microbiology Results (last 7 days)       Procedure Component Value Units Date/Time    Blood culture [[id number]] Collected: 03/20/24  0550    Order Status: Completed Specimen: Blood Updated: 03/24/24 0812     Blood Culture, Routine No Growth to date      No Growth to date      No Growth to date      No Growth to date      No Growth to date    Blood culture [[id number]] Collected: 03/21/24 0343    Order Status: Completed Specimen: Blood Updated: 03/24/24 0612     Blood Culture, Routine No Growth to date      No Growth to date      No Growth to date      No Growth to date    Blood culture [[id number]] Collected: 03/21/24 0343    Order Status: Completed Specimen: Blood Updated: 03/24/24 0612     Blood Culture, Routine No Growth to date      No Growth to date      No Growth to date      No Growth to date    Blood culture [[id number]]  (Abnormal) Collected: 03/20/24 0550    Order Status: Completed Specimen: Blood Updated: 03/23/24 1244     Blood Culture, Routine Gram stain aer bottle: Gram positive cocci in chains resembling Strep      Positive results previously called 03/21/2024  00:44      ENTEROCOCCUS FAECALIS  For susceptibility see order # [id number]      Blood culture [[id number]]  (Abnormal) Collected: 03/19/24 0825    Order Status: Completed Specimen: Blood Updated: 03/22/24 1508     Blood Culture, Routine Gram stain aer bottle: Gram positive cocci in chains resembling Strep      Positive results previously called 03/20/2024 00:00      ENTEROCOCCUS FAECALIS  For susceptibility see order #[id number]      Blood culture [[id number]]  (Abnormal)  (Susceptibility) Collected: 03/19/24 0822    Order Status: Completed Specimen: Blood Updated: 03/22/24 1507     Blood Culture, Routine Gram stain aer bottle: Gram positive cocci in chains resembling Strep      Results called to and read back by: Jaimee Morillo RN. 03/20/2024  00:00      ENTEROCOCCUS FAECALIS    Blood culture [[id number]]  (Abnormal) Collected: 03/18/24 0538    Order Status: Completed Specimen: Blood Updated: 03/20/24 1016     Blood Culture, Routine Gram stain aer bottle: Gram positive cocci  in chains resembling Strep      Positive results previously called 03/18/2024      ENTEROCOCCUS FAECALIS  For susceptibility see order #[id number]      Blood culture [[id number]]  (Abnormal) Collected: 03/18/24 0538    Order Status: Completed Specimen: Blood Updated: 03/20/24 1016     Blood Culture, Routine Gram stain aer bottle: Gram positive cocci in chains resembling Strep      Positive results previously called 03/18/2024      ENTEROCOCCUS FAECALIS  For susceptibility see order #[id number]      Blood culture [[id number]]  (Abnormal) Collected: 03/17/24 1300    Order Status: Completed Specimen: Blood Updated: 03/20/24 1016     Blood Culture, Routine Gram stain aer bottle: Gram positive cocci in chains resembling Strep      Positive results previously called 03/17/2024 05:37      ENTEROCOCCUS FAECALIS  For susceptibility see order #[id number]      Blood culture [[id number]]  (Abnormal) Collected: 03/17/24 1300    Order Status: Completed Specimen: Blood Updated: 03/20/24 1015     Blood Culture, Routine Gram stain aer bottle: Gram positive cocci in chains resembling Strep      Results called to and read back by: Emilie Dela Cruz RN. 03/18/2024  06:17      Gram stain vinay bottle: Gram positive cocci in chains resembling Strep      03/18/2024  11:21      ENTEROCOCCUS FAECALIS  For susceptibility see order #[id number]      Blood culture x two cultures. Draw prior to antibiotics. [[id number]]  (Abnormal) Collected: 03/16/24 1420    Order Status: Completed Specimen: Blood from Wrist, Right Updated: 03/19/24 1054     Blood Culture, Routine Gram stain aer bottle: Gram positive cocci in chains resembling Strep      Positive results previously called 03/17/2024  06:25      ENTEROCOCCUS FAECALIS  For susceptibility see order #[id number]      Narrative:      Aerobic and anaerobic    Blood culture x two cultures. Draw prior to antibiotics. [[id number]]  (Abnormal)  (Susceptibility) Collected: 03/16/24 1410    Order Status:  Completed Specimen: Blood from Peripheral, Forearm, Left Updated: 03/19/24 1053     Blood Culture, Routine Gram stain aer bottle: Gram positive cocci in chains resembling Strep      Gram stain vinay bottle: Gram positive cocci in chains resembling Strep      Results called to and read back by: Emilie Napoles RN 03/17/2024  05:37      ENTEROCOCCUS FAECALIS    Narrative:      Aerobic and anaerobic    Urine culture [[id number]]  (Abnormal)  (Susceptibility) Collected: 03/16/24 1359    Order Status: Completed Specimen: Urine Updated: 03/18/24 2120     Urine Culture, Routine ENTEROCOCCUS FAECALIS  >100,000 cfu/ml      Narrative:      Specimen Source->Urine          Urine Studies:   Recent Labs   Lab 03/16/24  1359   COLORU Yellow   APPEARANCEUA Hazy*   PHUR 6.0   SPECGRAV 1.020   PROTEINUA 1+*   GLUCUA 3+*   KETONESU Negative   BILIRUBINUA Negative   OCCULTUA 2+*   NITRITE Negative   LEUKOCYTESUR 2+*   RBCUA 14*   WBCUA 37*   BACTERIA Occasional   SQUAMEPITHEL 1   HYALINECASTS 0         Significant Imaging: I have reviewed all pertinent imaging results/findings within the past 24 hours.

## 2024-03-25 NOTE — ASSESSMENT & PLAN NOTE
Patient's anemia is currently controlled. Has not received any PRBCs to date. Etiology likely d/t chronic disease v iron deficiency. No obvious signs of bleeding.  Current CBC reviewed-   Lab Results   Component Value Date    HGB 8.7 (L) 03/25/2024    HCT 28.3 (L) 03/25/2024     Monitor serial CBC and transfuse if patient becomes hemodynamically unstable, symptomatic or H/H drops below 7/21.     106

## 2024-03-25 NOTE — CONSULTS
D/L PICC placed in left basilic vein, 34 cm in length with 0 cm exposed. Arm circumference 33 cm. Lot# [id number].  Ashley # [id number] used

## 2024-03-26 LAB
ALBUMIN SERPL BCP-MCNC: 2.7 G/DL (ref 3.5–5.2)
ALP SERPL-CCNC: 88 U/L (ref 55–135)
ALT SERPL W/O P-5'-P-CCNC: 20 U/L (ref 10–44)
ANION GAP SERPL CALC-SCNC: 8 MMOL/L (ref 8–16)
AST SERPL-CCNC: 9 U/L (ref 10–40)
BACTERIA BLD CULT: NORMAL
BACTERIA BLD CULT: NORMAL
BASOPHILS # BLD AUTO: 0.02 K/UL (ref 0–0.2)
BASOPHILS NFR BLD: 0.3 % (ref 0–1.9)
BILIRUB SERPL-MCNC: 0.1 MG/DL (ref 0.1–1)
BUN SERPL-MCNC: 7 MG/DL (ref 8–23)
CALCIUM SERPL-MCNC: 9.3 MG/DL (ref 8.7–10.5)
CHLORIDE SERPL-SCNC: 105 MMOL/L (ref 95–110)
CO2 SERPL-SCNC: 26 MMOL/L (ref 23–29)
CREAT SERPL-MCNC: 0.8 MG/DL (ref 0.5–1.4)
DIFFERENTIAL METHOD BLD: ABNORMAL
EOSINOPHIL # BLD AUTO: 0.1 K/UL (ref 0–0.5)
EOSINOPHIL NFR BLD: 1.2 % (ref 0–8)
ERYTHROCYTE [DISTWIDTH] IN BLOOD BY AUTOMATED COUNT: 18.9 % (ref 11.5–14.5)
EST. GFR  (NO RACE VARIABLE): >60 ML/MIN/1.73 M^2
GLUCOSE SERPL-MCNC: 146 MG/DL (ref 70–110)
HCT VFR BLD AUTO: 28.6 % (ref 37–48.5)
HGB BLD-MCNC: 8.8 G/DL (ref 12–16)
IMM GRANULOCYTES # BLD AUTO: 0.06 K/UL (ref 0–0.04)
IMM GRANULOCYTES NFR BLD AUTO: 1 % (ref 0–0.5)
LYMPHOCYTES # BLD AUTO: 1 K/UL (ref 1–4.8)
LYMPHOCYTES NFR BLD: 17.6 % (ref 18–48)
MCH RBC QN AUTO: 22.4 PG (ref 27–31)
MCHC RBC AUTO-ENTMCNC: 30.8 G/DL (ref 32–36)
MCV RBC AUTO: 73 FL (ref 82–98)
MONOCYTES # BLD AUTO: 0.4 K/UL (ref 0.3–1)
MONOCYTES NFR BLD: 6.6 % (ref 4–15)
NEUTROPHILS # BLD AUTO: 4.2 K/UL (ref 1.8–7.7)
NEUTROPHILS NFR BLD: 73.3 % (ref 38–73)
NRBC BLD-RTO: 0 /100 WBC
PLATELET # BLD AUTO: 355 K/UL (ref 150–450)
PMV BLD AUTO: 9.6 FL (ref 9.2–12.9)
POCT GLUCOSE: 174 MG/DL (ref 70–110)
POCT GLUCOSE: 188 MG/DL (ref 70–110)
POCT GLUCOSE: 216 MG/DL (ref 70–110)
POCT GLUCOSE: 229 MG/DL (ref 70–110)
POCT GLUCOSE: 258 MG/DL (ref 70–110)
POTASSIUM SERPL-SCNC: 4.5 MMOL/L (ref 3.5–5.1)
PROT SERPL-MCNC: 6 G/DL (ref 6–8.4)
RBC # BLD AUTO: 3.92 M/UL (ref 4–5.4)
SODIUM SERPL-SCNC: 139 MMOL/L (ref 136–145)
WBC # BLD AUTO: 5.78 K/UL (ref 3.9–12.7)

## 2024-03-26 PROCEDURE — 80053 COMPREHEN METABOLIC PANEL: CPT | Performed by: STUDENT IN AN ORGANIZED HEALTH CARE EDUCATION/TRAINING PROGRAM

## 2024-03-26 PROCEDURE — 25000003 PHARM REV CODE 250: Performed by: HOSPITALIST

## 2024-03-26 PROCEDURE — 85025 COMPLETE CBC W/AUTO DIFF WBC: CPT | Performed by: STUDENT IN AN ORGANIZED HEALTH CARE EDUCATION/TRAINING PROGRAM

## 2024-03-26 PROCEDURE — 63600175 PHARM REV CODE 636 W HCPCS: Performed by: INTERNAL MEDICINE

## 2024-03-26 PROCEDURE — 25000003 PHARM REV CODE 250: Performed by: INTERNAL MEDICINE

## 2024-03-26 PROCEDURE — 20600001 HC STEP DOWN PRIVATE ROOM

## 2024-03-26 PROCEDURE — 25000003 PHARM REV CODE 250: Performed by: STUDENT IN AN ORGANIZED HEALTH CARE EDUCATION/TRAINING PROGRAM

## 2024-03-26 PROCEDURE — 36415 COLL VENOUS BLD VENIPUNCTURE: CPT | Performed by: STUDENT IN AN ORGANIZED HEALTH CARE EDUCATION/TRAINING PROGRAM

## 2024-03-26 PROCEDURE — A4216 STERILE WATER/SALINE, 10 ML: HCPCS | Performed by: HOSPITALIST

## 2024-03-26 RX ADMIN — INSULIN ASPART 1 UNITS: 100 INJECTION, SOLUTION INTRAVENOUS; SUBCUTANEOUS at 09:03

## 2024-03-26 RX ADMIN — GABAPENTIN 300 MG: 300 CAPSULE ORAL at 09:03

## 2024-03-26 RX ADMIN — Medication 10 ML: at 11:03

## 2024-03-26 RX ADMIN — CEFTRIAXONE 2 G: 2 INJECTION, POWDER, FOR SOLUTION INTRAMUSCULAR; INTRAVENOUS at 12:03

## 2024-03-26 RX ADMIN — Medication 10 ML: at 12:03

## 2024-03-26 RX ADMIN — INSULIN DETEMIR 7 UNITS: 100 INJECTION, SOLUTION SUBCUTANEOUS at 09:03

## 2024-03-26 RX ADMIN — AMPICILLIN 2 G: 2 INJECTION, POWDER, FOR SOLUTION INTRAMUSCULAR; INTRAVENOUS at 01:03

## 2024-03-26 RX ADMIN — CARVEDILOL 6.25 MG: 6.25 TABLET, FILM COATED ORAL at 09:03

## 2024-03-26 RX ADMIN — AMPICILLIN 2 G: 2 INJECTION, POWDER, FOR SOLUTION INTRAMUSCULAR; INTRAVENOUS at 05:03

## 2024-03-26 RX ADMIN — INSULIN ASPART 3 UNITS: 100 INJECTION, SOLUTION INTRAVENOUS; SUBCUTANEOUS at 01:03

## 2024-03-26 RX ADMIN — AMITRIPTYLINE HYDROCHLORIDE 10 MG: 10 TABLET, FILM COATED ORAL at 09:03

## 2024-03-26 RX ADMIN — INSULIN ASPART 3 UNITS: 100 INJECTION, SOLUTION INTRAVENOUS; SUBCUTANEOUS at 05:03

## 2024-03-26 RX ADMIN — INSULIN ASPART 3 UNITS: 100 INJECTION, SOLUTION INTRAVENOUS; SUBCUTANEOUS at 09:03

## 2024-03-26 RX ADMIN — ENOXAPARIN SODIUM 40 MG: 40 INJECTION SUBCUTANEOUS at 05:03

## 2024-03-26 RX ADMIN — Medication 10 ML: at 05:03

## 2024-03-26 RX ADMIN — LISINOPRIL 40 MG: 20 TABLET ORAL at 09:03

## 2024-03-26 RX ADMIN — TRIAMCINOLONE ACETONIDE: 5 CREAM TOPICAL at 09:03

## 2024-03-26 RX ADMIN — HYDROCODONE BITARTRATE AND ACETAMINOPHEN 1 TABLET: 5; 325 TABLET ORAL at 06:03

## 2024-03-26 RX ADMIN — CEFTRIAXONE 2 G: 2 INJECTION, POWDER, FOR SOLUTION INTRAMUSCULAR; INTRAVENOUS at 01:03

## 2024-03-26 RX ADMIN — HYDROCODONE BITARTRATE AND ACETAMINOPHEN 1 TABLET: 5; 325 TABLET ORAL at 09:03

## 2024-03-26 RX ADMIN — LEVOTHYROXINE SODIUM 112 MCG: 112 TABLET ORAL at 05:03

## 2024-03-26 RX ADMIN — AMPICILLIN 2 G: 2 INJECTION, POWDER, FOR SOLUTION INTRAMUSCULAR; INTRAVENOUS at 12:03

## 2024-03-26 RX ADMIN — INSULIN ASPART 2 UNITS: 100 INJECTION, SOLUTION INTRAVENOUS; SUBCUTANEOUS at 09:03

## 2024-03-26 RX ADMIN — AMPICILLIN 2 G: 2 INJECTION, POWDER, FOR SOLUTION INTRAMUSCULAR; INTRAVENOUS at 09:03

## 2024-03-26 NOTE — PHYSICIAN QUERY
PT Name: Aminah Alicea-Dermody  MR #: [id number]     DOCUMENTATION CLARIFICATION     CDS/: Sydney Zhu RN, CCDS, CDi              Contact information: [email or web address]  This form is a permanent document in the medical record.     Query Date: March 26, 2024    By submitting this query, we are merely seeking further clarification of documentation.    Please utilize your independent clinical judgment when addressing the question(s) below.  The Medical Record contains the following:  Indicators Supporting Clinical Findings Location in Medical Record   x HR         RR          BP        Temp -DATE-  T  P  R  BP >/=  Sat %   O2  Device 3/16  102.6    16-22  102/52  95-98  RA  3/17  101.4  81-66  20-16  107/52  96-92     3/18  98.7  60-80  16-18  122/58  93-98     3/19  99.1  65-80  15-20,32  152/92  96-95    3/20  96.6-99.5  85-98  26-19  116/57  96-99 3/21  98.9  98-77  19  130/67  97-93 3/22-23  98.9  74-88  18  113/56  93-98    3/24  99.2  75-83  16-18  118/73  94-98 3/25-26  99.6  74-83  16-18  127/71  94-96     VS Flow sheet; provider and nurse notes   x WBC           Bands            CRP  Lactic Acid          Procalcitonin     date 03/16 03/17/ 05:56 03/18 03:36 03/19/ 05:40 03/20 05:50 03/21/  03:43 03/22 03:38 03/23  05:03 03/24 05:27 03/25  04:15   WBC 12.30 9.15 6.87 5.89 5.65 5.47 4.31 4.97 5.44 4.76   Platelet  241 182 166 177 209 224 225 256 320 321   anc 10.8 (H) 7.7 5.9 4.8 4.2 3.9 3.0 3.3 3.9 3.4   BUN 21 16 12 17 12 11 10 9 8 9   Cr  1.2 0.9 0.8 0.8 0.9 0.9 0.9 0.9 0.9 0.9   eGFR 46.3 ! >60.0 >60.0 >60.0 >60.0 >60.0 >60.0 >60.0 >60.0 >60.0   T bili 0.6 0.5 0.3 0.3 0.3 0.2 0.2 0.2 0.2 0.1   Lactic Acid  1.1                      lab   x Culture(s) 3/21  Blood Culture, Routine No growth after 5 days.  3/20  Blood Culture, Routine No growth after 5 days.  3/20  BC   ENTEROCOCCUS FAECALIS  3/19  BC   ENTEROCOCCUS FAECALIS  3/18  BC   ENTEROCOCCUS FAECALIS  3/17  BC   ENTEROCOCCUS  FAECALIS  3/16  BC   ENTEROCOCCUS FAECALIS    3/16 Rapid Organism ID by PCR (from Blood culture)    Enterococcus faecalis Detected Abnormal     3/16  UCx  ENTEROCOCCUS FAECALIS >100,000 cfu/ml  lab    AMS, Confusion, LOC, etc.     x Organ Dysfunction/Failure organ dysfunction:  ANAIS   ANAIS d/t pre-renal azotemia d/t dehydration  currently worsening. Baseline cr 0.8  Lacitc acidosis     ANAIS     Sepsis Screen (IP) - 03/16/24 1919   Are any of the following organ dysfunction criteria present and not considered to be due to a chronic condition?   Yes     Organ Dysfunction Criteria Lactate > 2.0             Ed md  H&P      H&P through Hosp PN 3/25        RN sepsis Screen 3/16           x Bacteremia or Sepsis / Septic Sepsis  This patient does have evidence of infective focus  My overall impression is sepsis.  Source: Urinary Tract    Enterococcal bacteremia     H&P through Hosp PN 3/25        Hosp Pn 3/25   x Known or Suspected Source of Infection documented  source:  urinary tract     Pyelonephritis      Ureteral stent present    Infection: enterococcus bacteremia and pyelonephritis  Pyelonephritis    Acute, urine consistent with infection.    Ed md  Hosp Pn 3/25  H&P thru Hosp Pn 3/25    (Failed) Outpatient Treatment     x Medication INPT MAR:   lactated ringers bolus 1,710 mL  start 3/16  LR @ 100 mL/hr  3/16-3/17   cefTRIAXone IVPB  start 3/16  Ampicillin  IVPB  start 3/17 Mar                 x Treatment Fluid challenge Ideal Body Weight- The patient's ideal body weight is Ideal body weight: 57 kg (125 lb 10.6 oz) which will be used to calculate fluid bolus of 30 ml/kg for   treatment of septic shock.        Post- resuscitation assessment No - Post resuscitation assessment not needed      Will Not start Pressors- Levophed for MAP of 65    Source control achieved by: rocephin   H&P through Hosp PN 3/25   x Other  TTE negative     3/25 Hosp Pn          Due to the conflicting clinical picture, please clinically  "validate the diagnosis of - "septic shock"                                 If validated, please provide additional clinical support for the diagnosis.                                                       - possible 2 part response -     [  x ] Above stated diagnosis is not confirmed and / or it has been ruled out  -              However, other diagnosis ruled in are - Severe sepsis    Organism:  Enterococcal  Source:  acute pyelonephritis  Organ dysfunction:  ANAIS  and Lactic acidosis     [   ] Above stated diagnosis is not confirmed and / or it has been ruled out  -             However, other diagnosis ruled in are - ______________       [   ] Above stated diagnosis is confirmed and / or ruled in.     -specify clinical support (signs, symptoms & treatment) for the confirmed diagnosis: ___________      [   ] Other - specify:  __________     [  ] Clinically Undetermined      Please document in your progress notes daily for the duration of treatment until resolved and include in your discharge summary.                  "

## 2024-03-26 NOTE — NURSING
Patient alert and oriented x 4. Vitals Stable. Patient went for Nuclear Med scan. IV abx continued. No complaints of pain or discomfort. Bed low and locked position. Call light In reach.

## 2024-03-26 NOTE — PROGRESS NOTES
[facility] ([place] [address])  [facility] Medicine  Progress Note    Patient Name: Aminah Alicea-Shelly  MRN: [id number]  Patient Class: IP- Inpatient   Admission Date: 3/16/2024  Length of Stay: 10 days  Attending Physician: Jaleesa Jaimes MD  Primary Care Provider: Jeison Sanchez MD        Subjective:     Principal Problem:Enterococcal bacteremia        HPI:  77 yo female with DM2, GERD, HPLD, HTN, Hypothyroidism, recent stone removal presenting with chills, weakness, and back pain. Interpretor MERI used at bedside. She states that she was d/c'd on the 14th of March was doing okay until last night she started feeling weak and actually fell, she denies any leg pain or headache and did loss consciousness, however she did not feel any better today whenever she woke up so presented to the ER.    She had a fever measured here at 102.6F, CT scan showed perinephric stranding, UA consistent with infection, lactic acidosis. Urology was consulted who recommended day and antibiotics. Medicine called for admission.     Overview/Hospital Course:  Started on ceftriaxone at admission, ampicillin added on 3/17. Blood cultures quickly resulted positive for e faecalis, persistently positive. ID consulted. Patient passed voiding trial 3/18. TTE negative. CARLITO to be done 3/20.     Interval History:   3/26: NM scan in process today. No other issues reported overnight.     Review of Systems   Constitutional:  Positive for chills and fever. Negative for fatigue and unexpected weight change.   HENT:  Negative for ear pain, facial swelling, hearing loss, mouth sores, nosebleeds, rhinorrhea, sinus pressure, sore throat, tinnitus, trouble swallowing and voice change.    Eyes:  Negative for photophobia, pain, redness and visual disturbance.   Respiratory:  Negative for cough, chest tightness, shortness of breath and wheezing.    Cardiovascular:  Negative for chest pain, palpitations and leg swelling.    Gastrointestinal:  Negative for abdominal pain, blood in stool, constipation, diarrhea, nausea and vomiting.   Endocrine: Negative for cold intolerance, heat intolerance, polydipsia, polyphagia and polyuria.   Genitourinary:  Positive for difficulty urinating, dysuria, hematuria and urgency. Negative for flank pain, frequency, menstrual problem, vaginal bleeding, vaginal discharge and vaginal pain.   Musculoskeletal:  Negative for arthralgias, back pain, joint swelling, myalgias and neck pain.   Skin:  Negative for rash.   Allergic/Immunologic: Negative for environmental allergies, food allergies and immunocompromised state.   Neurological:  Negative for dizziness, seizures, syncope, weakness, light-headedness, numbness and headaches.   Hematological:  Negative for adenopathy. Does not bruise/bleed easily.   Psychiatric/Behavioral:  Negative for confusion, hallucinations, self-injury, sleep disturbance and suicidal ideas. The patient is not nervous/anxious.      Objective:     Vital Signs (Most Recent):  Temp: 98.9 °F (37.2 °C) (03/26/24 0745)  Pulse: 76 (03/26/24 0745)  Resp: 18 (03/26/24 0745)  BP: (!) 151/65 (03/26/24 0745)  SpO2: 96 % (03/26/24 0745) Vital Signs (24h Range):  Temp:  [98.1 °F (36.7 °C)-99.6 °F (37.6 °C)] 98.9 °F (37.2 °C)  Pulse:  [67-77] 76  Resp:  [16-20] 18  SpO2:  [94 %-96 %] 96 %  BP: (121-191)/(56-82) 151/65     Weight: 71.1 kg (156 lb 12 oz)  Body mass index is 26.08 kg/m².    Intake/Output Summary (Last 24 hours) at 3/26/2024 1154  Last data filed at 3/26/2024 0448  Gross per 24 hour   Intake 400 ml   Output 2050 ml   Net -1650 ml         Physical Exam  Vitals and nursing note reviewed.   Constitutional:       Appearance: She is well-developed.   HENT:      Head: Normocephalic and atraumatic.      Right Ear: External ear normal.      Left Ear: External ear normal.   Eyes:      General: No scleral icterus.        Right eye: No discharge.         Left eye: No discharge.       Conjunctiva/sclera: Conjunctivae normal.   Cardiovascular:      Rate and Rhythm: Normal rate and regular rhythm.      Heart sounds: Normal heart sounds. No murmur heard.     No friction rub. No gallop.   Pulmonary:      Effort: Pulmonary effort is normal. No respiratory distress.      Breath sounds: Normal breath sounds. No stridor. No wheezing or rales.   Abdominal:      General: Bowel sounds are normal.      Tenderness: There is abdominal tenderness in the suprapubic area.   Musculoskeletal:         General: No tenderness. Normal range of motion.   Skin:     General: Skin is warm and dry.   Neurological:      Mental Status: She is alert and oriented to person, place, and time.             Significant Labs: All pertinent labs within the past 24 hours have been reviewed.    Significant Imaging: I have reviewed all pertinent imaging results/findings within the past 24 hours.    Assessment/Plan:      * Enterococcal bacteremia  Patient presented to ED with fevers and malaise 2 days after urologic procedure.  CT with evidence nephritic stranding.  Admitted for pyelonephritis.  Blood cultures on presentation 3/16 positive for Enterococcus faecalis. Suspect urologic source. TTE negative  - ID consulted   - repeat cultures  - CARLITO no vegetation  - S/p ceftriaxone in ED and on admission, continued for double coverage due to persistent bacteremia  - started on ampicillin 3/17   - urology to follow-up in clinic, stated no role for stent removal as it is source control  - NM scan ordered to be started on Monday    ID final recs: Outpatient Antibiotic Therapy Plan:     Please send referral to [facility].     1) Infection: enterococcus bacteremia and pyleonephritis     2) Discharge Antibiotics:     Intravenous antibiotics:  Ampicillin 2gm iv q4h (or continuous infusion)  Ceftriaxone 2gm iv q12h     3) Therapy Duration:  4weeks     Estimated end date of IV antibiotics: 4/18/24     4) Outpatient  Weekly Labs:     Order the following labs to be drawn on Mondays:   CBC  CMP   CRP        5) Fax Lab Results to Infectious Diseases Provider: Dr Velasquez     [facility] Fax Number: [phone]     6) Outpatient Infectious Diseases Follow-up     Follow-up appointment will be arranged by the ID clinic and will be found in the patient's appointments tab.     Prior to discharge, please ensure the patient's follow-up has been scheduled.    If there is still no follow-up scheduled prior to discharge, please send an EPIC message to Brandie Kinsey in Infectious Diseases.          Positive QuantiFERON-TB Gold test  - history noted      Ureteral stent present  Management as per urology   - outpatient follow-up      Anemia  Patient's anemia is currently controlled. Has not received any PRBCs to date. Etiology likely d/t chronic disease v iron deficiency. No obvious signs of bleeding.  Current CBC reviewed-   Lab Results   Component Value Date    HGB 8.7 (L) 03/25/2024    HCT 28.3 (L) 03/25/2024     Monitor serial CBC and transfuse if patient becomes hemodynamically unstable, symptomatic or H/H drops below 7/21.      ANAIS (acute kidney injury)  Resolved  Patient with acute kidney injury/acute renal failure likely due to pre-renal azotemia due to dehydration. Cr 1.2 on presentation. ANAIS is currently improving. Baseline creatinine  0.8  - Labs reviewed- Renal function/electrolytes with Estimated Creatinine Clearance: 57.3 mL/min (based on SCr of 0.8 mg/dL). according to latest data. Monitor urine output and serial BMP and adjust therapy as needed. Avoid nephrotoxins and renally dose meds for GFR listed above.    Sepsis  This patient does have evidence of infective focus  My overall impression is sepsis.  Source: Urinary Tract  Antibiotics given-   Antibiotics (72h ago, onward)      Start     Stop Route Frequency Ordered    03/19/24 1300  cefTRIAXone (ROCEPHIN) 2 g in dextrose 5 % in water (D5W) 100 mL IVPB (MB+)         --  "IV Every 12 hours (non-standard times) 03/19/24 1145    03/17/24 1100  ampicillin (OMNIPEN) 2 g in sodium chloride 0.9 % 100 mL IVPB (MB+)         -- IV Every 4 hours (non-standard times) 03/17/24 0954          Latest lactate reviewed-  No results for input(s): "LACTATE", "POCLAC" in the last 72 hours.    Fluid challenge Ideal Body Weight- The patient's ideal body weight is Ideal body weight: 57 kg (125 lb 10.6 oz) which will be used to calculate fluid bolus of 30 ml/kg for treatment of septic shock.      Post- resuscitation assessment No - Post resuscitation assessment not needed     ANAIS    Will Not start Pressors- Levophed for MAP of 65  Source control achieved by: rocephin and ampicillin    Pyelonephritis  Acute, urine consistent with infection.   -  see above        Type 2 diabetes mellitus with neurologic complication, without long-term current use of insulin  Patient's FSGs are controlled on current medication regimen.  Last A1c reviewed-   Lab Results   Component Value Date    HGBA1C 7.6 (H) 03/21/2024     Most recent fingerstick glucose reviewed-   Recent Labs   Lab 03/25/24  1541 03/25/24  2031 03/26/24  0746   POCTGLUCOSE 200* 240* 216*       Current correctional scale  Low  Maintain anti-hyperglycemic dose as follows-   Antihyperglycemics (From admission, onward)      Start     Stop Route Frequency Ordered    03/21/24 0715  insulin aspart U-100 pen 3 Units         -- SubQ 3 times daily with meals 03/20/24 2112 03/20/24 2115  insulin detemir U-100 (Levemir) pen 7 Units         -- SubQ 2 times daily 03/20/24 2111 03/16/24 2019  insulin aspart U-100 pen 0-5 Units         -- SubQ Before meals & nightly PRN 03/16/24 1920          Hold Oral hypoglycemics while patient is in the hospital.    GERD (gastroesophageal reflux disease)  Chronic, controlled, continue to monitor      HTN (hypertension), benign  Chronic, controlled. Latest blood pressure and vitals reviewed-     Temp:  [98.4 °F (36.9 °C)-99.4 °F " (37.4 °C)]   Pulse:  [74-83]   Resp:  [16-18]   BP: (121-171)/(60-72)   SpO2:  [94 %-98 %] .   Home meds for hypertension were reviewed and noted below.   Hypertension Medications               carvediloL (COREG) 25 MG tablet TAKE 1 TABLET TWICE DAILY    lisinopriL (PRINIVIL,ZESTRIL) 40 MG tablet Take 1 tablet (40 mg total) by mouth once daily.            While in the hospital, will manage blood pressure as follows; Adjust home antihypertensive regimen as follows- held on presentation, restart as tolerated    Will utilize p.r.n. blood pressure medication only if patient's blood pressure greater than 180/110 and she develops symptoms such as worsening chest pain or shortness of breath.    Hypothyroidism  Chronic, controlled, continue home synthroid        VTE Risk Mitigation (From admission, onward)           Ordered     enoxaparin injection 40 mg  Daily         03/16/24 1920     IP VTE HIGH RISK PATIENT  Once         03/16/24 1920     Place sequential compression device  Until discontinued         03/16/24 1920                    Discharge Planning   RAJESH: 3/27/2024     Code Status: Full Code   Is the patient medically ready for discharge?: No    Reason for patient still in hospital (select all that apply): Patient trending condition  Discharge Plan A: Home Health   Discharge Delays: (!) Procedure Scheduling (IR, OR, Labs, Echo, Cath, Echo, EEG)              Jaleesa Jaimes MD  Department of Hospital Medicine   [facility] ([place])

## 2024-03-26 NOTE — SUBJECTIVE & OBJECTIVE
Interval History:   3/26: NM scan in process today. No other issues reported overnight.     Review of Systems   Constitutional:  Positive for chills and fever. Negative for fatigue and unexpected weight change.   HENT:  Negative for ear pain, facial swelling, hearing loss, mouth sores, nosebleeds, rhinorrhea, sinus pressure, sore throat, tinnitus, trouble swallowing and voice change.    Eyes:  Negative for photophobia, pain, redness and visual disturbance.   Respiratory:  Negative for cough, chest tightness, shortness of breath and wheezing.    Cardiovascular:  Negative for chest pain, palpitations and leg swelling.   Gastrointestinal:  Negative for abdominal pain, blood in stool, constipation, diarrhea, nausea and vomiting.   Endocrine: Negative for cold intolerance, heat intolerance, polydipsia, polyphagia and polyuria.   Genitourinary:  Positive for difficulty urinating, dysuria, hematuria and urgency. Negative for flank pain, frequency, menstrual problem, vaginal bleeding, vaginal discharge and vaginal pain.   Musculoskeletal:  Negative for arthralgias, back pain, joint swelling, myalgias and neck pain.   Skin:  Negative for rash.   Allergic/Immunologic: Negative for environmental allergies, food allergies and immunocompromised state.   Neurological:  Negative for dizziness, seizures, syncope, weakness, light-headedness, numbness and headaches.   Hematological:  Negative for adenopathy. Does not bruise/bleed easily.   Psychiatric/Behavioral:  Negative for confusion, hallucinations, self-injury, sleep disturbance and suicidal ideas. The patient is not nervous/anxious.      Objective:     Vital Signs (Most Recent):  Temp: 98.9 °F (37.2 °C) (03/26/24 0745)  Pulse: 76 (03/26/24 0745)  Resp: 18 (03/26/24 0745)  BP: (!) 151/65 (03/26/24 0745)  SpO2: 96 % (03/26/24 0745) Vital Signs (24h Range):  Temp:  [98.1 °F (36.7 °C)-99.6 °F (37.6 °C)] 98.9 °F (37.2 °C)  Pulse:  [67-77] 76  Resp:  [16-20] 18  SpO2:  [94 %-96 %] 96  %  BP: (121-191)/(56-82) 151/65     Weight: 71.1 kg (156 lb 12 oz)  Body mass index is 26.08 kg/m².    Intake/Output Summary (Last 24 hours) at 3/26/2024 1154  Last data filed at 3/26/2024 0448  Gross per 24 hour   Intake 400 ml   Output 2050 ml   Net -1650 ml         Physical Exam  Vitals and nursing note reviewed.   Constitutional:       Appearance: She is well-developed.   HENT:      Head: Normocephalic and atraumatic.      Right Ear: External ear normal.      Left Ear: External ear normal.   Eyes:      General: No scleral icterus.        Right eye: No discharge.         Left eye: No discharge.      Conjunctiva/sclera: Conjunctivae normal.   Cardiovascular:      Rate and Rhythm: Normal rate and regular rhythm.      Heart sounds: Normal heart sounds. No murmur heard.     No friction rub. No gallop.   Pulmonary:      Effort: Pulmonary effort is normal. No respiratory distress.      Breath sounds: Normal breath sounds. No stridor. No wheezing or rales.   Abdominal:      General: Bowel sounds are normal.      Tenderness: There is abdominal tenderness in the suprapubic area.   Musculoskeletal:         General: No tenderness. Normal range of motion.   Skin:     General: Skin is warm and dry.   Neurological:      Mental Status: She is alert and oriented to person, place, and time.             Significant Labs: All pertinent labs within the past 24 hours have been reviewed.    Significant Imaging: I have reviewed all pertinent imaging results/findings within the past 24 hours.

## 2024-03-26 NOTE — PLAN OF CARE
POC reviewed.     Problem: Pain Acute  Goal: Acceptable Pain Control and Functional Ability  Outcome: Ongoing, Progressing     Problem: Fluid and Electrolyte Imbalance (Acute Kidney Injury/Impairment)  Goal: Fluid and Electrolyte Balance  Outcome: Ongoing, Progressing     Problem: Infection Progression (Sepsis/Septic Shock)  Goal: Absence of Infection Signs and Symptoms  Outcome: Ongoing, Progressing     Problem: Glycemic Control Impaired (Sepsis/Septic Shock)  Goal: Blood Glucose Level Within Desired Range  Outcome: Ongoing, Progressing     Problem: Diabetes Comorbidity  Goal: Blood Glucose Level Within Targeted Range  Outcome: Ongoing, Progressing

## 2024-03-26 NOTE — NURSING
No events overnight. Vitals have been stable this shift. Patient rested well overnight. She received one dose of prn morphine overnight. She is on room air. No telemetry monitoring. Plan of care has been reviewed with the patient. She verbalizes understanding.

## 2024-03-26 NOTE — ASSESSMENT & PLAN NOTE
Resolved  Patient with acute kidney injury/acute renal failure likely due to pre-renal azotemia due to dehydration. Cr 1.2 on presentation. ANAIS is currently improving. Baseline creatinine  0.8  - Labs reviewed- Renal function/electrolytes with Estimated Creatinine Clearance: 57.3 mL/min (based on SCr of 0.8 mg/dL). according to latest data. Monitor urine output and serial BMP and adjust therapy as needed. Avoid nephrotoxins and renally dose meds for GFR listed above.

## 2024-03-26 NOTE — ASSESSMENT & PLAN NOTE
Patient's FSGs are controlled on current medication regimen.  Last A1c reviewed-   Lab Results   Component Value Date    HGBA1C 7.6 (H) 03/21/2024     Most recent fingerstick glucose reviewed-   Recent Labs   Lab 03/25/24  1541 03/25/24 2031 03/26/24  0746   POCTGLUCOSE 200* 240* 216*       Current correctional scale  Low  Maintain anti-hyperglycemic dose as follows-   Antihyperglycemics (From admission, onward)    Start     Stop Route Frequency Ordered    03/21/24 0715  insulin aspart U-100 pen 3 Units         -- SubQ 3 times daily with meals 03/20/24 2112 03/20/24 2115  insulin detemir U-100 (Levemir) pen 7 Units         -- SubQ 2 times daily 03/20/24 2111 03/16/24 2019  insulin aspart U-100 pen 0-5 Units         -- SubQ Before meals & nightly PRN 03/16/24 1920        Hold Oral hypoglycemics while patient is in the hospital.

## 2024-03-26 NOTE — PLAN OF CARE
Problem: Adult Inpatient Plan of Care  Goal: Readiness for Transition of Care  Outcome: Ongoing, Progressing  Intervention: Mutually Develop Transition Plan  Flowsheets (Taken 3/26/2024 0306)  Communicated RAJESH with patient/caregiver: Date not available/Unable to determine  Do you expect to return to your current living situation?: Yes  Do you have help at home or someone to help you manage your care at home?: Yes     Problem: Diabetes Comorbidity  Goal: Blood Glucose Level Within Targeted Range  Outcome: Ongoing, Progressing  Intervention: Monitor and Manage Glycemia  Flowsheets (Taken 3/26/2024 0306)  Glycemic Management:   blood glucose monitored   supplemental insulin given

## 2024-03-27 LAB
ALBUMIN SERPL BCP-MCNC: 2.7 G/DL (ref 3.5–5.2)
ALP SERPL-CCNC: 89 U/L (ref 55–135)
ALT SERPL W/O P-5'-P-CCNC: 18 U/L (ref 10–44)
ANION GAP SERPL CALC-SCNC: 7 MMOL/L (ref 8–16)
AST SERPL-CCNC: 10 U/L (ref 10–40)
BASOPHILS # BLD AUTO: 0.03 K/UL (ref 0–0.2)
BASOPHILS NFR BLD: 0.7 % (ref 0–1.9)
BILIRUB SERPL-MCNC: 0.2 MG/DL (ref 0.1–1)
BUN SERPL-MCNC: 7 MG/DL (ref 8–23)
CALCIUM SERPL-MCNC: 9.9 MG/DL (ref 8.7–10.5)
CHLORIDE SERPL-SCNC: 105 MMOL/L (ref 95–110)
CO2 SERPL-SCNC: 27 MMOL/L (ref 23–29)
CREAT SERPL-MCNC: 0.8 MG/DL (ref 0.5–1.4)
DIFFERENTIAL METHOD BLD: ABNORMAL
EOSINOPHIL # BLD AUTO: 0.1 K/UL (ref 0–0.5)
EOSINOPHIL NFR BLD: 1.4 % (ref 0–8)
ERYTHROCYTE [DISTWIDTH] IN BLOOD BY AUTOMATED COUNT: 18.6 % (ref 11.5–14.5)
EST. GFR  (NO RACE VARIABLE): >60 ML/MIN/1.73 M^2
GLUCOSE SERPL-MCNC: 142 MG/DL (ref 70–110)
HCT VFR BLD AUTO: 30.8 % (ref 37–48.5)
HGB BLD-MCNC: 9.5 G/DL (ref 12–16)
IMM GRANULOCYTES # BLD AUTO: 0.04 K/UL (ref 0–0.04)
IMM GRANULOCYTES NFR BLD AUTO: 0.9 % (ref 0–0.5)
LYMPHOCYTES # BLD AUTO: 1 K/UL (ref 1–4.8)
LYMPHOCYTES NFR BLD: 23.9 % (ref 18–48)
MCH RBC QN AUTO: 22.6 PG (ref 27–31)
MCHC RBC AUTO-ENTMCNC: 30.8 G/DL (ref 32–36)
MCV RBC AUTO: 73 FL (ref 82–98)
MONOCYTES # BLD AUTO: 0.4 K/UL (ref 0.3–1)
MONOCYTES NFR BLD: 8.4 % (ref 4–15)
NEUTROPHILS # BLD AUTO: 2.8 K/UL (ref 1.8–7.7)
NEUTROPHILS NFR BLD: 64.7 % (ref 38–73)
NRBC BLD-RTO: 0 /100 WBC
PLATELET # BLD AUTO: 383 K/UL (ref 150–450)
PMV BLD AUTO: 9 FL (ref 9.2–12.9)
POCT GLUCOSE: 136 MG/DL (ref 70–110)
POCT GLUCOSE: 149 MG/DL (ref 70–110)
POCT GLUCOSE: 214 MG/DL (ref 70–110)
POCT GLUCOSE: 236 MG/DL (ref 70–110)
POCT GLUCOSE: 348 MG/DL (ref 70–110)
POTASSIUM SERPL-SCNC: 4.6 MMOL/L (ref 3.5–5.1)
PROT SERPL-MCNC: 7 G/DL (ref 6–8.4)
RBC # BLD AUTO: 4.2 M/UL (ref 4–5.4)
SODIUM SERPL-SCNC: 139 MMOL/L (ref 136–145)
WBC # BLD AUTO: 4.31 K/UL (ref 3.9–12.7)

## 2024-03-27 PROCEDURE — 25000003 PHARM REV CODE 250: Performed by: INTERNAL MEDICINE

## 2024-03-27 PROCEDURE — 63600175 PHARM REV CODE 636 W HCPCS: Performed by: INTERNAL MEDICINE

## 2024-03-27 PROCEDURE — 20600001 HC STEP DOWN PRIVATE ROOM

## 2024-03-27 PROCEDURE — 25000003 PHARM REV CODE 250: Performed by: STUDENT IN AN ORGANIZED HEALTH CARE EDUCATION/TRAINING PROGRAM

## 2024-03-27 PROCEDURE — A4216 STERILE WATER/SALINE, 10 ML: HCPCS | Performed by: HOSPITALIST

## 2024-03-27 PROCEDURE — 25000003 PHARM REV CODE 250: Performed by: HOSPITALIST

## 2024-03-27 PROCEDURE — 80053 COMPREHEN METABOLIC PANEL: CPT | Performed by: STUDENT IN AN ORGANIZED HEALTH CARE EDUCATION/TRAINING PROGRAM

## 2024-03-27 PROCEDURE — 36415 COLL VENOUS BLD VENIPUNCTURE: CPT | Performed by: STUDENT IN AN ORGANIZED HEALTH CARE EDUCATION/TRAINING PROGRAM

## 2024-03-27 PROCEDURE — 85025 COMPLETE CBC W/AUTO DIFF WBC: CPT | Performed by: STUDENT IN AN ORGANIZED HEALTH CARE EDUCATION/TRAINING PROGRAM

## 2024-03-27 RX ORDER — INSULIN ASPART 100 [IU]/ML
3 INJECTION, SOLUTION INTRAVENOUS; SUBCUTANEOUS 3 TIMES DAILY
Refills: 0
Start: 2024-03-27 | End: 2024-06-04

## 2024-03-27 RX ORDER — CARVEDILOL 6.25 MG/1
6.25 TABLET ORAL 2 TIMES DAILY
Qty: 60 TABLET | Refills: 11 | Status: SHIPPED | OUTPATIENT
Start: 2024-03-27 | End: 2025-03-27

## 2024-03-27 RX ADMIN — ENOXAPARIN SODIUM 40 MG: 40 INJECTION SUBCUTANEOUS at 05:03

## 2024-03-27 RX ADMIN — CARVEDILOL 6.25 MG: 6.25 TABLET, FILM COATED ORAL at 09:03

## 2024-03-27 RX ADMIN — CEFTRIAXONE 2 G: 2 INJECTION, POWDER, FOR SOLUTION INTRAMUSCULAR; INTRAVENOUS at 01:03

## 2024-03-27 RX ADMIN — TRIAMCINOLONE ACETONIDE: 5 CREAM TOPICAL at 09:03

## 2024-03-27 RX ADMIN — AMPICILLIN 2 G: 2 INJECTION, POWDER, FOR SOLUTION INTRAMUSCULAR; INTRAVENOUS at 01:03

## 2024-03-27 RX ADMIN — INSULIN ASPART 3 UNITS: 100 INJECTION, SOLUTION INTRAVENOUS; SUBCUTANEOUS at 09:03

## 2024-03-27 RX ADMIN — LEVOTHYROXINE SODIUM 112 MCG: 112 TABLET ORAL at 05:03

## 2024-03-27 RX ADMIN — AMPICILLIN 2 G: 2 INJECTION, POWDER, FOR SOLUTION INTRAMUSCULAR; INTRAVENOUS at 05:03

## 2024-03-27 RX ADMIN — CARVEDILOL 6.25 MG: 6.25 TABLET, FILM COATED ORAL at 10:03

## 2024-03-27 RX ADMIN — INSULIN ASPART 1 UNITS: 100 INJECTION, SOLUTION INTRAVENOUS; SUBCUTANEOUS at 10:03

## 2024-03-27 RX ADMIN — Medication 10 ML: at 05:03

## 2024-03-27 RX ADMIN — TRIAMCINOLONE ACETONIDE: 5 CREAM TOPICAL at 10:03

## 2024-03-27 RX ADMIN — AMITRIPTYLINE HYDROCHLORIDE 10 MG: 10 TABLET, FILM COATED ORAL at 10:03

## 2024-03-27 RX ADMIN — AMPICILLIN 2 G: 2 INJECTION, POWDER, FOR SOLUTION INTRAMUSCULAR; INTRAVENOUS at 09:03

## 2024-03-27 RX ADMIN — LISINOPRIL 40 MG: 20 TABLET ORAL at 09:03

## 2024-03-27 RX ADMIN — INSULIN ASPART 3 UNITS: 100 INJECTION, SOLUTION INTRAVENOUS; SUBCUTANEOUS at 05:03

## 2024-03-27 RX ADMIN — GABAPENTIN 300 MG: 300 CAPSULE ORAL at 10:03

## 2024-03-27 RX ADMIN — AMPICILLIN 2 G: 2 INJECTION, POWDER, FOR SOLUTION INTRAMUSCULAR; INTRAVENOUS at 10:03

## 2024-03-27 RX ADMIN — INSULIN ASPART 3 UNITS: 100 INJECTION, SOLUTION INTRAVENOUS; SUBCUTANEOUS at 01:03

## 2024-03-27 RX ADMIN — INSULIN ASPART 4 UNITS: 100 INJECTION, SOLUTION INTRAVENOUS; SUBCUTANEOUS at 05:03

## 2024-03-27 RX ADMIN — Medication 10 ML: at 01:03

## 2024-03-27 RX ADMIN — INSULIN DETEMIR 7 UNITS: 100 INJECTION, SOLUTION SUBCUTANEOUS at 09:03

## 2024-03-27 RX ADMIN — INSULIN DETEMIR 7 UNITS: 100 INJECTION, SOLUTION SUBCUTANEOUS at 10:03

## 2024-03-27 NOTE — SUBJECTIVE & OBJECTIVE
Interval History:   3/27; Patient would like home infusion therapy.     Review of Systems   Constitutional:  Positive for chills and fever. Negative for fatigue and unexpected weight change.   HENT:  Negative for ear pain, facial swelling, hearing loss, mouth sores, nosebleeds, rhinorrhea, sinus pressure, sore throat, tinnitus, trouble swallowing and voice change.    Eyes:  Negative for photophobia, pain, redness and visual disturbance.   Respiratory:  Negative for cough, chest tightness, shortness of breath and wheezing.    Cardiovascular:  Negative for chest pain, palpitations and leg swelling.   Gastrointestinal:  Negative for abdominal pain, blood in stool, constipation, diarrhea, nausea and vomiting.   Endocrine: Negative for cold intolerance, heat intolerance, polydipsia, polyphagia and polyuria.   Genitourinary:  Positive for difficulty urinating, dysuria, hematuria and urgency. Negative for flank pain, frequency, menstrual problem, vaginal bleeding, vaginal discharge and vaginal pain.   Musculoskeletal:  Negative for arthralgias, back pain, joint swelling, myalgias and neck pain.   Skin:  Negative for rash.   Allergic/Immunologic: Negative for environmental allergies, food allergies and immunocompromised state.   Neurological:  Negative for dizziness, seizures, syncope, weakness, light-headedness, numbness and headaches.   Hematological:  Negative for adenopathy. Does not bruise/bleed easily.   Psychiatric/Behavioral:  Negative for confusion, hallucinations, self-injury, sleep disturbance and suicidal ideas. The patient is not nervous/anxious.      Objective:     Vital Signs (Most Recent):  Temp: 98.4 °F (36.9 °C) (03/27/24 1152)  Pulse: 71 (03/27/24 1152)  Resp: 18 (03/27/24 1152)  BP: 139/70 (03/27/24 1152)  SpO2: 98 % (03/27/24 1152) Vital Signs (24h Range):  Temp:  [97.9 °F (36.6 °C)-99.3 °F (37.4 °C)] 98.4 °F (36.9 °C)  Pulse:  [71-93] 71  Resp:  [17-20] 18  SpO2:  [95 %-99 %] 98 %  BP:  (129-158)/(60-72) 139/70     Weight: 71.1 kg (156 lb 12 oz)  Body mass index is 26.08 kg/m².    Intake/Output Summary (Last 24 hours) at 3/27/2024 1330  Last data filed at 3/27/2024 0213  Gross per 24 hour   Intake 843.46 ml   Output 1350 ml   Net -506.54 ml         Physical Exam  Vitals and nursing note reviewed.   Constitutional:       Appearance: She is well-developed.   HENT:      Head: Normocephalic and atraumatic.      Right Ear: External ear normal.      Left Ear: External ear normal.   Eyes:      General: No scleral icterus.        Right eye: No discharge.         Left eye: No discharge.      Conjunctiva/sclera: Conjunctivae normal.   Cardiovascular:      Rate and Rhythm: Normal rate and regular rhythm.      Heart sounds: Normal heart sounds. No murmur heard.     No friction rub. No gallop.   Pulmonary:      Effort: Pulmonary effort is normal. No respiratory distress.      Breath sounds: Normal breath sounds. No stridor. No wheezing or rales.   Abdominal:      General: Bowel sounds are normal.      Tenderness: There is abdominal tenderness in the suprapubic area.   Musculoskeletal:         General: No tenderness. Normal range of motion.   Skin:     General: Skin is warm and dry.   Neurological:      Mental Status: She is alert and oriented to person, place, and time.             Significant Labs: All pertinent labs within the past 24 hours have been reviewed.    Significant Imaging: I have reviewed all pertinent imaging results/findings within the past 24 hours.

## 2024-03-27 NOTE — PROGRESS NOTES
[facility] - Stepdown [facility] ([place] [id number])  [facility] Medicine  Progress Note    Patient Name: Aminah Alicea-Shelly  MRN: [id number]  Patient Class: IP- Inpatient   Admission Date: 3/16/2024  Length of Stay: 11 days  Attending Physician: Jaleesa Jaimes MD  Primary Care Provider: Jeison Sanchez MD        Subjective:     Principal Problem:Enterococcal bacteremia        HPI:  79 yo female with DM2, GERD, HPLD, HTN, Hypothyroidism, recent stone removal presenting with chills, weakness, and back pain. Interpretor MERI used at bedside. She states that she was d/c'd on the 14th of March was doing okay until last night she started feeling weak and actually fell, she denies any leg pain or headache and did loss consciousness, however she did not feel any better today whenever she woke up so presented to the ER.    She had a fever measured here at 102.6F, CT scan showed perinephric stranding, UA consistent with infection, lactic acidosis. Urology was consulted who recommended day and antibiotics. Medicine called for admission.     Overview/Hospital Course:  Started on ceftriaxone at admission, ampicillin added on 3/17. Blood cultures quickly resulted positive for e faecalis, persistently positive. ID consulted. Patient passed voiding trial 3/18. TTE negative. CARLITO to be done 3/20.     Interval History:   3/27; Patient would like home infusion therapy.     Review of Systems   Constitutional:  Positive for chills and fever. Negative for fatigue and unexpected weight change.   HENT:  Negative for ear pain, facial swelling, hearing loss, mouth sores, nosebleeds, rhinorrhea, sinus pressure, sore throat, tinnitus, trouble swallowing and voice change.    Eyes:  Negative for photophobia, pain, redness and visual disturbance.   Respiratory:  Negative for cough, chest tightness, shortness of breath and wheezing.    Cardiovascular:  Negative for chest pain, palpitations and leg swelling.   Gastrointestinal:  Negative for  abdominal pain, blood in stool, constipation, diarrhea, nausea and vomiting.   Endocrine: Negative for cold intolerance, heat intolerance, polydipsia, polyphagia and polyuria.   Genitourinary:  Positive for difficulty urinating, dysuria, hematuria and urgency. Negative for flank pain, frequency, menstrual problem, vaginal bleeding, vaginal discharge and vaginal pain.   Musculoskeletal:  Negative for arthralgias, back pain, joint swelling, myalgias and neck pain.   Skin:  Negative for rash.   Allergic/Immunologic: Negative for environmental allergies, food allergies and immunocompromised state.   Neurological:  Negative for dizziness, seizures, syncope, weakness, light-headedness, numbness and headaches.   Hematological:  Negative for adenopathy. Does not bruise/bleed easily.   Psychiatric/Behavioral:  Negative for confusion, hallucinations, self-injury, sleep disturbance and suicidal ideas. The patient is not nervous/anxious.      Objective:     Vital Signs (Most Recent):  Temp: 98.4 °F (36.9 °C) (03/27/24 1152)  Pulse: 71 (03/27/24 1152)  Resp: 18 (03/27/24 1152)  BP: 139/70 (03/27/24 1152)  SpO2: 98 % (03/27/24 1152) Vital Signs (24h Range):  Temp:  [97.9 °F (36.6 °C)-99.3 °F (37.4 °C)] 98.4 °F (36.9 °C)  Pulse:  [71-93] 71  Resp:  [17-20] 18  SpO2:  [95 %-99 %] 98 %  BP: (129-158)/(60-72) 139/70     Weight: 71.1 kg (156 lb 12 oz)  Body mass index is 26.08 kg/m².    Intake/Output Summary (Last 24 hours) at 3/27/2024 1330  Last data filed at 3/27/2024 0213  Gross per 24 hour   Intake 843.46 ml   Output 1350 ml   Net -506.54 ml         Physical Exam  Vitals and nursing note reviewed.   Constitutional:       Appearance: She is well-developed.   HENT:      Head: Normocephalic and atraumatic.      Right Ear: External ear normal.      Left Ear: External ear normal.   Eyes:      General: No scleral icterus.        Right eye: No discharge.         Left eye: No discharge.      Conjunctiva/sclera: Conjunctivae normal.    Cardiovascular:      Rate and Rhythm: Normal rate and regular rhythm.      Heart sounds: Normal heart sounds. No murmur heard.     No friction rub. No gallop.   Pulmonary:      Effort: Pulmonary effort is normal. No respiratory distress.      Breath sounds: Normal breath sounds. No stridor. No wheezing or rales.   Abdominal:      General: Bowel sounds are normal.      Tenderness: There is abdominal tenderness in the suprapubic area.   Musculoskeletal:         General: No tenderness. Normal range of motion.   Skin:     General: Skin is warm and dry.   Neurological:      Mental Status: She is alert and oriented to person, place, and time.             Significant Labs: All pertinent labs within the past 24 hours have been reviewed.    Significant Imaging: I have reviewed all pertinent imaging results/findings within the past 24 hours.    Assessment/Plan:      * Enterococcal bacteremia  Patient presented to ED with fevers and malaise 2 days after urologic procedure.  CT with evidence nephritic stranding.  Admitted for pyelonephritis.  Blood cultures on presentation 3/16 positive for Enterococcus faecalis. Suspect urologic source. TTE negative  - ID consulted   - repeat cultures  - CARLITO no vegetation  - S/p ceftriaxone in ED and on admission, continued for double coverage due to persistent bacteremia  - started on ampicillin 3/17   - urology to follow-up in clinic, stated no role for stent removal as it is source control  - NM scan ordered to be started on Monday    ID final recs: Outpatient Antibiotic Therapy Plan:     Please send referral to [facility].     1) Infection: enterococcus bacteremia and pyleonephritis     2) Discharge Antibiotics:     Intravenous antibiotics:  Ampicillin 2gm iv q4h (or continuous infusion)  Ceftriaxone 2gm iv q12h     3) Therapy Duration:  4weeks     Estimated end date of IV antibiotics: 4/18/24     4) Outpatient Weekly Labs:     Order the following labs  to be drawn on Mondays:   CBC  CMP   CRP        5) Fax Lab Results to Infectious Diseases Provider: Dr Velasquez     [facility] Fax Number: [phone]     6) Outpatient Infectious Diseases Follow-up     Follow-up appointment will be arranged by the ID clinic and will be found in the patient's appointments tab.     Prior to discharge, please ensure the patient's follow-up has been scheduled.    If there is still no follow-up scheduled prior to discharge, please send an EPIC message to Brandie Kinsey in Infectious Diseases.          Positive QuantiFERON-TB Gold test  - history noted      Ureteral stent present  Management as per urology   - outpatient follow-up      Anemia  Patient's anemia is currently controlled. Has not received any PRBCs to date. Etiology likely d/t chronic disease v iron deficiency. No obvious signs of bleeding.  Current CBC reviewed-   Lab Results   Component Value Date    HGB 9.5 (L) 03/27/2024    HCT 30.8 (L) 03/27/2024     Monitor serial CBC and transfuse if patient becomes hemodynamically unstable, symptomatic or H/H drops below 7/21.      ANAIS (acute kidney injury)  Resolved  Patient with acute kidney injury/acute renal failure likely due to pre-renal azotemia due to dehydration. Cr 1.2 on presentation. ANAIS is currently improving. Baseline creatinine  0.8  - Labs reviewed- Renal function/electrolytes with Estimated Creatinine Clearance: 57.3 mL/min (based on SCr of 0.8 mg/dL). according to latest data. Monitor urine output and serial BMP and adjust therapy as needed. Avoid nephrotoxins and renally dose meds for GFR listed above.    Sepsis  This patient does have evidence of infective focus  My overall impression is sepsis.  Source: Urinary Tract  Antibiotics given-   Antibiotics (72h ago, onward)      Start     Stop Route Frequency Ordered    03/19/24 1300  cefTRIAXone (ROCEPHIN) 2 g in dextrose 5 % in water (D5W) 100 mL IVPB (MB+)         -- IV Every 12 hours (non-standard times)  "03/19/24 1145    03/17/24 1100  ampicillin (OMNIPEN) 2 g in sodium chloride 0.9 % 100 mL IVPB (MB+)         -- IV Every 4 hours (non-standard times) 03/17/24 0954          Latest lactate reviewed-  No results for input(s): "LACTATE", "POCLAC" in the last 72 hours.    Fluid challenge Ideal Body Weight- The patient's ideal body weight is Ideal body weight: 57 kg (125 lb 10.6 oz) which will be used to calculate fluid bolus of 30 ml/kg for treatment of septic shock.      Post- resuscitation assessment No - Post resuscitation assessment not needed     ANAIS    Will Not start Pressors- Levophed for MAP of 65  Source control achieved by: rocephin and ampicillin    Pyelonephritis  Acute, urine consistent with infection.   -  see above        Type 2 diabetes mellitus with neurologic complication, without long-term current use of insulin  Patient's FSGs are controlled on current medication regimen.  Last A1c reviewed-   Lab Results   Component Value Date    HGBA1C 7.6 (H) 03/21/2024     Most recent fingerstick glucose reviewed-   Recent Labs   Lab 03/25/24  1541 03/25/24  2031 03/26/24  0746   POCTGLUCOSE 200* 240* 216*       Current correctional scale  Low  Maintain anti-hyperglycemic dose as follows-   Antihyperglycemics (From admission, onward)      Start     Stop Route Frequency Ordered    03/21/24 0715  insulin aspart U-100 pen 3 Units         -- SubQ 3 times daily with meals 03/20/24 2112 03/20/24 2115  insulin detemir U-100 (Levemir) pen 7 Units         -- SubQ 2 times daily 03/20/24 2111 03/16/24 2019  insulin aspart U-100 pen 0-5 Units         -- SubQ Before meals & nightly PRN 03/16/24 1920          Hold Oral hypoglycemics while patient is in the hospital.    GERD (gastroesophageal reflux disease)  Chronic, controlled, continue to monitor      HTN (hypertension), benign  Chronic, controlled. Latest blood pressure and vitals reviewed-     Temp:  [98.4 °F (36.9 °C)-99.4 °F (37.4 °C)]   Pulse:  [74-83]   Resp:  " [16-18]   BP: (121-171)/(60-72)   SpO2:  [94 %-98 %] .   Home meds for hypertension were reviewed and noted below.   Hypertension Medications               carvediloL (COREG) 25 MG tablet TAKE 1 TABLET TWICE DAILY    lisinopriL (PRINIVIL,ZESTRIL) 40 MG tablet Take 1 tablet (40 mg total) by mouth once daily.            While in the hospital, will manage blood pressure as follows; Adjust home antihypertensive regimen as follows- held on presentation, restart as tolerated    Will utilize p.r.n. blood pressure medication only if patient's blood pressure greater than 180/110 and she develops symptoms such as worsening chest pain or shortness of breath.    Hypothyroidism  Chronic, controlled, continue home synthroid        VTE Risk Mitigation (From admission, onward)           Ordered     enoxaparin injection 40 mg  Daily         03/16/24 1920     IP VTE HIGH RISK PATIENT  Once         03/16/24 1920     Place sequential compression device  Until discontinued         03/16/24 1920                    Discharge Planning   RAJESH: 3/27/2024     Code Status: Full Code   Is the patient medically ready for discharge?: No    Reason for patient still in hospital (select all that apply): Patient trending condition  Discharge Plan A: Home Health   Discharge Delays: (!) Procedure Scheduling (IR, OR, Labs, Echo, Cath, Echo, EEG)              Jaleesa Jaimes MD  Department of Hospital Medicine   [facility] - [facility] ([place])

## 2024-03-27 NOTE — NURSING
No events overnight. Patient oriented x 4. She rested well but awakens easily for care. Normal sinus rhythm on telemetry monitoring. She is on room air. Patient is up independently to bedside commode chair. Ampicillin continues to infuse. No issues with patient picc line. All IV access is within required date range. Plan of care reviewed with patient.

## 2024-03-27 NOTE — NURSING
Patient alert and oriented x 4. Vitals Stable. IV abx. continued. No complaints of pain or discomfort. Patient voices no other concerns at this time. Call light within reach.

## 2024-03-27 NOTE — PLAN OF CARE
Problem: Adult Inpatient Plan of Care  Goal: Readiness for Transition of Care  Outcome: Ongoing, Progressing  Intervention: Mutually Develop Transition Plan  Flowsheets (Taken 3/27/2024 0312)  Communicated RAJESH with patient/caregiver: Date not available/Unable to determine  Do you expect to return to your current living situation?: Yes  Do you have help at home or someone to help you manage your care at home?: Yes     Problem: Adjustment to Illness (Sepsis/Septic Shock)  Goal: Optimal Coping  Outcome: Ongoing, Progressing  Intervention: Optimize Psychosocial Adjustment to Illness  Flowsheets (Taken 3/27/2024 0312)  Supportive Measures:   active listening utilized   verbalization of feelings encouraged  Family/Support System Care: self-care encouraged     Problem: Glycemic Control Impaired (Sepsis/Septic Shock)  Goal: Blood Glucose Level Within Desired Range  Outcome: Ongoing, Progressing  Intervention: Optimize Glycemic Control  Flowsheets (Taken 3/27/2024 0312)  Glycemic Management:   blood glucose monitored   supplemental insulin given

## 2024-03-27 NOTE — PLAN OF CARE
[facility]     Department of Hospital Medicine     [address] [place] [address]     [phone] after hours  [phone] fax       NURSING HOME ORDERS    Patient Name: Aminah Norton  YOB: [birth year]    Admit to Nursing Home:       Skilled Bed      Code full                                                Diagnoses:  Active Hospital Problems    Diagnosis  POA    *Enterococcal bacteremia [R78.81, B95.2]  Yes    Anemia [D64.9]  Yes    Ureteral stent present [Z96.0]  Not Applicable    Pyelonephritis [N12]  Yes    Sepsis [A41.9]  Yes    ANAIS (acute kidney injury) [N17.9]  Yes    Type 2 diabetes mellitus with neurologic complication, without long-term current use of insulin [E11.49]  Yes    GERD (gastroesophageal reflux disease) [K21.9]  Yes    Hypothyroidism [E03.9]  Yes    HTN (hypertension), benign [I10]  Yes      Resolved Hospital Problems   No resolved problems to display.       Patient is homebound due to:  Enterococcal bacteremia    Allergies:  Review of patient's allergies indicates:   Allergen Reactions    Bufferin [aspirin, buffered] Itching    Atorvastatin      Myalgias and arthralgias    Shellfish containing products Itching     Shellfish causes her to itch per her daughter, Caro.     Warfarin     Ibuprofen Itching     Itching of eyes, head       Vitals:       Every shift (Skilled Nursing patients)    Diet: diabetic diet: 2000 calorie               Acitivities:    - Up in a chair each morning as tolerated  - Ambulate with assistance to bathroom  - Scheduled walks once each shift (every 8 hours)  - May ambulate independently  - May use walker, cane, or self-propelled wheelchair       - Weight bearing: as tolerated    LABS:  Per facility protocol    Outpatient Antibiotic Therapy Plan:     Please send referral to [facility].     1) Infection: enterococcus bacteremia and  pyleonephritis     2) Discharge Antibiotics:     Intravenous antibiotics:  Ampicillin 2gm iv q4h (or continuous infusion)  Ceftriaxone 2gm iv q12h     3) Therapy Duration:  4weeks     Estimated end date of IV antibiotics: 4/18/24     4) Outpatient Weekly Labs:     Order the following labs to be drawn on Mondays:   CBC  CMP   CRP        5) Fax Lab Results to Infectious Diseases Provider: Dr Velasquez     [facility] Fax Number: [phone]     6) Outpatient Infectious Diseases Follow-up     Follow-up appointment will be arranged by the ID clinic and will be found in the patient's appointments tab.     Prior to discharge, please ensure the patient's follow-up has been scheduled.    If there is still no follow-up scheduled prior to discharge, please send an EPIC message to Brandie Kinsey in Infectious Diseases.     Infusion Therapy:   SN to perform Infusion Therapy/Central Line Care.  Review Central Line Care & Central Line Flush with patient.      Scrub the Hub: Prior to accessing the line, always perform a 30 second alcohol scrub  Each lumen of the central line is to be flushed at least daily with 10 mL Normal Saline and 3 mL Heparin flush (10 units/mL)  Skilled Nurse (SN) may draw blood from IV access  Blood Draw Procedure:   - Aspirate at least 5 mL of blood   - Discard   - Obtain specimen   - Change injection cap   - Flush with 20 mL Normal Saline followed by a                 3-5 mL Heparin flush (10 units/mL)  Central :   - Sterile dressing changes are done weekly and as needed.   - Use chlor-hexadine scrub to cleanse site, apply Biopatch to insertion site,       apply securement device dressing   - Injection caps are changed weekly and after EVERY lab draw.   - If sterile gauze is under dressing to control oozing,                 dressing change must be performed every 24 hours until gauze is not needed.    Nursing Precautions:   - Aspiration precautions:             - Total assistance  with meals            -  Upright 90 degrees befor during and after meals             -  Suction at bedside          - Fall precautions per nursing home protocol   - Seizure precaution per shelter protocol   - Decubitus precautions:        -  for positioning   - Pressure reducing foam mattress   - Turn patient every two hours. Use wedge pillows to anchor patient    CONSULTS:   Physical Therapy to evaluate and treat     Occupational Therapy to evaluate and treat     Speech Therapy  to evaluate and treat     Nutrition to evaluate and recommend diet       MISCELLANEOUS CARE:     Routine Skin for Bedridden Patients:  Apply moisture barrier cream to all    skin folds and wet areas in perineal area daily and after baths and                           all bowel movements.    DIABETES CARE:  Check blood sugar:    Fingerstick blood sugar AC and HS   Fingerstick blood sugar every 6 hours if unable to eat      Report CBG < 60 or > 400 to physician.                                          Insulin Sliding Scale          Glucose  Novolog Insulin Subcutaneous        0 - 60   Orange juice or glucose tablet, hold insulin      No insulin   201-250  2 units   251-300  4 units   301-350  6 units   351-400  8 units   >400   10 units then call physician      Medications: Discontinue all previous medication orders, if any. See new list below.     Current Discharge Medication List        START taking these medications    Details   insulin aspart U-100 (NOVOLOG) 100 unit/mL (3 mL) InPn pen Inject 3 Units into the skin 3 (three) times daily.  Refills: 0      insulin detemir U-100, Levemir, 100 unit/mL (3 mL) SubQ InPn pen Inject 7 Units into the skin 2 (two) times daily.  Refills: 0           CONTINUE these medications which have CHANGED    Details   carvediloL (COREG) 6.25 MG tablet Take 1 tablet (6.25 mg total) by mouth 2 (two) times daily.  Qty: 60 tablet, Refills: 11    Comments: .           CONTINUE these medications  which have NOT CHANGED    Details   amitriptyline (ELAVIL) 10 MG tablet Take 1 tablet (10 mg total) by mouth every evening.  Qty: 30 tablet, Refills: 11      gabapentin (NEURONTIN) 300 MG capsule Take 1 capsule (300 mg total) by mouth every evening.  Qty: 90 capsule, Refills: 3    Associated Diagnoses: Type 2 diabetes mellitus with diabetic neuropathy, without long-term current use of insulin      levothyroxine (SYNTHROID) 112 MCG tablet Take 1 tablet (112 mcg total) by mouth before breakfast.  Qty: 90 tablet, Refills: 3    Associated Diagnoses: Hypothyroidism, unspecified type      lisinopriL (PRINIVIL,ZESTRIL) 40 MG tablet Take 1 tablet (40 mg total) by mouth once daily.  Qty: 90 tablet, Refills: 3    Comments: .  Associated Diagnoses: HTN (hypertension), benign                       _________________________________  Jaleesa Jaimes MD  03/27/2024

## 2024-03-27 NOTE — PLAN OF CARE
[facility] ([place] [id number])      HOME HEALTH ORDERS  FACE TO FACE ENCOUNTER    Patient Name: Aminah Alicea-Dermody  YOB: 1945    PCP: Jeison Sanchez MD   PCP [facility] [address] [facility] [address]  PCP Phone Number: [phone]  PCP Fax: [phone]    Encounter Date: 3/16/24    Admit to Home Health    Diagnoses:  Active Hospital Problems    Diagnosis  POA    *Enterococcal bacteremia [R78.81, B95.2]  Yes    Anemia [D64.9]  Yes    Ureteral stent present [Z96.0]  Not Applicable    Pyelonephritis [N12]  Yes    Sepsis [A41.9]  Yes    ANAIS (acute kidney injury) [N17.9]  Yes    Type 2 diabetes mellitus with neurologic complication, without long-term current use of insulin [E11.49]  Yes    GERD (gastroesophageal reflux disease) [K21.9]  Yes    Hypothyroidism [E03.9]  Yes    HTN (hypertension), benign [I10]  Yes      Resolved Hospital Problems   No resolved problems to display.       Follow Up Appointments:  Future Appointments   Date Time Provider Department Center   4/12/2024  3:00 PM [facility]   4/30/2024  8:40 AM Brody Smith MD [facility] [place]   6/12/2024 12:00 PM Rachna Damon DPM [facility] [place]   7/26/2024  9:30 AM Nancy [facility]عراقي RD [facility]       Allergies:  Review of patient's allergies indicates:   Allergen Reactions    Bufferin [aspirin, buffered] Itching    Atorvastatin      Myalgias and arthralgias    Shellfish containing products Itching     Shellfish causes her to itch per her daughter, Caro.     Warfarin     Ibuprofen Itching     Itching of eyes, head       Medications: Review discharge medications with patient and family and provide education.    Current Facility-Administered Medications   Medication Dose Route Frequency Provider Last Rate Last Admin    acetaminophen tablet 650 mg  650 mg Oral Q8H PRN Luis Thibodeaux MD   650 mg at 03/17/24 0419    albuterol-ipratropium 2.5 mg-0.5 mg/3 mL  nebulizer solution 3 mL  3 mL Nebulization Q6H PRN Luis Thibodeaux MD        amitriptyline tablet 10 mg  10 mg Oral QHS Luis Thibodeaux MD   10 mg at 03/26/24 2130    ampicillin (OMNIPEN) 2 g in sodium chloride 0.9 % 100 mL IVPB (MB+)  2 g Intravenous Q4H Diane Gonsales MD 25 mL/hr at 03/27/24 1312 2 g at 03/27/24 1312    carvediloL tablet 6.25 mg  6.25 mg Oral BID Edwige Aly MD   6.25 mg at 03/27/24 0901    cefTRIAXone (ROCEPHIN) 2 g in dextrose 5 % in water (D5W) 100 mL IVPB (MB+)  2 g Intravenous Q12H Samina Velasquez  mL/hr at 03/27/24 1313 2 g at 03/27/24 1313    dextrose 10% bolus 125 mL 125 mL  12.5 g Intravenous PRN Luis Thibodeaux MD        dextrose 10% bolus 250 mL 250 mL  25 g Intravenous PRN Luis Thibodeaux MD        enoxaparin injection 40 mg  40 mg Subcutaneous Daily Luis Thibodeaux MD   40 mg at 03/26/24 1708    gabapentin capsule 300 mg  300 mg Oral QHS Luis Thibodeaux MD   300 mg at 03/26/24 2130    glucagon (human recombinant) injection 1 mg  1 mg Intramuscular PRN Luis Thibodeaux MD        glucose chewable tablet 16 g  16 g Oral PRN Luis Thibodeaux MD        glucose chewable tablet 24 g  24 g Oral PRN Luis Thibodeaux MD        HYDROcodone-acetaminophen 5-325 mg per tablet 1 tablet  1 tablet Oral Q6H PRN Luis Thibodeaux MD   1 tablet at 03/26/24 2139    insulin aspart U-100 pen 0-5 Units  0-5 Units Subcutaneous QID (AC + HS) PRN Luis Thibodeaux MD   1 Units at 03/26/24 2138    insulin aspart U-100 pen 3 Units  3 Units Subcutaneous TIDWM Ermias Dyer MD   3 Units at 03/27/24 0909    insulin detemir U-100 (Levemir) pen 7 Units  7 Units Subcutaneous BID Ermias Dyer MD   7 Units at 03/27/24 0910    levothyroxine tablet 112 mcg  112 mcg Oral Before breakfast Luis Thibodeaux MD   112 mcg at 03/27/24 0551    lisinopriL tablet 40 mg  40 mg Oral Daily Edwige Aly MD   40 mg at 03/27/24 0901    melatonin tablet 6 mg  6 mg Oral Nightly PRN Wolfgang Jennings,         morphine injection 2 mg  2  mg Intravenous Q4H PRN Luis Thibodeaux MD   2 mg at 03/25/24 2136    naloxone 0.4 mg/mL injection 0.02 mg  0.02 mg Intravenous PRN Luis Thibodeaux MD        ondansetron disintegrating tablet 4 mg  4 mg Oral Q8H PRN Luis Thibodeaux MD        polyethylene glycol packet 17 g  17 g Oral BID PRN Luis Thibodeaux MD        prochlorperazine injection Soln 5 mg  5 mg Intravenous Q6H PRN Luis Thibodeaux MD        simethicone chewable tablet 80 mg  1 tablet Oral QID PRN Luis Thibodeaux MD        sodium chloride 0.9% flush 10 mL  10 mL Intravenous PRN Wolfgang Jennings DO        sodium chloride 0.9% flush 10 mL  10 mL Intravenous PRN Luis Thibodeaux MD        sodium chloride 0.9% flush 10 mL  10 mL Intravenous Q6H Jaleesa Jaimes MD   10 mL at 03/27/24 1313    And    sodium chloride 0.9% flush 10 mL  10 mL Intravenous PRN Jaleesa Jaimes MD        triamcinolone acetonide 0.5% cream   Topical (Top) BID Jaleesa Jaimes MD   Given at 03/27/24 0910     Current Discharge Medication List        START taking these medications    Details   insulin aspart U-100 (NOVOLOG) 100 unit/mL (3 mL) InPn pen Inject 3 Units into the skin 3 (three) times daily.  Refills: 0      insulin detemir U-100, Levemir, 100 unit/mL (3 mL) SubQ InPn pen Inject 7 Units into the skin 2 (two) times daily.  Refills: 0           CONTINUE these medications which have CHANGED    Details   carvediloL (COREG) 6.25 MG tablet Take 1 tablet (6.25 mg total) by mouth 2 (two) times daily.  Qty: 60 tablet, Refills: 11    Comments: .           CONTINUE these medications which have NOT CHANGED    Details   amitriptyline (ELAVIL) 10 MG tablet Take 1 tablet (10 mg total) by mouth every evening.  Qty: 30 tablet, Refills: 11      gabapentin (NEURONTIN) 300 MG capsule Take 1 capsule (300 mg total) by mouth every evening.  Qty: 90 capsule, Refills: 3    Associated Diagnoses: Type 2 diabetes mellitus with diabetic neuropathy, without long-term current use of insulin      levothyroxine  (SYNTHROID) 112 MCG tablet Take 1 tablet (112 mcg total) by mouth before breakfast.  Qty: 90 tablet, Refills: 3    Associated Diagnoses: Hypothyroidism, unspecified type      lisinopriL (PRINIVIL,ZESTRIL) 40 MG tablet Take 1 tablet (40 mg total) by mouth once daily.  Qty: 90 tablet, Refills: 3    Comments: .  Associated Diagnoses: HTN (hypertension), benign           STOP taking these medications       ACCU-CHEK SOFTCLIX LANCETS Misc Comments:   Reason for Stopping:         BD ALCOHOL SWABS PadM Comments:   Reason for Stopping:         biotin 1 mg tablet Comments:   Reason for Stopping:         blood sugar diagnostic (ACCU-CHEK MATTIE PLUS TEST STRP) Strp Comments:   Reason for Stopping:         blood-glucose meter kit Comments:   Reason for Stopping:         cyanocobalamin (VITAMIN B-12) 1000 MCG tablet Comments:   Reason for Stopping:         glipiZIDE 5 MG TR24 Comments:   Reason for Stopping:         HYDROcodone-acetaminophen (NORCO) 5-325 mg per tablet Comments:   Reason for Stopping:         ketoconazole (NIZORAL) 2 % cream Comments:   Reason for Stopping:         lancets (ACCU-CHEK MULTICLIX LANCET) Misc Comments:   Reason for Stopping:         lancets Misc Comments:   Reason for Stopping:         metFORMIN (GLUCOPHAGE) 1000 MG tablet Comments:   Reason for Stopping:         mirabegron (MYRBETRIQ) 25 mg Tb24 ER tablet Comments:   Reason for Stopping:         naproxen (NAPROSYN) 500 MG tablet Comments:   Reason for Stopping:         rosuvastatin (CRESTOR) 20 MG tablet Comments:   Reason for Stopping:         SITagliptin phosphate (JANUVIA) 100 MG Tab Comments:   Reason for Stopping:         turmeric root extract 500 mg Cap Comments:   Reason for Stopping:         VITAMIN B COMPLEX ORAL Comments:   Reason for Stopping:         vitamin D (VITAMIN D3) 1000 units Tab Comments:   Reason for Stopping:                 I have seen and examined this patient within the last 30 days. My clinical findings that support the  need for the home health skilled services and home bound status are the following:no   Weakness/numbness causing balance and gait disturbance due to Infection making it taxing to leave home.     Diet:   diabetic diet 2000 calorie    Labs:  See infusion ordered    Referrals/ Consults  Physical Therapy to evaluate and treat. Evaluate for home safety and equipment needs; Establish/upgrade home exercise program. Perform / instruct on therapeutic exercises, gait training, transfer training, and Range of Motion.  Occupational Therapy to evaluate and treat. Evaluate home environment for safety and equipment needs. Perform/Instruct on transfers, ADL training, ROM, and therapeutic exercises.   to evaluate for community resources/long-range planning.  Aide to provide assistance with personal care, ADLs, and vital signs.    Activities:   activity as tolerated    Nursing:   Agency to admit patient within 24 hours of hospital discharge unless specified on physician order or at patient request    SN to complete comprehensive assessment including routine vital signs. Instruct on disease process and s/s of complications to report to MD. Review/verify medication list sent home with the patient at time of discharge  and instruct patient/caregiver as needed. Frequency may be adjusted depending on start of care date.     Skilled nurse to perform up to 3 visits PRN for symptoms related to diagnosis    Notify MD if SBP > 160 or < 90; DBP > 90 or < 50; HR > 120 or < 50; Temp > 101; O2 < 88%; Other:       Ok to schedule additional visits based on staff availability and patient request on consecutive days within the home health episode.    When multiple disciplines ordered:    Start of Care occurs on Sunday - Wednesday schedule remaining discipline evaluations as ordered on separate consecutive days following the start of care.    Thursday SOC -schedule subsequent evaluations Friday and Monday the following week.     Friday -  Saturday SOC - schedule subsequent discipline evaluations on consecutive days starting Monday of the following week.    For all post-discharge communication and subsequent orders please contact patient's primary care physician. If unable to reach primary care physician or do not receive response within 30 minutes, please contact [phone] for clinical staff order clarification    Miscellaneous   Routine Skin for Bedridden Patients: Instruct patient/caregiver to apply moisture barrier cream to all skin folds and wet areas in perineal area daily and after baths and all bowel movements.    LABS:  Per facility protocol     Outpatient Antibiotic Therapy Plan:     Please send referral to [facility].     1) Infection: enterococcus bacteremia and pyleonephritis     2) Discharge Antibiotics:     Intravenous antibiotics:  Ampicillin 2gm iv q4h (or continuous infusion)  Ceftriaxone 2gm iv q12h     3) Therapy Duration:  4weeks     Estimated end date of IV antibiotics: 4/18/24     4) Outpatient Weekly Labs:     Order the following labs to be drawn on Mondays:   CBC  CMP   CRP        5) Fax Lab Results to Infectious Diseases Provider: Dr Velasquez     [facility] Fax Number: [phone]     6) Outpatient Infectious Diseases Follow-up     Follow-up appointment will be arranged by the ID clinic and will be found in the patient's appointments tab.     Prior to discharge, please ensure the patient's follow-up has been scheduled.    If there is still no follow-up scheduled prior to discharge, please send an EPIC message to Brandie Kinsey in Infectious Diseases.      Infusion Therapy:   SN to perform Infusion Therapy/Central Line Care.  Review Central Line Care & Central Line Flush with patient.        Scrub the Hub: Prior to accessing the line, always perform a 30 second alcohol scrub  Each lumen of the central line is to be flushed at least daily with 10 mL Normal Saline and 3 mL Heparin  flush (10 units/mL)  Skilled Nurse (SN) may draw blood from IV access  Blood Draw Procedure:         - Aspirate at least 5 mL of blood         - Discard         - Obtain specimen         - Change injection cap         - Flush with 20 mL Normal Saline followed by a                 3-5 mL Heparin flush (10 units/mL)  Central :         - Sterile dressing changes are done weekly and as needed.         - Use chlor-hexadine scrub to cleanse site, apply Biopatch to insertion site,            apply securement device dressing         - Injection caps are changed weekly and after EVERY lab draw.         - If sterile gauze is under dressing to control oozing,                 dressing change must be performed every 24 hours until gauze is not needed.                         MISCELLANEOUS CARE:                Routine Skin for Bedridden Patients:  Apply moisture barrier cream to all                          skin folds and wet areas in perineal area daily and after baths and                           all bowel movements.    Home Health Aide:  Nursing Three times weekly, Physical Therapy Three times weekly, Occupational Therapy Three times weekly, Medical Social Work Three times weekly, and Home Health Aide Three times weekly    Wound Care Orders  no    I certify that this patient is confined to her home and needs intermittent skilled nursing care, physical therapy, and occupational therapy.

## 2024-03-27 NOTE — ASSESSMENT & PLAN NOTE
Patient's anemia is currently controlled. Has not received any PRBCs to date. Etiology likely d/t chronic disease v iron deficiency. No obvious signs of bleeding.  Current CBC reviewed-   Lab Results   Component Value Date    HGB 9.5 (L) 03/27/2024    HCT 30.8 (L) 03/27/2024     Monitor serial CBC and transfuse if patient becomes hemodynamically unstable, symptomatic or H/H drops below 7/21.

## 2024-03-27 NOTE — ASSESSMENT & PLAN NOTE
Patient presented to ED with fevers and malaise 2 days after urologic procedure.  CT with evidence nephritic stranding.  Admitted for pyelonephritis.  Blood cultures on presentation 3/16 positive for Enterococcus faecalis. Suspect urologic source. TTE negative  - ID consulted   - repeat cultures  - CARLITO no vegetation  - S/p ceftriaxone in ED and on admission, continued for double coverage due to persistent bacteremia  - started on ampicillin 3/17   - urology to follow-up in clinic, stated no role for stent removal as it is source control  - NM scan ordered to be started on Monday    ID final recs: Outpatient Antibiotic Therapy Plan:     Please send referral to [facility].     1) Infection: enterococcus bacteremia and pyleonephritis     2) Discharge Antibiotics:     Intravenous antibiotics:  Ampicillin 2gm iv q4h (or continuous infusion)  Ceftriaxone 2gm iv q12h     3) Therapy Duration:  4weeks     Estimated end date of IV antibiotics: 4/18/24     4) Outpatient Weekly Labs:     Order the following labs to be drawn on Mondays:   CBC  CMP   CRP        5) Fax Lab Results to Infectious Diseases Provider: Dr Velasquez     [facility] Fax Number: [phone]     6) Outpatient Infectious Diseases Follow-up     Follow-up appointment will be arranged by the ID clinic and will be found in the patient's appointments tab.     Prior to discharge, please ensure the patient's follow-up has been scheduled.    If there is still no follow-up scheduled prior to discharge, please send an EPIC message to Brandie Kinsey in Infectious Diseases.

## 2024-03-27 NOTE — PROGRESS NOTES
"[facility] - [facility] ([place])  Adult Nutrition  Progress Note    SUMMARY       Recommendations    Continue Diabetic, Low Na diet.  RD following.    Reason for Assessment    Reason For Assessment: length of stay  Diagnosis: other (see comments) (Bacteremia)   Relevant Medical History: DM, HTN  Interdisciplinary Rounds: did not attend    General Information Comments: Pt scheduled to discharge today. Per RN documentation, pt tolerating diet w/ 50-75% PO intake. Per chart review, pt w/ UBW of 165#. RD to complete full assessment 4/1 should pt not be discharged.     Anthropometrics    Temp: 98.4 °F (36.9 °C)  Height Method: Stated  Height: 5' 5" (165.1 cm)  Height (inches): 65 in  Weight Method: Bed Scale  Weight: 71.1 kg (156 lb 12 oz)  Weight (lb): 156.75 lb  Ideal Body Weight (IBW), Female: 125 lb  % Ideal Body Weight, Female (lb): 126.4 %  BMI (Calculated): 26.1    Nutrition Follow-Up    RD Follow-up?: Yes      "

## 2024-03-28 VITALS
DIASTOLIC BLOOD PRESSURE: 76 MMHG | BODY MASS INDEX: 25.23 KG/M2 | TEMPERATURE: 98 F | OXYGEN SATURATION: 100 % | RESPIRATION RATE: 18 BRPM | WEIGHT: 151.44 LBS | HEART RATE: 74 BPM | HEIGHT: 65 IN | SYSTOLIC BLOOD PRESSURE: 164 MMHG

## 2024-03-28 LAB
ALBUMIN SERPL BCP-MCNC: 2.8 G/DL (ref 3.5–5.2)
ALP SERPL-CCNC: 102 U/L (ref 55–135)
ALT SERPL W/O P-5'-P-CCNC: 15 U/L (ref 10–44)
ANION GAP SERPL CALC-SCNC: 8 MMOL/L (ref 8–16)
AST SERPL-CCNC: 9 U/L (ref 10–40)
BASOPHILS # BLD AUTO: 0.01 K/UL (ref 0–0.2)
BASOPHILS NFR BLD: 0.2 % (ref 0–1.9)
BILIRUB SERPL-MCNC: 0.2 MG/DL (ref 0.1–1)
BUN SERPL-MCNC: 9 MG/DL (ref 8–23)
CALCIUM SERPL-MCNC: 9.2 MG/DL (ref 8.7–10.5)
CHLORIDE SERPL-SCNC: 102 MMOL/L (ref 95–110)
CO2 SERPL-SCNC: 28 MMOL/L (ref 23–29)
CREAT SERPL-MCNC: 1 MG/DL (ref 0.5–1.4)
DIFFERENTIAL METHOD BLD: ABNORMAL
EOSINOPHIL # BLD AUTO: 0.1 K/UL (ref 0–0.5)
EOSINOPHIL NFR BLD: 1.4 % (ref 0–8)
ERYTHROCYTE [DISTWIDTH] IN BLOOD BY AUTOMATED COUNT: 18.8 % (ref 11.5–14.5)
EST. GFR  (NO RACE VARIABLE): 57.7 ML/MIN/1.73 M^2
GLUCOSE SERPL-MCNC: 173 MG/DL (ref 70–110)
HCT VFR BLD AUTO: 29.4 % (ref 37–48.5)
HGB BLD-MCNC: 9.2 G/DL (ref 12–16)
IMM GRANULOCYTES # BLD AUTO: 0.03 K/UL (ref 0–0.04)
IMM GRANULOCYTES NFR BLD AUTO: 0.7 % (ref 0–0.5)
LYMPHOCYTES # BLD AUTO: 0.9 K/UL (ref 1–4.8)
LYMPHOCYTES NFR BLD: 20.8 % (ref 18–48)
MCH RBC QN AUTO: 23 PG (ref 27–31)
MCHC RBC AUTO-ENTMCNC: 31.3 G/DL (ref 32–36)
MCV RBC AUTO: 74 FL (ref 82–98)
MONOCYTES # BLD AUTO: 0.4 K/UL (ref 0.3–1)
MONOCYTES NFR BLD: 8.3 % (ref 4–15)
NEUTROPHILS # BLD AUTO: 3 K/UL (ref 1.8–7.7)
NEUTROPHILS NFR BLD: 68.6 % (ref 38–73)
NRBC BLD-RTO: 0 /100 WBC
PLATELET # BLD AUTO: 442 K/UL (ref 150–450)
PMV BLD AUTO: 9.3 FL (ref 9.2–12.9)
POCT GLUCOSE: 192 MG/DL (ref 70–110)
POCT GLUCOSE: 273 MG/DL (ref 70–110)
POCT GLUCOSE: 275 MG/DL (ref 70–110)
POTASSIUM SERPL-SCNC: 4.7 MMOL/L (ref 3.5–5.1)
PROT SERPL-MCNC: 6.5 G/DL (ref 6–8.4)
RBC # BLD AUTO: 4 M/UL (ref 4–5.4)
SODIUM SERPL-SCNC: 138 MMOL/L (ref 136–145)
WBC # BLD AUTO: 4.33 K/UL (ref 3.9–12.7)

## 2024-03-28 PROCEDURE — A4216 STERILE WATER/SALINE, 10 ML: HCPCS | Performed by: HOSPITALIST

## 2024-03-28 PROCEDURE — 63600175 PHARM REV CODE 636 W HCPCS: Performed by: INTERNAL MEDICINE

## 2024-03-28 PROCEDURE — 25000003 PHARM REV CODE 250: Performed by: INTERNAL MEDICINE

## 2024-03-28 PROCEDURE — 85025 COMPLETE CBC W/AUTO DIFF WBC: CPT | Performed by: STUDENT IN AN ORGANIZED HEALTH CARE EDUCATION/TRAINING PROGRAM

## 2024-03-28 PROCEDURE — 80053 COMPREHEN METABOLIC PANEL: CPT | Performed by: STUDENT IN AN ORGANIZED HEALTH CARE EDUCATION/TRAINING PROGRAM

## 2024-03-28 PROCEDURE — 25000003 PHARM REV CODE 250: Performed by: HOSPITALIST

## 2024-03-28 PROCEDURE — 25000003 PHARM REV CODE 250: Performed by: STUDENT IN AN ORGANIZED HEALTH CARE EDUCATION/TRAINING PROGRAM

## 2024-03-28 RX ADMIN — TRIAMCINOLONE ACETONIDE: 5 CREAM TOPICAL at 09:03

## 2024-03-28 RX ADMIN — CARVEDILOL 6.25 MG: 6.25 TABLET, FILM COATED ORAL at 09:03

## 2024-03-28 RX ADMIN — CEFTRIAXONE 2 G: 2 INJECTION, POWDER, FOR SOLUTION INTRAMUSCULAR; INTRAVENOUS at 01:03

## 2024-03-28 RX ADMIN — Medication 10 ML: at 01:03

## 2024-03-28 RX ADMIN — AMPICILLIN 2 G: 2 INJECTION, POWDER, FOR SOLUTION INTRAMUSCULAR; INTRAVENOUS at 10:03

## 2024-03-28 RX ADMIN — AMPICILLIN 2 G: 2 INJECTION, POWDER, FOR SOLUTION INTRAMUSCULAR; INTRAVENOUS at 02:03

## 2024-03-28 RX ADMIN — Medication 10 ML: at 06:03

## 2024-03-28 RX ADMIN — INSULIN DETEMIR 7 UNITS: 100 INJECTION, SOLUTION SUBCUTANEOUS at 09:03

## 2024-03-28 RX ADMIN — INSULIN ASPART 3 UNITS: 100 INJECTION, SOLUTION INTRAVENOUS; SUBCUTANEOUS at 01:03

## 2024-03-28 RX ADMIN — AMPICILLIN 2 G: 2 INJECTION, POWDER, FOR SOLUTION INTRAMUSCULAR; INTRAVENOUS at 06:03

## 2024-03-28 RX ADMIN — LISINOPRIL 40 MG: 20 TABLET ORAL at 09:03

## 2024-03-28 RX ADMIN — LEVOTHYROXINE SODIUM 112 MCG: 112 TABLET ORAL at 06:03

## 2024-03-28 RX ADMIN — INSULIN ASPART 3 UNITS: 100 INJECTION, SOLUTION INTRAVENOUS; SUBCUTANEOUS at 09:03

## 2024-03-28 RX ADMIN — AMPICILLIN 2 G: 2 INJECTION, POWDER, FOR SOLUTION INTRAMUSCULAR; INTRAVENOUS at 03:03

## 2024-03-28 RX ADMIN — Medication 10 ML: at 12:03

## 2024-03-28 NOTE — ASSESSMENT & PLAN NOTE
Patient presented to ED with fevers and malaise 2 days after urologic procedure.  CT with evidence nephritic stranding.  Admitted for pyelonephritis.  Blood cultures on presentation 3/16 positive for Enterococcus faecalis. Suspect urologic source. TTE negative  - ID consulted   - CARLITO no vegetation  - S/p ceftriaxone in ED and on admission, continued for double coverage due to persistent bacteremia  - started on ampicillin 3/17   - urology to follow-up in clinic, stated no role for stent removal as it is source control  - NM scan negative    ID final recs: Outpatient Antibiotic Therapy Plan:     Please send referral to [facility].     1) Infection: enterococcus bacteremia and pyleonephritis     2) Discharge Antibiotics:     Intravenous antibiotics:  Ampicillin 2gm iv q4h (or continuous infusion)  Ceftriaxone 2gm iv q12h     3) Therapy Duration:  4weeks     Estimated end date of IV antibiotics: 4/18/24     4) Outpatient Weekly Labs:     Order the following labs to be drawn on Mondays:   CBC  CMP   CRP        5) Fax Lab Results to Infectious Diseases Provider: Dr Velasquez     [facility] Fax Number: [phone]     6) Outpatient Infectious Diseases Follow-up     Follow-up appointment will be arranged by the ID clinic and will be found in the patient's appointments tab.     Prior to discharge, please ensure the patient's follow-up has been scheduled.    If there is still no follow-up scheduled prior to discharge, please send an EPIC message to Brandie Kinsey in Infectious Diseases.

## 2024-03-28 NOTE — ASSESSMENT & PLAN NOTE
Resolved  Patient with acute kidney injury/acute renal failure likely due to pre-renal azotemia due to dehydration. Cr 1.2 on presentation. ANAIS is currently improving. Baseline creatinine  0.8  - Labs reviewed- Renal function/electrolytes with Estimated Creatinine Clearance: 45.2 mL/min (based on SCr of 1 mg/dL). according to latest data. Monitor urine output and serial BMP and adjust therapy as needed. Avoid nephrotoxins and renally dose meds for GFR listed above.

## 2024-03-28 NOTE — NURSING
Pt and sister educated on home infusion, pt connected to abx via PICC before d/c.  Pt left unit via wheelchair with family for D/C home, declined transport assistance.

## 2024-03-28 NOTE — PLAN OF CARE
CHW met with patient/family at bedside. Patient experience rounding completed and reviewed the following.     Do you know your discharge plan? Yes,    If yes, what is the plan? (Home, Home Health     Have you discussed your needs and preferences with your SW/CM? Yes     If you are discharging home, do you have help at home? Yes    Do you think you will need help additional at home at discharge? Yes     Do you currently have difficulty keeping up with bills, affording medicine or buying food? No    Assigned SW/CM notified of any patient/family needs or concerns. Appropriate resources provided to address patient's needs.

## 2024-03-28 NOTE — ASSESSMENT & PLAN NOTE
Chronic, controlled. Latest blood pressure and vitals reviewed-     Temp:  [98.1 °F (36.7 °C)-99.3 °F (37.4 °C)]   Pulse:  [73-80]   Resp:  [16-20]   BP: (122-159)/(59-70)   SpO2:  [96 %-100 %] .   Home meds for hypertension were reviewed and noted below.   Hypertension Medications               carvediloL (COREG) 25 MG tablet TAKE 1 TABLET TWICE DAILY    lisinopriL (PRINIVIL,ZESTRIL) 40 MG tablet Take 1 tablet (40 mg total) by mouth once daily.            While in the hospital, will manage blood pressure as follows; Adjust home antihypertensive regimen as follows- held on presentation, restart as tolerated    Will utilize p.r.n. blood pressure medication only if patient's blood pressure greater than 180/110 and she develops symptoms such as worsening chest pain or shortness of breath.

## 2024-03-28 NOTE — ASSESSMENT & PLAN NOTE
Patient's anemia is currently controlled. Has not received any PRBCs to date. Etiology likely d/t chronic disease v iron deficiency. No obvious signs of bleeding.  Current CBC reviewed-   Lab Results   Component Value Date    HGB 9.2 (L) 03/28/2024    HCT 29.4 (L) 03/28/2024     Monitor serial CBC and transfuse if patient becomes hemodynamically unstable, symptomatic or H/H drops below 7/21.

## 2024-03-28 NOTE — PLAN OF CARE
03/28/24 1204   Discharge Reassessment   Assessment Type Discharge Planning Reassessment   Did the patient's condition or plan change since previous assessment? No   Discharge Plan discussed with: Adult children   Communicated RAJESH with patient/caregiver Date not available/Unable to determine   Discharge Plan A Home Health   Discharge Plan B Home with family   DME Needed Upon Discharge  none   Transition of Care Barriers None   Why the patient remains in the hospital Requires continued medical care   Post-Acute Status   Post-Acute Authorization Home Health   Home Health Status Referrals Sent   Coverage [employer] Managed Medicare   Hospital Resources/Appts/Education Provided Provided patient/caregiver with written discharge plan information   Patient choice form signed by patient/caregiver List with quality metrics by geographic area provided   Discharge Delays (!) Payor Issues       [facility] - [facility] ([place])  Discharge Reassessment      Discharge Plan A and Plan B have been determined by review of patient's clinical status, future medical and therapeutic needs, and coverage/benefits for post-acute care in coordination with multidisciplinary team members.    Radha Simpson MSW, CSW

## 2024-03-28 NOTE — ASSESSMENT & PLAN NOTE
Patient's FSGs are controlled on current medication regimen.  Last A1c reviewed-   Lab Results   Component Value Date    HGBA1C 7.6 (H) 03/21/2024     Most recent fingerstick glucose reviewed-   Recent Labs   Lab 03/27/24  1643 03/27/24 2028 03/28/24  0745 03/28/24  1157   POCTGLUCOSE 348* 236* 192* 275*       Current correctional scale  Low  Maintain anti-hyperglycemic dose as follows-   Antihyperglycemics (From admission, onward)    Start     Stop Route Frequency Ordered    03/21/24 0715  insulin aspart U-100 pen 3 Units         -- SubQ 3 times daily with meals 03/20/24 2112 03/20/24 2115  insulin detemir U-100 (Levemir) pen 7 Units         -- SubQ 2 times daily 03/20/24 2111 03/16/24 2019  insulin aspart U-100 pen 0-5 Units         -- SubQ Before meals & nightly PRN 03/16/24 1920        Hold Oral hypoglycemics while patient is in the hospital.

## 2024-03-28 NOTE — SUBJECTIVE & OBJECTIVE
Interval History: No acute events overnight. Patient denies symptoms. Denies chest pain, SOB, n/v, c/d. Reports she is ready to discharge.     Review of Systems  Objective:     Vital Signs (Most Recent):  Temp: 98.1 °F (36.7 °C) (03/28/24 1215)  Pulse: 73 (03/28/24 1215)  Resp: 16 (03/28/24 1215)  BP: (!) 148/70 (03/28/24 1215)  SpO2: 100 % (03/28/24 1215) Vital Signs (24h Range):  Temp:  [98.1 °F (36.7 °C)-99.3 °F (37.4 °C)] 98.1 °F (36.7 °C)  Pulse:  [73-80] 73  Resp:  [16-20] 16  SpO2:  [96 %-100 %] 100 %  BP: (122-159)/(59-70) 148/70     Weight: 68.7 kg (151 lb 7.3 oz)  Body mass index is 25.2 kg/m².    Intake/Output Summary (Last 24 hours) at 3/28/2024 1447  Last data filed at 3/28/2024 0650  Gross per 24 hour   Intake 680 ml   Output 1150 ml   Net -470 ml         Physical Exam  Vitals and nursing note reviewed.   Constitutional:       Appearance: She is well-developed.   HENT:      Head: Normocephalic and atraumatic.      Right Ear: External ear normal.      Left Ear: External ear normal.   Eyes:      General: No scleral icterus.        Right eye: No discharge.         Left eye: No discharge.      Conjunctiva/sclera: Conjunctivae normal.   Cardiovascular:      Rate and Rhythm: Normal rate and regular rhythm.      Heart sounds: Normal heart sounds. No murmur heard.     No friction rub. No gallop.   Pulmonary:      Effort: Pulmonary effort is normal. No respiratory distress.      Breath sounds: Normal breath sounds. No stridor. No wheezing or rales.   Abdominal:      General: Bowel sounds are normal.      Palpations: Abdomen is soft.      Tenderness: There is no abdominal tenderness.   Musculoskeletal:         General: No tenderness. Normal range of motion.   Skin:     General: Skin is warm and dry.   Neurological:      Mental Status: She is alert and oriented to person, place, and time.             Significant Labs: All pertinent labs within the past 24 hours have been reviewed.    Significant Imaging: I have  reviewed all pertinent imaging results/findings within the past 24 hours.

## 2024-03-28 NOTE — DISCHARGE SUMMARY
[facility] ([place] [id number])  [facility] Medicine  Discharge Summary      Patient Name: Aminah Norton  MRN: [id number]  GIANNA: [id number]  Patient Class: IP- Inpatient  Admission Date: 3/16/2024  Hospital Length of Stay: 12 days  Discharge Date and Time: 3/28/2024  6:32 PM  Attending Physician: Edwige Aly MD   Discharging Provider: Edwige Aly MD  Primary Care Provider: Jeison Sanchez MD  [facility] Medicine Team: [facility] Edwige Aly MD  Primary Care Team: [facility] MED B    HPI:   79 yo female with DM2, GERD, HPLD, HTN, Hypothyroidism, recent stone removal presenting with chills, weakness, and back pain. Interpretor MERI used at bedside. She states that she was d/c'd on the 14th of March was doing okay until last night she started feeling weak and actually fell, she denies any leg pain or headache and did loss consciousness, however she did not feel any better today whenever she woke up so presented to the ER.    She had a fever measured here at 102.6F, CT scan showed perinephric stranding, UA consistent with infection, lactic acidosis. Urology was consulted who recommended day and antibiotics. Medicine called for admission.     Procedure(s) (LRB):  Transesophageal echo (CARLITO) intra-procedure log documentation (N/A)      Hospital Course:   Started on ceftriaxone at admission, ampicillin added on 3/17. Blood cultures quickly resulted positive for e faecalis, persistently positive. ID consulted. Patient passed voiding trial 3/18. TTE and CARLITO negative for vegetations. CT abdomen negative for abscess. Nuclear medicine scan done based on ID recommendations also negative. Patient to complete 4 weeks of ampicillin and ceftriaxone. Ampicillin 2gm IV q4h (or continuous) and ceftriaxone 2gm q12h until 4/18/24.      Goals of Care Treatment Preferences:  Code Status: Full Code    Living Will: Yes              Consults:   Consults (From admission, onward)          Status Ordering  Provider     [facility]  Once        Provider:  (Not yet assigned)    Completed NUZHAT STODDARD     Inpatient consult to Infectious Diseases  Once        Provider:  (Not yet assigned)    Completed BRENDA GARCIA     Inpatient consult to Urology  Once        Provider:  (Not yet assigned)    Completed JOSE ALEJANDRO PEACE          Physical Exam  Vitals and nursing note reviewed.   Constitutional:       Appearance: She is well-developed.   HENT:      Head: Normocephalic and atraumatic.      Right Ear: External ear normal.      Left Ear: External ear normal.   Eyes:      General: No scleral icterus.        Right eye: No discharge.         Left eye: No discharge.      Conjunctiva/sclera: Conjunctivae normal.   Cardiovascular:      Rate and Rhythm: Normal rate and regular rhythm.      Heart sounds: Normal heart sounds. No murmur heard.     No friction rub. No gallop.   Pulmonary:      Effort: Pulmonary effort is normal. No respiratory distress.      Breath sounds: Normal breath sounds. No stridor. No wheezing or rales.   Abdominal:      General: Bowel sounds are normal.   Musculoskeletal:         General: No tenderness. Normal range of motion.   Skin:     General: Skin is warm and dry.   Neurological:      Mental Status: She is alert and oriented to person, place, and time.     Cardiac/Vascular  HTN (hypertension), benign  Chronic, controlled. Latest blood pressure and vitals reviewed-     Temp:  [98.1 °F (36.7 °C)-99.3 °F (37.4 °C)]   Pulse:  [73-80]   Resp:  [16-20]   BP: (122-159)/(59-70)   SpO2:  [96 %-100 %] .   Home meds for hypertension were reviewed and noted below.   Hypertension Medications               carvediloL (COREG) 25 MG tablet TAKE 1 TABLET TWICE DAILY    lisinopriL (PRINIVIL,ZESTRIL) 40 MG tablet Take 1 tablet (40 mg total) by mouth once daily.            While in the hospital, will manage blood pressure as follows; Adjust home antihypertensive regimen as follows- held on  presentation, restart as tolerated    Will utilize p.r.n. blood pressure medication only if patient's blood pressure greater than 180/110 and she develops symptoms such as worsening chest pain or shortness of breath.    Renal/  Ureteral stent present  Management as per urology   - outpatient follow-up      ANAIS (acute kidney injury)  Resolved  Patient with acute kidney injury/acute renal failure likely due to pre-renal azotemia due to dehydration. Cr 1.2 on presentation. ANAIS is currently improving. Baseline creatinine  0.8  - Labs reviewed- Renal function/electrolytes with Estimated Creatinine Clearance: 45.2 mL/min (based on SCr of 1 mg/dL). according to latest data. Monitor urine output and serial BMP and adjust therapy as needed. Avoid nephrotoxins and renally dose meds for GFR listed above.    ID  * Enterococcal bacteremia  Patient presented to ED with fevers and malaise 2 days after urologic procedure.  CT with evidence nephritic stranding.  Admitted for pyelonephritis.  Blood cultures on presentation 3/16 positive for Enterococcus faecalis. Suspect urologic source. TTE negative  - ID consulted   - CARLITO no vegetation  - S/p ceftriaxone in ED and on admission, continued for double coverage due to persistent bacteremia  - started on ampicillin 3/17   - urology to follow-up in clinic, stated no role for stent removal as it is source control  - NM scan negative    ID final recs: Outpatient Antibiotic Therapy Plan:     Please send referral to [facility].     1) Infection: enterococcus bacteremia and pyleonephritis     2) Discharge Antibiotics:     Intravenous antibiotics:  Ampicillin 2gm iv q4h (or continuous infusion)  Ceftriaxone 2gm iv q12h     3) Therapy Duration:  4weeks     Estimated end date of IV antibiotics: 4/18/24     4) Outpatient Weekly Labs:     Order the following labs to be drawn on Mondays:   CBC  CMP   CRP        5) Fax Lab Results to Infectious Diseases Provider:  "Dr Velasquez     [facility] Fax Number: [phone]     6) Outpatient Infectious Diseases Follow-up     Follow-up appointment will be arranged by the ID clinic and will be found in the patient's appointments tab.     Prior to discharge, please ensure the patient's follow-up has been scheduled.    If there is still no follow-up scheduled prior to discharge, please send an EPIC message to Brandie Kinsey in Infectious Diseases.          Sepsis  This patient does have evidence of infective focus  My overall impression is sepsis.  Source: Urinary Tract  Antibiotics given-   Antibiotics (72h ago, onward)      Start     Stop Route Frequency Ordered    03/19/24 1300  cefTRIAXone (ROCEPHIN) 2 g in dextrose 5 % in water (D5W) 100 mL IVPB (MB+)         -- IV Every 12 hours (non-standard times) 03/19/24 1145    03/17/24 1100  ampicillin (OMNIPEN) 2 g in sodium chloride 0.9 % 100 mL IVPB (MB+)         -- IV Every 4 hours (non-standard times) 03/17/24 0954          Latest lactate reviewed-  No results for input(s): "LACTATE", "POCLAC" in the last 72 hours.    Fluid challenge Ideal Body Weight- The patient's ideal body weight is Ideal body weight: 57 kg (125 lb 10.6 oz) which will be used to calculate fluid bolus of 30 ml/kg for treatment of septic shock.      Post- resuscitation assessment No - Post resuscitation assessment not needed     ANAIS    Will Not start Pressors- Levophed for MAP of 65  Source control achieved by: rocephin and ampicillin    Pyelonephritis  Acute, urine consistent with infection.   -  see above        Oncology  Anemia  Patient's anemia is currently controlled. Has not received any PRBCs to date. Etiology likely d/t chronic disease v iron deficiency. No obvious signs of bleeding.  Current CBC reviewed-   Lab Results   Component Value Date    HGB 9.2 (L) 03/28/2024    HCT 29.4 (L) 03/28/2024     Monitor serial CBC and transfuse if patient becomes hemodynamically unstable, symptomatic or H/H drops below " 7/21.      Endocrine  Type 2 diabetes mellitus with neurologic complication, without long-term current use of insulin  Patient's FSGs are controlled on current medication regimen.  Last A1c reviewed-   Lab Results   Component Value Date    HGBA1C 7.6 (H) 03/21/2024     Most recent fingerstick glucose reviewed-   Recent Labs   Lab 03/27/24  1643 03/27/24 2028 03/28/24  0745 03/28/24  1157   POCTGLUCOSE 348* 236* 192* 275*       Current correctional scale  Low  Maintain anti-hyperglycemic dose as follows-   Antihyperglycemics (From admission, onward)      Start     Stop Route Frequency Ordered    03/21/24 0715  insulin aspart U-100 pen 3 Units         -- SubQ 3 times daily with meals 03/20/24 2112 03/20/24 2115  insulin detemir U-100 (Levemir) pen 7 Units         -- SubQ 2 times daily 03/20/24 2111 03/16/24 2019  insulin aspart U-100 pen 0-5 Units         -- SubQ Before meals & nightly PRN 03/16/24 1920          Hold Oral hypoglycemics while patient is in the hospital.    Hypothyroidism  Chronic, controlled, continue home synthroid      GI  GERD (gastroesophageal reflux disease)  Chronic, controlled, continue to monitor        Final Active Diagnoses:    Diagnosis Date Noted POA    PRINCIPAL PROBLEM:  Enterococcal bacteremia [R78.81, B95.2] 03/17/2024 Yes    Pyelonephritis [N12] 03/16/2024 Yes    Sepsis [A41.9] 03/16/2024 Yes    ANAIS (acute kidney injury) [N17.9] 03/16/2024 Yes    Anemia [D64.9] 03/18/2024 Yes    Ureteral stent present [Z96.0] 03/18/2024 Not Applicable    Type 2 diabetes mellitus with neurologic complication, without long-term current use of insulin [E11.49] 09/23/2014 Yes    GERD (gastroesophageal reflux disease) [K21.9] 11/21/2013 Yes    Hypothyroidism [E03.9] 07/26/2013 Yes    HTN (hypertension), benign [I10] 07/26/2013 Yes      Problems Resolved During this Admission:       Discharged Condition: stable    Disposition: Home-Health Care [facility]    Follow Up:   Follow-up Information        Brody Smith MD Follow up in 3 week(s).    Specialty: Urology  Why: stent removal  Contact information:  [address] [facility]  [address]  [place]  [phone]                           Patient Instructions:      Ambulatory referral/consult to Infectious Disease   Standing Status: Future   Referral Priority: Routine Referral Type: Consultation   Referral Reason: Specialty Services Required   Requested Specialty: Infectious Diseases   Number of Visits Requested: 1     Ambulatory referral/consult to Outpatient Case Management   Referral Priority: Routine Referral Type: Consultation   Referral Reason: Specialty Services Required   Number of Visits Requested: 1     Ambulatory referral/consult to [facility]   Standing Status: Future   Referral Priority: Routine Referral Type: Consultation   Referral Reason: Specialty Services Required   Number of Visits Requested: 1     Cystoscopy w/ stent removal   Standing Status: Future Standing Exp. Date: 06/18/24       Significant Diagnostic Studies: Microbiology: Blood Culture   Lab Results   Component Value Date    LABBLOO No growth after 5 days. 03/21/2024    LABBLOO No growth after 5 days. 03/21/2024       Pending Diagnostic Studies:       None           Medications:  Reconciled Home Medications:      Medication List        START taking these medications      insulin aspart U-100 100 unit/mL (3 mL) Inpn pen  Commonly known as: NovoLOG  Inject 3 Units into the skin 3 (three) times daily.     insulin detemir U-100 (Levemir) 100 unit/mL (3 mL) Inpn pen  Inject 7 Units into the skin 2 (two) times daily.            CHANGE how you take these medications      carvediloL 6.25 MG tablet  Commonly known as: COREG  [place] 1 tableta (6.25 mg en total) por vía oral 2 (dos) veces al día.  (Take 1 tablet (6.25 mg total) by mouth 2 (two) times daily.)  What changed:   medication strength  how much to take            CONTINUE taking these medications       amitriptyline 10 MG tablet  Commonly known as: ELAVIL  Take 1 tablet (10 mg total) by mouth every evening.     gabapentin 300 MG capsule  Commonly known as: NEURONTIN  Take 1 capsule (300 mg total) by mouth every evening.     levothyroxine 112 MCG tablet  Commonly known as: SYNTHROID  Take 1 tablet (112 mcg total) by mouth before breakfast.     lisinopriL 40 MG tablet  Commonly known as: PRINIVIL,ZESTRIL  Take 1 tablet (40 mg total) by mouth once daily.            STOP taking these medications      ACCU-CHEK SOFTCLIX LANCETS Misc  Generic drug: lancets     BD ALCOHOL SWABS Padm  Generic drug: alcohol swabs     biotin 1 mg tablet     blood sugar diagnostic Strp  Commonly known as: ACCU-CHEK MATTIE PLUS TEST STRP     blood-glucose meter kit     cyanocobalamin 1000 MCG tablet  Commonly known as: VITAMIN B-12     glipiZIDE 5 MG Tr24     HYDROcodone-acetaminophen 5-325 mg per tablet  Commonly known as: NORCO     ketoconazole 2 % cream  Commonly known as: NIZORAL     lancets Misc     metFORMIN 1000 MG tablet  Commonly known as: GLUCOPHAGE     MYRBETRIQ 25 mg Tb24 ER tablet  Generic drug: mirabegron     naproxen 500 MG tablet  Commonly known as: NAPROSYN     rosuvastatin 20 MG tablet  Commonly known as: CRESTOR     SITagliptin phosphate 100 MG Tab  Commonly known as: JANUVIA     turmeric root extract 500 mg Cap     VITAMIN B COMPLEX ORAL     vitamin D 1000 units Tab  Commonly known as: VITAMIN D3              Indwelling Lines/Drains at time of discharge:   Lines/Drains/Airways       Peripherally Inserted Central Catheter Line  Duration             PICC Double Lumen 03/25/24 1654 left basilic 2 days                    Time spent on the discharge of patient: 35 minutes         Edwige Aly MD  Department of Hospital Medicine  [facility] ([place]

## 2024-03-28 NOTE — NURSING
Patient slept well this shift. Pain managed well with PRN's. RUE Picc line patent and dressing dry and intact dated 03/25.  Vital Signs stable.  Patient repositions self. Safety precautions in place. Call light in reach. No other concerns noted at this time.

## 2024-03-28 NOTE — PROGRESS NOTES
[facility]    [facility] educator met with the patient and her sister Ivette and discussed the discharge plan for home IVABX. Aminah Norton will discharge home with family support. The patient will infuse her medication via CADD Pump and Elastomeric Pump. The patient's sister was educated on the S.A.S.H procedure and a S.A.S.H mat was provided. The patient education checklist was reviewed and acknowledged by the above person and she is agreeable to discharge with the home infusion plan of care. The IV administration process using aspetic technique was reviewed with successful return demonstration. The patient feels comfortable with her sister doing her home infusions. The patient will discharge home with Ampicillin 12 gm IV continuous and Ceftriaxone 2 gm IV every 12 hours for an estimated end of therapy date of 4/18/24. [facility] will follow the patient for weekly dressing changes and lab draws. Time was allotted for questions. The patient's nurse and the case management team were notified that the teaching has been completed.     Medication delivery will be made to the bedside before discharge.    1620: [facility] accepted the patient and I spoke to Claire in intake and gave her report. [phone]    [facility]  Kaley Quinones RN, Clinical Educator  Cell [phone]  Office [phone]  Fax [phone]

## 2024-04-02 ENCOUNTER — PATIENT OUTREACH (OUTPATIENT)
Dept: ADMINISTRATIVE | Facility: CLINIC | Age: 79
End: 2024-04-02
Payer: MEDICARE

## 2024-04-02 NOTE — PROGRESS NOTES
C3 nurse spoke with Aminah Norton for a [facility] post hospital discharge follow up call. The patient has a scheduled HOSFU appointment with Lucina Barnes NP on 04/03/24 @ 0800.

## 2024-04-03 ENCOUNTER — OFFICE VISIT (OUTPATIENT)
Dept: HOME HEALTH SERVICES | Facility: CLINIC | Age: 79
End: 2024-04-03
Payer: MEDICARE

## 2024-04-03 DIAGNOSIS — N12 PYELONEPHRITIS: ICD-10-CM

## 2024-04-03 DIAGNOSIS — I10 HTN (HYPERTENSION), BENIGN: ICD-10-CM

## 2024-04-03 DIAGNOSIS — E11.42 DIABETIC POLYNEUROPATHY ASSOCIATED WITH TYPE 2 DIABETES MELLITUS: Primary | ICD-10-CM

## 2024-04-03 DIAGNOSIS — E03.9 HYPOTHYROIDISM, UNSPECIFIED TYPE: ICD-10-CM

## 2024-04-03 PROCEDURE — 1159F MED LIST DOCD IN RCRD: CPT | Mod: CPTII,S$GLB,, | Performed by: NURSE PRACTITIONER

## 2024-04-03 PROCEDURE — 1160F RVW MEDS BY RX/DR IN RCRD: CPT | Mod: CPTII,S$GLB,, | Performed by: NURSE PRACTITIONER

## 2024-04-03 PROCEDURE — 99349 HOME/RES VST EST MOD MDM 40: CPT | Mod: S$GLB,,, | Performed by: NURSE PRACTITIONER

## 2024-04-03 PROCEDURE — 1157F ADVNC CARE PLAN IN RCRD: CPT | Mod: CPTII,S$GLB,, | Performed by: NURSE PRACTITIONER

## 2024-04-03 PROCEDURE — 1111F DSCHRG MED/CURRENT MED MERGE: CPT | Mod: CPTII,S$GLB,, | Performed by: NURSE PRACTITIONER

## 2024-04-03 NOTE — PHYSICIAN QUERY
"PT Name: Aminah Alicea-Dermody  MR #: [id number]    DOCUMENTATION CLARIFICATION     Sydney Zhu RN, CCDS   Documentation Excellence  [email or web address]     This form is a permanent document in the medical record.     Query Date: April 3, 2024    Dear Provider,  By submitting this query, we are merely seeking further clarification of documentation.   Please utilize your independent clinical judgment when addressing the question(s) below.    The medical record contains the following:  Clinical Information Location in Medical Record   S/p ureteroscopic stone extraction with lithotripsy & R ureteral stent on 03/01/2024  Enterococcal bacteremia  Likely 2/2 UTI with translocation into blood.   Pyelonephritis likely secondary to recent urologic procedure removal of stone.  Suspect translocation from the stone into the renal system.   Appears to have distant seeding with positive blood cultures for Enterococcus   ID CN 3/17    Suspect bacteremia from  source; ureteral proc likely contributed ID CN / ID PN 3/18-28   s/p ureteroscopic stone extraction with lithotripsy & right ureteral  stent                       placement (right) on 3/14       ID CN 3/20    CTRSS shows right ureteral stent in good position, with acceptable amount of right hydronephrosis considering stent reflux, no left hydronephrosis or ureteral stones. Bladder not significantly distended.  pain improved with day placement.   Pyelonephritis  - IV antibiotics  - IV fluids  - Maintain stent   ID CN 3/16    CT scan showed perinephric stranding,  3/17 Hosp PN   Discussed with urology and they don't think ureteral stents are infected and want to hold off exchanging at this time 3/22 ID PN         Please clarify if "Pyelonephritis" -     (as it relates to "s/p ureteroscopic stone extraction with lithotripsy & right ureteral stent placement (right) on 3/14") is:    [  ] Inherent/Integral to the procedure     [x  ] Complication of the procedure   "   [  ] Present, but not a complication of the procedure     [  ] Other (please specify): _____________     [  ]   Clinically Undetermined   Please document in your progress notes daily for the duration of treatment until resolved and include in your discharge summary.

## 2024-04-07 VITALS — TEMPERATURE: 98 F | OXYGEN SATURATION: 98 % | RESPIRATION RATE: 18 BRPM | HEART RATE: 70 BPM

## 2024-04-08 NOTE — PATIENT INSTRUCTIONS
Instructions:  - [facility] to schedule home follow-up visit with patient as needed.  - Continue all medications, treatments and therapies as ordered.   - Follow all instructions, recommendations as discussed.  - Maintain Safety Precautions at all times.  - Attend all medical appointments as scheduled.  - For worsening symptoms: call Primary Care Physician or Nurse Practitioner.  - For emergencies, call 911 or immediately report to the nearest emergency room.  - Limit Risks of environmental exposure to coronavirus/COVID-19 as discussed including: social distancing, hand hygiene, and limiting departures from the home for necessities only.

## 2024-04-08 NOTE — PROGRESS NOTES
[facility]  Transitional Care Management (TCM) Home Visit    Encounter Provider: Lucina EDGAR Chairs   PCP: Jeison Sanchez MD  Consult Requested By: Dr. Edwige Aly  Admit Date: 3/16/24   IP Discharge Date: 3/28/24  Hospital Length of Stay:RRHLOS@ days  Admission Date: 3/16/2024  Hospital Length of Stay: 12 days  Discharge Date and Time: 3/28/2024  Hospital Diagnosis: Pyelonephritis [N12]     HISTORY OF PRESENT ILLNESS      Patient ID: Aminah Norton is a 78 y.o. female was recently admitted to the hospital, this is their TCM encounter.    Hospital Course Synopsis:    HPI:   79 yo female with DM2, GERD, HPLD, HTN, Hypothyroidism, recent stone removal presenting with chills, weakness, and back pain. Interpretor MERI used at bedside. She states that she was d/c'd on the 14th of March was doing okay until last night she started feeling weak and actually fell, she denies any leg pain or headache and did loss consciousness, however she did not feel any better today whenever she woke up so presented to the ER.     She had a fever measured here at 102.6F, CT scan showed perinephric stranding, UA consistent with infection, lactic acidosis. Urology was consulted who recommended day and antibiotics. Medicine called for admission.      Procedure(s) (LRB):  Transesophageal echo (CARLITO) intra-procedure log documentation (N/A)       Hospital Course:   Started on ceftriaxone at admission, ampicillin added on 3/17. Blood cultures quickly resulted positive for e faecalis, persistently positive. ID consulted. Patient passed voiding trial 3/18. TTE and CARLITO negative for vegetations. CT abdomen negative for abscess. Nuclear medicine scan done based on ID recommendations also negative. Patient to complete 4 weeks of ampicillin and ceftriaxone. Ampicillin 2gm IV q4h (or continuous) and ceftriaxone 2gm q12h until 4/18/24.         DECISION MAKING TODAY       Assessment & Plan:  1. Diabetic polyneuropathy associated with  type 2 diabetes mellitus  Assessment & Plan:  The current medical regimen is effective;  continue present plan and medications.         2. Pyelonephritis  -     Ambulatory referral/consult to [facility]    3. HTN (hypertension), benign  Assessment & Plan:  Chronic, controlled. Latest blood pressure and vitals reviewed-     Temp:  [98.1 °F (36.7 °C)-99.3 °F (37.4 °C)]   Pulse:  [73-80]   Resp:  [16-20]   BP: (122-159)/(59-70)   SpO2:  [96 %-100 %] .   Home meds for hypertension were reviewed and noted below.   Hypertension Medications               carvediloL (COREG) 25 MG tablet TAKE 1 TABLET TWICE DAILY    lisinopriL (PRINIVIL,ZESTRIL) 40 MG tablet Take 1 tablet (40 mg total) by mouth once daily.            While in the hospital, will manage blood pressure as follows; Adjust home antihypertensive regimen as follows- held on presentation, restart as tolerated    Will utilize p.r.n. blood pressure medication only if patient's blood pressure greater than 180/110 and she develops symptoms such as worsening chest pain or shortness of breath.      4. Hypothyroidism, unspecified type  Assessment & Plan:  Chronic, controlled, continue home synthroid             Medication List on Discharge:     Medication List            Accurate as of April 3, 2024 11:59 PM. If you have any questions, ask your nurse or doctor.                CONTINUE taking these medications      amitriptyline 10 MG tablet  Commonly known as: ELAVIL  Take 1 tablet (10 mg total) by mouth every evening.     carvediloL 6.25 MG tablet  Commonly known as: COREG  [place] 1 tableta (6.25 mg en total) por vía oral 2 (dos) veces al día.  (Take 1 tablet (6.25 mg total) by mouth 2 (two) times daily.)     gabapentin 300 MG capsule  Commonly known as: NEURONTIN  Take 1 capsule (300 mg total) by mouth every evening.     insulin aspart U-100 100 unit/mL (3 mL) Inpn pen  Commonly known as: NovoLOG  Inject 3 Units into the skin 3 (three) times daily.      insulin detemir U-100 (Levemir) 100 unit/mL (3 mL) Inpn pen  Inject 7 Units into the skin 2 (two) times daily.     levothyroxine 112 MCG tablet  Commonly known as: SYNTHROID  Take 1 tablet (112 mcg total) by mouth before breakfast.     lisinopriL 40 MG tablet  Commonly known as: PRINIVIL,ZESTRIL  Take 1 tablet (40 mg total) by mouth once daily.              Medication Reconciliation:  Were medications changed on discharge? Yes  Were medications in the home? Yes  Is the patient taking the medications as directed? Yes  Does the patient understand the medications and changes? Yes  Does updated med list accurately reflects meds patient is currently taking? Yes    ENVIRONMENT OF CARE      Family and/or Caregiver present at visit?  Yes  Name of Caregiver: 0  History provided by: patient and caregiver    Advance Care Planning   Advanced Care Planning Status:  Patient does not have an ACP conversation on file  Living Will: Yes  Power of : Yes  [place]POST: No    Does Caregiver have HCPoA: Yes  Changes today: 0  Is patient hospice appropriate: No  (If needed, use PPS <30 or FAST score >7)  Was referral to hospice placed: No       Impression upon entering the home:  Physical Dwelling: single family home   Appearance of home environment: cleaniness: clean  Functional Status: minimal assistance  Mobility: ambulatory with device  Nutritional access: adequate intake and access  Home Health: No, and does not need it at this time   DME/Supplies: rolling walker and cane     Diagnostic tests reviewed/disposition: No diagnosic tests pending after this hospitalization.  Disease/illness education: Diabetes  Establishment or re-establishment of referral orders for community resources: No other necessary community resources.   Discussion with other health care providers: No discussion with other health care providers necessary.   Does patient have a PCP at [facility]? Yes   Repatriation plan with PCP? follow-up with PCP within 30d   Does  patient have an ostomy (ileostomy, colostomy, suprapubic catheter, nephrostomy tube, tracheostomy, PEG tube, pleurex catheter, cholecystostomy, etc)? No  Were BPAs reviewed? Yes    Social History     Socioeconomic History    Marital status:    Tobacco Use    Smoking status: Never    Smokeless tobacco: Never   Substance and Sexual Activity    Alcohol use: No    Drug use: No    Sexual activity: Yes     Partners: Male     Birth control/protection: Post-menopausal     Comment:     Other Topics Concern    Are you pregnant or think you may be? No    Breast-feeding No   Social History Narrative     with 7 kids     Social Determinants of Health     Financial Resource Strain: Low Risk  (3/18/2024)    Overall Financial Resource Strain (CARDIA)     Difficulty of Paying Living Expenses: Not very hard   Food Insecurity: No Food Insecurity (3/18/2024)    Hunger Vital Sign     Worried About Running Out of Food in the Last Year: Never true     Ran Out of Food in the Last Year: Never true   Transportation Needs: No Transportation Needs (3/18/2024)    PRAPARE - Transportation     Lack of Transportation (Medical): No     Lack of Transportation (Non-Medical): No   Physical Activity: Inactive (3/18/2024)    Exercise Vital Sign     Days of Exercise per Week: 0 days     Minutes of Exercise per Session: 0 min   Stress: No Stress Concern Present (3/18/2024)    [place] [employer] - Occupational Stress Questionnaire     Feeling of Stress : Only a little   Social Connections: Unknown (3/18/2024)    Social Connection and Isolation Panel [NHANES]     Frequency of Communication with Friends and Family: More than three times a week     Frequency of Social Gatherings with Friends and Family: Twice a week     Attends Shinto Services: 1 to 4 times per year     Active Member of Clubs or Organizations: Yes     Attends Club or Organization Meetings: Never     Marital Status: Patient declined   Housing Stability:  Unknown (3/18/2024)    Housing Stability Vital Sign     Unable to Pay for Housing in the Last Year: No     Unstable Housing in the Last Year: No       OBJECTIVE:     Vital Signs:  Vitals:    04/03/24 1424   Pulse: 70   Resp: 18   Temp: 98.1 °F (36.7 °C)       Review of Systems    Physical Exam:  Physical Exam    INSTRUCTIONS FOR PATIENT:     Scheduled Follow-up, Appts Reviewed with Modifications if Needed: Yes  Future Appointments   Date Time Provider Department Center   4/12/2024  3:00 PM Samina Velasquez MD [facility] ID [facility]   4/30/2024  8:40 AM Brody Smith MD [facility] URO [place]   6/12/2024 12:00 PM Rachna Damon DPM [facility] PODIATR [place]   7/26/2024  9:30 AM Nancy العراقي RD [facility] INTLDIA [place] [facility] PCW       Signature: Lucina Barnes, NP    Transition of Care Visit:  I have reviewed and updated the history and problem list.  I have reconciled the medication list.  I have discussed the hospitalization and current medical issues, prognosis and plans with the patient/family.

## 2024-04-15 ENCOUNTER — TELEPHONE (OUTPATIENT)
Dept: INFECTIOUS DISEASES | Facility: CLINIC | Age: 79
End: 2024-04-15
Payer: MEDICARE

## 2024-04-15 NOTE — TELEPHONE ENCOUNTER
----- Message from Samina Velasquez MD sent at 4/12/2024  5:18 PM CDT -----  I'm sorry that happened. I didn't know she was running late. Yes, I'm planning on ending iv abx on 4/18 (not 4/8).  I'm on inpatient main service at [facility] next week so I can fit her in for follow-up (ideally 1pm on tues, thurs or fri). I'd like to talk to her about continuting to take an oral antibiotic for a little while (after stopping iv abx)    ----- Message -----  From: Ladonna Middleton MA  Sent: 4/12/2024   3:48 PM CDT  To: Samina Velasquez MD    Pt came into the clinic after you had left for the day. Her EOC is 4/8. Do you want her to still f/u with an appt or do you plan to end therapy as dated? Let me know and I will call the pt.

## 2024-04-18 ENCOUNTER — OFFICE VISIT (OUTPATIENT)
Dept: INFECTIOUS DISEASES | Facility: CLINIC | Age: 79
End: 2024-04-18
Payer: MEDICARE

## 2024-04-18 ENCOUNTER — TELEPHONE (OUTPATIENT)
Dept: INTERNAL MEDICINE | Facility: CLINIC | Age: 79
End: 2024-04-18
Payer: MEDICARE

## 2024-04-18 VITALS
WEIGHT: 160.94 LBS | DIASTOLIC BLOOD PRESSURE: 71 MMHG | BODY MASS INDEX: 26.81 KG/M2 | HEIGHT: 65 IN | TEMPERATURE: 98 F | SYSTOLIC BLOOD PRESSURE: 173 MMHG | HEART RATE: 83 BPM

## 2024-04-18 DIAGNOSIS — R78.81 ENTEROCOCCAL BACTEREMIA: Primary | ICD-10-CM

## 2024-04-18 DIAGNOSIS — Z79.2 RECEIVING INTRAVENOUS ANTIBIOTIC TREATMENT AS OUTPATIENT: ICD-10-CM

## 2024-04-18 DIAGNOSIS — Z51.81 THERAPEUTIC DRUG MONITORING: ICD-10-CM

## 2024-04-18 DIAGNOSIS — B95.2 ENTEROCOCCAL BACTEREMIA: Primary | ICD-10-CM

## 2024-04-18 DIAGNOSIS — N20.1 RIGHT URETERAL STONE: ICD-10-CM

## 2024-04-18 PROCEDURE — 1125F AMNT PAIN NOTED PAIN PRSNT: CPT | Mod: HCNC,CPTII,S$GLB, | Performed by: INTERNAL MEDICINE

## 2024-04-18 PROCEDURE — 1101F PT FALLS ASSESS-DOCD LE1/YR: CPT | Mod: HCNC,CPTII,S$GLB, | Performed by: INTERNAL MEDICINE

## 2024-04-18 PROCEDURE — 99999 PR PBB SHADOW E&M-EST. PATIENT-LVL IV: CPT | Mod: PBBFAC,HCNC,, | Performed by: INTERNAL MEDICINE

## 2024-04-18 PROCEDURE — 1157F ADVNC CARE PLAN IN RCRD: CPT | Mod: HCNC,CPTII,S$GLB, | Performed by: INTERNAL MEDICINE

## 2024-04-18 PROCEDURE — 3077F SYST BP >= 140 MM HG: CPT | Mod: HCNC,CPTII,S$GLB, | Performed by: INTERNAL MEDICINE

## 2024-04-18 PROCEDURE — 99214 OFFICE O/P EST MOD 30 MIN: CPT | Mod: HCNC,S$GLB,, | Performed by: INTERNAL MEDICINE

## 2024-04-18 PROCEDURE — 3288F FALL RISK ASSESSMENT DOCD: CPT | Mod: HCNC,CPTII,S$GLB, | Performed by: INTERNAL MEDICINE

## 2024-04-18 PROCEDURE — 3078F DIAST BP <80 MM HG: CPT | Mod: HCNC,CPTII,S$GLB, | Performed by: INTERNAL MEDICINE

## 2024-04-18 PROCEDURE — 1111F DSCHRG MED/CURRENT MED MERGE: CPT | Mod: HCNC,CPTII,S$GLB, | Performed by: INTERNAL MEDICINE

## 2024-04-18 RX ORDER — AMOXICILLIN 500 MG/1
500 CAPSULE ORAL 3 TIMES DAILY
Qty: 90 CAPSULE | Refills: 1 | Status: SHIPPED | OUTPATIENT
Start: 2024-04-18 | End: 2024-05-02 | Stop reason: SDUPTHER

## 2024-04-18 NOTE — TELEPHONE ENCOUNTER
----- Message from Tor Conte sent at 4/17/2024  2:48 PM CDT -----  Contact: [facility] [phone]  1MEDICALADVICE     Patient is calling for Medical Advice regarding:DM pt is requesting     How long has patient had these symptoms:    Pharmacy name and phone#:    Would like response via WhoGotStuffhart:  n/a     Comments:    Call back Claire ivan

## 2024-04-19 ENCOUNTER — TELEPHONE (OUTPATIENT)
Dept: INTERNAL MEDICINE | Facility: CLINIC | Age: 79
End: 2024-04-19
Payer: MEDICARE

## 2024-04-19 NOTE — TELEPHONE ENCOUNTER
Held slot 05/02/2024 10:30 Dr. Sanchez, sent msg to advanced .  Spoke with pt confirmed ok and asked to mail reminder.

## 2024-04-19 NOTE — TELEPHONE ENCOUNTER
----- Message from Jeison Sanchez MD sent at 4/18/2024  4:54 PM CDT -----  Can we see if she is taking the insulin she was prescribed on discharge?    Jeison Sanchez  ----- Message -----  From: Elda Arndt MA  Sent: 4/17/2024   2:16 PM CDT  To: Jeison Sanchez MD      ----- Message -----  From: Darlin Chao  Sent: 4/17/2024   2:09 PM CDT  To: Daniel Daugherty    1MEDICALADVICE     Patient is calling for Medical Advice regarding:Ines gerard Rodriguez  is calling back again to address that the patient since being released from hospital on 3/28/24 she was told to stop taking all mediations until notified by infectious disease doctor and Ines just wanted her PCP to be aware of what is going on and patient blood sugar is at 400.     Please f/u with patient.

## 2024-04-19 NOTE — TELEPHONE ENCOUNTER
----- Message from Shira Birmingham sent at 4/19/2024  8:17 AM CDT -----  Contact: Pt [phone]  Consult    The patient said her other doctor told her to see you asap to go over her medicines    Thank you

## 2024-04-22 ENCOUNTER — TELEPHONE (OUTPATIENT)
Dept: INFECTIOUS DISEASES | Facility: CLINIC | Age: 79
End: 2024-04-22
Payer: MEDICARE

## 2024-04-22 NOTE — TELEPHONE ENCOUNTER
----- Message from Sadaf Sanford sent at 4/22/2024 11:40 AM CDT -----  Regarding: DME Orders  Good Afternoon,    I received information to provide pt with DME. Please resubmit via orders via Epic along with supporting notes, dx and justification. Please see below         All progress notes most having the following for the equipment ordered. It can also be documented in PT notes. The orders will also have the dx code listed. Size of the wheelchair, cushion, leg rest and anti tippers.     Insurance guidelines now require the justification to be documented by the doctor or physical therapist. Several insurances are denying WC, RW, BSC and Beds because the documentation is not noted by the doctor or physical therapist.  The documentation does not need to be in daily MD notes, you can document on a progress note by itself.  Insurance will not accept the justification documented in a plan of care note. I know this is an inconvenience, but claims are being denied and HME will have to  equipment, because the insurance guidelines were not met.  We are trying to ensure that the patient has the equipment they need.       HME wants to ensure that the insurance will pay the claims on WC, RW, BSC and Bed orders. All WC orders should be ordered has Standard WC.  If the patient needs a Lightweight WC, it must be documented thoroughly that the patient cannot use a Standard WC.  HME only stocks Lightweight WC's, so the patient will receive a lightweight if the order shows Standard WC.  The following Insurance Justifications will need to be documented in the MD or Physical Therapist progress notes, before approval can be given to deliver.  Insurance is denying paying the claims if the justification is not listed in the MD or Physical Therapist progress notes.  If this can be done when the orders are placed, it will streamline the process time to have equipment delivered.  If you have any questions, please give me a call and  I will be happy to discuss this with you.        Example:  Bed   ____________ requires a hospital bed due to her requiring positioning of the body in ways not feasible with an ordinary bed to alleviate pain/ is completely immobile /or limited mobility and cannot independently make changes in body position without the use of the bed.  The positioning of the body cannot be sufficiently resolved by the use of pillows and wedges.        Example 2 for bed   ____________requires the head of the bed to be elevated more than 30 degrees most of the time due to CHF, Chromic pulmonary disease, or problems with aspiration.  The positioning of the body cannot be sufficiently resolved by the use of pillows and wedges.    (Most respiratory issues would qualify the patient)         Example 3 for bed   ____________ because the patient needs traction equipment which would attach to the bed. The positioning of the body cannot be sufficiently resolved by the use of pillows and wedges.    ([facility] does not stock traction equipment)     RW     The mobility limitation cannot be sufficiently resolved by the use of a cane.   Patient's functional mobility deficit can be sufficiently resolved with the use of a (Rolling Walker or Walker).  Patient's mobility limitation significantly impairs their ability to participate in one of more activities of daily living.  The use of a (RW or Walker) will significantly improve the patient's ability to participate in MRADLS and the patient will use it on regular basis in the home.       Thank you   Rolonda Merrick [facility]

## 2024-04-24 ENCOUNTER — OUTPATIENT CASE MANAGEMENT (OUTPATIENT)
Dept: ADMINISTRATIVE | Facility: OTHER | Age: 79
End: 2024-04-24
Payer: MEDICARE

## 2024-04-24 ENCOUNTER — TELEPHONE (OUTPATIENT)
Dept: INFECTIOUS DISEASES | Facility: CLINIC | Age: 79
End: 2024-04-24
Payer: MEDICARE

## 2024-04-24 NOTE — TELEPHONE ENCOUNTER
Spoke to pharmacy about filling pt's prescription told them per Dr. Velasquez that the pt isnt allergic to pcn and it was ok to fill the prescription.        ----- Message from Samina Velasquez MD sent at 4/24/2024  1:37 PM CDT -----  Regarding: RE: RX clarification  Contact: [phone]  Yes, she isn't allergic to penicillin. She's currently on IV penicillin and has been tolerating it fine  ----- Message -----  From: Vonda Pastrana MA  Sent: 4/24/2024  11:45 AM CDT  To: Samina Velasquez MD  Subject: FW: RX clarification                             Would you still like them to fill the prescription even tho the pt is allergic to pcn?  ----- Message -----  From: Melida Okeefe  Sent: 4/24/2024  11:24 AM CDT  To: Eva Haas Staff  Subject: RX clarification                                 Brenda from [facility] is calling to speak with someone in provider office in regards to a penciling allergy the pt has Brenda wants to know if its ok to fill script amoxicillin (AMOXIL) 500 MG capsule Brenda  is asking for a return call please call her at  [phone]

## 2024-04-24 NOTE — PROGRESS NOTES
SW received a referral on the above patient . SW explained to patient reason for referral to assist with social needs. Patient denied having any needs but advised SW neck has been hurting. Patient decline for SW to send a message to PCP regarding medical concern. Patient reports she will further discuss neck pain on visit next week with PCP. SW will close case as no needs.

## 2024-04-29 ENCOUNTER — TELEPHONE (OUTPATIENT)
Dept: INFECTIOUS DISEASES | Facility: CLINIC | Age: 79
End: 2024-04-29
Payer: MEDICARE

## 2024-04-29 NOTE — TELEPHONE ENCOUNTER
----- Message from Samina Velasquez MD sent at 4/26/2024  5:07 PM CDT -----  Thanks, continue ampicillin until this is done  ----- Message -----  From: Amirah Poe MA  Sent: 4/26/2024   3:57 PM CDT  To: Samina Velasquez MD    Tried to reschedule but May 13 is the first available  ----- Message -----  From: Samina Velasquez MD  Sent: 4/26/2024   3:53 PM CDT  To: Amirah Poe MA    Is there anyway we could move up her MRI results? She's probably had enough IV antibiotics and would like to get picc removed, but was waiting on results of mri first. Thanks  ----- Message -----  From: Amirah Poe MA  Sent: 4/24/2024   5:36 PM CDT  To: Samina Velasquez MD

## 2024-04-30 ENCOUNTER — OFFICE VISIT (OUTPATIENT)
Dept: UROLOGY | Facility: CLINIC | Age: 79
End: 2024-04-30
Payer: MEDICARE

## 2024-04-30 VITALS
WEIGHT: 153 LBS | DIASTOLIC BLOOD PRESSURE: 79 MMHG | BODY MASS INDEX: 25.49 KG/M2 | HEART RATE: 78 BPM | HEIGHT: 65 IN | SYSTOLIC BLOOD PRESSURE: 144 MMHG

## 2024-04-30 DIAGNOSIS — N20.0 CALCULUS OF KIDNEY: Primary | ICD-10-CM

## 2024-04-30 PROCEDURE — 3077F SYST BP >= 140 MM HG: CPT | Mod: CPTII,S$GLB,, | Performed by: UROLOGY

## 2024-04-30 PROCEDURE — 1159F MED LIST DOCD IN RCRD: CPT | Mod: CPTII,S$GLB,, | Performed by: UROLOGY

## 2024-04-30 PROCEDURE — 1101F PT FALLS ASSESS-DOCD LE1/YR: CPT | Mod: CPTII,S$GLB,, | Performed by: UROLOGY

## 2024-04-30 PROCEDURE — 1157F ADVNC CARE PLAN IN RCRD: CPT | Mod: CPTII,S$GLB,, | Performed by: UROLOGY

## 2024-04-30 PROCEDURE — 99214 OFFICE O/P EST MOD 30 MIN: CPT | Mod: S$GLB,,, | Performed by: UROLOGY

## 2024-04-30 PROCEDURE — 3078F DIAST BP <80 MM HG: CPT | Mod: CPTII,S$GLB,, | Performed by: UROLOGY

## 2024-04-30 PROCEDURE — 3288F FALL RISK ASSESSMENT DOCD: CPT | Mod: CPTII,S$GLB,, | Performed by: UROLOGY

## 2024-04-30 PROCEDURE — 99999 PR PBB SHADOW E&M-EST. PATIENT-LVL III: CPT | Mod: PBBFAC,,, | Performed by: UROLOGY

## 2024-04-30 NOTE — PROGRESS NOTES
[facility]        Urolithiasis Return Note     4/30/2024     Referred by:  No ref. provider found     HPI: Aminah Bravo is a very pleasant 79 y.o. female who has is a return patient for BPS/IC. Upper tract imaging also demonstrated a distal right obstructing stone. At the time of Hunner lesion treatment, she also has right USE. Following her second ureteroscopy (stent left in place) she developed pyelonephritis. While she reports removing the stent with externalized string (and no string is seen on CT today), the CT from 3/20/24 shows a stent in place.      Previous evaluation has included:   Cystoscopy: areas of erythema noted 2-3 years ago.     A review of 10+ systems was conducted with pertinent positive and negative findings documented in HPI with all other systems reviewed and negative.     Past medical, family, surgical and social history reviewed as documented in chart with pertinent positive medical, family, surgical and social history detailed in HPI.     Exam Findings:     Const: no acute distress, conversant and alert  Eyes: anicteric, extraocular muscles intact  ENMT: normocephalic, Nl oral membranes  Cardio: no cyanosis, nl cap refill  Pulm: no tachypnea; no resp distress  Abd: soft, no tenderness  Musc: no laceration, no tenderness  Neuro: alert; oriented x 3  Skin: warm, dry; no petichiae  Psych: no anxiety; normal speech  : No externalized string seen on exam      Assessment/Plan:     Bladder Pain (estab, stable): Patient had injection of Hunner Lesions on recent cystoscopy. We will follow for improvement in IC symptoms.     2. Urolithiasis (established, needs addt'l evaluation): It's unclear if she removed her stent as it was present during hospitalization but no external string seen today. She reports removing it 2 days following USE but not consistent with imaging. Will obtain renal U/S as planned after USE but also KUB to evaluate for retained stent.

## 2024-05-01 ENCOUNTER — EXTERNAL HOME HEALTH (OUTPATIENT)
Dept: HOME HEALTH SERVICES | Facility: HOSPITAL | Age: 79
End: 2024-05-01
Payer: MEDICARE

## 2024-05-01 ENCOUNTER — TELEPHONE (OUTPATIENT)
Dept: UROLOGY | Facility: CLINIC | Age: 79
End: 2024-05-01
Payer: MEDICARE

## 2024-05-01 ENCOUNTER — HOSPITAL ENCOUNTER (OUTPATIENT)
Dept: RADIOLOGY | Facility: HOSPITAL | Age: 79
Discharge: HOME | End: 2024-05-01
Attending: UROLOGY
Payer: MEDICARE

## 2024-05-01 DIAGNOSIS — N20.0 CALCULUS OF KIDNEY: ICD-10-CM

## 2024-05-01 PROCEDURE — 76770 US EXAM ABDO BACK WALL COMP: CPT | Mod: 26,,, | Performed by: RADIOLOGY

## 2024-05-01 PROCEDURE — 74018 RADEX ABDOMEN 1 VIEW: CPT | Mod: 26,,, | Performed by: INTERNAL MEDICINE

## 2024-05-01 PROCEDURE — 74018 RADEX ABDOMEN 1 VIEW: CPT | Mod: TC

## 2024-05-01 PROCEDURE — 76770 US EXAM ABDO BACK WALL COMP: CPT | Mod: TC

## 2024-05-01 NOTE — TELEPHONE ENCOUNTER
Left a message with the patient and her daughter that right stent is still in place and we will need to schedule removal with cystoscopy. Posting slip sent and will reach out to the patient to schedule.

## 2024-05-02 ENCOUNTER — OFFICE VISIT (OUTPATIENT)
Dept: INTERNAL MEDICINE | Facility: CLINIC | Age: 79
End: 2024-05-02
Payer: MEDICARE

## 2024-05-02 ENCOUNTER — TELEPHONE (OUTPATIENT)
Dept: UROLOGY | Facility: CLINIC | Age: 79
End: 2024-05-02
Payer: MEDICARE

## 2024-05-02 VITALS
OXYGEN SATURATION: 98 % | DIASTOLIC BLOOD PRESSURE: 60 MMHG | SYSTOLIC BLOOD PRESSURE: 162 MMHG | BODY MASS INDEX: 25.71 KG/M2 | WEIGHT: 154.31 LBS | HEART RATE: 82 BPM | HEIGHT: 65 IN

## 2024-05-02 DIAGNOSIS — B95.2 ENTEROCOCCAL BACTEREMIA: Primary | ICD-10-CM

## 2024-05-02 DIAGNOSIS — R78.81 ENTEROCOCCAL BACTEREMIA: Primary | ICD-10-CM

## 2024-05-02 DIAGNOSIS — I10 HTN (HYPERTENSION), BENIGN: ICD-10-CM

## 2024-05-02 DIAGNOSIS — E11.40 TYPE 2 DIABETES MELLITUS WITH DIABETIC NEUROPATHY, WITHOUT LONG-TERM CURRENT USE OF INSULIN: ICD-10-CM

## 2024-05-02 PROCEDURE — 3288F FALL RISK ASSESSMENT DOCD: CPT | Mod: HCNC,CPTII,S$GLB, | Performed by: INTERNAL MEDICINE

## 2024-05-02 PROCEDURE — 1125F AMNT PAIN NOTED PAIN PRSNT: CPT | Mod: HCNC,CPTII,S$GLB, | Performed by: INTERNAL MEDICINE

## 2024-05-02 PROCEDURE — 1157F ADVNC CARE PLAN IN RCRD: CPT | Mod: HCNC,CPTII,S$GLB, | Performed by: INTERNAL MEDICINE

## 2024-05-02 PROCEDURE — 99999 PR PBB SHADOW E&M-EST. PATIENT-LVL III: CPT | Mod: PBBFAC,HCNC,, | Performed by: INTERNAL MEDICINE

## 2024-05-02 PROCEDURE — 1159F MED LIST DOCD IN RCRD: CPT | Mod: HCNC,CPTII,S$GLB, | Performed by: INTERNAL MEDICINE

## 2024-05-02 PROCEDURE — 3078F DIAST BP <80 MM HG: CPT | Mod: HCNC,CPTII,S$GLB, | Performed by: INTERNAL MEDICINE

## 2024-05-02 PROCEDURE — 99215 OFFICE O/P EST HI 40 MIN: CPT | Mod: HCNC,S$GLB,, | Performed by: INTERNAL MEDICINE

## 2024-05-02 PROCEDURE — 1101F PT FALLS ASSESS-DOCD LE1/YR: CPT | Mod: HCNC,CPTII,S$GLB, | Performed by: INTERNAL MEDICINE

## 2024-05-02 PROCEDURE — 3077F SYST BP >= 140 MM HG: CPT | Mod: HCNC,CPTII,S$GLB, | Performed by: INTERNAL MEDICINE

## 2024-05-02 RX ORDER — AMLODIPINE AND BENAZEPRIL HYDROCHLORIDE 5; 40 MG/1; MG/1
1 CAPSULE ORAL DAILY
Qty: 90 CAPSULE | Refills: 3 | Status: SHIPPED | OUTPATIENT
Start: 2024-05-02 | End: 2025-05-02

## 2024-05-02 RX ORDER — INSULIN GLARGINE 100 [IU]/ML
10 INJECTION, SOLUTION SUBCUTANEOUS DAILY
Qty: 3 ML | Refills: 0 | Status: SHIPPED | OUTPATIENT
Start: 2024-05-02 | End: 2024-06-04 | Stop reason: ALTCHOICE

## 2024-05-02 RX ORDER — AMOXICILLIN 500 MG/1
500 CAPSULE ORAL 3 TIMES DAILY
Qty: 90 CAPSULE | Refills: 1 | Status: SHIPPED | OUTPATIENT
Start: 2024-05-02 | End: 2024-06-03

## 2024-05-02 NOTE — TELEPHONE ENCOUNTER
05/02/2024 @ 10:24 called patient to schedule stent removal, No answer left voicemail advising patient to call back and get scheduled in for our next available date.

## 2024-05-02 NOTE — PROGRESS NOTES
"    CHIEF COMPLAINT     Chief Complaint   Patient presents with    Follow-up       HPI     Aminah Norton is a 79 y.o. female type 2 diabetes hypertension ureteral stone with stent here today for     For Enterococcus bacteremia.  Id suspicious that source was  related due to stent Was started on ampicillin and ceftriaxone.  Ceftriaxone was discontinued ampicillin treatment course was extended due to persistent back pain.  She is awaiting MRI to rule out abscess/osteomyelitis.  She is receiving medication via PICC    Diabetes  To insulin while hospitalized.  Discharged on prandial aspart only.  She was given prescription for detemir which has not covered by her plan.  Her oral agents were stopped.  Has had worsening hyperglycemia while receiving IV antibiotics    Hypertension  Taking lisinopril 40 mg.  Blood pressure has been elevated    Personally Reviewed Patient's Medical, surgical, family and social hx. Changes updated in [employer].  Care Team updated in Epic    Review of Systems:  Review of Systems    Health Maintenance:   Reviewed with patient  Due for the following:      PHYSICAL EXAM     BP (!) 162/60 (BP Location: Right arm, Patient Position: Sitting, BP Method: Medium (Manual))   Pulse 82   Ht 5' 5" (1.651 m)   Wt 70 kg (154 lb 5.2 oz)   SpO2 98%   BMI 25.68 kg/m²     Gen: Well Appearing, NAD  HEENT: PERR, EOMI  Neck: FROM, no thyromegaly, no cervical adenopathy  CVD: RRR, no M/R/G  Pulm: Normal work of breathing, CTAB, no wheezing   Abd:  Soft, NT, ND non TTP, no mass  MSK: no LE edemaPICC line present left upper extremity  Neuro: A&Ox3, gait normal, speech normal  Mood; Mood normal, behavior normal, thought process linear       LABS     Labs reviewed; Notable for  Lab Results   Component Value Date    WBC 4.33 03/28/2024    HGB 9.2 (L) 03/28/2024    HCT 29.4 (L) 03/28/2024    MCV 74 (L) 03/28/2024     03/28/2024         Chemistry        Component Value Date/Time     " 03/28/2024 0630    K 4.7 03/28/2024 0630     03/28/2024 0630    CO2 28 03/28/2024 0630    BUN 9 03/28/2024 0630    CREATININE 1.0 03/28/2024 0630     (H) 03/28/2024 0630        Component Value Date/Time    CALCIUM 9.2 03/28/2024 0630    ALKPHOS 102 03/28/2024 0630    AST 9 (L) 03/28/2024 0630    ALT 15 03/28/2024 0630    BILITOT 0.2 03/28/2024 0630    ESTGFRAFRICA >60.0 06/30/2022 0807    EGFRNONAA >60.0 06/30/2022 0807        Lab Results   Component Value Date    HGBA1C 7.6 (H) 03/21/2024     Lab Results   Component Value Date    LDLCALC 122.4 11/09/2023       ASSESSMENT     1. Enterococcal bacteremia  amoxicillin (AMOXIL) 500 MG capsule    CBC Auto Differential    Comprehensive Metabolic Panel      2. Type 2 diabetes mellitus with diabetic neuropathy, without long-term current use of insulin  insulin (LANTUS SOLOSTAR U-100 INSULIN) glargine 100 units/mL SubQ pen    CBC Auto Differential    Comprehensive Metabolic Panel    Hemoglobin A1C    Lipid Panel      3. HTN (hypertension), benign  amLODIPine-benazepriL (LOTREL) 5-40 mg per capsule    CBC Auto Differential    Comprehensive Metabolic Panel              Plan     Aminah Norton is a 79 y.o. female with DM2, HTN, hypothyroidism hyperlipidemia history of ureteral stone history of enterococcus bacteremia    1. Enterococcal bacteremia  Spoke with ID provider, MRI scheduled for 513 will continue ampicillin until MRI results  At that point will transition to Amoxil.  Will reorder since she had not received.  Discussed this with daughter and patient  - amoxicillin (AMOXIL) 500 MG capsule; Take 1 capsule (500 mg total) by mouth 3 (three) times daily.  Dispense: 90 capsule; Refill: 1  - CBC Auto Differential; Future  - Comprehensive Metabolic Panel; Future    2. Type 2 diabetes mellitus with diabetic neuropathy, without long-term current use of insulin  Start Lantus 10 units daily  Continue aspart 3 t.i.d. with meals  Will try and transition  back to oral regimen once completed IV antibiotics  - insulin (LANTUS SOLOSTAR U-100 INSULIN) glargine 100 units/mL SubQ pen; Inject 10 Units into the skin once daily.  Dispense: 3 mL; Refill: 0  - CBC Auto Differential; Future  - Comprehensive Metabolic Panel; Future  - Hemoglobin A1C; Future  - Lipid Panel; Future    3. HTN (hypertension), benign  Not controlled   Will start Lotrel 5-40   Stop lisinopril  - amLODIPine-benazepriL (LOTREL) 5-40 mg per capsule; Take 1 capsule by mouth once daily.  Dispense: 90 capsule; Refill: 3  - CBC Auto Differential; Future  - Comprehensive Metabolic Panel; Future    >40 minutes of professional services provided as part of today's visit.      Jeison Sanchez MD

## 2024-05-02 NOTE — PROGRESS NOTES
[facility]  4/18/24     Subjective:      Chief Complaint:   Chief Complaint   Patient presents with    Follow-up       History of Present Illness:    Patient Aminah Norton is a 79 y.o. female with h/o DM, treated LTBI (6 months INH in 2018),  interstitial cystitis and urolithiasis s/p ureteroscopic stone extraction with lithotripsy and right ureteral stent placement (right) on 3/14 who presents today for hospital follow up for e.faecalis bacteremia    Brief history:  Was admitted  on 3/16 with chills, dizziness, flank pain, dysuria, freq, and a fall. Found to have high grade e.faecalis bacteremia. CARLITO and NM uptake scan normal.   Discussed with urology and they don't think ureteral stents are infected and want to hold off exchanging at this time.    Was d/c'd on ampicillin/ceftriaxone per ID recs     Reports compliance with abx. Denies issues with picc line    Reports worsening lower back pain that makes it difficult for her to walk very far    Asking for DME equipment        Review of Symptoms:  Constitutional: Denies fevers, chills, or weakness.  ENT: Denies dysphagia, nasal discharge, ear pain or discharge.  Cardiovascular: Denies chest pain, palpitations, orthopnea, or claudication.  Respiratory: Denies shortness of breath, cough, hemoptysis, or wheezing.  GI: Denies nausea/vomitting, hematochezia, melena, abd pain, or changes in appetite.  : Denies dysuria, incontinence, or hematuria.  Musculoskeletal: as per hpi  Skin/breast: Denies rashes, lumps, lesions, or discharge.  Neurologic: Denies headache, dizziness, vertigo, or paresthesias.    Past Medical History:   Diagnosis Date    Anticoagulant long-term use     Arthritis     Cataract     left eye    Choroidal rupture of left eye     Diabetes mellitus type II     GERD (gastroesophageal reflux disease)     Hyperlipidemia     Hypertension     Hypothyroidism     Macular scar     right eye    Retinal degeneration     chorioretinal  OD    Thyroid disease     Vitamin B 12 deficiency        Past Surgical History:   Procedure Laterality Date    APPENDECTOMY      BLADDER FULGURATION N/A 3/1/2024    Procedure: ULCER INJECTION FULGURATION;  Surgeon: Brody Smith MD;  Location: [facility] OR;  Service: Urology;  Laterality: N/A;    BLADDER SUSPENSION      CATARACT EXTRACTION      right eye     CHOLECYSTECTOMY      COLONOSCOPY N/A 7/20/2020    Procedure: COLONOSCOPY;  Surgeon: Juan Parker MD;  Location: [facility] ENDO (4TH FLR);  Service: Endoscopy;  Laterality: N/A;  Hx of chronic anemia.  patient needs a   4/6/20 - removed from 4/20/20, rescheduled 7/20/20 - pg  covid 7/17-[facility] 2nd floor-tb    COLONOSCOPY N/A 2/9/2023    Procedure: COLONOSCOPY;  Surgeon: Renan Sanchez MD;  Location: [facility] ENDO (4TH FLR);  Service: Colon and Rectal;  Laterality: N/A;  instr handed to patient, -ml    CYSTOSCOPY N/A 5/19/2021    Procedure: CYSTOSCOPY;  Surgeon: Brody Smith MD;  Location: [facility] OR 1ST FLR;  Service: Urology;  Laterality: N/A;    CYSTOURETEROSCOPY, WITH HOLMIUM LASER LITHOTRIPSY OF URETERAL CALCULUS AND STENT INSERTION Right 3/1/2024    Procedure: CYSTOURETEROSCOPY, WITH HOLMIUM LASER LITHOTRIPSY OF URETERAL CALCULUS AND STENT INSERTION;  Surgeon: Brody Smith MD;  Location: [facility] OR;  Service: Urology;  Laterality: Right;    CYSTOURETEROSCOPY,WITH HOLMIUM LASER LITHOTRIPSY OF URETERAL CALCULUS Right 3/14/2024    Procedure: CYSTOURETEROSCOPY,WITH HOLMIUM LASER LITHOTRIPSY OF URETERAL CALCULUS;  Surgeon: Brody Smith MD;  Location: [facility] OR;  Service: Urology;  Laterality: Right;  1 HR    ECHOCARDIOGRAM,TRANSESOPHAGEAL N/A 3/20/2024    Procedure: Transesophageal echo (CARLITO) intra-procedure log documentation;  Surgeon: Provider, [facility];  Location: [facility] EP LAB;  Service: Cardiology;  Laterality: N/A;    EYE SURGERY Bilateral     cataract removal    INJECTION OF STEROID N/A 3/14/2024     Procedure: INJECTION, STEROID;  Surgeon: Brody Smith MD;  Location: [facility] OR;  Service: Urology;  Laterality: N/A;    PLACEMENT-STENT Right 3/14/2024    Procedure: PLACEMENT-STENT;  Surgeon: Brody Smith MD;  Location: [facility] OR;  Service: Urology;  Laterality: Right;    REMOVAL-STENT Right 3/14/2024    Procedure: REMOVAL-STENT;  Surgeon: Brody Smith MD;  Location: [facility] OR;  Service: Urology;  Laterality: Right;    TOTAL ABDOMINAL HYSTERECTOMY      DUB       Family History   Problem Relation Name Age of Onset    Cancer Mother          uterine    Breast cancer Mother      Ovarian cancer Mother      COPD Father      Cancer Sister          liver    Ovarian cancer Sister      Hypertension Sister      Hypertension Sister      Hypertension Daughter      Thyroid disease Daughter      Cancer Daughter          uterine    Thyroid disease Daughter      Hypertension Daughter      Diabetes Son      Colon cancer Neg Hx      Amblyopia Neg Hx      Blindness Neg Hx      Cataracts Neg Hx      Glaucoma Neg Hx      Macular degeneration Neg Hx      Retinal detachment Neg Hx      Strabismus Neg Hx      Stroke Neg Hx         Social History     Socioeconomic History    Marital status:    Tobacco Use    Smoking status: Never    Smokeless tobacco: Never   Substance and Sexual Activity    Alcohol use: No    Drug use: No    Sexual activity: Yes     Partners: Male     Birth control/protection: Post-menopausal     Comment:     Other Topics Concern    Are you pregnant or think you may be? No    Breast-feeding No   Social History Narrative     with 7 kids     Social Determinants of Health     Financial Resource Strain: Low Risk  (3/18/2024)    Overall Financial Resource Strain (CARDIA)     Difficulty of Paying Living Expenses: Not very hard   Food Insecurity: No Food Insecurity (3/18/2024)    Hunger Vital Sign     Worried About Running Out of Food in the Last Year: Never true     Ran Out of Food in the  Last Year: Never true   Transportation Needs: No Transportation Needs (3/18/2024)    PRAPARE - Transportation     Lack of Transportation (Medical): No     Lack of Transportation (Non-Medical): No   Physical Activity: Inactive (3/18/2024)    Exercise Vital Sign     Days of Exercise per Week: 0 days     Minutes of Exercise per Session: 0 min   Stress: No Stress Concern Present (3/18/2024)    [employer] [place] [employer] - Occupational Stress Questionnaire     Feeling of Stress : Only a little   Housing Stability: Unknown (3/18/2024)    Housing Stability Vital Sign     Unable to Pay for Housing in the Last Year: No     Unstable Housing in the Last Year: No       Review of patient's allergies indicates:   Allergen Reactions    Bufferin [aspirin, buffered] Itching    Atorvastatin      Myalgias and arthralgias    Shellfish containing products Itching     Shellfish causes her to itch per her daughter, Caro.     Warfarin     Ibuprofen Itching     Itching of eyes, head         Objective:   VS (24h):   Vitals:    04/18/24 1309   BP: (!) 173/71   Pulse: 83   Temp: 97.7 °F (36.5 °C)     [unfilled]  General: Afebrile, alert, comfortable, no acute distress.   HEENT: FAINA. EOMI, no scleral icterus.   Pulmonary: Non labored,clear to auscultation A/P/L. No wheezing, crackles, or rhonchi.  Cardiac: normal S1 & S2 w/o rubs/murmurs/gallops.    Abdominal: Non-tender, non-distended  Extremities: Moves all extremities x 4. Lower spinal tenderness to palpation  Skin: picc dressings c/d/i  Neurological:  Alert and oriented x 4.     Labs:    Glucose   Date Value Ref Range Status   03/28/2024 173 (H) 70 - 110 mg/dL Final   03/27/2024 142 (H) 70 - 110 mg/dL Final   03/26/2024 146 (H) 70 - 110 mg/dL Final       Calcium   Date Value Ref Range Status   03/28/2024 9.2 8.7 - 10.5 mg/dL Final   03/27/2024 9.9 8.7 - 10.5 mg/dL Final   03/26/2024 9.3 8.7 - 10.5 mg/dL Final       Albumin   Date Value Ref Range Status   03/28/2024 2.8  (L) 3.5 - 5.2 g/dL Final   03/27/2024 2.7 (L) 3.5 - 5.2 g/dL Final   03/26/2024 2.7 (L) 3.5 - 5.2 g/dL Final       Total Protein   Date Value Ref Range Status   03/28/2024 6.5 6.0 - 8.4 g/dL Final   03/27/2024 7.0 6.0 - 8.4 g/dL Final   03/26/2024 6.0 6.0 - 8.4 g/dL Final       Sodium   Date Value Ref Range Status   03/28/2024 138 136 - 145 mmol/L Final   03/27/2024 139 136 - 145 mmol/L Final   03/26/2024 139 136 - 145 mmol/L Final       Potassium   Date Value Ref Range Status   03/28/2024 4.7 3.5 - 5.1 mmol/L Final   03/27/2024 4.6 3.5 - 5.1 mmol/L Final   03/26/2024 4.5 3.5 - 5.1 mmol/L Final       CO2   Date Value Ref Range Status   03/28/2024 28 23 - 29 mmol/L Final   03/27/2024 27 23 - 29 mmol/L Final   03/26/2024 26 23 - 29 mmol/L Final       Chloride   Date Value Ref Range Status   03/28/2024 102 95 - 110 mmol/L Final   03/27/2024 105 95 - 110 mmol/L Final   03/26/2024 105 95 - 110 mmol/L Final       BUN   Date Value Ref Range Status   03/28/2024 9 8 - 23 mg/dL Final   03/27/2024 7 (L) 8 - 23 mg/dL Final   03/26/2024 7 (L) 8 - 23 mg/dL Final       Creatinine   Date Value Ref Range Status   03/28/2024 1.0 0.5 - 1.4 mg/dL Final   03/27/2024 0.8 0.5 - 1.4 mg/dL Final   03/26/2024 0.8 0.5 - 1.4 mg/dL Final       Alkaline Phosphatase   Date Value Ref Range Status   03/28/2024 102 55 - 135 U/L Final   03/27/2024 89 55 - 135 U/L Final   03/26/2024 88 55 - 135 U/L Final       ALT   Date Value Ref Range Status   03/28/2024 15 10 - 44 U/L Final   03/27/2024 18 10 - 44 U/L Final   03/26/2024 20 10 - 44 U/L Final       AST   Date Value Ref Range Status   03/28/2024 9 (L) 10 - 40 U/L Final   03/27/2024 10 10 - 40 U/L Final   03/26/2024 9 (L) 10 - 40 U/L Final       Total Bilirubin   Date Value Ref Range Status   03/28/2024 0.2 0.1 - 1.0 mg/dL Final     Comment:     For infants and newborns, interpretation of results should be based  on gestational age, weight and in agreement with clinical  observations.    Premature  Infant recommended reference ranges:  Up to 24 hours.............<8.0 mg/dL  Up to 48 hours............<12.0 mg/dL  3-5 days..................<15.0 mg/dL  6-29 days.................<15.0 mg/dL     03/27/2024 0.2 0.1 - 1.0 mg/dL Final     Comment:     For infants and newborns, interpretation of results should be based  on gestational age, weight and in agreement with clinical  observations.    Premature Infant recommended reference ranges:  Up to 24 hours.............<8.0 mg/dL  Up to 48 hours............<12.0 mg/dL  3-5 days..................<15.0 mg/dL  6-29 days.................<15.0 mg/dL     03/26/2024 0.1 0.1 - 1.0 mg/dL Final     Comment:     For infants and newborns, interpretation of results should be based  on gestational age, weight and in agreement with clinical  observations.    Premature Infant recommended reference ranges:  Up to 24 hours.............<8.0 mg/dL  Up to 48 hours............<12.0 mg/dL  3-5 days..................<15.0 mg/dL  6-29 days.................<15.0 mg/dL         WBC   Date Value Ref Range Status   03/28/2024 4.33 3.90 - 12.70 K/uL Final   03/27/2024 4.31 3.90 - 12.70 K/uL Final   03/26/2024 5.78 3.90 - 12.70 K/uL Final       Hemoglobin   Date Value Ref Range Status   03/28/2024 9.2 (L) 12.0 - 16.0 g/dL Final   03/27/2024 9.5 (L) 12.0 - 16.0 g/dL Final   03/26/2024 8.8 (L) 12.0 - 16.0 g/dL Final       Hematocrit   Date Value Ref Range Status   03/28/2024 29.4 (L) 37.0 - 48.5 % Final   03/27/2024 30.8 (L) 37.0 - 48.5 % Final   03/26/2024 28.6 (L) 37.0 - 48.5 % Final       MCV   Date Value Ref Range Status   03/28/2024 74 (L) 82 - 98 fL Final   03/27/2024 73 (L) 82 - 98 fL Final   03/26/2024 73 (L) 82 - 98 fL Final       Platelets   Date Value Ref Range Status   03/28/2024 442 150 - 450 K/uL Final   03/27/2024 383 150 - 450 K/uL Final   03/26/2024 355 150 - 450 K/uL Final       Lab Results   Component Value Date    CHOL 225 (H) 11/09/2023    CHOL 201 (H) 08/07/2023    CHOL 221 (H)  "01/17/2023       Lab Results   Component Value Date    HDL 59 11/09/2023    HDL 53 08/07/2023    HDL 59 01/17/2023       Lab Results   Component Value Date    LDLCALC 122.4 11/09/2023    LDLCALC 108.6 08/07/2023    LDLCALC 118.6 01/17/2023       Lab Results   Component Value Date    TRIG 218 (H) 11/09/2023    TRIG 197 (H) 08/07/2023    TRIG 217 (H) 01/17/2023       Lab Results   Component Value Date    CHOLHDL 26.2 11/09/2023    CHOLHDL 26.4 08/07/2023    CHOLHDL 26.7 01/17/2023       RPR   Date Value Ref Range Status   07/02/2018 Non-reactive Non-reactive Final     No results found for: "QUANTIFERON"    Medications:  Current Outpatient Medications on File Prior to Visit   Medication Sig Dispense Refill    carvediloL (COREG) 6.25 MG tablet Take 1 tablet (6.25 mg total) by mouth 2 (two) times daily. 60 tablet 11    gabapentin (NEURONTIN) 300 MG capsule Take 1 capsule (300 mg total) by mouth every evening. 90 capsule 3    insulin aspart U-100 (NOVOLOG) 100 unit/mL (3 mL) InPn pen Inject 3 Units into the skin 3 (three) times daily.  0    insulin detemir U-100, Levemir, 100 unit/mL (3 mL) SubQ InPn pen Inject 7 Units into the skin 2 (two) times daily. (Patient not taking: Reported on 5/2/2024)  0    levothyroxine (SYNTHROID) 112 MCG tablet Take 1 tablet (112 mcg total) by mouth before breakfast. 90 tablet 3    lisinopriL (PRINIVIL,ZESTRIL) 40 MG tablet Take 1 tablet (40 mg total) by mouth once daily. 90 tablet 3    amitriptyline (ELAVIL) 10 MG tablet Take 1 tablet (10 mg total) by mouth every evening. 30 tablet 11     No current facility-administered medications on file prior to visit.       Antibiotics:   Antibiotics (From admission, onward)      None            HIV: No components found for: "HIV 1/2 AG/AB"  Hepatitis C IgG: No components found for: "HEPATITIS C"  Syphilis:   RPR   Date Value Ref Range Status   07/02/2018 Non-reactive Non-reactive Final       Hepatitis A IgG: No components found for: "HEPATITIS A " "IGG"  Hepatitis Bc IgG: No components found for: "HEPATITIS B CORE IGG"  Hepatitis Bs IgG:  Quantiferon: No results found for: "QUANTIFERON"  VZV IgG: No components found for: "VARICELLA IGG"    No components found for: "SEDIMENTATION RATE"  No components found for: "C-REACTIVE PROTEIN"      Microbiology x 7d:   Microbiology Results (last 7 days)       ** No results found for the last 168 hours. **            Immunization History   Administered Date(s) Administered    COVID-19, MRNA, LN-S, PF (Pfizer) ([employer]) 03/09/2022    COVID-19, mRNA, LNP-S, bivalent booster, PF (PFIZER OMICRON) 10/12/2022    COVID-19, vector-nr, rS-Ad26, PF ([employer]) 03/05/2021    Influenza (FLUAD) - Quadrivalent - Adjuvanted - PF *Preferred* (65+) 12/10/2021, 10/10/2022, 11/09/2023    Influenza - High Dose - PF (65 years and older) 12/02/2015, 09/15/2017, 11/26/2018    Pneumococcal Conjugate - 13 Valent 11/26/2018    Pneumococcal Polysaccharide - 23 Valent 01/18/2023         Reviewed records today as well as relevant labs, cultures, and imaging    Assessment:     Iv abx needed as an outpatient  Home health active care coordination  E.faecalis bacteremia    No toxicities from antimicrobials  Extremely medically complex    Plan:     MRI back (earliest could be scheduled 5/13 per MA). Continue ampicillin until result done. Stop ceftriaxone    If MRI normal, plan on stopping iv abx and pulling picc, but would start amoxicillin and continue until ureteral stent removed/exchanged    Have asked PCP for assistance getting DME equipment for patient as she request      - Will monitor drug therapy for toxicity      - The following labs were ordered:    Orders Placed This Encounter   Procedures    MRI Thoracic Spine W WO Contrast     Standing Status:   Future     Standing Expiration Date:   4/18/2025     Order Specific Question:   Does the patient have or ever had a pacemaker or a defibrillator (Note: Some facilities may not be able to schedule an MRI " for patients with pacemakers and defibrillators. You should contact your local radiology dept to determine if this is the case.)?     Answer:   No     Order Specific Question:   Does the patient have an aneurysm or surgical clip, pump, nerve/brain stimulator, middle/inner ear prosthesis, or other metal implant or foreign object (bullet, shrapnel)? If they have a card related to their implant, ask them to bring it. Issues related to the implant may cause the MRI to be delayed.     Answer:   No     Order Specific Question:   Will the patient require sedation?     Answer:   No     Order Specific Question:   May the Radiologist modify the order per protocol to meet the clinical needs of the patient?     Answer:   Yes     Order Specific Question:   Recist criteria?     Answer:   Yes     Order Specific Question:   Does the patient have on a skin patch for medication with aluminized backing?     Answer:   No    MRI Lumbar Spine W WO Contrast     Standing Status:   Future     Standing Expiration Date:   4/18/2025     Order Specific Question:   Does the patient have or ever had a pacemaker or a defibrillator (Note: Some facilities may not be able to schedule an MRI for patients with pacemakers and defibrillators. You should contact your local radiology dept to determine if this is the case.)?     Answer:   No     Order Specific Question:   Does the patient have an aneurysm or surgical clip, pump, nerve/brain stimulator, middle/inner ear prosthesis, or other metal implant or foreign object (bullet, shrapnel)? If they have a card related to their implant, ask them to bring it. Issues related to the implant may cause the MRI to be delayed.     Answer:   No     Order Specific Question:   Will the patient require sedation?     Answer:   No     Order Specific Question:   May the Radiologist modify the order per protocol to meet the clinical needs of the patient?     Answer:   Yes     Order Specific Question:   Recist criteria?      Answer:   Yes     Order Specific Question:   Does the patient have on a skin patch for medication with aluminized backing?     Answer:   No       I have sent communication to the referring physician and/or primary care provider.     The total time for evaluation and management services performed on 4/18/24 was greater than 30 minutes.  This includes face to face time and non-face to face time preparing to see the patient (eg, review of tests), obtaining and/or reviewing separately obtained history, documenting clinical information in the electronic or other health record, independently interpreting results, and communicating results to the patient/family/caregiver, or care coordination.             Samina Velasquez MD, MPH  Infectious Disease

## 2024-05-09 RX ORDER — METFORMIN HYDROCHLORIDE 1000 MG/1
TABLET ORAL
Qty: 180 TABLET | Refills: 0 | OUTPATIENT
Start: 2024-05-09

## 2024-05-09 RX ORDER — ISOPROPYL ALCOHOL 70 ML/100ML
SWAB TOPICAL
Refills: 0 | OUTPATIENT
Start: 2024-05-09

## 2024-05-09 RX ORDER — LANCETS
EACH MISCELLANEOUS
Qty: 100 EACH | Refills: 0 | OUTPATIENT
Start: 2024-05-09

## 2024-05-09 NOTE — TELEPHONE ENCOUNTER
No care due was identified.  [facility] Embedded Care Due Messages. Reference number: [id number].   5/09/2024 12:22:17 PM CDT

## 2024-05-09 NOTE — TELEPHONE ENCOUNTER
Refill Decision Note   Aminah Norton  is requesting a refill authorization.  Brief Assessment and Rationale for Refill:  Quick Discontinue     Medication Therapy Plan:    Pharmacy is requesting new scripts for the following medications without required information, (sig/ frequency/qty/etc)      Medication Reconciliation Completed: No     Comments: Pharmacies have been requesting medications for patients without required information, (sig, frequency, qty, etc.). In addition, requests are sent for medication(s) pt. are currently not taking, and medications patients have never taken.    We have spoken to the pharmacies about these request types and advised their teams previously that we are unable to assess these New Script requests and require all details for these requests. This is a known issue and has been reported.     Note composed:1:19 PM 05/09/2024

## 2024-05-13 ENCOUNTER — HOSPITAL ENCOUNTER (OUTPATIENT)
Dept: RADIOLOGY | Facility: HOSPITAL | Age: 79
Discharge: HOME | End: 2024-05-13
Attending: INTERNAL MEDICINE
Payer: MEDICARE

## 2024-05-13 DIAGNOSIS — N20.1 RIGHT URETERAL STONE: ICD-10-CM

## 2024-05-13 DIAGNOSIS — B95.2 ENTEROCOCCAL BACTEREMIA: ICD-10-CM

## 2024-05-13 DIAGNOSIS — R78.81 ENTEROCOCCAL BACTEREMIA: ICD-10-CM

## 2024-05-13 PROCEDURE — 25500020 PHARM REV CODE 255: Mod: HCNC | Performed by: INTERNAL MEDICINE

## 2024-05-13 PROCEDURE — 72158 MRI LUMBAR SPINE W/O & W/DYE: CPT | Mod: 26,HCNC,, | Performed by: RADIOLOGY

## 2024-05-13 PROCEDURE — 72158 MRI LUMBAR SPINE W/O & W/DYE: CPT | Mod: TC,HCNC

## 2024-05-13 PROCEDURE — A9585 GADOBUTROL INJECTION: HCPCS | Mod: HCNC | Performed by: INTERNAL MEDICINE

## 2024-05-13 PROCEDURE — 72157 MRI CHEST SPINE W/O & W/DYE: CPT | Mod: 26,HCNC,, | Performed by: RADIOLOGY

## 2024-05-13 PROCEDURE — 72157 MRI CHEST SPINE W/O & W/DYE: CPT | Mod: TC,HCNC

## 2024-05-13 RX ORDER — GADOBUTROL 604.72 MG/ML
7 INJECTION INTRAVENOUS
Status: COMPLETED | OUTPATIENT
Start: 2024-05-13 | End: 2024-05-13

## 2024-05-13 RX ADMIN — GADOBUTROL 7 ML: 604.72 INJECTION INTRAVENOUS at 07:05

## 2024-05-15 ENCOUNTER — TELEPHONE (OUTPATIENT)
Dept: INFECTIOUS DISEASES | Facility: CLINIC | Age: 79
End: 2024-05-15
Payer: MEDICARE

## 2024-05-15 NOTE — TELEPHONE ENCOUNTER
Home health is asking if pt is finished with IV therapy and if line can be removed. Nurse stated pt told her that she was finished.

## 2024-05-15 NOTE — TELEPHONE ENCOUNTER
----- Message from Ladonna Middleton MA sent at 5/15/2024 10:01 AM CDT -----  Regarding: FW: Home Health  Contact: [facility] [phone]    ----- Message -----  From: Nick Knight  Sent: 5/15/2024   9:58 AM CDT  To: Eva [facility]as Staff  Subject: Home Health                                      [facility] patient's Home Health Nurse calling requesting a callback from nurse or provider in regards to pick line and medication. Please call back as soon as possible.

## 2024-05-20 ENCOUNTER — TELEPHONE (OUTPATIENT)
Dept: UROLOGY | Facility: CLINIC | Age: 79
End: 2024-05-20
Payer: MEDICARE

## 2024-05-20 DIAGNOSIS — Z46.6 ENCOUNTER FOR REMOVAL OF URETERAL STENT: Primary | ICD-10-CM

## 2024-05-23 NOTE — ADDENDUM NOTE
Addendum  created 05/23/24 1318 by Jerzy Beard MD    Attestation recorded in Intraprocedure, Intraprocedure Attestations filed, Intraprocedure Event edited       verbal cues/nonverbal cues (demo/gestures)/1 person assist

## 2024-05-23 NOTE — PRE-PROCEDURE INSTRUCTIONS
Unable to reach pt via phone. Utilizing  #[phone], left message with instructions along with a request for a call back. In the message, RN also stated that pt will need to be accompanied by a responsible adult on day of surgery. No portal available.    Dear Aminah ,    You are scheduled for a procedure with Dr. Smith on 5/24/2024. Your scheduled arrival time is 6:20am.  This arrival time is roughly 2 hours before your anticipated procedure time to allow sufficient time for pre-op.  Please wear comfortable clothes.  This procedure will take place at the [facility] at the corner of [facility] and [facility].  It is [facility] next to [facility].  The address is:    [address] [place] [address].  [place] [id number]    After entering the building, you will proceed to the second floor where you can check in with registration. You should take any medications that you routinely take for blood pressure (other than those listed below), heart medications, thyroid, cholesterol, etc.     If you wear contact lenses, please wear glasses to your procedure.    Your fasting instructions are as follow:  Nothing to eat after midnight the evening before your surgery. You may drink clear liquids up until 2 hours prior to your arrival time. You MUST have a responsible adult to bring you home.      The evening before and morning of your procedure, please hold the following medications:  -Aspirin and Aspirin-containing products (Goody's powder, Excedrin)  -NSAIDs (Advil, Ibuprofen, Aleve, Diclofenac)  -Vitamins/Supplements  -Herbal remedies/Teas  -Stimulants (Adderall, Vyvanse, Adipex)  -Diabetic medication (Please bring with you day of procedure)  -Prescription creams/gels/lotions  -AMLODIPINE-BENAZEPRIL  -NOVOLOG  -LANTUS - TAKE 8 UNITS TONIGHT OR TAKE 5 UNITS IN THE MORNING, DEPENDING ON YOUR SCHEDULE    -May take Tylenol      The evening before and morning of your procedure, take  a shower using antibacterial soap (ex: Hibiclens or Dial antibacterial soap). DO NOT apply deodorant, lotion, cologne, or anything else to the skin. Wear loose, comfortable fitting clothing. Do not wear jewelry or bring any valuables with you. If you wear dentures or contacts, please bring your case with you or leave them at home. Use and bring any inhalers that you may have.    If you have any procedure-specific questions, please call your surgeon's office. Any other questions, don't hesitate to call at [phone].    Thanks,  SUKI Montez  Pre-Admit Testing  Anesthesia Dept [facility]

## 2024-05-24 ENCOUNTER — HOSPITAL ENCOUNTER (OUTPATIENT)
Facility: HOSPITAL | Age: 79
Discharge: HOME | End: 2024-05-24
Attending: UROLOGY | Admitting: UROLOGY
Payer: MEDICARE

## 2024-05-24 ENCOUNTER — ANESTHESIA EVENT (OUTPATIENT)
Dept: SURGERY | Facility: HOSPITAL | Age: 79
End: 2024-05-24
Payer: MEDICARE

## 2024-05-24 ENCOUNTER — HOSPITAL ENCOUNTER (OUTPATIENT)
Dept: RADIOLOGY | Facility: HOSPITAL | Age: 79
Discharge: HOME | End: 2024-05-24
Attending: UROLOGY | Admitting: UROLOGY
Payer: MEDICARE

## 2024-05-24 ENCOUNTER — ANESTHESIA (OUTPATIENT)
Dept: SURGERY | Facility: HOSPITAL | Age: 79
End: 2024-05-24
Payer: MEDICARE

## 2024-05-24 VITALS
SYSTOLIC BLOOD PRESSURE: 136 MMHG | DIASTOLIC BLOOD PRESSURE: 63 MMHG | BODY MASS INDEX: 24.22 KG/M2 | TEMPERATURE: 97 F | HEIGHT: 65 IN | OXYGEN SATURATION: 99 % | RESPIRATION RATE: 16 BRPM | HEART RATE: 79 BPM | WEIGHT: 145.38 LBS

## 2024-05-24 DIAGNOSIS — Z96.0 URETERAL STENT PRESENT: Primary | ICD-10-CM

## 2024-05-24 DIAGNOSIS — Z46.6 ENCOUNTER FOR REMOVAL OF URETERAL STENT: ICD-10-CM

## 2024-05-24 LAB
POCT GLUCOSE: 287 MG/DL (ref 70–110)
POCT GLUCOSE: 334 MG/DL (ref 70–110)
POCT GLUCOSE: 340 MG/DL (ref 70–110)

## 2024-05-24 PROCEDURE — 25000003 PHARM REV CODE 250: Performed by: NURSE ANESTHETIST, CERTIFIED REGISTERED

## 2024-05-24 PROCEDURE — 63600175 PHARM REV CODE 636 W HCPCS: Performed by: NURSE ANESTHETIST, CERTIFIED REGISTERED

## 2024-05-24 PROCEDURE — 37000009 HC ANESTHESIA EA ADD 15 MINS: Performed by: UROLOGY

## 2024-05-24 PROCEDURE — 36000707: Performed by: UROLOGY

## 2024-05-24 PROCEDURE — 94761 N-INVAS EAR/PLS OXIMETRY MLT: CPT

## 2024-05-24 PROCEDURE — 52310 CYSTOSCOPY AND TREATMENT: CPT | Mod: HCNC,,, | Performed by: UROLOGY

## 2024-05-24 PROCEDURE — D9220A PRA ANESTHESIA: Mod: ,,, | Performed by: NURSE ANESTHETIST, CERTIFIED REGISTERED

## 2024-05-24 PROCEDURE — 82962 GLUCOSE BLOOD TEST: CPT | Performed by: UROLOGY

## 2024-05-24 PROCEDURE — C1758 CATHETER, URETERAL: HCPCS | Performed by: UROLOGY

## 2024-05-24 PROCEDURE — 99900035 HC TECH TIME PER 15 MIN (STAT)

## 2024-05-24 PROCEDURE — 76000 FLUOROSCOPY <1 HR PHYS/QHP: CPT | Mod: TC

## 2024-05-24 PROCEDURE — 36000706: Performed by: UROLOGY

## 2024-05-24 PROCEDURE — 71000015 HC POSTOP RECOV 1ST HR: Performed by: UROLOGY

## 2024-05-24 PROCEDURE — 87086 URINE CULTURE/COLONY COUNT: CPT | Mod: HCNC | Performed by: UROLOGY

## 2024-05-24 PROCEDURE — 37000008 HC ANESTHESIA 1ST 15 MINUTES: Performed by: UROLOGY

## 2024-05-24 PROCEDURE — C1769 GUIDE WIRE: HCPCS | Performed by: UROLOGY

## 2024-05-24 PROCEDURE — 63600175 PHARM REV CODE 636 W HCPCS: Performed by: UROLOGY

## 2024-05-24 PROCEDURE — 71000033 HC RECOVERY, INTIAL HOUR: Performed by: UROLOGY

## 2024-05-24 PROCEDURE — 25000003 PHARM REV CODE 250: Performed by: UROLOGY

## 2024-05-24 PROCEDURE — 63600175 PHARM REV CODE 636 W HCPCS: Performed by: STUDENT IN AN ORGANIZED HEALTH CARE EDUCATION/TRAINING PROGRAM

## 2024-05-24 RX ORDER — SODIUM CHLORIDE 9 MG/ML
INJECTION, SOLUTION INTRAVENOUS CONTINUOUS
Status: DISCONTINUED | OUTPATIENT
Start: 2024-05-24 | End: 2024-05-24 | Stop reason: HOSPADM

## 2024-05-24 RX ORDER — PHENYLEPHRINE HYDROCHLORIDE 10 MG/ML
INJECTION INTRAVENOUS
Status: DISCONTINUED | OUTPATIENT
Start: 2024-05-24 | End: 2024-05-24

## 2024-05-24 RX ORDER — PROPOFOL 10 MG/ML
VIAL (ML) INTRAVENOUS
Status: DISCONTINUED | OUTPATIENT
Start: 2024-05-24 | End: 2024-05-24

## 2024-05-24 RX ORDER — ONDANSETRON HYDROCHLORIDE 2 MG/ML
INJECTION, SOLUTION INTRAVENOUS
Status: DISCONTINUED | OUTPATIENT
Start: 2024-05-24 | End: 2024-05-24

## 2024-05-24 RX ORDER — LIDOCAINE HYDROCHLORIDE 20 MG/ML
INJECTION INTRAVENOUS
Status: DISCONTINUED | OUTPATIENT
Start: 2024-05-24 | End: 2024-05-24

## 2024-05-24 RX ORDER — HALOPERIDOL 5 MG/ML
INJECTION INTRAMUSCULAR
Status: DISCONTINUED | OUTPATIENT
Start: 2024-05-24 | End: 2024-05-24

## 2024-05-24 RX ORDER — LIDOCAINE HYDROCHLORIDE 10 MG/ML
1 INJECTION, SOLUTION EPIDURAL; INFILTRATION; INTRACAUDAL; PERINEURAL ONCE AS NEEDED
Status: DISCONTINUED | OUTPATIENT
Start: 2024-05-24 | End: 2024-05-24 | Stop reason: HOSPADM

## 2024-05-24 RX ORDER — SODIUM CHLORIDE 0.9 % (FLUSH) 0.9 %
10 SYRINGE (ML) INJECTION DAILY PRN
Status: DISCONTINUED | OUTPATIENT
Start: 2024-05-24 | End: 2024-05-24 | Stop reason: HOSPADM

## 2024-05-24 RX ORDER — PROCHLORPERAZINE EDISYLATE 5 MG/ML
5 INJECTION INTRAMUSCULAR; INTRAVENOUS EVERY 30 MIN PRN
Status: DISCONTINUED | OUTPATIENT
Start: 2024-05-24 | End: 2024-05-24 | Stop reason: HOSPADM

## 2024-05-24 RX ORDER — AMPICILLIN 10 G/100ML
10 INJECTION, POWDER, FOR SOLUTION INTRAVENOUS DAILY
COMMUNITY
Start: 2024-05-10 | End: 2024-06-03

## 2024-05-24 RX ORDER — OXYCODONE HYDROCHLORIDE 5 MG/1
5 TABLET ORAL
Status: DISCONTINUED | OUTPATIENT
Start: 2024-05-24 | End: 2024-05-24 | Stop reason: HOSPADM

## 2024-05-24 RX ADMIN — PHENYLEPHRINE HYDROCHLORIDE 150 MCG: 10 INJECTION INTRAVENOUS at 08:05

## 2024-05-24 RX ADMIN — VANCOMYCIN HYDROCHLORIDE 1000 MG: 1 INJECTION, POWDER, LYOPHILIZED, FOR SOLUTION INTRAVENOUS at 08:05

## 2024-05-24 RX ADMIN — HALOPERIDOL LACTATE 0.5 MG: 5 INJECTION, SOLUTION INTRAMUSCULAR at 08:05

## 2024-05-24 RX ADMIN — SODIUM CHLORIDE: 9 INJECTION, SOLUTION INTRAVENOUS at 08:05

## 2024-05-24 RX ADMIN — PHENYLEPHRINE HYDROCHLORIDE 100 MCG: 10 INJECTION INTRAVENOUS at 08:05

## 2024-05-24 RX ADMIN — ONDANSETRON 4 MG: 2 INJECTION INTRAMUSCULAR; INTRAVENOUS at 08:05

## 2024-05-24 RX ADMIN — INSULIN HUMAN 5 UNITS: 100 INJECTION, SOLUTION PARENTERAL at 08:05

## 2024-05-24 RX ADMIN — GLYCOPYRROLATE 0.2 MCG: 0.2 INJECTION, SOLUTION INTRAMUSCULAR; INTRAVENOUS at 08:05

## 2024-05-24 RX ADMIN — LIDOCAINE HYDROCHLORIDE 100 MG: 20 INJECTION INTRAVENOUS at 08:05

## 2024-05-24 RX ADMIN — SODIUM CHLORIDE: 0.9 INJECTION, SOLUTION INTRAVENOUS at 08:05

## 2024-05-24 RX ADMIN — PROPOFOL 150 MG: 10 INJECTION, EMULSION INTRAVENOUS at 08:05

## 2024-05-24 NOTE — PROGRESS NOTES
Spoke with anesthesia MD JUAN Rosario regarding patient high post op glucose of 287. No additional orders placed. OK from anesthesia stand point

## 2024-05-24 NOTE — PROGRESS NOTES
Pt's BG in pre-op was 334. Dr. Rosario notified and ordered 5U of regular insulin. Awaiting to obtain IV access then will administer insulin as ordered.

## 2024-05-24 NOTE — ANESTHESIA PREPROCEDURE EVALUATION
05/24/2024  [facility]  Anesthesia Pre-Operative Evaluation         Patient Name: Aminah Norton  YOB: 1945  MRN: [id number]    SUBJECTIVE:     Pre-operative evaluation for Procedure(s) (LRB):  RIGHT REMOVAL-STENT (Right)     05/24/2024    Aminah Norton is a 79 y.o. female   Patient now presents for the above procedure(s).    TTE 3/2024:     CARLITO for assessment of endocarditis. No vegetations visualized.    Left Atrium: The left atrial appendage appears normal. The left atrial appendage has a windsock morphology. Appendage velocity is normal at greater than 40 cm/sec. There is no thrombus in the cavity.    Left Ventricle: There is normal systolic function with a visually estimated ejection fraction of 60 - 65%.    Right Ventricle: Normal right ventricular cavity size. Wall thickness is normal. Right ventricle wall motion  is normal. Systolic function is normal.    Right Atrium: Normal right atrial size. There is a prominent Chiari network.    Aortic Valve: The aortic valve is a trileaflet valve. There is mild aortic valve sclerosis. There is mild annular calcification present. There is no mass/vegetation present.    Mitral Valve: There is mild bileaflet sclerosis. There is moderate mitral annular calcification present. There is no mass/vegetation present. There is no stenosis.    Tricuspid Valve: There is no mass/vegetation present.    Aorta: Aortic root is normal in size measuring 2.9 cm. Ascending aorta is normal measuring 2.7 cm. Grade 4 moderate protruding calcified atherosclerosis of the descending aorta.    IVC/SVC: IVC was not assessed.    Patient Active Problem List   Diagnosis    Myalgia    Plantar fasciitis of left foot    Hypothyroidism    Elevated cholesterol with high triglycerides    HTN (hypertension), benign    Vitamin B 12  deficiency    GERD (gastroesophageal reflux disease)    Neuropathy    Type 2 diabetes mellitus with neurologic complication, without long-term current use of insulin    Diabetic polyneuropathy    Cataracts, bilateral    Seborrheic dermatitis    Back pain    Facet syndrome    Bilateral low back pain without sciatica    Spondylisthesis    Vitamin D insufficiency    Lower urinary tract symptoms (LUTS)    Hematuria    Bilateral leg pain    Aortic atherosclerosis    Bilateral shoulder pain    TB lung, latent    Hyperlipidemia    Neck pain    Upper extremity weakness    Posture abnormality    Bladder pain    Lower extremity weakness    Right ureteral stone    Interstitial cystitis    Pyelonephritis    Sepsis    ANAIS (acute kidney injury)    Enterococcal bacteremia    Anemia    Ureteral stent present    Positive QuantiFERON-TB Gold test       Review of patient's allergies indicates:   Allergen Reactions    Bufferin [aspirin, buffered] Itching    Atorvastatin      Myalgias and arthralgias    Shellfish containing products Itching     Shellfish causes her to itch per her daughter, Caro.     Warfarin     Ibuprofen Itching     Itching of eyes, head       Current Inpatient Medications:      No current facility-administered medications on file prior to encounter.     Current Outpatient Medications on File Prior to Encounter   Medication Sig Dispense Refill    amitriptyline (ELAVIL) 10 MG tablet Take 1 tablet (10 mg total) by mouth every evening. 30 tablet 11    amLODIPine-benazepriL (LOTREL) 5-40 mg per capsule Take 1 capsule by mouth once daily. 90 capsule 3    amoxicillin (AMOXIL) 500 MG capsule Take 1 capsule (500 mg total) by mouth 3 (three) times daily. 90 capsule 1    carvediloL (COREG) 6.25 MG tablet Take 1 tablet (6.25 mg total) by mouth 2 (two) times daily. 60 tablet 11    gabapentin (NEURONTIN) 300 MG capsule Take 1 capsule (300 mg total) by mouth every evening. 90 capsule 3    insulin aspart U-100 (NOVOLOG) 100 unit/mL  (3 mL) InPn pen Inject 3 Units into the skin 3 (three) times daily.  0    insulin glargine U-100, Lantus, (LANTUS SOLOSTAR U-100 INSULIN) 100 unit/mL (3 mL) InPn pen Inject 10 Units into the skin once daily. 3 mL 0    levothyroxine (SYNTHROID) 112 MCG tablet Take 1 tablet (112 mcg total) by mouth before breakfast. 90 tablet 3    lisinopriL (PRINIVIL,ZESTRIL) 40 MG tablet Take 1 tablet (40 mg total) by mouth once daily. 90 tablet 3    insulin detemir U-100, Levemir, 100 unit/mL (3 mL) SubQ InPn pen Inject 7 Units into the skin 2 (two) times daily. (Patient not taking: Reported on 5/2/2024)  0       Past Surgical History:   Procedure Laterality Date    APPENDECTOMY      BLADDER FULGURATION N/A 3/1/2024    Procedure: ULCER INJECTION FULGURATION;  Surgeon: Brody Smith MD;  Location: [facility];  Service: Urology;  Laterality: N/A;    BLADDER SUSPENSION      CATARACT EXTRACTION      right eye     CHOLECYSTECTOMY      COLONOSCOPY N/A 7/20/2020    Procedure: COLONOSCOPY;  Surgeon: Juan Parker MD;  Location: [facility] (4TH FLR);  Service: Endoscopy;  Laterality: N/A;  Hx of chronic anemia.  patient needs a   4/6/20 - removed from 4/20/20, rescheduled 7/20/20 - pg  covid 7/17-[facility] 2nd floor-tb    COLONOSCOPY N/A 2/9/2023    Procedure: COLONOSCOPY;  Surgeon: Renan Sanchez MD;  Location: [facility] ENDO (4TH FLR);  Service: Colon and Rectal;  Laterality: N/A;  instr handed to patient, -ml    CYSTOSCOPY N/A 5/19/2021    Procedure: CYSTOSCOPY;  Surgeon: Brody Smith MD;  Location: [facility] OR 1ST FLR;  Service: Urology;  Laterality: N/A;    CYSTOURETEROSCOPY, WITH HOLMIUM LASER LITHOTRIPSY OF URETERAL CALCULUS AND STENT INSERTION Right 3/1/2024    Procedure: CYSTOURETEROSCOPY, WITH HOLMIUM LASER LITHOTRIPSY OF URETERAL CALCULUS AND STENT INSERTION;  Surgeon: Brody Smith MD;  Location: [facility];  Service: Urology;  Laterality: Right;    CYSTOURETEROSCOPY,WITH HOLMIUM LASER  LITHOTRIPSY OF URETERAL CALCULUS Right 3/14/2024    Procedure: CYSTOURETEROSCOPY,WITH HOLMIUM LASER LITHOTRIPSY OF URETERAL CALCULUS;  Surgeon: Brody Smith MD;  Location: [facility] OR;  Service: Urology;  Laterality: Right;  1 HR    ECHOCARDIOGRAM,TRANSESOPHAGEAL N/A 3/20/2024    Procedure: Transesophageal echo (CARLITO) intra-procedure log documentation;  Surgeon: Provider, [facility];  [facility] EP LAB;  Service: Cardiology;  Laterality: N/A;    EYE SURGERY Bilateral     cataract removal    INJECTION OF STEROID N/A 3/14/2024    Procedure: INJECTION, STEROID;  Surgeon: Brody Smith MD;  Location: [facility] OR;  Service: Urology;  Laterality: N/A;    PLACEMENT-STENT Right 3/14/2024    Procedure: PLACEMENT-STENT;  Surgeon: Brody Smith MD;  Location: [facility] OR;  Service: Urology;  Laterality: Right;    REMOVAL-STENT Right 3/14/2024    Procedure: REMOVAL-STENT;  Surgeon: Brody Smith MD;  Location: [facility] OR;  Service: Urology;  Laterality: Right;    TOTAL ABDOMINAL HYSTERECTOMY      DUB       OBJECTIVE:     Vital Signs Range (Last 24H):         Significant Labs:  Lab Results   Component Value Date    WBC 4.33 03/28/2024    HGB 9.2 (L) 03/28/2024    HCT 29.4 (L) 03/28/2024     03/28/2024    CHOL 225 (H) 11/09/2023    TRIG 218 (H) 11/09/2023    HDL 59 11/09/2023    ALT 15 03/28/2024    AST 9 (L) 03/28/2024     03/28/2024    K 4.7 03/28/2024     03/28/2024    CREATININE 1.0 03/28/2024    BUN 9 03/28/2024    CO2 28 03/28/2024    TSH 3.405 11/09/2023    HGBA1C 7.6 (H) 03/21/2024       Diagnostic Studies: No relevant studies.    EKG:   Results for orders placed or performed during the hospital encounter of 03/16/24   EKG 12-lead    Collection Time: 03/16/24  2:48 PM   Result Value Ref Range    QRS Duration 82 ms    OHS QTC Calculation 427 ms    Narrative    Test Reason : R53.1,    Vent. Rate : 111 BPM     Atrial Rate : 111 BPM     P-R Int : 152 ms          QRS Dur : 082 ms       QT Int : 314 ms       P-R-T Axes : 060 053 127 degrees     QTc Int : 427 ms    Sinus tachycardia  Nonspecific ST and T wave abnormality  Abnormal ECG  When compared with ECG of 26-APR-2023 14:56,  Vent. rate has increased BY  39 BPM  T wave inversion now evident in Inferior leads  Confirmed by Cal Restrepo MD ([id number]) on 3/17/2024 10:45:49 AM    Referred By: [email or web address]   SELF           Confirmed By:Cal Restrepo MD       ASSESSMENT/PLAN:           Pre-op Assessment    I have reviewed the Patient Summary Reports.     I have reviewed the Nursing Notes. I have reviewed the NPO Status.   I have reviewed the Medications.     Review of Systems  Anesthesia Hx:  No problems with previous Anesthesia             Denies Family Hx of Anesthesia complications.    Denies Personal Hx of Anesthesia complications.                    Hematology/Oncology:       -- Anemia:                                  EENT/Dental:  EENT/Dental Normal           Cardiovascular:     Hypertension  Denies Valvular problems/Murmurs.  Denies MI.  Denies CAD.    Denies CABG/stent.                                     Pulmonary:    Denies COPD.  Denies Asthma.                    Renal/:  Chronic Renal Disease                Hepatic/GI:     GERD             Neurological:    Neuromuscular Disease, (Diabetic neuropathy)                                   Endocrine:  Diabetes, poorly controlled Hypothyroidism              Physical Exam  General: Well nourished, Cooperative, Oriented and Alert    Airway:  Mallampati: II   Mouth Opening: Normal  TM Distance: Normal    Dental:  Dentures        Anesthesia Plan  Type of Anesthesia, risks & benefits discussed:    Anesthesia Type: Gen Supraglottic Airway  Intra-op Monitoring Plan: Standard ASA Monitors  Post Op Pain Control Plan: multimodal analgesia  Induction:  IV  Informed Consent: Informed consent signed with the Patient and all parties understand the risks and agree with anesthesia plan.  All questions  answered.   ASA Score: 3  Day of Surgery Review of History & Physical: H&P Update referred to the surgeon/provider.  Anesthesia Plan Notes: Blood glucose is 340 this morning. She still has not had her Detemir approved. Will give 5u of regular insulin and re-check.     Ready For Surgery From Anesthesia Perspective.     .

## 2024-05-24 NOTE — INTERVAL H&P NOTE
The patient has been examined and the H&P has been reviewed:    Despite the patient's report, a KUB shows the stent in place; no apparent calcifications to suggest major encrustation. However, given retained nature of the stent, plan to remove in OR in case ureteroscopy, stone removal from stent is needed.     Surgery risks, benefits and alternative options discussed and understood by patient/family.          There are no hospital problems to display for this patient.

## 2024-05-24 NOTE — PLAN OF CARE
Chart reviewed. Preop nursing care completed per orders. Safe surgery checklist complete. Pt denies any open wounds cuts or sores. Pt denies any metal in body or use of weight loss injections. Pt AAOX3, VSS on room air. Refer to previous notes regarding pt's BG. Pt toileted, Bed locked in lowest position, Call light within reach. Pt denies any needs at this time. Belongings placed in locker #8.

## 2024-05-24 NOTE — PLAN OF CARE
Pt has not arrived at facility. Call cell phone and left a message. Also called home number and no answer.

## 2024-05-24 NOTE — ANESTHESIA PROCEDURE NOTES
Intubation    Date/Time: 5/24/2024 8:44 AM    Performed by: Araceli Quiroz CRNA  Authorized by: Gavin Rosario MD    Intubation:     Induction:  Intravenous    Intubated:  Postinduction    Mask Ventilation:  Easy mask    Attempts:  1    Attempted By:  CRNA    Difficult Airway Encountered?: No      Complications:  None    Airway Device:  Supraglottic airway/LMA    Airway Device Size:  4.0    Style/Cuff Inflation:  Uncuffed    Secured at:  The lips    Placement Verified By:  Capnometry    Findings Post-Intubation:  BS equal bilateral and atraumatic/condition of teeth unchanged

## 2024-05-24 NOTE — ANESTHESIA POSTPROCEDURE EVALUATION
Anesthesia Post Evaluation    Patient: Aminah Norton    Procedure(s) Performed: Procedure(s) (LRB):  RIGHT REMOVAL-STENT (Right)    Final Anesthesia Type: general      Patient location during evaluation: PACU  Patient participation: Yes- Able to Participate  Level of consciousness: awake and alert, oriented and awake  Post-procedure vital signs: reviewed and stable  Pain management: adequate  Airway patency: patent  MATTHEW mitigation strategies: Multimodal analgesia  PONV status at discharge: No PONV  Anesthetic complications: no      Cardiovascular status: blood pressure returned to baseline and hemodynamically stable  Respiratory status: unassisted and spontaneous ventilation  Hydration status: euvolemic  Follow-up not needed.              Vitals Value Taken Time   /63 05/24/24 1017   Temp  05/24/24 1031   Pulse 78 05/24/24 1024   Resp 19 05/24/24 1024   SpO2 99 % 05/24/24 1024   Vitals shown include unfiled device data.      Event Time   Out of Recovery 09:35:00         Pain/Lorenza Score: Lorenza Score: 10 (5/24/2024  9:35 AM)

## 2024-05-24 NOTE — DISCHARGE SUMMARY
[facility]  Discharge Note  Short Stay    Procedure(s) (LRB):  RIGHT REMOVAL-STENT (Right)      OUTCOME: Patient tolerated treatment/procedure well without complication and is now ready for discharge.    DISPOSITION: Home or Self Care    FINAL DIAGNOSIS:  Retained Ureteral stent    FOLLOWUP: In clinic    DISCHARGE INSTRUCTIONS:  No discharge procedures on file.     TIME SPENT ON DISCHARGE: 15 minutes

## 2024-05-24 NOTE — OP NOTE
Cystoscopy Operative Note  5/24/2024    Preoperative Diagnosis:   Retained Ureteral Stent    Postoperative Diagnosis:  Retained Ureteral Stent    Procedure:  Cystoscopy with removal of retained ureteral stent    Attending Surgeon: Brody Smith MD    Anesthesia: Laryngeal Mask Airway    EBL: <5 mL    Complications: None    Findings:  Removal of entire ureteral stent    Drains: None    Reason for procedure: Aminah Norton is a very pleasant 79 y.o. female who presented with a history of retained ureteral stent. Scheduled for removal, possible lithotripsy of any encrustation.     Procedure in Detail:  Informed consent was obtained by explaining all risks and benefits of the procedure to the patient in detail.  After informed consent, the patient was brought to the suite where Laryngeal Mask Airway anesthesiawas administered prior to initiation of the invasive procedure. Appropriate perioperative antibiotics were given within 30 minutes of beginning the procedure. A formal timeout was performed prior to the procedure. The patient was gently placed in lithotomy position with all pressure points padded. Bilateral sequential compression devices were applied and activated. The patient was prepped and draped in standard fashion.    A 19-Cameroonian rigid cystoscope was passed per urethra into the bladder. Inspection of the bladder demonstrated a retained ureteral stent from the right ureter. There were 1-2 areas of inflammation which may have been irritation from the stent.     The stent was grasped and removed under fluoroscopic guidance. No stone fragments were seen on the fluoroscopy and no stone fragments were seen on or emanating from the stent.     She tolerated the procedure well and was extubated and transferred in stable condition to the recovery room.     We will plan to see her back in 4-6 weeks for continued evaluation. If bladder pain continues, would consider injection/fulguration.

## 2024-05-24 NOTE — PROGRESS NOTES
Insulin administered as ordered. CRNA and OR RN at bedside to take pt back to procedure. Report handed off to both. BG to be reassessed while in OR.

## 2024-05-24 NOTE — TRANSFER OF CARE
"Anesthesia Transfer of Care Note    Patient: Aminah Alicea-Shelly    Procedure(s) Performed: Procedure(s) (LRB):  RIGHT REMOVAL-STENT (Right)    Patient location: PACU    Anesthesia Type: general    Transport from OR: Transported from OR on 6-10 L/min O2 by face mask with adequate spontaneous ventilation    Post pain: adequate analgesia    Post assessment: no apparent anesthetic complications    Post vital signs: stable    Level of consciousness: sedated    Nausea/Vomiting: no nausea/vomiting    Complications: none    Transfer of care protocol was followed      Last vitals: Visit Vitals  BP (!) 165/73 (BP Location: Right arm, Patient Position: Lying)   Pulse 71   Temp 37.4 °C (99.3 °F) (Temporal)   Resp 17   Ht 5' 5" (1.651 m)   Wt 65.9 kg (145 lb 6.3 oz)   SpO2 98%   Breastfeeding No   BMI 24.19 kg/m²     "

## 2024-05-25 LAB — BACTERIA UR CULT: NO GROWTH

## 2024-06-01 ENCOUNTER — LAB VISIT (OUTPATIENT)
Dept: LAB | Facility: HOSPITAL | Age: 79
End: 2024-06-01
Attending: INTERNAL MEDICINE
Payer: MEDICARE

## 2024-06-01 DIAGNOSIS — B95.2 ENTEROCOCCAL BACTEREMIA: ICD-10-CM

## 2024-06-01 DIAGNOSIS — I10 HTN (HYPERTENSION), BENIGN: ICD-10-CM

## 2024-06-01 DIAGNOSIS — R78.81 ENTEROCOCCAL BACTEREMIA: ICD-10-CM

## 2024-06-01 DIAGNOSIS — E11.40 TYPE 2 DIABETES MELLITUS WITH DIABETIC NEUROPATHY, WITHOUT LONG-TERM CURRENT USE OF INSULIN: ICD-10-CM

## 2024-06-01 LAB
ALBUMIN SERPL BCP-MCNC: 3.8 G/DL (ref 3.5–5.2)
ALP SERPL-CCNC: 144 U/L (ref 55–135)
ALT SERPL W/O P-5'-P-CCNC: 12 U/L (ref 10–44)
ANION GAP SERPL CALC-SCNC: 8 MMOL/L (ref 8–16)
AST SERPL-CCNC: 11 U/L (ref 10–40)
BASOPHILS # BLD AUTO: 0.02 K/UL (ref 0–0.2)
BASOPHILS NFR BLD: 0.6 % (ref 0–1.9)
BILIRUB SERPL-MCNC: 0.4 MG/DL (ref 0.1–1)
BUN SERPL-MCNC: 16 MG/DL (ref 8–23)
CALCIUM SERPL-MCNC: 10.1 MG/DL (ref 8.7–10.5)
CHLORIDE SERPL-SCNC: 96 MMOL/L (ref 95–110)
CHOLEST SERPL-MCNC: 245 MG/DL (ref 120–199)
CHOLEST/HDLC SERPL: 4.8 {RATIO} (ref 2–5)
CO2 SERPL-SCNC: 27 MMOL/L (ref 23–29)
CREAT SERPL-MCNC: 1 MG/DL (ref 0.5–1.4)
DIFFERENTIAL METHOD BLD: ABNORMAL
EOSINOPHIL # BLD AUTO: 0.1 K/UL (ref 0–0.5)
EOSINOPHIL NFR BLD: 1.7 % (ref 0–8)
ERYTHROCYTE [DISTWIDTH] IN BLOOD BY AUTOMATED COUNT: 16.6 % (ref 11.5–14.5)
EST. GFR  (NO RACE VARIABLE): 57.3 ML/MIN/1.73 M^2
ESTIMATED AVG GLUCOSE: 275 MG/DL (ref 68–131)
GLUCOSE SERPL-MCNC: 353 MG/DL (ref 70–110)
HBA1C MFR BLD: 11.2 % (ref 4–5.6)
HCT VFR BLD AUTO: 32.6 % (ref 37–48.5)
HDLC SERPL-MCNC: 51 MG/DL (ref 40–75)
HDLC SERPL: 20.8 % (ref 20–50)
HGB BLD-MCNC: 10.3 G/DL (ref 12–16)
IMM GRANULOCYTES # BLD AUTO: 0.01 K/UL (ref 0–0.04)
IMM GRANULOCYTES NFR BLD AUTO: 0.3 % (ref 0–0.5)
LDLC SERPL CALC-MCNC: 162 MG/DL (ref 63–159)
LYMPHOCYTES # BLD AUTO: 1.6 K/UL (ref 1–4.8)
LYMPHOCYTES NFR BLD: 45.2 % (ref 18–48)
MCH RBC QN AUTO: 22.4 PG (ref 27–31)
MCHC RBC AUTO-ENTMCNC: 31.6 G/DL (ref 32–36)
MCV RBC AUTO: 71 FL (ref 82–98)
MONOCYTES # BLD AUTO: 0.4 K/UL (ref 0.3–1)
MONOCYTES NFR BLD: 11.6 % (ref 4–15)
NEUTROPHILS # BLD AUTO: 1.4 K/UL (ref 1.8–7.7)
NEUTROPHILS NFR BLD: 40.6 % (ref 38–73)
NONHDLC SERPL-MCNC: 194 MG/DL
NRBC BLD-RTO: 0 /100 WBC
PLATELET # BLD AUTO: 257 K/UL (ref 150–450)
PMV BLD AUTO: 10 FL (ref 9.2–12.9)
POTASSIUM SERPL-SCNC: 4.8 MMOL/L (ref 3.5–5.1)
PROT SERPL-MCNC: 7.6 G/DL (ref 6–8.4)
RBC # BLD AUTO: 4.59 M/UL (ref 4–5.4)
SODIUM SERPL-SCNC: 131 MMOL/L (ref 136–145)
TRIGL SERPL-MCNC: 160 MG/DL (ref 30–150)
WBC # BLD AUTO: 3.52 K/UL (ref 3.9–12.7)

## 2024-06-01 PROCEDURE — 80061 LIPID PANEL: CPT | Mod: HCNC | Performed by: INTERNAL MEDICINE

## 2024-06-01 PROCEDURE — 36415 COLL VENOUS BLD VENIPUNCTURE: CPT | Mod: HCNC | Performed by: INTERNAL MEDICINE

## 2024-06-01 PROCEDURE — 83036 HEMOGLOBIN GLYCOSYLATED A1C: CPT | Mod: HCNC | Performed by: INTERNAL MEDICINE

## 2024-06-01 PROCEDURE — 85025 COMPLETE CBC W/AUTO DIFF WBC: CPT | Mod: HCNC | Performed by: INTERNAL MEDICINE

## 2024-06-01 PROCEDURE — 80053 COMPREHEN METABOLIC PANEL: CPT | Mod: HCNC | Performed by: INTERNAL MEDICINE

## 2024-06-03 ENCOUNTER — OFFICE VISIT (OUTPATIENT)
Dept: INFECTIOUS DISEASES | Facility: CLINIC | Age: 79
End: 2024-06-03
Payer: MEDICARE

## 2024-06-03 VITALS
DIASTOLIC BLOOD PRESSURE: 75 MMHG | TEMPERATURE: 99 F | SYSTOLIC BLOOD PRESSURE: 147 MMHG | HEART RATE: 80 BPM | BODY MASS INDEX: 24.98 KG/M2 | WEIGHT: 149.94 LBS | HEIGHT: 65 IN

## 2024-06-03 DIAGNOSIS — R78.81 ENTEROCOCCAL BACTEREMIA: Primary | ICD-10-CM

## 2024-06-03 DIAGNOSIS — Z51.81 THERAPEUTIC DRUG MONITORING: ICD-10-CM

## 2024-06-03 DIAGNOSIS — B95.2 ENTEROCOCCAL BACTEREMIA: Primary | ICD-10-CM

## 2024-06-03 PROCEDURE — 3078F DIAST BP <80 MM HG: CPT | Mod: HCNC,CPTII,S$GLB, | Performed by: INTERNAL MEDICINE

## 2024-06-03 PROCEDURE — 1126F AMNT PAIN NOTED NONE PRSNT: CPT | Mod: HCNC,CPTII,S$GLB, | Performed by: INTERNAL MEDICINE

## 2024-06-03 PROCEDURE — 99214 OFFICE O/P EST MOD 30 MIN: CPT | Mod: HCNC,S$GLB,, | Performed by: INTERNAL MEDICINE

## 2024-06-03 PROCEDURE — 1157F ADVNC CARE PLAN IN RCRD: CPT | Mod: HCNC,CPTII,S$GLB, | Performed by: INTERNAL MEDICINE

## 2024-06-03 PROCEDURE — 3077F SYST BP >= 140 MM HG: CPT | Mod: HCNC,CPTII,S$GLB, | Performed by: INTERNAL MEDICINE

## 2024-06-03 PROCEDURE — 3288F FALL RISK ASSESSMENT DOCD: CPT | Mod: HCNC,CPTII,S$GLB, | Performed by: INTERNAL MEDICINE

## 2024-06-03 PROCEDURE — 1101F PT FALLS ASSESS-DOCD LE1/YR: CPT | Mod: HCNC,CPTII,S$GLB, | Performed by: INTERNAL MEDICINE

## 2024-06-03 PROCEDURE — 99999 PR PBB SHADOW E&M-EST. PATIENT-LVL III: CPT | Mod: PBBFAC,HCNC,, | Performed by: INTERNAL MEDICINE

## 2024-06-03 PROCEDURE — 1159F MED LIST DOCD IN RCRD: CPT | Mod: HCNC,CPTII,S$GLB, | Performed by: INTERNAL MEDICINE

## 2024-06-03 NOTE — PROGRESS NOTES
[facility]ECT[facility]OUS DISEASE CLINIC  6/3/24     Subjective:      Chief Complaint:   Follow up of antibiotics      History of Present Illness:    Patient Aminah Norton is a 79 y.o. female with h/o DM, treated LTBI (6 months INH in 2018),  interstitial cystitis and urolithiasis s/p ureteroscopic stone extraction with lithotripsy and right ureteral stent placement (right) on 3/14 who presents today for  follow up for e.faecalis bacteremia (neg CARLITO, neg NM uptake scan)    Since last appt had MRI of back without signs of infection. Then IV antibiotics were stopped and picc removed. She started taking po amoxicillin after stopping iv abx. Reports compliance, but worried it's causing her sugars to be high. Recently underwent cystoscopy with removal of ureteral stent 5/24    Urine culture 5/24 - no growth    Denies complaints today. Denies dysuria. Denies diarrhea. Denies pain or swelling at site of where picc line was.     Asking for DME equipment    Ipad  used      Review of Symptoms:  Constitutional: Denies fevers, chills, or weakness.  ENT: Denies dysphagia, nasal discharge, ear pain or discharge.  Cardiovascular: Denies chest pain, palpitations, orthopnea, or claudication.  Respiratory: Denies shortness of breath, cough, hemoptysis, or wheezing.  GI: Denies nausea/vomitting, hematochezia, melena, abd pain, or changes in appetite.  : Denies dysuria, incontinence, or hematuria.  Musculoskeletal: as per hpi  Skin/breast: Denies rashes, lumps, lesions, or discharge.  Neurologic: Denies headache, dizziness, vertigo, or paresthesias.    Past Medical History:   Diagnosis Date    Anticoagulant long-term use     Arthritis     Cataract     left eye    Choroidal rupture of left eye     Diabetes mellitus type II     GERD (gastroesophageal reflux disease)     Hyperlipidemia     Hypertension     Hypothyroidism     Macular scar     right eye    Retinal degeneration     chorioretinal OD    Thyroid disease      Vitamin B 12 deficiency        Past Surgical History:   Procedure Laterality Date    APPENDECTOMY      BLADDER FULGURATION N/A 3/1/2024    Procedure: ULCER INJECTION FULGURATION;  Surgeon: Brody Smith MD;  Location: [facility] OR;  Service: Urology;  Laterality: N/A;    BLADDER SUSPENSION      CATARACT EXTRACTION      right eye     CHOLECYSTECTOMY      COLONOSCOPY N/A 7/20/2020    Procedure: COLONOSCOPY;  Surgeon: Juan Parker MD;  Location: [facility] ENDO (4TH FLR);  Service: Endoscopy;  Laterality: N/A;  Hx of chronic anemia.  patient needs a   4/6/20 - removed from 4/20/20, rescheduled 7/20/20 - pg  covid 7/17-[facility] 2nd floor-tb    COLONOSCOPY N/A 2/9/2023    Procedure: COLONOSCOPY;  Surgeon: Renan Sanchez MD;  Location: [facility] ENDO (4TH FLR);  Service: Colon and Rectal;  Laterality: N/A;  instr handed to patient, -ml    CYSTOSCOPY N/A 5/19/2021    Procedure: CYSTOSCOPY;  Surgeon: Brody Smith MD;  Location: [facility] 1ST FLR;  Service: Urology;  Laterality: N/A;    CYSTOSCOPY W/ URETERAL STENT REMOVAL Right 5/24/2024    Procedure: RIGHT REMOVAL-STENT;  Surgeon: Brody Smith MD;  Location: [facility] OR;  Service: Urology;  Laterality: Right;  30 MINUTES    CYSTOURETEROSCOPY, WITH HOLMIUM LASER LITHOTRIPSY OF URETERAL CALCULUS AND STENT INSERTION Right 3/1/2024    Procedure: CYSTOURETEROSCOPY, WITH HOLMIUM LASER LITHOTRIPSY OF URETERAL CALCULUS AND STENT INSERTION;  Surgeon: Brody Smith MD;  Location: [facility] OR;  Service: Urology;  Laterality: Right;    CYSTOURETEROSCOPY,WITH HOLMIUM LASER LITHOTRIPSY OF URETERAL CALCULUS Right 3/14/2024    Procedure: CYSTOURETEROSCOPY,WITH HOLMIUM LASER LITHOTRIPSY OF URETERAL CALCULUS;  Surgeon: Brody Smith MD;  Location: [facility] OR;  Service: Urology;  Laterality: Right;  1 HR    ECHOCARDIOGRAM,TRANSESOPHAGEAL N/A 3/20/2024    Procedure: Transesophageal echo (CARLITO) intra-procedure log documentation;  Surgeon: Provider,  Dosc Diagnostic;  Location: [facility] EP LAB;  Service: Cardiology;  Laterality: N/A;    EYE SURGERY Bilateral     cataract removal    INJECTION OF STEROID N/A 3/14/2024    Procedure: INJECTION, STEROID;  Surgeon: Brody Smith MD;  Location: [facility] OR;  Service: Urology;  Laterality: N/A;    PLACEMENT-STENT Right 3/14/2024    Procedure: PLACEMENT-STENT;  Surgeon: Brody Smith MD;  Location: [facility] OR;  Service: Urology;  Laterality: Right;    REMOVAL-STENT Right 3/14/2024    Procedure: REMOVAL-STENT;  Surgeon: Brody Smith MD;  Location: [facility] OR;  Service: Urology;  Laterality: Right;    TOTAL ABDOMINAL HYSTERECTOMY      DUB       Family History   Problem Relation Name Age of Onset    Cancer Mother          uterine    Breast cancer Mother      Ovarian cancer Mother      COPD Father      Cancer Sister          liver    Ovarian cancer Sister      Hypertension Sister      Hypertension Sister      Hypertension Daughter      Thyroid disease Daughter      Cancer Daughter          uterine    Thyroid disease Daughter      Hypertension Daughter      Diabetes Son      Colon cancer Neg Hx      Amblyopia Neg Hx      Blindness Neg Hx      Cataracts Neg Hx      Glaucoma Neg Hx      Macular degeneration Neg Hx      Retinal detachment Neg Hx      Strabismus Neg Hx      Stroke Neg Hx         Social History     Socioeconomic History    Marital status:    Tobacco Use    Smoking status: Never    Smokeless tobacco: Never   Substance and Sexual Activity    Alcohol use: No    Drug use: No    Sexual activity: Yes     Partners: Male     Birth control/protection: Post-menopausal     Comment:     Other Topics Concern    Are you pregnant or think you may be? No    Breast-feeding No   Social History Narrative     with 7 kids     Social Determinants of Health     Financial Resource Strain: Low Risk  (3/18/2024)    Overall Financial Resource Strain (CARDIA)     Difficulty of Paying Living Expenses: Not very hard  "  Food Insecurity: No Food Insecurity (3/18/2024)    Hunger Vital Sign     Worried About Running Out of Food in the Last Year: Never true     Ran Out of Food in the Last Year: Never true   Transportation Needs: No Transportation Needs (3/18/2024)    PRAPARE - Transportation     Lack of Transportation (Medical): No     Lack of Transportation (Non-Medical): No   Physical Activity: Inactive (3/18/2024)    Exercise Vital Sign     Days of Exercise per Week: 0 days     Minutes of Exercise per Session: 0 min   Stress: No Stress Concern Present (3/18/2024)    [employer] - Occupational Stress Questionnaire     Feeling of Stress : Only a little   Housing Stability: Unknown (3/18/2024)    Housing Stability Vital Sign     Unable to Pay for Housing in the Last Year: No     Unstable Housing in the Last Year: No       Review of patient's allergies indicates:   Allergen Reactions    Bufferin [aspirin, buffered] Itching    Atorvastatin      Myalgias and arthralgias    Shellfish containing products Itching     Shellfish causes her to itch per her daughter, Caro.     Warfarin     Ibuprofen Itching     Itching of eyes, head         Objective:   BP (!) 147/75 (BP Location: Right arm)   Pulse 80   Temp 98.5 °F (36.9 °C) (Oral)   Ht 5' 5" (1.651 m)   Wt 68 kg (149 lb 14.6 oz)   BMI 24.95 kg/m²     General: Afebrile, alert, comfortable, no acute distress.   HEENT: FAINA. EOMI, no scleral icterus.   Pulmonary: Non labored,clear to auscultation A/P/L. No wheezing, crackles, or rhonchi.  Cardiac: normal S1 & S2 w/o rubs/murmurs/gallops.    Abdominal: Non-tender, non-distended  Extremities: Moves all extremities x 4.   Neurological:  Alert and oriented x 4.     Labs:    Glucose   Date Value Ref Range Status   06/01/2024 353 (H) 70 - 110 mg/dL Final   03/28/2024 173 (H) 70 - 110 mg/dL Final   03/27/2024 142 (H) 70 - 110 mg/dL Final       Calcium   Date Value Ref Range Status   06/01/2024 10.1 8.7 - 10.5 mg/dL " Final   03/28/2024 9.2 8.7 - 10.5 mg/dL Final   03/27/2024 9.9 8.7 - 10.5 mg/dL Final       Albumin   Date Value Ref Range Status   06/01/2024 3.8 3.5 - 5.2 g/dL Final   03/28/2024 2.8 (L) 3.5 - 5.2 g/dL Final   03/27/2024 2.7 (L) 3.5 - 5.2 g/dL Final       Total Protein   Date Value Ref Range Status   06/01/2024 7.6 6.0 - 8.4 g/dL Final   03/28/2024 6.5 6.0 - 8.4 g/dL Final   03/27/2024 7.0 6.0 - 8.4 g/dL Final       Sodium   Date Value Ref Range Status   06/01/2024 131 (L) 136 - 145 mmol/L Final   03/28/2024 138 136 - 145 mmol/L Final   03/27/2024 139 136 - 145 mmol/L Final       Potassium   Date Value Ref Range Status   06/01/2024 4.8 3.5 - 5.1 mmol/L Final   03/28/2024 4.7 3.5 - 5.1 mmol/L Final   03/27/2024 4.6 3.5 - 5.1 mmol/L Final       CO2   Date Value Ref Range Status   06/01/2024 27 23 - 29 mmol/L Final   03/28/2024 28 23 - 29 mmol/L Final   03/27/2024 27 23 - 29 mmol/L Final       Chloride   Date Value Ref Range Status   06/01/2024 96 95 - 110 mmol/L Final   03/28/2024 102 95 - 110 mmol/L Final   03/27/2024 105 95 - 110 mmol/L Final       BUN   Date Value Ref Range Status   06/01/2024 16 8 - 23 mg/dL Final   03/28/2024 9 8 - 23 mg/dL Final   03/27/2024 7 (L) 8 - 23 mg/dL Final       Creatinine   Date Value Ref Range Status   06/01/2024 1.0 0.5 - 1.4 mg/dL Final   03/28/2024 1.0 0.5 - 1.4 mg/dL Final   03/27/2024 0.8 0.5 - 1.4 mg/dL Final       Alkaline Phosphatase   Date Value Ref Range Status   06/01/2024 144 (H) 55 - 135 U/L Final   03/28/2024 102 55 - 135 U/L Final   03/27/2024 89 55 - 135 U/L Final       ALT   Date Value Ref Range Status   06/01/2024 12 10 - 44 U/L Final   03/28/2024 15 10 - 44 U/L Final   03/27/2024 18 10 - 44 U/L Final       AST   Date Value Ref Range Status   06/01/2024 11 10 - 40 U/L Final   03/28/2024 9 (L) 10 - 40 U/L Final   03/27/2024 10 10 - 40 U/L Final       Total Bilirubin   Date Value Ref Range Status   06/01/2024 0.4 0.1 - 1.0 mg/dL Final     Comment:     For infants  and newborns, interpretation of results should be based  on gestational age, weight and in agreement with clinical  observations.    Premature Infant recommended reference ranges:  Up to 24 hours.............<8.0 mg/dL  Up to 48 hours............<12.0 mg/dL  3-5 days..................<15.0 mg/dL  6-29 days.................<15.0 mg/dL     03/28/2024 0.2 0.1 - 1.0 mg/dL Final     Comment:     For infants and newborns, interpretation of results should be based  on gestational age, weight and in agreement with clinical  observations.    Premature Infant recommended reference ranges:  Up to 24 hours.............<8.0 mg/dL  Up to 48 hours............<12.0 mg/dL  3-5 days..................<15.0 mg/dL  6-29 days.................<15.0 mg/dL     03/27/2024 0.2 0.1 - 1.0 mg/dL Final     Comment:     For infants and newborns, interpretation of results should be based  on gestational age, weight and in agreement with clinical  observations.    Premature Infant recommended reference ranges:  Up to 24 hours.............<8.0 mg/dL  Up to 48 hours............<12.0 mg/dL  3-5 days..................<15.0 mg/dL  6-29 days.................<15.0 mg/dL         WBC   Date Value Ref Range Status   06/01/2024 3.52 (L) 3.90 - 12.70 K/uL Final   03/28/2024 4.33 3.90 - 12.70 K/uL Final   03/27/2024 4.31 3.90 - 12.70 K/uL Final       Hemoglobin   Date Value Ref Range Status   06/01/2024 10.3 (L) 12.0 - 16.0 g/dL Final   03/28/2024 9.2 (L) 12.0 - 16.0 g/dL Final   03/27/2024 9.5 (L) 12.0 - 16.0 g/dL Final       Hematocrit   Date Value Ref Range Status   06/01/2024 32.6 (L) 37.0 - 48.5 % Final   03/28/2024 29.4 (L) 37.0 - 48.5 % Final   03/27/2024 30.8 (L) 37.0 - 48.5 % Final       MCV   Date Value Ref Range Status   06/01/2024 71 (L) 82 - 98 fL Final   03/28/2024 74 (L) 82 - 98 fL Final   03/27/2024 73 (L) 82 - 98 fL Final       Platelets   Date Value Ref Range Status   06/01/2024 257 150 - 450 K/uL Final   03/28/2024 442 150 - 450 K/uL Final  "  03/27/2024 383 150 - 450 K/uL Final       Lab Results   Component Value Date    CHOL 245 (H) 06/01/2024    CHOL 225 (H) 11/09/2023    CHOL 201 (H) 08/07/2023       Lab Results   Component Value Date    HDL 51 06/01/2024    HDL 59 11/09/2023    HDL 53 08/07/2023       Lab Results   Component Value Date    LDLCALC 162.0 (H) 06/01/2024    LDLCALC 122.4 11/09/2023    LDLCALC 108.6 08/07/2023       Lab Results   Component Value Date    TRIG 160 (H) 06/01/2024    TRIG 218 (H) 11/09/2023    TRIG 197 (H) 08/07/2023       Lab Results   Component Value Date    CHOLHDL 20.8 06/01/2024    CHOLHDL 26.2 11/09/2023    CHOLHDL 26.4 08/07/2023       RPR   Date Value Ref Range Status   07/02/2018 Non-reactive Non-reactive Final     No results found for: "QUANTIFERON"    Medications:  Current Outpatient Medications on File Prior to Visit   Medication Sig Dispense Refill    amitriptyline (ELAVIL) 10 MG tablet Take 1 tablet (10 mg total) by mouth every evening. 30 tablet 11    amLODIPine-benazepriL (LOTREL) 5-40 mg per capsule Take 1 capsule by mouth once daily. 90 capsule 3    amoxicillin (AMOXIL) 500 MG capsule Take 1 capsule (500 mg total) by mouth 3 (three) times daily. 90 capsule 1    ampicillin (OMNIPEN) 10 gram SolR Inject 10 g into the vein once daily.      carvediloL (COREG) 6.25 MG tablet Take 1 tablet (6.25 mg total) by mouth 2 (two) times daily. 60 tablet 11    gabapentin (NEURONTIN) 300 MG capsule Take 1 capsule (300 mg total) by mouth every evening. 90 capsule 3    insulin aspart U-100 (NOVOLOG) 100 unit/mL (3 mL) InPn pen Inject 3 Units into the skin 3 (three) times daily.  0    insulin detemir U-100, Levemir, 100 unit/mL (3 mL) SubQ InPn pen Inject 7 Units into the skin 2 (two) times daily. (Patient not taking: Reported on 5/2/2024)  0    insulin glargine U-100, Lantus, (LANTUS SOLOSTAR U-100 INSULIN) 100 unit/mL (3 mL) InPn pen Inject 10 Units into the skin once daily. 3 mL 0    levothyroxine (SYNTHROID) 112 MCG " "tablet Take 1 tablet (112 mcg total) by mouth before breakfast. 90 tablet 3    lisinopriL (PRINIVIL,ZESTRIL) 40 MG tablet Take 1 tablet (40 mg total) by mouth once daily. 90 tablet 3     No current facility-administered medications on file prior to visit.       Antibiotics:   Antibiotics (From admission, onward)      None            HIV: No components found for: "HIV 1/2 AG/AB"  Hepatitis C IgG: No components found for: "HEPATITIS C"  Syphilis:   RPR   Date Value Ref Range Status   07/02/2018 Non-reactive Non-reactive Final       Hepatitis A IgG: No components found for: "HEPATITIS A IGG"  Hepatitis Bc IgG: No components found for: "HEPATITIS B CORE IGG"  Hepatitis Bs IgG:  Quantiferon: No results found for: "QUANTIFERON"  VZV IgG: No components found for: "VARICELLA IGG"    No components found for: "SEDIMENTATION RATE"  No components found for: "C-REACTIVE PROTEIN"      Microbiology x 7d:   Microbiology Results (last 7 days)       ** No results found for the last 168 hours. **            Immunization History   Administered Date(s) Administered    COVID-19, MRNA, LN-S, PF (Pfizer) (Gray Cap) 03/09/2022    COVID-19, mRNA, LNP-S, bivalent booster, PF (PFIZER OMICRON) 10/12/2022    COVID-19, vector-nr, rS-Ad26, PF ([employer]) 03/05/2021    Influenza (FLUAD) - Quadrivalent - Adjuvanted - PF *Preferred* (65+) 12/10/2021, 10/10/2022, 11/09/2023    Influenza - High Dose - PF (65 years and older) 12/02/2015, 09/15/2017, 11/26/2018    Pneumococcal Conjugate - 13 Valent 11/26/2018    Pneumococcal Polysaccharide - 23 Valent 01/18/2023         Reviewed records today as well as relevant labs, cultures, and imaging    Assessment:     E.faecalis bacteremia    No toxicities from antimicrobials  Extremely medically complex    Plan:     Stopped IV antibiotics on 5/13 and picc line removed    Given that ureteral stents have now been removed, recommend stopping oral abx (amoxicillin as well)    Would appreciate PCP assistance getting DME " equipment for patient as she request (pt has appt tomorrow and will discuss)    Needs better control of DM (A1c 7.6 --> 11.2%)      The total time for evaluation and management services performed on 6/3/24 was greater than 25 minutes.  This includes face to face time and non-face to face time preparing to see the patient (eg, review of tests), obtaining and/or reviewing separately obtained history, documenting clinical information in the electronic or other health record, independently interpreting results, and communicating results to the patient/family/caregiver, or care coordination.    Rtc prperri Velasquez MD, MPH  Infectious Disease

## 2024-06-04 ENCOUNTER — OFFICE VISIT (OUTPATIENT)
Dept: INTERNAL MEDICINE | Facility: CLINIC | Age: 79
End: 2024-06-04
Payer: MEDICARE

## 2024-06-04 VITALS
DIASTOLIC BLOOD PRESSURE: 68 MMHG | BODY MASS INDEX: 24.79 KG/M2 | HEIGHT: 65 IN | OXYGEN SATURATION: 98 % | WEIGHT: 148.81 LBS | SYSTOLIC BLOOD PRESSURE: 130 MMHG | HEART RATE: 68 BPM

## 2024-06-04 DIAGNOSIS — E03.9 HYPOTHYROIDISM, UNSPECIFIED TYPE: ICD-10-CM

## 2024-06-04 DIAGNOSIS — E11.40 TYPE 2 DIABETES MELLITUS WITH DIABETIC NEUROPATHY, WITHOUT LONG-TERM CURRENT USE OF INSULIN: Primary | ICD-10-CM

## 2024-06-04 DIAGNOSIS — T46.6X5A MYALGIA DUE TO STATIN: ICD-10-CM

## 2024-06-04 DIAGNOSIS — I10 HTN (HYPERTENSION), BENIGN: ICD-10-CM

## 2024-06-04 DIAGNOSIS — M79.10 MYALGIA DUE TO STATIN: ICD-10-CM

## 2024-06-04 PROCEDURE — 3075F SYST BP GE 130 - 139MM HG: CPT | Mod: HCNC,CPTII,S$GLB, | Performed by: INTERNAL MEDICINE

## 2024-06-04 PROCEDURE — 1159F MED LIST DOCD IN RCRD: CPT | Mod: HCNC,CPTII,S$GLB, | Performed by: INTERNAL MEDICINE

## 2024-06-04 PROCEDURE — 99214 OFFICE O/P EST MOD 30 MIN: CPT | Mod: HCNC,S$GLB,, | Performed by: INTERNAL MEDICINE

## 2024-06-04 PROCEDURE — 1126F AMNT PAIN NOTED NONE PRSNT: CPT | Mod: HCNC,CPTII,S$GLB, | Performed by: INTERNAL MEDICINE

## 2024-06-04 PROCEDURE — 1101F PT FALLS ASSESS-DOCD LE1/YR: CPT | Mod: HCNC,CPTII,S$GLB, | Performed by: INTERNAL MEDICINE

## 2024-06-04 PROCEDURE — 99999 PR PBB SHADOW E&M-EST. PATIENT-LVL III: CPT | Mod: PBBFAC,HCNC,, | Performed by: INTERNAL MEDICINE

## 2024-06-04 PROCEDURE — 1160F RVW MEDS BY RX/DR IN RCRD: CPT | Mod: HCNC,CPTII,S$GLB, | Performed by: INTERNAL MEDICINE

## 2024-06-04 PROCEDURE — 1157F ADVNC CARE PLAN IN RCRD: CPT | Mod: HCNC,CPTII,S$GLB, | Performed by: INTERNAL MEDICINE

## 2024-06-04 PROCEDURE — 3078F DIAST BP <80 MM HG: CPT | Mod: HCNC,CPTII,S$GLB, | Performed by: INTERNAL MEDICINE

## 2024-06-04 PROCEDURE — 3288F FALL RISK ASSESSMENT DOCD: CPT | Mod: HCNC,CPTII,S$GLB, | Performed by: INTERNAL MEDICINE

## 2024-06-04 RX ORDER — METFORMIN HYDROCHLORIDE 500 MG/1
500 TABLET, EXTENDED RELEASE ORAL 2 TIMES DAILY WITH MEALS
Qty: 180 TABLET | Refills: 3 | Status: SHIPPED | OUTPATIENT
Start: 2024-06-04 | End: 2025-06-04

## 2024-06-04 RX ORDER — LANCETS
EACH MISCELLANEOUS
Qty: 200 EACH | Refills: 11 | Status: SHIPPED | OUTPATIENT
Start: 2024-06-04

## 2024-06-04 RX ORDER — GLIPIZIDE 5 MG/1
5 TABLET, FILM COATED, EXTENDED RELEASE ORAL
Qty: 90 TABLET | Refills: 3 | Status: SHIPPED | OUTPATIENT
Start: 2024-06-04 | End: 2025-06-04

## 2024-06-04 NOTE — PROGRESS NOTES
"    CHIEF COMPLAINT     Chief Complaint   Patient presents with    Follow-up       HPI     Aminah Norton is a 79 y.o. female type 2 diabetes hypertension ureteral stone with stent here today for     DM2  Taking lantus 10 units daily. CBGs elevated while on iv abx.  A1c went from 7.6---> 11.2. She has completed abx.   Wants to go back on her previous regimen.    Personally Reviewed Patient's Medical, surgical, family and social hx. Changes [facility].  Care Team updated in Epic    Review of Systems:  Review of Systems   Respiratory:  Negative for cough and shortness of breath.    Endocrine: Positive for polydipsia. Negative for polyuria.       Health Maintenance:   Reviewed with patient  Due for the following:      PHYSICAL EXAM     /68 (BP Location: Right arm, Patient Position: Sitting)   Pulse 68   Ht 5' 5" (1.651 m)   Wt 67.5 kg (148 lb 13 oz)   SpO2 98%   BMI 24.76 kg/m²     Gen: Well Appearing, NAD  HEENT: PERR, EOMI  Neck: FROM, no thyromegaly, no cervical adenopathy  CVD: RRR, no M/R/G  Pulm: Normal work of breathing, CTAB, no wheezing  Abd:  Soft, NT, ND non TTP, no mass  MSK: no LE edema  Neuro: A&Ox3, gait normal, speech normal  Mood; Mood normal, behavior normal, thought process linear       LABS     Labs reviewed; Notable for  Lab Results   Component Value Date    WBC 3.52 (L) 06/01/2024    HGB 10.3 (L) 06/01/2024    HCT 32.6 (L) 06/01/2024    MCV 71 (L) 06/01/2024     06/01/2024         Chemistry        Component Value Date/Time     (L) 06/01/2024 0816    K 4.8 06/01/2024 0816    CL 96 06/01/2024 0816    CO2 27 06/01/2024 0816    BUN 16 06/01/2024 0816    CREATININE 1.0 06/01/2024 0816     (H) 06/01/2024 0816        Component Value Date/Time    CALCIUM 10.1 06/01/2024 0816    ALKPHOS 144 (H) 06/01/2024 0816    AST 11 06/01/2024 0816    ALT 12 06/01/2024 0816    BILITOT 0.4 06/01/2024 0816    ESTGFRAFRICA >60.0 06/30/2022 0807    EGFRNONAA >60.0 " 06/30/2022 0807        Lab Results   Component Value Date    HGBA1C 11.2 (H) 06/01/2024         ASSESSMENT     1. Type 2 diabetes mellitus with diabetic neuropathy, without long-term current use of insulin  Hemoglobin A1C    Comprehensive Metabolic Panel    SITagliptin phosphate (JANUVIA) 100 MG Tab    lancets Misc    blood sugar diagnostic Strp    glipiZIDE 5 MG TR24    metFORMIN (GLUCOPHAGE-XR) 500 MG ER 24hr tablet      2. HTN (hypertension), benign        3. Hypothyroidism, unspecified type  TSH      4. Myalgia due to statin                Plan     Aminah Norton is a 79 y.o. female with type 2 diabetes hypertension       1. Type 2 diabetes mellitus with diabetic neuropathy, without long-term current use of insulin  Going to transition back to previous regimen, januvia 100, metformin 500mg BID and glipizide 5mg daily  Would like to transition to GLP1-a if further medications are needed, but pt wants to try old meds since she was doing well.  - Hemoglobin A1C; Future  - Comprehensive Metabolic Panel; Future  - SITagliptin phosphate (JANUVIA) 100 MG Tab; Take 1 tablet (100 mg total) by mouth once daily.  Dispense: 90 tablet; Refill: 3  - lancets Misc; To check BG 2 times daily, to use with insurance preferred meter  Dispense: 200 each; Refill: 11  - blood sugar diagnostic Strp; To check BG 2 times daily, to use with insurance preferred meter  Dispense: 200 each; Refill: 0  - glipiZIDE 5 MG TR24; Take 1 tablet (5 mg total) by mouth daily with breakfast.  Dispense: 90 tablet; Refill: 3  - metFORMIN (GLUCOPHAGE-XR) 500 MG ER 24hr tablet; Take 1 tablet (500 mg total) by mouth 2 (two) times daily with meals.  Dispense: 180 tablet; Refill: 3    2. HTN (hypertension), benign  At goal continue coreg 25mg BID and lotrel 5-40    3. Hypothyroidism, unspecified type  Continue levothyroxine 112mcg daily  Recheck prior to next visit  - TSH; Future    4. Myalgia due to statin  Previously did not tolerate  atorvastatin  Would like to trial rosuvastatin    Jeison Sanchez MD

## 2024-06-07 ENCOUNTER — TELEPHONE (OUTPATIENT)
Dept: INTERNAL MEDICINE | Facility: CLINIC | Age: 79
End: 2024-06-07
Payer: MEDICARE

## 2024-06-07 NOTE — TELEPHONE ENCOUNTER
----- Message from Ashok Hammer sent at 6/7/2024 12:05 PM CDT -----  Pt needs to speak with provider concerning her Dexa 1    Please Call    Contact  [phone]

## 2024-06-12 ENCOUNTER — OFFICE VISIT (OUTPATIENT)
Dept: PODIATRY | Facility: CLINIC | Age: 79
End: 2024-06-12
Payer: MEDICARE

## 2024-06-12 VITALS
DIASTOLIC BLOOD PRESSURE: 75 MMHG | HEIGHT: 65 IN | HEART RATE: 76 BPM | WEIGHT: 148.81 LBS | SYSTOLIC BLOOD PRESSURE: 151 MMHG | BODY MASS INDEX: 24.79 KG/M2

## 2024-06-12 DIAGNOSIS — B35.1 DERMATOPHYTOSIS OF NAIL: ICD-10-CM

## 2024-06-12 DIAGNOSIS — E11.49 TYPE II DIABETES MELLITUS WITH NEUROLOGICAL MANIFESTATIONS: Primary | ICD-10-CM

## 2024-06-12 PROCEDURE — 3288F FALL RISK ASSESSMENT DOCD: CPT | Mod: HCNC,CPTII,S$GLB, | Performed by: PODIATRIST

## 2024-06-12 PROCEDURE — 3077F SYST BP >= 140 MM HG: CPT | Mod: HCNC,CPTII,S$GLB, | Performed by: PODIATRIST

## 2024-06-12 PROCEDURE — 1159F MED LIST DOCD IN RCRD: CPT | Mod: HCNC,CPTII,S$GLB, | Performed by: PODIATRIST

## 2024-06-12 PROCEDURE — 1157F ADVNC CARE PLAN IN RCRD: CPT | Mod: HCNC,CPTII,S$GLB, | Performed by: PODIATRIST

## 2024-06-12 PROCEDURE — 99999 PR PBB SHADOW E&M-EST. PATIENT-LVL III: CPT | Mod: PBBFAC,HCNC,, | Performed by: PODIATRIST

## 2024-06-12 PROCEDURE — 99213 OFFICE O/P EST LOW 20 MIN: CPT | Mod: HCNC,S$GLB,, | Performed by: PODIATRIST

## 2024-06-12 PROCEDURE — 1101F PT FALLS ASSESS-DOCD LE1/YR: CPT | Mod: HCNC,CPTII,S$GLB, | Performed by: PODIATRIST

## 2024-06-12 PROCEDURE — 1126F AMNT PAIN NOTED NONE PRSNT: CPT | Mod: HCNC,CPTII,S$GLB, | Performed by: PODIATRIST

## 2024-06-12 PROCEDURE — 3078F DIAST BP <80 MM HG: CPT | Mod: HCNC,CPTII,S$GLB, | Performed by: PODIATRIST

## 2024-06-12 PROCEDURE — 1160F RVW MEDS BY RX/DR IN RCRD: CPT | Mod: HCNC,CPTII,S$GLB, | Performed by: PODIATRIST

## 2024-06-12 RX ORDER — SODIUM CHLORIDE 0.9 % (FLUSH) 0.9 %
SYRINGE (ML) INJECTION
COMMUNITY
Start: 2024-05-10

## 2024-06-12 RX ORDER — CICLOPIROX 80 MG/ML
SOLUTION TOPICAL NIGHTLY
Qty: 6.6 ML | Refills: 6 | Status: SHIPPED | OUTPATIENT
Start: 2024-06-12

## 2024-06-12 RX ORDER — HEPARIN SODIUM,PORCINE/PF 10 UNIT/ML
SYRINGE (ML) INTRAVENOUS
COMMUNITY
Start: 2024-04-26

## 2024-06-12 RX ORDER — CEFTRIAXONE 2 G/1
INJECTION, POWDER, FOR SOLUTION INTRAMUSCULAR; INTRAVENOUS
COMMUNITY
Start: 2024-04-15

## 2024-06-12 RX ORDER — HEPARIN SODIUM,PORCINE/PF 10 UNIT/ML
SYRINGE (ML) INTRAVENOUS
COMMUNITY
Start: 2024-05-10

## 2024-06-12 NOTE — PROGRESS NOTES
Subjective:      Patient ID: Aminah Norton is a 79 y.o. female.    Chief Complaint: Diabetic Foot Exam (Foot Exam/PCP Jeison Sanchez MD  02/29/24)    Aminah is a 79 y.o. female who presents to the clinic  for evaluation and treatment of diabetic feet.   Patient was seen and evaluated via  ([facility]).    She is concerned about fungal toenails.  Has been using OTC topical without improvement.   Also needs prescription for diabetes shoes.       PCP: Jeison Sanchez MD    Date Last Seen by PCP:  Diabetic Foot Exam (Foot Exam/PCP Jeison Sanchez MD  02/29/24)       Current shoe gear: Casual shoes      Patient Active Problem List   Diagnosis    Myalgia    Plantar fasciitis of left foot    Hypothyroidism    Elevated cholesterol with high triglycerides    HTN (hypertension), benign    Vitamin B 12 deficiency    GERD (gastroesophageal reflux disease)    Neuropathy    Type 2 diabetes mellitus with neurologic complication, without long-term current use of insulin    Diabetic polyneuropathy    Cataracts, bilateral    Seborrheic dermatitis    Back pain    Facet syndrome    Bilateral low back pain without sciatica    Spondylisthesis    Vitamin D insufficiency    Lower urinary tract symptoms (LUTS)    Hematuria    Bilateral leg pain    Aortic atherosclerosis    Bilateral shoulder pain    TB lung, latent    Hyperlipidemia    Neck pain    Upper extremity weakness    Posture abnormality    Bladder pain    Lower extremity weakness    Right ureteral stone    Interstitial cystitis    Pyelonephritis    Sepsis    ANAIS (acute kidney injury)    Enterococcal bacteremia    Anemia    Ureteral stent present    Positive QuantiFERON-TB Gold test         Current Outpatient Medications on File Prior to Visit   Medication Sig Dispense Refill    amLODIPine-benazepriL (LOTREL) 5-40 mg per capsule Take 1 capsule by mouth once daily. 90 capsule 3    blood sugar diagnostic Strp To check BG 2 times daily, to use with  insurance preferred meter 200 each 0    carvediloL (COREG) 6.25 MG tablet Take 1 tablet (6.25 mg total) by mouth 2 (two) times daily. 60 tablet 11    cefTRIAXone (ROCEPHIN) 2 gram injection       gabapentin (NEURONTIN) 300 MG capsule Take 1 capsule (300 mg total) by mouth every evening. 90 capsule 3    glipiZIDE 5 MG TR24 Take 1 tablet (5 mg total) by mouth daily with breakfast. 90 tablet 3    heparin, porcine, PF, 10 unit/mL Syrg Inject into the vein.      heparin, porcine, PF, 10 unit/mL Syrg       lancets Misc To check BG 2 times daily, to use with insurance preferred meter 200 each 11    levothyroxine (SYNTHROID) 112 MCG tablet Take 1 tablet (112 mcg total) by mouth before breakfast. 90 tablet 3    metFORMIN (GLUCOPHAGE-XR) 500 MG ER 24hr tablet Take 1 tablet (500 mg total) by mouth 2 (two) times daily with meals. 180 tablet 3    NORMAL SALINE FLUSH injection       SITagliptin phosphate (JANUVIA) 100 MG Tab Take 1 tablet (100 mg total) by mouth once daily. 90 tablet 3    amitriptyline (ELAVIL) 10 MG tablet Take 1 tablet (10 mg total) by mouth every evening. 30 tablet 11     No current facility-administered medications on file prior to visit.         Review of patient's allergies indicates:   Allergen Reactions    Bufferin [aspirin, buffered] Itching    Atorvastatin      Myalgias and arthralgias    Shellfish containing products Itching     Shellfish causes her to itch per her daughter, Caro.     Warfarin     Ibuprofen Itching     Itching of eyes, head         Hemoglobin A1C   Date Value Ref Range Status   06/01/2024 11.2 (H) 4.0 - 5.6 % Final     Comment:     ADA Screening Guidelines:  5.7-6.4%  Consistent with prediabetes  >or=6.5%  Consistent with diabetes    High levels of fetal hemoglobin interfere with the HbA1C  assay. Heterozygous hemoglobin variants (HbS, HgC, etc)do  not significantly interfere with this assay.   However, presence of multiple variants may affect accuracy.     03/21/2024 7.6 (H) 4.0 -  "5.6 % Final     Comment:     ADA Screening Guidelines:  5.7-6.4%  Consistent with prediabetes  >or=6.5%  Consistent with diabetes    High levels of fetal hemoglobin interfere with the HbA1C  assay. Heterozygous hemoglobin variants (HbS, HgC, etc)do  not significantly interfere with this assay.   However, presence of multiple variants may affect accuracy.     11/09/2023 8.2 (H) 4.0 - 5.6 % Final     Comment:     ADA Screening Guidelines:  5.7-6.4%  Consistent with prediabetes  >or=6.5%  Consistent with diabetes    High levels of fetal hemoglobin interfere with the HbA1C  assay. Heterozygous hemoglobin variants (HbS, HgC, etc)do  not significantly interfere with this assay.   However, presence of multiple variants may affect accuracy.     11/09/2023 8.2 (H) 4.0 - 5.6 % Final     Comment:     ADA Screening Guidelines:  5.7-6.4%  Consistent with prediabetes  >or=6.5%  Consistent with diabetes    High levels of fetal hemoglobin interfere with the HbA1C  assay. Heterozygous hemoglobin variants (HbS, HgC, etc)do  not significantly interfere with this assay.   However, presence of multiple variants may affect accuracy.                   Objective:       Vitals:    06/12/24 1208   BP: (!) 151/75   Pulse: 76   Weight: 67.5 kg (148 lb 13 oz)   Height: 5' 5" (1.651 m)        Physical Exam  Vitals reviewed.   Constitutional:       Appearance: She is well-developed.   Cardiovascular:      Pulses:           Dorsalis pedis pulses are 2+ on the right side and 2+ on the left side.        Posterior tibial pulses are 2+ on the right side and 2+ on the left side.   Pulmonary:      Effort: Pulmonary effort is normal.   Musculoskeletal:         General: Normal range of motion.      Comments: Inspection and palpation of the muscles joints and bones of both lower extremities reveal that muscle strength for the anterior lateral and posterior muscle groups and intrinsic muscle groups of the foot are all 5 over 5 symmetrical.  Ankle subtalar " midtarsal and digital joint range of motion are within normal limits, nonpainful, without crepitus or effusion.  Patient exhibits a normal angle and base of gait.  Palpation of the tendons reveal no defects.   Skin:     General: Skin is warm and dry.      Capillary Refill: Capillary refill takes 2 to 3 seconds.      Comments: Skin turgor is normal bilaterally.  Skin texture is well hydrated to both lower extremities.  No lesions or rashes or wounds appreciated bilaterally. Mild incurvation noted to bilateral hallux nails with mild tenderness no erythema.   Neurological:      Mental Status: She is alert and oriented to person, place, and time.      Comments: Sharp dull light touch vibratory proprioceptive sensation are diminished bilaterally.  Deep tendon reflexes to patellar and Achilles tendon are symmetrical 2 over 4 bilaterally.  No ankle clonus or Babinski reflexes noted bilaterally.  Coordination is fair to both feet and lower extremities.               Assessment:       Problem List Items Addressed This Visit    None  Visit Diagnoses       Type II diabetes mellitus with neurological manifestations    -  Primary    Relevant Orders    DIABETIC SHOES FOR HOME USE    Dermatophytosis of nail        Relevant Medications    ciclopirox (PENLAC) 8 % Soln                Plan:         I counseled the patient on her conditions, their implications and medical management.  Shoe inspection.     Continue good nutrition and blood sugar control to help prevent podiatric complications of diabetes.   Maintain proper foot hygiene.   Continue wearing proper shoe gear, daily foot inspections, never walking without protective shoe gear, never putting sharp instruments to feet.  Prescription as ordered.   Prescription diabetes shoes.   General nail care measures for abnormal nails include:  Keeping nails trimmed short, but avoid overzealous trimming  Avoiding trauma   Avoiding contact irritants   Keeping nails dry (avoiding wet  work)  Avoiding all nail cosmetics  Wearing shoes that fit well at the toe box.  Avoid tight shoes.

## 2024-06-17 PROBLEM — N12 PYELONEPHRITIS: Status: RESOLVED | Noted: 2024-03-16 | Resolved: 2024-06-17

## 2024-06-17 PROBLEM — N17.9 AKI (ACUTE KIDNEY INJURY): Status: RESOLVED | Noted: 2024-03-16 | Resolved: 2024-06-17

## 2024-06-17 PROBLEM — A41.9 SEPSIS: Status: RESOLVED | Noted: 2024-03-16 | Resolved: 2024-06-17

## 2024-07-02 ENCOUNTER — PATIENT OUTREACH (OUTPATIENT)
Dept: ADMINISTRATIVE | Facility: HOSPITAL | Age: 79
End: 2024-07-02
Payer: MEDICARE

## 2024-07-02 NOTE — PROGRESS NOTES
Health Maintenance Due   Topic Date Due    TETANUS VACCINE  Never done    Shingles Vaccine (1 of 2) Never done    RSV Vaccine (Age 60+ and Pregnant patients) (1 - 1-dose 60+ series) Never done    DEXA Scan  07/31/2016       Chart reviewed and updated. Reconciled immunizations.\    Laura Crystal LPN   Clinical Care Coordinator  [facility]

## 2024-07-16 ENCOUNTER — OFFICE VISIT (OUTPATIENT)
Dept: INTERNAL MEDICINE | Facility: CLINIC | Age: 79
End: 2024-07-16
Payer: MEDICARE

## 2024-07-16 VITALS
BODY MASS INDEX: 25.38 KG/M2 | SYSTOLIC BLOOD PRESSURE: 124 MMHG | WEIGHT: 152.31 LBS | OXYGEN SATURATION: 98 % | HEIGHT: 65 IN | DIASTOLIC BLOOD PRESSURE: 60 MMHG | HEART RATE: 89 BPM

## 2024-07-16 DIAGNOSIS — G44.86 CERVICOGENIC HEADACHE: ICD-10-CM

## 2024-07-16 DIAGNOSIS — M25.552 GREATER TROCHANTERIC PAIN SYNDROME OF LEFT LOWER EXTREMITY: ICD-10-CM

## 2024-07-16 DIAGNOSIS — E11.40 TYPE 2 DIABETES MELLITUS WITH DIABETIC NEUROPATHY, WITHOUT LONG-TERM CURRENT USE OF INSULIN: Primary | ICD-10-CM

## 2024-07-16 DIAGNOSIS — R30.0 DYSURIA: ICD-10-CM

## 2024-07-16 LAB
BILIRUB UR QL STRIP: NEGATIVE
CLARITY UR REFRACT.AUTO: CLEAR
COLOR UR AUTO: COLORLESS
GLUCOSE UR QL STRIP: NEGATIVE
HGB UR QL STRIP: ABNORMAL
KETONES UR QL STRIP: NEGATIVE
LEUKOCYTE ESTERASE UR QL STRIP: NEGATIVE
NITRITE UR QL STRIP: NEGATIVE
PH UR STRIP: 6 [PH] (ref 5–8)
PROT UR QL STRIP: NEGATIVE
SP GR UR STRIP: 1 (ref 1–1.03)
URN SPEC COLLECT METH UR: ABNORMAL

## 2024-07-16 PROCEDURE — 1159F MED LIST DOCD IN RCRD: CPT | Mod: HCNC,CPTII,S$GLB, | Performed by: INTERNAL MEDICINE

## 2024-07-16 PROCEDURE — 81003 URINALYSIS AUTO W/O SCOPE: CPT | Mod: HCNC | Performed by: INTERNAL MEDICINE

## 2024-07-16 PROCEDURE — 99214 OFFICE O/P EST MOD 30 MIN: CPT | Mod: HCNC,S$GLB,, | Performed by: INTERNAL MEDICINE

## 2024-07-16 PROCEDURE — 99999 PR PBB SHADOW E&M-EST. PATIENT-LVL IV: CPT | Mod: PBBFAC,HCNC,, | Performed by: INTERNAL MEDICINE

## 2024-07-16 PROCEDURE — 3074F SYST BP LT 130 MM HG: CPT | Mod: HCNC,CPTII,S$GLB, | Performed by: INTERNAL MEDICINE

## 2024-07-16 PROCEDURE — 1125F AMNT PAIN NOTED PAIN PRSNT: CPT | Mod: HCNC,CPTII,S$GLB, | Performed by: INTERNAL MEDICINE

## 2024-07-16 PROCEDURE — 3078F DIAST BP <80 MM HG: CPT | Mod: HCNC,CPTII,S$GLB, | Performed by: INTERNAL MEDICINE

## 2024-07-16 PROCEDURE — 3288F FALL RISK ASSESSMENT DOCD: CPT | Mod: HCNC,CPTII,S$GLB, | Performed by: INTERNAL MEDICINE

## 2024-07-16 PROCEDURE — 1160F RVW MEDS BY RX/DR IN RCRD: CPT | Mod: HCNC,CPTII,S$GLB, | Performed by: INTERNAL MEDICINE

## 2024-07-16 PROCEDURE — 1101F PT FALLS ASSESS-DOCD LE1/YR: CPT | Mod: HCNC,CPTII,S$GLB, | Performed by: INTERNAL MEDICINE

## 2024-07-16 PROCEDURE — 1157F ADVNC CARE PLAN IN RCRD: CPT | Mod: HCNC,CPTII,S$GLB, | Performed by: INTERNAL MEDICINE

## 2024-07-16 RX ORDER — GABAPENTIN 300 MG/1
300 CAPSULE ORAL NIGHTLY
Qty: 90 CAPSULE | Refills: 3 | Status: SHIPPED | OUTPATIENT
Start: 2024-07-16

## 2024-07-16 RX ORDER — AMITRIPTYLINE HYDROCHLORIDE 10 MG/1
10 TABLET, FILM COATED ORAL NIGHTLY
Qty: 30 TABLET | Refills: 11 | Status: SHIPPED | OUTPATIENT
Start: 2024-07-16 | End: 2025-07-16

## 2024-07-16 RX ORDER — RIZATRIPTAN BENZOATE 10 MG/1
10 TABLET, ORALLY DISINTEGRATING ORAL
Qty: 10 TABLET | Refills: 0 | Status: SHIPPED | OUTPATIENT
Start: 2024-07-16 | End: 2024-08-15

## 2024-07-16 RX ORDER — METFORMIN HYDROCHLORIDE 500 MG/1
1000 TABLET, EXTENDED RELEASE ORAL 2 TIMES DAILY WITH MEALS
Qty: 360 TABLET | Refills: 3 | Status: SHIPPED | OUTPATIENT
Start: 2024-07-16 | End: 2025-07-16

## 2024-07-16 NOTE — PROGRESS NOTES
"    CHIEF COMPLAINT     Chief Complaint   Patient presents with    Follow-up     Left side of face, nose in pain over a week with runny nose        HPI     Aminah Norton is a 79 y.o. female here today for     DM  Taking januvia 100, glipizide 5mg daily, metformin 500mg bid.    Reports headache left side.  Occipital early improves with NSAIDs vision changes no pain with recumbency      Review of Systems:  Review of Systems   Genitourinary:  Positive for dysuria.       Health Maintenance:   Reviewed with patient  Due for the following:      PHYSICAL EXAM     /60   Pulse 89   Ht 5' 5" (1.651 m)   Wt 69.1 kg (152 lb 5.4 oz)   SpO2 98%   BMI 25.35 kg/m²     Gen: Well Appearing, NAD  HEENT: PERR, EOMI  Neck: FROM, no thyromegaly, no cervical adenopathy  CVD: RRR, no M/R/G  Pulm: Normal work of breathing, CTAB, no wheezing  Abd:  Soft, NT, ND non TTP, no mass  MSK: no LE edema tender palpation bilateral greater trochanteric bursa  Neuro: A&Ox3, gait normal, speech normal  Mood; Mood normal, behavior normal, thought process linear       LABS     Labs reviewed; Notable for    ASSESSMENT     1. Type 2 diabetes mellitus with diabetic neuropathy, without long-term current use of insulin  gabapentin (NEURONTIN) 300 MG capsule    metFORMIN (GLUCOPHAGE-XR) 500 MG ER 24hr tablet      2. Dysuria  Urinalysis, Reflex to Urine Culture Urine, Clean Catch      3. Greater trochanteric pain syndrome of left lower extremity  Ambulatory referral/consult to Physical/Occupational Therapy      4. Cervicogenic headache  amitriptyline (ELAVIL) 10 MG tablet    rizatriptan (MAXALT-MLT) 10 MG disintegrating tablet              Plan     Aminah Norton is a 79 y.o. female with  1. Type 2 diabetes mellitus with diabetic neuropathy, without long-term current use of insulin  Increase metformin a 1000 b.i.d. continue glipizide 5 mg and Januvia 100 mg daily  Recheck A1c in 6 weeks  Would like to start on G LP 1 " agonist  - gabapentin (NEURONTIN) 300 MG capsule; Take 1 capsule (300 mg total) by mouth every evening.  Dispense: 90 capsule; Refill: 3  - metFORMIN (GLUCOPHAGE-XR) 500 MG ER 24hr tablet; Take 2 tablets (1,000 mg total) by mouth 2 (two) times daily with meals.  Dispense: 360 tablet; Refill: 3    2. Dysuria  Will check urine  - Urinalysis, Reflex to Urine Culture Urine, Clean Catch    3. Greater trochanteric pain syndrome of left lower extremity  Will send a PT  - Ambulatory referral/consult to Physical/Occupational Therapy; Future    4. Cervicogenic headache  Will try nighttime amitriptyline to try and break headache frequency can try Maxalt as abortive agent.  - amitriptyline (ELAVIL) 10 MG tablet; Take 1 tablet (10 mg total) by mouth every evening.  Dispense: 30 tablet; Refill: 11  - rizatriptan (MAXALT-MLT) 10 MG disintegrating tablet; Take 1 tablet (10 mg total) by mouth as needed for Migraine. May repeat in 2 hours if needed  Dispense: 10 tablet; Refill: 0      Jeison Sanchez MD

## 2024-07-17 ENCOUNTER — TELEPHONE (OUTPATIENT)
Dept: INTERNAL MEDICINE | Facility: CLINIC | Age: 79
End: 2024-07-17
Payer: MEDICARE

## 2024-07-17 DIAGNOSIS — M25.552 GREATER TROCHANTERIC PAIN SYNDROME OF LEFT LOWER EXTREMITY: Primary | ICD-10-CM

## 2024-07-17 NOTE — TELEPHONE ENCOUNTER
----- Message from Hunter Oleary sent at 7/17/2024 11:36 AM CDT -----  Good morning,       I reached out to the above patient to schedule for outpatient physical therapy, patient states she has no transportation and would like for someone to come to her home for therapy services. Please advise.        Thanks,    Hunter Oleary  Access Navigator- [facility]

## 2024-08-27 ENCOUNTER — OFFICE VISIT (OUTPATIENT)
Dept: INTERNAL MEDICINE | Facility: CLINIC | Age: 79
End: 2024-08-27
Payer: MEDICARE

## 2024-08-27 ENCOUNTER — LAB VISIT (OUTPATIENT)
Dept: LAB | Facility: HOSPITAL | Age: 79
End: 2024-08-27
Attending: INTERNAL MEDICINE
Payer: MEDICARE

## 2024-08-27 VITALS
HEART RATE: 91 BPM | OXYGEN SATURATION: 98 % | WEIGHT: 161.81 LBS | HEIGHT: 65 IN | BODY MASS INDEX: 26.96 KG/M2 | DIASTOLIC BLOOD PRESSURE: 64 MMHG | SYSTOLIC BLOOD PRESSURE: 128 MMHG

## 2024-08-27 DIAGNOSIS — G89.29 CHRONIC BILATERAL LOW BACK PAIN WITHOUT SCIATICA: ICD-10-CM

## 2024-08-27 DIAGNOSIS — E11.40 TYPE 2 DIABETES MELLITUS WITH DIABETIC NEUROPATHY, WITHOUT LONG-TERM CURRENT USE OF INSULIN: Primary | ICD-10-CM

## 2024-08-27 DIAGNOSIS — M54.50 CHRONIC BILATERAL LOW BACK PAIN WITHOUT SCIATICA: ICD-10-CM

## 2024-08-27 DIAGNOSIS — M25.552 GREATER TROCHANTERIC PAIN SYNDROME OF LEFT LOWER EXTREMITY: ICD-10-CM

## 2024-08-27 DIAGNOSIS — E11.40 TYPE 2 DIABETES MELLITUS WITH DIABETIC NEUROPATHY, WITHOUT LONG-TERM CURRENT USE OF INSULIN: ICD-10-CM

## 2024-08-27 LAB
BILIRUB UR QL STRIP: NEGATIVE
CLARITY UR REFRACT.AUTO: CLEAR
COLOR UR AUTO: COLORLESS
GLUCOSE UR QL STRIP: ABNORMAL
HGB UR QL STRIP: ABNORMAL
KETONES UR QL STRIP: NEGATIVE
LEUKOCYTE ESTERASE UR QL STRIP: NEGATIVE
NITRITE UR QL STRIP: NEGATIVE
PH UR STRIP: 6 [PH] (ref 5–8)
PROT UR QL STRIP: NEGATIVE
SP GR UR STRIP: 1.01 (ref 1–1.03)
URN SPEC COLLECT METH UR: ABNORMAL

## 2024-08-27 PROCEDURE — 1157F ADVNC CARE PLAN IN RCRD: CPT | Mod: HCNC,CPTII,S$GLB, | Performed by: INTERNAL MEDICINE

## 2024-08-27 PROCEDURE — 81003 URINALYSIS AUTO W/O SCOPE: CPT | Mod: HCNC | Performed by: INTERNAL MEDICINE

## 2024-08-27 PROCEDURE — 99999 PR PBB SHADOW E&M-EST. PATIENT-LVL IV: CPT | Mod: PBBFAC,HCNC,, | Performed by: INTERNAL MEDICINE

## 2024-08-27 PROCEDURE — 3288F FALL RISK ASSESSMENT DOCD: CPT | Mod: HCNC,CPTII,S$GLB, | Performed by: INTERNAL MEDICINE

## 2024-08-27 PROCEDURE — 1125F AMNT PAIN NOTED PAIN PRSNT: CPT | Mod: HCNC,CPTII,S$GLB, | Performed by: INTERNAL MEDICINE

## 2024-08-27 PROCEDURE — 3078F DIAST BP <80 MM HG: CPT | Mod: HCNC,CPTII,S$GLB, | Performed by: INTERNAL MEDICINE

## 2024-08-27 PROCEDURE — 1159F MED LIST DOCD IN RCRD: CPT | Mod: HCNC,CPTII,S$GLB, | Performed by: INTERNAL MEDICINE

## 2024-08-27 PROCEDURE — 3074F SYST BP LT 130 MM HG: CPT | Mod: HCNC,CPTII,S$GLB, | Performed by: INTERNAL MEDICINE

## 2024-08-27 PROCEDURE — 1101F PT FALLS ASSESS-DOCD LE1/YR: CPT | Mod: HCNC,CPTII,S$GLB, | Performed by: INTERNAL MEDICINE

## 2024-08-27 PROCEDURE — 99214 OFFICE O/P EST MOD 30 MIN: CPT | Mod: HCNC,S$GLB,, | Performed by: INTERNAL MEDICINE

## 2024-08-27 NOTE — PROGRESS NOTES
"    CHIEF COMPLAINT     Chief Complaint   Patient presents with    Follow-up     Back pain        HPI     Aminah Norton is a 79 y.o. female type 2 diabetes hypertension ureteral stone here today for     DM2   metformin XR 1000mg BID and glipidize 5mg  AM 120s, HS 160s. No readings over 200.  Stopped januvia because she thinks it caused some dysuria    Still having issues with her left greater troch and back.  I am do physical therapy because she does not have a ride.  Had MRI of her lumbar spine in the setting of osteo notable for degenerative changes to lower back    Personally Reviewed Patient's Medical, surgical, family and social hx. Changes updated in [employer].  Care Team updated in Epic    Review of Systems:  Review of Systems    Health Maintenance:   Reviewed with patient  Due for the following:      PHYSICAL EXAM     /64 (BP Location: Right arm, Patient Position: Sitting)   Pulse 91   Ht 5' 5" (1.651 m)   Wt 73.4 kg (161 lb 13.1 oz)   SpO2 98%   BMI 26.93 kg/m²     Gen: Well Appearing, NAD  HEENT: PERR, EOMI  Neck: FROM, no thyromegaly, no cervical adenopathy  CVD: RRR, no M/R/G  Pulm: Normal work of breathing, CTAB, no wheezing  Abd:  Soft, NT, ND non TTP, no mass  MSK: no LE edema  Tender palpation just above bilateral SI joints under palpation left greater troch  Neuro: A&Ox3, gait normal, speech normal  Mood; Mood normal, behavior normal, thought process linear       LABS     Labs reviewed; Notable for  MRI 5/2024  ASSESSMENT     1. Type 2 diabetes mellitus with diabetic neuropathy, without long-term current use of insulin  Urinalysis, Reflex to Urine Culture Urine, Clean Catch      2. Greater trochanteric pain syndrome of left lower extremity        3. Chronic bilateral low back pain without sciatica                Plan     Aminah Norton is a 79 y.o. female with type 2 diabetes hypertension ureteral stone   1. Type 2 diabetes mellitus with diabetic neuropathy, " without long-term current use of insulin  - Urinalysis, Reflex to Urine Culture Urine, Clean Catch; Future  Recheck A1c today  Continue glipizide and metformin  Anticipate starting G LP 1 agonist    2. Greater trochanteric pain syndrome of left lower extremity  Given home exercise program  Can consider CSI once A1c at goal  3. Chronic bilateral low back pain without sciatica  Given home exercise program    Jeison Sanchez MD

## 2024-09-04 ENCOUNTER — LAB VISIT (OUTPATIENT)
Dept: LAB | Facility: HOSPITAL | Age: 79
End: 2024-09-04
Attending: INTERNAL MEDICINE
Payer: MEDICARE

## 2024-09-04 DIAGNOSIS — E11.40 TYPE 2 DIABETES MELLITUS WITH DIABETIC NEUROPATHY, WITHOUT LONG-TERM CURRENT USE OF INSULIN: ICD-10-CM

## 2024-09-04 DIAGNOSIS — E03.9 HYPOTHYROIDISM, UNSPECIFIED TYPE: ICD-10-CM

## 2024-09-04 LAB
ALBUMIN SERPL BCP-MCNC: 4 G/DL (ref 3.5–5.2)
ALP SERPL-CCNC: 94 U/L (ref 55–135)
ALT SERPL W/O P-5'-P-CCNC: 17 U/L (ref 10–44)
ANION GAP SERPL CALC-SCNC: 10 MMOL/L (ref 8–16)
AST SERPL-CCNC: 14 U/L (ref 10–40)
BILIRUB SERPL-MCNC: 0.5 MG/DL (ref 0.1–1)
BUN SERPL-MCNC: 19 MG/DL (ref 8–23)
CALCIUM SERPL-MCNC: 10.1 MG/DL (ref 8.7–10.5)
CHLORIDE SERPL-SCNC: 102 MMOL/L (ref 95–110)
CO2 SERPL-SCNC: 23 MMOL/L (ref 23–29)
CREAT SERPL-MCNC: 0.9 MG/DL (ref 0.5–1.4)
EST. GFR  (NO RACE VARIABLE): >60 ML/MIN/1.73 M^2
ESTIMATED AVG GLUCOSE: 212 MG/DL (ref 68–131)
GLUCOSE SERPL-MCNC: 130 MG/DL (ref 70–110)
HBA1C MFR BLD: 9 % (ref 4–5.6)
POTASSIUM SERPL-SCNC: 4.5 MMOL/L (ref 3.5–5.1)
PROT SERPL-MCNC: 7.4 G/DL (ref 6–8.4)
SODIUM SERPL-SCNC: 135 MMOL/L (ref 136–145)
TSH SERPL DL<=0.005 MIU/L-ACNC: 2.19 UIU/ML (ref 0.4–4)

## 2024-09-04 PROCEDURE — 36415 COLL VENOUS BLD VENIPUNCTURE: CPT | Mod: HCNC | Performed by: INTERNAL MEDICINE

## 2024-09-04 PROCEDURE — 80053 COMPREHEN METABOLIC PANEL: CPT | Mod: HCNC | Performed by: INTERNAL MEDICINE

## 2024-09-04 PROCEDURE — 84443 ASSAY THYROID STIM HORMONE: CPT | Mod: HCNC | Performed by: INTERNAL MEDICINE

## 2024-09-04 PROCEDURE — 83036 HEMOGLOBIN GLYCOSYLATED A1C: CPT | Mod: HCNC | Performed by: INTERNAL MEDICINE

## 2024-09-05 ENCOUNTER — OFFICE VISIT (OUTPATIENT)
Dept: INTERNAL MEDICINE | Facility: CLINIC | Age: 79
End: 2024-09-05
Payer: MEDICARE

## 2024-09-05 ENCOUNTER — LAB VISIT (OUTPATIENT)
Dept: LAB | Facility: HOSPITAL | Age: 79
End: 2024-09-05
Attending: INTERNAL MEDICINE
Payer: MEDICARE

## 2024-09-05 VITALS
BODY MASS INDEX: 27.03 KG/M2 | OXYGEN SATURATION: 99 % | HEART RATE: 71 BPM | SYSTOLIC BLOOD PRESSURE: 116 MMHG | WEIGHT: 162.25 LBS | DIASTOLIC BLOOD PRESSURE: 72 MMHG | HEIGHT: 65 IN

## 2024-09-05 DIAGNOSIS — R30.0 DYSURIA: ICD-10-CM

## 2024-09-05 DIAGNOSIS — E11.40 TYPE 2 DIABETES MELLITUS WITH DIABETIC NEUROPATHY, WITHOUT LONG-TERM CURRENT USE OF INSULIN: Primary | ICD-10-CM

## 2024-09-05 DIAGNOSIS — M25.552 GREATER TROCHANTERIC PAIN SYNDROME OF LEFT LOWER EXTREMITY: ICD-10-CM

## 2024-09-05 DIAGNOSIS — E03.9 HYPOTHYROIDISM, UNSPECIFIED TYPE: ICD-10-CM

## 2024-09-05 PROCEDURE — 1157F ADVNC CARE PLAN IN RCRD: CPT | Mod: HCNC,CPTII,S$GLB, | Performed by: INTERNAL MEDICINE

## 2024-09-05 PROCEDURE — 87077 CULTURE AEROBIC IDENTIFY: CPT | Mod: HCNC | Performed by: INTERNAL MEDICINE

## 2024-09-05 PROCEDURE — 1159F MED LIST DOCD IN RCRD: CPT | Mod: HCNC,CPTII,S$GLB, | Performed by: INTERNAL MEDICINE

## 2024-09-05 PROCEDURE — 87086 URINE CULTURE/COLONY COUNT: CPT | Mod: HCNC | Performed by: INTERNAL MEDICINE

## 2024-09-05 PROCEDURE — 3288F FALL RISK ASSESSMENT DOCD: CPT | Mod: HCNC,CPTII,S$GLB, | Performed by: INTERNAL MEDICINE

## 2024-09-05 PROCEDURE — 1101F PT FALLS ASSESS-DOCD LE1/YR: CPT | Mod: HCNC,CPTII,S$GLB, | Performed by: INTERNAL MEDICINE

## 2024-09-05 PROCEDURE — 1160F RVW MEDS BY RX/DR IN RCRD: CPT | Mod: HCNC,CPTII,S$GLB, | Performed by: INTERNAL MEDICINE

## 2024-09-05 PROCEDURE — 87186 SC STD MICRODIL/AGAR DIL: CPT | Mod: HCNC | Performed by: INTERNAL MEDICINE

## 2024-09-05 PROCEDURE — 3074F SYST BP LT 130 MM HG: CPT | Mod: HCNC,CPTII,S$GLB, | Performed by: INTERNAL MEDICINE

## 2024-09-05 PROCEDURE — 99214 OFFICE O/P EST MOD 30 MIN: CPT | Mod: HCNC,S$GLB,, | Performed by: INTERNAL MEDICINE

## 2024-09-05 PROCEDURE — 87088 URINE BACTERIA CULTURE: CPT | Mod: HCNC | Performed by: INTERNAL MEDICINE

## 2024-09-05 PROCEDURE — 1125F AMNT PAIN NOTED PAIN PRSNT: CPT | Mod: HCNC,CPTII,S$GLB, | Performed by: INTERNAL MEDICINE

## 2024-09-05 PROCEDURE — 3078F DIAST BP <80 MM HG: CPT | Mod: HCNC,CPTII,S$GLB, | Performed by: INTERNAL MEDICINE

## 2024-09-05 PROCEDURE — 99999 PR PBB SHADOW E&M-EST. PATIENT-LVL IV: CPT | Mod: PBBFAC,HCNC,, | Performed by: INTERNAL MEDICINE

## 2024-09-05 RX ORDER — SEMAGLUTIDE 0.68 MG/ML
INJECTION, SOLUTION SUBCUTANEOUS
Qty: 13.5 ML | Refills: 0 | Status: SHIPPED | OUTPATIENT
Start: 2024-09-05 | End: 2025-03-04

## 2024-09-05 NOTE — PROGRESS NOTES
"    CHIEF COMPLAINT     Chief Complaint   Patient presents with    Follow-up     Left side back pain        HPI     Aminah Norton is a 79 y.o. female  type 2 diabetes hypertension ureteral stone here today for Dm f/u    Taking metformin 1000mg BID and glipizide 5mg daily. Didn't tolerate januvia or SGLT2-I. A1c elevated 9.0      Continuing to have L GTB pain, and low Back pain. Doing HEP    Also reports 2 weeks dysuria and frequency.    Personally Reviewed Patient's Medical, surgical, family and social hx. Changes updated in [facility].  Care Team updated in Epic    Review of Systems:  Review of Systems   Genitourinary:  Positive for dysuria and frequency. Negative for flank pain.   Musculoskeletal:  Positive for back pain.       Health Maintenance:   Reviewed with patient  Due for the following:      PHYSICAL EXAM     /72 (BP Location: Right arm, Patient Position: Sitting)   Pulse 71   Ht 5' 5" (1.651 m)   Wt 73.6 kg (162 lb 4.1 oz)   SpO2 99%   BMI 27.00 kg/m²     Gen: Well Appearing, NAD  HEENT: PERR, EOMI  Neck: FROM, no thyromegaly, no cervical adenopathy  CVD: RRR, no M/R/G  Pulm: Normal work of breathing, CTAB, no wheezing  Abd:  Soft, NT, ND non TTP, no mass  MSK: no LE edema, continuing to have  TTP L. GTB  Neuro: A&Ox3, gait normal, speech normal  Mood; Mood normal, behavior normal, thought process linear       LABS     Labs reviewed; Notable for  Lab Results   Component Value Date    HGBA1C 9.0 (H) 09/04/2024       Chemistry        Component Value Date/Time     (L) 09/04/2024 0836    K 4.5 09/04/2024 0836     09/04/2024 0836    CO2 23 09/04/2024 0836    BUN 19 09/04/2024 0836    CREATININE 0.9 09/04/2024 0836     (H) 09/04/2024 0836        Component Value Date/Time    CALCIUM 10.1 09/04/2024 0836    ALKPHOS 94 09/04/2024 0836    AST 14 09/04/2024 0836    ALT 17 09/04/2024 0836    BILITOT 0.5 09/04/2024 0836    ESTGFRAFRICA >60.0 06/30/2022 0807    EGFRNONAA >60.0 " 06/30/2022 0807        Lab Results   Component Value Date    TSH 2.193 09/04/2024       ASSESSMENT     1. Type 2 diabetes mellitus with diabetic neuropathy, without long-term current use of insulin  semaglutide (OZEMPIC) 0.25 mg or 0.5 mg (2 mg/3 mL) pen injector    Hemoglobin A1C    Comprehensive Metabolic Panel      2. Dysuria  CULTURE, URINE      3. Hypothyroidism, unspecified type        4. Greater trochanteric pain syndrome of left lower extremity                Jimmy Norton is a 79 y.o. female with  type 2 diabetes hypertension ureteral stone     1. Type 2 diabetes mellitus with diabetic neuropathy, without long-term current use of insulin  Not controlled  Continue metformin 1000mg BID and glipizide 5mg  Will start semagltuide .25mg weekly increase  - semaglutide (OZEMPIC) 0.25 mg or 0.5 mg (2 mg/3 mL) pen injector; Inject 0.25 mg into the skin every 7 days for 90 days, THEN 0.5 mg every 7 days.  Dispense: 13.5 mL; Refill: 0  - Hemoglobin A1C; Future  - Comprehensive Metabolic Panel; Future    2. Dysuria  Will check urine CX  - CULTURE, URINE; Future    3. Hypothyroidism, unspecified type  Clinically and serologically euthyroid continue levothyroxine 112 mcg daily    4. Greater trochanteric pain syndrome of left lower extremity  Continue HEP. Given informational handout  Would benefit from PT but doesn't have reliable transportation to PT  A1c >> for ICS    Rtc 3m  Jeison Sanchez MD

## 2024-09-08 LAB — BACTERIA UR CULT: ABNORMAL

## 2024-09-09 DIAGNOSIS — N30.00 ACUTE CYSTITIS WITHOUT HEMATURIA: Primary | ICD-10-CM

## 2024-09-09 RX ORDER — NITROFURANTOIN 25; 75 MG/1; MG/1
100 CAPSULE ORAL 2 TIMES DAILY
Qty: 10 CAPSULE | Refills: 0 | Status: SHIPPED | OUTPATIENT
Start: 2024-09-09 | End: 2024-09-14

## 2024-10-02 DIAGNOSIS — Z78.0 MENOPAUSE: ICD-10-CM

## 2024-11-02 DIAGNOSIS — E11.40 TYPE 2 DIABETES MELLITUS WITH DIABETIC NEUROPATHY, WITHOUT LONG-TERM CURRENT USE OF INSULIN: ICD-10-CM

## 2024-11-02 NOTE — TELEPHONE ENCOUNTER
No care due was identified.  [facility] Embedded Care Due Messages. Reference number: [id number].   11/02/2024 2:13:41 PM CDT

## 2024-11-03 RX ORDER — BLOOD SUGAR DIAGNOSTIC
STRIP MISCELLANEOUS
Qty: 200 STRIP | Refills: 3 | Status: SHIPPED | OUTPATIENT
Start: 2024-11-03

## 2024-11-03 NOTE — TELEPHONE ENCOUNTER
Refill Decision Note   Aminah Norton  is requesting a refill authorization.  Brief Assessment and Rationale for Refill:  Approve     Medication Therapy Plan:         Comments:     Note composed:5:03 PM 11/03/2024

## 2024-11-20 DIAGNOSIS — E03.9 HYPOTHYROIDISM, UNSPECIFIED TYPE: ICD-10-CM

## 2024-11-20 RX ORDER — LEVOTHYROXINE SODIUM 112 UG/1
112 TABLET ORAL
Qty: 90 TABLET | Refills: 3 | Status: SHIPPED | OUTPATIENT
Start: 2024-11-20

## 2024-11-20 NOTE — TELEPHONE ENCOUNTER
Refill Decision Note   Aminah Norton  is requesting a refill authorization.  Brief Assessment and Rationale for Refill:  Approve     Medication Therapy Plan:         Comments:     Note composed:2:32 PM 11/20/2024

## 2024-11-20 NOTE — TELEPHONE ENCOUNTER
----- Message from Laura sent at 11/20/2024 10:19 AM CST -----  Contact: Pt  [phone]  Prescription refill request.    RX name and strength (copy/paste from chart):   levothyroxine (SYNTHROID) 112 MCG tablet    Pharmacy name and phone # (copy/paste from chart):       [facility]/pharmacy #[id number] - [place] - [address]  [address]  [place] [address]  Phone: [phone] Fax: [phone]      Additional information:

## 2024-11-20 NOTE — TELEPHONE ENCOUNTER
No care due was identified.  [facility] Embedded Care Due Messages. Reference number: [id number].   11/20/2024 2:30:31 PM CST

## 2025-01-19 DIAGNOSIS — I10 HTN (HYPERTENSION), BENIGN: ICD-10-CM

## 2025-01-19 NOTE — TELEPHONE ENCOUNTER
Care Due:                  Date            Visit Type   Department     Provider  --------------------------------------------------------------------------------                                EP -                              PRIMARY      [facility] INTERNAL  Last Visit: 09-      CARE ([facility])   YESSY MARTINEZ                              EP                               PRIMARY      [facility] INTERNAL  Next Visit: 02-      CARE ([facility])   [facility]       DEVON MARTINEZ                                                            Last  Test          Frequency    Reason                     Performed    Due Date  --------------------------------------------------------------------------------    HBA1C.......  6 months...  glipiZIDE, metFORMIN,      09- 03-                             semaglutide..............    [employer] Embedded Care Due Messages. Reference number: [id number].   1/19/2025 12:22:32 AM CST

## 2025-01-21 RX ORDER — LISINOPRIL 40 MG/1
40 TABLET ORAL
Qty: 90 TABLET | Refills: 3 | OUTPATIENT
Start: 2025-01-21

## 2025-01-21 NOTE — TELEPHONE ENCOUNTER
Provider Staff:  Action required for this patient    Requires labs      Please see care gap opportunities below in Care Due Message.    Thanks!  [facility]     Appointments      Date Provider   Last Visit   9/5/2024 Jeison Sanchez MD   Next Visit   2/27/2025 Jeison Sanchez MD     Refill Decision Note   Aminah AliceaJanki  is requesting a refill authorization.  Brief Assessment and Rationale for Refill:  Quick Discontinue     Medication Therapy Plan:  Med d/c on 06/04/24 by PCP; C      Comments:     Note composed:6:19 AM 01/21/2025

## 2025-01-27 DIAGNOSIS — E78.5 HYPERLIPIDEMIA, UNSPECIFIED HYPERLIPIDEMIA TYPE: ICD-10-CM

## 2025-01-27 NOTE — TELEPHONE ENCOUNTER
Refill Routing Note   Medication(s) are not appropriate for processing by [facility] for the following reason(s):        No active prescription written by provider    [facility] action(s):  Defer               Appointments  past 12m or future 3m with PCP    Date Provider   Last Visit   9/5/2024 Jeison Sanchez MD   Next Visit   2/27/2025 Jeison Sanchez MD   ED visits in past 90 days: 0        Note composed:12:02 PM 01/27/2025

## 2025-01-27 NOTE — TELEPHONE ENCOUNTER
No care due was identified.  [facility] Embedded Care Due Messages. Reference number: [id number].   1/27/2025 12:14:13 AM CST

## 2025-01-28 DIAGNOSIS — E78.5 HYPERLIPIDEMIA, UNSPECIFIED HYPERLIPIDEMIA TYPE: ICD-10-CM

## 2025-01-28 DIAGNOSIS — E11.40 TYPE 2 DIABETES MELLITUS WITH DIABETIC NEUROPATHY, WITHOUT LONG-TERM CURRENT USE OF INSULIN: ICD-10-CM

## 2025-01-28 RX ORDER — LANCETS
EACH MISCELLANEOUS
Qty: 200 EACH | Refills: 0 | OUTPATIENT
Start: 2025-01-28

## 2025-01-28 RX ORDER — DEXTROSE 4 G
TABLET,CHEWABLE ORAL
Refills: 0 | OUTPATIENT
Start: 2025-01-28

## 2025-01-28 RX ORDER — LISINOPRIL 40 MG/1
TABLET ORAL
Qty: 90 TABLET | Refills: 0 | OUTPATIENT
Start: 2025-01-28

## 2025-01-28 RX ORDER — INSULIN GLARGINE 100 [IU]/ML
INJECTION, SOLUTION SUBCUTANEOUS
Qty: 9 ML | Refills: 0 | OUTPATIENT
Start: 2025-01-28

## 2025-01-28 RX ORDER — METFORMIN HYDROCHLORIDE 500 MG/1
TABLET, EXTENDED RELEASE ORAL
Qty: 360 TABLET | Refills: 0 | OUTPATIENT
Start: 2025-01-28

## 2025-01-28 RX ORDER — ISOPROPYL ALCOHOL 70 ML/100ML
SWAB TOPICAL
Refills: 0 | OUTPATIENT
Start: 2025-01-28

## 2025-01-28 RX ORDER — METFORMIN HYDROCHLORIDE 500 MG/1
TABLET ORAL
Qty: 180 TABLET | Refills: 0 | OUTPATIENT
Start: 2025-01-28

## 2025-01-28 RX ORDER — ROSUVASTATIN CALCIUM 20 MG/1
TABLET, COATED ORAL
Qty: 90 TABLET | Refills: 3 | Status: SHIPPED | OUTPATIENT
Start: 2025-01-28

## 2025-01-28 RX ORDER — ROSUVASTATIN CALCIUM 20 MG/1
TABLET, COATED ORAL
Qty: 90 TABLET | Refills: 0 | OUTPATIENT
Start: 2025-01-28

## 2025-01-28 RX ORDER — GLIPIZIDE 5 MG/1
TABLET, FILM COATED, EXTENDED RELEASE ORAL
Qty: 90 TABLET | Refills: 0 | OUTPATIENT
Start: 2025-01-28

## 2025-01-28 NOTE — TELEPHONE ENCOUNTER
No care due was identified.  Health system Embedded Care Due Messages. Reference number: [id number].   1/28/2025 12:44:30 PM CST

## 2025-01-29 DIAGNOSIS — E11.40 TYPE 2 DIABETES MELLITUS WITH DIABETIC NEUROPATHY, WITHOUT LONG-TERM CURRENT USE OF INSULIN: ICD-10-CM

## 2025-01-29 DIAGNOSIS — E03.9 HYPOTHYROIDISM, UNSPECIFIED TYPE: ICD-10-CM

## 2025-01-29 DIAGNOSIS — I10 HTN (HYPERTENSION), BENIGN: ICD-10-CM

## 2025-01-29 RX ORDER — LEVOTHYROXINE SODIUM 112 UG/1
112 TABLET ORAL
Qty: 90 TABLET | Refills: 2 | Status: SHIPPED | OUTPATIENT
Start: 2025-01-29

## 2025-01-29 NOTE — TELEPHONE ENCOUNTER
PT requesting the following medication refills Lvothroxie Gabapentin and Lisinopril. I have sent a refill request of the Gabapentin and Levothroxine medication to the PT pharmacy.     PT requesting Lisinopril (Prinvil) med I did not see this medication on the patients med list. PT states that she only has 6 pills left, and the pills were dispensed as a 20 day supply. The PT says that her blood pressure has been elevated , but did not have any blood pressure readings to give to me.

## 2025-01-29 NOTE — TELEPHONE ENCOUNTER
No care due was identified.  Health [facility] Embedded Care Due Messages. Reference number: [id number].   1/29/2025 3:12:43 PM CST

## 2025-01-29 NOTE — TELEPHONE ENCOUNTER
----- Message from Madonna sent at 1/29/2025 12:45 PM CST -----  Contact: [phone] Patient  Requesting an RX refill or new RX.    Is this a refill or new RX: new    RX name and strength (copy/paste from chart):  levothyroxine (SYNTHROID) 112 MCG tablet    Is this a 30 day or 90 day RX: 90    Pharmacy name and phone # (copy/paste from chart):    [facility] - [place] [id number] [facility]  [address] [place] [address]  Phone: [phone] Fax: [phone]    Requesting an RX refill or new RX.    Is this a refill or new RX:     RX name and strength (copy/paste from chart):  gabapentin (NEURONTIN) 300 MG capsule    Is this a 30 day or 90 day RX:     Pharmacy name and phone # (copy/paste from chart):  [facility] - [place] [id number] [place] [id number] [place] [address]  Phone: [phone] Fax: [phone]    Requesting an RX refill or new RX.    Is this a refill or new RX: new    RX name and strength (copy/paste from chart):  lisinopriL (PRINIVIL,ZESTRIL) 40 MG tablet     Is this a 30 day or 90 day RX: 90    Pharmacy name and phone # (copy/paste from chart):  [facility] [place] [id number] [place] [id number] [place] [address]  Phone: [phone] Fax: [phone]      The doctors have asked that we provide their patients with the following 2 reminders -- prescription refills can take up to 72 hours, and a friendly reminder that in the future you can use your [facility] account to request refills: no

## 2025-01-29 NOTE — TELEPHONE ENCOUNTER
Refill Routing Note   Medication(s) are not appropriate for processing by [facility] for the following reason(s):        Outside of protocol  No active prescription written by provider    [facility] action(s):  Defer  Route  Approve        Medication Therapy Plan: PT REQUESTING LISINOPRIL THAT WAS DISCONTINUED ON 06/2024      Appointments  past 12m or future 3m with PCP    Date Provider   Last Visit   9/5/2024 Jeison Sanchez MD   Next Visit   2/27/2025 Jeison Sanchez MD   ED visits in past 90 days: 0        Note composed:4:43 PM 01/29/2025

## 2025-01-29 NOTE — TELEPHONE ENCOUNTER
----- Message from Madonna sent at 1/29/2025 12:45 PM CST -----  Contact: [phone] Patient  Requesting an RX refill or new RX.    Is this a refill or new RX: new    RX name and strength (copy/paste from chart):  levothyroxine (SYNTHROID) 112 MCG tablet    Is this a 30 day or 90 day RX: 90    Pharmacy name and phone # (copy/paste from chart):    [facility] - [place] [id number] [facility]  [address] [address]  Phone: [phone] Fax: [phone]    Requesting an RX refill or new RX.    Is this a refill or new RX:     RX name and strength (copy/paste from chart):  gabapentin (NEURONTIN) 300 MG capsule    Is this a 30 day or 90 day RX:     Pharmacy name and phone # (copy/paste from chart):  [facility] - [place] [id number] [place] [id number] [place] [address]  Phone: [phone] Fax: [phone]    Requesting an RX refill or new RX.    Is this a refill or new RX: new    RX name and strength (copy/paste from chart):  lisinopriL (PRINIVIL,ZESTRIL) 40 MG tablet     Is this a 30 day or 90 day RX: 90    Pharmacy name and phone # (copy/paste from chart):  [facility] [place] [id number] [place] [id number] [place] [address]  Phone: [phone] Fax: [phone]      The doctors have asked that we provide their patients with the following 2 reminders -- prescription refills can take up to 72 hours, and a friendly reminder that in the future you can use your [facility] account to request refills: no

## 2025-01-29 NOTE — TELEPHONE ENCOUNTER
[facility] Note  Quick DC. Inappropriate Request   Refill request requires further review by MD: NO   Medication Therapy Plan: Pharmacy is requesting new script(s) for the following medications without required information, (sig/ frequency/qty/etc)     [facility] action(s):  Quick Discontinue      Duplicate Pended Encounter(s)/ Last Prescribed Details:    Pharmacies have been requesting medications for patients without required information, (sig, frequency, qty, etc.). In addition, requests are sent for medication(s) pt. are currently not taking, and medications patients have never taken.    We have spoken to the pharmacies about these request types and advised their teams previously that we are unable to assess these New Script requests and require all details for these requests. This is a known issue and has been reported.        Medication related problems are not assessed for QDC.   Medication Reconciliation Completed? NO Were there pending details that required adjustment? NO     Automatic Epic Generated Protocol Data Below:   Requested Prescriptions     Pending Prescriptions Disp Refills    glipiZIDE 5 MG TR24 [Pharmacy Med Name: GLIPIZIDE ER 5MG TAB] 90 tablet 0    ALCOHOL PREP PAD SPACER [Pharmacy Med Name: BD ALCOHOL SWAB PAD]  0    lisinopriL (PRINIVIL,ZESTRIL) 40 MG tablet [Pharmacy Med Name: LISINOPRIL 40MG TAB] 90 tablet 0    LANTUS SOLOSTAR U-100 INSULIN 100 unit/mL (3 mL) InPn pen [Pharmacy Med Name: LANTUS U100 SOLOSTAR INJ ([id number])] 9 mL 0    metFORMIN (GLUCOPHAGE-XR) 500 MG ER 24hr tablet [Pharmacy Med Name: METFORMIN ER 500MG TAB (AB1)] 360 tablet 0    rosuvastatin (CRESTOR) 20 MG tablet [Pharmacy Med Name: ROSUVASTATIN 20MG TAB] 90 tablet 0    ACCU-CHEK GUIDE ME GLUCOSE MTR Misc [Pharmacy Med Name: ACCU-CHEK GUIDE-ME KIT]  0    ACCU-CHEK GUIDE TEST STRIPS Strp [Pharmacy Med Name: ACCU-CHEK GUIDE TEST STRIPS (50)] 200 strip 0    ACCU-CHEK SOFTCLIX LANCETS Misc [Pharmacy Med Name:  ACCU-CHEK SOFTCLIX LANCETS (100)] 200 each 0    metFORMIN (GLUCOPHAGE) 500 MG tablet [Pharmacy Med Name: METFORMIN 500MG TAB] 180 tablet 0              Appointments      Date Provider   Last Visit   9/5/2024 Jeison Sanchez MD   Next Visit   2/27/2025 Jeison Sanchez MD        Note composed:8:05 PM 01/28/2025

## 2025-01-30 RX ORDER — GABAPENTIN 300 MG/1
300 CAPSULE ORAL NIGHTLY
Qty: 90 CAPSULE | Refills: 3 | Status: SHIPPED | OUTPATIENT
Start: 2025-01-30

## 2025-01-30 RX ORDER — LISINOPRIL 40 MG/1
40 TABLET ORAL DAILY
Qty: 90 TABLET | Refills: 3 | Status: SHIPPED | OUTPATIENT
Start: 2025-01-30

## 2025-02-06 ENCOUNTER — TELEPHONE (OUTPATIENT)
Dept: INTERNAL MEDICINE | Facility: CLINIC | Age: 80
End: 2025-02-06
Payer: MEDICARE

## 2025-02-06 DIAGNOSIS — I10 HTN (HYPERTENSION), BENIGN: ICD-10-CM

## 2025-02-06 RX ORDER — LISINOPRIL 40 MG/1
40 TABLET ORAL
Qty: 90 TABLET | Refills: 3 | OUTPATIENT
Start: 2025-02-06

## 2025-02-06 NOTE — TELEPHONE ENCOUNTER
No care due was identified.  [facility] Embedded Care Due Messages. Reference number: [id number].   2/06/2025 1:15:13 PM CST

## 2025-02-06 NOTE — TELEPHONE ENCOUNTER
Refill Decision Note   Aminah Errol  is requesting a refill authorization.  Brief Assessment and Rationale for Refill:  Quick Discontinue     Medication Therapy Plan: E-Prescribing Status: Receipt confirmed by pharmacy (1/30/2025  1:18 PM CST)      Comments:     Note composed:2:22 PM 02/06/2025

## 2025-02-22 DIAGNOSIS — Z00.00 ENCOUNTER FOR MEDICARE ANNUAL WELLNESS EXAM: ICD-10-CM

## 2025-02-27 ENCOUNTER — LAB VISIT (OUTPATIENT)
Dept: LAB | Facility: HOSPITAL | Age: 80
End: 2025-02-27
Attending: INTERNAL MEDICINE
Payer: MEDICARE

## 2025-02-27 ENCOUNTER — OFFICE VISIT (OUTPATIENT)
Dept: INTERNAL MEDICINE | Facility: CLINIC | Age: 80
End: 2025-02-27
Payer: MEDICARE

## 2025-02-27 VITALS
BODY MASS INDEX: 28.83 KG/M2 | HEART RATE: 90 BPM | SYSTOLIC BLOOD PRESSURE: 138 MMHG | HEIGHT: 65 IN | WEIGHT: 173.06 LBS | OXYGEN SATURATION: 98 % | DIASTOLIC BLOOD PRESSURE: 68 MMHG

## 2025-02-27 DIAGNOSIS — E03.9 HYPOTHYROIDISM, UNSPECIFIED TYPE: Primary | ICD-10-CM

## 2025-02-27 DIAGNOSIS — E11.40 TYPE 2 DIABETES MELLITUS WITH DIABETIC NEUROPATHY, WITHOUT LONG-TERM CURRENT USE OF INSULIN: ICD-10-CM

## 2025-02-27 DIAGNOSIS — E03.9 HYPOTHYROIDISM, UNSPECIFIED TYPE: ICD-10-CM

## 2025-02-27 DIAGNOSIS — G44.86 CERVICOGENIC HEADACHE: ICD-10-CM

## 2025-02-27 DIAGNOSIS — I10 HTN (HYPERTENSION), BENIGN: ICD-10-CM

## 2025-02-27 DIAGNOSIS — M25.569 LATERAL KNEE PAIN, UNSPECIFIED LATERALITY: ICD-10-CM

## 2025-02-27 LAB
ALBUMIN SERPL BCP-MCNC: 4.1 G/DL (ref 3.5–5.2)
ALP SERPL-CCNC: 109 U/L (ref 40–150)
ALT SERPL W/O P-5'-P-CCNC: 12 U/L (ref 10–44)
ANION GAP SERPL CALC-SCNC: 10 MMOL/L (ref 8–16)
AST SERPL-CCNC: 26 U/L (ref 10–40)
BILIRUB SERPL-MCNC: 0.3 MG/DL (ref 0.1–1)
BUN SERPL-MCNC: 18 MG/DL (ref 8–23)
CALCIUM SERPL-MCNC: 10.1 MG/DL (ref 8.7–10.5)
CHLORIDE SERPL-SCNC: 101 MMOL/L (ref 95–110)
CO2 SERPL-SCNC: 23 MMOL/L (ref 23–29)
CREAT SERPL-MCNC: 1 MG/DL (ref 0.5–1.4)
EST. GFR  (NO RACE VARIABLE): 57.3 ML/MIN/1.73 M^2
ESTIMATED AVG GLUCOSE: 209 MG/DL (ref 68–131)
GLUCOSE SERPL-MCNC: 172 MG/DL (ref 70–110)
HBA1C MFR BLD: 8.9 % (ref 4–5.6)
POTASSIUM SERPL-SCNC: 4.6 MMOL/L (ref 3.5–5.1)
PROT SERPL-MCNC: 7.8 G/DL (ref 6–8.4)
SODIUM SERPL-SCNC: 134 MMOL/L (ref 136–145)
T4 FREE SERPL-MCNC: 0.97 NG/DL (ref 0.71–1.51)
TSH SERPL DL<=0.005 MIU/L-ACNC: 4.57 UIU/ML (ref 0.4–4)

## 2025-02-27 PROCEDURE — 3072F LOW RISK FOR RETINOPATHY: CPT | Mod: HCNC,CPTII,S$GLB, | Performed by: INTERNAL MEDICINE

## 2025-02-27 PROCEDURE — 36415 COLL VENOUS BLD VENIPUNCTURE: CPT | Mod: HCNC | Performed by: INTERNAL MEDICINE

## 2025-02-27 PROCEDURE — 84439 ASSAY OF FREE THYROXINE: CPT | Mod: HCNC | Performed by: INTERNAL MEDICINE

## 2025-02-27 PROCEDURE — 3288F FALL RISK ASSESSMENT DOCD: CPT | Mod: HCNC,CPTII,S$GLB, | Performed by: INTERNAL MEDICINE

## 2025-02-27 PROCEDURE — 3078F DIAST BP <80 MM HG: CPT | Mod: HCNC,CPTII,S$GLB, | Performed by: INTERNAL MEDICINE

## 2025-02-27 PROCEDURE — 80053 COMPREHEN METABOLIC PANEL: CPT | Mod: HCNC | Performed by: INTERNAL MEDICINE

## 2025-02-27 PROCEDURE — 84443 ASSAY THYROID STIM HORMONE: CPT | Mod: HCNC | Performed by: INTERNAL MEDICINE

## 2025-02-27 PROCEDURE — 3075F SYST BP GE 130 - 139MM HG: CPT | Mod: HCNC,CPTII,S$GLB, | Performed by: INTERNAL MEDICINE

## 2025-02-27 PROCEDURE — 99214 OFFICE O/P EST MOD 30 MIN: CPT | Mod: HCNC,S$GLB,, | Performed by: INTERNAL MEDICINE

## 2025-02-27 PROCEDURE — 1126F AMNT PAIN NOTED NONE PRSNT: CPT | Mod: HCNC,CPTII,S$GLB, | Performed by: INTERNAL MEDICINE

## 2025-02-27 PROCEDURE — 99999 PR PBB SHADOW E&M-EST. PATIENT-LVL III: CPT | Mod: PBBFAC,HCNC,, | Performed by: INTERNAL MEDICINE

## 2025-02-27 PROCEDURE — 1101F PT FALLS ASSESS-DOCD LE1/YR: CPT | Mod: HCNC,CPTII,S$GLB, | Performed by: INTERNAL MEDICINE

## 2025-02-27 PROCEDURE — 1157F ADVNC CARE PLAN IN RCRD: CPT | Mod: HCNC,CPTII,S$GLB, | Performed by: INTERNAL MEDICINE

## 2025-02-27 PROCEDURE — 83036 HEMOGLOBIN GLYCOSYLATED A1C: CPT | Mod: HCNC | Performed by: INTERNAL MEDICINE

## 2025-02-27 PROCEDURE — 1159F MED LIST DOCD IN RCRD: CPT | Mod: HCNC,CPTII,S$GLB, | Performed by: INTERNAL MEDICINE

## 2025-02-27 RX ORDER — SEMAGLUTIDE 0.68 MG/ML
INJECTION, SOLUTION SUBCUTANEOUS
Qty: 13.5 ML | Refills: 0 | Status: SHIPPED | OUTPATIENT
Start: 2025-02-27 | End: 2025-08-26

## 2025-02-27 RX ORDER — AMITRIPTYLINE HYDROCHLORIDE 10 MG/1
10 TABLET, FILM COATED ORAL NIGHTLY
Qty: 30 TABLET | Refills: 11 | Status: SHIPPED | OUTPATIENT
Start: 2025-02-27 | End: 2025-02-27

## 2025-02-27 RX ORDER — MELOXICAM 7.5 MG/1
7.5 TABLET ORAL DAILY
Qty: 90 TABLET | Refills: 0 | Status: SHIPPED | OUTPATIENT
Start: 2025-02-27

## 2025-02-27 RX ORDER — AMITRIPTYLINE HYDROCHLORIDE 10 MG/1
10 TABLET, FILM COATED ORAL NIGHTLY
Qty: 90 TABLET | Refills: 3 | Status: SHIPPED | OUTPATIENT
Start: 2025-02-27 | End: 2026-02-27

## 2025-02-27 NOTE — PROGRESS NOTES
"    CHIEF COMPLAINT     Chief Complaint   Patient presents with    Follow-up     Pain In Arm and Leg       HPI     Aminah Norton is a 79 y.o. female  type 2 diabetes hypertension ureteral stone here today for Dm f/u     Taking metformin 1000 mg b.i.d..  She is also taking glipizide 5 mg daily.  Was previously prescribed Ozempic but medication became too expensive.  Reports her blood sugar was better controlled when she was taking the Ozempic.  She has switched insurance.    Has pain in left knee and left elbow.  She takes ibuprofen which helps    Personally Reviewed Patient's Medical, surgical, family and social hx. [facility]  Care Team updated in Epic    Review of Systems:  Review of Systems   Genitourinary:  Positive for frequency.   Musculoskeletal:  Positive for arthralgias and gait problem.       Health Maintenance:   Reviewed with patient  Due for the following:      PHYSICAL EXAM     BP (!) 140/68 (BP Location: Right arm, Patient Position: Sitting)   Pulse 90   Ht 5' 5" (1.651 m)   Wt 78.5 kg (173 lb 1 oz)   SpO2 98%   BMI 28.80 kg/m²     Gen: Well Appearing, NAD  HEENT: PERR, EOMI  Neck: FROM, no thyromegaly, no cervical adenopathy  Chest normal work of breathing  MSK: no LE edema  Neuro: A&Ox3, gait normal, speech normal  Mood; Mood normal, behavior normal, thought process linear       LABS     Labs reviewed; Notable for    Chemistry        Component Value Date/Time     (L) 09/04/2024 0836    K 4.5 09/04/2024 0836     09/04/2024 0836    CO2 23 09/04/2024 0836    BUN 19 09/04/2024 0836    CREATININE 0.9 09/04/2024 0836     (H) 09/04/2024 0836        Component Value Date/Time    CALCIUM 10.1 09/04/2024 0836    ALKPHOS 94 09/04/2024 0836    AST 14 09/04/2024 0836    ALT 17 09/04/2024 0836    BILITOT 0.5 09/04/2024 0836    ESTGFRAFRICA >60.0 06/30/2022 0807    EGFRNONAA >60.0 06/30/2022 0807          ASSESSMENT     1. Hypothyroidism, unspecified type  TSH "      2. Type 2 diabetes mellitus with diabetic neuropathy, without long-term current use of insulin  semaglutide (OZEMPIC) 0.25 mg or 0.5 mg (2 mg/3 mL) pen injector    Hemoglobin A1C    Comprehensive Metabolic Panel    TSH      3. HTN (hypertension), benign        4. Cervicogenic headache  amitriptyline (ELAVIL) 10 MG tablet    DISCONTINUED: amitriptyline (ELAVIL) 10 MG tablet      5. Lateral knee pain, unspecified laterality  meloxicam (MOBIC) 7.5 MG tablet              Plan     Aminah Norton is a 79 y.o. female with  type 2 diabetes hypertension ureteral stone here today for Dm f/u   1. Type 2 diabetes mellitus with diabetic neuropathy, without long-term current use of insulin  Recheck labs today  Restart Ozempic  Continue glipizide 5 and metformin 1000 b.i.d.  - semaglutide (OZEMPIC) 0.25 mg or 0.5 mg (2 mg/3 mL) pen injector; Inject 0.25 mg into the skin every 7 days for 90 days, THEN 0.5 mg every 7 days.  Dispense: 13.5 mL; Refill: 0  - Hemoglobin A1C; Future  - Comprehensive Metabolic Panel; Future  - TSH; Future    2. Hypothyroidism, unspecified type  Continue levothyroxine 112 mcg daily  - TSH; Future    3. HTN (hypertension), benign  At goal continue Coreg 6.25 b.i.d. lisinopril 40  Think will also improve if we can help her with her joint pain    4. Cervicogenic headache  Refilled amitriptyline.  Also think it will help with her overactive bladder symptoms  - amitriptyline (ELAVIL) 10 MG tablet; Take 1 tablet (10 mg total) by mouth every evening.  Dispense: 90 tablet; Refill: 3    5. Lateral knee pain, unspecified laterality  Therapeutic trial of daily meloxicam for knee pain  - meloxicam (MOBIC) 7.5 MG tablet; Take 1 tablet (7.5 mg total) by mouth once daily.  Dispense: 90 tablet; Refill: 0    Will get labs today follow up in 3 months  Jeison Sanchez MD

## 2025-02-28 ENCOUNTER — RESULTS FOLLOW-UP (OUTPATIENT)
Dept: INTERNAL MEDICINE | Facility: CLINIC | Age: 80
End: 2025-02-28
Payer: MEDICARE

## 2025-02-28 ENCOUNTER — TELEPHONE (OUTPATIENT)
Dept: INTERNAL MEDICINE | Facility: CLINIC | Age: 80
End: 2025-02-28
Payer: MEDICARE

## 2025-02-28 NOTE — TELEPHONE ENCOUNTER
----- Message from Alma Delia sent at 2/28/2025  8:52 AM CST -----  Contact: [phone]@patient  Patient is returning a phone call. Yes Who left a message for the patient: Carmen Morocho LPNDoes patient know what this is regarding:  Would you like a call back, or a response through your [facility] portal?:   Call back Comments:

## 2025-02-28 NOTE — TELEPHONE ENCOUNTER
I left a detailed message on her recorder. She's to call back with any questions or concerns. She's not on Portal.

## 2025-02-28 NOTE — TELEPHONE ENCOUNTER
----- Message from Jeison Sanchez MD sent at 2/28/2025  7:44 AM CST -----  A1c still not at goal. Hopefully can restart ozempic and titrate up.     TSH flags as out of range, but acceptable for age.  ----- Message -----  From: Jacob, [employer]  Sent: 2/27/2025   9:58 PM CST  To: Jeison Sanchez MD

## 2025-04-28 NOTE — TELEPHONE ENCOUNTER
----- Message from Mary sent at 2/6/2025  3:27 PM CST -----  Contact: [phone] Patient  .[email or web address]     Patient is calling for Medical Advice regarding: Pt states [facility] wants $250 out of pocket for the following Rx: semaglutide (OZEMPIC) 0.25 mg or 0.5 mg (2 mg/3 mL) pen injector 13.5 mL 0 9/5/2024 3/4/2025 No    Pt would like to know if Rx can be sent to new pharmacy and if they cover the Rx.    How long has patient had these symptoms:    Pharmacy name and phone#:   [facility] - [place], [place] - [address]  [address] [address]  Phone: [phone] Fax: [phone]    Patient wants a call back or thru [facility]: call back    Comments: [employer] should pay $50 towards this medication but will not pay the $250.    Please advise patient replies from provider may take up to 48 hours.  
Dr Sanchez I have no control how much the insurance companies charge for co-payments on drugs. I don't know if this is part of a deductible? I do not obtain PA's for costs on medications. I believe that she should have this discussion with her insurance.  
yes

## 2025-05-02 ENCOUNTER — OFFICE VISIT (OUTPATIENT)
Dept: PODIATRY | Facility: CLINIC | Age: 80
End: 2025-05-02
Payer: MEDICARE

## 2025-05-02 VITALS
BODY MASS INDEX: 27.36 KG/M2 | HEART RATE: 84 BPM | HEIGHT: 65 IN | DIASTOLIC BLOOD PRESSURE: 77 MMHG | SYSTOLIC BLOOD PRESSURE: 149 MMHG | WEIGHT: 164.25 LBS

## 2025-05-02 DIAGNOSIS — B35.3 TINEA PEDIS OF BOTH FEET: ICD-10-CM

## 2025-05-02 DIAGNOSIS — M20.41 HAMMERTOES OF BOTH FEET: ICD-10-CM

## 2025-05-02 DIAGNOSIS — B35.1 TINEA UNGUIUM: ICD-10-CM

## 2025-05-02 DIAGNOSIS — M20.42 HAMMERTOES OF BOTH FEET: ICD-10-CM

## 2025-05-02 DIAGNOSIS — E11.42 TYPE 2 DIABETES MELLITUS WITH PERIPHERAL NEUROPATHY: ICD-10-CM

## 2025-05-02 DIAGNOSIS — E11.40 PAINFUL DIABETIC NEUROPATHY: Primary | ICD-10-CM

## 2025-05-02 PROCEDURE — 99999 PR PBB SHADOW E&M-EST. PATIENT-LVL III: CPT | Mod: PBBFAC,HCNC,, | Performed by: STUDENT IN AN ORGANIZED HEALTH CARE EDUCATION/TRAINING PROGRAM

## 2025-05-02 RX ORDER — TERBINAFINE HYDROCHLORIDE 250 MG/1
250 TABLET ORAL DAILY
Qty: 90 TABLET | Refills: 0 | Status: SHIPPED | OUTPATIENT
Start: 2025-05-02 | End: 2025-07-31

## 2025-05-02 RX ORDER — GABAPENTIN 600 MG/1
600 TABLET ORAL NIGHTLY
Qty: 30 TABLET | Refills: 11 | Status: SHIPPED | OUTPATIENT
Start: 2025-05-02 | End: 2026-05-02

## 2025-05-02 NOTE — PROGRESS NOTES
Subjective:     Patient    Aminah Norton is a 80 y.o. female.    Problem    Seen by Dr Damon 1 year ago for fungal nails, prescribed Penlac which did not help the nails. Has bilateral foot pain, numbness, tingling, cramping left worse than right; worst at left medial ankle. Also with chronic low back pain and left sided sciatica. Has attempted amitriptyline which makes her too sleepy. On gabapentin now which helps some but could be better, no side effects.      Primary Care Provider    Primary Care Provider: Jeison Sanchez MD   Last Seen: 2/27/2025     History    History obtained from patient and review of medical records.     Past Medical History:   Diagnosis Date    Anticoagulant long-term use     Arthritis     Cataract     left eye    Choroidal rupture of left eye     Diabetes mellitus type II     GERD (gastroesophageal reflux disease)     Hyperlipidemia     Hypertension     Hypothyroidism     Macular scar     right eye    Retinal degeneration     chorioretinal OD    Thyroid disease     Vitamin B 12 deficiency        Past Surgical History:   Procedure Laterality Date    APPENDECTOMY      BLADDER FULGURATION N/A 3/1/2024    Procedure: ULCER INJECTION FULGURATION;  Surgeon: Brody Smith MD;  [facility]: [facility];  Service: Urology;  Laterality: N/A;    BLADDER SUSPENSION      CATARACT EXTRACTION      right eye     CHOLECYSTECTOMY      COLONOSCOPY N/A 7/20/2020    Procedure: COLONOSCOPY;  Surgeon: Juan Parker MD;  Location: [facility] (4TH FLR);  Service: Endoscopy;  Laterality: N/A;  Hx of chronic anemia.  patient needs a   4/6/20 - removed from 4/20/20, rescheduled 7/20/20 - pg  covid 7/17-[facility] 2nd floor-tb    COLONOSCOPY N/A 2/9/2023    Procedure: COLONOSCOPY;  Surgeon: Renan Sanchez MD;  Location: [facility] (4TH FLR);  Service: Colon and Rectal;  Laterality: N/A;  instr handed to patient, -ml    CYSTOSCOPY N/A 5/19/2021    Procedure:  CYSTOSCOPY;  Surgeon: Brody Smith MD;  Location: [facility] FLR;  Service: Urology;  Laterality: N/A;    CYSTOSCOPY W/ URETERAL STENT REMOVAL Right 5/24/2024    Procedure: RIGHT REMOVAL-STENT;  Surgeon: Brody Smith MD;  Location: [facility] OR;  Service: Urology;  Laterality: Right;  30 MINUTES    CYSTOURETEROSCOPY, WITH HOLMIUM LASER LITHOTRIPSY OF URETERAL CALCULUS AND STENT INSERTION Right 3/1/2024    Procedure: CYSTOURETEROSCOPY, WITH HOLMIUM LASER LITHOTRIPSY OF URETERAL CALCULUS AND STENT INSERTION;  Surgeon: Brody Smith MD;  Location: [facility] OR;  Service: Urology;  Laterality: Right;    CYSTOURETEROSCOPY,WITH HOLMIUM LASER LITHOTRIPSY OF URETERAL CALCULUS Right 3/14/2024    Procedure: CYSTOURETEROSCOPY,WITH HOLMIUM LASER LITHOTRIPSY OF URETERAL CALCULUS;  Surgeon: Brody Smith MD;  Location: [facility] OR;  Service: Urology;  Laterality: Right;  1 HR    ECHOCARDIOGRAM,TRANSESOPHAGEAL N/A 3/20/2024    Procedure: Transesophageal echo (CARLITO) intra-procedure log documentation;  Surgeon: Provider, [facility];  Location: [facility] EP LAB;  Service: Cardiology;  Laterality: N/A;    EYE SURGERY Bilateral     cataract removal    INJECTION OF STEROID N/A 3/14/2024    Procedure: INJECTION, STEROID;  Surgeon: Brody Smith MD;  Location: [facility] OR;  Service: Urology;  Laterality: N/A;    PLACEMENT-STENT Right 3/14/2024    Procedure: PLACEMENT-STENT;  Surgeon: Brody Smith MD;  Location: [facility] OR;  Service: Urology;  Laterality: Right;    REMOVAL-STENT Right 3/14/2024    Procedure: REMOVAL-STENT;  Surgeon: Brody Smith MD;  Location: [facility];  Service: Urology;  Laterality: Right;    TOTAL ABDOMINAL HYSTERECTOMY      DUB        Objective:     Vitals  Wt Readings from Last 1 Encounters:   05/02/25 74.5 kg (164 lb 3.9 oz)     Temp Readings from Last 1 Encounters:   06/03/24 98.5 °F (36.9 °C) (Oral)     BP Readings from Last 1 Encounters:   05/02/25 (!) 149/77     Pulse Readings  from Last 1 Encounters:   25 84       Dermatological Exam    Skin:  Pedal hair growth diminished and Pedal skin thin and shiny on right; dry scaly acral skin  Pedal hair growth diminished and Pedal skin thin and shiny on left; dry scaly acral skin    Nails:  10 nail(s) elongated, 10 nail(s) thickened, and 10 nail(s) discolored    Vascular Exam    Arteries:  Posterior tibial artery palpable on right  Dorsalis pedis artery palpable on right  Posterior tibial artery palpable on left  Dorsalis pedis artery palpable on left    Veins:  Superficial veins unremarkable on right  Superficial veins unremarkable on left    Swellin+ nonpitting on right  1+ nonpitting on left    Neurological Exam    [place] touch test:  6/6 sites sensed, light touch intact    Provocation Sign:  + at tibial nerve on left     Musculoskeletal Exam    Footwear:  Casual on right  Casual on left    Gait Exam:   Ambulatory Status: Ambulatory  Gait: Antalgic  Assistive Devices: None    Foot Progression Angle:  Normal on right  Normal on left     Right Lower Extremity Additional Findings:  Hammertoes 2-5  Right foot and ankle function, strength, and range of motion unremarkable except as noted above.     Left Lower Extremity Additional Findings:  Hammertoes 2-5  Left foot and ankle function, strength, and range of motion unremarkable except as noted above.    Imaging and Other Tests    Imaging:  Independently reviewed and interpreted imaging, findings are as follows: N/A    Other Tests: The following A1c results were reviewed.   Hemoglobin A1C   Date Value Ref Range Status   2025 8.9 (H) 4.0 - 5.6 % Final     Comment:     ADA Screening Guidelines:  5.7-6.4%  Consistent with prediabetes  >or=6.5%  Consistent with diabetes    High levels of fetal hemoglobin interfere with the HbA1C  assay. Heterozygous hemoglobin variants (HbS, HgC, etc)do  not significantly interfere with this assay.   However, presence of multiple variants may affect  accuracy.     09/04/2024 9.0 (H) 4.0 - 5.6 % Final     Comment:     ADA Screening Guidelines:  5.7-6.4%  Consistent with prediabetes  >or=6.5%  Consistent with diabetes    High levels of fetal hemoglobin interfere with the HbA1C  assay. Heterozygous hemoglobin variants (HbS, HgC, etc)do  not significantly interfere with this assay.   However, presence of multiple variants may affect accuracy.     06/01/2024 11.2 (H) 4.0 - 5.6 % Final     Comment:     ADA Screening Guidelines:  5.7-6.4%  Consistent with prediabetes  >or=6.5%  Consistent with diabetes    High levels of fetal hemoglobin interfere with the HbA1C  assay. Heterozygous hemoglobin variants (HbS, HgC, etc)do  not significantly interfere with this assay.   However, presence of multiple variants may affect accuracy.           Assessment:     Encounter Diagnoses   Name Primary?    Painful diabetic neuropathy Yes    Tinea unguium     Tinea pedis of both feet     Type 2 diabetes mellitus with peripheral neuropathy     Hammertoes of both feet         Plan:     I counseled the patient on her conditions, their implications and medical management.    Diabetic foot with peripheral neuropathy: chronic stable  -Diabetic foot exam performed.  -Performed shoe inspection and diabetic foot education. Reviewed importance of blood glucose control, proper nutrition, and foot hygiene to minimize risk of complications of diabetes. Recommended daily foot inspections, daily moisturizer to feet, avoiding sharp instruments to feet, appropriate footwear at all times when ambulating, and following up regularly for routine foot care.   -Diabetic foot risk: 2023 IWGDF very low ulcer risk.   -Next foot exam due May 2026.    Painful diabetic neuropathy: chronic stable  -Failed amitriptyline.   -Increase gabapentin to 600 mg/night.   -Ordered Qutenza.     Hammertoes: chronic stable  -Patient is under a comprehensive diabetes management plan and has foot deformity. Ordered diabetic shoes  and inserts.     Tinea unguium, tinea pedis: chronic stable  -Recommended wearing comfortable well fitting shoes; discarding old footwear or disinfecting old footwear with naphthalene mothballs; avoiding trauma to the nails; avoiding sharing nail clippers with family or friends; avoiding nail salons that do not employ sterile techniques; and wearing flip flops or shower shoes at public pools, showers, locker rooms, and hotels.  -Discussed treatment options including (1) monitoring, (2) debridement, (3) topical antifungals, (4) oral antifungals. Discussed potential risks, benefits, alternatives to each. Patient opted for debridement and oral antifungals.  -Nails debrided x10, thickness and length reduced using sterile nail nippers, see procedure note.    -Stop ciclopirox.   -Reviewed most recent CMP/LFT, ordered 3 months PO terbinafine.          Return to clinic in 1 month for Qutenza, call sooner PRN.

## 2025-05-02 NOTE — PROCEDURES
"Routine Foot Care    Date/Time: 5/2/2025 10:30 AM    Performed by: Luis Wiseman DPM  Authorized by: Lius Wiseman DPM    Time out: Immediately prior to procedure a "time out" was called to verify the correct patient, procedure, equipment, support staff and site/side marked as required.    Consent Done?:  Yes (Verbal)  Hyperkeratotic Skin Lesions?: No      Nail Care Type:  Debride  Location(s): All  (Left 1st Toe, Left 3rd Toe, Left 2nd Toe, Left 4th Toe, Left 5th Toe, Right 1st Toe, Right 2nd Toe, Right 3rd Toe, Right 4th Toe and Right 5th Toe)  Patient tolerance:  Patient tolerated the procedure well with no immediate complications    "

## 2025-05-06 ENCOUNTER — OFFICE VISIT (OUTPATIENT)
Dept: INTERNAL MEDICINE | Facility: CLINIC | Age: 80
End: 2025-05-06
Payer: MEDICARE

## 2025-05-06 VITALS
SYSTOLIC BLOOD PRESSURE: 136 MMHG | DIASTOLIC BLOOD PRESSURE: 74 MMHG | HEART RATE: 90 BPM | HEIGHT: 65 IN | OXYGEN SATURATION: 98 % | BODY MASS INDEX: 27.66 KG/M2 | WEIGHT: 166 LBS

## 2025-05-06 DIAGNOSIS — B02.9 HERPES ZOSTER WITHOUT COMPLICATION: Primary | ICD-10-CM

## 2025-05-06 PROCEDURE — 1125F AMNT PAIN NOTED PAIN PRSNT: CPT | Mod: CPTII,HCNC,S$GLB, | Performed by: NURSE PRACTITIONER

## 2025-05-06 PROCEDURE — 3072F LOW RISK FOR RETINOPATHY: CPT | Mod: CPTII,HCNC,S$GLB, | Performed by: NURSE PRACTITIONER

## 2025-05-06 PROCEDURE — 99999 PR PBB SHADOW E&M-EST. PATIENT-LVL IV: CPT | Mod: PBBFAC,HCNC,, | Performed by: NURSE PRACTITIONER

## 2025-05-06 PROCEDURE — 1157F ADVNC CARE PLAN IN RCRD: CPT | Mod: CPTII,HCNC,S$GLB, | Performed by: NURSE PRACTITIONER

## 2025-05-06 PROCEDURE — 3078F DIAST BP <80 MM HG: CPT | Mod: CPTII,HCNC,S$GLB, | Performed by: NURSE PRACTITIONER

## 2025-05-06 PROCEDURE — 1101F PT FALLS ASSESS-DOCD LE1/YR: CPT | Mod: CPTII,HCNC,S$GLB, | Performed by: NURSE PRACTITIONER

## 2025-05-06 PROCEDURE — 99213 OFFICE O/P EST LOW 20 MIN: CPT | Mod: HCNC,S$GLB,, | Performed by: NURSE PRACTITIONER

## 2025-05-06 PROCEDURE — 3288F FALL RISK ASSESSMENT DOCD: CPT | Mod: CPTII,HCNC,S$GLB, | Performed by: NURSE PRACTITIONER

## 2025-05-06 PROCEDURE — 1159F MED LIST DOCD IN RCRD: CPT | Mod: CPTII,HCNC,S$GLB, | Performed by: NURSE PRACTITIONER

## 2025-05-06 PROCEDURE — 3075F SYST BP GE 130 - 139MM HG: CPT | Mod: CPTII,HCNC,S$GLB, | Performed by: NURSE PRACTITIONER

## 2025-05-06 RX ORDER — VALACYCLOVIR HYDROCHLORIDE 1 G/1
1000 TABLET, FILM COATED ORAL 3 TIMES DAILY
Qty: 21 TABLET | Refills: 0 | Status: SHIPPED | OUTPATIENT
Start: 2025-05-06 | End: 2025-05-13

## 2025-05-06 RX ORDER — TRIAMCINOLONE ACETONIDE 1 MG/G
CREAM TOPICAL 2 TIMES DAILY
Qty: 80 G | Refills: 0 | Status: SHIPPED | OUTPATIENT
Start: 2025-05-06 | End: 2025-05-13

## 2025-05-06 NOTE — PATIENT INSTRUCTIONS
Increase gabapentin to 300 mg in the morning and 300 mg at night.   Start valtrex today.   Okay to continue meloxicam  Can put triamcinolone cream on as needed if itching it really bad.   Let me know if pain fails to improve or worsens.

## 2025-05-06 NOTE — PROGRESS NOTES
INTERNAL MEDICINE PROGRESS/URGENT CARE NOTE    CHIEF COMPLAINT     Chief Complaint   Patient presents with    rash     pain under left breast        HPI     Aminah Norton is a 80 y.o. female who presents for an urgent visit today.    Here with son.  was offered but pt declined.     PT states developed painful itchy red rash to L low back area which radiates to lower abd. Began 2 days ago. No fevers or recent illness. Taking meloxicam for pain with little improvement. She is also prescribed gabapentin for neuropathy. She has the 300 mg capsules in her medicine bag today. Does not have 600 mg capsules.       Problem List[1]     Past Medical History:  Past Medical History:   Diagnosis Date    Anticoagulant long-term use     Arthritis     Cataract     left eye    Choroidal rupture of left eye     Diabetes mellitus type II     GERD (gastroesophageal reflux disease)     Hyperlipidemia     Hypertension     Hypothyroidism     Macular scar     right eye    Retinal degeneration     chorioretinal OD    Thyroid disease     Vitamin B 12 deficiency         Past Surgical History:  Past Surgical History:   Procedure Laterality Date    APPENDECTOMY      BLADDER FULGURATION N/A 3/1/2024    Procedure: ULCER INJECTION FULGURATION;  Surgeon: Brody Smith MD;  Location: [facility];  Service: Urology;  Laterality: N/A;    BLADDER SUSPENSION      CATARACT EXTRACTION      right eye     CHOLECYSTECTOMY      COLONOSCOPY N/A 7/20/2020    Procedure: COLONOSCOPY;  Surgeon: Juan Parker MD;  Location: [facility] (4TH FLR);  Service: Endoscopy;  Laterality: N/A;  Hx of chronic anemia.  patient needs a   4/6/20 - removed from 4/20/20, rescheduled 7/20/20 - pg  covid 7[place] [place] [facility]    COLONOSCOPY N/A 2/9/2023    Procedure: COLONOSCOPY;  Surgeon: Renan Sanchez MD;  Location: [facility] (4TH FLR);  Service: Colon and Rectal;  Laterality: N/A;  instr handed to patient, -ml     CYSTOSCOPY N/A 5/19/2021    Procedure: CYSTOSCOPY;  Surgeon: Brody Smith MD;  Location: [facility] FLR;  Service: Urology;  Laterality: N/A;    CYSTOSCOPY W/ URETERAL STENT REMOVAL Right 5/24/2024    Procedure: RIGHT REMOVAL-STENT;  Surgeon: Brody Smith MD;  Location: [facility] OR;  Service: Urology;  Laterality: Right;  30 MINUTES    CYSTOURETEROSCOPY, WITH HOLMIUM LASER LITHOTRIPSY OF URETERAL CALCULUS AND STENT INSERTION Right 3/1/2024    Procedure: CYSTOURETEROSCOPY, WITH HOLMIUM LASER LITHOTRIPSY OF URETERAL CALCULUS AND STENT INSERTION;  Surgeon: Brody Smith MD;  Location: [facility] OR;  Service: Urology;  Laterality: Right;    CYSTOURETEROSCOPY,WITH HOLMIUM LASER LITHOTRIPSY OF URETERAL CALCULUS Right 3/14/2024    Procedure: CYSTOURETEROSCOPY,WITH HOLMIUM LASER LITHOTRIPSY OF URETERAL CALCULUS;  Surgeon: Brody Smith MD;  Location: [facility] OR;  Service: Urology;  Laterality: Right;  1 HR    ECHOCARDIOGRAM,TRANSESOPHAGEAL N/A 3/20/2024    Procedure: Transesophageal echo (CARLITO) intra-procedure log documentation;  Surgeon: Provider, Dosc [facility];  Location: [facility] EP LAB;  Service: Cardiology;  Laterality: N/A;    EYE SURGERY Bilateral     cataract removal    INJECTION OF STEROID N/A 3/14/2024    Procedure: INJECTION, STEROID;  Surgeon: Brody Smith MD;  Location: [facility];  Service: Urology;  Laterality: N/A;    PLACEMENT-STENT Right 3/14/2024    Procedure: PLACEMENT-STENT;  Surgeon: Brody Smith MD;  Location: [facility] OR;  Service: Urology;  Laterality: Right;    REMOVAL-STENT Right 3/14/2024    Procedure: REMOVAL-STENT;  Surgeon: Brody Smith MD;  Location: [facility] OR;  Service: Urology;  Laterality: Right;    TOTAL ABDOMINAL HYSTERECTOMY      DUB        Allergies:  Review of patient's allergies indicates:   Allergen Reactions    Bufferin [aspirin, buffered] Itching    Atorvastatin      Myalgias and arthralgias    Shellfish containing products Itching      "Shellfish causes her to itch per her daughter, Caro.     Warfarin     Ibuprofen Itching     Itching of eyes, head       Home Medications:  Current Medications[2]     Review of Systems:  Review of Systems   Constitutional:  Negative for fever.   Respiratory:  Negative for cough and shortness of breath.    Cardiovascular:  Negative for chest pain.   Musculoskeletal:  Positive for back pain.   Skin:  Positive for rash.   Neurological:  Negative for weakness and headaches.         PHYSICAL EXAM     Vitals:    05/06/25 1502   BP: 136/74   BP Location: Left arm   Patient Position: Sitting   Pulse: 90   SpO2: 98%   Weight: 75.3 kg (166 lb 0.1 oz)   Height: 5' 5" (1.651 m)      Body mass index is 27.62 kg/m².     Physical Exam  Vitals reviewed.   Constitutional:       Appearance: Normal appearance.   HENT:      Head: Normocephalic.   Eyes:      Conjunctiva/sclera: Conjunctivae normal.   Cardiovascular:      Rate and Rhythm: Normal rate and regular rhythm.   Pulmonary:      Effort: Pulmonary effort is normal.      Breath sounds: Normal breath sounds.   Skin:     General: Skin is warm and dry.      Comments: Vesicular red rash noted to left low back which radiates anteriorly to abdomen.   Neurological:      General: No focal deficit present.      Mental Status: She is alert.   Psychiatric:         Mood and Affect: Mood normal.         Behavior: Behavior normal.         LABS     Lab Results   Component Value Date    HGBA1C 8.9 (H) 02/27/2025     CMP  Sodium   Date Value Ref Range Status   02/27/2025 134 (L) 136 - 145 mmol/L Final     Potassium   Date Value Ref Range Status   02/27/2025 4.6 3.5 - 5.1 mmol/L Final     Chloride   Date Value Ref Range Status   02/27/2025 101 95 - 110 mmol/L Final     CO2   Date Value Ref Range Status   02/27/2025 23 23 - 29 mmol/L Final     Glucose   Date Value Ref Range Status   02/27/2025 172 (H) 70 - 110 mg/dL Final     BUN   Date Value Ref Range Status   02/27/2025 18 8 - 23 mg/dL Final "     Creatinine   Date Value Ref Range Status   02/27/2025 1.0 0.5 - 1.4 mg/dL Final     Calcium   Date Value Ref Range Status   02/27/2025 10.1 8.7 - 10.5 mg/dL Final     Total Protein   Date Value Ref Range Status   02/27/2025 7.8 6.0 - 8.4 g/dL Final     Albumin   Date Value Ref Range Status   02/27/2025 4.1 3.5 - 5.2 g/dL Final     Total Bilirubin   Date Value Ref Range Status   02/27/2025 0.3 0.1 - 1.0 mg/dL Final     Comment:     For infants and newborns, interpretation of results should be based  on gestational age, weight and in agreement with clinical  observations.    Premature Infant recommended reference ranges:  Up to 24 hours.............<8.0 mg/dL  Up to 48 hours............<12.0 mg/dL  3-5 days..................<15.0 mg/dL  6-29 days.................<15.0 mg/dL       Alkaline Phosphatase   Date Value Ref Range Status   02/27/2025 109 40 - 150 U/L Final     AST   Date Value Ref Range Status   02/27/2025 26 10 - 40 U/L Final     ALT   Date Value Ref Range Status   02/27/2025 12 10 - 44 U/L Final     Anion Gap   Date Value Ref Range Status   02/27/2025 10 8 - 16 mmol/L Final     eGFR if    Date Value Ref Range Status   06/30/2022 >60.0 >60 mL/min/1.73 m^2 Final     eGFR if non    Date Value Ref Range Status   06/30/2022 >60.0 >60 mL/min/1.73 m^2 Final     Comment:     Calculation used to obtain the estimated glomerular filtration  rate (eGFR) is the CKD-EPI equation.        Lab Results   Component Value Date    WBC 3.52 (L) 06/01/2024    HGB 10.3 (L) 06/01/2024    HCT 32.6 (L) 06/01/2024    MCV 71 (L) 06/01/2024     06/01/2024     Lab Results   Component Value Date    CHOL 245 (H) 06/01/2024    CHOL 225 (H) 11/09/2023    CHOL 201 (H) 08/07/2023     Lab Results   Component Value Date    HDL 51 06/01/2024    HDL 59 11/09/2023    HDL 53 08/07/2023     Lab Results   Component Value Date    LDLCALC 162.0 (H) 06/01/2024    LDLCALC 122.4 11/09/2023    LDLCALC 108.6  08/07/2023     Lab Results   Component Value Date    TRIG 160 (H) 06/01/2024    TRIG 218 (H) 11/09/2023    TRIG 197 (H) 08/07/2023     Lab Results   Component Value Date    CHOLHDL 20.8 06/01/2024    CHOLHDL 26.2 11/09/2023    CHOLHDL 26.4 08/07/2023     Lab Results   Component Value Date    TSH 4.573 (H) 02/27/2025       ASSESSMENT     1. Herpes zoster without complication           PLAN  Explained zoster to pt and son.  She did have chickenpox when she was younger.  She received 1 shingles vaccine last year.  We will start Valtrex which should help prevent any worsening.  Instructed to increase her gabapentin to 300 mg in the morning and 300 mg at night.  Can continue her meloxicam.  We will give her a steroid cream to use only if it itches.  Instructed to keep covers as this is contagious until it as crusted over.  She should stay away from the immunocompromise or those who are elderly or very young until she is healed.   If pain still uncontrolled she will let me know and we will consider stronger pain medication.  Keep f/u as scheduled with PCP this month.     1. Herpes zoster without complication  - valACYclovir (VALTREX) 1000 MG tablet; Take 1 tablet (1,000 mg total) by mouth 3 (three) times daily. for 7 days  Dispense: 21 tablet; Refill: 0  - triamcinolone acetonide 0.1% (KENALOG) 0.1 % cream; Apply topically 2 (two) times daily. for 7 days  Dispense: 80 g; Refill: 0       Follow up with PCP     Patient was counseled on when to seek emergent care. Patient's plan/treatment was discussed including medications and possible side effects. Verbalized understanding of all instructions.     This note was partly generated with [employer] voice recognition software. I apologize for any possible typographical errors.          ANDREW Silver  Department of Internal Medicine - [facility]  05/06/2025          [1]   Patient Active Problem List  Diagnosis    Myalgia    Plantar fasciitis of left foot     Hypothyroidism    Elevated cholesterol with high triglycerides    HTN (hypertension), benign    Vitamin B 12 deficiency    GERD (gastroesophageal reflux disease)    Neuropathy    Type 2 diabetes mellitus with neurologic complication, without long-term current use of insulin    Diabetic polyneuropathy    Cataracts, bilateral    Seborrheic dermatitis    Back pain    Facet syndrome    Bilateral low back pain without sciatica    Spondylisthesis    Vitamin D insufficiency    Lower urinary tract symptoms (LUTS)    Hematuria    Bilateral leg pain    Aortic atherosclerosis    Bilateral shoulder pain    TB lung, latent    Hyperlipidemia    Neck pain    Upper extremity weakness    Posture abnormality    Bladder pain    Lower extremity weakness    Right ureteral stone    Interstitial cystitis    Enterococcal bacteremia    Anemia    Ureteral stent present    Positive QuantiFERON-TB Gold test   [2]   Current Outpatient Medications:     ACCU-CHEK GUIDE TEST STRIPS Strp, CHECK KEVAN AZUCAR DOS VECES AL GOGO, Disp: 200 strip, Rfl: 3    amitriptyline (ELAVIL) 10 MG tablet, Take 1 tablet (10 mg total) by mouth every evening., Disp: 90 tablet, Rfl: 3    cefTRIAXone (ROCEPHIN) 2 gram injection, , Disp: , Rfl:     ciclopirox (PENLAC) 8 % Soln, Apply topically nightly. To affected toenails.  Remove with rubbing alcohol after each week of use., Disp: 6.6 mL, Rfl: 6    gabapentin (NEURONTIN) 600 MG tablet, Take 1 tablet (600 mg total) by mouth nightly., Disp: 30 tablet, Rfl: 11    glipiZIDE 5 MG TR24, Take 1 tablet (5 mg total) by mouth daily with breakfast., Disp: 90 tablet, Rfl: 3    heparin, porcine, PF, 10 unit/mL Syrg, Inject into the vein., Disp: , Rfl:     heparin, porcine, PF, 10 unit/mL Syrg, , Disp: , Rfl:     lancets Misc, To check BG 2 times daily, to use with insurance preferred meter, Disp: 200 each, Rfl: 11    levothyroxine (SYNTHROID) 112 MCG tablet, Take 1 tablet (112 mcg total) by mouth before breakfast., Disp: 90 tablet,  Rfl: 2    lisinopriL (PRINIVIL,ZESTRIL) 40 MG tablet, Take 1 tablet (40 mg total) by mouth once daily., Disp: 90 tablet, Rfl: 3    meloxicam (MOBIC) 7.5 MG tablet, Take 1 tablet (7.5 mg total) by mouth once daily., Disp: 90 tablet, Rfl: 0    metFORMIN (GLUCOPHAGE-XR) 500 MG ER 24hr tablet, Take 2 tablets (1,000 mg total) by mouth 2 (two) times daily with meals., Disp: 360 tablet, Rfl: 3    NORMAL SALINE FLUSH injection, , Disp: , Rfl:     rosuvastatin (CRESTOR) 20 MG tablet, TOME NATASHA TABLETA TODOS [place]RE[place], Disp: 90 tablet, Rfl: 3    semaglutide (OZEMPIC) 0.25 mg or 0.5 mg (2 mg/3 mL) pen injector, Inject 0.25 mg into the skin every 7 days for 90 days, THEN 0.5 mg every 7 days., Disp: 13.5 mL, Rfl: 0    terbinafine HCL (LAMISIL) 250 mg tablet, Take 1 tablet (250 mg total) by mouth once daily., Disp: 90 tablet, Rfl: 0    carvediloL (COREG) 6.25 MG tablet, Take 1 tablet (6.25 mg total) by mouth 2 (two) times daily., Disp: 60 tablet, Rfl: 11    rizatriptan (MAXALT-MLT) 10 MG disintegrating tablet, Take 1 tablet (10 mg total) by mouth as needed for Migraine. May repeat in 2 hours if needed, Disp: 10 tablet, Rfl: 0    triamcinolone acetonide 0.1% (KENALOG) 0.1 % cream, Apply topically 2 (two) times daily. for 7 days, Disp: 80 g, Rfl: 0    valACYclovir (VALTREX) 1000 MG tablet, Take 1 tablet (1,000 mg total) by mouth 3 (three) times daily. for 7 days, Disp: 21 tablet, Rfl: 0    Current Facility-Administered Medications:     capsaicin-skin cleanser patch 4 patch, 4 patch, Topical (Top), 1 time in Clinic/HOD,

## 2025-05-10 ENCOUNTER — HOSPITAL ENCOUNTER (EMERGENCY)
Facility: HOSPITAL | Age: 80
Discharge: HOME | End: 2025-05-10
Attending: EMERGENCY MEDICINE
Payer: MEDICARE

## 2025-05-10 VITALS
RESPIRATION RATE: 16 BRPM | SYSTOLIC BLOOD PRESSURE: 201 MMHG | HEIGHT: 65 IN | DIASTOLIC BLOOD PRESSURE: 92 MMHG | HEART RATE: 102 BPM | TEMPERATURE: 98 F | OXYGEN SATURATION: 98 % | BODY MASS INDEX: 27.62 KG/M2

## 2025-05-10 DIAGNOSIS — R30.0 DYSURIA: ICD-10-CM

## 2025-05-10 DIAGNOSIS — B02.9 HERPES ZOSTER WITHOUT COMPLICATION: Primary | ICD-10-CM

## 2025-05-10 DIAGNOSIS — R10.9 ABDOMINAL PAIN: ICD-10-CM

## 2025-05-10 LAB
BACTERIA #/AREA URNS AUTO: ABNORMAL /HPF
BILIRUB UR QL STRIP.AUTO: NEGATIVE
CLARITY UR: CLEAR
COLOR UR AUTO: COLORLESS
GLUCOSE UR QL STRIP: NEGATIVE
HGB UR QL STRIP: ABNORMAL
HOLD SPECIMEN: NORMAL
KETONES UR QL STRIP: NEGATIVE
LEUKOCYTE ESTERASE UR QL STRIP: ABNORMAL
MICROSCOPIC COMMENT: ABNORMAL
NITRITE UR QL STRIP: NEGATIVE
PH UR STRIP: 7 [PH]
PROT UR QL STRIP: NEGATIVE
RBC #/AREA URNS AUTO: <1 /HPF (ref 0–4)
SP GR UR STRIP: <1.005
SQUAMOUS #/AREA URNS AUTO: 2 /HPF
UROBILINOGEN UR STRIP-ACNC: NEGATIVE EU/DL
WBC #/AREA URNS AUTO: 9 /HPF (ref 0–5)
WBC CLUMPS UR QL AUTO: ABNORMAL

## 2025-05-10 PROCEDURE — 81001 URINALYSIS AUTO W/SCOPE: CPT | Mod: HCNC

## 2025-05-10 PROCEDURE — 99284 EMERGENCY DEPT VISIT MOD MDM: CPT | Mod: 25,HCNC

## 2025-05-10 PROCEDURE — 25000003 PHARM REV CODE 250: Mod: HCNC

## 2025-05-10 RX ORDER — HYDROCODONE BITARTRATE AND ACETAMINOPHEN 5; 325 MG/1; MG/1
1 TABLET ORAL
Refills: 0 | Status: COMPLETED | OUTPATIENT
Start: 2025-05-10 | End: 2025-05-10

## 2025-05-10 RX ORDER — LIDOCAINE HYDROCHLORIDE 20 MG/ML
SOLUTION OROPHARYNGEAL EVERY 6 HOURS PRN
Qty: 20 ML | Refills: 0 | Status: SHIPPED | OUTPATIENT
Start: 2025-05-10

## 2025-05-10 RX ADMIN — HYDROCODONE BITARTRATE AND ACETAMINOPHEN 1 TABLET: 5; 325 TABLET ORAL at 03:05

## 2025-05-10 NOTE — DISCHARGE INSTRUCTIONS
Thank you for letting me care for you today - it was nice to meet you and I hope you feel better soon.     Our goal at [facility] is to always give you outstanding care and exceptional service. You may receive a survey by mail or email in the next week about your experience in our ED. We would greatly appreciate you completing and returning the survey. Your feedback provides us with a way to recognize our staff who give very good care and it helps us learn how to improve when your experience was below our aspiration of excellence.     All the best,     Ellie Anguiano PA-C  Emergency Department Physician Assistant  [facility], [facility]

## 2025-05-10 NOTE — ED PROVIDER NOTES
Encounter Date: 5/10/2025       History     Chief Complaint   Patient presents with    Herpes Zoster     Pt. Was dignosed with shingles rash 5 days ago. There is a rash to (L) flank area-shingles like in appearance, no vesicles or weeping. Pt. Is not taking the prescribed medications citing they are not helping but here today for pain. She also reports constipation with no bm x5 days, despite stool softeners     Patient is a 80-year-old female with PMH of type 2 diabetes, GERD, HLD, HTN, hypothyroidism, arthritis presenting to the ED with complaints of ongoing pain due to herpes zoster diagnosed by her primary care provider 4 days ago.  She states the pain has been worsening since then.  She describes the pain as stabbing and burning.  States she has been taking Valtrex and gabapentin as prescribed.  She is also reporting associated constipation since starting the Valtrex as well as nausea.  She also reports dysuria.  She denies fever, chills, chest pain, or shortness of breath. She reports history of cholecystectomy, appendectomy, as well as surgery on her bladder in the past.    The history is provided by the patient. A  was used.     Review of patient's allergies indicates:   Allergen Reactions    Bufferin [aspirin, buffered] Itching    Atorvastatin      Myalgias and arthralgias    Shellfish containing products Itching     Shellfish causes her to itch per her daughter, Caro.     Warfarin     Ibuprofen Itching     Itching of eyes, head     Past Medical History:   Diagnosis Date    Anticoagulant long-term use     Arthritis     Cataract     left eye    Choroidal rupture of left eye     Diabetes mellitus type II     GERD (gastroesophageal reflux disease)     Hyperlipidemia     Hypertension     Hypothyroidism     Macular scar     right eye    Retinal degeneration     chorioretinal OD    Thyroid disease     Vitamin B 12 deficiency      Past Surgical History:   Procedure Laterality Date     APPENDECTOMY      BLADDER FULGURATION N/A 3/1/2024    Procedure: ULCER INJECTION FULGURATION;  Surgeon: Brody Smith MD;  Location: [facility] OR;  Service: Urology;  Laterality: N/A;    BLADDER SUSPENSION      CATARACT EXTRACTION      right eye     CHOLECYSTECTOMY      COLONOSCOPY N/A 7/20/2020    Procedure: COLONOSCOPY;  Surgeon: Juan Parker MD;  Location: [facility] ENDO (4TH FLR);  Service: Endoscopy;  Laterality: N/A;  Hx of chronic anemia.  patient needs a   4/6/20 - removed from 4/20/20, rescheduled 7/20/20 - pg  covid 7/17-om 2nd floor-tb    COLONOSCOPY N/A 2/9/2023    Procedure: COLONOSCOPY;  Surgeon: Renan Sanchez MD;  Location: [facility] ENDO (4TH FLR);  Service: Colon and Rectal;  Laterality: N/A;  instr handed to patient, -ml    CYSTOSCOPY N/A 5/19/2021    Procedure: CYSTOSCOPY;  Surgeon: Brody Smith MD;  Location: [facility] 1ST FLR;  Service: Urology;  Laterality: N/A;    CYSTOSCOPY W/ URETERAL STENT REMOVAL Right 5/24/2024    Procedure: RIGHT REMOVAL-STENT;  Surgeon: Brody Smith MD;  Location: [facility];  Service: Urology;  Laterality: Right;  30 MINUTES    CYSTOURETEROSCOPY, WITH HOLMIUM LASER LITHOTRIPSY OF URETERAL CALCULUS AND STENT INSERTION Right 3/1/2024    Procedure: CYSTOURETEROSCOPY, WITH HOLMIUM LASER LITHOTRIPSY OF URETERAL CALCULUS AND STENT INSERTION;  Surgeon: Brody Smith MD;  Location: [facility] OR;  Service: Urology;  Laterality: Right;    CYSTOURETEROSCOPY,WITH HOLMIUM LASER LITHOTRIPSY OF URETERAL CALCULUS Right 3/14/2024    Procedure: CYSTOURETEROSCOPY,WITH HOLMIUM LASER LITHOTRIPSY OF URETERAL CALCULUS;  Surgeon: Brody Smith MD;  Location: [facility] OR;  Service: Urology;  Laterality: Right;  1 HR    ECHOCARDIOGRAM,TRANSESOPHAGEAL N/A 3/20/2024    Procedure: Transesophageal echo (CARLITO) intra-procedure log documentation;  Surgeon: Provider, Dosc [facility];  Location: [facility] EP LAB;  Service: Cardiology;  Laterality: N/A;    EYE  SURGERY Bilateral     cataract removal    INJECTION OF STEROID N/A 3/14/2024    Procedure: INJECTION, STEROID;  Surgeon: Brody Smith MD;  Location: [facility] OR;  Service: Urology;  Laterality: N/A;    PLACEMENT-STENT Right 3/14/2024    Procedure: PLACEMENT-STENT;  Surgeon: Brody Smith MD;  Location: [facility] OR;  Service: Urology;  Laterality: Right;    REMOVAL-STENT Right 3/14/2024    Procedure: REMOVAL-STENT;  Surgeon: Brody Smith MD;  Location: [facility] OR;  Service: Urology;  Laterality: Right;    TOTAL ABDOMINAL HYSTERECTOMY      DUB     Family History   Problem Relation Name Age of Onset    Cancer Mother          uterine    Breast cancer Mother      Ovarian cancer Mother      COPD Father      Cancer Sister          liver    Ovarian cancer Sister      Hypertension Sister      Hypertension Sister      Hypertension Daughter      Thyroid disease Daughter      Cancer Daughter          uterine    Thyroid disease Daughter      Hypertension Daughter      Diabetes Son      Colon cancer Neg Hx      Amblyopia Neg Hx      Blindness Neg Hx      Cataracts Neg Hx      Glaucoma Neg Hx      Macular degeneration Neg Hx      Retinal detachment Neg Hx      Strabismus Neg Hx      Stroke Neg Hx       Social History[1]  Review of Systems   Constitutional:  Negative for chills and fever.   HENT:  Negative for congestion.    Eyes:  Negative for pain and redness.   Respiratory:  Negative for cough and shortness of breath.    Cardiovascular:  Negative for chest pain.   Gastrointestinal:  Positive for abdominal pain, constipation and nausea. Negative for diarrhea and vomiting.   Genitourinary:  Positive for dysuria.   Musculoskeletal:  Positive for back pain.   Skin:  Positive for rash.   Neurological:  Negative for headaches.   All other systems reviewed and are negative.      Physical Exam     Initial Vitals [05/10/25 1259]   BP Pulse Resp Temp SpO2   (!) 201/92 102 20 97.7 °F (36.5 °C) 98 %      MAP       --          Physical Exam    Vitals reviewed.  Constitutional: She appears well-developed and well-nourished.   HENT:   Head: Normocephalic and atraumatic.   Eyes: Conjunctivae and EOM are normal.   Neck: Neck supple.   Normal range of motion.  Cardiovascular:  Normal rate.           Pulmonary/Chest: Breath sounds normal. No respiratory distress. She has no wheezes.   Abdominal: Abdomen is soft. Bowel sounds are normal. She exhibits no distension. There is generalized abdominal tenderness.   Generalized mild tenderness to palpation There is no rebound and no guarding.   Musculoskeletal:         General: Normal range of motion.      Cervical back: Normal range of motion and neck supple.     Neurological: She is alert and oriented to person, place, and time.   Skin: Skin is warm. Rash noted.   Scabbed over rash in a dermatomal distribution to the left lumbar region extending around to abdomen. No weeping, surrounding erythema, or swelling. No skin sloughing.   Psychiatric: She has a normal mood and affect. Her behavior is normal. Judgment and thought content normal.         ED Course   Procedures  Labs Reviewed   URINALYSIS, REFLEX TO URINE CULTURE - Abnormal       Result Value    Color, UA Colorless (*)     Appearance, UA Clear      pH, UA 7.0      Spec Grav UA <1.005 (*)     Protein, UA Negative      Glucose, UA Negative      Ketones, UA Negative      Bilirubin, UA Negative      Blood, UA Trace (*)     Nitrites, UA Negative      Urobilinogen, UA Negative      Leukocyte Esterase, UA Trace (*)    URINALYSIS MICROSCOPIC - Abnormal    RBC, UA <1      WBC, UA 9 (*)     WBC Clumps, UA Rare      Bacteria, UA Occasional      Squamous Epithelial Cells, UA 2      Microscopic Comment       GREY TOP URINE HOLD          Imaging Results              X-Ray Abdomen Flat And Erect (Final result)  Result time 05/10/25 16:02:57      Final result by Joce Ray MD (05/10/25 16:02:57)                   Impression:       Constipation      Electronically signed by: Joce Ray MD  Date:    05/10/2025  Time:    16:02               Narrative:    EXAMINATION:  XR ABDOMEN FLAT AND ERECT    CLINICAL HISTORY:  Unspecified abdominal pain    TECHNIQUE:  Flat and erect AP views of the abdomen were performed.    COMPARISON:  05/01/2025    FINDINGS:  Bowel gas pattern is nonobstructive.  Mild-to-moderate colonic fecal material present.  No suspicious mass-effect or calcifications present.  Osseous structures are unchanged..  Lung bases clear.                                       Medications   HYDROcodone-acetaminophen 5-325 mg per tablet 1 tablet (1 tablet Oral Given 5/10/25 1530)     Medical Decision Making  Differential Diagnosis includes, but is not limited to:  Herpes zoster, erythema multiforme, Jansen-Dg syndrome, toxic epidermal necrolysis, cellulitis, Staph scalded skin syndrome, secondary syphilis, abscess, osteomyelitis, septic joint, MRSA, drug eruption, allergic reaction/urticatia, irritant/contact dermatitis, viral exanthem, local trauma/contusion, abrasion.      Amount and/or Complexity of Data Reviewed  Radiology: ordered.    Risk  Prescription drug management.               ED Course as of 05/10/25 1645   Sat May 10, 2025   1552 Urinalysis, Reflex to Urine Culture Urine, Clean Catch(!)  Trace blood & leukocytes [KG]   1606 FINDINGS:  Bowel gas pattern is nonobstructive.  Mild-to-moderate colonic fecal material present.  No suspicious mass-effect or calcifications present.  Osseous structures are unchanged..  Lung bases clear.     Impression:     Constipation [KG]   1637 Patient is stable, lying comfortably on stretcher.  Hypertensive upon arrival but otherwise vitals are normal.  In no acute distress.  Afebrile.  On exam, rash present to left lumbar region extending into left side of abdomen.  Consistent with appearance of shingles.  Currently scabbed over, no weeping or surrounding erythema.  Mild generalized  tenderness to palpation of the abdomen.  Upon re-evaluation, she states her pain is feeling better after medication given in ED. Discussed labs and imaging.  Mild to moderate amount of stool present in colon, however no signs of obstruction or free air.  Trace leukocytes on UA; low suspicion for UTI but will defer treatment pending urine culture.  Advised continued application of ice for pain.  Continue taking Valtrex and gabapentin as prescribed.  Viscous lidocaine prescribed to use topically.  Lactulose prescribed for constipation.  Strict ED return precautions discussed.  Follow up with PCP.  Patient verbalized understanding and agreement with plan.  All questions answered.  I have discussed this patient with ED attending, Dr. Bustos who is in agreement with patient's ED course and dispo. [KG]      ED Course User Index  [KG] Ellie Anguiano PA-C                           Clinical Impression:  Final diagnoses:  [R10.9] Abdominal pain  [B02.9] Herpes zoster without complication (Primary)  [R30.0] Dysuria          ED Disposition Condition    Discharge Stable          ED Prescriptions       Medication Sig Dispense Start Date End Date Auth. Provider    LIDOcaine viscous HCl 2% (LIDOCAINE VISCOUS) 2 % Soln Apply topically every 6 (six) hours as needed. 20 mL 5/10/2025 -- Ellie Anguiano PA-C    lactulose (CHRONULAC) 20 gram/30 mL Soln Take 15 mLs (10 g total) by mouth 2 (two) times daily. for 2 doses 30 mL 5/10/2025 5/11/2025 Ellie Anguiano PA-C          Follow-up Information       Follow up With Specialties Details Why Contact Info    Jeison Sanchez MD Internal Medicine   [address] [place] [id number]  [phone]                   [1]   Social History  Tobacco Use    Smoking status: Never    Smokeless tobacco: Never   Substance Use Topics    Alcohol use: No    Drug use: No        Ellie Anguiano PA-C  05/10/25 0344

## 2025-05-10 NOTE — ED NOTES
Patient reports rash on left side that wraps around to left lower back. She reports recently dx with shingles 5 days ago but doesn't take the medication stating it doesn't help with the pain. Patient also reports pain in left lower abdomen and reports no bowel movement x 5 days. She reports taking Ducalax for the past 3 days with no relief. Patient also reports pain and burning upon urination x2 weeks.

## 2025-05-12 ENCOUNTER — TELEPHONE (OUTPATIENT)
Dept: INTERNAL MEDICINE | Facility: CLINIC | Age: 80
End: 2025-05-12
Payer: MEDICARE

## 2025-05-12 NOTE — TELEPHONE ENCOUNTER
----- Message from Claire sent at 5/12/2025  9:03 AM CDT -----  .1MEDICALADVICE Patient is calling for Medical Advice regarding:Jennifer called asking for patients contact #. They have been unable to reach patient. I'm not authorized to give patient info. How long has patient had these symptoms:Pharmacy name and phone#:Patient wants a call back or [facility]:Comments: Sandeep Ware  Please advise patient replies from provider may take up to 48 hours.

## 2025-05-27 ENCOUNTER — OFFICE VISIT (OUTPATIENT)
Dept: INTERNAL MEDICINE | Facility: CLINIC | Age: 80
End: 2025-05-27
Payer: MEDICARE

## 2025-05-27 VITALS
OXYGEN SATURATION: 97 % | DIASTOLIC BLOOD PRESSURE: 86 MMHG | SYSTOLIC BLOOD PRESSURE: 138 MMHG | WEIGHT: 167.31 LBS | BODY MASS INDEX: 27.85 KG/M2 | HEART RATE: 84 BPM

## 2025-05-27 DIAGNOSIS — E03.9 HYPOTHYROIDISM, UNSPECIFIED TYPE: ICD-10-CM

## 2025-05-27 DIAGNOSIS — E11.40 TYPE 2 DIABETES MELLITUS WITH DIABETIC NEUROPATHY, WITHOUT LONG-TERM CURRENT USE OF INSULIN: Primary | ICD-10-CM

## 2025-05-27 DIAGNOSIS — B02.29 POST HERPETIC NEURALGIA: ICD-10-CM

## 2025-05-27 DIAGNOSIS — E78.5 HYPERLIPIDEMIA, UNSPECIFIED HYPERLIPIDEMIA TYPE: ICD-10-CM

## 2025-05-27 PROCEDURE — 3072F LOW RISK FOR RETINOPATHY: CPT | Mod: CPTII,S$GLB,, | Performed by: INTERNAL MEDICINE

## 2025-05-27 PROCEDURE — 99214 OFFICE O/P EST MOD 30 MIN: CPT | Mod: S$GLB,,, | Performed by: INTERNAL MEDICINE

## 2025-05-27 PROCEDURE — 1160F RVW MEDS BY RX/DR IN RCRD: CPT | Mod: CPTII,S$GLB,, | Performed by: INTERNAL MEDICINE

## 2025-05-27 PROCEDURE — 1159F MED LIST DOCD IN RCRD: CPT | Mod: CPTII,S$GLB,, | Performed by: INTERNAL MEDICINE

## 2025-05-27 PROCEDURE — 99999 PR PBB SHADOW E&M-EST. PATIENT-LVL IV: CPT | Mod: PBBFAC,,, | Performed by: INTERNAL MEDICINE

## 2025-05-27 PROCEDURE — 1157F ADVNC CARE PLAN IN RCRD: CPT | Mod: CPTII,S$GLB,, | Performed by: INTERNAL MEDICINE

## 2025-05-27 PROCEDURE — 1125F AMNT PAIN NOTED PAIN PRSNT: CPT | Mod: CPTII,S$GLB,, | Performed by: INTERNAL MEDICINE

## 2025-05-27 PROCEDURE — 1101F PT FALLS ASSESS-DOCD LE1/YR: CPT | Mod: CPTII,S$GLB,, | Performed by: INTERNAL MEDICINE

## 2025-05-27 PROCEDURE — 3075F SYST BP GE 130 - 139MM HG: CPT | Mod: CPTII,S$GLB,, | Performed by: INTERNAL MEDICINE

## 2025-05-27 PROCEDURE — 3288F FALL RISK ASSESSMENT DOCD: CPT | Mod: CPTII,S$GLB,, | Performed by: INTERNAL MEDICINE

## 2025-05-27 PROCEDURE — 3079F DIAST BP 80-89 MM HG: CPT | Mod: CPTII,S$GLB,, | Performed by: INTERNAL MEDICINE

## 2025-05-27 RX ORDER — METFORMIN HYDROCHLORIDE 500 MG/1
1000 TABLET, EXTENDED RELEASE ORAL 2 TIMES DAILY WITH MEALS
Qty: 360 TABLET | Refills: 3 | Status: SHIPPED | OUTPATIENT
Start: 2025-05-27 | End: 2026-05-27

## 2025-05-27 RX ORDER — LIDOCAINE 50 MG/G
1 PATCH TOPICAL DAILY
Qty: 30 PATCH | Refills: 0 | Status: SHIPPED | OUTPATIENT
Start: 2025-05-27

## 2025-05-27 RX ORDER — SEMAGLUTIDE 0.68 MG/ML
0.5 INJECTION, SOLUTION SUBCUTANEOUS
Qty: 9 ML | Refills: 0 | Status: SHIPPED | OUTPATIENT
Start: 2025-05-27 | End: 2025-08-25

## 2025-05-27 NOTE — PROGRESS NOTES
CHIEF COMPLAINT     Chief Complaint   Patient presents with    Follow-up     Pt had herpes rash 3 weeks ago    burning sensation       HPI     Aminah Norton is a 80 y.o. female PMH of type 2 diabetes, GERD, HLD, HTN, hypothyroidism, arthritis here today for     Recently had shingles s/p tx.  Having some residual neuralgia    Type 2 diabetes  Prescribed metformin a 1000 b.i.d., glipizide 5 mg in the morning and Ozempic.  Reports she only did 1 month of Ozempic    And did not get refill.  Reports her blood sugar was better with the Ozempic.    Personally Reviewed Patient's Medical, surgical, family and social hx. Changes updated in [employer].  Care Team updated in Epic    Review of Systems:  Review of Systems   Neurological:         +neuritis         Health Maintenance:   Reviewed with patient  Due for the following:      PHYSICAL EXAM     /86   Pulse 84   Wt 75.9 kg (167 lb 5.3 oz)   SpO2 97%   BMI 27.85 kg/m²     Gen: Well Appearing, NAD  HEENT: PERR, EOMI  Neck: FROM, no thyromegaly, no cervical adenopathy  CVD: RRR, no M/R/G  Pulm: Normal work of breathing, CTAB, no wheezing  Abd:  Soft, NT, ND non TTP, no mass  MSK: no LE edema  Neuro: A&Ox3, gait normal, speech normal  Mood; Mood normal, behavior normal, thought process linear       LABS     Labs reviewed; Notable for  Lab Results   Component Value Date    HGBA1C 8.9 (H) 02/27/2025       Chemistry        Component Value Date/Time     (L) 02/27/2025 1515    K 4.6 02/27/2025 1515     02/27/2025 1515    CO2 23 02/27/2025 1515    BUN 18 02/27/2025 1515    CREATININE 1.0 02/27/2025 1515     (H) 02/27/2025 1515        Component Value Date/Time    CALCIUM 10.1 02/27/2025 1515    ALKPHOS 109 02/27/2025 1515    AST 26 02/27/2025 1515    ALT 12 02/27/2025 1515    BILITOT 0.3 02/27/2025 1515    ESTGFRAFRICA >60.0 06/30/2022 0807    EGFRNONAA >60.0 06/30/2022 0807        Lab Results   Component Value Date    TSH 4.573 (H)  02/27/2025       ASSESSMENT     1. Type 2 diabetes mellitus with diabetic neuropathy, without long-term current use of insulin  Microalbumin/Creatinine Ratio, Urine    Comprehensive Metabolic Panel    Hemoglobin A1C    semaglutide (OZEMPIC) 0.25 mg or 0.5 mg (2 mg/3 mL) pen injector    blood sugar diagnostic (ACCU-CHEK GUIDE TEST STRIPS) Strp      2. Hypothyroidism, unspecified type  TSH      3. Post herpetic neuralgia  LIDOcaine (LIDODERM) 5 %      4. Hyperlipidemia, unspecified hyperlipidemia type  Lipid Panel              Plan      Aminah Norton is a 80 y.o. female with PMH of type 2 diabetes, GERD, HLD, HTN, hypothyroidism, arthritis   1. Type 2 diabetes mellitus with diabetic neuropathy, without long-term current use of insulin  Recheck A1c anticipate being elevated  Restart Ozempic  Continue metformin and glipizide  Overdue for eye exam  Will get microalbumin today  - Microalbumin/Creatinine Ratio, Urine; Future  - Comprehensive Metabolic Panel; Future  - Hemoglobin A1C; Future  - semaglutide (OZEMPIC) 0.25 mg or 0.5 mg (2 mg/3 mL) pen injector; Inject 0.5 mg into the skin every 7 days.  Dispense: 9 mL; Refill: 0  - blood sugar diagnostic (ACCU-CHEK GUIDE TEST STRIPS) Strp; CHECK KEVAN AZUCAR DOS VECES AL GOGO  Dispense: 200 strip; Refill: 3    2. Hypothyroidism, unspecified type  Continue levothyroxine will recheck TSH and titrate  - TSH; Future    3. Post herpetic neuralgia  Insufficient relief with gabapentin\  Will add lidocaine patches  - LIDOcaine (LIDODERM) 5 %; Place 1 patch onto the skin once daily. Remove & Discard patch within 12 hours or as directed by MD  Dispense: 30 patch; Refill: 0    4. Hyperlipidemia, unspecified hyperlipidemia type  Continue Crestor recheck lipid panel  - Lipid Panel; Future      Jeison Sanchez MD

## 2025-05-28 ENCOUNTER — TELEPHONE (OUTPATIENT)
Dept: INTERNAL MEDICINE | Facility: CLINIC | Age: 80
End: 2025-05-28
Payer: MEDICARE

## 2025-05-28 ENCOUNTER — LAB VISIT (OUTPATIENT)
Dept: LAB | Facility: HOSPITAL | Age: 80
End: 2025-05-28
Attending: INTERNAL MEDICINE
Payer: MEDICARE

## 2025-05-28 ENCOUNTER — RESULTS FOLLOW-UP (OUTPATIENT)
Dept: INTERNAL MEDICINE | Facility: CLINIC | Age: 80
End: 2025-05-28

## 2025-05-28 DIAGNOSIS — E03.9 HYPOTHYROIDISM, UNSPECIFIED TYPE: ICD-10-CM

## 2025-05-28 DIAGNOSIS — E11.40 TYPE 2 DIABETES MELLITUS WITH DIABETIC NEUROPATHY, WITHOUT LONG-TERM CURRENT USE OF INSULIN: ICD-10-CM

## 2025-05-28 DIAGNOSIS — E78.5 HYPERLIPIDEMIA, UNSPECIFIED HYPERLIPIDEMIA TYPE: ICD-10-CM

## 2025-05-28 LAB
ALBUMIN SERPL BCP-MCNC: 4.1 G/DL (ref 3.5–5.2)
ALBUMIN/CREAT UR: 53.4 UG/MG
ALP SERPL-CCNC: 108 UNIT/L (ref 40–150)
ALT SERPL W/O P-5'-P-CCNC: 17 UNIT/L (ref 10–44)
ANION GAP (OHS): 11 MMOL/L (ref 8–16)
AST SERPL-CCNC: 16 UNIT/L (ref 11–45)
BILIRUB SERPL-MCNC: 0.4 MG/DL (ref 0.1–1)
BUN SERPL-MCNC: 9 MG/DL (ref 8–23)
CALCIUM SERPL-MCNC: 9.3 MG/DL (ref 8.7–10.5)
CHLORIDE SERPL-SCNC: 103 MMOL/L (ref 95–110)
CHOLEST SERPL-MCNC: 254 MG/DL (ref 120–199)
CHOLEST/HDLC SERPL: 4.1 {RATIO} (ref 2–5)
CO2 SERPL-SCNC: 25 MMOL/L (ref 23–29)
CREAT SERPL-MCNC: 0.9 MG/DL (ref 0.5–1.4)
CREAT UR-MCNC: 148 MG/DL (ref 15–325)
EAG (OHS): 203 MG/DL (ref 68–131)
GFR SERPLBLD CREATININE-BSD FMLA CKD-EPI: >60 ML/MIN/1.73/M2
GLUCOSE SERPL-MCNC: 193 MG/DL (ref 70–110)
HBA1C MFR BLD: 8.7 % (ref 4–5.6)
HDLC SERPL-MCNC: 62 MG/DL (ref 40–75)
HDLC SERPL: 24.4 % (ref 20–50)
LDLC SERPL CALC-MCNC: 123.4 MG/DL (ref 63–159)
MICROALBUMIN UR-MCNC: 79 UG/ML (ref ?–5000)
NONHDLC SERPL-MCNC: 192 MG/DL
POTASSIUM SERPL-SCNC: 4.6 MMOL/L (ref 3.5–5.1)
PROT SERPL-MCNC: 7.6 GM/DL (ref 6–8.4)
SODIUM SERPL-SCNC: 139 MMOL/L (ref 136–145)
T4 FREE SERPL-MCNC: 1.14 NG/DL (ref 0.71–1.51)
TRIGL SERPL-MCNC: 343 MG/DL (ref 30–150)
TSH SERPL-ACNC: 4.94 UIU/ML (ref 0.4–4)

## 2025-05-28 PROCEDURE — 80061 LIPID PANEL: CPT

## 2025-05-28 PROCEDURE — 83036 HEMOGLOBIN GLYCOSYLATED A1C: CPT

## 2025-05-28 PROCEDURE — 84443 ASSAY THYROID STIM HORMONE: CPT

## 2025-05-28 PROCEDURE — 82043 UR ALBUMIN QUANTITATIVE: CPT

## 2025-05-28 PROCEDURE — 36415 COLL VENOUS BLD VENIPUNCTURE: CPT

## 2025-05-28 PROCEDURE — 80053 COMPREHEN METABOLIC PANEL: CPT

## 2025-05-28 PROCEDURE — 84439 ASSAY OF FREE THYROXINE: CPT

## 2025-05-28 NOTE — TELEPHONE ENCOUNTER
Pt is okay with re-starting Ozempic and with the increase with Crestor. Please send to her mail order.

## 2025-05-28 NOTE — TELEPHONE ENCOUNTER
----- Message from Jeison Sanchez MD sent at 5/28/2025 12:58 PM CDT -----  A1c still elevated- think restarting ozempic should help  LDL still elevated would like to increase crestor to 40mg     Would like to repeat both A1c and lipid in .    Can you  let me know if she is okay with this plan and I will order.   She is Swazi speaking   ----- Message -----  From: Lab, Background User  Sent: 5/28/2025  10:14 AM CDT  To: Jeison Sanchez MD

## 2025-05-29 DIAGNOSIS — E78.5 HYPERLIPIDEMIA, UNSPECIFIED HYPERLIPIDEMIA TYPE: ICD-10-CM

## 2025-05-29 RX ORDER — ROSUVASTATIN CALCIUM 40 MG/1
40 TABLET, COATED ORAL DAILY
Qty: 90 TABLET | Refills: 3 | Status: SHIPPED | OUTPATIENT
Start: 2025-05-29

## 2025-06-06 ENCOUNTER — PROCEDURE VISIT (OUTPATIENT)
Dept: PODIATRY | Facility: CLINIC | Age: 80
End: 2025-06-06
Payer: MEDICARE

## 2025-06-06 VITALS
HEART RATE: 82 BPM | HEIGHT: 65 IN | SYSTOLIC BLOOD PRESSURE: 167 MMHG | BODY MASS INDEX: 27.73 KG/M2 | DIASTOLIC BLOOD PRESSURE: 73 MMHG | WEIGHT: 166.44 LBS

## 2025-06-06 DIAGNOSIS — E11.42 TYPE 2 DIABETES MELLITUS WITH PERIPHERAL NEUROPATHY: ICD-10-CM

## 2025-06-06 DIAGNOSIS — E11.40 PAINFUL DIABETIC NEUROPATHY: Primary | ICD-10-CM

## 2025-06-06 RX ORDER — LACTULOSE 10 G/15ML
15 SOLUTION ORAL 2 TIMES DAILY
COMMUNITY
Start: 2025-05-10

## 2025-06-06 RX ORDER — GABAPENTIN 800 MG/1
800 TABLET ORAL NIGHTLY
Qty: 90 TABLET | Refills: 3 | Status: SHIPPED | OUTPATIENT
Start: 2025-06-06 | End: 2026-06-06

## 2025-07-07 DIAGNOSIS — E11.40 TYPE 2 DIABETES MELLITUS WITH DIABETIC NEUROPATHY, WITHOUT LONG-TERM CURRENT USE OF INSULIN: ICD-10-CM

## 2025-07-07 RX ORDER — GLIPIZIDE 5 MG/1
TABLET, FILM COATED, EXTENDED RELEASE ORAL
Qty: 90 TABLET | Refills: 3 | Status: SHIPPED | OUTPATIENT
Start: 2025-07-07

## 2025-07-07 NOTE — TELEPHONE ENCOUNTER
Care Due:                  Date            Visit Type   Department     Provider  --------------------------------------------------------------------------------                                EP -                              PRIMARY      [facility]  Last Visit: 05-      CARE ([facility])   MEDICINE       Jeison Sanchez  Next Visit: None Scheduled  None         None Found                                                            Last  Test          Frequency    Reason                     Performed    Due Date  --------------------------------------------------------------------------------    CBC.........  12 months..  meloxicam................  06- 05-    [facility] Embedded Care Due Messages. Reference number: [id number].   7/07/2025 12:13:42 AM CDT

## 2025-07-08 NOTE — TELEPHONE ENCOUNTER
Provider Staff:  Action required for this patient    Requires labs CBC     Please see care gap opportunities below in Care Due Message.    Thanks!  [facility]     Appointments      Date Provider   Last Visit   5/27/2025 Jeison Sanchez MD   Next Visit   Visit date not found Jeison Sanchez MD     Refill Decision Note   Aminah Errol  is requesting a refill authorization.  Brief Assessment and Rationale for Refill:  Approve     Medication Therapy Plan:         Comments:     Note composed:7:05 PM 07/07/2025

## 2025-08-22 DIAGNOSIS — E11.40 TYPE 2 DIABETES MELLITUS WITH DIABETIC NEUROPATHY, WITHOUT LONG-TERM CURRENT USE OF INSULIN: ICD-10-CM

## 2025-08-22 RX ORDER — GLIPIZIDE 5 MG/1
TABLET, FILM COATED, EXTENDED RELEASE ORAL
Qty: 90 TABLET | Refills: 0 | OUTPATIENT
Start: 2025-08-22

## (undated) DEVICE — SOL IRR WATER STRL 3000 ML

## (undated) DEVICE — TRAY CYSTO BASIN

## (undated) DEVICE — GOWN SMARTGOWN LVL4 X-LONG XL

## (undated) DEVICE — ADAPTER TUOHY BORST 6FR

## (undated) DEVICE — SET CYSTO IRRIGATION UNIV SPIK

## (undated) DEVICE — BAG URO DRAIN

## (undated) DEVICE — PACK CYSTOSCOPY II ECLIPSE

## (undated) DEVICE — SOL IRR NACL .9% 3000ML

## (undated) DEVICE — EXTRACTOR TIPLESS 2.4FRX1115CM

## (undated) DEVICE — PACK CYSTO

## (undated) DEVICE — GLOVE SENSICARE PI SURG 8

## (undated) DEVICE — GOWN SURGICAL X-LARGE

## (undated) DEVICE — TRAY CYSTO BASIN OMC

## (undated) DEVICE — CATH POLLACK OPEN-END FLEXI-TI

## (undated) DEVICE — GUIDE WIRE MOTION .035 X 150CM

## (undated) DEVICE — GLOVE BIOGEL 7.5

## (undated) DEVICE — PACK PERI/GYN OPTIMA

## (undated) DEVICE — SYR 10CC LUER LOCK

## (undated) DEVICE — CATH 5FR OPEN END URETERAL

## (undated) DEVICE — WIRE GD LUB STD 3CM .038 150CM

## (undated) DEVICE — GOWN X-LG STERILE BACK

## (undated) DEVICE — SET TUR BLADDER IRRIG Y TYPE

## (undated) DEVICE — FORCEP CUP BPSY OVAL 3F 115CM